# Patient Record
Sex: FEMALE | Race: WHITE | NOT HISPANIC OR LATINO | Employment: PART TIME | URBAN - METROPOLITAN AREA
[De-identification: names, ages, dates, MRNs, and addresses within clinical notes are randomized per-mention and may not be internally consistent; named-entity substitution may affect disease eponyms.]

---

## 2017-04-05 ENCOUNTER — OFFICE VISIT (OUTPATIENT)
Dept: INTERNAL MEDICINE | Facility: CLINIC | Age: 62
End: 2017-04-05
Payer: COMMERCIAL

## 2017-04-05 VITALS
HEART RATE: 68 BPM | HEIGHT: 65 IN | SYSTOLIC BLOOD PRESSURE: 120 MMHG | BODY MASS INDEX: 23.82 KG/M2 | DIASTOLIC BLOOD PRESSURE: 70 MMHG | TEMPERATURE: 98 F | WEIGHT: 143 LBS

## 2017-04-05 DIAGNOSIS — G47.00 INSOMNIA, UNSPECIFIED TYPE: ICD-10-CM

## 2017-04-05 DIAGNOSIS — F41.1 GAD (GENERALIZED ANXIETY DISORDER): ICD-10-CM

## 2017-04-05 PROCEDURE — 3078F DIAST BP <80 MM HG: CPT | Mod: S$GLB,,, | Performed by: PHYSICIAN ASSISTANT

## 2017-04-05 PROCEDURE — 99999 PR PBB SHADOW E&M-EST. PATIENT-LVL III: CPT | Mod: PBBFAC,,, | Performed by: PHYSICIAN ASSISTANT

## 2017-04-05 PROCEDURE — 99213 OFFICE O/P EST LOW 20 MIN: CPT | Mod: S$GLB,,, | Performed by: PHYSICIAN ASSISTANT

## 2017-04-05 PROCEDURE — 1160F RVW MEDS BY RX/DR IN RCRD: CPT | Mod: S$GLB,,, | Performed by: PHYSICIAN ASSISTANT

## 2017-04-05 PROCEDURE — 3074F SYST BP LT 130 MM HG: CPT | Mod: S$GLB,,, | Performed by: PHYSICIAN ASSISTANT

## 2017-04-05 RX ORDER — ALPRAZOLAM 0.25 MG/1
0.25 TABLET ORAL DAILY PRN
Qty: 30 TABLET | Refills: 0 | Status: SHIPPED | OUTPATIENT
Start: 2017-04-05 | End: 2017-09-20 | Stop reason: SDUPTHER

## 2017-04-05 NOTE — MR AVS SNAPSHOT
[place] [place]  [address] [place] [address]  Phone: [phone]  Fax: [phone]                  Shirlene Olvera   2017 10:00 AM   Office Visit    Description:  Female : 1955   Provider:  MARIA DOLORES Carias   [facility]  [place]           Reason for Visit     Headache     Ear Fullness     Nasal Congestion           Diagnoses this Visit        Comments    ROMÁN (generalized anxiety disorder)         Insomnia, unspecified type                To Do List           Future Appointments        Provider Department Dept Phone    2017 8:00 AM LABORATORY, [facility] [phone]    2017 2:40 PM Jhoana Segovia MD [facility] Medicine [phone]      Goals (5 Years of Data)     None       These Medications        Disp Refills Start End    alprazolam (XANAX) 0.25 MG tablet 30 tablet 0 2017    Take 1 tablet (0.25 mg total) by mouth daily as needed for Anxiety. - Oral    Pharmacy: [facility] in [facility], [place] - [address] [address] Ph #: [phone]       phenylephrine-guaifenesin (DECONEX IR)  mg Tab 14 each 0 2017 4/15/2017    Take 1 tablet by mouth every 12 (twelve) hours as needed. - Oral    Pharmacy: [facility] in [facility] - [address] [address] Ph #: [phone]         [facility]     [facility] Care Line -  Assistance  Unless otherwise directed by your provider, please contact [facility], our nurse care line that is available for  assistance.     Registered nurses in the [facility] provide: appointment scheduling, clinical advisement, health education, and other advisory services.  Call: [phone] (toll free)               Medications           Message regarding Medications     Verify the changes and/or additions to your medication regime listed below are the same as  discussed with your clinician today.  If any of these changes or additions are incorrect, please notify your healthcare provider.        START taking these NEW medications        Refills    phenylephrine-guaifenesin (DECONEX IR)  mg Tab 0    Sig: Take 1 tablet by mouth every 12 (twelve) hours as needed.    Class: Normal    Route: Oral      STOP taking these medications     loratadine (CLARITIN) 10 mg tablet Take 1 tablet (10 mg total) by mouth once daily.           Verify that the below list of medications is an accurate representation of the medications you are currently taking.  If none reported, the list may be blank. If incorrect, please contact your healthcare provider. Carry this list with you in case of emergency.           Current Medications     alprazolam (XANAX) 0.25 MG tablet Take 1 tablet (0.25 mg total) by mouth daily as needed for Anxiety.    aspirin 81 MG Chew Take 1 tablet (81 mg total) by mouth once daily.    atenolol (TENORMIN) 50 MG tablet TAKE 1 TABLET BY MOUTH ONCE DAILY    cyanocobalamin (VITAMIN B-12) 1000 MCG tablet Take 100 mcg by mouth once daily.    cyclobenzaprine (FLEXERIL) 10 MG tablet TAKE 1 TABLET BY MOUTH 3 TIMES DAILY IF NEEDED FOR MUSCLE SPASMS    fenofibrate 160 MG Tab Take 1 tablet (160 mg total) by mouth once daily.    fluticasone (FLONASE) 50 mcg/actuation nasal spray USE 2 SPRAYS IN EACH NOSTRIL ONCE DAILY    losartan-hydrochlorothiazide 50-12.5 mg (HYZAAR) 50-12.5 mg per tablet TAKE 1 TABLET BY MOUTH EVERY MORNING.    multivitamin with minerals tablet Take 1 tablet by mouth once daily.    tobramycin-dexamethasone (TOBRADEX ST) 0.3-0.05 % DrpS 1 drop 4 times a day for 7 days    phenylephrine-guaifenesin (DECONEX IR)  mg Tab Take 1 tablet by mouth every 12 (twelve) hours as needed.    tramadol (ULTRAM) 50 mg tablet Take 1 tablet (50 mg total) by mouth every 6 (six) hours as needed for Pain.           Clinical Reference Information           Your Vitals Were     BP  "Pulse Temp Height Weight BMI    120/70 68 97.5 °F (36.4 °C) (Tympanic) 5' 5" (1.651 m) 64.9 kg (143 lb) 23.8 kg/m2      Blood Pressure          Most Recent Value    BP  120/70      Allergies as of 4/5/2017     Statins-hmg-coa Reductase Inhibitors      Immunizations Administered on Date of Encounter - 4/5/2017     None      [facility] Sign-Up     Activating your [facility] account is as easy as 1-2-3!     1) Visit [email or web address], select Sign Up Now, enter this activation code and your date of birth, then select Next.  [email or web address]  Expires: 5/20/2017 10:39 AM      2) Create a username and password to use when you visit [facility] in the future and select a security question in case you lose your password and select Next.    3) Enter your e-mail address and click Sign Up!    Additional Information  If you have questions, please e-mail [email or web address] or call [phone] to talk to our [facility] staff. Remember, [facility] is NOT to be used for urgent needs. For medical emergencies, dial 911.         Instructions    Watch blood pressure (BP)   This medication may raise BP   Take one and see how it affects you.  Only take for a few days in a row        Smoking Cessation     If you would like to quit smoking:   You may be eligible for free services if you are a [place] resident and started smoking cigarettes before September 1, 1988.  Call the Smoking Cessation [employer] (SCT) toll free at (749) 743-1214 or (220) 936-9433.   Call 1-800-QUIT-NOW if you do not meet the above criteria.   Contact us via email: [email or web address]   View our website for more information: [email or web address]        Language Assistance Services     ATTENTION: Language assistance services are available, free of charge. Please call [phone].      ATENCIÓN: Si habla español, tiene a carlson disposición servicios gratuitos de asistencia lingüística. Llame al [phone].     CHÚ Ý: N?u b?n nói Ti?ng Vi?t, có các d?ch v? h? " tr? jose laughlin? mi?n phí dành cho b?n. G?i s? [phone].         [facility] complies with applicable Federal civil rights laws and does not discriminate on the basis of race, color, national origin, age, disability, or sex.

## 2017-04-05 NOTE — LETTER
April 5, 2017      [facility]  [address] [place] [address]  Phone: [phone]  Fax: [phone]       Patient: Shirlene Olvera   YOB: 1955  Date of Visit: 04/05/2017    To Whom It May Concern:    Shirlene Cohen was at [facility] on 04/05/2017. She May return to work on 4/6/17 with no restrictions. If you have any questions or concerns, or if I can be of further assistance, please do not hesitate to contact me.    Sincerely,    Ny Espitia LPN

## 2017-04-05 NOTE — PROGRESS NOTES
Subjective:       Patient ID: Shirlene Olvera is a 61 y.o. female.    Chief Complaint: Headache; Ear Fullness; and Nasal Congestion    Headache    This is a new problem. Associated symptoms include sinus pressure. Pertinent negatives include no abdominal pain, abnormal behavior, anorexia, back pain, blurred vision, coughing, dizziness, drainage, ear pain, eye pain, eye redness, facial sweating, fever, hearing loss, insomnia, loss of balance, muscle aches, nausea, neck pain, numbness, phonophobia, photophobia, rhinorrhea, scalp tenderness, seizures, sore throat, swollen glands, tingling, tinnitus, visual change, vomiting, weakness or weight loss.   Ear Fullness    Associated symptoms include headaches. Pertinent negatives include no abdominal pain, coughing, drainage, hearing loss, neck pain, rhinorrhea, sore throat or vomiting.   Sinus Problem   Associated symptoms include headaches and sinus pressure. Pertinent negatives include no coughing, ear pain, neck pain, sore throat or swollen glands.     Patient also needs refill of xanax. Only takes it sparingly, last refill >6 months ago   Past Medical History:   Diagnosis Date    Colon polyp     Coronary artery disease     pt states MI June 2015    Hypertension     Myocardial infarction     Tobacco use        Current Outpatient Prescriptions   Medication Sig Dispense Refill    alprazolam (XANAX) 0.25 MG tablet Take 1 tablet (0.25 mg total) by mouth daily as needed for Anxiety. 30 tablet 0    aspirin 81 MG Chew Take 1 tablet (81 mg total) by mouth once daily.  0    atenolol (TENORMIN) 50 MG tablet TAKE 1 TABLET BY MOUTH ONCE DAILY 30 tablet 11    cyanocobalamin (VITAMIN B-12) 1000 MCG tablet Take 100 mcg by mouth once daily.      cyclobenzaprine (FLEXERIL) 10 MG tablet TAKE 1 TABLET BY MOUTH 3 TIMES DAILY IF NEEDED FOR MUSCLE SPASMS 60 tablet 3    fenofibrate 160 MG Tab Take 1 tablet (160 mg total) by mouth once daily. 90 tablet 3    fluticasone  "(FLONASE) 50 mcg/actuation nasal spray USE 2 SPRAYS IN EACH NOSTRIL ONCE DAILY 16 g 11    losartan-hydrochlorothiazide 50-12.5 mg (HYZAAR) 50-12.5 mg per tablet TAKE 1 TABLET BY MOUTH EVERY MORNING. 30 tablet 11    multivitamin with minerals tablet Take 1 tablet by mouth once daily.      tobramycin-dexamethasone (TOBRADEX ST) 0.3-0.05 % DrpS 1 drop 4 times a day for 7 days 5 mL 0    phenylephrine-guaifenesin (DECONEX IR)  mg Tab Take 1 tablet by mouth every 12 (twelve) hours as needed. 14 each 0    tramadol (ULTRAM) 50 mg tablet Take 1 tablet (50 mg total) by mouth every 6 (six) hours as needed for Pain. 60 tablet 0     No current facility-administered medications for this visit.        Review of Systems   Constitutional: Negative for fever and weight loss.   HENT: Positive for sinus pressure. Negative for ear pain, hearing loss, rhinorrhea, sore throat and tinnitus.    Eyes: Negative for blurred vision, photophobia, pain and redness.   Respiratory: Negative for cough.    Gastrointestinal: Negative for abdominal pain, anorexia, nausea and vomiting.   Musculoskeletal: Negative for back pain and neck pain.   Neurological: Positive for headaches. Negative for dizziness, tingling, seizures, weakness, numbness and loss of balance.   Psychiatric/Behavioral: The patient does not have insomnia.        Objective:   /70  Pulse 68  Temp 97.5 °F (36.4 °C) (Tympanic)   Ht 5' 5" (1.651 m)  Wt 64.9 kg (143 lb)  BMI 23.8 kg/m2     Physical Exam   Constitutional: She is oriented to person, place, and time. She appears well-developed and well-nourished. No distress.   HENT:   Head: Normocephalic and atraumatic.   Right Ear: External ear normal.   Left Ear: External ear normal.   Eyes: EOM are normal. Pupils are equal, round, and reactive to light.   Cardiovascular: Normal rate, regular rhythm and normal heart sounds.    Pulmonary/Chest: Effort normal and breath sounds normal.   Neurological: She is alert and " oriented to person, place, and time.   Skin: Skin is warm.   Psychiatric: She has a normal mood and affect. Her behavior is normal. Thought content normal.         Lab Results   Component Value Date    WBC 4.98 07/10/2015    HGB 14.4 07/10/2015    HCT 43.6 07/10/2015     07/10/2015    CHOL 233 (H) 10/22/2016    TRIG 180 (H) 10/22/2016    HDL 52 10/22/2016    ALT 20 10/22/2016    AST 26 10/22/2016     10/22/2016    K 4.5 10/22/2016     10/22/2016    CREATININE 1.1 10/22/2016    BUN 19 10/22/2016    CO2 31 (H) 10/22/2016    TSH 0.895 01/26/2015    INR 0.9 05/13/2015       Assessment:       1. ROMÁN (generalized anxiety disorder)    2. Insomnia, unspecified type        Plan:   ROMÁN (generalized anxiety disorder)  -     alprazolam (XANAX) 0.25 MG tablet; Take 1 tablet (0.25 mg total) by mouth daily as needed for Anxiety.  Dispense: 30 tablet; Refill: 0    Insomnia, unspecified type  -     alprazolam (XANAX) 0.25 MG tablet; Take 1 tablet (0.25 mg total) by mouth daily as needed for Anxiety.  Dispense: 30 tablet; Refill: 0    Sinus pressure- discussed risks of this medication raising BP, take with caution and with plenty of water.   -     phenylephrine-guaifenesin (DECONEX IR)  mg Tab; Take 1 tablet by mouth every 12 (twelve) hours as needed.  Dispense: 14 each; Refill: 0

## 2017-04-05 NOTE — PATIENT INSTRUCTIONS
Watch blood pressure (BP)   This medication may raise BP   Take one and see how it affects you.  Only take for a few days in a row

## 2017-04-22 ENCOUNTER — LAB VISIT (OUTPATIENT)
Dept: LAB | Facility: HOSPITAL | Age: 62
End: 2017-04-22
Attending: FAMILY MEDICINE
Payer: COMMERCIAL

## 2017-04-22 DIAGNOSIS — E78.5 HYPERLIPIDEMIA, UNSPECIFIED HYPERLIPIDEMIA TYPE: ICD-10-CM

## 2017-04-22 DIAGNOSIS — Z72.0 TOBACCO ABUSE: ICD-10-CM

## 2017-04-22 DIAGNOSIS — I10 ESSENTIAL HYPERTENSION: ICD-10-CM

## 2017-04-22 LAB
ALBUMIN SERPL BCP-MCNC: 4.2 G/DL
ALP SERPL-CCNC: 59 U/L
ALT SERPL W/O P-5'-P-CCNC: 22 U/L
ANION GAP SERPL CALC-SCNC: 8 MMOL/L
AST SERPL-CCNC: 30 U/L
BASOPHILS # BLD AUTO: 0.06 K/UL
BASOPHILS NFR BLD: 1.1 %
BILIRUB SERPL-MCNC: 0.8 MG/DL
BUN SERPL-MCNC: 24 MG/DL
CALCIUM SERPL-MCNC: 10.3 MG/DL
CHLORIDE SERPL-SCNC: 104 MMOL/L
CHOLEST/HDLC SERPL: 4.3 {RATIO}
CO2 SERPL-SCNC: 28 MMOL/L
CREAT SERPL-MCNC: 1.5 MG/DL
DIFFERENTIAL METHOD: ABNORMAL
EOSINOPHIL # BLD AUTO: 0.1 K/UL
EOSINOPHIL NFR BLD: 2.3 %
ERYTHROCYTE [DISTWIDTH] IN BLOOD BY AUTOMATED COUNT: 12.8 %
EST. GFR  (AFRICAN AMERICAN): 42.7 ML/MIN/1.73 M^2
EST. GFR  (NON AFRICAN AMERICAN): 37.1 ML/MIN/1.73 M^2
GLUCOSE SERPL-MCNC: 98 MG/DL
HCT VFR BLD AUTO: 44.5 %
HDL/CHOLESTEROL RATIO: 23.5 %
HDLC SERPL-MCNC: 268 MG/DL
HDLC SERPL-MCNC: 63 MG/DL
HGB BLD-MCNC: 15.1 G/DL
LDLC SERPL CALC-MCNC: 179.8 MG/DL
LYMPHOCYTES # BLD AUTO: 2 K/UL
LYMPHOCYTES NFR BLD: 35.6 %
MCH RBC QN AUTO: 34.4 PG
MCHC RBC AUTO-ENTMCNC: 33.9 %
MCV RBC AUTO: 101 FL
MONOCYTES # BLD AUTO: 0.6 K/UL
MONOCYTES NFR BLD: 9.8 %
NEUTROPHILS # BLD AUTO: 2.9 K/UL
NEUTROPHILS NFR BLD: 51 %
NONHDLC SERPL-MCNC: 205 MG/DL
PLATELET # BLD AUTO: 283 K/UL
PMV BLD AUTO: 10.1 FL
POTASSIUM SERPL-SCNC: 4.7 MMOL/L
PROT SERPL-MCNC: 7.7 G/DL
RBC # BLD AUTO: 4.39 M/UL
SODIUM SERPL-SCNC: 140 MMOL/L
TRIGL SERPL-MCNC: 126 MG/DL
WBC # BLD AUTO: 5.7 K/UL

## 2017-04-22 PROCEDURE — 85025 COMPLETE CBC W/AUTO DIFF WBC: CPT

## 2017-04-22 PROCEDURE — 80053 COMPREHEN METABOLIC PANEL: CPT

## 2017-04-22 PROCEDURE — 80061 LIPID PANEL: CPT

## 2017-04-22 PROCEDURE — 36415 COLL VENOUS BLD VENIPUNCTURE: CPT | Mod: PO

## 2017-04-27 ENCOUNTER — OFFICE VISIT (OUTPATIENT)
Dept: INTERNAL MEDICINE | Facility: CLINIC | Age: 62
End: 2017-04-27
Payer: COMMERCIAL

## 2017-04-27 VITALS
WEIGHT: 147.06 LBS | SYSTOLIC BLOOD PRESSURE: 120 MMHG | DIASTOLIC BLOOD PRESSURE: 78 MMHG | TEMPERATURE: 98 F | BODY MASS INDEX: 24.5 KG/M2 | HEIGHT: 65 IN | HEART RATE: 60 BPM

## 2017-04-27 DIAGNOSIS — I10 ESSENTIAL HYPERTENSION: ICD-10-CM

## 2017-04-27 DIAGNOSIS — Z00.00 ROUTINE GENERAL MEDICAL EXAMINATION AT A HEALTH CARE FACILITY: Primary | ICD-10-CM

## 2017-04-27 DIAGNOSIS — N17.9 AKI (ACUTE KIDNEY INJURY): ICD-10-CM

## 2017-04-27 DIAGNOSIS — Z12.31 ENCOUNTER FOR SCREENING MAMMOGRAM FOR BREAST CANCER: ICD-10-CM

## 2017-04-27 DIAGNOSIS — E78.5 HYPERLIPIDEMIA, UNSPECIFIED HYPERLIPIDEMIA TYPE: ICD-10-CM

## 2017-04-27 PROCEDURE — 3074F SYST BP LT 130 MM HG: CPT | Mod: S$GLB,,, | Performed by: FAMILY MEDICINE

## 2017-04-27 PROCEDURE — 3078F DIAST BP <80 MM HG: CPT | Mod: S$GLB,,, | Performed by: FAMILY MEDICINE

## 2017-04-27 PROCEDURE — 99396 PREV VISIT EST AGE 40-64: CPT | Mod: S$GLB,,, | Performed by: FAMILY MEDICINE

## 2017-04-27 PROCEDURE — 99999 PR PBB SHADOW E&M-EST. PATIENT-LVL III: CPT | Mod: PBBFAC,,, | Performed by: FAMILY MEDICINE

## 2017-04-27 RX ORDER — FLUTICASONE PROPIONATE 50 MCG
2 SPRAY, SUSPENSION (ML) NASAL DAILY
Qty: 16 G | Refills: 11 | Status: SHIPPED | OUTPATIENT
Start: 2017-04-27 | End: 2017-10-19 | Stop reason: SDUPTHER

## 2017-04-27 RX ORDER — ROSUVASTATIN CALCIUM 5 MG/1
5 TABLET, COATED ORAL DAILY
Qty: 90 TABLET | Refills: 3 | Status: SHIPPED | OUTPATIENT
Start: 2017-04-27 | End: 2017-09-27

## 2017-04-27 NOTE — PROGRESS NOTES
Subjective:      Patient ID: Shirlene Olvera is a 61 y.o. female.    Chief Complaint: Annual Exam    HPI Comments: Patient's coming in today for follow-up of multiple issues and prevention exam.    She had a non-ST elevation MI.  We did try her on simvastatin and atorvastatin.  She couldn't afford Crestor before but now it is generic.  She's willing to try it again since the fenofibrate still is not getting her to goals.     She is working on quitting smoking.     She also has blood pressure issues currently well controlled.  No problems at this time.    She has recurrent back pain and cervical thoracic and lumbar regions with some degenerative disc.  Periodically she will use tramadol however she is remaining very active.  She has been using Motrin p.m. at night help her sleep.      She is requesting something for anxiety and mood irritation.  Xanax seemed to work very nicely helps her to get a good bit of rest.  She recently got a refill about 1 month ago.  Doing well.    Now noted to have acute kidney injury.  This is a new finding for her.  She reports she does not drink much water.  She's also been taking the Motrin p.m.  She's willing to stop.      Lab Results   Component Value Date    WBC 5.70 04/22/2017    HGB 15.1 04/22/2017    HCT 44.5 04/22/2017     04/22/2017    CHOL 268 (H) 04/22/2017    TRIG 126 04/22/2017    HDL 63 04/22/2017    ALT 22 04/22/2017    AST 30 04/22/2017     04/22/2017    K 4.7 04/22/2017     04/22/2017    CREATININE 1.5 (H) 04/22/2017    BUN 24 (H) 04/22/2017    CO2 28 04/22/2017    TSH 0.895 01/26/2015    INR 0.9 05/13/2015       Review of Systems   Constitutional: Negative for chills, fatigue and fever.   HENT: Negative for ear pain and trouble swallowing.    Eyes: Negative for pain and visual disturbance.   Respiratory: Negative for cough and shortness of breath.    Cardiovascular: Negative for chest pain and leg swelling.   Gastrointestinal: Negative for  abdominal pain, blood in stool, nausea and vomiting.   Endocrine: Negative for cold intolerance and heat intolerance.   Genitourinary: Negative for dysuria and frequency.   Musculoskeletal: Negative for joint swelling, myalgias and neck pain.   Skin: Negative for color change and rash.   Neurological: Negative for dizziness and headaches.   Psychiatric/Behavioral: Negative for behavioral problems, decreased concentration, dysphoric mood and sleep disturbance. The patient is not nervous/anxious.      Objective:     Physical Exam   Constitutional: She is oriented to person, place, and time. She appears well-developed and well-nourished.   HENT:   Head: Normocephalic and atraumatic.   Right Ear: External ear normal.   Left Ear: External ear normal.   Mouth/Throat: Oropharynx is clear and moist.   Eyes: EOM are normal.   Neck: Normal range of motion. Neck supple. No thyromegaly present.   Cardiovascular: Normal rate and regular rhythm.  Exam reveals no gallop and no friction rub.    No murmur heard.  Pulmonary/Chest: Effort normal. No respiratory distress. She has no wheezes. She has no rales.   Abdominal: Soft. Bowel sounds are normal. She exhibits no distension. There is no tenderness. There is no rebound.   Musculoskeletal: Normal range of motion. She exhibits no edema.   Lymphadenopathy:     She has no cervical adenopathy.   Neurological: She is alert and oriented to person, place, and time.   Skin: Skin is warm and dry. No rash noted.   Psychiatric: She has a normal mood and affect. Her behavior is normal. Judgment and thought content normal.   Vitals reviewed.    Assessment:     1. Routine general medical examination at a health care facility    2. Essential hypertension    3. Hyperlipidemia, unspecified hyperlipidemia type    4. SANTA (acute kidney injury)    5. Encounter for screening mammogram for breast cancer      Plan:   Shirlene was seen today for annual exam.    Diagnoses and all orders for this  visit:    Routine general medical examination at a health care facility- - labs ordered. Discussed Health Maintenance issues.       Essential hypertension- - stable, Continue with current medications and interventions. Labs reviewed.       Hyperlipidemia, unspecified hyperlipidemia type- stable, Continue with current medications and interventions. Labs reviewed.   -     Lipid panel; Future    SANTA (acute kidney injury)  Comments:  using motrin pm will stop. push more water. repeat again in 3mo.   Orders:  -     Comprehensive metabolic panel; Future    Encounter for screening mammogram for breast cancer-s [facility]acy.   -     Mammo Digital Screening Bilat with CAD; Future    Other orders  -     fluticasone (FLONASE) 50 mcg/actuation nasal spray; 2 sprays by Each Nare route once daily.  -     rosuvastatin (CRESTOR) 5 MG tablet; Take 1 tablet (5 mg total) by mouth once daily. Pt is willing to try medication again.            Return in about 1 year (around 4/27/2018) for physical.

## 2017-04-27 NOTE — MR AVS SNAPSHOT
[facility]  [address] [place] [address]  Phone: [phone]  Fax: [phone]                  Shirlene Olvera   2017 2:40 PM   Office Visit    Description:  Female : 1955   Provider:  Jhoana Segovia MD   [facility]           Reason for Visit     Annual Exam           Diagnoses this Visit        Comments    Routine general medical examination at a health care facility    -  Primary     Essential hypertension         Hyperlipidemia, unspecified hyperlipidemia type         SANTA (acute kidney injury)     using motrin pm will stop. push more water. repeat again in 3mo.     Encounter for screening mammogram for breast cancer                To Do List           Future Appointments        Provider Department Dept Phone    2017 8:45 AM [facility], [facility] [phone]    2017 10:45 AM [facility] MAMM-S[facility] [phone]      Goals (5 Years of Data)     None      Follow-Up and Disposition     Return in about 1 year (around 2018) for physical.       These Medications        Disp Refills Start End    fluticasone (FLONASE) 50 mcg/actuation nasal spray 16 g 11 2017     2 sprays by Each Nare route once daily. - Each Nare    Pharmacy: [facility] in [address], [place] Ph #: [phone]       Notes to Pharmacy: Generic For:*FLONASE 0.05% NASAL SPRAY   N O T I C E    PRESCRIPTION PREVIOUSLY AUTHORIZED BY DOCTOR:YOLY BARRERA, SEVERIANO [phone]    rosuvastatin (CRESTOR) 5 MG tablet 90 tablet 3 2017    Take 1 tablet (5 mg total) by mouth once daily. Pt is willing to try medication again. - Oral    Pharmacy: [facility] in [facility] [address] [facility] [address], [place] Ph #: [phone]         [facility]     [facility] On [facility] Nurse Care Line - / Assistance  Unless otherwise directed by your  provider, please contact [facility], our nurse care line that is available for 24/7 assistance.     Registered nurses in the [facility] provide: appointment scheduling, clinical advisement, health education, and other advisory services.  Call: [phone] (toll free)               Medications           Message regarding Medications     Verify the changes and/or additions to your medication regime listed below are the same as discussed with your clinician today.  If any of these changes or additions are incorrect, please notify your healthcare provider.        START taking these NEW medications        Refills    rosuvastatin (CRESTOR) 5 MG tablet 3    Sig: Take 1 tablet (5 mg total) by mouth once daily. Pt is willing to try medication again.    Class: Normal    Route: Oral      CHANGE how you are taking these medications     Start Taking Instead of    fluticasone (FLONASE) 50 mcg/actuation nasal spray fluticasone (FLONASE) 50 mcg/actuation nasal spray    Dosage:  2 sprays by Each Nare route once daily. Dosage:  USE 2 SPRAYS IN EACH NOSTRIL ONCE DAILY    Reason for Change:  Reorder       STOP taking these medications     fenofibrate 160 MG Tab Take 1 tablet (160 mg total) by mouth once daily.    tramadol (ULTRAM) 50 mg tablet Take 1 tablet (50 mg total) by mouth every 6 (six) hours as needed for Pain.           Verify that the below list of medications is an accurate representation of the medications you are currently taking.  If none reported, the list may be blank. If incorrect, please contact your healthcare provider. Carry this list with you in case of emergency.           Current Medications     alprazolam (XANAX) 0.25 MG tablet Take 1 tablet (0.25 mg total) by mouth daily as needed for Anxiety.    aspirin 81 MG Chew Take 1 tablet (81 mg total) by mouth once daily.    atenolol (TENORMIN) 50 MG tablet TAKE 1 TABLET BY MOUTH ONCE DAILY    cyanocobalamin (VITAMIN B-12) 1000 MCG tablet Take 100  "mcg by mouth once daily.    cyclobenzaprine (FLEXERIL) 10 MG tablet TAKE 1 TABLET BY MOUTH 3 TIMES DAILY IF NEEDED FOR MUSCLE SPASMS    fluticasone (FLONASE) 50 mcg/actuation nasal spray 2 sprays by Each Nare route once daily.    losartan-hydrochlorothiazide 50-12.5 mg (HYZAAR) 50-12.5 mg per tablet TAKE 1 TABLET BY MOUTH EVERY MORNING.    multivitamin with minerals tablet Take 1 tablet by mouth once daily.    rosuvastatin (CRESTOR) 5 MG tablet Take 1 tablet (5 mg total) by mouth once daily. Pt is willing to try medication again.    tobramycin-dexamethasone (TOBRADEX ST) 0.3-0.05 % DrpS 1 drop 4 times a day for 7 days           Clinical Reference Information           Your Vitals Were     BP Pulse Temp Height Weight BMI    120/78 60 97.8 °F (36.6 °C) (Tympanic) 5' 5" (1.651 m) 66.7 kg (147 lb 0.8 oz) 24.47 kg/m2      Blood Pressure          Most Recent Value    BP  120/78      Allergies as of 4/27/2017     Statins-hmg-coa Reductase Inhibitors      Immunizations Administered on Date of Encounter - 4/27/2017     None      Orders Placed During Today's Visit     Future Labs/Procedures Expected by Expires    Mammo Digital Screening Bilat with CAD  4/27/2017 6/28/2018    Comprehensive metabolic panel  7/19/2017 4/27/2018    Lipid panel  7/19/2017 4/27/2018      [facility] Sign-Up     Activating your [facility] account is as easy as 1-2-3!     1) Visit [email or web address], select Sign Up Now, enter this activation code and your date of birth, then select Next.  [email or web address]  Expires: 5/20/2017 10:39 AM      2) Create a username and password to use when you visit [facility] in the future and select a security question in case you lose your password and select Next.    3) Enter your e-mail address and click Sign Up!    Additional Information  If you have questions, please e-mail [email or web address] or call [phone] to talk to our [facility] staff. Remember, [facility] is NOT to be used for urgent needs. For medical " emergencies, dial 911.         Smoking Cessation     If you would like to quit smoking:   You may be eligible for free services if you are a [place] resident and started smoking cigarettes before September 1, 1988.  Call the Smoking Cessation Trust ([facility]) toll free at (224) 735-7932 or (402) 342-2600.   Call 1-800-QUIT-NOW if you do not meet the above criteria.   Contact us via email: [email or web address]   View our website for more information: [email or web address]        Language Assistance Services     ATTENTION: Language assistance services are available, free of charge. Please call [phone].      ATENCIÓN: Si habla shine, tiene a carlson disposición servicios gratuitos de asistencia lingüística. Llame al [phone].     CHÚ Ý: N?u b?n nói Ti?ng Vi?t, có các d?ch v? h? tr? ngôn ng? mi?n phí dành cho b?n. G?i s? [phone].         [facility] complies with applicable Federal civil rights laws and does not discriminate on the basis of race, color, national origin, age, disability, or sex.

## 2017-05-22 ENCOUNTER — OFFICE VISIT (OUTPATIENT)
Dept: URGENT CARE | Facility: CLINIC | Age: 62
End: 2017-05-22
Payer: COMMERCIAL

## 2017-05-22 VITALS
HEIGHT: 65 IN | HEART RATE: 60 BPM | BODY MASS INDEX: 24.94 KG/M2 | DIASTOLIC BLOOD PRESSURE: 82 MMHG | OXYGEN SATURATION: 98 % | TEMPERATURE: 98 F | SYSTOLIC BLOOD PRESSURE: 170 MMHG | WEIGHT: 149.69 LBS

## 2017-05-22 DIAGNOSIS — B37.31 YEAST VAGINITIS: Primary | ICD-10-CM

## 2017-05-22 DIAGNOSIS — N39.0 URINARY TRACT INFECTION WITH HEMATURIA, SITE UNSPECIFIED: ICD-10-CM

## 2017-05-22 DIAGNOSIS — R31.9 URINARY TRACT INFECTION WITH HEMATURIA, SITE UNSPECIFIED: ICD-10-CM

## 2017-05-22 LAB
BILIRUB SERPL-MCNC: NEGATIVE MG/DL
BLOOD URINE, POC: ABNORMAL
COLOR, POC UA: YELLOW
GLUCOSE UR QL STRIP: NORMAL
KETONES UR QL STRIP: NEGATIVE
LEUKOCYTE ESTERASE URINE, POC: ABNORMAL
NITRITE, POC UA: NEGATIVE
PH, POC UA: 5
PROTEIN, POC: ABNORMAL
SPECIFIC GRAVITY, POC UA: 1.01
UROBILINOGEN, POC UA: NORMAL

## 2017-05-22 PROCEDURE — 99999 PR PBB SHADOW E&M-EST. PATIENT-LVL IV: CPT | Mod: PBBFAC,,, | Performed by: NURSE PRACTITIONER

## 2017-05-22 PROCEDURE — 1160F RVW MEDS BY RX/DR IN RCRD: CPT | Mod: S$GLB,,, | Performed by: NURSE PRACTITIONER

## 2017-05-22 PROCEDURE — 3079F DIAST BP 80-89 MM HG: CPT | Mod: S$GLB,,, | Performed by: NURSE PRACTITIONER

## 2017-05-22 PROCEDURE — 3077F SYST BP >= 140 MM HG: CPT | Mod: S$GLB,,, | Performed by: NURSE PRACTITIONER

## 2017-05-22 PROCEDURE — 87086 URINE CULTURE/COLONY COUNT: CPT

## 2017-05-22 PROCEDURE — 99213 OFFICE O/P EST LOW 20 MIN: CPT | Mod: 25,S$GLB,, | Performed by: NURSE PRACTITIONER

## 2017-05-22 PROCEDURE — 81002 URINALYSIS NONAUTO W/O SCOPE: CPT | Mod: S$GLB,,, | Performed by: NURSE PRACTITIONER

## 2017-05-22 RX ORDER — PHENAZOPYRIDINE HYDROCHLORIDE 200 MG/1
200 TABLET, FILM COATED ORAL 3 TIMES DAILY PRN
Qty: 6 TABLET | Refills: 0 | Status: SHIPPED | OUTPATIENT
Start: 2017-05-22 | End: 2017-09-27

## 2017-05-22 RX ORDER — FLUCONAZOLE 150 MG/1
150 TABLET ORAL ONCE
Qty: 2 TABLET | Refills: 0 | Status: SHIPPED | OUTPATIENT
Start: 2017-05-22 | End: 2017-05-22

## 2017-05-22 RX ORDER — CIPROFLOXACIN 500 MG/1
500 TABLET ORAL 2 TIMES DAILY
Qty: 14 TABLET | Refills: 0 | Status: SHIPPED | OUTPATIENT
Start: 2017-05-22 | End: 2017-05-29

## 2017-05-22 NOTE — PATIENT INSTRUCTIONS
Rest  Fluids  Follow up with Primary Care Physician  If not improving       Urinary Tract Infections in Women    Urinary tract infections (UTIs) are most often caused by bacteria (germs). These bacteria enter the urinary tract. The bacteria may come from outside the body. Or they may travel from the skin outside the rectum or vagina into the urethra. Female anatomy makes it easier for bacteria from the bowel to enter a womans urinary tract, which is the most common source of UTI. This means women develop UTIs more often than men. Pain in or around the urinary tract is a common UTI symptom. But the only way to know for sure if you have a UTI for the health care provider to test your urine. The two tests that may be done are the urinalysis and urine culture.  Types of UTIs  · Cystitis: A bladder infection (cystitis) is the most common UTI in women. You may have urgent or frequent urination. You may also have pain, burning when you urinate, and bloody urine.  · Urethritis: This is an inflamed urethra, which is the tube that carries urine from the bladder to outside the body. You may have lower stomach or back pain. You may also have urgent or frequent urination.  · Pyelonephritis: This is a kidney infection. If not treated, it can be serious and damage your kidneys. In severe cases, you may be hospitalized. You may have a fever and lower back pain.  Medications to treat a UTI  Most UTIs are treated with antibiotics. These kill the bacteria. The length of time you need to take them depends on the type of infection. It may be as short as 3 days. If you have repeated UTIs, a low-dose antibiotic may be needed for several months. Take antibiotics exactly as directed. Dont stop taking them until all of the medication is gone. If you stop taking the antibiotic too soon, the infection may not go away, and you may develop a resistance to the antibiotic. This can make it much harder to treat.  Lifestyle changes to treat and  prevent UTIs  The lifestyle changes below will help get rid of your UTI. They may also help prevent future UTIs.  · Drink plenty of fluids. This includes water, juice, or other caffeine-free drinks. Fluids help flush bacteria out of your body.  · Empty your bladder. Always empty your bladder when you feel the urge to urinate. And always urinate before going to sleep. Urine that stays in your bladder can lead to infection. Try to urinate before and after sex as well.  · Practice good personal hygiene. Wipe yourself from front to back after using the toilet. This helps keep bacteria from getting into the urethra.  · Use condoms during sex. These help prevent UTIs caused by sexually transmitted bacteria. Also, avoid using spermicides during sex. These can increase the risk of UTIs. Choose other forms of birth control instead. For women who tend to get UTIs after sex, a low-dose of a preventive antibiotic may be used. Be sure to discuss this option with your health care provider.  · Follow up with your health care provider as directed. He or she may test to make sure the infection has cleared. If necessary, additional treatment may be started.  Date Last Reviewed: 9/8/2014  © 2975-0664 [employer]. [address] [place] [address], [place] [address]. All rights reserved. This information is not intended as a substitute for professional medical care. Always follow your healthcare professional's instructions.

## 2017-05-22 NOTE — PROGRESS NOTES
"Subjective:       Patient ID: Shirlene Olvera is a 61 y.o. female.    Chief Complaint: Dysuria (flank pain also )    Urinary Tract Infection    This is a new problem. Episode onset: 3 days. The problem occurs every urination. The problem has been gradually worsening. The quality of the pain is described as burning. The pain is mild. There has been no fever. There is no history of pyelonephritis. Associated symptoms include behavior changes (drinks lots of tea lately), a discharge (itching), flank pain, frequency, hesitancy and urgency. Pertinent negatives include no chills, hematuria, nausea, possible pregnancy, sweats, vomiting, weight loss, bubble bath use, constipation, rash or withholding. She has tried increased fluids for the symptoms. The treatment provided mild relief. There is no history of diabetes mellitus, kidney stones, a single kidney or urinary stasis.       BP (!) 170/82 (BP Location: Left arm, Patient Position: Sitting, BP Method: Manual)   Pulse 60   Temp 97.6 °F (36.4 °C) (Tympanic)   Ht 5' 5" (1.651 m)   Wt 67.9 kg (149 lb 11.1 oz)   SpO2 98%   BMI 24.91 kg/m²     Review of Systems   Constitutional: Negative for appetite change, chills, diaphoresis, fatigue, fever and weight loss.   HENT: Negative.    Eyes: Negative.  Negative for visual disturbance.   Respiratory: Negative for cough, chest tightness, shortness of breath and wheezing.    Cardiovascular: Negative for chest pain, palpitations and leg swelling.   Gastrointestinal: Positive for abdominal pain. Negative for abdominal distention, blood in stool, constipation, diarrhea, nausea and vomiting.   Endocrine: Negative.    Genitourinary: Positive for dysuria, flank pain, frequency, hesitancy, urgency and vaginal discharge (thick white, itching, feels like past yeast). Negative for decreased urine volume, difficulty urinating, dyspareunia, enuresis, genital sores, hematuria, menstrual problem, pelvic pain and vaginal pain. "   Musculoskeletal: Positive for back pain. Negative for joint swelling.   Skin: Negative for color change and rash.   Allergic/Immunologic: Negative for environmental allergies, food allergies and immunocompromised state.   Neurological: Negative.  Negative for dizziness, syncope, speech difficulty, light-headedness and headaches.   Hematological: Negative for adenopathy. Does not bruise/bleed easily.   Psychiatric/Behavioral: Negative for agitation, confusion and hallucinations. The patient is not nervous/anxious.        Objective:      Physical Exam   Constitutional: She is oriented to person, place, and time. She appears well-developed and well-nourished. No distress.   HENT:   Head: Normocephalic and atraumatic.   Right Ear: External ear normal.   Left Ear: External ear normal.   Nose: Nose normal.   Eyes: Conjunctivae are normal. Right eye exhibits no discharge. Left eye exhibits no discharge.   Neck: Normal range of motion.   Cardiovascular: Normal rate, regular rhythm and normal heart sounds.    No murmur heard.  Pulmonary/Chest: Effort normal and breath sounds normal. No respiratory distress. She has no wheezes. She has no rales. She exhibits no tenderness.   Abdominal: Soft. She exhibits no distension. There is no hepatosplenomegaly. There is tenderness in the periumbilical area and suprapubic area. There is no rigidity, no rebound, no guarding and no CVA tenderness. No hernia.   Musculoskeletal: Normal range of motion. She exhibits no edema or tenderness.   Neurological: She is alert and oriented to person, place, and time.   Skin: Skin is warm and dry. No rash noted. She is not diaphoretic. No erythema.   Psychiatric: She has a normal mood and affect. Her behavior is normal. Judgment and thought content normal.   Nursing note and vitals reviewed.      Assessment:       1. Yeast vaginitis    2. Urinary tract infection with hematuria, site unspecified        Plan:       Shirlene was seen today for  dysuria.    Diagnoses and all orders for this visit:    Yeast vaginitis  -     fluconazole (DIFLUCAN) 150 MG Tab; Take 1 tablet (150 mg total) by mouth once. Repeat in 3 days if symptoms still persist.    Urinary tract infection with hematuria, site unspecified  -     ciprofloxacin HCl (CIPRO) 500 MG tablet; Take 1 tablet (500 mg total) by mouth 2 (two) times daily.  -     POCT urine dipstick without microscope  -     Urine culture  -     phenazopyridine (PYRIDIUM) 200 MG tablet; Take 1 tablet (200 mg total) by mouth 3 (three) times daily as needed.    due to flank pain, offered kub to rule out kidney stone due to flank pain and blood seen in urine today. Patient wants to hold off, push fluids, and try antibiotic for infection first.   Rest  Fluids  Limit tea  If symptoms worsen or fail to improve with treatment, see your Primary Care Provider or go to the nearest Emergency Room.

## 2017-05-24 LAB
BACTERIA UR CULT: NORMAL
BACTERIA UR CULT: NORMAL

## 2017-05-25 ENCOUNTER — TELEPHONE (OUTPATIENT)
Dept: URGENT CARE | Facility: CLINIC | Age: 62
End: 2017-05-25

## 2017-05-25 NOTE — TELEPHONE ENCOUNTER
----- Message from Alanna Ohara sent at 5/25/2017 12:55 PM CDT -----  Contact: self [phone]  States that she is returning call. Please call back at [phone] leave detailed message if no answer//thank you acc

## 2017-05-25 NOTE — TELEPHONE ENCOUNTER
----- Message from Reese Cobb sent at 5/25/2017 11:45 AM CDT -----  Contact: pt  Pt is returning Nurse staff call.Pt call back [phone] to be advised thanks

## 2017-05-25 NOTE — TELEPHONE ENCOUNTER
----- Message from Tracey Seo sent at 5/25/2017 10:07 AM CDT -----  Contact: pt  Pt returning nurse call, please call pt @ [phone].

## 2017-07-17 ENCOUNTER — HOSPITAL ENCOUNTER (OUTPATIENT)
Dept: RADIOLOGY | Facility: HOSPITAL | Age: 62
Discharge: HOME | End: 2017-07-17
Attending: FAMILY MEDICINE
Payer: COMMERCIAL

## 2017-07-17 VITALS — HEIGHT: 65 IN | WEIGHT: 149 LBS | BODY MASS INDEX: 24.83 KG/M2

## 2017-07-17 DIAGNOSIS — Z12.31 ENCOUNTER FOR SCREENING MAMMOGRAM FOR BREAST CANCER: ICD-10-CM

## 2017-07-17 PROCEDURE — 77063 BREAST TOMOSYNTHESIS BI: CPT | Mod: 26,,, | Performed by: RADIOLOGY

## 2017-07-17 PROCEDURE — 77067 SCR MAMMO BI INCL CAD: CPT | Mod: TC

## 2017-07-17 PROCEDURE — 77067 SCR MAMMO BI INCL CAD: CPT | Mod: 26,,, | Performed by: RADIOLOGY

## 2017-07-18 ENCOUNTER — TELEPHONE (OUTPATIENT)
Dept: INTERNAL MEDICINE | Facility: CLINIC | Age: 62
End: 2017-07-18

## 2017-07-18 NOTE — TELEPHONE ENCOUNTER
----- Message from Shyla Huerta sent at 7/18/2017  2:32 PM CDT -----  Contact: patient  Returning your call. Please call patient @ [phone]. Thanks, roosevelt

## 2017-07-22 ENCOUNTER — LAB VISIT (OUTPATIENT)
Dept: LAB | Facility: HOSPITAL | Age: 62
End: 2017-07-22
Attending: FAMILY MEDICINE
Payer: COMMERCIAL

## 2017-07-22 DIAGNOSIS — N17.9 AKI (ACUTE KIDNEY INJURY): ICD-10-CM

## 2017-07-22 DIAGNOSIS — E78.5 HYPERLIPIDEMIA, UNSPECIFIED HYPERLIPIDEMIA TYPE: ICD-10-CM

## 2017-07-22 LAB
ALBUMIN SERPL BCP-MCNC: 3.6 G/DL
ALP SERPL-CCNC: 78 U/L
ALT SERPL W/O P-5'-P-CCNC: 10 U/L
ANION GAP SERPL CALC-SCNC: 9 MMOL/L
AST SERPL-CCNC: 17 U/L
BILIRUB SERPL-MCNC: 1.2 MG/DL
BUN SERPL-MCNC: 12 MG/DL
CALCIUM SERPL-MCNC: 9.6 MG/DL
CHLORIDE SERPL-SCNC: 107 MMOL/L
CHOLEST/HDLC SERPL: 6 {RATIO}
CO2 SERPL-SCNC: 26 MMOL/L
CREAT SERPL-MCNC: 0.9 MG/DL
EST. GFR  (AFRICAN AMERICAN): >60 ML/MIN/1.73 M^2
EST. GFR  (NON AFRICAN AMERICAN): >60 ML/MIN/1.73 M^2
GLUCOSE SERPL-MCNC: 91 MG/DL
HDL/CHOLESTEROL RATIO: 16.7 %
HDLC SERPL-MCNC: 252 MG/DL
HDLC SERPL-MCNC: 42 MG/DL
LDLC SERPL CALC-MCNC: 172 MG/DL
NONHDLC SERPL-MCNC: 210 MG/DL
POTASSIUM SERPL-SCNC: 4.4 MMOL/L
PROT SERPL-MCNC: 6.9 G/DL
SODIUM SERPL-SCNC: 142 MMOL/L
TRIGL SERPL-MCNC: 190 MG/DL

## 2017-07-22 PROCEDURE — 80053 COMPREHEN METABOLIC PANEL: CPT

## 2017-07-22 PROCEDURE — 80061 LIPID PANEL: CPT

## 2017-07-22 PROCEDURE — 36415 COLL VENOUS BLD VENIPUNCTURE: CPT | Mod: PO

## 2017-07-24 ENCOUNTER — TELEPHONE (OUTPATIENT)
Dept: INTERNAL MEDICINE | Facility: CLINIC | Age: 62
End: 2017-07-24

## 2017-07-24 NOTE — TELEPHONE ENCOUNTER
----- Message from Thang Eason sent at 7/24/2017  1:13 PM CDT -----  Contact: Pt  Pt states she is returning nurse call, please contact pt at [phone]

## 2017-07-24 NOTE — PROGRESS NOTES
Please find out if she is taking the crestor 5mg for her cholesterol. No major change in her cholesterol numbers.

## 2017-09-20 DIAGNOSIS — M50.30 DDD (DEGENERATIVE DISC DISEASE), CERVICAL: ICD-10-CM

## 2017-09-20 DIAGNOSIS — I10 UNSPECIFIED ESSENTIAL HYPERTENSION: ICD-10-CM

## 2017-09-20 DIAGNOSIS — G47.00 INSOMNIA, UNSPECIFIED TYPE: ICD-10-CM

## 2017-09-20 DIAGNOSIS — F41.1 GAD (GENERALIZED ANXIETY DISORDER): ICD-10-CM

## 2017-09-20 DIAGNOSIS — M51.36 DDD (DEGENERATIVE DISC DISEASE), LUMBAR: ICD-10-CM

## 2017-09-20 RX ORDER — METOPROLOL SUCCINATE 50 MG/1
50 TABLET, EXTENDED RELEASE ORAL DAILY
Qty: 30 TABLET | Refills: 11 | Status: SHIPPED | OUTPATIENT
Start: 2017-09-20 | End: 2017-10-12

## 2017-09-20 RX ORDER — ATENOLOL 50 MG/1
50 TABLET ORAL DAILY
Qty: 30 TABLET | Refills: 11 | Status: CANCELLED | OUTPATIENT
Start: 2017-09-20

## 2017-09-20 RX ORDER — ALPRAZOLAM 0.25 MG/1
0.25 TABLET ORAL DAILY PRN
Qty: 30 TABLET | Refills: 0 | Status: SHIPPED | OUTPATIENT
Start: 2017-09-20 | End: 2018-08-11

## 2017-09-20 RX ORDER — CYCLOBENZAPRINE HCL 10 MG
TABLET ORAL
Qty: 60 TABLET | Refills: 3 | Status: SHIPPED | OUTPATIENT
Start: 2017-09-20 | End: 2018-10-18 | Stop reason: SDUPTHER

## 2017-09-20 NOTE — TELEPHONE ENCOUNTER
----- Message from Alanna Edouardstephanieisak sent at 9/20/2017 10:33 AM CDT -----  Contact: self [phone]  1. What is the name of the medication you are requesting? atenolol  2. What is the dose? 50mg  3. How do you take the medication? Orally, topically, etc? orally  4. How often do you take this medication? daily  5. Do you need a 30 day or 90 day supply? 30 day  6. How many refills are you requesting? 1  7. What is your preferred pharmacy and location of the pharmacy? [facility] in [place] on [facility] [address].   8. Who can we contact with further questions? Pt [phone]    1. What is the name of the medication you are requesting? flexaril  2. What is the dose? 10mg  3. How do you take the medication? Orally, topically, etc? orally  4. How often do you take this medication? 3x daily as needed  5. Do you need a 30 day or 90 day supply? 30 day  6. How many refills are you requesting? 1  7. What is your preferred pharmacy and location of the pharmacy? [facility] in [place] on [facility] [address].   8. Who can we contact with further questions? Pt [phone]    1. What is the name of the medication you are requesting? xanax  2. What is the dose? 10mg  3. How do you take the medication? Orally, topically, etc? orally  4. How often do you take this medication? Daily as needed  5. Do you need a 30 day or 90 day supply? 30 day  6. How many refills are you requesting? 1  7. What is your preferred pharmacy and location of the pharmacy? [facility] in [place] on [facility] [address].   8. Who can we contact with further questions? Pt [phone]

## 2017-09-26 ENCOUNTER — OFFICE VISIT (OUTPATIENT)
Dept: URGENT CARE | Facility: CLINIC | Age: 62
End: 2017-09-26
Payer: COMMERCIAL

## 2017-09-26 ENCOUNTER — TELEPHONE (OUTPATIENT)
Dept: URGENT CARE | Facility: CLINIC | Age: 62
End: 2017-09-26

## 2017-09-26 DIAGNOSIS — H92.03 EAR PAIN, BILATERAL: ICD-10-CM

## 2017-09-26 DIAGNOSIS — G89.29 CHRONIC LOW BACK PAIN WITH SCIATICA, SCIATICA LATERALITY UNSPECIFIED, UNSPECIFIED BACK PAIN LATERALITY: Primary | ICD-10-CM

## 2017-09-26 DIAGNOSIS — H91.93 BILATERAL HEARING LOSS, UNSPECIFIED HEARING LOSS TYPE: ICD-10-CM

## 2017-09-26 DIAGNOSIS — M54.40 CHRONIC LOW BACK PAIN WITH SCIATICA, SCIATICA LATERALITY UNSPECIFIED, UNSPECIFIED BACK PAIN LATERALITY: Primary | ICD-10-CM

## 2017-09-26 DIAGNOSIS — H60.91 OTITIS EXTERNA OF RIGHT EAR, UNSPECIFIED CHRONICITY, UNSPECIFIED TYPE: ICD-10-CM

## 2017-09-26 PROCEDURE — 96372 THER/PROPH/DIAG INJ SC/IM: CPT | Mod: S$GLB,,, | Performed by: NURSE PRACTITIONER

## 2017-09-26 PROCEDURE — 3008F BODY MASS INDEX DOCD: CPT | Mod: S$GLB,,, | Performed by: NURSE PRACTITIONER

## 2017-09-26 PROCEDURE — 99999 PR PBB SHADOW E&M-EST. PATIENT-LVL III: CPT | Mod: PBBFAC,,, | Performed by: NURSE PRACTITIONER

## 2017-09-26 PROCEDURE — 99214 OFFICE O/P EST MOD 30 MIN: CPT | Mod: 25,S$GLB,, | Performed by: NURSE PRACTITIONER

## 2017-09-26 RX ORDER — NEOMYCIN SULFATE, POLYMYXIN B SULFATE AND HYDROCORTISONE 10; 3.5; 1 MG/ML; MG/ML; [USP'U]/ML
3 SUSPENSION/ DROPS AURICULAR (OTIC) 3 TIMES DAILY
Qty: 10 ML | OUTPATIENT
Start: 2017-09-26 | End: 2017-09-27

## 2017-09-26 RX ORDER — KETOROLAC TROMETHAMINE 30 MG/ML
60 INJECTION, SOLUTION INTRAMUSCULAR; INTRAVENOUS
Status: COMPLETED | OUTPATIENT
Start: 2017-09-26 | End: 2017-09-26

## 2017-09-26 RX ORDER — NEOMYCIN SULFATE, POLYMYXIN B SULFATE AND HYDROCORTISONE 10; 3.5; 1 MG/ML; MG/ML; [USP'U]/ML
3 SUSPENSION/ DROPS AURICULAR (OTIC) 3 TIMES DAILY
Qty: 10 ML | Status: SHIPPED | OUTPATIENT
Start: 2017-09-26 | End: 2017-09-26 | Stop reason: SDUPTHER

## 2017-09-26 RX ADMIN — KETOROLAC TROMETHAMINE 60 MG: 30 INJECTION, SOLUTION INTRAMUSCULAR; INTRAVENOUS at 10:09

## 2017-09-26 NOTE — PATIENT INSTRUCTIONS
Causes of Lumbar (Low Back) Pain  Low back pain can be caused by problems with any part of the lumbar spine. A disk can herniate (push out) and press on a nerve. Vertebrae can rub against each other or slip out of place. This can irritate facet joints and nerves. It can also lead to stenosis, a narrowing of the spinal canal or foramen.  Pressure from a disk  Constant wear and tear on a disk can cause it to weaken and push outward. Part of the disk may then press on nearby nerves. There are two common types of herniated disks:  Contained means the soft nucleus is protruding outward.   Extruded means the firm annulus has torn, letting the soft center squeeze through.     Pressure from bone  An unstable spine   With age, a disk may thin and wear out. Vertebrae above and below the disk may begin to touch. This can put pressure on nerves. It can also cause bone spurs (growths) to form where the bones rub together.    Stenosis results when bone spurs narrow the foramen or spinal canal. This also puts pressure on nerves. Slipping vertebrae can irritate nerves and joints. They can also worsen stenosis.    In some cases, vertebrae become unstable and slip forward. This is called spondylolisthesis.     Date Last Reviewed: 10/12/2015  © 4341-8709 [employer]. [address] [place] [address], [place] [address]. All rights reserved. This information is not intended as a substitute for professional medical care. Always follow your healthcare professional's instructions.

## 2017-09-26 NOTE — PROGRESS NOTES
Subjective:       Patient ID: Shirlene Olvera is a 62 y.o. female.    Chief Complaint: Back Pain    62 year old female presents to Urgent Care with reports of lower back pain that has been present for about 2 days. Patient also reports right ear pain. Denies any other problems or concerns at this time.  Patient reports that she was in pain management but has not seen her physician in a while.       Otalgia    There is pain in both ears. This is a new problem. The current episode started in the past 7 days. The problem occurs every few minutes. The problem has been unchanged. There has been no fever. The pain is at a severity of 2/10. The pain is mild. Pertinent negatives include no abdominal pain, diarrhea, headaches, rash, rhinorrhea, sore throat or vomiting. She has tried nothing for the symptoms. The treatment provided no relief.   Back Pain   This is a new problem. The current episode started yesterday. The problem occurs constantly. The problem is unchanged. The pain is present in the lumbar spine. The pain radiates to the right thigh. The pain is at a severity of 4/10. The pain is moderate. The pain is the same all the time. Pertinent negatives include no abdominal pain, chest pain, dysuria, fever, headaches, numbness or weakness.     Review of Systems   Constitutional: Negative for appetite change, chills and fever.   HENT: Positive for ear pain. Negative for rhinorrhea, sinus pressure, sore throat and trouble swallowing.    Eyes: Negative for visual disturbance.   Respiratory: Negative for shortness of breath.    Cardiovascular: Negative for chest pain.   Gastrointestinal: Negative for abdominal pain, diarrhea, nausea and vomiting.   Endocrine: Negative for cold intolerance, polyphagia and polyuria.   Genitourinary: Negative for decreased urine volume and dysuria.   Musculoskeletal: Positive for back pain.   Skin: Negative for rash.   Allergic/Immunologic: Negative for environmental allergies and food  allergies.   Neurological: Negative for dizziness, tremors, weakness, numbness and headaches.   Hematological: Does not bruise/bleed easily.   Psychiatric/Behavioral: Negative for confusion and hallucinations. The patient is not nervous/anxious and is not hyperactive.    All other systems reviewed and are negative.      Objective:     Physical Exam   Constitutional: She is oriented to person, place, and time. She appears well-developed and well-nourished.   HENT:   Head: Normocephalic and atraumatic.   Right Ear: External ear normal.   Left Ear: External ear normal.   Ears:    Nose: Nose normal.   Mouth/Throat: Oropharynx is clear and moist.   Eyes: Conjunctivae and EOM are normal. Pupils are equal, round, and reactive to light.   Neck: Normal range of motion. Neck supple.   Cardiovascular: Normal rate, regular rhythm, normal heart sounds and intact distal pulses.    No murmur heard.  Pulmonary/Chest: Effort normal and breath sounds normal. She has no wheezes.   Abdominal: Soft. Bowel sounds are normal. There is no tenderness.   Musculoskeletal: Normal range of motion.        Lumbar back: She exhibits tenderness and spasm.        Back:    Neurological: She is alert and oriented to person, place, and time. She has normal reflexes.   Skin: Skin is warm and dry. No rash noted.   Psychiatric: She has a normal mood and affect. Her behavior is normal. Judgment and thought content normal.   Nursing note and vitals reviewed.    Assessment:     1. Chronic low back pain with sciatica, sciatica laterality unspecified, unspecified back pain laterality    2. Otitis externa of right ear, unspecified chronicity, unspecified type    3. Ear pain, bilateral    4. Bilateral hearing loss, unspecified hearing loss type      Plan:   Chronic low back pain with sciatica, sciatica laterality unspecified, unspecified back pain laterality    Otitis externa of right ear, unspecified chronicity, unspecified type    Ear pain, bilateral  -      Ambulatory referral to ENT    Bilateral hearing loss, unspecified hearing loss type    Other orders  -     Discontinue: neomycin-polymyxin-hydrocortisone (CORTISPORIN) 3.5-10,000-1 mg/mL-unit/mL-% otic suspension; Place 3 drops into both ears 3 (three) times daily.  Dispense: 10 mL; Refill: ml  -     ketorolac injection 60 mg; Inject 2 mLs (60 mg total) into the muscle one time.  -     neomycin-polymyxin-hydrocortisone (CORTISPORIN) 3.5-10,000-1 mg/mL-unit/mL-% otic suspension; Place 3 drops into both ears 3 (three) times daily.  Dispense: 10 mL; Refill: ml

## 2017-09-26 NOTE — TELEPHONE ENCOUNTER
----- Message from Britni Mayfield sent at 9/26/2017 12:29 PM CDT -----  Pt needs to know if her antibiotics has been called in /please call [phone]/ma

## 2017-09-27 ENCOUNTER — OFFICE VISIT (OUTPATIENT)
Dept: OTOLARYNGOLOGY | Facility: CLINIC | Age: 62
End: 2017-09-27
Payer: COMMERCIAL

## 2017-09-27 VITALS
DIASTOLIC BLOOD PRESSURE: 88 MMHG | SYSTOLIC BLOOD PRESSURE: 170 MMHG | HEART RATE: 65 BPM | BODY MASS INDEX: 24.51 KG/M2 | TEMPERATURE: 98 F | WEIGHT: 147.25 LBS

## 2017-09-27 DIAGNOSIS — H93.13 TINNITUS, BILATERAL: ICD-10-CM

## 2017-09-27 DIAGNOSIS — H91.90 PERCEIVED HEARING CHANGES: ICD-10-CM

## 2017-09-27 DIAGNOSIS — H60.63 CHRONIC OTITIS EXTERNA OF BOTH EARS, UNSPECIFIED TYPE: Primary | ICD-10-CM

## 2017-09-27 PROCEDURE — 99999 PR PBB SHADOW E&M-EST. PATIENT-LVL III: CPT | Mod: PBBFAC,,, | Performed by: PHYSICIAN ASSISTANT

## 2017-09-27 PROCEDURE — 99243 OFF/OP CNSLTJ NEW/EST LOW 30: CPT | Mod: S$GLB,,, | Performed by: PHYSICIAN ASSISTANT

## 2017-09-27 NOTE — LETTER
September 27, 2017      Erick Christiansen, [facility]  [address] [address]           [facility]  [address] [address]  Phone: [phone]  Fax: [phone]          Patient: Shirlene Olvera   MR Number: [id number]   YOB: 1955   Date of Visit: 9/27/2017       Dear Erick Christiansen:    Thank you for referring Shirlene Olvera to me for evaluation. Attached you will find relevant portions of my assessment and plan of care.    If you have questions, please do not hesitate to call me. I look forward to following Shirlene Olvera along with you.    Sincerely,    Carlota Ulrich PA-C    Enclosure  CC:  No Recipients    If you would like to receive this communication electronically, please contact [email or web address] or [phone] to request more information on [employer] [facility] access.    For providers and/or their staff who would like to refer a patient to [facility], please contact us through our one-stop-shop provider referral line, [facility], at [phone].    If you feel you have received this communication in error or would no longer like to receive these types of communications, please e-mail [email or web address]

## 2017-09-27 NOTE — PROGRESS NOTES
Referring Provider:    Erick Fairbanks, [facility]  [address] [place] [address]  Subjective:   Patient: Shirlene Olvera [id number], :1955   Visit date:2017 12:01 PM    Chief Complaint:  Otalgia and Ear Fullness    HPI:  Shirlene is a 62 y.o. female who I was asked to see in consultation for evaluation of the following issue(s): otalgia AU.  Patient reports issues with dry, itchy ears for the past 4-6 months.  She using Qtips to scratch them and to apply topical Vaseline with no relief.  [facility] over past 4 months and has tried ear drops (? Not sure which) without sustained relief.  She was seen at  yesterday and Cortisporin was prescribed (not yet started it).  She says her ears feel stopped up and swollen, R>L.  Her hearing seems diminished in both ears.  She has tinnitus AU for years.  Denies ear drainage other than wax on Qtips.  She says her equilibrium has been off since her ears have been stopped up.  Denies vertigo or spinning.  Denies fever.  She has nasal congestion and clear runny nose and uses Flonase as needed (not daily).  She smokes, 0.5 packs per day for 40 years.  She saw ENT in past many years ago and Dermotic was prescribed; not used recently.  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      Review of Systems:  Negative unless checked off.  Gen:  []fever   [x]fatigue  HENT:  []nosebleeds  []dental problem   Eyes:  []photophobia  []visual disturbance  Resp:  []chest tightness []wheezing  Card:  []chest pain  []leg swelling  GI:  []abdominal pain []blood in stool  :  []dysuria  []hematuria  Musc:  []joint swelling  [x]gait problem - feels off balance since her ears are stopped up  Skin:  []color change  []pallor  Neuro:  []seizures  []numbness  Hem:  []bruise/bleed easily  Psych:  []hallucinations  []behavioral problems  Allergy/Imm: is allergic to  statins-hmg-coa reductase inhibitors.    Her meds, allergies, medical, surgical, social & family histories were reviewed & updated:  -     She has a current medication list which includes the following prescription(s): alprazolam, aspirin, cyanocobalamin, cyclobenzaprine, fluticasone, losartan-hydrochlorothiazide 50-12.5 mg, metoprolol succinate, multivitamin with minerals, and tobramycin-dexamethasone.  -     She  has a past medical history of Colon polyp; Coronary artery disease; Hypertension; Myocardial infarction; and Tobacco use.   -     She  does not have any pertinent problems on file.   -     She  has a past surgical history that includes Rib fracture surgery; high blood; Hysterectomy; Cholecystectomy (09/03/2015); and Colonoscopy (N/A, 10/11/2016).  -     She  reports that she has been smoking.  She has been smoking about 0.50 packs per day. She has never used smokeless tobacco. She reports that she drinks alcohol. She reports that she does not use drugs.  -     Her family history includes Heart attack (age of onset: 56) in her father.  -     She is allergic to statins-hmg-coa reductase inhibitors.    Objective:     Physical Exam:  Vitals:  BP (!) 170/88   Pulse 65   Temp 98.4 °F (36.9 °C) (Tympanic)   Wt 66.8 kg (147 lb 4.3 oz)   BMI 24.51 kg/m²   General appearance:  Well developed, well nourished    Eyes:  Extraocular motions intact, PERRL.  Eyeglasses    Communication:  no hoarseness, no dysphonia    Ears:  Otoscopy of external auditory canal shows significant edema and erythema with white debris and cerumen (removal described below) clinical speech reception thresholds grossly intact, no mass/lesion of auricle.  Nose:  No masses/lesions of external nose, nasal mucosa, septum, and turbinates were within normal limits.  Mouth:  No mass/lesion of lips, teeth, gums, hard/soft palate, tongue, tonsils, or oropharynx.    Cardiovascular:   Radial Pulses +2     Neck & Lymphatics:  No cervical  lymphadenopathy, no neck mass/crepitus/ asymmetry, trachea is midline, no thyroid enlargement/tenderness/mass.    Psych: Oriented x3,  Alert with normal mood and affect.     Respiration/Chest:  Symmetric expansion during respiration, normal respiratory effort.    Skin:  Warm and intact. No ulcerations of face, scalp, neck.        Procedure Note    CHIEF COMPLAINT:  Cerumen Impaction    Description:  The patient was seated in an exam chair.  An ear speculum was placed in the right EAC and was examined under the microscope.  Suction (5Fr) was used to remove white debris.  The tympanic membrane was partially visualized and was normal in appearance.  The procedure was repeated on the left side in a similar fashion and more cerumen removed on this side.  The TM was intact and normal on this side as well.  The patient tolerated the procedure well.      Assessment & Plan:   Shirlene was seen today for otalgia and ear fullness.    Diagnoses and all orders for this visit:    Chronic otitis externa of both ears, unspecified type    Tinnitus, bilateral    Perceived hearing changes      OTITIS EXTERNA-  Shirlene has evidence of bilateral otitis externa.  This was visualized after removal of cerumen.  We discussed the need for dry ear precautions.  For maintenance therapy, I recommend a mixture of distilled vinegar and alcohol.  For acute infection, antibiotic therapy is needed.  For Shirlene Olvera I recommend Cortisporin as prescribed yesterday.   RTC in 2 weeks for recheck.  She likely has component of eczematoid OE that is now acutely infected.  She may benefit from Dermotic once acute infection resolves.  Audiogram once acute infection resolves.    We also discussed that tinnitus is most often caused by a hearing loss, and that as the hair cells are damaged, either genetic or as a result of loud noise exposure, they then cause tinnitus.  Some patients find that restricting the salt or caffeine in their diet helps, and there  is also an OTC supplement, lipoflavinoids, that some people find to be effective though their benefit is not fully proven.  Tinnitus tends to be louder in times of stress and fatigue, and may decrease with time.  Sound machines may also be an effective masking technique if needed at night.      We discussed her medical conditions, treatments and plan.  Shirlene should return to clinic if any issues arise (symptoms worsen or persist), otherwise we will see her back in the clinic In 2 weeks.    Thank you for allowing me to participate in the care of Shirlene.  Report returned via [facility].

## 2017-10-03 ENCOUNTER — TELEPHONE (OUTPATIENT)
Dept: OTOLARYNGOLOGY | Facility: CLINIC | Age: 62
End: 2017-10-03

## 2017-10-03 RX ORDER — AMOXICILLIN AND CLAVULANATE POTASSIUM 875; 125 MG/1; MG/1
1 TABLET, FILM COATED ORAL 2 TIMES DAILY
Qty: 20 TABLET | Refills: 0 | Status: SHIPPED | OUTPATIENT
Start: 2017-10-03 | End: 2017-10-13

## 2017-10-03 NOTE — TELEPHONE ENCOUNTER
----- Message from Khushboo Ulrich sent at 10/3/2017  2:14 PM CDT -----  Contact: pt  The pt states she needs a antibiotic called in for her ears, pt request a call to [phone]///[id number]

## 2017-10-03 NOTE — TELEPHONE ENCOUNTER
Spoke with pt. States her ears continue to ring and she is unable walk and move around without feeling dizzy. She would like to know if any other measures can be taken to reduce symptoms before next visit. Advised will discuss with provider and return call with further instructions. Verbalized understanding. Call ended well.

## 2017-10-04 ENCOUNTER — TELEPHONE (OUTPATIENT)
Dept: INTERNAL MEDICINE | Facility: CLINIC | Age: 62
End: 2017-10-04

## 2017-10-04 NOTE — TELEPHONE ENCOUNTER
Med was changed from atenolol to metoprolol because of national backorder so atenolol isn't available currently. Have her come in to be seen to recheck pulse rate and Bp, can be ov with me or Juanita to adjust the meds. Have her check bp and her heart rate at home 1-2 times per day and bring in copies with her to visit.

## 2017-10-04 NOTE — TELEPHONE ENCOUNTER
----- Message from Austin Gray sent at 10/4/2017  1:37 PM CDT -----  Contact: Pt  Pt needs to speak with nurse regarding the BP medication. Pt states the medicine has her very fatigue. Please give pt a call at ..[phone] (home)

## 2017-10-04 NOTE — TELEPHONE ENCOUNTER
Called pt, she stated that her blood pressure medicine was recently changed. She has been taking it since she has been given it. She stated that she takes it at night and ever since then she is so tired and sleepy. She sleeps half the day. No energy at all. She wants to know if she can go back to previous medicine?

## 2017-10-05 NOTE — TELEPHONE ENCOUNTER
Called pt, notified of the reason the change was made. She verbalize understanding. She stated she is not taking it at all right now. Wants to know if she should try it again or try half or if is okay to go without? She is taking the losartan-hctz still. She is booked to see Juanita on Tuesday.

## 2017-10-05 NOTE — TELEPHONE ENCOUNTER
Can go without it for now if BP is staying under 140/85. If not staying under those levels then add back half tablet daily.

## 2017-10-12 ENCOUNTER — OFFICE VISIT (OUTPATIENT)
Dept: INTERNAL MEDICINE | Facility: CLINIC | Age: 62
End: 2017-10-12
Payer: COMMERCIAL

## 2017-10-12 VITALS
HEIGHT: 65 IN | BODY MASS INDEX: 24.16 KG/M2 | TEMPERATURE: 97 F | WEIGHT: 145 LBS | SYSTOLIC BLOOD PRESSURE: 106 MMHG | HEART RATE: 84 BPM | DIASTOLIC BLOOD PRESSURE: 70 MMHG

## 2017-10-12 DIAGNOSIS — I25.10 CORONARY ARTERY DISEASE INVOLVING NATIVE CORONARY ARTERY OF NATIVE HEART WITHOUT ANGINA PECTORIS: Primary | ICD-10-CM

## 2017-10-12 DIAGNOSIS — I21.4 NSTEMI (NON-ST ELEVATED MYOCARDIAL INFARCTION): ICD-10-CM

## 2017-10-12 DIAGNOSIS — I10 ESSENTIAL HYPERTENSION: ICD-10-CM

## 2017-10-12 PROCEDURE — 99213 OFFICE O/P EST LOW 20 MIN: CPT | Mod: S$GLB,,, | Performed by: PHYSICIAN ASSISTANT

## 2017-10-12 PROCEDURE — 99999 PR PBB SHADOW E&M-EST. PATIENT-LVL III: CPT | Mod: PBBFAC,,, | Performed by: PHYSICIAN ASSISTANT

## 2017-10-12 RX ORDER — NEBIVOLOL 5 MG/1
10 TABLET ORAL DAILY
Qty: 30 TABLET | Refills: 2 | Status: SHIPPED | OUTPATIENT
Start: 2017-10-12 | End: 2017-12-14 | Stop reason: SDUPTHER

## 2017-10-12 NOTE — PROGRESS NOTES
Subjective:       Patient ID: Shirlene Olvera is a 62 y.o. female.    Chief Complaint: Follow-up    HPI  Patient comes in today for follow up BP  She has been having some issue with metorpolol since change from atenolol   She is not liking metorpolol, having dizzy spells, feeling very tired.   Concern BP is going too low.   She states that she is checking it at home and it is fluctuating from too high (140s/90s to very low 105/60)   No cp, no shortness of breath   She does have hypertension, CAD, history of HI, tobacco use.   Past Medical History:   Diagnosis Date    Colon polyp     Coronary artery disease     pt states MI June 2015    Hypertension     Myocardial infarction     Tobacco use        Current Outpatient Prescriptions   Medication Sig Dispense Refill    alprazolam (XANAX) 0.25 MG tablet Take 1 tablet (0.25 mg total) by mouth daily as needed for Anxiety. 30 tablet 0    cyanocobalamin (VITAMIN B-12) 1000 MCG tablet Take 100 mcg by mouth once daily.      cyclobenzaprine (FLEXERIL) 10 MG tablet TAKE 1 TABLET BY MOUTH 3 TIMES DAILY IF NEEDED FOR MUSCLE SPASMS 60 tablet 3    fluticasone (FLONASE) 50 mcg/actuation nasal spray 2 sprays by Each Nare route once daily. 16 g 11    losartan-hydrochlorothiazide 50-12.5 mg (HYZAAR) 50-12.5 mg per tablet TAKE 1 TABLET BY MOUTH EVERY MORNING. 30 tablet 11    multivitamin with minerals tablet Take 1 tablet by mouth once daily.      tobramycin-dexamethasone (TOBRADEX ST) 0.3-0.05 % DrpS 1 drop 4 times a day for 7 days 5 mL 0    aspirin 81 MG Chew Take 1 tablet (81 mg total) by mouth once daily.  0    nebivolol (BYSTOLIC) 5 MG Tab Take 2 tablets (10 mg total) by mouth once daily. 30 tablet 2     No current facility-administered medications for this visit.        Review of Systems   Constitutional: Positive for fatigue.   HENT: Negative.    Eyes: Negative.    Respiratory: Negative.    Gastrointestinal: Negative.    Endocrine: Negative.    Genitourinary:  "Negative.    Musculoskeletal: Negative.    Allergic/Immunologic: Negative.    Neurological: Positive for dizziness. Negative for weakness.   Hematological: Negative.    Psychiatric/Behavioral: Negative.        Objective:   /70   Pulse 84   Temp 97.2 °F (36.2 °C) (Tympanic)   Ht 5' 5" (1.651 m)   Wt 65.8 kg (145 lb)   BMI 24.13 kg/m²      Physical Exam   Constitutional: She is oriented to person, place, and time. She appears well-developed and well-nourished. No distress.   HENT:   Head: Normocephalic and atraumatic.   Right Ear: External ear normal.   Left Ear: External ear normal.   Nose: Nose normal.   Mouth/Throat: Oropharynx is clear and moist.   Eyes: Conjunctivae and EOM are normal. Pupils are equal, round, and reactive to light.   Cardiovascular: Normal rate, regular rhythm, normal heart sounds and intact distal pulses.    Pulmonary/Chest: Effort normal and breath sounds normal.   Neurological: She is alert and oriented to person, place, and time.   Skin: Skin is warm.         Lab Results   Component Value Date    WBC 5.70 04/22/2017    HGB 15.1 04/22/2017    HCT 44.5 04/22/2017     04/22/2017    CHOL 252 (H) 07/22/2017    TRIG 190 (H) 07/22/2017    HDL 42 07/22/2017    ALT 10 07/22/2017    AST 17 07/22/2017     07/22/2017    K 4.4 07/22/2017     07/22/2017    CREATININE 0.9 07/22/2017    BUN 12 07/22/2017    CO2 26 07/22/2017    TSH 0.895 01/26/2015    INR 0.9 05/13/2015       Assessment:       1. Coronary artery disease involving native coronary artery of native heart without angina pectoris    2. NSTEMI (non-ST elevated myocardial infarction)    3. Essential hypertension        Plan:   Coronary artery disease involving native coronary artery of native heart without angina pectoris    NSTEMI (non-ST elevated myocardial infarction)    Essential hypertension- Patient not liking metorpolol, change to bystolic. Atenolol still not available Start with 1 pill daily, may increase if " BPs still running high   Follow up 2 weeks     Other orders  -     nebivolol (BYSTOLIC) 5 MG Tab; Take 2 tablets (10 mg total) by mouth once daily.  Dispense: 30 tablet; Refill: 2

## 2017-10-19 ENCOUNTER — OFFICE VISIT (OUTPATIENT)
Dept: OTOLARYNGOLOGY | Facility: CLINIC | Age: 62
End: 2017-10-19
Payer: COMMERCIAL

## 2017-10-19 VITALS
BODY MASS INDEX: 24.75 KG/M2 | HEIGHT: 65 IN | TEMPERATURE: 98 F | HEART RATE: 68 BPM | WEIGHT: 148.56 LBS | DIASTOLIC BLOOD PRESSURE: 75 MMHG | SYSTOLIC BLOOD PRESSURE: 133 MMHG | RESPIRATION RATE: 18 BRPM

## 2017-10-19 DIAGNOSIS — H93.13 TINNITUS OF BOTH EARS: ICD-10-CM

## 2017-10-19 DIAGNOSIS — H91.90 PERCEIVED HEARING CHANGES: ICD-10-CM

## 2017-10-19 DIAGNOSIS — H60.63 CHRONIC OTITIS EXTERNA OF BOTH EARS, UNSPECIFIED TYPE: Primary | ICD-10-CM

## 2017-10-19 PROCEDURE — 99999 PR PBB SHADOW E&M-EST. PATIENT-LVL IV: CPT | Mod: PBBFAC,,, | Performed by: PHYSICIAN ASSISTANT

## 2017-10-19 PROCEDURE — 99214 OFFICE O/P EST MOD 30 MIN: CPT | Mod: S$GLB,,, | Performed by: PHYSICIAN ASSISTANT

## 2017-10-19 RX ORDER — FLUOCINOLONE ACETONIDE 0.11 MG/ML
3 OIL AURICULAR (OTIC) 2 TIMES DAILY
Qty: 1 BOTTLE | Refills: 3 | Status: SHIPPED | OUTPATIENT
Start: 2017-10-19 | End: 2019-11-12

## 2017-10-19 RX ORDER — FLUTICASONE PROPIONATE 50 MCG
2 SPRAY, SUSPENSION (ML) NASAL DAILY
Qty: 16 G | Refills: 11 | Status: SHIPPED | OUTPATIENT
Start: 2017-10-19 | End: 2019-03-22 | Stop reason: SDUPTHER

## 2017-10-19 RX ORDER — LORATADINE 10 MG/1
10 TABLET ORAL DAILY PRN
COMMUNITY
End: 2023-01-13 | Stop reason: ALTCHOICE

## 2017-10-20 NOTE — PROGRESS NOTES
Subjective:   Patient: Shirlene Olvera [id number], :1955   Visit date:10/19/2017 7:54 AM    Chief Complaint:  Follow-up; Tinnitus; Otitis Media (bilateral/external); and Adenopathy    HPI:  Shirlene is a 62 y.o. female who is here for follow-up. She was last here on 17 and was started on Cortisporin for OE.  She later called because she had more issues with her equilibrium.  Augmentin was sent out and she's completed it.  Her blood pressure medication was recentl changed due to lows and she's feeling much better now and is no longer dizzy or lightheaded.  Since her last visit, she reports improved ear pain but she continues to have itching.  Denies ear drainage.  She says her ears are no longer feeling stopped up.  She says her hearing seems back to baseline.  She has tinnitus AU for years.  Denies vertigo or spinning.  Denies fever.  She has nasal congestion and clear runny nose and uses Flonase as needed (not daily).  She smokes, 0.5 packs per day for 40 years.  She says her glands in her neck were swollen last week but seem back to normal now.        Review of Systems:  -     Allergic/Immunologic: is allergic to statins-hmg-coa reductase inhibitors..  -     Constitutional: Current temp: 97.8 °F (36.6 °C) (Tympanic)    Her meds, allergies, medical, surgical, social & family histories were reviewed & updated:  -     She has a current medication list which includes the following prescription(s): alprazolam, aspirin, cyanocobalamin, cyclobenzaprine, fluticasone, loratadine, losartan-hydrochlorothiazide 50-12.5 mg, multivitamin with minerals, nebivolol, and fluocinolone acetonide oil.  -     She  has a past medical history of Colon polyp; Coronary artery disease; Hypertension; Myocardial infarction; and Tobacco use.   -     She  does not have any pertinent problems on file.   -     She  has a past surgical history that includes Rib fracture surgery; high blood; Hysterectomy; Cholecystectomy (2015);  "and Colonoscopy (N/A, 10/11/2016).  -     She  reports that she has been smoking.  She has been smoking about 0.50 packs per day. She has never used smokeless tobacco. She reports that she drinks alcohol. She reports that she does not use drugs.  -     Her family history includes Heart attack (age of onset: 56) in her father.  -     She is allergic to statins-hmg-coa reductase inhibitors.    Objective:     Physical Exam:  Vitals:  /75   Pulse 68   Temp 97.8 °F (36.6 °C) (Tympanic)   Resp 18   Ht 5' 5" (1.651 m)   Wt 67.4 kg (148 lb 9.4 oz)   BMI 24.73 kg/m²   Appearance:  Well-developed, well-nourished.  Communication:  Able to communicate, no hoarseness.  Head & Face:  Normocephalic, atraumatic, no sinus tenderness, normal facial strength.  Eyes:  Extraocular motions intact.  Ears:  Otoscopy of external auditory canals and tympanic membranes was normal, clinical speech reception thresholds grossly intact, no mass/lesion of auricle.  Dry, flaky skin in bilateral EAC; improved erythema and no edema.  Small amount of cerumen in left EAC (tried to suction today); otherwise no drainage  Nose:  No masses/lesions of external nose, nasal mucosa, septum, and turbinates were within normal limits.  Mouth:  No mass/lesion of lips, teeth, gums, hard/soft palate, tongue, tonsils, or oropharynx.  Neck & Lymphatics:  No cervical lymphadenopathy, no neck mass/crepitus/ asymmetry, trachea is midline, no thyroid enlargement/tenderness/mass.  Neuro/Psych: Alert with normal mood and affect.   Abdominal: Normal appearance.   Respiration/Chest:  Symmetric expansion during respiration, normal respiratory effort.  Skin:  Warm and intact  Cardiovascular:  No peripheral vascular edema or varicosities.    Assessment & Plan:   Shirlene was seen today for follow-up, tinnitus, otitis media and adenopathy.    Diagnoses and all orders for this visit:    Chronic otitis externa of both ears, unspecified type    Tinnitus of both " ears    Perceived hearing changes    Other orders  -     fluocinolone acetonide oil (DERMOTIC OIL) 0.01 % Drop; Place 3 drops in ear(s) 2 (two) times daily.  -     fluticasone (FLONASE) 50 mcg/actuation nasal spray; 2 sprays by Each Nare route once daily.      Her ears look much better today.  Recommend Dermotic drops (once or twice a week) for itching of the canals.  Recommend scheduling an audiogram now that her acute infection has resolved.  Will review those results once available.  We also discussed that tinnitus is most often caused by a hearing loss, and that as the hair cells are damaged, either genetic or as a result of loud noise exposure, they then cause tinnitus.  Some patients find that restricting the salt or caffeine in their diet helps, and there is also an OTC supplement, lipoflavinoids, that some people find to be effective though their benefit is not fully proven.  Tinnitus tends to be louder in times of stress and fatigue, and may decrease with time.  Sound machines may also be an effective masking technique if needed at night.  Flonase refilled as requested.  Recommend she RTC for repeat examination if she has recurrence of her cervical lymphadenopathy.  She voiced understanding.

## 2017-10-26 ENCOUNTER — OFFICE VISIT (OUTPATIENT)
Dept: CARDIOLOGY | Facility: CLINIC | Age: 62
End: 2017-10-26
Payer: COMMERCIAL

## 2017-10-26 VITALS
DIASTOLIC BLOOD PRESSURE: 88 MMHG | WEIGHT: 147.94 LBS | HEIGHT: 65 IN | HEART RATE: 60 BPM | BODY MASS INDEX: 24.65 KG/M2 | SYSTOLIC BLOOD PRESSURE: 142 MMHG

## 2017-10-26 DIAGNOSIS — I10 ESSENTIAL HYPERTENSION: Primary | ICD-10-CM

## 2017-10-26 DIAGNOSIS — I10 HYPERTENSION, UNSPECIFIED TYPE: ICD-10-CM

## 2017-10-26 DIAGNOSIS — E78.00 PURE HYPERCHOLESTEROLEMIA: ICD-10-CM

## 2017-10-26 DIAGNOSIS — I10 HYPERTENSION, UNSPECIFIED TYPE: Primary | ICD-10-CM

## 2017-10-26 DIAGNOSIS — Z72.0 TOBACCO ABUSE: ICD-10-CM

## 2017-10-26 DIAGNOSIS — I25.10 CORONARY ARTERY DISEASE INVOLVING NATIVE CORONARY ARTERY OF NATIVE HEART WITHOUT ANGINA PECTORIS: ICD-10-CM

## 2017-10-26 PROCEDURE — 93000 ELECTROCARDIOGRAM COMPLETE: CPT | Mod: S$GLB,,, | Performed by: NUCLEAR MEDICINE

## 2017-10-26 PROCEDURE — 99999 PR PBB SHADOW E&M-EST. PATIENT-LVL III: CPT | Mod: PBBFAC,,, | Performed by: INTERNAL MEDICINE

## 2017-10-26 PROCEDURE — 99214 OFFICE O/P EST MOD 30 MIN: CPT | Mod: S$GLB,,, | Performed by: INTERNAL MEDICINE

## 2017-10-26 RX ORDER — LEVOCARNITINE 500 MG
500 TABLET ORAL
Refills: 0
Start: 2017-10-26 | End: 2019-11-12

## 2017-10-26 NOTE — LETTER
October 29, 2017      MARIA DOLORES Carias  [address] [place] [address]           [facility]  [address] [place] [address]  Phone: [phone]  Fax: [phone]          Patient: Shirlene Olvera   MR Number: [id number]   YOB: 1955   Date of Visit: 10/26/2017       Dear Juanita Leiva:    Thank you for referring Shirlene Olvera to me for evaluation. Attached you will find relevant portions of my assessment and plan of care.    If you have questions, please do not hesitate to call me. I look forward to following Shirlene Olvera along with you.    Sincerely,    Pepe Byers MD    Enclosure  CC:  No Recipients    If you would like to receive this communication electronically, please contact [email or web address] or [phone] to request more information on [employer] Link access.    For providers and/or their staff who would like to refer a patient to [facility], please contact us through our one-stop-shop provider referral line, [facility], at [phone].    If you feel you have received this communication in error or would no longer like to receive these types of communications, please e-mail [email or web address]

## 2017-10-26 NOTE — PROGRESS NOTES
Subjective:   Patient ID:  Shirlene Olvera is a 62 y.o. female who presents for follow-up of Hypertension; Hyperlipidemia; and Medication Management (metoprolol making sick//just started bystolic)  Pt with ? nstemi 2015 vs (+) troponin. Pt with (-) NMT 2015. Pt with exertional CP when cutting grass or gardening. Pt with R leg pian at rest or exertion.    Hypertension   This is a chronic problem. The current episode started more than 1 year ago. The problem has been gradually improving since onset. The problem is controlled. Associated symptoms include chest pain and shortness of breath. Pertinent negatives include no palpitations. Past treatments include beta blockers, angiotensin blockers and diuretics. The current treatment provides moderate improvement. Compliance problems include exercise.    Chest Pain    This is a recurrent problem. The current episode started 1 to 4 weeks ago. The onset quality is gradual. The problem occurs intermittently. The problem has been waxing and waning. The pain is present in the substernal region. The pain is mild. The quality of the pain is described as dull. The pain does not radiate. Associated symptoms include shortness of breath. Pertinent negatives include no dizziness or palpitations. The pain is aggravated by exertion. She has tried rest for the symptoms. The treatment provided moderate relief.   Her past medical history is significant for hyperlipidemia.   Pertinent negatives for past medical history include no muscle weakness.   Hyperlipidemia   This is a chronic problem. The current episode started more than 1 year ago. The problem is controlled. Recent lipid tests were reviewed and are variable. Associated symptoms include chest pain and shortness of breath. She is currently on no antihyperlipidemic treatment. The current treatment provides moderate improvement of lipids. Compliance problems include medication side effects.        Review of Systems   Constitution:  Negative. Negative for weight gain.   HENT: Negative.    Eyes: Negative.    Cardiovascular: Positive for chest pain. Negative for leg swelling and palpitations.   Respiratory: Positive for shortness of breath.    Endocrine: Negative.    Hematologic/Lymphatic: Negative.    Skin: Negative.    Musculoskeletal: Negative for muscle weakness.   Gastrointestinal: Negative.    Genitourinary: Negative.    Neurological: Negative.  Negative for dizziness.   Psychiatric/Behavioral: Negative.    Allergic/Immunologic: Negative.      Family History   Problem Relation Age of Onset    Heart attack Father 56     MI    Asthma Neg Hx     Thyroid disease Neg Hx     Migraines Neg Hx     Cancer Neg Hx      Past Medical History:   Diagnosis Date    Colon polyp     Coronary artery disease     pt states MI June 2015    Hypertension     Myocardial infarction     Tobacco use      Current Outpatient Prescriptions on File Prior to Visit   Medication Sig Dispense Refill    cyanocobalamin (VITAMIN B-12) 1000 MCG tablet Take 100 mcg by mouth once daily.      cyclobenzaprine (FLEXERIL) 10 MG tablet TAKE 1 TABLET BY MOUTH 3 TIMES DAILY IF NEEDED FOR MUSCLE SPASMS 60 tablet 3    fluocinolone acetonide oil (DERMOTIC OIL) 0.01 % Drop Place 3 drops in ear(s) 2 (two) times daily. 1 Bottle 3    fluticasone (FLONASE) 50 mcg/actuation nasal spray 2 sprays by Each Nare route once daily. 16 g 11    loratadine (CLARITIN) 10 mg tablet Take 10 mg by mouth daily as needed for Allergies.      losartan-hydrochlorothiazide 50-12.5 mg (HYZAAR) 50-12.5 mg per tablet TAKE 1 TABLET BY MOUTH EVERY MORNING. 30 tablet 11    multivitamin with minerals tablet Take 1 tablet by mouth once daily.      nebivolol (BYSTOLIC) 5 MG Tab Take 2 tablets (10 mg total) by mouth once daily. 30 tablet 2    alprazolam (XANAX) 0.25 MG tablet Take 1 tablet (0.25 mg total) by mouth daily as needed for Anxiety. 30 tablet 0    aspirin 81 MG Chew Take 1 tablet (81 mg total) by  mouth once daily.  0     No current facility-administered medications on file prior to visit.      Review of patient's allergies indicates:   Allergen Reactions    Statins-hmg-coa reductase inhibitors Other (See Comments)     Myalgias to lipitor and simvastatin       Objective:     Physical Exam   Constitutional: She is oriented to person, place, and time. She appears well-developed and well-nourished.   HENT:   Head: Normocephalic and atraumatic.   Eyes: Conjunctivae and EOM are normal. Pupils are equal, round, and reactive to light.   Neck: Normal range of motion. Neck supple.   Cardiovascular: Normal rate, regular rhythm, normal heart sounds and intact distal pulses.    Pulmonary/Chest: Effort normal and breath sounds normal.   Abdominal: Soft. Bowel sounds are normal.   Musculoskeletal: Normal range of motion.   Neurological: She is alert and oriented to person, place, and time.   Skin: Skin is warm and dry.   Psychiatric: She has a normal mood and affect.   Nursing note and vitals reviewed.      Assessment:     1. Essential hypertension    2. Pure hypercholesterolemia    3. Coronary artery disease involving native coronary artery of native heart without angina pectoris    4. Tobacco abuse        Plan:     Essential hypertension    Pure hypercholesterolemia    Coronary artery disease involving native coronary artery of native heart without angina pectoris    Tobacco abuse    smoking cessation  Echo, nmt  Vascular studies  Continue asa- cad  Continue bystolic, losartan-hctz-htn

## 2017-11-09 ENCOUNTER — HOSPITAL ENCOUNTER (OUTPATIENT)
Dept: RADIOLOGY | Facility: HOSPITAL | Age: 62
Discharge: HOME | End: 2017-11-09
Attending: INTERNAL MEDICINE
Payer: COMMERCIAL

## 2017-11-09 ENCOUNTER — CLINICAL SUPPORT (OUTPATIENT)
Dept: CARDIOLOGY | Facility: CLINIC | Age: 62
End: 2017-11-09
Payer: COMMERCIAL

## 2017-11-09 DIAGNOSIS — I25.10 CORONARY ARTERY DISEASE INVOLVING NATIVE CORONARY ARTERY OF NATIVE HEART WITHOUT ANGINA PECTORIS: ICD-10-CM

## 2017-11-09 PROCEDURE — 78452 HT MUSCLE IMAGE SPECT MULT: CPT | Mod: TC,PO

## 2017-11-09 PROCEDURE — A9502 TC99M TETROFOSMIN: HCPCS | Mod: PO

## 2017-11-09 PROCEDURE — 93015 CV STRESS TEST SUPVJ I&R: CPT | Mod: S$GLB,,, | Performed by: INTERNAL MEDICINE

## 2017-11-09 PROCEDURE — 78452 HT MUSCLE IMAGE SPECT MULT: CPT | Mod: 26,,, | Performed by: INTERNAL MEDICINE

## 2017-11-10 ENCOUNTER — CLINICAL SUPPORT (OUTPATIENT)
Dept: CARDIOLOGY | Facility: CLINIC | Age: 62
End: 2017-11-10
Payer: COMMERCIAL

## 2017-11-10 DIAGNOSIS — Z72.0 TOBACCO ABUSE: ICD-10-CM

## 2017-11-10 LAB
DIASTOLIC DYSFUNCTION: NO
DIASTOLIC DYSFUNCTION: NO
ESTIMATED PA SYSTOLIC PRESSURE: 27.21
RETIRED EF AND QEF - SEE NOTES: 60 (ref 55–65)
VASCULAR ANKLE BRACHIAL INDEX (ABI) LEFT: 0.82 (ref 0.9–1.2)

## 2017-11-10 PROCEDURE — 93922 UPR/L XTREMITY ART 2 LEVELS: CPT | Mod: S$GLB,,, | Performed by: INTERNAL MEDICINE

## 2017-11-10 PROCEDURE — 93925 LOWER EXTREMITY STUDY: CPT | Mod: S$GLB,,, | Performed by: INTERNAL MEDICINE

## 2017-12-14 ENCOUNTER — OFFICE VISIT (OUTPATIENT)
Dept: CARDIOLOGY | Facility: CLINIC | Age: 62
End: 2017-12-14
Payer: COMMERCIAL

## 2017-12-14 VITALS
BODY MASS INDEX: 24.79 KG/M2 | WEIGHT: 148.81 LBS | SYSTOLIC BLOOD PRESSURE: 142 MMHG | DIASTOLIC BLOOD PRESSURE: 78 MMHG | HEIGHT: 65 IN | HEART RATE: 80 BPM

## 2017-12-14 DIAGNOSIS — E78.00 PURE HYPERCHOLESTEROLEMIA: ICD-10-CM

## 2017-12-14 DIAGNOSIS — Z72.0 TOBACCO ABUSE: ICD-10-CM

## 2017-12-14 DIAGNOSIS — I10 ESSENTIAL HYPERTENSION: Primary | ICD-10-CM

## 2017-12-14 PROCEDURE — 99214 OFFICE O/P EST MOD 30 MIN: CPT | Mod: S$GLB,,, | Performed by: INTERNAL MEDICINE

## 2017-12-14 PROCEDURE — 99999 PR PBB SHADOW E&M-EST. PATIENT-LVL III: CPT | Mod: PBBFAC,,, | Performed by: INTERNAL MEDICINE

## 2017-12-14 RX ORDER — LOSARTAN POTASSIUM AND HYDROCHLOROTHIAZIDE 12.5; 5 MG/1; MG/1
1 TABLET ORAL EVERY MORNING
Qty: 30 TABLET | Refills: 11 | Status: SHIPPED | OUTPATIENT
Start: 2017-12-14 | End: 2019-01-07 | Stop reason: SDUPTHER

## 2017-12-14 RX ORDER — NEBIVOLOL 5 MG/1
5 TABLET ORAL DAILY
Qty: 30 TABLET | Refills: 11 | Status: SHIPPED | OUTPATIENT
Start: 2017-12-14 | End: 2018-06-07

## 2017-12-14 NOTE — PROGRESS NOTES
Subjective:   Patient ID:  Shirlene Olvera is a 62 y.o. female who presents for follow-up of Hypertension; Hyperlipidemia; and Medication Management (would like to get her atenolol back)  Vasular studies shows mild PAD. LLE pain atypical.Pt has seen pain specialist for lumbar injections.  Echo- nml lv function, NMT - no reversibility, inferior scar.  Hypertension   This is a chronic problem. The current episode started more than 1 year ago. The problem has been gradually improving since onset. Pertinent negatives include no chest pain, palpitations or shortness of breath. Past treatments include beta blockers, diuretics and angiotensin blockers. The current treatment provides moderate improvement. Compliance problems include exercise.    Hyperlipidemia   This is a chronic problem. The current episode started more than 1 year ago. The problem is controlled. Pertinent negatives include no chest pain or shortness of breath. Current antihyperlipidemic treatment includes nicotinic acid. The current treatment provides moderate improvement of lipids. Compliance problems include adherence to exercise.        Review of Systems   Constitution: Negative. Negative for weight gain.   HENT: Negative.    Eyes: Negative.    Cardiovascular: Negative.  Negative for chest pain, leg swelling and palpitations.   Respiratory: Negative.  Negative for shortness of breath.    Endocrine: Negative.    Hematologic/Lymphatic: Negative.    Skin: Negative.    Musculoskeletal: Negative for muscle weakness.   Gastrointestinal: Negative.    Genitourinary: Negative.    Neurological: Negative.  Negative for dizziness.   Psychiatric/Behavioral: Negative.    Allergic/Immunologic: Negative.      Family History   Problem Relation Age of Onset    Heart attack Father 56     MI    Asthma Neg Hx     Thyroid disease Neg Hx     Migraines Neg Hx     Cancer Neg Hx      Past Medical History:   Diagnosis Date    Colon polyp     Coronary artery disease      pt states MI June 2015    Hypertension     Myocardial infarction     Tobacco use      Current Outpatient Prescriptions on File Prior to Visit   Medication Sig Dispense Refill    cyanocobalamin (VITAMIN B-12) 1000 MCG tablet Take 100 mcg by mouth once daily.      cyclobenzaprine (FLEXERIL) 10 MG tablet TAKE 1 TABLET BY MOUTH 3 TIMES DAILY IF NEEDED FOR MUSCLE SPASMS 60 tablet 3    fluocinolone acetonide oil (DERMOTIC OIL) 0.01 % Drop Place 3 drops in ear(s) 2 (two) times daily. 1 Bottle 3    fluticasone (FLONASE) 50 mcg/actuation nasal spray 2 sprays by Each Nare route once daily. 16 g 11    loratadine (CLARITIN) 10 mg tablet Take 10 mg by mouth daily as needed for Allergies.      losartan-hydrochlorothiazide 50-12.5 mg (HYZAAR) 50-12.5 mg per tablet TAKE 1 TABLET BY MOUTH EVERY MORNING. 30 tablet 11    multivitamin with minerals tablet Take 1 tablet by mouth once daily.      niacin, inositol niacinate, (NIACIN FLUSH FREE) 400 mg niacin (500 mg) Cap Take 500 mg by mouth after dinner.  0    alprazolam (XANAX) 0.25 MG tablet Take 1 tablet (0.25 mg total) by mouth daily as needed for Anxiety. 30 tablet 0    aspirin 81 MG Chew Take 1 tablet (81 mg total) by mouth once daily.  0    nebivolol (BYSTOLIC) 5 MG Tab Take 2 tablets (10 mg total) by mouth once daily. 30 tablet 2     No current facility-administered medications on file prior to visit.      Review of patient's allergies indicates:   Allergen Reactions    Statins-hmg-coa reductase inhibitors Other (See Comments)     Myalgias to lipitor and simvastatin       Objective:     Physical Exam   Constitutional: She is oriented to person, place, and time. She appears well-developed and well-nourished.   HENT:   Head: Normocephalic and atraumatic.   Eyes: Conjunctivae and EOM are normal. Pupils are equal, round, and reactive to light.   Neck: Normal range of motion. Neck supple.   Cardiovascular: Normal rate, regular rhythm, normal heart sounds and intact  distal pulses.    Pulmonary/Chest: Effort normal and breath sounds normal.   Abdominal: Soft. Bowel sounds are normal.   Musculoskeletal: Normal range of motion.   Neurological: She is alert and oriented to person, place, and time.   Skin: Skin is warm and dry.   Psychiatric: She has a normal mood and affect.   Nursing note and vitals reviewed.      Assessment:     1. Essential hypertension    2. Pure hypercholesterolemia    3. Tobacco abuse        Plan:     Essential hypertension    Pure hypercholesterolemia    Tobacco abuse      Continue asa- pad  Smoking cessation  Continue bystolic, losartan-hctz-htn  Continue niacin-hlp

## 2018-03-09 ENCOUNTER — TELEPHONE (OUTPATIENT)
Dept: CARDIOLOGY | Facility: CLINIC | Age: 63
End: 2018-03-09

## 2018-03-09 ENCOUNTER — OFFICE VISIT (OUTPATIENT)
Dept: INTERNAL MEDICINE | Facility: CLINIC | Age: 63
End: 2018-03-09
Payer: COMMERCIAL

## 2018-03-09 VITALS
SYSTOLIC BLOOD PRESSURE: 160 MMHG | OXYGEN SATURATION: 99 % | WEIGHT: 151.44 LBS | DIASTOLIC BLOOD PRESSURE: 80 MMHG | TEMPERATURE: 99 F | HEIGHT: 65 IN | HEART RATE: 70 BPM | BODY MASS INDEX: 25.23 KG/M2

## 2018-03-09 DIAGNOSIS — T36.95XA ANTIBIOTICS CAUSING ADVERSE EFFECT IN THERAPEUTIC USE, INITIAL ENCOUNTER: Primary | ICD-10-CM

## 2018-03-09 DIAGNOSIS — K21.9 GASTROESOPHAGEAL REFLUX DISEASE, ESOPHAGITIS PRESENCE NOT SPECIFIED: ICD-10-CM

## 2018-03-09 DIAGNOSIS — I10 ESSENTIAL HYPERTENSION: ICD-10-CM

## 2018-03-09 PROCEDURE — 99213 OFFICE O/P EST LOW 20 MIN: CPT | Mod: S$GLB,,, | Performed by: PHYSICIAN ASSISTANT

## 2018-03-09 PROCEDURE — 3079F DIAST BP 80-89 MM HG: CPT | Mod: CPTII,S$GLB,, | Performed by: PHYSICIAN ASSISTANT

## 2018-03-09 PROCEDURE — 99999 PR PBB SHADOW E&M-EST. PATIENT-LVL IV: CPT | Mod: PBBFAC,,, | Performed by: PHYSICIAN ASSISTANT

## 2018-03-09 PROCEDURE — 3077F SYST BP >= 140 MM HG: CPT | Mod: CPTII,S$GLB,, | Performed by: PHYSICIAN ASSISTANT

## 2018-03-09 RX ORDER — PANTOPRAZOLE SODIUM 20 MG/1
20 TABLET, DELAYED RELEASE ORAL DAILY
Qty: 30 TABLET | Refills: 0 | Status: SHIPPED | OUTPATIENT
Start: 2018-03-09 | End: 2018-03-15 | Stop reason: SDUPTHER

## 2018-03-09 NOTE — TELEPHONE ENCOUNTER
----- Message from Elba Olson sent at 3/9/2018 12:01 PM CST -----  Contact: pt  Calling with questions about blood work and medication  She is taking and please advise [phone] (home)

## 2018-03-10 ENCOUNTER — LAB VISIT (OUTPATIENT)
Dept: LAB | Facility: HOSPITAL | Age: 63
End: 2018-03-10
Attending: INTERNAL MEDICINE
Payer: COMMERCIAL

## 2018-03-10 DIAGNOSIS — E78.00 PURE HYPERCHOLESTEROLEMIA: ICD-10-CM

## 2018-03-10 LAB
ALBUMIN SERPL BCP-MCNC: 3.5 G/DL
ALP SERPL-CCNC: 72 U/L
ALT SERPL W/O P-5'-P-CCNC: 20 U/L
AST SERPL-CCNC: 24 U/L
BILIRUB DIRECT SERPL-MCNC: 0.2 MG/DL
BILIRUB SERPL-MCNC: 0.5 MG/DL
CHOLEST SERPL-MCNC: 238 MG/DL
CHOLEST/HDLC SERPL: 4.5 {RATIO}
HDLC SERPL-MCNC: 53 MG/DL
HDLC SERPL: 22.3 %
LDLC SERPL CALC-MCNC: 143.2 MG/DL
NONHDLC SERPL-MCNC: 185 MG/DL
PROT SERPL-MCNC: 6.8 G/DL
TRIGL SERPL-MCNC: 209 MG/DL

## 2018-03-10 PROCEDURE — 80061 LIPID PANEL: CPT

## 2018-03-10 PROCEDURE — 80076 HEPATIC FUNCTION PANEL: CPT

## 2018-03-10 PROCEDURE — 36415 COLL VENOUS BLD VENIPUNCTURE: CPT | Mod: PO

## 2018-03-12 ENCOUNTER — TELEPHONE (OUTPATIENT)
Dept: CARDIOLOGY | Facility: CLINIC | Age: 63
End: 2018-03-12

## 2018-03-12 DIAGNOSIS — I21.4 NON-ST ELEVATED MYOCARDIAL INFARCTION: Primary | ICD-10-CM

## 2018-03-12 DIAGNOSIS — E78.49 OTHER HYPERLIPIDEMIA: ICD-10-CM

## 2018-03-12 DIAGNOSIS — I25.10 CORONARY ARTERY DISEASE INVOLVING NATIVE CORONARY ARTERY OF NATIVE HEART WITHOUT ANGINA PECTORIS: ICD-10-CM

## 2018-03-12 NOTE — TELEPHONE ENCOUNTER
----- Message from Pepe Byers MD sent at 3/11/2018  7:04 AM CDT -----  Lipids reveiwed, increase niacin to 2 tablets per day, recheck lipids in 3 months

## 2018-03-13 ENCOUNTER — TELEPHONE (OUTPATIENT)
Dept: INTERNAL MEDICINE | Facility: CLINIC | Age: 63
End: 2018-03-13

## 2018-03-13 NOTE — PROGRESS NOTES
Subjective:       Patient ID: Shirlene Olvera is a 62 y.o. female.    Chief Complaint: Bloated (sour stomache since starting antibiotics for [facility] on 3/5/18 steroid and augmentin)  Patient comes in today for GI issues   States she has been having bloating/sour stomach for several months  Recently treated by UC with augmentin, this seems to have made it somewhat worse     No fever. Bloating after meals  +nausea   No vomiting   On/off constipation.  This is normal per patient     Abdominal Cramping   This is a recurrent problem. The current episode started more than 1 month ago. The onset quality is gradual. The problem occurs intermittently. The problem has been waxing and waning. Pertinent negatives include no arthralgias or fever.       Health Maintenance Due   Topic Date Due    Influenza Vaccine  08/01/2017       Past Medical History:   Diagnosis Date    Colon polyp     Coronary artery disease     pt states MI June 2015    Hypertension     Myocardial infarction     Tobacco use        Current Outpatient Prescriptions   Medication Sig Dispense Refill    cyanocobalamin (VITAMIN B-12) 1000 MCG tablet Take 100 mcg by mouth once daily.      cyclobenzaprine (FLEXERIL) 10 MG tablet TAKE 1 TABLET BY MOUTH 3 TIMES DAILY IF NEEDED FOR MUSCLE SPASMS 60 tablet 3    fluocinolone acetonide oil (DERMOTIC OIL) 0.01 % Drop Place 3 drops in ear(s) 2 (two) times daily. 1 Bottle 3    fluticasone (FLONASE) 50 mcg/actuation nasal spray 2 sprays by Each Nare route once daily. 16 g 11    loratadine (CLARITIN) 10 mg tablet Take 10 mg by mouth daily as needed for Allergies.      losartan-hydrochlorothiazide 50-12.5 mg (HYZAAR) 50-12.5 mg per tablet Take 1 tablet by mouth every morning. 30 tablet 11    multivitamin with minerals tablet Take 1 tablet by mouth once daily.      niacin, inositol niacinate, (NIACIN FLUSH FREE) 400 mg niacin (500 mg) Cap Take 500 mg by mouth after dinner. (Patient taking  "differently: Take 1,000 mg by mouth after dinner. )  0    alprazolam (XANAX) 0.25 MG tablet Take 1 tablet (0.25 mg total) by mouth daily as needed for Anxiety. 30 tablet 0    aspirin 81 MG Chew Take 1 tablet (81 mg total) by mouth once daily.  0    nebivolol (BYSTOLIC) 5 MG Tab Take 1 tablet (5 mg total) by mouth once daily. 30 tablet 11    pantoprazole (PROTONIX) 20 MG tablet Take 1 tablet (20 mg total) by mouth once daily. 30 tablet 0     No current facility-administered medications for this visit.        Review of Systems   Constitutional: Negative for activity change, appetite change, chills, fever and unexpected weight change.   HENT: Negative for trouble swallowing and voice change.    Eyes: Negative for photophobia and visual disturbance.   Respiratory: Negative for apnea and choking.    Cardiovascular: Negative for chest pain, palpitations and leg swelling.   Gastrointestinal: Negative for abdominal distention, abdominal pain and anal bleeding.   Endocrine: Negative for cold intolerance and heat intolerance.   Genitourinary: Negative for difficulty urinating, dyspareunia, menstrual problem and pelvic pain.   Musculoskeletal: Negative for arthralgias and back pain.   Skin: Negative for rash and wound.   Allergic/Immunologic: Negative for immunocompromised state.   Neurological: Negative for dizziness, syncope and weakness.   Hematological: Negative for adenopathy. Does not bruise/bleed easily.   Psychiatric/Behavioral: Negative for sleep disturbance and suicidal ideas.       Objective:   BP (!) 160/80   Pulse 70   Temp 99.3 °F (37.4 °C)   Ht 5' 5" (1.651 m)   Wt 68.7 kg (151 lb 7.3 oz)   SpO2 99%   BMI 25.20 kg/m²      Physical Exam   Constitutional: She is oriented to person, place, and time. She appears well-developed and well-nourished. No distress.   HENT:   Head: Normocephalic and atraumatic.   Right Ear: Hearing, tympanic membrane, external ear and ear canal normal.   Left Ear: Hearing, " tympanic membrane, external ear and ear canal normal.   Nose: No sinus tenderness. Right sinus exhibits no maxillary sinus tenderness and no frontal sinus tenderness. Left sinus exhibits no maxillary sinus tenderness and no frontal sinus tenderness.   Mouth/Throat: Uvula is midline, oropharynx is clear and moist and mucous membranes are normal. No oropharyngeal exudate, posterior oropharyngeal edema, posterior oropharyngeal erythema or tonsillar abscesses.   Eyes: Conjunctivae are normal. Pupils are equal, round, and reactive to light.   Neck: Normal range of motion. Neck supple.   Cardiovascular: Normal rate, regular rhythm, normal heart sounds and intact distal pulses.    Pulmonary/Chest: Effort normal and breath sounds normal. No respiratory distress.   Abdominal: Soft. Bowel sounds are normal. She exhibits no distension and no mass. There is no tenderness. There is no rebound and no guarding. No hernia.   Neurological: She is alert and oriented to person, place, and time.   Skin: Skin is warm. Capillary refill takes less than 2 seconds.   Psychiatric: She has a normal mood and affect. Her behavior is normal. Judgment and thought content normal.   Vitals reviewed.          Lab Results   Component Value Date    WBC 5.70 04/22/2017    HGB 15.1 04/22/2017    HCT 44.5 04/22/2017     04/22/2017    CHOL 238 (H) 03/10/2018    TRIG 209 (H) 03/10/2018    HDL 53 03/10/2018    ALT 20 03/10/2018    AST 24 03/10/2018     07/22/2017    K 4.4 07/22/2017     07/22/2017    CREATININE 0.9 07/22/2017    BUN 12 07/22/2017    CO2 26 07/22/2017    TSH 0.895 01/26/2015    INR 0.9 05/13/2015       Assessment:       1. Antibiotics causing adverse effect in therapeutic use, initial encounter    2. Essential hypertension        Plan:   Antibiotics causing adverse effect in therapeutic use, initial encounter  Augmentin likely not helping but patient states this has been gong on for quite sometime   Est  HTN    GERD/indigestion   Start Protonix  Follow up next week concerning BP and stomach issues   -     pantoprazole (PROTONIX) 20 MG tablet; Take 1 tablet (20 mg total) by mouth once daily.  Dispense: 30 tablet; Refill: 0    No Follow-up on file.

## 2018-03-13 NOTE — TELEPHONE ENCOUNTER
Chief Complaint: Bloated (sour stomache since starting antibiotics for [facility] 3/5/18 steroid and augmentin)    Called and got pt booked for Thursday. She is stating she is still not any better. Still having upset stomach.

## 2018-03-15 ENCOUNTER — HOSPITAL ENCOUNTER (OUTPATIENT)
Dept: RADIOLOGY | Facility: HOSPITAL | Age: 63
Discharge: HOME | End: 2018-03-15
Attending: FAMILY MEDICINE
Payer: COMMERCIAL

## 2018-03-15 ENCOUNTER — OFFICE VISIT (OUTPATIENT)
Dept: CARDIOLOGY | Facility: CLINIC | Age: 63
End: 2018-03-15
Payer: COMMERCIAL

## 2018-03-15 ENCOUNTER — OFFICE VISIT (OUTPATIENT)
Dept: INTERNAL MEDICINE | Facility: CLINIC | Age: 63
End: 2018-03-15
Payer: COMMERCIAL

## 2018-03-15 ENCOUNTER — TELEPHONE (OUTPATIENT)
Dept: INTERNAL MEDICINE | Facility: CLINIC | Age: 63
End: 2018-03-15

## 2018-03-15 VITALS
DIASTOLIC BLOOD PRESSURE: 72 MMHG | BODY MASS INDEX: 25.33 KG/M2 | SYSTOLIC BLOOD PRESSURE: 122 MMHG | HEIGHT: 65 IN | HEART RATE: 72 BPM | DIASTOLIC BLOOD PRESSURE: 72 MMHG | WEIGHT: 152 LBS | BODY MASS INDEX: 25.45 KG/M2 | SYSTOLIC BLOOD PRESSURE: 122 MMHG | WEIGHT: 152.75 LBS | HEART RATE: 72 BPM | HEIGHT: 65 IN

## 2018-03-15 DIAGNOSIS — R19.8 CHANGE IN BOWEL MOVEMENT: ICD-10-CM

## 2018-03-15 DIAGNOSIS — R11.2 NON-INTRACTABLE VOMITING WITH NAUSEA, UNSPECIFIED VOMITING TYPE: ICD-10-CM

## 2018-03-15 DIAGNOSIS — I25.10 CORONARY ARTERY DISEASE INVOLVING NATIVE CORONARY ARTERY OF NATIVE HEART WITHOUT ANGINA PECTORIS: ICD-10-CM

## 2018-03-15 DIAGNOSIS — R10.84 GENERALIZED ABDOMINAL PAIN: Primary | ICD-10-CM

## 2018-03-15 DIAGNOSIS — I10 ESSENTIAL HYPERTENSION: Primary | ICD-10-CM

## 2018-03-15 DIAGNOSIS — E78.00 PURE HYPERCHOLESTEROLEMIA: ICD-10-CM

## 2018-03-15 DIAGNOSIS — Z72.0 TOBACCO ABUSE: ICD-10-CM

## 2018-03-15 DIAGNOSIS — R10.84 GENERALIZED ABDOMINAL PAIN: ICD-10-CM

## 2018-03-15 PROCEDURE — 3074F SYST BP LT 130 MM HG: CPT | Mod: CPTII,S$GLB,, | Performed by: INTERNAL MEDICINE

## 2018-03-15 PROCEDURE — 74019 RADEX ABDOMEN 2 VIEWS: CPT | Mod: TC,FY,PO

## 2018-03-15 PROCEDURE — 3078F DIAST BP <80 MM HG: CPT | Mod: CPTII,S$GLB,, | Performed by: INTERNAL MEDICINE

## 2018-03-15 PROCEDURE — 99999 PR PBB SHADOW E&M-EST. PATIENT-LVL III: CPT | Mod: PBBFAC,,, | Performed by: FAMILY MEDICINE

## 2018-03-15 PROCEDURE — 99999 PR PBB SHADOW E&M-EST. PATIENT-LVL II: CPT | Mod: PBBFAC,,, | Performed by: INTERNAL MEDICINE

## 2018-03-15 PROCEDURE — 99214 OFFICE O/P EST MOD 30 MIN: CPT | Mod: S$GLB,,, | Performed by: INTERNAL MEDICINE

## 2018-03-15 PROCEDURE — 3078F DIAST BP <80 MM HG: CPT | Mod: CPTII,S$GLB,, | Performed by: FAMILY MEDICINE

## 2018-03-15 PROCEDURE — 74019 RADEX ABDOMEN 2 VIEWS: CPT | Mod: 26,,, | Performed by: RADIOLOGY

## 2018-03-15 PROCEDURE — 99214 OFFICE O/P EST MOD 30 MIN: CPT | Mod: S$GLB,,, | Performed by: FAMILY MEDICINE

## 2018-03-15 PROCEDURE — 3074F SYST BP LT 130 MM HG: CPT | Mod: CPTII,S$GLB,, | Performed by: FAMILY MEDICINE

## 2018-03-15 RX ORDER — PANTOPRAZOLE SODIUM 40 MG/1
40 TABLET, DELAYED RELEASE ORAL DAILY
Qty: 30 TABLET | Refills: 0 | Status: SHIPPED | OUTPATIENT
Start: 2018-03-15 | End: 2018-06-07

## 2018-03-15 RX ORDER — BUPROPION HYDROCHLORIDE 150 MG/1
150 TABLET ORAL DAILY
Qty: 30 TABLET | Refills: 11 | Status: SHIPPED | OUTPATIENT
Start: 2018-03-15 | End: 2018-06-07 | Stop reason: SDUPTHER

## 2018-03-15 RX ORDER — DICYCLOMINE HYDROCHLORIDE 20 MG/1
20 TABLET ORAL EVERY 6 HOURS
Qty: 120 TABLET | Refills: 0 | Status: SHIPPED | OUTPATIENT
Start: 2018-03-15 | End: 2018-04-12 | Stop reason: SDUPTHER

## 2018-03-15 RX ORDER — ONDANSETRON HYDROCHLORIDE 8 MG/1
8 TABLET, FILM COATED ORAL EVERY 8 HOURS PRN
Qty: 20 TABLET | Refills: 0 | Status: SHIPPED | OUTPATIENT
Start: 2018-03-15 | End: 2018-08-11

## 2018-03-15 NOTE — PROGRESS NOTES
Subjective:   Patient ID:  Shirlene Olvera is a 62 y.o. female who presents for follow-up of HTN,HLP, old MI.  Pt is trying to stop smoking.Patient denies CP, angina or anginal equivalent.  NMT and echo negative last year.Pt with nonspecific RLE pain especially at night.  HPI    ROS  Family History   Problem Relation Age of Onset    Heart attack Father 56     MI    Asthma Neg Hx     Thyroid disease Neg Hx     Migraines Neg Hx     Cancer Neg Hx      Past Medical History:   Diagnosis Date    Colon polyp     Coronary artery disease     pt states MI June 2015    Hypertension     Myocardial infarction     Tobacco use      Current Outpatient Prescriptions on File Prior to Visit   Medication Sig Dispense Refill    alprazolam (XANAX) 0.25 MG tablet Take 1 tablet (0.25 mg total) by mouth daily as needed for Anxiety. 30 tablet 0    aspirin 81 MG Chew Take 1 tablet (81 mg total) by mouth once daily.  0    cyanocobalamin (VITAMIN B-12) 1000 MCG tablet Take 100 mcg by mouth once daily.      cyclobenzaprine (FLEXERIL) 10 MG tablet TAKE 1 TABLET BY MOUTH 3 TIMES DAILY IF NEEDED FOR MUSCLE SPASMS 60 tablet 3    fluocinolone acetonide oil (DERMOTIC OIL) 0.01 % Drop Place 3 drops in ear(s) 2 (two) times daily. 1 Bottle 3    fluticasone (FLONASE) 50 mcg/actuation nasal spray 2 sprays by Each Nare route once daily. 16 g 11    loratadine (CLARITIN) 10 mg tablet Take 10 mg by mouth daily as needed for Allergies.      losartan-hydrochlorothiazide 50-12.5 mg (HYZAAR) 50-12.5 mg per tablet Take 1 tablet by mouth every morning. 30 tablet 11    multivitamin with minerals tablet Take 1 tablet by mouth once daily.      niacin, inositol niacinate, (NIACIN FLUSH FREE) 400 mg niacin (500 mg) Cap Take 500 mg by mouth after dinner. (Patient taking differently: Take 1,000 mg by mouth after dinner. )  0    pantoprazole (PROTONIX) 20 MG tablet Take 1 tablet (20 mg total) by mouth once daily. 30 tablet 0    nebivolol  (BYSTOLIC) 5 MG Tab Take 1 tablet (5 mg total) by mouth once daily. 30 tablet 11     No current facility-administered medications on file prior to visit.      Review of patient's allergies indicates:   Allergen Reactions    Statins-hmg-coa reductase inhibitors Other (See Comments)     Myalgias to lipitor and simvastatin       Objective:     Physical Exam    Assessment:     1. Essential hypertension    2. Pure hypercholesterolemia    3. Tobacco abuse    4. Coronary artery disease involving native coronary artery of native heart without angina pectoris        Plan:     Essential hypertension    Pure hypercholesterolemia    Tobacco abuse    Coronary artery disease involving native coronary artery of native heart without angina pectoris    exercise  Smoking cessation  Continue bystolic, losartan-hctz-htn  Continue niacin-hlp  Vascular studies

## 2018-03-15 NOTE — TELEPHONE ENCOUNTER
----- Message from Shyla Huerta sent at 3/15/2018  4:26 PM CDT -----  Contact: Freya @ [facility]  Calling concerning 2 RX's that patient states provider was sending electronically. Please call pharmacy today ASAP @ [phone]. Thanks, roosevelt

## 2018-03-15 NOTE — TELEPHONE ENCOUNTER
Called pharmacy and let know that Dr. Segovia sent in 4 medications today at 3:48 pm.  She said they did not get, gave verbal per med card.

## 2018-03-18 NOTE — PROGRESS NOTES
Subjective:      Patient ID: Shirlene Olvera is a 62 y.o. female.    Chief Complaint: Follow-up  and Abdominal Pain    Disclaimer:  This note is prepared using voice recognition software and as such is likely to have errors and has not been proof read. Please contact me for questions.     Shirlene Olvera is a 62 y.o. female who presents today for multiple issues.  She is mainly wanting to come in for follow-up of some abdominal pain.  She was seen last week by the physician assistant.  At that time she presented with sour stomach and more nausea.  The patient initially thought it was related to possibly using some antibiotics but that had been quite some time previous he urgent care.  She was started on Protonix and noted to have elevated blood pressure and so was advised to follow-up with her PCP in 1 week.  No lab work was obtained at that time.  Since that time she's had no improvement of her symptoms.  Blood pressure has controlled but she is still nauseated.  She reports that it does not matter what she eats she still continues to have nausea off and on.  She has a change in her bowels as well.  Sometimes it's rapid diarrhea other times she feels like she is constipated.  There's not been any increased stress recently.  She is a smoker.  She does still have her appendix.  She's not had any issues that she's aware of pancreatitis.  She has had her gallbladder removed however.  She's been taking the Protonix for 1 week but can't see that it's improved any.    She also would like to get back on Wellbutrin to help her to quit smoking.  She states she's ready at this time.  She believes it helped her previously.        Lab Results   Component Value Date    WBC 8.51 03/15/2018    HGB 14.0 03/15/2018    HCT 41.6 03/15/2018     03/15/2018    CHOL 238 (H) 03/10/2018    TRIG 209 (H) 03/10/2018    HDL 53 03/10/2018    ALT 15 03/15/2018    AST 18 03/15/2018     03/15/2018    K 4.6 03/15/2018    CL  "102 03/15/2018    CREATININE 1.1 03/15/2018    BUN 18 03/15/2018    CO2 30 (H) 03/15/2018    TSH 0.895 01/26/2015    INR 0.9 05/13/2015       Review of Systems   Constitutional: Positive for appetite change. Negative for activity change, fatigue and fever.   HENT: Negative for trouble swallowing and voice change.    Respiratory: Negative for cough and shortness of breath.    Cardiovascular: Negative for chest pain and palpitations.   Gastrointestinal: Positive for abdominal distention, abdominal pain, diarrhea, nausea and vomiting. Negative for blood in stool, constipation and rectal pain.   Genitourinary: Negative for difficulty urinating, flank pain, frequency, vaginal bleeding, vaginal discharge and vaginal pain.   Neurological: Negative for weakness.   Psychiatric/Behavioral: Negative for decreased concentration, dysphoric mood and sleep disturbance.     Objective:     Vitals:    03/15/18 1434   BP: 122/72   Pulse: 72   Weight: 68.9 kg (152 lb)   Height: 5' 5" (1.651 m)     Physical Exam   Constitutional: She appears well-developed and well-nourished.   HENT:   Head: Normocephalic and atraumatic.   Right Ear: Tympanic membrane normal.   Left Ear: Tympanic membrane normal.   Mouth/Throat: Oropharynx is clear and moist.   Eyes: Conjunctivae and EOM are normal.   Neck: Normal range of motion. Neck supple.   Cardiovascular: Normal rate and regular rhythm.    Pulmonary/Chest: Effort normal and breath sounds normal.   Abdominal: Soft. Normal appearance. Bowel sounds are increased. There is generalized tenderness. There is guarding. There is no rigidity, no rebound, no CVA tenderness, no tenderness at McBurney's point and negative Cárdenas's sign. No hernia.   Skin: Skin is warm and dry. No lesion noted. No erythema.   Psychiatric: She has a normal mood and affect. Her speech is normal and behavior is normal. Judgment and thought content normal. Cognition and memory are normal.   Nursing note and vitals " reviewed.    Assessment:     1. Generalized abdominal pain    2. Non-intractable vomiting with nausea, unspecified vomiting type    3. Change in bowel movement    4. Tobacco abuse      Plan:   Shirlene was seen today for follow-up and abdominal pain.    Diagnoses and all orders for this visit:    Generalized abdominal pain-still present not improved at this time will obtain additional lab work today as well as an x-ray of the abdomen.  Add x-ray has been reviewed which shows evidence of moderate stool throughout the colon.  Advised patient to perform magnesium citrate over the weekend.  If not improved with this and the use of Bentyl then would recommend we proceed forward with a CT scan of the abdomen and pelvis to evaluate for further etiologies.  We will also need to rule out pancreatitis.  Patient was in agreement with the plan.-Also check for H. pylori and start Protonix  -     Comprehensive metabolic panel; Future  -     CBC auto differential; Future  -     Lipase; Future  -     Amylase; Future  -     H. PYLORI ANTIBODY, IGG; Future  -     X-Ray Abdomen Flat And Erect; Future  -     CT Abdomen Pelvis W Wo Contrast; Future    Non-intractable vomiting with nausea, unspecified vomiting type-not improved at this time obtain lab work to evaluate for pancreatitis as well as x-rays.  We'll treat with intermittent Bentyl and Zofran as needed.  Follow-up based on findings, await findings on H. pylori testing can start Protonix in the interim  -     Comprehensive metabolic panel; Future  -     CBC auto differential; Future  -     Lipase; Future  -     Amylase; Future  -     H. PYLORI ANTIBODY, IGG; Future  -     X-Ray Abdomen Flat And Erect; Future    Change in bowel movement-new problem worse recently x-ray today noted to have significant amount of stool then will have patient use magnesium citrate and if not improving then would recommend CT scan of the abdomen to evaluate further due to her history of smoking.  -      Comprehensive metabolic panel; Future  -     CBC auto differential; Future  -     Lipase; Future  -     Amylase; Future  -     H. PYLORI ANTIBODY, IGG; Future  -     X-Ray Abdomen Flat And Erect; Future    Tobacco abuse-patient is wanting to get back on Wellbutrin to work on quitting.  We'll restart medication    Other orders  -     dicyclomine (BENTYL) 20 mg tablet; Take 1 tablet (20 mg total) by mouth every 6 (six) hours.  -     pantoprazole (PROTONIX) 40 MG tablet; Take 1 tablet (40 mg total) by mouth once daily.  -     ondansetron (ZOFRAN) 8 MG tablet; Take 1 tablet (8 mg total) by mouth every 8 (eight) hours as needed for Nausea.  -     buPROPion (WELLBUTRIN XL) 150 MG TB24 tablet; Take 1 tablet (150 mg total) by mouth once daily.            Follow-up in about 4 weeks (around 4/12/2018) for abdominal pain, ct scan.

## 2018-03-20 ENCOUNTER — TELEPHONE (OUTPATIENT)
Dept: INTERNAL MEDICINE | Facility: CLINIC | Age: 63
End: 2018-03-20

## 2018-03-20 NOTE — TELEPHONE ENCOUNTER
----- Message from Becca Miller sent at 3/16/2018  2:05 PM CDT -----  Contact: pt  She's calling in regards to  missed call pls call pt back at [phone] (home)

## 2018-03-20 NOTE — TELEPHONE ENCOUNTER
Not sure what about, but probably her labs from last week and xray. See results note from last week

## 2018-03-22 ENCOUNTER — TELEPHONE (OUTPATIENT)
Dept: INTERNAL MEDICINE | Facility: CLINIC | Age: 63
End: 2018-03-22

## 2018-03-22 NOTE — TELEPHONE ENCOUNTER
----- Message from Elba Olson sent at 3/22/2018 11:37 AM CDT -----  Contact: pt  Calling with results. [phone] (home)

## 2018-03-23 ENCOUNTER — TELEPHONE (OUTPATIENT)
Dept: INTERNAL MEDICINE | Facility: CLINIC | Age: 63
End: 2018-03-23

## 2018-03-23 DIAGNOSIS — R10.9 ABDOMINAL PAIN, UNSPECIFIED ABDOMINAL LOCATION: Primary | ICD-10-CM

## 2018-03-29 ENCOUNTER — TELEPHONE (OUTPATIENT)
Dept: RADIOLOGY | Facility: HOSPITAL | Age: 63
End: 2018-03-29

## 2018-04-02 ENCOUNTER — HOSPITAL ENCOUNTER (OUTPATIENT)
Dept: RADIOLOGY | Facility: HOSPITAL | Age: 63
Discharge: HOME | End: 2018-04-02
Attending: FAMILY MEDICINE
Payer: COMMERCIAL

## 2018-04-02 DIAGNOSIS — R10.84 GENERALIZED ABDOMINAL PAIN: ICD-10-CM

## 2018-04-02 PROCEDURE — 74178 CT ABD&PLV WO CNTR FLWD CNTR: CPT | Mod: TC,PO

## 2018-04-02 PROCEDURE — 74178 CT ABD&PLV WO CNTR FLWD CNTR: CPT | Mod: 26,,, | Performed by: RADIOLOGY

## 2018-04-02 PROCEDURE — 25500020 PHARM REV CODE 255: Mod: PO | Performed by: FAMILY MEDICINE

## 2018-04-02 RX ADMIN — IOHEXOL 30 ML: 350 INJECTION, SOLUTION INTRAVENOUS at 08:04

## 2018-04-02 RX ADMIN — IOHEXOL 75 ML: 350 INJECTION, SOLUTION INTRAVENOUS at 09:04

## 2018-04-02 NOTE — PROGRESS NOTES
Ct scan shows small hiatal hernia which can cause more reflux/indigestion issues. Continue with the acid reducers of pantoprazole 40mg indefinately,   Has kidney stone small 3mm on left side. Not obstructing but can cause back and radiating abdominal pain/ flank pain at times. Push lots of fluid of water  Benign kidney cysts noted. No colon or bowel issues. Please inform.

## 2018-04-03 ENCOUNTER — TELEPHONE (OUTPATIENT)
Dept: INTERNAL MEDICINE | Facility: CLINIC | Age: 63
End: 2018-04-03

## 2018-04-03 NOTE — TELEPHONE ENCOUNTER
----- Message from Shyla Huerta sent at 4/3/2018  1:51 PM CDT -----  Contact: patient  Calling to get a copy of her results from yesterday. Please call patient @ [phone]. Thanks, roosevelt

## 2018-04-04 ENCOUNTER — TELEPHONE (OUTPATIENT)
Dept: INTERNAL MEDICINE | Facility: CLINIC | Age: 63
End: 2018-04-04

## 2018-04-04 NOTE — TELEPHONE ENCOUNTER
Called pt and notified her of her appt that she already is scheduled for and she verbalized understanding.

## 2018-04-04 NOTE — TELEPHONE ENCOUNTER
----- Message from Tracey Seo sent at 4/4/2018  2:10 PM CDT -----  Contact: pt  Please call pt @ [phone] regarding an follow up appt before 5/23.

## 2018-04-10 ENCOUNTER — PATIENT OUTREACH (OUTPATIENT)
Dept: ADMINISTRATIVE | Facility: HOSPITAL | Age: 63
End: 2018-04-10

## 2018-04-12 ENCOUNTER — LAB VISIT (OUTPATIENT)
Dept: LAB | Facility: HOSPITAL | Age: 63
End: 2018-04-12
Attending: FAMILY MEDICINE
Payer: COMMERCIAL

## 2018-04-12 ENCOUNTER — OFFICE VISIT (OUTPATIENT)
Dept: INTERNAL MEDICINE | Facility: CLINIC | Age: 63
End: 2018-04-12
Payer: COMMERCIAL

## 2018-04-12 VITALS
WEIGHT: 149.94 LBS | TEMPERATURE: 97 F | HEART RATE: 78 BPM | SYSTOLIC BLOOD PRESSURE: 132 MMHG | BODY MASS INDEX: 24.98 KG/M2 | DIASTOLIC BLOOD PRESSURE: 80 MMHG | HEIGHT: 65 IN

## 2018-04-12 DIAGNOSIS — K44.9 HIATAL HERNIA: ICD-10-CM

## 2018-04-12 DIAGNOSIS — R10.9 ABDOMINAL PAIN, UNSPECIFIED ABDOMINAL LOCATION: ICD-10-CM

## 2018-04-12 DIAGNOSIS — R10.9 ABDOMINAL PAIN, UNSPECIFIED ABDOMINAL LOCATION: Primary | ICD-10-CM

## 2018-04-12 DIAGNOSIS — A04.8 H. PYLORI INFECTION: ICD-10-CM

## 2018-04-12 LAB — IGA SERPL-MCNC: 194 MG/DL

## 2018-04-12 PROCEDURE — 3079F DIAST BP 80-89 MM HG: CPT | Mod: CPTII,S$GLB,, | Performed by: FAMILY MEDICINE

## 2018-04-12 PROCEDURE — 99999 PR PBB SHADOW E&M-EST. PATIENT-LVL III: CPT | Mod: PBBFAC,,, | Performed by: FAMILY MEDICINE

## 2018-04-12 PROCEDURE — 3075F SYST BP GE 130 - 139MM HG: CPT | Mod: CPTII,S$GLB,, | Performed by: FAMILY MEDICINE

## 2018-04-12 PROCEDURE — 86677 HELICOBACTER PYLORI ANTIBODY: CPT

## 2018-04-12 PROCEDURE — 36415 COLL VENOUS BLD VENIPUNCTURE: CPT | Mod: PO

## 2018-04-12 PROCEDURE — 83516 IMMUNOASSAY NONANTIBODY: CPT

## 2018-04-12 PROCEDURE — 82784 ASSAY IGA/IGD/IGG/IGM EACH: CPT

## 2018-04-12 PROCEDURE — 99214 OFFICE O/P EST MOD 30 MIN: CPT | Mod: S$GLB,,, | Performed by: FAMILY MEDICINE

## 2018-04-12 RX ORDER — DICYCLOMINE HYDROCHLORIDE 20 MG/1
20 TABLET ORAL EVERY 6 HOURS
Qty: 120 TABLET | Refills: 0 | Status: SHIPPED | OUTPATIENT
Start: 2018-04-12 | End: 2018-05-12

## 2018-04-12 RX ORDER — LANSOPRAZOLE, AMOXICILLIN, CLARITHROMYCIN 30-500-500
KIT ORAL
Qty: 1 KIT | Refills: 0 | Status: SHIPPED | OUTPATIENT
Start: 2018-04-12 | End: 2018-04-26

## 2018-04-12 NOTE — PATIENT INSTRUCTIONS
What Is a Hiatal Hernia?    Hiatal hernia is when the area where the stomach and esophagus meet bulges up through the diaphragm into the chest cavity. In some cases, part of the stomach may bulge above the diaphragm. Stomach acid may move up into the esophagus and cause symptoms. The symptoms are often blamed on gastroesophageal reflux disease (GERD). You may only know about the hernia when it shows up on an X-ray taken for other reasons.   What you may feel  The hiatus is a normal hole in the diaphragm. The esophagus passes through this hole and leads to the stomach. In some cases, part of the stomach may bulge above the diaphragm. This bulge is called a hernia. Stomach acid may move up into the esophagus and cause symptoms.  When you eat, the muscle at the hiatus relaxes to allow food to pass into the stomach. It tightens again to keep food and digestive acids in the stomach.  Many people with hiatal hernias have mild symptoms. You may notice the following GERD symptoms:  · Heartburn or other chest discomfort  · A feeling of chest fullness after a meal  · Frequent burping  · Acid taste in the mouth  · Trouble swallowing  Treating symptoms  If you have been diagnosed with hiatal hernia, these suggestions may help improve symptoms:  · Lose excess weight. Extra weight puts pressure on the stomach and esophagus.  · Dont lie down after eating. Sit up for at least an hour after eating. Lying down after eating can increase symptoms.  · Avoid certain foods and drinks. These include fatty foods, chocolate, coffee, mint, and other foods that cause symptoms for you.  · Dont smoke or drink alcohol. These can worsen symptoms.  · Look at your medicines. Discuss your medicines with your healthcare provider. Many medicines can cause symptoms.  · Consider an antacid medicine. Ask your healthcare provider about over-the-counter and prescription medicines that may help.  · Ask about surgery, if needed. Surgery is a treatment  choice for some people. Your healthcare provider can determine if surgery is an option for you.    Date Last Reviewed: 10/1/2016  © 0548-7585 [employer], [facility]. [address] [place] [address], [place] [address]. All rights reserved. This information is not intended as a substitute for professional medical care. Always follow your healthcare professional's instructions.

## 2018-04-14 NOTE — PROGRESS NOTES
Subjective:      Patient ID: Shirlene Olvera is a 62 y.o. female.    Chief Complaint: Follow-up (4 week )    Disclaimer:  This note is prepared using voice recognition software and as such is likely to have errors and has not been proof read. Please contact me for questions.     Shirlene Olvera is a 62 y.o. female who presents today for four-week follow-up of abdominal pain.  Since I last saw her she still having 9 attend stomach pain.  We did start Protonix and continued it.  She really can't tell much improvement with this.  She did start the Bentyl and has noticed it's helped minimally.  She now reports a lot more mid sternal pain as well as back pain radiating in the center of her back between her shoulder blades.  She also gets extreme spasms after eating each meal.  She believes she has a hiatal hernia.  Her H. pylori test that was performed was equivocal.  For this reason due to her current symptoms and minimal improvement with Protonix and Bentyl I recommend we go ahead and place her on the treatment for H. pylori while waiting on the results to be repeated again.  We did review her CT scan findings which showed no colon abnormalities present.  It did repeat the elbow the hiatal hernia.  We discussed how this can contribute a lot of her symptoms as well as specially after eating.  Weight is down 3 pounds since last visit.    She's also still working on quitting smoking.  She's now using atomic fireball to come to help with her symptoms and cravings however we discussed that the spicy and peppermint like candies can also aggravate reflux.             Lab Results   Component Value Date    WBC 8.51 03/15/2018    HGB 14.0 03/15/2018    HCT 41.6 03/15/2018     03/15/2018    CHOL 238 (H) 03/10/2018    TRIG 209 (H) 03/10/2018    HDL 53 03/10/2018    ALT 15 03/15/2018    AST 18 03/15/2018     03/15/2018    K 4.6 03/15/2018     03/15/2018    CREATININE 1.1 03/15/2018    BUN 18 03/15/2018  "   CO2 30 (H) 03/15/2018    TSH 0.895 01/26/2015    INR 0.9 05/13/2015       Review of Systems   Constitutional: Positive for activity change, appetite change and fatigue. Negative for chills and fever.   HENT: Negative for trouble swallowing and voice change.    Respiratory: Negative for cough and shortness of breath.    Cardiovascular: Positive for chest pain.   Gastrointestinal: Positive for abdominal distention, abdominal pain and nausea. Negative for blood in stool, constipation, diarrhea and vomiting.   Musculoskeletal: Positive for back pain.     Objective:     Vitals:    04/12/18 1129   BP: 132/80   Pulse: 78   Temp: 97.2 °F (36.2 °C)   TempSrc: Tympanic   Weight: 68 kg (149 lb 14.6 oz)   Height: 5' 4.5" (1.638 m)     Physical Exam   Constitutional: She appears well-developed and well-nourished.   HENT:   Head: Normocephalic and atraumatic.   Right Ear: Tympanic membrane normal.   Left Ear: Tympanic membrane normal.   Mouth/Throat: Oropharynx is clear and moist.   Eyes: Conjunctivae and EOM are normal.   Neck: Normal range of motion. Neck supple.   Cardiovascular: Normal rate and regular rhythm.    Pulmonary/Chest: Effort normal and breath sounds normal.   Abdominal: Soft. Normal appearance and bowel sounds are normal. There is tenderness in the epigastric area. There is guarding. There is no rigidity, no rebound and no CVA tenderness.   Musculoskeletal:        Thoracic back: She exhibits pain. She exhibits normal range of motion, no tenderness, no bony tenderness and no spasm.        Back:    Psychiatric: She has a normal mood and affect. Her behavior is normal.   Nursing note and vitals reviewed.    Assessment:     1. Abdominal pain, unspecified abdominal location    2. Hiatal hernia    3. H. pylori infection      Plan:   Shirlene was seen today for follow-up.    Diagnoses and all orders for this visit:    Abdominal pain, unspecified abdominal location-reviewed CT scan.  We'll also check for repeat H. pylori " as well as possible celiac issues as patient has been trying to limit some of the week to see if this would help some as well.  We will go ahead and treat with Prevpac in the interim.  Continue with Bentyl to help with spasms.  -     Tissue transglutaminase, IgA; Future  -     IgA; Future    Hiatal hernia-noted on CT scan discussed dietary changes I stop modification.  We'll also go ahead and treat with Prevpac.  If H. pylori is negative or no improvement with Prevpac and we'll need to proceed forward with EGD     H. pylori infection-noted to be  equivocal on last exam repeating today.    Other orders  -     dicyclomine (BENTYL) 20 mg tablet; Take 1 tablet (20 mg total) by mouth every 6 (six) hours.  -     amoxicillin-clarithromycin-lansoprazole (PREVPAC) 500-500-30 mg combo pack; Follow package directions.            Follow-up in about 6 weeks (around 5/24/2018) for abdominal pain.

## 2018-04-16 LAB
H PYLORI IGG SERPL QL IA: NEGATIVE
TTG IGA SER IA-ACNC: 6 UNITS

## 2018-04-16 NOTE — PROGRESS NOTES
No evidence of celiac disease. h pylori this time was negative. Please call patient to find out if she started prevpack. If she did, has she seen any improvement since starting? If not, then can stop it. If she has, then complete the full course. If not improving, then let me know so we can refer for EGD.

## 2018-05-30 ENCOUNTER — CLINICAL SUPPORT (OUTPATIENT)
Dept: CARDIOLOGY | Facility: CLINIC | Age: 63
End: 2018-05-30
Attending: INTERNAL MEDICINE
Payer: COMMERCIAL

## 2018-05-30 DIAGNOSIS — Z72.0 TOBACCO ABUSE: ICD-10-CM

## 2018-05-30 LAB — VASCULAR ANKLE BRACHIAL INDEX (ABI) LEFT: 0.8 (ref 0.9–1.2)

## 2018-05-30 PROCEDURE — 93925 LOWER EXTREMITY STUDY: CPT | Mod: S$GLB,,, | Performed by: INTERNAL MEDICINE

## 2018-05-30 PROCEDURE — 93922 UPR/L XTREMITY ART 2 LEVELS: CPT | Mod: S$GLB,,, | Performed by: INTERNAL MEDICINE

## 2018-06-01 ENCOUNTER — TELEPHONE (OUTPATIENT)
Dept: CARDIOLOGY | Facility: CLINIC | Age: 63
End: 2018-06-01

## 2018-06-01 NOTE — TELEPHONE ENCOUNTER
----- Message from Pepe Byers MD sent at 5/30/2018  7:08 PM CDT -----  Vascular studies show mild PAD, recommend exercise

## 2018-06-07 ENCOUNTER — OFFICE VISIT (OUTPATIENT)
Dept: INTERNAL MEDICINE | Facility: CLINIC | Age: 63
End: 2018-06-07
Payer: COMMERCIAL

## 2018-06-07 VITALS
BODY MASS INDEX: 29.39 KG/M2 | SYSTOLIC BLOOD PRESSURE: 126 MMHG | WEIGHT: 149.69 LBS | DIASTOLIC BLOOD PRESSURE: 76 MMHG | HEART RATE: 74 BPM | TEMPERATURE: 97 F | HEIGHT: 60 IN

## 2018-06-07 DIAGNOSIS — F41.1 GAD (GENERALIZED ANXIETY DISORDER): ICD-10-CM

## 2018-06-07 DIAGNOSIS — I73.9 PAD (PERIPHERAL ARTERY DISEASE): Primary | ICD-10-CM

## 2018-06-07 DIAGNOSIS — Z72.0 TOBACCO ABUSE: ICD-10-CM

## 2018-06-07 DIAGNOSIS — R10.9 ABDOMINAL SPASMS: ICD-10-CM

## 2018-06-07 DIAGNOSIS — E78.00 PURE HYPERCHOLESTEROLEMIA: ICD-10-CM

## 2018-06-07 DIAGNOSIS — Z00.00 ROUTINE GENERAL MEDICAL EXAMINATION AT A HEALTH CARE FACILITY: ICD-10-CM

## 2018-06-07 DIAGNOSIS — I10 ESSENTIAL HYPERTENSION: ICD-10-CM

## 2018-06-07 DIAGNOSIS — G47.00 INSOMNIA, UNSPECIFIED TYPE: ICD-10-CM

## 2018-06-07 DIAGNOSIS — Z12.31 ENCOUNTER FOR SCREENING MAMMOGRAM FOR BREAST CANCER: ICD-10-CM

## 2018-06-07 PROCEDURE — 99214 OFFICE O/P EST MOD 30 MIN: CPT | Mod: 25,S$GLB,, | Performed by: FAMILY MEDICINE

## 2018-06-07 PROCEDURE — 3074F SYST BP LT 130 MM HG: CPT | Mod: CPTII,S$GLB,, | Performed by: FAMILY MEDICINE

## 2018-06-07 PROCEDURE — 3008F BODY MASS INDEX DOCD: CPT | Mod: CPTII,S$GLB,, | Performed by: FAMILY MEDICINE

## 2018-06-07 PROCEDURE — 3078F DIAST BP <80 MM HG: CPT | Mod: CPTII,S$GLB,, | Performed by: FAMILY MEDICINE

## 2018-06-07 PROCEDURE — 99396 PREV VISIT EST AGE 40-64: CPT | Mod: 25,S$GLB,, | Performed by: FAMILY MEDICINE

## 2018-06-07 PROCEDURE — 99999 PR PBB SHADOW E&M-EST. PATIENT-LVL III: CPT | Mod: PBBFAC,,, | Performed by: FAMILY MEDICINE

## 2018-06-07 RX ORDER — IBUPROFEN 200 MG
1 TABLET ORAL DAILY
Qty: 14 PATCH | Refills: 1 | Status: SHIPPED | OUTPATIENT
Start: 2018-06-07 | End: 2019-05-20 | Stop reason: SDUPTHER

## 2018-06-07 RX ORDER — BUPROPION HYDROCHLORIDE 150 MG/1
150 TABLET ORAL DAILY
Qty: 30 TABLET | Refills: 11 | Status: SHIPPED | OUTPATIENT
Start: 2018-06-07 | End: 2018-07-10 | Stop reason: SDUPTHER

## 2018-06-09 PROBLEM — F41.1 GAD (GENERALIZED ANXIETY DISORDER): Status: ACTIVE | Noted: 2018-06-09

## 2018-06-09 PROBLEM — G47.00 INSOMNIA: Status: ACTIVE | Noted: 2018-06-09

## 2018-06-09 PROBLEM — I73.9 PAD (PERIPHERAL ARTERY DISEASE): Status: ACTIVE | Noted: 2018-06-09

## 2018-06-09 NOTE — PROGRESS NOTES
Subjective:      Patient ID: Shirlene Olvera is a 62 y.o. female.    Chief Complaint: Follow-up (6 weeks ), abdominal pain status post treatment and new vascular studies    Disclaimer:  This note is prepared using voice recognition software and as such is likely to have errors and has not been proof read. Please contact me for questions.     Shirlene Olvera is a 62 y.o. female who presents today for 6 week follow-up of abdominal pain.  Since I last saw her she is actually doing much better now.  She completed a Prevpac for if equivocal H pylori.  She is not having much symptoms at all.    Also since that time she has met with Cardiology in ended up having arterial studies of a peripheral vascular system.  She has mild to moderate peripheral artery disease.  It was recommended that she quit smoking in begin routine exercise.  She is interested in quitting smoking today.  She has done so before in the past but previously she was using some atomic fire balls which also exacerbated some reflux issues.  We discussed the benefit of using Wellbutrin and possibly even patches.  She is willing to try to get this to help.  She is more stressed at this time.  She does smoke within 30 min of waking up.  She usually smokes around 10 per day.  Her son recently moved in with her is caused more stress as well.    With the peripheral arteries she is post be exercising more.  She is trying to work on this.  She is intolerant to statins.    Blood pressure is controlled at this time.    She is currently on B12 supplements at this time as well.    Her back pain seems to be stable at this time.            Lab Results   Component Value Date    WBC 8.51 03/15/2018    HGB 14.0 03/15/2018    HCT 41.6 03/15/2018     03/15/2018    CHOL 238 (H) 03/10/2018    TRIG 209 (H) 03/10/2018    HDL 53 03/10/2018    ALT 15 03/15/2018    AST 18 03/15/2018     03/15/2018    K 4.6 03/15/2018     03/15/2018    CREATININE 1.1  03/15/2018    BUN 18 03/15/2018    CO2 30 (H) 03/15/2018    TSH 0.895 01/26/2015    INR 0.9 05/13/2015       Review of Systems   Constitutional: Negative for activity change, appetite change, fatigue and fever.   Respiratory: Negative for cough and shortness of breath.    Cardiovascular: Negative for chest pain, palpitations and leg swelling.   Gastrointestinal: Negative for abdominal distention, abdominal pain, constipation, diarrhea and nausea.   Musculoskeletal: Positive for myalgias. Negative for arthralgias and gait problem.   Neurological: Negative for light-headedness and headaches.   Psychiatric/Behavioral: Positive for dysphoric mood. Negative for sleep disturbance. The patient is nervous/anxious.      Objective:     Vitals:    06/07/18 1428   BP: 126/76   Pulse: 74   Temp: 97 °F (36.1 °C)   TempSrc: Tympanic   Weight: 67.9 kg (149 lb 11.1 oz)   Height: 5' (1.524 m)     Physical Exam   Constitutional: She is oriented to person, place, and time. She appears well-developed and well-nourished.   HENT:   Head: Normocephalic and atraumatic.   Right Ear: External ear normal.   Left Ear: External ear normal.   Mouth/Throat: Oropharynx is clear and moist.   Eyes: EOM are normal.   Neck: Normal range of motion. Neck supple. No thyromegaly present.   Cardiovascular: Normal rate and regular rhythm.  Exam reveals no gallop and no friction rub.    No murmur heard.  Pulmonary/Chest: Effort normal. No respiratory distress. She has no wheezes. She has no rales.   Abdominal: Soft. Bowel sounds are normal. She exhibits no distension. There is no tenderness. There is no rebound.   Musculoskeletal: Normal range of motion. She exhibits no edema.   Lymphadenopathy:     She has no cervical adenopathy.   Neurological: She is alert and oriented to person, place, and time.   Skin: Skin is warm and dry. No rash noted.   Psychiatric: She has a normal mood and affect. Her behavior is normal. Judgment and thought content normal.    Vitals reviewed.    Assessment:     1. PAD (peripheral artery disease)    2. Abdominal spasms    3. Tobacco abuse    4. ROMÁN (generalized anxiety disorder)    5. Insomnia, unspecified type    6. Pure hypercholesterolemia    7. Essential hypertension    8. Encounter for screening mammogram for breast cancer    9. Routine general medical examination at a health care facility      Plan:   Shirlene was seen today for follow-up.    Diagnoses and all orders for this visit:    PAD (peripheral artery disease)- new. Reviewed studies   Comments:  bilateral noted, will work on quitting smoking.  Encouraged exercise as well.  Intolerant to statin  Orders:  -     Ambulatory referral to Smoking Cessation Program    Abdominal spasms-improved with these medications as well as treatment with Prevpac for H pylori    Tobacco abuse-greatly needs to quit due to peripheral vascular disease.  Will refer to the smoking cessation program.  Beginning Wellbutrin today.  Also discussed use of nicotine patches.  Also discussed benefit of cutting back even from 10-5.  -     Ambulatory referral to Smoking Cessation Program    ROMÁN (generalized anxiety disorder)-starting Wellbutrin to help with anxiety and stress also walk quitting smoking.  Has Xanax as needed as well    Insomnia, unspecified type-intermittently using Xanax    Pure hypercholesterolemia-intolerant to statins with peripheral vascular disease needing to work on diet    Essential hypertension-stable at this time      Other orders  -     buPROPion (WELLBUTRIN XL) 150 MG TB24 tablet; Take 1 tablet (150 mg total) by mouth once daily.  -     nicotine (NICODERM CQ) 14 mg/24 hr; Place 1 patch onto the skin once daily.            Follow-up in about 7 months (around 1/7/2019) for chronic issues Dr Segovia.

## 2018-06-09 NOTE — PROGRESS NOTES
Shirlene Olvera presented for a prevention/wellness visit today. The following components were reviewed and updated:    · Medical history  · Family History  · Social history  · Allergies and Current Medications  · Health Maintenance  Patient Care Team:  · Jhoana Segovia MD as PCP - General (Family Medicine)   · Dr. Byers Cardiology    **See Completed Assessments for Annual Wellness visit with in the encounter summary    ·  wellness assessment:  ·   Depression screening  · 1. In the past 2 weeks has the patient felt down, depressed or hopeless? No  · 2. Over the past 2 weeks as the patient felt little interest or pleasure in doing things?  No, however her son recently moved in with her and is making it more stressful  ·  Functional Ability/ Safety Screening  · 1. Was the  Patient's timed up and go test unsteady or longer than 30 seconds?  No   · 2. Has the patient needed help with transportation shopping preparing meals housework laundry medications are managing money?  No  ·     Vitals:    06/07/18 1428   BP: 126/76   Pulse: 74   Temp: 97 °F (36.1 °C)   TempSrc: Tympanic   Weight: 67.9 kg (149 lb 11.1 oz)   Height: 5' (1.524 m)     Body mass index is 29.23 kg/m².   ]      Annual Wellness Visit  Patient Active Problem List   Diagnosis    Thoracic or lumbosacral neuritis or radiculitis, unspecified    HTN (hypertension)    Hyperlipidemia    DDD (degenerative disc disease), cervical    DDD (degenerative disc disease), lumbar    Tobacco abuse    Allergic rhinitis, cause unspecified    NSTEMI (non-ST elevated myocardial infarction)    Chest pain    Coronary artery disease without angina pectoris    Biliary dyskinesia    History of colon polyps    PAD (peripheral artery disease)    ROÁMN (generalized anxiety disorder)    Insomnia         Provided Shirlene with a 5-10 year written screening schedule and personal prevention plan. Recommendations were developed using the USPSTF age appropriate recommendations.  Education, counseling, and referrals were provided as needed.  After Visit Summary printed and given to patient which includes a list of additional screenings\tests needed.    Health Maintenance   Topic Date Due    Mammogram  07/17/2018    Influenza Vaccine  08/01/2018    Lipid Panel  03/10/2019    Pneumococcal PPSV23 (Medium Risk) (2) 04/27/2021    Colonoscopy  10/11/2021    TETANUS VACCINE  10/27/2026    Hepatitis C Screening  Completed    Zoster Vaccine  Completed     Discussed health maintenance with the patient.  Will schedule her mammogram.  Discussed smoking cessation we referring her to clinic starting Wellbutrin and nicotine patches  Follow-up in about 7 months (around 1/7/2019) for chronic issues Dr Segovia.      Jhoana Segovia MD

## 2018-06-12 ENCOUNTER — LAB VISIT (OUTPATIENT)
Dept: LAB | Facility: HOSPITAL | Age: 63
End: 2018-06-12
Attending: INTERNAL MEDICINE
Payer: COMMERCIAL

## 2018-06-12 DIAGNOSIS — E78.49 OTHER HYPERLIPIDEMIA: ICD-10-CM

## 2018-06-12 DIAGNOSIS — I25.10 CORONARY ARTERY DISEASE INVOLVING NATIVE CORONARY ARTERY OF NATIVE HEART WITHOUT ANGINA PECTORIS: ICD-10-CM

## 2018-06-12 DIAGNOSIS — I21.4 NON-ST ELEVATED MYOCARDIAL INFARCTION: ICD-10-CM

## 2018-06-12 LAB
CHOLEST SERPL-MCNC: 246 MG/DL
CHOLEST/HDLC SERPL: 5.9 {RATIO}
HDLC SERPL-MCNC: 42 MG/DL
HDLC SERPL: 17.1 %
LDLC SERPL CALC-MCNC: 145 MG/DL
NONHDLC SERPL-MCNC: 204 MG/DL
TRIGL SERPL-MCNC: 295 MG/DL

## 2018-06-12 PROCEDURE — 36415 COLL VENOUS BLD VENIPUNCTURE: CPT | Mod: PO

## 2018-06-12 PROCEDURE — 80061 LIPID PANEL: CPT

## 2018-06-13 ENCOUNTER — TELEPHONE (OUTPATIENT)
Dept: CARDIOLOGY | Facility: CLINIC | Age: 63
End: 2018-06-13

## 2018-06-13 NOTE — TELEPHONE ENCOUNTER
----- Message from Pepe Byers MD sent at 6/12/2018 10:03 PM CDT -----  Lipids reveiwed increase niacin to 2000 mg q day

## 2018-07-11 RX ORDER — BUPROPION HYDROCHLORIDE 150 MG/1
TABLET ORAL
Qty: 30 TABLET | Refills: 11 | Status: SHIPPED | OUTPATIENT
Start: 2018-07-11 | End: 2019-01-07

## 2018-07-18 ENCOUNTER — HOSPITAL ENCOUNTER (OUTPATIENT)
Dept: RADIOLOGY | Facility: HOSPITAL | Age: 63
Discharge: HOME | End: 2018-07-18
Attending: FAMILY MEDICINE
Payer: COMMERCIAL

## 2018-07-18 VITALS — WEIGHT: 149 LBS | HEIGHT: 60 IN | BODY MASS INDEX: 29.25 KG/M2

## 2018-07-18 DIAGNOSIS — Z12.31 ENCOUNTER FOR SCREENING MAMMOGRAM FOR BREAST CANCER: ICD-10-CM

## 2018-07-18 PROCEDURE — 77063 BREAST TOMOSYNTHESIS BI: CPT | Mod: 26,,, | Performed by: RADIOLOGY

## 2018-07-18 PROCEDURE — 77067 SCR MAMMO BI INCL CAD: CPT | Mod: 26,,, | Performed by: RADIOLOGY

## 2018-07-18 PROCEDURE — 77067 SCR MAMMO BI INCL CAD: CPT | Mod: TC,PO

## 2018-07-20 ENCOUNTER — TELEPHONE (OUTPATIENT)
Dept: INTERNAL MEDICINE | Facility: CLINIC | Age: 63
End: 2018-07-20

## 2018-07-20 NOTE — TELEPHONE ENCOUNTER
----- Message from Maco Garcia sent at 7/20/2018  8:16 AM CDT -----  Contact: pt   Pt is returning the nurse call.                                               [phone] (bqbt)

## 2018-08-11 ENCOUNTER — TELEPHONE (OUTPATIENT)
Dept: URGENT CARE | Facility: CLINIC | Age: 63
End: 2018-08-11

## 2018-08-11 ENCOUNTER — OFFICE VISIT (OUTPATIENT)
Dept: URGENT CARE | Facility: CLINIC | Age: 63
End: 2018-08-11
Payer: COMMERCIAL

## 2018-08-11 ENCOUNTER — HOSPITAL ENCOUNTER (OUTPATIENT)
Dept: RADIOLOGY | Facility: HOSPITAL | Age: 63
Discharge: HOME | End: 2018-08-11
Attending: NURSE PRACTITIONER
Payer: COMMERCIAL

## 2018-08-11 VITALS
DIASTOLIC BLOOD PRESSURE: 80 MMHG | RESPIRATION RATE: 18 BRPM | BODY MASS INDEX: 25.83 KG/M2 | SYSTOLIC BLOOD PRESSURE: 134 MMHG | HEIGHT: 65 IN | OXYGEN SATURATION: 98 % | TEMPERATURE: 98 F | WEIGHT: 155 LBS | HEART RATE: 65 BPM

## 2018-08-11 DIAGNOSIS — M25.442 SWELLING OF FINGER JOINT OF LEFT HAND: ICD-10-CM

## 2018-08-11 DIAGNOSIS — S61.012A LACERATION OF LEFT THUMB, FOREIGN BODY PRESENCE UNSPECIFIED, NAIL DAMAGE STATUS UNSPECIFIED, INITIAL ENCOUNTER: ICD-10-CM

## 2018-08-11 DIAGNOSIS — M79.645 THUMB PAIN, LEFT: ICD-10-CM

## 2018-08-11 DIAGNOSIS — M79.645 THUMB PAIN, LEFT: Primary | ICD-10-CM

## 2018-08-11 DIAGNOSIS — R21 ERYTHEMATOUS RASH: ICD-10-CM

## 2018-08-11 PROCEDURE — 73110 X-RAY EXAM OF WRIST: CPT | Mod: TC,FY,PO,LT

## 2018-08-11 PROCEDURE — 3079F DIAST BP 80-89 MM HG: CPT | Mod: CPTII,S$GLB,, | Performed by: NURSE PRACTITIONER

## 2018-08-11 PROCEDURE — 73110 X-RAY EXAM OF WRIST: CPT | Mod: 26,LT,, | Performed by: RADIOLOGY

## 2018-08-11 PROCEDURE — 99999 PR PBB SHADOW E&M-EST. PATIENT-LVL V: CPT | Mod: PBBFAC,,, | Performed by: NURSE PRACTITIONER

## 2018-08-11 PROCEDURE — 73140 X-RAY EXAM OF FINGER(S): CPT | Mod: 26,LT,, | Performed by: RADIOLOGY

## 2018-08-11 PROCEDURE — 3008F BODY MASS INDEX DOCD: CPT | Mod: CPTII,S$GLB,, | Performed by: NURSE PRACTITIONER

## 2018-08-11 PROCEDURE — 3075F SYST BP GE 130 - 139MM HG: CPT | Mod: CPTII,S$GLB,, | Performed by: NURSE PRACTITIONER

## 2018-08-11 PROCEDURE — 73140 X-RAY EXAM OF FINGER(S): CPT | Mod: TC,FY,PO

## 2018-08-11 PROCEDURE — 99214 OFFICE O/P EST MOD 30 MIN: CPT | Mod: S$GLB,,, | Performed by: NURSE PRACTITIONER

## 2018-08-11 RX ORDER — NEBIVOLOL HYDROCHLORIDE 5 MG/1
10 TABLET ORAL DAILY
Refills: 11 | COMMUNITY
Start: 2018-07-10 | End: 2018-08-16 | Stop reason: ALTCHOICE

## 2018-08-11 RX ORDER — MUPIROCIN CALCIUM 20 MG/G
CREAM TOPICAL 2 TIMES DAILY
Qty: 15 G | Refills: 0 | Status: SHIPPED | OUTPATIENT
Start: 2018-08-11 | End: 2022-03-28

## 2018-08-11 NOTE — TELEPHONE ENCOUNTER
Notified patient of preliminary report. Instructed to continue treatment plan and follow up if there's any changes.

## 2018-08-11 NOTE — PROGRESS NOTES
Subjective:       Patient ID: Shirlene Olvera is a 62 y.o. female.    Chief Complaint: cut left thumb a week ago and now is breaking out with a maryann    Patient presents with a erythematous rash that she noticed this morning.  Reports cutting her thumb last week while cleaning chicken.  Kept thumb covered with band aid.  Has some pain and swelling to the thumb and wrist (left).  Has not tried any new topical creams/lotions or soaps.  Tried no medications.        Review of Systems   Constitutional: Negative for chills and fatigue.   Respiratory: Negative for cough and choking.    Musculoskeletal: Positive for arthralgias and joint swelling.   Skin: Positive for color change and wound.   Psychiatric/Behavioral: Negative for agitation and confusion.       Objective:      Physical Exam   Constitutional: She is oriented to person, place, and time. Vital signs are normal. She appears well-developed and well-nourished.   HENT:   Head: Normocephalic and atraumatic.   Neck: Normal range of motion.   Cardiovascular: Normal rate.    Pulmonary/Chest: Effort normal.   Musculoskeletal: She exhibits edema and tenderness.   Neurological: She is alert and oriented to person, place, and time.   Skin: Skin is warm. Laceration (left thumb) and rash noted. Rash is macular. There is erythema.   Macular erythematous area noted from left thumb to forearm.  No warm or swelling noted.  Laceration intact to left thumb.  No drainage.     Psychiatric: She has a normal mood and affect. Her behavior is normal.       Assessment:       1. Thumb pain, left    2. Swelling of finger joint of left hand    3. Laceration of left thumb, foreign body presence unspecified, nail damage status unspecified, initial encounter    4. Erythematous rash        Plan:         Thumb pain, left  -     X-Ray Finger 2 or More Views; Future; Expected date: 08/11/2018    Swelling of finger joint of left hand  -     X-Ray Finger 2 or More Views; Future; Expected date:  08/11/2018  -     X-Ray Wrist Complete Left; Future; Expected date: 08/11/2018    Laceration of left thumb, foreign body presence unspecified, nail damage status unspecified, initial encounter  -     X-Ray Finger 2 or More Views; Future; Expected date: 08/11/2018    Erythematous rash  -     mupirocin calcium 2% (BACTROBAN) 2 % cream; Apply topically 2 (two) times daily.  Dispense: 15 g; Refill: 0        X-ray's today.  Will start Bactroban ointment to area.  Instructed to monitor for changes.  Will start oral antibiotic if no improvement.  Will notify of x-ray reports.

## 2018-08-14 ENCOUNTER — HOSPITAL ENCOUNTER (OUTPATIENT)
Facility: HOSPITAL | Age: 63
Discharge: HOME | End: 2018-08-14
Attending: EMERGENCY MEDICINE | Admitting: HOSPITALIST
Payer: COMMERCIAL

## 2018-08-14 VITALS
BODY MASS INDEX: 23.91 KG/M2 | HEIGHT: 65 IN | RESPIRATION RATE: 17 BRPM | SYSTOLIC BLOOD PRESSURE: 138 MMHG | OXYGEN SATURATION: 99 % | DIASTOLIC BLOOD PRESSURE: 63 MMHG | WEIGHT: 143.5 LBS | HEART RATE: 61 BPM | TEMPERATURE: 96 F

## 2018-08-14 DIAGNOSIS — R07.9 CHEST PAIN: ICD-10-CM

## 2018-08-14 LAB
ANION GAP SERPL CALC-SCNC: 8 MMOL/L
BASOPHILS # BLD AUTO: 0.03 K/UL
BASOPHILS NFR BLD: 0.6 %
BUN SERPL-MCNC: 13 MG/DL
CALCIUM SERPL-MCNC: 9.4 MG/DL
CHLORIDE SERPL-SCNC: 105 MMOL/L
CHOLEST SERPL-MCNC: 251 MG/DL
CHOLEST/HDLC SERPL: 6.4 {RATIO}
CO2 SERPL-SCNC: 26 MMOL/L
CREAT SERPL-MCNC: 0.9 MG/DL
DIFFERENTIAL METHOD: ABNORMAL
EOSINOPHIL # BLD AUTO: 0.1 K/UL
EOSINOPHIL NFR BLD: 2.5 %
ERYTHROCYTE [DISTWIDTH] IN BLOOD BY AUTOMATED COUNT: 12.9 %
EST. GFR  (AFRICAN AMERICAN): >60 ML/MIN/1.73 M^2
EST. GFR  (NON AFRICAN AMERICAN): >60 ML/MIN/1.73 M^2
ESTIMATED AVG GLUCOSE: 105 MG/DL
GLUCOSE SERPL-MCNC: 92 MG/DL
HBA1C MFR BLD HPLC: 5.3 %
HCT VFR BLD AUTO: 39.3 %
HDLC SERPL-MCNC: 39 MG/DL
HDLC SERPL: 15.5 %
HGB BLD-MCNC: 13.7 G/DL
LDLC SERPL CALC-MCNC: 165.6 MG/DL
LYMPHOCYTES # BLD AUTO: 1.7 K/UL
LYMPHOCYTES NFR BLD: 31.3 %
MAGNESIUM SERPL-MCNC: 2.1 MG/DL
MCH RBC QN AUTO: 34.4 PG
MCHC RBC AUTO-ENTMCNC: 34.9 G/DL
MCV RBC AUTO: 99 FL
MONOCYTES # BLD AUTO: 0.5 K/UL
MONOCYTES NFR BLD: 8.5 %
NEUTROPHILS # BLD AUTO: 3 K/UL
NEUTROPHILS NFR BLD: 57.1 %
NONHDLC SERPL-MCNC: 212 MG/DL
PLATELET # BLD AUTO: 183 K/UL
PMV BLD AUTO: 9.4 FL
POTASSIUM SERPL-SCNC: 4.1 MMOL/L
RBC # BLD AUTO: 3.98 M/UL
SODIUM SERPL-SCNC: 139 MMOL/L
TRIGL SERPL-MCNC: 232 MG/DL
TROPONIN I SERPL DL<=0.01 NG/ML-MCNC: 0.01 NG/ML
TROPONIN I SERPL DL<=0.01 NG/ML-MCNC: 0.01 NG/ML
TROPONIN I SERPL DL<=0.01 NG/ML-MCNC: <0.006 NG/ML
TSH SERPL DL<=0.005 MIU/L-ACNC: 1.43 UIU/ML
WBC # BLD AUTO: 5.3 K/UL

## 2018-08-14 PROCEDURE — 93005 ELECTROCARDIOGRAM TRACING: CPT

## 2018-08-14 PROCEDURE — G0378 HOSPITAL OBSERVATION PER HR: HCPCS

## 2018-08-14 PROCEDURE — 25000003 PHARM REV CODE 250: Performed by: NURSE PRACTITIONER

## 2018-08-14 PROCEDURE — 99285 EMERGENCY DEPT VISIT HI MDM: CPT | Mod: 25

## 2018-08-14 PROCEDURE — 83735 ASSAY OF MAGNESIUM: CPT

## 2018-08-14 PROCEDURE — 25000003 PHARM REV CODE 250: Performed by: EMERGENCY MEDICINE

## 2018-08-14 PROCEDURE — 80048 BASIC METABOLIC PNL TOTAL CA: CPT

## 2018-08-14 PROCEDURE — 80061 LIPID PANEL: CPT

## 2018-08-14 PROCEDURE — 84484 ASSAY OF TROPONIN QUANT: CPT

## 2018-08-14 PROCEDURE — 83036 HEMOGLOBIN GLYCOSYLATED A1C: CPT

## 2018-08-14 PROCEDURE — 93010 ELECTROCARDIOGRAM REPORT: CPT | Mod: ,,, | Performed by: INTERNAL MEDICINE

## 2018-08-14 PROCEDURE — 85025 COMPLETE CBC W/AUTO DIFF WBC: CPT

## 2018-08-14 PROCEDURE — 84484 ASSAY OF TROPONIN QUANT: CPT | Mod: 91

## 2018-08-14 PROCEDURE — 36415 COLL VENOUS BLD VENIPUNCTURE: CPT

## 2018-08-14 PROCEDURE — 84443 ASSAY THYROID STIM HORMONE: CPT

## 2018-08-14 PROCEDURE — A4216 STERILE WATER/SALINE, 10 ML: HCPCS | Performed by: EMERGENCY MEDICINE

## 2018-08-14 RX ORDER — ONDANSETRON 8 MG/1
8 TABLET, ORALLY DISINTEGRATING ORAL EVERY 8 HOURS PRN
Status: CANCELLED | OUTPATIENT
Start: 2018-08-14

## 2018-08-14 RX ORDER — HYDROCHLOROTHIAZIDE 12.5 MG/1
12.5 TABLET ORAL DAILY
Status: DISCONTINUED | OUTPATIENT
Start: 2018-08-14 | End: 2018-08-14 | Stop reason: HOSPADM

## 2018-08-14 RX ORDER — IBUPROFEN 200 MG
1 TABLET ORAL DAILY
Status: DISCONTINUED | OUTPATIENT
Start: 2018-08-14 | End: 2018-08-14 | Stop reason: HOSPADM

## 2018-08-14 RX ORDER — NITROGLYCERIN 0.4 MG/1
0.4 TABLET SUBLINGUAL EVERY 5 MIN PRN
Status: DISCONTINUED | OUTPATIENT
Start: 2018-08-14 | End: 2018-08-14 | Stop reason: HOSPADM

## 2018-08-14 RX ORDER — PNV NO.95/FERROUS FUM/FOLIC AC 28MG-0.8MG
100 TABLET ORAL DAILY
Status: DISCONTINUED | OUTPATIENT
Start: 2018-08-14 | End: 2018-08-14 | Stop reason: HOSPADM

## 2018-08-14 RX ORDER — OXYCODONE HYDROCHLORIDE 5 MG/1
5 TABLET ORAL EVERY 4 HOURS PRN
Status: DISCONTINUED | OUTPATIENT
Start: 2018-08-14 | End: 2018-08-14 | Stop reason: HOSPADM

## 2018-08-14 RX ORDER — LANOLIN ALCOHOL/MO/W.PET/CERES
500 CREAM (GRAM) TOPICAL DAILY
Status: DISCONTINUED | OUTPATIENT
Start: 2018-08-14 | End: 2018-08-14 | Stop reason: HOSPADM

## 2018-08-14 RX ORDER — LOSARTAN POTASSIUM 50 MG/1
50 TABLET ORAL DAILY
Status: DISCONTINUED | OUTPATIENT
Start: 2018-08-14 | End: 2018-08-14 | Stop reason: HOSPADM

## 2018-08-14 RX ORDER — NAPROXEN SODIUM 220 MG/1
81 TABLET, FILM COATED ORAL DAILY
Status: DISCONTINUED | OUTPATIENT
Start: 2018-08-14 | End: 2018-08-14 | Stop reason: HOSPADM

## 2018-08-14 RX ORDER — ACETAMINOPHEN 325 MG/1
650 TABLET ORAL EVERY 6 HOURS PRN
Status: DISCONTINUED | OUTPATIENT
Start: 2018-08-14 | End: 2018-08-14 | Stop reason: HOSPADM

## 2018-08-14 RX ORDER — SODIUM CHLORIDE 0.9 % (FLUSH) 0.9 %
3 SYRINGE (ML) INJECTION EVERY 8 HOURS
Status: DISCONTINUED | OUTPATIENT
Start: 2018-08-14 | End: 2018-08-14 | Stop reason: HOSPADM

## 2018-08-14 RX ORDER — NEBIVOLOL 5 MG/1
5 TABLET ORAL DAILY
Status: DISCONTINUED | OUTPATIENT
Start: 2018-08-14 | End: 2018-08-14 | Stop reason: HOSPADM

## 2018-08-14 RX ORDER — BUPROPION HYDROCHLORIDE 150 MG/1
150 TABLET ORAL DAILY
Status: DISCONTINUED | OUTPATIENT
Start: 2018-08-14 | End: 2018-08-14 | Stop reason: HOSPADM

## 2018-08-14 RX ORDER — ONDANSETRON 2 MG/ML
4 INJECTION INTRAMUSCULAR; INTRAVENOUS EVERY 8 HOURS PRN
Status: DISCONTINUED | OUTPATIENT
Start: 2018-08-14 | End: 2018-08-14 | Stop reason: HOSPADM

## 2018-08-14 RX ORDER — ENOXAPARIN SODIUM 100 MG/ML
40 INJECTION SUBCUTANEOUS EVERY 24 HOURS
Status: DISCONTINUED | OUTPATIENT
Start: 2018-08-14 | End: 2018-08-14 | Stop reason: HOSPADM

## 2018-08-14 RX ADMIN — ASPIRIN 81 MG 81 MG: 81 TABLET ORAL at 09:08

## 2018-08-14 RX ADMIN — LOSARTAN POTASSIUM 50 MG: 50 TABLET, FILM COATED ORAL at 09:08

## 2018-08-14 RX ADMIN — Medication 3 ML: at 06:08

## 2018-08-14 RX ADMIN — BUPROPION HYDROCHLORIDE 150 MG: 150 TABLET, EXTENDED RELEASE ORAL at 09:08

## 2018-08-14 RX ADMIN — HYDROCHLOROTHIAZIDE 12.5 MG: 12.5 TABLET ORAL at 09:08

## 2018-08-14 NOTE — PROGRESS NOTES
Discharge instructions given. IV removed with cannula intact. Daughter present and verbliazed understanding of instructions. NO noted distress. Transported out via w/c

## 2018-08-14 NOTE — HOSPITAL COURSE
The patient was placed in Observation for chest pain. ACS was ruled out. The patient will follow up with [facility] as an outpatient.

## 2018-08-14 NOTE — H&P
"[facility] Hospital Medicine  History & Physical    Patient Name: Shirlene Olvera  MRN: [id number]  Admission Date: 8/14/2018  Attending Physician: Luca Linn MD   Primary Care Provider: Jhoana Segovia MD         Patient information was obtained from patient, past medical records and ER records.     Subjective:     Principal Problem:Chest pain    Chief Complaint:   Chief Complaint   Patient presents with    Chest Pain     transfer from [facility].        HPI: Ms. Olvera is a 63yo female with  a PMHx of nonobstructive CAD, HTN, HLD, PAD, and tobacco use, who  was transferred from [facility] with c/o left-sided chest pain since ~6:30PM last night.  Associated SOB, nausea, and dizziness.  Pain was constant, "tight" in nature, rated 5-6/10, radiated into left arm, and nonexertional.  No aggravating or alleviating factors.  Denies any palpitations, ESPARZA, orthopnea, PND, cough, edema, weight gain, ABD pain, V/D, constipation, dysuria, hematuria, flank/back pain, HA, AMS, syncope, focal deficits, fever, chills, or diaphoresis.  Work-up at [facility] ED was unremarkable with negative troponin, unrevealing EKG, normal CXR and UA, normal CBC and CMP.  She received ASA 325mg PO and SL NTG.  Subsequently, she was transferred to [facility] for Cardiology services per patient request.  Hospital Medicine was called and patient admitted to Observation for CP to r/o AMI.  Patient reports undergoing LHC in 2008 by Dr. Armendariz ([facility]), which showed "a small blockage".  She had negative MPI stress test and normal 2D echo in 11/2017 per Dr. Byers.  Currently, patient appears comfortable in NAD.  Denies any CP or SOB at present.       Past Medical History:   Diagnosis Date    Colon polyp     Coronary artery disease     pt states MI June 2015    Hypertension     Myocardial infarction     PAD (peripheral artery disease)     Tobacco use        Past Surgical History:   Procedure Laterality " Date    CHOLECYSTECTOMY  09/03/2015    high blood      HYSTERECTOMY      RIB FRACTURE SURGERY         Review of patient's allergies indicates:   Allergen Reactions    Statins-hmg-coa reductase inhibitors Other (See Comments)     Myalgias to lipitor and simvastatin       No current facility-administered medications on file prior to encounter.      Current Outpatient Medications on File Prior to Encounter   Medication Sig    aspirin 81 MG Chew Take 1 tablet (81 mg total) by mouth once daily.    buPROPion (WELLBUTRIN XL) 150 MG TB24 tablet TAKE 1 TABLET BY MOUTH ONCE DAILY    BYSTOLIC 5 mg Tab Take 5 mg by mouth once daily.    cyanocobalamin (VITAMIN B-12) 1000 MCG tablet Take 100 mcg by mouth once daily.    cyclobenzaprine (FLEXERIL) 10 MG tablet TAKE 1 TABLET BY MOUTH 3 TIMES DAILY IF NEEDED FOR MUSCLE SPASMS    fluocinolone acetonide oil (DERMOTIC OIL) 0.01 % Drop Place 3 drops in ear(s) 2 (two) times daily.    fluticasone (FLONASE) 50 mcg/actuation nasal spray 2 sprays by Each Nare route once daily.    loratadine (CLARITIN) 10 mg tablet Take 10 mg by mouth daily as needed for Allergies.    losartan-hydrochlorothiazide 50-12.5 mg (HYZAAR) 50-12.5 mg per tablet Take 1 tablet by mouth every morning.    multivitamin with minerals tablet Take 1 tablet by mouth once daily.    mupirocin calcium 2% (BACTROBAN) 2 % cream Apply topically 2 (two) times daily.    niacin, inositol niacinate, (NIACIN FLUSH FREE) 400 mg niacin (500 mg) Cap Take 500 mg by mouth after dinner. (Patient taking differently: Take 2,000 mg by mouth after dinner. 06/13/18 Dr Byers increased to 2000 mg a day.)    nicotine (NICODERM CQ) 14 mg/24 hr Place 1 patch onto the skin once daily.     Family History     Problem Relation (Age of Onset)    Heart attack Father (56)        Tobacco Use    Smoking status: Current Every Day Smoker     Packs/day: 0.25    Smokeless tobacco: Never Used   Substance and Sexual Activity    Alcohol use: Yes      Alcohol/week: 0.0 oz     Comment: once a month    Drug use: No    Sexual activity: No     Review of Systems   Constitutional: Negative for activity change, chills, diaphoresis, fatigue, fever and unexpected weight change.   HENT: Negative for congestion, sore throat and trouble swallowing.    Respiratory: Positive for shortness of breath. Negative for cough and wheezing.    Cardiovascular: Positive for chest pain. Negative for palpitations and leg swelling.   Gastrointestinal: Positive for nausea. Negative for abdominal distention, abdominal pain, blood in stool, constipation, diarrhea and vomiting.   Genitourinary: Negative for difficulty urinating, dysuria, frequency, hematuria and urgency.   Musculoskeletal: Negative for arthralgias, back pain, gait problem, joint swelling and myalgias.   Skin: Negative for pallor, rash and wound.   Neurological: Positive for dizziness. Negative for syncope, weakness, light-headedness, numbness and headaches.   Psychiatric/Behavioral: Negative for confusion. The patient is not nervous/anxious.    All other systems reviewed and are negative.    Objective:     Vital Signs (Most Recent):  Temp: 97.8 °F (36.6 °C) (08/14/18 0230)  Pulse: (!) 59 (08/14/18 0501)  Resp: 11 (08/14/18 0501)  BP: (!) 150/72 (08/14/18 0501)  SpO2: 97 % (08/14/18 0501) Vital Signs (24h Range):  Temp:  [63 °F (17.2 °C)-97.8 °F (36.6 °C)] 97.8 °F (36.6 °C)  Pulse:  [59-98] 59  Resp:  [11-20] 11  SpO2:  [96 %-100 %] 97 %  BP: (140-172)/(70-84) 150/72     Weight: 65.1 kg (143 lb 8 oz)  Body mass index is 23.88 kg/m².    Physical Exam   Constitutional: She is oriented to person, place, and time. She appears well-developed and well-nourished. No distress.   HENT:   Head: Normocephalic and atraumatic.   Eyes: Conjunctivae are normal.   PERRL; EOM intact.   Neck: Normal range of motion. Neck supple. No JVD present.   Cardiovascular: Normal rate, regular rhythm, S1 normal, S2 normal and intact distal pulses.  No  extrasystoles are present. Exam reveals no gallop and no friction rub.   No murmur heard.  Pulses:       Radial pulses are 2+ on the right side, and 2+ on the left side.        Dorsalis pedis pulses are 2+ on the right side, and 2+ on the left side.        Posterior tibial pulses are 2+ on the right side, and 2+ on the left side.   Pulmonary/Chest: Effort normal and breath sounds normal. No accessory muscle usage. No tachypnea. No respiratory distress. She has no wheezes. She has no rhonchi. She has no rales.   Abdominal: Soft. Bowel sounds are normal. She exhibits no distension. There is no tenderness. There is no rebound, no guarding and no CVA tenderness.   Musculoskeletal: Normal range of motion. She exhibits no edema, tenderness or deformity.   Neurological: She is alert and oriented to person, place, and time. No cranial nerve deficit or sensory deficit.   Skin: Skin is warm, dry and intact. Capillary refill takes less than 2 seconds. No rash noted. She is not diaphoretic. No cyanosis or erythema.   Psychiatric: She has a normal mood and affect. Her speech is normal and behavior is normal. Cognition and memory are normal.   Nursing note and vitals reviewed.          Significant Labs: Results from [facility] ED reviewed. All pertinent labs within the past 24 hours have been reviewed.    Significant Imaging: CXR and EKG from [facility] reviewed. I have reviewed all pertinent imaging results/findings within the past 24 hours.        Assessment/Plan:     * Chest pain    - Troponin negative x 2, EKG unrevealing, CP free at present.  Follow serial troponin.  - Continue ASA, BB, and niacin.  No statin due to allergy.  - Check FLP.  - Further recs pending clinical course.        Nonobstructive coronary artery disease    - Continue medical management as above.  - Normal 2D echo and negative MPI stress test in 11/2017, followed by Dr. Byers.        Tobacco abuse    - Counseled on cessation.  - Nicotine patch.         Hyperlipidemia    - Continue niacin.  - Allergy to statins.  - Check FLP.        Essential hypertension    - BP stable.  - Continue BB, ARB, and diuretic.          VTE Risk Mitigation (From admission, onward)        Ordered     enoxaparin injection 40 mg  Daily      08/14/18 0544     IP VTE LOW RISK PATIENT  Once      08/14/18 0544     Place sequential compression device  Until discontinued      08/14/18 0544             Genevieve Armendariz NP  Department of Hospital Medicine   [facility]

## 2018-08-14 NOTE — HPI
"Shirlene Olvera is a 62 year old female with nonobstructive CAD, HTN, HLD, PAD and tobacco use who was transferred from [facility] with complaints of left-sided chest pain since approximately 6:30 PM last night. Associated symptoms include SOB, nausea, and dizziness. The pain was constant, "tight" in nature, rated 5-6 out of 10, radiated into her left arm and nonexertional. She denies aggravating or alleviating factors. The patient denies any palpitations, ESPARZA, orthopnea, PND, cough, edema, weight gain, abdominal pain, vomiting, diarrhea, constipation, dysuria, hematuria, flank pain, back pain, headache, confusion, syncope, focal deficits, fever, chills and diaphoresis. The patient reports undergoing LHC in 2008 by Dr. Armendariz with [facility], which showed "a small blockage."  She had a negative MPI stress test and normal 2D echo in November of 2017, per Dr. Byers. Work-up at [facility] ED was unremarkable with negative troponin, unrevealing EKG, normal CXR and UA as well as a normal CBC and CMP.  She received  mg PO and SL NTG. Subsequently, she was transferred to [facility] per patient request. Hospital Medicine was called and the patient was placed in Observation for chest pain to rule out ACS.   "

## 2018-08-14 NOTE — ASSESSMENT & PLAN NOTE
- Continue medical management as above.  - Normal 2D echo and negative MPI stress test in 11/2017, followed by Dr. Byers.

## 2018-08-14 NOTE — DISCHARGE SUMMARY
"[facility] Hospital Medicine  Discharge Summary      Patient Name: Shirlene Olvera  MRN: [id number]  Admission Date: 8/14/2018  Hospital Length of Stay: 0 days  Discharge Date and Time: 8/14/2018  1:25 PM  Attending Physician: No att. providers found   Discharging Provider: MARIA DOLORES Fontanez  Primary Care Provider: Jhoana Segovia MD      HPI:   Shirlene Olvera is a 62 year old female with  nonobstructive CAD, HTN, HLD, PAD and tobacco use who  was transferred from [facility] with complaints of left-sided chest pain since approximately 6:30 PM last night. Associated symptoms include SOB, nausea, and dizziness. The pain was constant, "tight" in nature, rated 5-6 out of 10, radiated into her left arm and nonexertional. She denies aggravating or alleviating factors. The patient denies any palpitations, ESPARZA, orthopnea, PND, cough, edema, weight gain, abdominal pain, vomiting, diarrhea, constipation, dysuria, hematuria, flank pain, back pain, headache, confusion, syncope, focal deficits, fever, chills and diaphoresis. The patient reports undergoing LHC in 2008 by Dr. Armendariz with [facility], which showed "a small blockage."  She had a negative MPI stress test and normal 2D echo in November of 2017, per Dr. Byers. Work-up at [facility] ED was unremarkable with negative troponin, unrevealing EKG, normal CXR and UA as well as a normal CBC and CMP.  She received  mg PO and SL NTG. Subsequently, she was transferred to [facility] per patient request. Hospital Medicine was called and the patient was placed in Observation for chest pain to rule out ACS.     * No surgery found *      Hospital Course:   The patient was placed in Observation for chest pain. ACS was ruled out. The patient will follow up with [facility] as an outpatient.      Consults:     Final Active Diagnoses:    Diagnosis Date Noted POA    PRINCIPAL PROBLEM:  Chest pain [R07.9] 05/14/2015 Yes    Nonobstructive coronary " artery disease [I25.10] 08/31/2015 Yes     Chronic    Tobacco abuse [Z72.0] 07/18/2014 Yes     Chronic    Essential hypertension [I10] 10/10/2013 Yes     Chronic    Hyperlipidemia [E78.5] 10/10/2013 Yes     Chronic      Problems Resolved During this Admission:       Discharged Condition: stable    Disposition: Home or Self Care    Follow Up:  Follow-up Information     Jhoana Segovia MD In 3 days.    Specialty:  Family Medicine  Contact information:  [address]  [address]  [place] [address]  [phone]             Pepe Byers MD.    Specialties:  Cardiology, INTERVENTIONAL CARDIOLOGY  Why:  or Geraldo Rinaldi or Ania Leiva  Contact information:  [address]  [place] [address] [phone]                 Patient Instructions:      Ambulatory consult to Cardiology   Referral Priority: Routine Referral Type: Consultation   Referral Reason: Specialty Services Required   Requested Specialty: Cardiology   Number of Visits Requested: 1     Diet diabetic   Scheduling Instructions: 2 gm sodium     Activity as tolerated       Significant Diagnostic Studies: Labs:   CMP   Recent Labs   Lab  08/14/18   0645   NA  139   K  4.1   CL  105   CO2  26   GLU  92   BUN  13   CREATININE  0.9   CALCIUM  9.4   ANIONGAP  8   ESTGFRAFRICA  >60   EGFRNONAA  >60   , CBC   Recent Labs   Lab  08/14/18   0645   WBC  5.30   HGB  13.7   HCT  39.3   PLT  183    and All labs within the past 24 hours have been reviewed    Pending Diagnostic Studies:     Procedure Component Value Units Date/Time    EKG 12-LEAD [[id number]]     Order Status:  Sent Lab Status:  No result          Medications:  Reconciled Home Medications:      Medication List      CHANGE how you take these medications    niacin (inositol niacinate) 400 mg niacin (500 mg) Cap  Commonly known as:  NIACIN FLUSH FREE  Take 500 mg by mouth after dinner.  What changed:    · how much to take  · when to take this  · additional instructions        CONTINUE taking  these medications    aspirin 81 MG Chew  Take 1 tablet (81 mg total) by mouth once daily.     buPROPion 150 MG TB24 tablet  Commonly known as:  WELLBUTRIN XL  TAKE 1 TABLET BY MOUTH ONCE DAILY     BYSTOLIC 5 MG Tab  Generic drug:  nebivolol  Take 5 mg by mouth once daily.     cyclobenzaprine 10 MG tablet  Commonly known as:  FLEXERIL  TAKE 1 TABLET BY MOUTH 3 TIMES DAILY IF NEEDED FOR MUSCLE SPASMS     fluocinolone acetonide oil 0.01 % Drop  Commonly known as:  DERMOTIC OIL  Place 3 drops in ear(s) 2 (two) times daily.     fluticasone 50 mcg/actuation nasal spray  Commonly known as:  FLONASE  2 sprays by Each Nare route once daily.     loratadine 10 mg tablet  Commonly known as:  CLARITIN  Take 10 mg by mouth daily as needed for Allergies.     losartan-hydrochlorothiazide 50-12.5 mg 50-12.5 mg per tablet  Commonly known as:  HYZAAR  Take 1 tablet by mouth every morning.     multivitamin with minerals tablet  Take 1 tablet by mouth once daily.     mupirocin calcium 2% 2 % cream  Commonly known as:  BACTROBAN  Apply topically 2 (two) times daily.     nicotine 14 mg/24 hr  Commonly known as:  NICODERM CQ  Place 1 patch onto the skin once daily.     VITAMIN B-12 1000 MCG tablet  Generic drug:  cyanocobalamin  Take 100 mcg by mouth once daily.            Indwelling Lines/Drains at time of discharge:   Lines/Drains/Airways          None          Time spent on the discharge of patient: Greater than 30 minutes. Patient was seen and examined on the date of discharge and determined to be suitable for discharge.         MARIA DOLORES Fontanez  Department of Hospital Medicine  [facility]

## 2018-08-14 NOTE — ED PROVIDER NOTES
SCRIBE #1 NOTE: I, Roshni Garcia, am scribing for, and in the presence of, Stanislav Hall MD. I have scribed the entire note.      History      Chief Complaint   Patient presents with    Chest Pain     transfer from [facility].       Review of patient's allergies indicates:   Allergen Reactions    Statins-hmg-coa reductase inhibitors Other (See Comments)     Myalgias to lipitor and simvastatin        HPI   HPI    8/14/2018, 2:55 AM   History obtained from the patient      History of Present Illness: Shirlene Olvera is a 62 y.o. female patient with PMHx of HTN who presents to the Emergency Department for CP which onset gradually 1830. Patient reports sxs onset after checking her BP yesterday evening. Symptoms are constant and moderate in severity. The patient describes the sxs as tightness and radiating to L arm. No mitigating or exacerbating factors reported. Associated sxs include dizziness, SOB, and nausea. Patient was evaluated at [facility] for sxs and transferred for patient preference and cardiology consult. Workup at [facility] include negative chest x-ray and negative troponin. Patient received aspirin and nitroglycerin while at [facility]. Patient denies any fever, chills, diaphoresis, palpitations, extremity weakness/numbness, leg swelling, cough, abd pain, vomiting, and all other sxs at this time. Patient's last stress test was 3-4 months ago with Dr. Byers that was normal. Patient reports last heart cath was with Dr. Armendariz 5-6 years ago that showed some blockages. Patient admits to smoking about 10 cigarettes daily. No further complaints or concerns at this time.     Arrival mode:  AASI    PCP: Jhoana Segovia MD       Past Medical History:  Past Medical History:   Diagnosis Date    Colon polyp     Coronary artery disease     pt states MI June 2015    Hypertension     Myocardial infarction     PAD (peripheral artery disease)     Tobacco use        Past Surgical  History:  Past Surgical History:   Procedure Laterality Date    CHOLECYSTECTOMY  09/03/2015    high blood      HYSTERECTOMY      RIB FRACTURE SURGERY           Family History:  Family History   Problem Relation Age of Onset    Heart attack Father 56        MI    Asthma Neg Hx     Thyroid disease Neg Hx     Migraines Neg Hx     Cancer Neg Hx        Social History:  Social History     Tobacco Use    Smoking status: Current Every Day Smoker     Packs/day: 0.25    Smokeless tobacco: Never Used   Substance and Sexual Activity    Alcohol use: Yes     Alcohol/week: 0.0 oz     Comment: once a month    Drug use: No    Sexual activity: No       ROS   Review of Systems   Constitutional: Negative for chills, diaphoresis and fever.   HENT: Negative for sore throat.    Respiratory: Positive for shortness of breath. Negative for cough.    Cardiovascular: Positive for chest pain. Negative for palpitations and leg swelling.   Gastrointestinal: Positive for nausea. Negative for abdominal pain and vomiting.   Genitourinary: Negative for dysuria.   Musculoskeletal: Negative for back pain.   Skin: Negative for rash.   Neurological: Positive for dizziness. Negative for weakness and numbness.   Hematological: Does not bruise/bleed easily.   All other systems reviewed and are negative.      Physical Exam      Initial Vitals [08/14/18 0230]   BP Pulse Resp Temp SpO2   (!) 164/84 98 18 97.8 °F (36.6 °C) 97 %      MAP       --          Physical Exam  Nursing Notes and Vital Signs Reviewed.  Constitutional: Patient is in no acute distress. Well-developed and well-nourished. Non-toxic appearing.  Head: Atraumatic. Normocephalic.  Eyes: PERRL. EOM intact. Conjunctivae are not pale. No scleral icterus.  ENT: Mucous membranes are moist. Oropharynx is clear and symmetric.    Neck: Supple. Full ROM. No lymphadenopathy.  Cardiovascular: Regular rate. Regular rhythm. No murmurs, rubs, or gallops. Distal pulses are 2+ and  "symmetric.  Pulmonary/Chest: No respiratory distress. Clear to auscultation bilaterally. No wheezing or rales.  Abdominal: Soft and non-distended.  There is no tenderness.  No rebound, guarding, or rigidity. Good bowel sounds.  Genitourinary: No CVA tenderness  Musculoskeletal: Moves all extremities. No obvious deformities. No edema. No calf tenderness.  Skin: Warm and dry.  Neurological:  Alert, awake, and appropriate.  Normal speech.  No acute focal neurological deficits are appreciated.  Psychiatric: Normal affect. Good eye contact. Appropriate in content.    ED Course    Procedures  ED Vital Signs:  Vitals:    08/14/18 0230 08/14/18 0238 08/14/18 0301   BP: (!) 164/84 (!) 165/77 (!) 172/77   Pulse: 98 64 62   Resp: 18 15 13   Temp: 97.8 °F (36.6 °C)     TempSrc: Oral     SpO2: 97% 99% 98%   Weight: 65.1 kg (143 lb 8 oz)     Height: 5' 5" (1.651 m)         Abnormal Lab Results:  Labs Reviewed   TROPONIN I        All Lab Results:  Results for orders placed or performed during the hospital encounter of 08/14/18   Troponin I   Result Value Ref Range    Troponin I 0.012 0.000 - 0.026 ng/mL       Imaging Results:  Imaging Results    None        The EKG was ordered, reviewed, and independently interpreted by the ED provider.  Interpretation time: 0242  Rate: 69 BPM  Rhythm: Sinus rhythm with occasional ventricular-paced complexes  Interpretation: Possible left atrial enlargement. Prolonged QT. Abnormal ECG. No STEMI.         The Emergency Provider reviewed the vital signs and test results, which are outlined above.    ED Discussion     3:00 AM:  I have discussed test results, shared treatment plan, and the need for admission with patient and family at bedside. Pt and family express understanding at this time and agree with all information. All questions answered. Pt and family have no further questions or concerns at this time. Pt is ready for admit.    3:05 AM: Discussed case with Genevieve Armendariz NP (Hospital Medicine) " who agrees with current care and management of pt and accepts admission.   Admitting Service: Hospital medicine   Admitting Physician: Dr. Carmona  Admit to: Obs      ED Medication(s):  Medications - No data to display    New Prescriptions    No medications on file             Medical Decision Making    Medical Decision Making:   Clinical Tests:   Lab Tests: Ordered and Reviewed  Medical Tests: Ordered and Reviewed           Scribe Attestation:   Scribe #1: I performed the above scribed service and the documentation accurately describes the services I performed. I attest to the accuracy of the note.    Attending:   Physician Attestation Statement for Scribe #1: I, Stanislav Hall MD, personally performed the services described in this documentation, as scribed by Roshni Garcia, in my presence, and it is both accurate and complete.          Clinical Impression       ICD-10-CM ICD-9-CM   1. Chest pain R07.9 786.50       Disposition:   Disposition: Placed in Observation  Condition: Fair         Stanislav Hall MD  08/14/18 0516

## 2018-08-14 NOTE — ASSESSMENT & PLAN NOTE
- Troponin negative x 2, EKG unrevealing, CP free at present.  Follow serial troponin.  - Continue ASA, BB, and niacin.  No statin due to allergy.  - Check FLP.  - Further recs pending clinical course.

## 2018-08-14 NOTE — NURSING
Pt received to room 243 via W/C. No family present. Oriented to room. Call light placed within reach. No noted distress and no complaints of chest pain. On RA. Tele placed. Patient in NSR. Will monitor.

## 2018-08-14 NOTE — SUBJECTIVE & OBJECTIVE
Past Medical History:   Diagnosis Date    Colon polyp     Coronary artery disease     pt states MI June 2015    Hypertension     Myocardial infarction     PAD (peripheral artery disease)     Tobacco use        Past Surgical History:   Procedure Laterality Date    CHOLECYSTECTOMY  09/03/2015    high blood      HYSTERECTOMY      RIB FRACTURE SURGERY         Review of patient's allergies indicates:   Allergen Reactions    Statins-hmg-coa reductase inhibitors Other (See Comments)     Myalgias to lipitor and simvastatin       No current facility-administered medications on file prior to encounter.      Current Outpatient Medications on File Prior to Encounter   Medication Sig    aspirin 81 MG Chew Take 1 tablet (81 mg total) by mouth once daily.    buPROPion (WELLBUTRIN XL) 150 MG TB24 tablet TAKE 1 TABLET BY MOUTH ONCE DAILY    BYSTOLIC 5 mg Tab Take 5 mg by mouth once daily.    cyanocobalamin (VITAMIN B-12) 1000 MCG tablet Take 100 mcg by mouth once daily.    cyclobenzaprine (FLEXERIL) 10 MG tablet TAKE 1 TABLET BY MOUTH 3 TIMES DAILY IF NEEDED FOR MUSCLE SPASMS    fluocinolone acetonide oil (DERMOTIC OIL) 0.01 % Drop Place 3 drops in ear(s) 2 (two) times daily.    fluticasone (FLONASE) 50 mcg/actuation nasal spray 2 sprays by Each Nare route once daily.    loratadine (CLARITIN) 10 mg tablet Take 10 mg by mouth daily as needed for Allergies.    losartan-hydrochlorothiazide 50-12.5 mg (HYZAAR) 50-12.5 mg per tablet Take 1 tablet by mouth every morning.    multivitamin with minerals tablet Take 1 tablet by mouth once daily.    mupirocin calcium 2% (BACTROBAN) 2 % cream Apply topically 2 (two) times daily.    niacin, inositol niacinate, (NIACIN FLUSH FREE) 400 mg niacin (500 mg) Cap Take 500 mg by mouth after dinner. (Patient taking differently: Take 2,000 mg by mouth after dinner. 06/13/18 Dr Byers increased to 2000 mg a day.)    nicotine (NICODERM CQ) 14 mg/24 hr Place 1 patch onto the skin  once daily.     Family History     Problem Relation (Age of Onset)    Heart attack Father (56)        Tobacco Use    Smoking status: Current Every Day Smoker     Packs/day: 0.25    Smokeless tobacco: Never Used   Substance and Sexual Activity    Alcohol use: Yes     Alcohol/week: 0.0 oz     Comment: once a month    Drug use: No    Sexual activity: No     Review of Systems   Constitutional: Negative for activity change, chills, diaphoresis, fatigue, fever and unexpected weight change.   HENT: Negative for congestion, sore throat and trouble swallowing.    Respiratory: Positive for shortness of breath. Negative for cough and wheezing.    Cardiovascular: Positive for chest pain. Negative for palpitations and leg swelling.   Gastrointestinal: Positive for nausea. Negative for abdominal distention, abdominal pain, blood in stool, constipation, diarrhea and vomiting.   Genitourinary: Negative for difficulty urinating, dysuria, frequency, hematuria and urgency.   Musculoskeletal: Negative for arthralgias, back pain, gait problem, joint swelling and myalgias.   Skin: Negative for pallor, rash and wound.   Neurological: Positive for dizziness. Negative for syncope, weakness, light-headedness, numbness and headaches.   Psychiatric/Behavioral: Negative for confusion. The patient is not nervous/anxious.    All other systems reviewed and are negative.    Objective:     Vital Signs (Most Recent):  Temp: 97.8 °F (36.6 °C) (08/14/18 0230)  Pulse: (!) 59 (08/14/18 0501)  Resp: 11 (08/14/18 0501)  BP: (!) 150/72 (08/14/18 0501)  SpO2: 97 % (08/14/18 0501) Vital Signs (24h Range):  Temp:  [63 °F (17.2 °C)-97.8 °F (36.6 °C)] 97.8 °F (36.6 °C)  Pulse:  [59-98] 59  Resp:  [11-20] 11  SpO2:  [96 %-100 %] 97 %  BP: (140-172)/(70-84) 150/72     Weight: 65.1 kg (143 lb 8 oz)  Body mass index is 23.88 kg/m².    Physical Exam   Constitutional: She is oriented to person, place, and time. She appears well-developed and well-nourished. No  distress.   HENT:   Head: Normocephalic and atraumatic.   Eyes: Conjunctivae are normal.   PERRL; EOM intact.   Neck: Normal range of motion. Neck supple. No JVD present.   Cardiovascular: Normal rate, regular rhythm, S1 normal, S2 normal and intact distal pulses.  No extrasystoles are present. Exam reveals no gallop and no friction rub.   No murmur heard.  Pulses:       Radial pulses are 2+ on the right side, and 2+ on the left side.        Dorsalis pedis pulses are 2+ on the right side, and 2+ on the left side.        Posterior tibial pulses are 2+ on the right side, and 2+ on the left side.   Pulmonary/Chest: Effort normal and breath sounds normal. No accessory muscle usage. No tachypnea. No respiratory distress. She has no wheezes. She has no rhonchi. She has no rales.   Abdominal: Soft. Bowel sounds are normal. She exhibits no distension. There is no tenderness. There is no rebound, no guarding and no CVA tenderness.   Musculoskeletal: Normal range of motion. She exhibits no edema, tenderness or deformity.   Neurological: She is alert and oriented to person, place, and time. No cranial nerve deficit or sensory deficit.   Skin: Skin is warm, dry and intact. Capillary refill takes less than 2 seconds. No rash noted. She is not diaphoretic. No cyanosis or erythema.   Psychiatric: She has a normal mood and affect. Her speech is normal and behavior is normal. Cognition and memory are normal.   Nursing note and vitals reviewed.          Significant Labs: Results from [facility] ED reviewed. All pertinent labs within the past 24 hours have been reviewed.    Significant Imaging: CXR and EKG from [facility] reviewed. I have reviewed all pertinent imaging results/findings within the past 24 hours.

## 2018-08-16 ENCOUNTER — OFFICE VISIT (OUTPATIENT)
Dept: CARDIOLOGY | Facility: CLINIC | Age: 63
End: 2018-08-16
Payer: COMMERCIAL

## 2018-08-16 VITALS
HEART RATE: 66 BPM | HEIGHT: 65 IN | SYSTOLIC BLOOD PRESSURE: 128 MMHG | WEIGHT: 152.75 LBS | BODY MASS INDEX: 25.45 KG/M2 | DIASTOLIC BLOOD PRESSURE: 76 MMHG

## 2018-08-16 DIAGNOSIS — E78.00 PURE HYPERCHOLESTEROLEMIA: Chronic | ICD-10-CM

## 2018-08-16 DIAGNOSIS — Z72.0 TOBACCO ABUSE: Chronic | ICD-10-CM

## 2018-08-16 DIAGNOSIS — I10 ESSENTIAL HYPERTENSION: Primary | Chronic | ICD-10-CM

## 2018-08-16 DIAGNOSIS — I73.9 PAD (PERIPHERAL ARTERY DISEASE): ICD-10-CM

## 2018-08-16 DIAGNOSIS — I25.10 CORONARY ARTERY DISEASE INVOLVING NATIVE CORONARY ARTERY OF NATIVE HEART WITHOUT ANGINA PECTORIS: Chronic | ICD-10-CM

## 2018-08-16 PROCEDURE — 99999 PR PBB SHADOW E&M-EST. PATIENT-LVL III: CPT | Mod: PBBFAC,,, | Performed by: INTERNAL MEDICINE

## 2018-08-16 PROCEDURE — 3008F BODY MASS INDEX DOCD: CPT | Mod: CPTII,S$GLB,, | Performed by: INTERNAL MEDICINE

## 2018-08-16 PROCEDURE — 3078F DIAST BP <80 MM HG: CPT | Mod: CPTII,S$GLB,, | Performed by: INTERNAL MEDICINE

## 2018-08-16 PROCEDURE — 3074F SYST BP LT 130 MM HG: CPT | Mod: CPTII,S$GLB,, | Performed by: INTERNAL MEDICINE

## 2018-08-16 PROCEDURE — 99214 OFFICE O/P EST MOD 30 MIN: CPT | Mod: S$GLB,,, | Performed by: INTERNAL MEDICINE

## 2018-08-16 RX ORDER — NEBIVOLOL 10 MG/1
10 TABLET ORAL DAILY
Qty: 30 TABLET | Refills: 11 | Status: SHIPPED | OUTPATIENT
Start: 2018-08-16 | End: 2019-08-26 | Stop reason: SDUPTHER

## 2018-08-16 NOTE — PROGRESS NOTES
Subjective:   Patient ID:  Shirlene Olvera is a 62 y.o. female who presents for follow-up of er visit (started in [facility] and ended up at er at [facility] for B/P for 199/120)  Pt with noted CP with it. Cardiac w/u (-).BP high    Hypertension   This is a chronic problem. The current episode started more than 1 year ago. The problem has been gradually improving since onset. The problem is controlled. Pertinent negatives include no chest pain, palpitations or shortness of breath. Past treatments include beta blockers, angiotensin blockers and diuretics. The current treatment provides moderate improvement. There are no compliance problems.    Hyperlipidemia   This is a chronic problem. The current episode started more than 1 year ago. The problem is controlled. Pertinent negatives include no chest pain or shortness of breath. Current antihyperlipidemic treatment includes nicotinic acid. The current treatment provides moderate improvement of lipids. Compliance problems include adherence to exercise.        Review of Systems   Constitution: Negative. Negative for weight gain.   HENT: Negative.    Eyes: Negative.    Cardiovascular: Negative.  Negative for chest pain, leg swelling and palpitations.   Respiratory: Negative.  Negative for shortness of breath.    Endocrine: Negative.    Hematologic/Lymphatic: Negative.    Skin: Negative.    Musculoskeletal: Negative for muscle weakness.   Gastrointestinal: Negative.    Genitourinary: Negative.    Neurological: Negative.  Negative for dizziness.   Psychiatric/Behavioral: Negative.    Allergic/Immunologic: Negative.      Family History   Problem Relation Age of Onset    Heart attack Father 56        MI    Asthma Neg Hx     Thyroid disease Neg Hx     Migraines Neg Hx     Cancer Neg Hx      Past Medical History:   Diagnosis Date    Colon polyp     Coronary artery disease     pt states MI June 2015    Hypertension     Myocardial infarction     PAD (peripheral artery  disease)     Tobacco use      Current Outpatient Medications on File Prior to Visit   Medication Sig Dispense Refill    aspirin 81 MG Chew Take 1 tablet (81 mg total) by mouth once daily.  0    buPROPion (WELLBUTRIN XL) 150 MG TB24 tablet TAKE 1 TABLET BY MOUTH ONCE DAILY 30 tablet 11    BYSTOLIC 5 mg Tab Take 5 mg by mouth once daily.  11    cyanocobalamin (VITAMIN B-12) 1000 MCG tablet Take 100 mcg by mouth once daily.      cyclobenzaprine (FLEXERIL) 10 MG tablet TAKE 1 TABLET BY MOUTH 3 TIMES DAILY IF NEEDED FOR MUSCLE SPASMS 60 tablet 3    fluocinolone acetonide oil (DERMOTIC OIL) 0.01 % Drop Place 3 drops in ear(s) 2 (two) times daily. 1 Bottle 3    fluticasone (FLONASE) 50 mcg/actuation nasal spray 2 sprays by Each Nare route once daily. 16 g 11    loratadine (CLARITIN) 10 mg tablet Take 10 mg by mouth daily as needed for Allergies.      losartan-hydrochlorothiazide 50-12.5 mg (HYZAAR) 50-12.5 mg per tablet Take 1 tablet by mouth every morning. 30 tablet 11    multivitamin with minerals tablet Take 1 tablet by mouth once daily.      mupirocin calcium 2% (BACTROBAN) 2 % cream Apply topically 2 (two) times daily. 15 g 0    niacin, inositol niacinate, (NIACIN FLUSH FREE) 400 mg niacin (500 mg) Cap Take 500 mg by mouth after dinner. (Patient taking differently: Take 1,000 mg by mouth 2 (two) times daily. 06/13/18 Dr Byers increased to 2000 mg a day.)  0    nicotine (NICODERM CQ) 14 mg/24 hr Place 1 patch onto the skin once daily. 14 patch 1     No current facility-administered medications on file prior to visit.      Review of patient's allergies indicates:   Allergen Reactions    Statins-hmg-coa reductase inhibitors Other (See Comments)     Myalgias to lipitor and simvastatin       Objective:     Physical Exam   Constitutional: She is oriented to person, place, and time. She appears well-developed and well-nourished.   HENT:   Head: Normocephalic and atraumatic.   Eyes: Conjunctivae and EOM are  normal. Pupils are equal, round, and reactive to light.   Neck: Normal range of motion. Neck supple.   Cardiovascular: Normal rate, regular rhythm, normal heart sounds and intact distal pulses.   Pulmonary/Chest: Effort normal and breath sounds normal.   Abdominal: Soft. Bowel sounds are normal.   Musculoskeletal: Normal range of motion.   Neurological: She is alert and oriented to person, place, and time.   Skin: Skin is warm and dry.   Psychiatric: She has a normal mood and affect.   Nursing note and vitals reviewed.      Assessment:     1. Essential hypertension    2. Pure hypercholesterolemia    3. Tobacco abuse    4. Coronary artery disease involving native coronary artery of native heart without angina pectoris    5. PAD (peripheral artery disease)        Plan:     Essential hypertension    Pure hypercholesterolemia    Tobacco abuse    Coronary artery disease involving native coronary artery of native heart without angina pectoris    PAD (peripheral artery disease)    exercise  Smoking cessation  Continue bystolic, losartan-hctz-htn  Continue niacin-hlp  Increase bystolic

## 2018-09-26 ENCOUNTER — TELEPHONE (OUTPATIENT)
Dept: CARDIOLOGY | Facility: CLINIC | Age: 63
End: 2018-09-26

## 2018-09-26 NOTE — TELEPHONE ENCOUNTER
I called and left v/m that I am so sorry I did not get to talk to her, but I need to reschedule her appt due to Dr Byers has an meeting in [place] tomorrow.  Please call me if the appt made will not work for her. Call me at [phone]. ah

## 2018-09-27 ENCOUNTER — TELEPHONE (OUTPATIENT)
Dept: CARDIOLOGY | Facility: CLINIC | Age: 63
End: 2018-09-27

## 2018-09-27 NOTE — TELEPHONE ENCOUNTER
Called and asked her if I could move her appt since I have a mandatory skill fair I have to attend. She agreed. tasha

## 2018-10-05 ENCOUNTER — TELEPHONE (OUTPATIENT)
Dept: CARDIOLOGY | Facility: CLINIC | Age: 63
End: 2018-10-05

## 2018-10-05 NOTE — TELEPHONE ENCOUNTER
Called and left v/m to rtc to me, I hate to do this but [facility] , he will be in [facility] cath lab. Please call me at [phone]. Thanks Tsesa

## 2018-10-18 ENCOUNTER — OFFICE VISIT (OUTPATIENT)
Dept: CARDIOLOGY | Facility: CLINIC | Age: 63
End: 2018-10-18
Payer: COMMERCIAL

## 2018-10-18 VITALS
WEIGHT: 156.06 LBS | HEART RATE: 64 BPM | DIASTOLIC BLOOD PRESSURE: 68 MMHG | BODY MASS INDEX: 26 KG/M2 | SYSTOLIC BLOOD PRESSURE: 126 MMHG | HEIGHT: 65 IN

## 2018-10-18 DIAGNOSIS — G47.00 INSOMNIA, UNSPECIFIED TYPE: ICD-10-CM

## 2018-10-18 DIAGNOSIS — M50.30 DDD (DEGENERATIVE DISC DISEASE), CERVICAL: ICD-10-CM

## 2018-10-18 DIAGNOSIS — I25.10 CORONARY ARTERY DISEASE INVOLVING NATIVE CORONARY ARTERY OF NATIVE HEART WITHOUT ANGINA PECTORIS: Chronic | ICD-10-CM

## 2018-10-18 DIAGNOSIS — I10 ESSENTIAL HYPERTENSION: Chronic | ICD-10-CM

## 2018-10-18 DIAGNOSIS — Z72.0 TOBACCO ABUSE: Chronic | ICD-10-CM

## 2018-10-18 DIAGNOSIS — M51.36 DDD (DEGENERATIVE DISC DISEASE), LUMBAR: ICD-10-CM

## 2018-10-18 DIAGNOSIS — I73.9 PAD (PERIPHERAL ARTERY DISEASE): Primary | ICD-10-CM

## 2018-10-18 DIAGNOSIS — F41.1 GAD (GENERALIZED ANXIETY DISORDER): ICD-10-CM

## 2018-10-18 DIAGNOSIS — E78.00 PURE HYPERCHOLESTEROLEMIA: Chronic | ICD-10-CM

## 2018-10-18 PROCEDURE — 99999 PR PBB SHADOW E&M-EST. PATIENT-LVL IV: CPT | Mod: PBBFAC,,, | Performed by: INTERNAL MEDICINE

## 2018-10-18 PROCEDURE — 99214 OFFICE O/P EST MOD 30 MIN: CPT | Mod: S$GLB,,, | Performed by: INTERNAL MEDICINE

## 2018-10-18 PROCEDURE — 3074F SYST BP LT 130 MM HG: CPT | Mod: CPTII,S$GLB,, | Performed by: INTERNAL MEDICINE

## 2018-10-18 PROCEDURE — 3078F DIAST BP <80 MM HG: CPT | Mod: CPTII,S$GLB,, | Performed by: INTERNAL MEDICINE

## 2018-10-18 PROCEDURE — 3008F BODY MASS INDEX DOCD: CPT | Mod: CPTII,S$GLB,, | Performed by: INTERNAL MEDICINE

## 2018-10-18 RX ORDER — ALPRAZOLAM 0.25 MG/1
0.25 TABLET ORAL DAILY PRN
Qty: 30 TABLET | Refills: 0 | Status: SHIPPED | OUTPATIENT
Start: 2018-10-18 | End: 2019-01-07 | Stop reason: SDUPTHER

## 2018-10-18 RX ORDER — CYCLOBENZAPRINE HCL 10 MG
TABLET ORAL
Qty: 60 TABLET | Refills: 3 | Status: SHIPPED | OUTPATIENT
Start: 2018-10-18 | End: 2019-01-07 | Stop reason: SDUPTHER

## 2018-10-18 NOTE — PROGRESS NOTES
Subjective:   Patient ID:  Shirlene Olvera is a 63 y.o. female who presents for follow-up of Hypertension and Follow-up  Patient denies CP, angina or anginal equivalent.BP improved with bystolic the patient still smoking.    Hypertension   This is a chronic problem. The current episode started more than 1 year ago. The problem has been gradually improving since onset. The problem is controlled. Pertinent negatives include no chest pain, palpitations or shortness of breath. Past treatments include beta blockers, angiotensin blockers and diuretics. The current treatment provides moderate improvement. Compliance problems include exercise.    Hyperlipidemia   This is a chronic problem. The current episode started more than 1 year ago. The problem is controlled. Recent lipid tests were reviewed and are variable. Pertinent negatives include no chest pain or shortness of breath. Current antihyperlipidemic treatment includes statins. The current treatment provides moderate improvement of lipids. Compliance problems include medication side effects and adherence to exercise.        Review of Systems   Constitution: Negative. Negative for weight gain.   HENT: Negative.    Eyes: Negative.    Cardiovascular: Negative.  Negative for chest pain, leg swelling and palpitations.   Respiratory: Negative.  Negative for shortness of breath.    Endocrine: Negative.    Hematologic/Lymphatic: Negative.    Skin: Negative.    Musculoskeletal: Negative for muscle weakness.   Gastrointestinal: Negative.    Genitourinary: Negative.    Neurological: Negative.  Negative for dizziness.   Psychiatric/Behavioral: Negative.    Allergic/Immunologic: Negative.      Family History   Problem Relation Age of Onset    Heart attack Father 56        MI    Asthma Neg Hx     Thyroid disease Neg Hx     Migraines Neg Hx     Cancer Neg Hx      Past Medical History:   Diagnosis Date    Colon polyp     Coronary artery disease     pt states MI June 2015     Hypertension     Myocardial infarction     PAD (peripheral artery disease)     Tobacco use      Current Outpatient Medications on File Prior to Visit   Medication Sig Dispense Refill    aspirin 81 MG Chew Take 1 tablet (81 mg total) by mouth once daily.  0    buPROPion (WELLBUTRIN XL) 150 MG TB24 tablet TAKE 1 TABLET BY MOUTH ONCE DAILY 30 tablet 11    cyanocobalamin (VITAMIN B-12) 1000 MCG tablet Take 100 mcg by mouth once daily.      cyclobenzaprine (FLEXERIL) 10 MG tablet TAKE 1 TABLET BY MOUTH 3 TIMES DAILY IF NEEDED FOR MUSCLE SPASMS 60 tablet 3    fluocinolone acetonide oil (DERMOTIC OIL) 0.01 % Drop Place 3 drops in ear(s) 2 (two) times daily. 1 Bottle 3    fluticasone (FLONASE) 50 mcg/actuation nasal spray 2 sprays by Each Nare route once daily. 16 g 11    loratadine (CLARITIN) 10 mg tablet Take 10 mg by mouth daily as needed for Allergies.      losartan-hydrochlorothiazide 50-12.5 mg (HYZAAR) 50-12.5 mg per tablet Take 1 tablet by mouth every morning. 30 tablet 11    multivitamin with minerals tablet Take 1 tablet by mouth once daily.      mupirocin calcium 2% (BACTROBAN) 2 % cream Apply topically 2 (two) times daily. 15 g 0    nebivolol (BYSTOLIC) 10 MG Tab Take 1 tablet (10 mg total) by mouth once daily. 30 tablet 11    niacin, inositol niacinate, (NIACIN FLUSH FREE) 400 mg niacin (500 mg) Cap Take 500 mg by mouth after dinner. (Patient taking differently: Take 1,000 mg by mouth 2 (two) times daily. 06/13/18 Dr Byers increased to 2000 mg a day.)  0    nicotine (NICODERM CQ) 14 mg/24 hr Place 1 patch onto the skin once daily. 14 patch 1     No current facility-administered medications on file prior to visit.      Review of patient's allergies indicates:   Allergen Reactions    Statins-hmg-coa reductase inhibitors Other (See Comments)     Myalgias to lipitor and simvastatin       Objective:     Physical Exam   Constitutional: She is oriented to person, place, and time. She appears  well-developed and well-nourished.   HENT:   Head: Normocephalic and atraumatic.   Eyes: Conjunctivae and EOM are normal. Pupils are equal, round, and reactive to light.   Neck: Normal range of motion. Neck supple.   Cardiovascular: Normal rate, regular rhythm, normal heart sounds and intact distal pulses.   Pulmonary/Chest: Effort normal and breath sounds normal.   Abdominal: Soft. Bowel sounds are normal.   Musculoskeletal: Normal range of motion.   Neurological: She is alert and oriented to person, place, and time.   Skin: Skin is warm and dry.   Psychiatric: She has a normal mood and affect.   Nursing note and vitals reviewed.      Assessment:     1. PAD (peripheral artery disease)    2. Coronary artery disease involving native coronary artery of native heart without angina pectoris    3. Tobacco abuse    4. Essential hypertension    5. Pure hypercholesterolemia        Plan:     PAD (peripheral artery disease)    Coronary artery disease involving native coronary artery of native heart without angina pectoris    Tobacco abuse    Essential hypertension    Pure hypercholesterolemia    exercise  Smoking cessation  Continue bystolic, losartan-hctz-htn  Continue niacin-hlp

## 2018-12-31 ENCOUNTER — LAB VISIT (OUTPATIENT)
Dept: LAB | Facility: HOSPITAL | Age: 63
End: 2018-12-31
Attending: INTERNAL MEDICINE
Payer: COMMERCIAL

## 2018-12-31 DIAGNOSIS — E78.00 PURE HYPERCHOLESTEROLEMIA: Chronic | ICD-10-CM

## 2018-12-31 LAB
ALBUMIN SERPL BCP-MCNC: 3.5 G/DL
ALP SERPL-CCNC: 87 U/L
ALT SERPL W/O P-5'-P-CCNC: 16 U/L
AST SERPL-CCNC: 23 U/L
BILIRUB DIRECT SERPL-MCNC: 0.2 MG/DL
BILIRUB SERPL-MCNC: 0.7 MG/DL
CHOLEST SERPL-MCNC: 243 MG/DL
CHOLEST/HDLC SERPL: 6.1 {RATIO}
HDLC SERPL-MCNC: 40 MG/DL
HDLC SERPL: 16.5 %
LDLC SERPL CALC-MCNC: 149.4 MG/DL
NONHDLC SERPL-MCNC: 203 MG/DL
PROT SERPL-MCNC: 6.8 G/DL
TRIGL SERPL-MCNC: 268 MG/DL

## 2018-12-31 PROCEDURE — 80061 LIPID PANEL: CPT

## 2018-12-31 PROCEDURE — 36415 COLL VENOUS BLD VENIPUNCTURE: CPT | Mod: PO

## 2018-12-31 PROCEDURE — 80076 HEPATIC FUNCTION PANEL: CPT

## 2019-01-02 PROBLEM — Z78.9 STATIN INTOLERANCE: Status: ACTIVE | Noted: 2019-01-02

## 2019-01-04 ENCOUNTER — TELEPHONE (OUTPATIENT)
Dept: CARDIOLOGY | Facility: CLINIC | Age: 64
End: 2019-01-04

## 2019-01-04 NOTE — TELEPHONE ENCOUNTER
1/04 Rev'd labs and order to start repatha. She agrees and I explained the process and may take 1-2 weeks and to expect a call from [facility] in [facility]. She was ok with this. Cm    ----- Message from Pepe Byers MD sent at 1/3/2019 10:18 AM CST -----  Lipids reviewed, start repatha

## 2019-01-07 ENCOUNTER — TELEPHONE (OUTPATIENT)
Dept: PHARMACY | Facility: CLINIC | Age: 64
End: 2019-01-07

## 2019-01-07 ENCOUNTER — OFFICE VISIT (OUTPATIENT)
Dept: INTERNAL MEDICINE | Facility: CLINIC | Age: 64
End: 2019-01-07
Payer: COMMERCIAL

## 2019-01-07 VITALS
HEART RATE: 88 BPM | SYSTOLIC BLOOD PRESSURE: 110 MMHG | TEMPERATURE: 99 F | WEIGHT: 154.13 LBS | BODY MASS INDEX: 25.68 KG/M2 | HEIGHT: 65 IN | DIASTOLIC BLOOD PRESSURE: 70 MMHG

## 2019-01-07 DIAGNOSIS — Z00.00 ROUTINE GENERAL MEDICAL EXAMINATION AT A HEALTH CARE FACILITY: Primary | ICD-10-CM

## 2019-01-07 DIAGNOSIS — M51.36 DDD (DEGENERATIVE DISC DISEASE), LUMBAR: ICD-10-CM

## 2019-01-07 DIAGNOSIS — I73.9 PAD (PERIPHERAL ARTERY DISEASE): ICD-10-CM

## 2019-01-07 DIAGNOSIS — F41.1 GAD (GENERALIZED ANXIETY DISORDER): ICD-10-CM

## 2019-01-07 DIAGNOSIS — Z72.0 TOBACCO ABUSE: Chronic | ICD-10-CM

## 2019-01-07 DIAGNOSIS — I10 ESSENTIAL HYPERTENSION: ICD-10-CM

## 2019-01-07 DIAGNOSIS — G47.00 INSOMNIA, UNSPECIFIED TYPE: ICD-10-CM

## 2019-01-07 DIAGNOSIS — Z78.9 STATIN INTOLERANCE: ICD-10-CM

## 2019-01-07 DIAGNOSIS — E78.00 PURE HYPERCHOLESTEROLEMIA: Chronic | ICD-10-CM

## 2019-01-07 DIAGNOSIS — M50.30 DDD (DEGENERATIVE DISC DISEASE), CERVICAL: ICD-10-CM

## 2019-01-07 PROCEDURE — 90471 FLU VACCINE (QUAD) GREATER THAN OR EQUAL TO 3YO PRESERVATIVE FREE IM: ICD-10-PCS | Mod: S$GLB,,, | Performed by: FAMILY MEDICINE

## 2019-01-07 PROCEDURE — 90686 FLU VACCINE (QUAD) GREATER THAN OR EQUAL TO 3YO PRESERVATIVE FREE IM: ICD-10-PCS | Mod: S$GLB,,, | Performed by: FAMILY MEDICINE

## 2019-01-07 PROCEDURE — 90471 IMMUNIZATION ADMIN: CPT | Mod: S$GLB,,, | Performed by: FAMILY MEDICINE

## 2019-01-07 PROCEDURE — 3078F DIAST BP <80 MM HG: CPT | Mod: CPTII,S$GLB,, | Performed by: FAMILY MEDICINE

## 2019-01-07 PROCEDURE — 3074F PR MOST RECENT SYSTOLIC BLOOD PRESSURE < 130 MM HG: ICD-10-PCS | Mod: CPTII,S$GLB,, | Performed by: FAMILY MEDICINE

## 2019-01-07 PROCEDURE — 90686 IIV4 VACC NO PRSV 0.5 ML IM: CPT | Mod: S$GLB,,, | Performed by: FAMILY MEDICINE

## 2019-01-07 PROCEDURE — 99999 PR PBB SHADOW E&M-EST. PATIENT-LVL III: ICD-10-PCS | Mod: PBBFAC,,, | Performed by: FAMILY MEDICINE

## 2019-01-07 PROCEDURE — 3078F PR MOST RECENT DIASTOLIC BLOOD PRESSURE < 80 MM HG: ICD-10-PCS | Mod: CPTII,S$GLB,, | Performed by: FAMILY MEDICINE

## 2019-01-07 PROCEDURE — 99999 PR PBB SHADOW E&M-EST. PATIENT-LVL III: CPT | Mod: PBBFAC,,, | Performed by: FAMILY MEDICINE

## 2019-01-07 PROCEDURE — 3074F SYST BP LT 130 MM HG: CPT | Mod: CPTII,S$GLB,, | Performed by: FAMILY MEDICINE

## 2019-01-07 PROCEDURE — 99396 PREV VISIT EST AGE 40-64: CPT | Mod: 25,S$GLB,, | Performed by: FAMILY MEDICINE

## 2019-01-07 PROCEDURE — 99396 PR PREVENTIVE VISIT,EST,40-64: ICD-10-PCS | Mod: 25,S$GLB,, | Performed by: FAMILY MEDICINE

## 2019-01-07 RX ORDER — LOSARTAN POTASSIUM AND HYDROCHLOROTHIAZIDE 12.5; 5 MG/1; MG/1
1 TABLET ORAL EVERY MORNING
Qty: 30 TABLET | Refills: 11 | Status: SHIPPED | OUTPATIENT
Start: 2019-01-07 | End: 2019-05-20

## 2019-01-07 RX ORDER — VARENICLINE TARTRATE 1 MG/1
1 TABLET, FILM COATED ORAL 2 TIMES DAILY
Qty: 60 TABLET | Refills: 1 | Status: SHIPPED | OUTPATIENT
Start: 2019-02-07 | End: 2019-03-11

## 2019-01-07 RX ORDER — VARENICLINE TARTRATE 0.5 (11)-1
KIT ORAL
Qty: 1 PACKAGE | Refills: 0 | Status: SHIPPED | OUTPATIENT
Start: 2019-01-07 | End: 2019-03-11

## 2019-01-07 RX ORDER — CYCLOBENZAPRINE HCL 10 MG
TABLET ORAL
Qty: 90 TABLET | Refills: 3 | Status: SHIPPED | OUTPATIENT
Start: 2019-01-07 | End: 2019-10-01 | Stop reason: SDUPTHER

## 2019-01-07 RX ORDER — ALPRAZOLAM 0.25 MG/1
0.25 TABLET ORAL DAILY PRN
Qty: 30 TABLET | Refills: 1 | Status: SHIPPED | OUTPATIENT
Start: 2019-01-07 | End: 2019-07-10 | Stop reason: SDUPTHER

## 2019-01-07 NOTE — PROGRESS NOTES
Subjective:      Patient ID: Shirlene Olvera is a 63 y.o. female.    Chief Complaint: Follow-up and Annual Exam    Disclaimer:  This note is prepared using voice recognition software and as such is likely to have errors and has not been proof read. Please contact me for questions.     Shirlene Olvera is a 62 y.o. female who presents today for prevention exam and follow-up.  She was recently approved for rib path.  Has peripheral artery disease.  Still has significantly high cholesterol and has not been able to tolerate statin therapy without significant cramps and myalgias.  Has been trying niacin but not much benefit.  Also trying to quit smoking as well.    She did try the Wellbutrin to help her to quit but it has not.  She also has nicotine patches still no better.  She has a family member that is working with Chantix.  She would like to try this as well.  Did discuss slight increased risk for cardiovascular events such as heart attack or stroke but she is willing to monitor especially in the setting of her getting on the right past the this should help reduce her cholesterol levels also.    Still having issues with constipation has been taking more fiber but not drinking as much water.  Willing to work on this.    Blood pressure is very well controlled at this time needing refills.    Anxiety is controlled with rare use of Xanax.  Did take 1 prior to coming which shows that her blood pressure is much improved.    Still uses Flexeril at night to help her with sleep and myalgias and restless legs as well as her back pain.    Trying to exercise a bit more.  Trying to do some home Roseline.                Lab Results   Component Value Date    WBC 5.30 08/14/2018    HGB 13.7 08/14/2018    HCT 39.3 08/14/2018     08/14/2018    CHOL 243 (H) 12/31/2018    TRIG 268 (H) 12/31/2018    HDL 40 12/31/2018    ALT 16 12/31/2018    AST 23 12/31/2018     08/14/2018    K 4.1 08/14/2018     08/14/2018     CREATININE 0.9 08/14/2018    BUN 13 08/14/2018    CO2 26 08/14/2018    TSH 1.429 08/14/2018    INR 0.9 05/13/2015    HGBA1C 5.3 08/14/2018       X-Ray Finger 2 or More Views  Narrative: EXAMINATION:  XR WRIST COMPLETE 3 VIEWS LEFT; XR FINGER 2 OR MORE VIEWS    CLINICAL HISTORY:  Left thumb pain/swelling.  Recent laceration; Effusion, left hand; Pain in left finger(s)    TECHNIQUE:  Four views of the left wrist and three views of the left thumb.    COMPARISON:  None    FINDINGS:  The bones are osteopenic.  There is multi articular osteoarthritic change.  No acute fracture or dislocation.  Impression: As above.    Electronically signed by: LAMONT Centeno MD  Date:    08/13/2018  Time:    09:22  X-Ray Wrist Complete Left  Narrative: EXAMINATION:  XR WRIST COMPLETE 3 VIEWS LEFT; XR FINGER 2 OR MORE VIEWS    CLINICAL HISTORY:  Left thumb pain/swelling.  Recent laceration; Effusion, left hand; Pain in left finger(s)    TECHNIQUE:  Four views of the left wrist and three views of the left thumb.    COMPARISON:  None    FINDINGS:  The bones are osteopenic.  There is multi articular osteoarthritic change.  No acute fracture or dislocation.  Impression: As above.    Electronically signed by: LAMONT Centeno MD  Date:    08/13/2018  Time:    09:22        Review of Systems   Constitutional: Negative for chills, fatigue and fever.   HENT: Negative for ear pain and trouble swallowing.    Eyes: Negative for pain and visual disturbance.   Respiratory: Negative for cough and shortness of breath.    Cardiovascular: Negative for chest pain and leg swelling.   Gastrointestinal: Negative for abdominal pain, blood in stool, nausea and vomiting.   Endocrine: Negative for cold intolerance and heat intolerance.   Genitourinary: Negative for dysuria and frequency.   Musculoskeletal: Negative for joint swelling, myalgias and neck pain.   Skin: Negative for color change and rash.   Neurological: Negative for dizziness and headaches.  "  Psychiatric/Behavioral: Negative for behavioral problems and sleep disturbance.     Objective:     Vitals:    01/07/19 1450   BP: 110/70   Pulse: 88   Temp: 98.8 °F (37.1 °C)   Weight: 69.9 kg (154 lb 1.6 oz)   Height: 5' 5" (1.651 m)     Physical Exam   Constitutional: She is oriented to person, place, and time. She appears well-developed and well-nourished.   HENT:   Head: Normocephalic and atraumatic.   Right Ear: External ear normal.   Left Ear: External ear normal.   Mouth/Throat: Oropharynx is clear and moist.   Eyes: EOM are normal.   Neck: Normal range of motion. Neck supple. No thyromegaly present.   Cardiovascular: Normal rate and regular rhythm. Exam reveals no gallop and no friction rub.   No murmur heard.  Pulmonary/Chest: Effort normal. No respiratory distress. She has no wheezes. She has no rales.   Abdominal: Soft. Bowel sounds are normal. She exhibits no distension. There is no tenderness. There is no rebound.   Musculoskeletal: Normal range of motion. She exhibits no edema.   Lymphadenopathy:     She has no cervical adenopathy.   Neurological: She is alert and oriented to person, place, and time.   Skin: Skin is warm and dry. No rash noted.   Psychiatric: She has a normal mood and affect. Her behavior is normal. Judgment and thought content normal.   Vitals reviewed.    Assessment:     1. Routine general medical examination at a health care facility    2. Statin intolerance    3. PAD (peripheral artery disease)    4. ROMÁN (generalized anxiety disorder)    5. Insomnia, unspecified type    6. DDD (degenerative disc disease), cervical    7. DDD (degenerative disc disease), lumbar    8. Essential hypertension    9. Tobacco abuse    10. Pure hypercholesterolemia      Plan:   Shirlene was seen today for follow-up and annual exam.    Diagnoses and all orders for this visit:    Routine general medical examination at a health care facility - labs reviewed. Discussed Health Maintenance issues.       Statin " intolerance- per Cardiology starting with path a failed multiple statins failed niacin felt fenofibrate    PAD (peripheral artery disease)-starting Repatha.   ROMÁN (generalized anxiety disorder)- refilled. Stable.   -     ALPRAZolam (XANAX) 0.25 MG tablet; Take 1 tablet (0.25 mg total) by mouth daily as needed for Anxiety.    Insomnia, unspecified type- refilled. Stable.  -     ALPRAZolam (XANAX) 0.25 MG tablet; Take 1 tablet (0.25 mg total) by mouth daily as needed for Anxiety.    DDD (degenerative disc disease), cervical- refilled. Stable.  -     cyclobenzaprine (FLEXERIL) 10 MG tablet; TAKE 1 TABLET BY MOUTH QHS    DDD (degenerative disc disease), lumbar- refilled. Stable.  -     cyclobenzaprine (FLEXERIL) 10 MG tablet; TAKE 1 TABLET BY MOUTH QHS    Essential hypertension- refilled. Stable.  -     losartan-hydrochlorothiazide 50-12.5 mg (HYZAAR) 50-12.5 mg per tablet; Take 1 tablet by mouth every morning.    Tobacco abuse- failed Wellbutrin starting Chantix discussed increased risks such as cardiovascular events mood changes irritability.    Pure hypercholesterolemia-starting injectable cholesterol lowering medicine    Other orders  -     varenicline (CHANTIX STARTING MONTH BOX) 0.5 mg (11)- 1 mg (42) tablet; Take one 0.5mg tab by mouth once daily X3 days,then increase to one 0.5mg tab twice daily X4 days,then increase to one 1mg tab twice daily  -     varenicline (CHANTIX) 1 mg Tab; Take 1 tablet (1 mg total) by mouth 2 (two) times daily.            Follow-up in about 6 months (around 7/7/2019) for chronic issues Dr Segovia.    There are no Patient Instructions on file for this visit.

## 2019-01-15 NOTE — TELEPHONE ENCOUNTER
Documentation Only:  Faxed prior authorization for Repatha to insurance company for review on 1.15.19 AK 2:06pm

## 2019-01-16 NOTE — TELEPHONE ENCOUNTER
lvm to let patient know of transfer to [facility] and have her give them a call at [phone] to set up an account for her Repatha -- MD has been messaged to send new RX to [facility] as well. Copay card information needs to be given to patient -- info is listed in referral manager and in EPIC. AKF 12:21pm

## 2019-01-16 NOTE — TELEPHONE ENCOUNTER
"**The following message was sent to MD and staff via staff message on 1.16.19 @ 12:18pm **        Dr. Byers and Staff,    Josué prior authorization has been approved through 1.15.2020.   Insurance requires the patient to fill specialty medications through [facility]. Directions are below.     The patient has been provided with the necessary information to schedule a delivery.          To complete this in EPIC  1. The original order MUST be discontinued and re-typed as a new prescription with the updated pharmacy listed.     2. I have added [facility] to the patient's preferred pharmacies.      3. Uncheck the box that says [facility] AND Check box with [facility] (pharmacy)    *do not  click "reorder" -- as it will continue to route the rx to [facility] even if the pharmacy is changed.     Please opt the patient out of [facility] when the BPA is fired.     Thank You,  Carlota Landaverde, [facility]  Patient Care Advocate   [facility]   Direct ext: [phone]   Fax: [phone]   [email or web address]      "

## 2019-01-16 NOTE — TELEPHONE ENCOUNTER
Documentation Only:    Prior Authorization for Repatha has been approved for 1 year    Approval dates: 01.15.2019 until 01.15.2020     Case ID#: [id number]    Patient co-pay: $30    Patient Assistance IS required.    Patient has been enrolled in Repatha copay card program which will reduce patient copay to $5. Copay card info is listed below.    Repatha Copay Card  RxBIN: [id number]  RxPCN: [id number]  RxGRP: [id number]  ID: [id number]    Patient locked in to [facility] -- Will notify MD and PT of transfer.    AKF 12:15pm

## 2019-01-22 ENCOUNTER — TELEPHONE (OUTPATIENT)
Dept: CARDIOLOGY | Facility: CLINIC | Age: 64
End: 2019-01-22

## 2019-01-22 NOTE — TELEPHONE ENCOUNTER
01/22 I rtc to pt and she got a call from the pharmacy telling her to have the doctor call back. Told her I would call back this afternoon, then call her with status. Cm  ---- Message from Marysol Lo sent at 1/22/2019 12:57 PM CST -----  Contact: Patient  Patient states that she has been waiting on her script for Repatha, its suppose to go to [facility]'s pharmacy, please call her back at [phone]. Thank you

## 2019-01-23 ENCOUNTER — TELEPHONE (OUTPATIENT)
Dept: CARDIOLOGY | Facility: CLINIC | Age: 64
End: 2019-01-23

## 2019-01-23 DIAGNOSIS — E78.1 PURE HYPERGLYCERIDEMIA: Primary | Chronic | ICD-10-CM

## 2019-01-23 DIAGNOSIS — Z76.89 ENCOUNTER FOR NEW MEDICATION PRESCRIPTION: ICD-10-CM

## 2019-01-23 NOTE — TELEPHONE ENCOUNTER
I called and spoke with Abhilash. They do not fill the med till they get a call from the pt. They can send the rx to her local pharmacy , which is [facility], and she should get by tomorrow. She will get a envelope at [facility] to send back the 30 dollar copay. When she needs refill she will need to call [facility] at [phone].   Pt notified of all and agrees. Will call if anything else is needed. tasha

## 2019-01-24 ENCOUNTER — OFFICE VISIT (OUTPATIENT)
Dept: URGENT CARE | Facility: CLINIC | Age: 64
End: 2019-01-24
Payer: COMMERCIAL

## 2019-01-24 VITALS
DIASTOLIC BLOOD PRESSURE: 60 MMHG | TEMPERATURE: 97 F | BODY MASS INDEX: 25.5 KG/M2 | WEIGHT: 153.25 LBS | OXYGEN SATURATION: 99 % | SYSTOLIC BLOOD PRESSURE: 132 MMHG | HEART RATE: 70 BPM

## 2019-01-24 DIAGNOSIS — S29.012A UPPER BACK STRAIN, INITIAL ENCOUNTER: Primary | ICD-10-CM

## 2019-01-24 DIAGNOSIS — S46.812A TRAPEZIUS STRAIN, LEFT, INITIAL ENCOUNTER: ICD-10-CM

## 2019-01-24 PROCEDURE — 99999 PR PBB SHADOW E&M-EST. PATIENT-LVL IV: ICD-10-PCS | Mod: PBBFAC,,, | Performed by: NURSE PRACTITIONER

## 2019-01-24 PROCEDURE — 99214 OFFICE O/P EST MOD 30 MIN: CPT | Mod: S$GLB,,, | Performed by: NURSE PRACTITIONER

## 2019-01-24 PROCEDURE — 3078F DIAST BP <80 MM HG: CPT | Mod: CPTII,S$GLB,, | Performed by: NURSE PRACTITIONER

## 2019-01-24 PROCEDURE — 99214 PR OFFICE/OUTPT VISIT, EST, LEVL IV, 30-39 MIN: ICD-10-PCS | Mod: S$GLB,,, | Performed by: NURSE PRACTITIONER

## 2019-01-24 PROCEDURE — 3008F PR BODY MASS INDEX (BMI) DOCUMENTED: ICD-10-PCS | Mod: CPTII,S$GLB,, | Performed by: NURSE PRACTITIONER

## 2019-01-24 PROCEDURE — 3075F SYST BP GE 130 - 139MM HG: CPT | Mod: CPTII,S$GLB,, | Performed by: NURSE PRACTITIONER

## 2019-01-24 PROCEDURE — 3078F PR MOST RECENT DIASTOLIC BLOOD PRESSURE < 80 MM HG: ICD-10-PCS | Mod: CPTII,S$GLB,, | Performed by: NURSE PRACTITIONER

## 2019-01-24 PROCEDURE — 3008F BODY MASS INDEX DOCD: CPT | Mod: CPTII,S$GLB,, | Performed by: NURSE PRACTITIONER

## 2019-01-24 PROCEDURE — 99999 PR PBB SHADOW E&M-EST. PATIENT-LVL IV: CPT | Mod: PBBFAC,,, | Performed by: NURSE PRACTITIONER

## 2019-01-24 PROCEDURE — 3075F PR MOST RECENT SYSTOLIC BLOOD PRESS GE 130-139MM HG: ICD-10-PCS | Mod: CPTII,S$GLB,, | Performed by: NURSE PRACTITIONER

## 2019-01-24 RX ORDER — NAPROXEN 500 MG/1
500 TABLET ORAL 2 TIMES DAILY
Qty: 30 TABLET | Refills: 0 | Status: SHIPPED | OUTPATIENT
Start: 2019-01-24 | End: 2019-10-01

## 2019-01-24 NOTE — PATIENT INSTRUCTIONS
Back Sprain or Strain    Injury to the muscles (strain) or ligaments (sprain) around the spine can be troubling. Injury may occur after a sudden forceful twisting or bending force such as in a car accident, after a simple awkward movement, or after lifting something heavy with poor body positioning. In any case, muscle spasm is often present and adds to the pain.  Thankfully, most people feel better in 1 to 2 weeks, and most of the rest in 1 to 2 months. Most people can remain active. Unless you had a forceful or traumatic physical injury such as a car accident or fall, X-rays may not be ordered for the first evaluation of a back sprain or strain. If pain continues and does not respond to medical treatment, your healthcare provider may then order X-rays and other tests.  Home care  The following guidelines will help you care for your injury at home:  · When in bed, try to find a comfortable position. A firm mattress is best. Try lying flat on your back with pillows under your knees. You can also try lying on your side with your knees bent up toward your chest and a pillow between your knees.  · Don't sit for long periods. Try not to take long car rides or take other trips that have you sitting for a long time. This puts more stress on the lower back than standing or walking.  · During the first 24 to 72 hours after an injury or flare-up, apply an ice pack to the painful area for 20 minutes. Then remove it for 20 minutes. Do this for 60 to 90 minutes, or several times a day. This will reduce swelling and pain. Be sure to wrap the ice pack in a thin towel or plastic to protect your skin.  · You can start with ice, then switch to heat. Heat from a hot shower, hot bath, or heating pad reduces pain and works well for muscle spasms. Put heat on the painful area for 20 minutes, then remove for 20 minutes. Do this for 60 to 90 minutes, or several times a day. Do not use a heating pad while sleeping. It can burn the  skin.  · You can alternate the ice and heat. Talk with your healthcare provider to find out the best treatment or therapy for your back pain.  · Therapeutic massage will help relax the back muscles without stretching them.  · Be aware of safe lifting methods. Do not lift anything over 15 pounds until all of the pain is gone.  Medicines  Talk to your healthcare provider before using medicines, especially if you have other health problems or are taking other medicines.  · You may use acetaminophen or ibuprofen to control pain, unless another pain medicine was prescribed. If you have chronic conditions like diabetes, liver or kidney disease, stomach ulcers, or gastrointestinal bleeding, or are taking blood-thinner medicines, talk with your doctor before taking any medicines.  · Be careful if you are given prescription medicines, narcotics, or medicine for muscle spasm. They can cause drowsiness, and affect your coordination, reflexes, and judgment. Do not drive or operate heavy machinery when taking these types of medicines. Only take pain medicine as prescribed by your healthcare provider.  Follow-up care  Follow up with your healthcare provider, or as advised. You may need physical therapy or more tests if your symptoms get worse.  If you had X-rays your healthcare provider may be checking for any broken bones, breaks, or fractures. Bruises and sprains can sometimes hurt as much as a fracture. These injuries can take time to heal completely. If your symptoms dont improve or they get worse, talk with your healthcare provider. You may need a repeat X-ray or other tests.  Call 911  Call for emergency care if any of the following occur:  · Trouble breathing  · Confused  · Very drowsy or trouble awakening  · Fainting or loss of consciousness  · Rapid or very slow heart rate  · Loss of bowel or bladder control  When to seek medical advice  Call your healthcare provider right away if any of the following occur:  · Pain  gets worse or spreads to your arms or legs  · Weakness or numbness in one or both arms or legs  · Numbness in the groin or genital area  Date Last Reviewed: 6/1/2016  © 2211-0340 [employer]. [address] [place] [address], [place] [address]. All rights reserved. This information is not intended as a substitute for professional medical care. Always follow your healthcare professional's instructions.

## 2019-01-24 NOTE — LETTER
January 24, 2019      [facility]  [address] [place] [address]  Phone: [phone]  Fax: [phone]       Patient: Shirlene Olvera   YOB: 1955  Date of Visit: 01/24/2019    To Whom It May Concern:    Maggy Olvera  was at [facility] on 01/24/2019. She may return to work/school on 01/28/2019 with no restrictions. Please excuse for 01/23/2019-01/26/2019. If you have any questions or concerns, or if I can be of further assistance, please do not hesitate to contact me.    Sincerely,              Jerry Sampson, NP

## 2019-01-24 NOTE — PROGRESS NOTES
Subjective:       Patient ID: Shirlene Olvera is a 63 y.o. female.    Chief Complaint: Neck Pain    Pt is a 63 year old female to clinic today with complaints of left trap and shoulder pain that began 1-2 days ago. 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       Neck Pain    This is a new problem. The current episode started yesterday. The problem occurs intermittently. The problem has been waxing and waning. The pain is associated with lifting a heavy object (at work). The pain is present in the left side. The quality of the pain is described as aching. The pain is at a severity of 7/10. The pain is moderate. The symptoms are aggravated by twisting. Associated symptoms include numbness (in hands). Pertinent negatives include no chest pain, fever, headaches, leg pain, pain with swallowing, paresis, photophobia, syncope, tingling, trouble swallowing, visual change, weakness or weight loss. She has tried muscle relaxants for the symptoms. The treatment provided moderate relief.     Review of Systems   Constitutional: Negative for chills, diaphoresis, fatigue, fever and weight loss.   HENT: Negative for trouble swallowing.    Eyes: Negative for photophobia.   Respiratory: Negative for cough, chest tightness, shortness of breath and wheezing.    Cardiovascular: Negative for chest pain, palpitations and syncope.   Musculoskeletal: Positive for myalgias (left trap and shoulder) and neck pain. Negative for arthralgias, back pain and joint swelling.   Skin: Negative for rash.   Neurological: Positive for numbness (in hands). Negative for dizziness, tingling, weakness, light-headedness and headaches.       Objective:      Physical Exam   Constitutional: She is oriented to person, place, and time. She appears well-developed and well-nourished. No distress.   HENT:   Head: Normocephalic.   Right Ear: External ear normal.   Left Ear: External ear normal.   Nose: Nose normal.   Eyes:  Pupils are equal, round, and reactive to light.   Cardiovascular: Normal rate, regular rhythm and normal heart sounds. Exam reveals no gallop and no friction rub.   No murmur heard.  Pulses:       Radial pulses are 2+ on the right side, and 2+ on the left side.   Musculoskeletal: Normal range of motion. She exhibits tenderness. She exhibits no edema or deformity.        Right shoulder: She exhibits normal range of motion, no tenderness, no bony tenderness, no swelling, no effusion, no crepitus, no deformity, no laceration, no pain, no spasm, normal pulse and normal strength.        Left shoulder: She exhibits tenderness, pain and spasm. She exhibits normal range of motion, no bony tenderness, no swelling, no effusion, no crepitus, no deformity, no laceration, normal pulse and normal strength.        Cervical back: She exhibits tenderness, pain and spasm. She exhibits normal range of motion, no bony tenderness, no swelling, no edema, no deformity and no laceration.        Thoracic back: She exhibits normal range of motion, no tenderness, no bony tenderness, no swelling, no edema, no deformity, no laceration, no pain and no spasm.        Lumbar back: She exhibits normal range of motion, no tenderness, no bony tenderness, no swelling, no edema, no deformity, no laceration, no pain and no spasm.   Neurological: She is alert and oriented to person, place, and time.   Skin: Skin is warm and dry. No rash noted. She is not diaphoretic.   Psychiatric: She has a normal mood and affect. Her speech is normal and behavior is normal. Thought content normal.   Nursing note and vitals reviewed.      Assessment:       1. Upper back strain, initial encounter    2. Trapezius strain, left, initial encounter        Plan:   Upper back strain, initial encounter  -     naproxen (NAPROSYN) 500 MG tablet; Take 1 tablet (500 mg total) by mouth 2 (two) times daily.  Dispense: 30 tablet; Refill: 0    Trapezius strain, left, initial encounter  -      naproxen (NAPROSYN) 500 MG tablet; Take 1 tablet (500 mg total) by mouth 2 (two) times daily.  Dispense: 30 tablet; Refill: 0      Recommend continue muscle relaxant. Causes drowsiness. Do not take while driving.   Recommend ice to area.  Biofreeze/icy hot.    Follow prescribed treatment plan as directed.  Stay hydrated and rest.  Report to ER if symptoms worsen.  Follow up with PCP in 2-3 days or sooner if symptoms do not improve.

## 2019-03-11 ENCOUNTER — LAB VISIT (OUTPATIENT)
Dept: LAB | Facility: HOSPITAL | Age: 64
End: 2019-03-11
Attending: FAMILY MEDICINE
Payer: COMMERCIAL

## 2019-03-11 ENCOUNTER — OFFICE VISIT (OUTPATIENT)
Dept: INTERNAL MEDICINE | Facility: CLINIC | Age: 64
End: 2019-03-11
Payer: COMMERCIAL

## 2019-03-11 VITALS
HEART RATE: 80 BPM | DIASTOLIC BLOOD PRESSURE: 70 MMHG | HEIGHT: 65 IN | WEIGHT: 150.81 LBS | BODY MASS INDEX: 25.13 KG/M2 | TEMPERATURE: 97 F | SYSTOLIC BLOOD PRESSURE: 102 MMHG

## 2019-03-11 DIAGNOSIS — R10.30 LOWER ABDOMINAL PAIN: Primary | ICD-10-CM

## 2019-03-11 DIAGNOSIS — R10.9 FLANK PAIN: ICD-10-CM

## 2019-03-11 DIAGNOSIS — N20.0 KIDNEY STONE ON LEFT SIDE: ICD-10-CM

## 2019-03-11 DIAGNOSIS — R10.30 LOWER ABDOMINAL PAIN: ICD-10-CM

## 2019-03-11 LAB
BACTERIA #/AREA URNS AUTO: ABNORMAL /HPF
BASOPHILS # BLD AUTO: 0.07 K/UL
BASOPHILS NFR BLD: 0.8 %
BILIRUB UR QL STRIP: NEGATIVE
CLARITY UR REFRACT.AUTO: ABNORMAL
COLOR UR AUTO: YELLOW
DIFFERENTIAL METHOD: ABNORMAL
EOSINOPHIL # BLD AUTO: 0.1 K/UL
EOSINOPHIL NFR BLD: 0.8 %
ERYTHROCYTE [DISTWIDTH] IN BLOOD BY AUTOMATED COUNT: 12.1 %
GLUCOSE UR QL STRIP: NEGATIVE
HCT VFR BLD AUTO: 41.4 %
HGB BLD-MCNC: 14 G/DL
HGB UR QL STRIP: ABNORMAL
HYALINE CASTS UR QL AUTO: 13 /LPF
IMM GRANULOCYTES # BLD AUTO: 0.04 K/UL
IMM GRANULOCYTES NFR BLD AUTO: 0.5 %
KETONES UR QL STRIP: NEGATIVE
LEUKOCYTE ESTERASE UR QL STRIP: ABNORMAL
LYMPHOCYTES # BLD AUTO: 2 K/UL
LYMPHOCYTES NFR BLD: 22.5 %
MCH RBC QN AUTO: 34.1 PG
MCHC RBC AUTO-ENTMCNC: 33.8 G/DL
MCV RBC AUTO: 101 FL
MICROSCOPIC COMMENT: ABNORMAL
MONOCYTES # BLD AUTO: 0.7 K/UL
MONOCYTES NFR BLD: 7.4 %
NEUTROPHILS # BLD AUTO: 6 K/UL
NEUTROPHILS NFR BLD: 68 %
NITRITE UR QL STRIP: NEGATIVE
NRBC BLD-RTO: 0 /100 WBC
PH UR STRIP: 5 [PH] (ref 5–8)
PLATELET # BLD AUTO: 212 K/UL
PMV BLD AUTO: 10.8 FL
PROT UR QL STRIP: NEGATIVE
RBC # BLD AUTO: 4.11 M/UL
RBC #/AREA URNS AUTO: 27 /HPF (ref 0–4)
SP GR UR STRIP: 1.01 (ref 1–1.03)
SQUAMOUS #/AREA URNS AUTO: 1 /HPF
URN SPEC COLLECT METH UR: ABNORMAL
WBC # BLD AUTO: 8.76 K/UL
WBC #/AREA URNS AUTO: 33 /HPF (ref 0–5)

## 2019-03-11 PROCEDURE — 3078F DIAST BP <80 MM HG: CPT | Mod: CPTII,S$GLB,, | Performed by: FAMILY MEDICINE

## 2019-03-11 PROCEDURE — 87086 URINE CULTURE/COLONY COUNT: CPT

## 2019-03-11 PROCEDURE — 87088 URINE BACTERIA CULTURE: CPT

## 2019-03-11 PROCEDURE — 80053 COMPREHEN METABOLIC PANEL: CPT

## 2019-03-11 PROCEDURE — 99999 PR PBB SHADOW E&M-EST. PATIENT-LVL V: CPT | Mod: PBBFAC,,, | Performed by: FAMILY MEDICINE

## 2019-03-11 PROCEDURE — 87186 SC STD MICRODIL/AGAR DIL: CPT

## 2019-03-11 PROCEDURE — 99214 PR OFFICE/OUTPT VISIT, EST, LEVL IV, 30-39 MIN: ICD-10-PCS | Mod: 25,S$GLB,, | Performed by: FAMILY MEDICINE

## 2019-03-11 PROCEDURE — 3074F SYST BP LT 130 MM HG: CPT | Mod: CPTII,S$GLB,, | Performed by: FAMILY MEDICINE

## 2019-03-11 PROCEDURE — 3078F PR MOST RECENT DIASTOLIC BLOOD PRESSURE < 80 MM HG: ICD-10-PCS | Mod: CPTII,S$GLB,, | Performed by: FAMILY MEDICINE

## 2019-03-11 PROCEDURE — 3008F PR BODY MASS INDEX (BMI) DOCUMENTED: ICD-10-PCS | Mod: CPTII,S$GLB,, | Performed by: FAMILY MEDICINE

## 2019-03-11 PROCEDURE — 3074F PR MOST RECENT SYSTOLIC BLOOD PRESSURE < 130 MM HG: ICD-10-PCS | Mod: CPTII,S$GLB,, | Performed by: FAMILY MEDICINE

## 2019-03-11 PROCEDURE — 96372 THER/PROPH/DIAG INJ SC/IM: CPT | Mod: S$GLB,,, | Performed by: FAMILY MEDICINE

## 2019-03-11 PROCEDURE — 81001 URINALYSIS AUTO W/SCOPE: CPT

## 2019-03-11 PROCEDURE — 96372 PR INJECTION,THERAP/PROPH/DIAG2ST, IM OR SUBCUT: ICD-10-PCS | Mod: S$GLB,,, | Performed by: FAMILY MEDICINE

## 2019-03-11 PROCEDURE — 87077 CULTURE AEROBIC IDENTIFY: CPT

## 2019-03-11 PROCEDURE — 36415 COLL VENOUS BLD VENIPUNCTURE: CPT | Mod: PO

## 2019-03-11 PROCEDURE — 3008F BODY MASS INDEX DOCD: CPT | Mod: CPTII,S$GLB,, | Performed by: FAMILY MEDICINE

## 2019-03-11 PROCEDURE — 85025 COMPLETE CBC W/AUTO DIFF WBC: CPT

## 2019-03-11 PROCEDURE — 99999 PR PBB SHADOW E&M-EST. PATIENT-LVL V: ICD-10-PCS | Mod: PBBFAC,,, | Performed by: FAMILY MEDICINE

## 2019-03-11 PROCEDURE — 99214 OFFICE O/P EST MOD 30 MIN: CPT | Mod: 25,S$GLB,, | Performed by: FAMILY MEDICINE

## 2019-03-11 RX ORDER — KETOROLAC TROMETHAMINE 10 MG/1
10 TABLET, FILM COATED ORAL EVERY 6 HOURS
Qty: 12 TABLET | Refills: 0 | Status: SHIPPED | OUTPATIENT
Start: 2019-03-11 | End: 2019-03-14

## 2019-03-11 RX ORDER — CIPROFLOXACIN 500 MG/1
500 TABLET ORAL 2 TIMES DAILY
Qty: 14 TABLET | Refills: 0 | Status: SHIPPED | OUTPATIENT
Start: 2019-03-11 | End: 2019-03-18

## 2019-03-11 RX ORDER — TAMSULOSIN HYDROCHLORIDE 0.4 MG/1
0.4 CAPSULE ORAL DAILY
Qty: 7 CAPSULE | Refills: 0 | Status: SHIPPED | OUTPATIENT
Start: 2019-03-11 | End: 2019-11-12

## 2019-03-11 RX ORDER — KETOROLAC TROMETHAMINE 30 MG/ML
60 INJECTION, SOLUTION INTRAMUSCULAR; INTRAVENOUS
Status: COMPLETED | OUTPATIENT
Start: 2019-03-11 | End: 2019-03-11

## 2019-03-11 RX ADMIN — KETOROLAC TROMETHAMINE 60 MG: 30 INJECTION, SOLUTION INTRAMUSCULAR; INTRAVENOUS at 03:03

## 2019-03-11 NOTE — PROGRESS NOTES
Subjective:      Patient ID: Shirlene Olvera is a 63 y.o. female.    Chief Complaint: Abdominal Cramping (back pain and blood in urine since 3/9/19)    Disclaimer:  This note is prepared using voice recognition software and as such is likely to have errors and has not been proof read. Please contact me for questions.     Here for severe pelvic pain and burning with urination. 9/10. Started 48hrs ago. Trying to drink more water. Not helping. Pain in pelvic regions. Stomach does get upset sometimes, has had constipation and diarrhea in last 48 hrs. Feels flushed but not running fever. No tylenol, no nsaids. + vomiting.   Hx of kidney stone on left kidney from ct scan in 4/2018.     EXAMINATION:  CT ABDOMEN PELVIS W WO CONTRAST    CLINICAL HISTORY:  Abd pain, gastroenteritis or colitis suspected;Generalized abdominal pain    TECHNIQUE:  Low dose axial images, sagittal and coronal reformations were obtained from the lung bases to the pubic symphysis following the IV administration of 75 mL of Omnipaque 350.  Precontrast images of the abdomen were obtained.  30 mL of oral Omnipaque 350 was also administered.    COMPARISON:  None    FINDINGS:  Visualized Chest: Calcified granuloma noted in the left lower lobe.  Lung bases are otherwise clear bilaterally.    Abdomen:    Liver: Within normal limits.    Gallbladder and biliary: Gallbladder is not visualized and presumed surgically absent.    Spleen: Within normal limits.    Pancreas: Within normal limits.    Adrenals: Within normal limits.    Kidneys: There is a 3 mm nonobstructing stone at the interpolar region of the left kidney.  Multiple subcentimeter bilateral renal cysts are present without measurable enhancement.    Bowel: There is a small sliding-type hiatal hernia.  Remaining segments of bowel appear within normal limits.  No evidence of obstruction.  The appendix appears normal.    Peritoneum: No ascites or pneumoperitoneum.    Abdominal adenopathy:  None.    Vasculature: Within normal limits.    Pelvis:    Urinary bladder: Unremarkable.    Uterus and adnexa: Status post hysterectomy.  No adnexal masses visualized.    Pelvic adenopathy: None.    Bones: No acute findings.    Miscellaneous: None.    Impression      1. Nonobstructing 3 mm stone at the interpolar region of the left kidney  2. Small bilateral renal cysts  3. Small sliding-type hiatal hernia.      Electronically signed by: Jerry Eric MD  Date: 04/02/2018  Time: 10:58          Abdominal Pain   This is a new problem. The current episode started in the past 7 days. The onset quality is sudden. The problem occurs constantly. The problem has been rapidly worsening. The pain is located in the RLQ, LLQ, periumbilical region, suprapubic region, left flank and right flank. The pain is at a severity of 9/10. The pain is severe. The quality of the pain is colicky, cramping, aching and burning. The abdominal pain radiates to the suprapubic region and periumbilical region. Associated symptoms include constipation, diarrhea, dysuria, frequency, nausea and vomiting. Pertinent negatives include no anorexia, arthralgias, belching, fever, headaches, hematochezia, hematuria, melena, myalgias or weight loss. The pain is aggravated by urination. The pain is relieved by nothing. She has tried nothing for the symptoms. The treatment provided no relief.       Lab Results   Component Value Date    WBC 5.30 08/14/2018    HGB 13.7 08/14/2018    HCT 39.3 08/14/2018     08/14/2018    CHOL 243 (H) 12/31/2018    TRIG 268 (H) 12/31/2018    HDL 40 12/31/2018    ALT 16 12/31/2018    AST 23 12/31/2018     08/14/2018    K 4.1 08/14/2018     08/14/2018    CREATININE 0.9 08/14/2018    BUN 13 08/14/2018    CO2 26 08/14/2018    TSH 1.429 08/14/2018    INR 0.9 05/13/2015    HGBA1C 5.3 08/14/2018             Review of Systems   Constitutional: Positive for activity change and appetite change. Negative for chills,  "diaphoresis, fatigue, fever, unexpected weight change and weight loss.   Respiratory: Negative for cough, chest tightness and shortness of breath.    Cardiovascular: Negative for chest pain and palpitations.   Gastrointestinal: Positive for abdominal distention, abdominal pain, constipation, diarrhea, nausea and vomiting. Negative for anorexia, hematochezia and melena.   Genitourinary: Positive for decreased urine volume, difficulty urinating, dysuria, frequency, pelvic pain and urgency. Negative for hematuria.   Musculoskeletal: Negative for arthralgias and myalgias.   Neurological: Negative for headaches.   Psychiatric/Behavioral: Positive for sleep disturbance.     Objective:     Vitals:    03/11/19 1452   BP: 102/70   Pulse: 80   Temp: 97 °F (36.1 °C)   Weight: 68.4 kg (150 lb 12.7 oz)   Height: 5' 5" (1.651 m)     Physical Exam   Constitutional: She appears well-developed and well-nourished. She appears distressed.   HENT:   Head: Normocephalic and atraumatic.   Right Ear: Tympanic membrane normal.   Left Ear: Tympanic membrane normal.   Mouth/Throat: Oropharynx is clear and moist.   Eyes: Conjunctivae and EOM are normal.   Neck: Normal range of motion. Neck supple.   Cardiovascular: Normal rate and regular rhythm.   Pulmonary/Chest: Effort normal and breath sounds normal.   Abdominal: Normal appearance. Bowel sounds are decreased. There is tenderness in the right lower quadrant, suprapubic area and left lower quadrant. There is rebound, guarding and CVA tenderness. There is no rigidity.   Skin: Skin is warm and dry. Bruising noted.        Psychiatric: She has a normal mood and affect. Her behavior is normal.   Nursing note and vitals reviewed.    Assessment:     1. Lower abdominal pain    2. Kidney stone on left side      Plan:   Shirlene was seen today for abdominal cramping.    Diagnoses and all orders for this visit:    Lower abdominal pain-new problem not attend to the patient.  Has CT scan from April of 2018 " which showed a left-sided kidney stone which is known previously at 3 mm.  Unable to obtain urine specimen initially at the time of exam.  She will try again before she leaves.  Obtain blood work today.  Will going give Toradol 60 mg IM injection now.  Place her on Flomax and Toradol 10 mg every 6 hr she is to push fluids.  I will also go ahead and presumptively treated with Cipro as well since her initial symptoms started with urinary frequency and dysuria.  If symptoms are not improving in the next 6-8 hours or she develops fever she has to go on into the emergency room for CT scanning an additional workup with IV fluids.  If she is improving by the a.m. then we will base next steps on blood work.  Unfortunately due to the later appointment of the day today we are unable to get a CT scan of her abdomen from an outpatient standpoint.  Patient was amenable to this plan.  -     CBC auto differential; Future  -     Comprehensive metabolic panel; Future  -     Urinalysis  -     Urine culture; Future    Kidney stone on left side-noted previously on April 2018 CT scan.  Treat aggressively with fluids orally Toradol injection Flomax.  If not improving in the next 6-8 hours or develops fever go on into the local emergency room.  Patient is agreement with the plan.  -     CBC auto differential; Future  -     Comprehensive metabolic panel; Future  -     Urinalysis  -     Urine culture; Future    Other orders  -     ketorolac injection 60 mg  -     ciprofloxacin HCl (CIPRO) 500 MG tablet; Take 1 tablet (500 mg total) by mouth 2 (two) times daily. for 7 days  -     tamsulosin (FLOMAX) 0.4 mg Cap; Take 1 capsule (0.4 mg total) by mouth once daily.  -     ketorolac (TORADOL) 10 mg tablet; Take 1 tablet (10 mg total) by mouth every 6 (six) hours. for 3 days            No Follow-up on file.    Patient Instructions   If not improving in 6-8 hrs or develop fever temp > 100.4F, then go on in to ED for possible kidney stone vs CT  abdomen for bowel issues.   If better in am, then based on results of labs will determine if CT scan is needed or not.             Addendum:  Patient's blood counts did not show evidence of infection at this time.  Liver enzymes are normal.  Urinalysis does show positive blood.  Patient does have known history of kidney stone.  Reports symptoms not much improved but did not spike fever.  Recommend we go ahead and proceed for with doing CT scan to evaluate for kidney stone.  Placed orders.  Will schedule

## 2019-03-11 NOTE — PATIENT INSTRUCTIONS
If not improving in 6-8 hrs or develop fever temp > 100.4F, then go on in to ED for possible kidney stone vs CT abdomen for bowel issues.   If better in am, then based on results of labs will determine if CT scan is needed or not.

## 2019-03-12 ENCOUNTER — TELEPHONE (OUTPATIENT)
Dept: INTERNAL MEDICINE | Facility: CLINIC | Age: 64
End: 2019-03-12

## 2019-03-12 LAB
ALBUMIN SERPL BCP-MCNC: 4 G/DL
ALP SERPL-CCNC: 94 U/L
ALT SERPL W/O P-5'-P-CCNC: 19 U/L
ANION GAP SERPL CALC-SCNC: 4 MMOL/L
AST SERPL-CCNC: 22 U/L
BILIRUB SERPL-MCNC: 0.7 MG/DL
BUN SERPL-MCNC: 24 MG/DL
CALCIUM SERPL-MCNC: 9.7 MG/DL
CHLORIDE SERPL-SCNC: 98 MMOL/L
CO2 SERPL-SCNC: 33 MMOL/L
CREAT SERPL-MCNC: 1.3 MG/DL
EST. GFR  (AFRICAN AMERICAN): 50.1 ML/MIN/1.73 M^2
EST. GFR  (NON AFRICAN AMERICAN): 43.5 ML/MIN/1.73 M^2
GLUCOSE SERPL-MCNC: 92 MG/DL
POTASSIUM SERPL-SCNC: 4.7 MMOL/L
PROT SERPL-MCNC: 7.3 G/DL
SODIUM SERPL-SCNC: 135 MMOL/L

## 2019-03-12 NOTE — TELEPHONE ENCOUNTER
----- Message from Lou Stewart LPN sent at 3/12/2019  9:33 AM CDT -----  Called pt, she stated that she is still having the pain and soreness. Not any worse but not really any better at this time. She stills feels as if she is having issues using the restroom. Okay to proceed with CT scan.

## 2019-03-12 NOTE — TELEPHONE ENCOUNTER
----- Message from Nghia Segovia sent at 3/12/2019 11:20 AM CDT -----  Pt is requesting a call from nurse to discuss questions and concerns regarding CT scan.      Please call pt back at [phone]

## 2019-03-12 NOTE — TELEPHONE ENCOUNTER
I called patient and she states that she is better and wish to hold off on doing ct scan at this time.

## 2019-03-12 NOTE — PROGRESS NOTES
Please contact patient this am to check on her to see how she did overnight. Urine and labs looks like kidney stone as the cause for her abdominal pain at this time. If not much improved, then let me know so I can place order for CT abd/pelvis

## 2019-03-13 LAB — BACTERIA UR CULT: NORMAL

## 2019-03-15 ENCOUNTER — TELEPHONE (OUTPATIENT)
Dept: INTERNAL MEDICINE | Facility: CLINIC | Age: 64
End: 2019-03-15

## 2019-03-15 NOTE — PROGRESS NOTES
Urine did grow out uti but it is sensitive to the cipro. Please inform pt to complete the antibiotics and see how she is doing.

## 2019-03-15 NOTE — TELEPHONE ENCOUNTER
----- Message from Crystal Garcia sent at 3/15/2019  2:36 PM CDT -----  Contact: Pt  Type:  Patient Returning Call    Who Called:Pt  Who Left Message for Patient:unknown  Does the patient know what this is regarding?: No  Would the patient rather a call back or a response via [facility]? Call back  Best Call Back Number: [phone] (home)   Additional Information: n/a

## 2019-03-25 RX ORDER — FLUTICASONE PROPIONATE 50 MCG
SPRAY, SUSPENSION (ML) NASAL
Qty: 16 G | Refills: 11 | Status: SHIPPED | OUTPATIENT
Start: 2019-03-25 | End: 2020-04-17

## 2019-04-16 ENCOUNTER — OFFICE VISIT (OUTPATIENT)
Dept: CARDIOLOGY | Facility: CLINIC | Age: 64
End: 2019-04-16
Payer: COMMERCIAL

## 2019-04-16 VITALS
HEIGHT: 65 IN | SYSTOLIC BLOOD PRESSURE: 138 MMHG | DIASTOLIC BLOOD PRESSURE: 78 MMHG | HEART RATE: 68 BPM | BODY MASS INDEX: 25.05 KG/M2 | WEIGHT: 150.38 LBS

## 2019-04-16 DIAGNOSIS — Z78.9 STATIN INTOLERANCE: ICD-10-CM

## 2019-04-16 DIAGNOSIS — I73.9 PAD (PERIPHERAL ARTERY DISEASE): Primary | ICD-10-CM

## 2019-04-16 DIAGNOSIS — I10 ESSENTIAL HYPERTENSION: Chronic | ICD-10-CM

## 2019-04-16 DIAGNOSIS — I25.10 CORONARY ARTERY DISEASE INVOLVING NATIVE CORONARY ARTERY OF NATIVE HEART WITHOUT ANGINA PECTORIS: Chronic | ICD-10-CM

## 2019-04-16 DIAGNOSIS — Z72.0 TOBACCO ABUSE: Chronic | ICD-10-CM

## 2019-04-16 DIAGNOSIS — E78.00 PURE HYPERCHOLESTEROLEMIA: Chronic | ICD-10-CM

## 2019-04-16 PROCEDURE — 3075F PR MOST RECENT SYSTOLIC BLOOD PRESS GE 130-139MM HG: ICD-10-PCS | Mod: CPTII,S$GLB,, | Performed by: INTERNAL MEDICINE

## 2019-04-16 PROCEDURE — 99214 OFFICE O/P EST MOD 30 MIN: CPT | Mod: S$GLB,,, | Performed by: INTERNAL MEDICINE

## 2019-04-16 PROCEDURE — 3078F PR MOST RECENT DIASTOLIC BLOOD PRESSURE < 80 MM HG: ICD-10-PCS | Mod: CPTII,S$GLB,, | Performed by: INTERNAL MEDICINE

## 2019-04-16 PROCEDURE — 99214 PR OFFICE/OUTPT VISIT, EST, LEVL IV, 30-39 MIN: ICD-10-PCS | Mod: S$GLB,,, | Performed by: INTERNAL MEDICINE

## 2019-04-16 PROCEDURE — 99999 PR PBB SHADOW E&M-EST. PATIENT-LVL III: CPT | Mod: PBBFAC,,, | Performed by: INTERNAL MEDICINE

## 2019-04-16 PROCEDURE — 3078F DIAST BP <80 MM HG: CPT | Mod: CPTII,S$GLB,, | Performed by: INTERNAL MEDICINE

## 2019-04-16 PROCEDURE — 3075F SYST BP GE 130 - 139MM HG: CPT | Mod: CPTII,S$GLB,, | Performed by: INTERNAL MEDICINE

## 2019-04-16 PROCEDURE — 99999 PR PBB SHADOW E&M-EST. PATIENT-LVL III: ICD-10-PCS | Mod: PBBFAC,,, | Performed by: INTERNAL MEDICINE

## 2019-04-16 PROCEDURE — 3008F BODY MASS INDEX DOCD: CPT | Mod: CPTII,S$GLB,, | Performed by: INTERNAL MEDICINE

## 2019-04-16 PROCEDURE — 3008F PR BODY MASS INDEX (BMI) DOCUMENTED: ICD-10-PCS | Mod: CPTII,S$GLB,, | Performed by: INTERNAL MEDICINE

## 2019-04-27 ENCOUNTER — LAB VISIT (OUTPATIENT)
Dept: LAB | Facility: HOSPITAL | Age: 64
End: 2019-04-27
Attending: INTERNAL MEDICINE
Payer: COMMERCIAL

## 2019-04-27 DIAGNOSIS — E78.1 PURE HYPERGLYCERIDEMIA: Chronic | ICD-10-CM

## 2019-04-27 DIAGNOSIS — Z76.89 ENCOUNTER FOR NEW MEDICATION PRESCRIPTION: ICD-10-CM

## 2019-04-27 LAB
ALBUMIN SERPL BCP-MCNC: 3.8 G/DL (ref 3.5–5.2)
ALP SERPL-CCNC: 86 U/L (ref 55–135)
ALT SERPL W/O P-5'-P-CCNC: 17 U/L (ref 10–44)
AST SERPL-CCNC: 23 U/L (ref 10–40)
BILIRUB DIRECT SERPL-MCNC: 0.4 MG/DL (ref 0.1–0.3)
BILIRUB SERPL-MCNC: 1.1 MG/DL (ref 0.1–1)
CHOLEST SERPL-MCNC: 165 MG/DL (ref 120–199)
CHOLEST/HDLC SERPL: 3.3 {RATIO} (ref 2–5)
HDLC SERPL-MCNC: 50 MG/DL (ref 40–75)
HDLC SERPL: 30.3 % (ref 20–50)
LDLC SERPL CALC-MCNC: 77.8 MG/DL (ref 63–159)
NONHDLC SERPL-MCNC: 115 MG/DL
PROT SERPL-MCNC: 7.1 G/DL (ref 6–8.4)
TRIGL SERPL-MCNC: 186 MG/DL (ref 30–150)

## 2019-04-27 PROCEDURE — 36415 COLL VENOUS BLD VENIPUNCTURE: CPT

## 2019-04-27 PROCEDURE — 80076 HEPATIC FUNCTION PANEL: CPT

## 2019-04-27 PROCEDURE — 80061 LIPID PANEL: CPT

## 2019-04-29 ENCOUNTER — TELEPHONE (OUTPATIENT)
Dept: CARDIOLOGY | Facility: CLINIC | Age: 64
End: 2019-04-29

## 2019-04-29 NOTE — TELEPHONE ENCOUNTER
Pt notified. Cm    ----- Message from Pepe Byers MD sent at 4/29/2019  3:09 PM CDT -----  Lipids at goal

## 2019-05-07 ENCOUNTER — OFFICE VISIT (OUTPATIENT)
Dept: URGENT CARE | Facility: CLINIC | Age: 64
End: 2019-05-07
Payer: COMMERCIAL

## 2019-05-07 DIAGNOSIS — R09.81 SINUS CONGESTION: ICD-10-CM

## 2019-05-07 DIAGNOSIS — J06.9 UPPER RESPIRATORY TRACT INFECTION, UNSPECIFIED TYPE: Primary | ICD-10-CM

## 2019-05-07 PROCEDURE — 99214 PR OFFICE/OUTPT VISIT, EST, LEVL IV, 30-39 MIN: ICD-10-PCS | Mod: 25,S$GLB,, | Performed by: NURSE PRACTITIONER

## 2019-05-07 PROCEDURE — 96372 THER/PROPH/DIAG INJ SC/IM: CPT | Mod: S$GLB,,, | Performed by: NURSE PRACTITIONER

## 2019-05-07 PROCEDURE — 96372 PR INJECTION,THERAP/PROPH/DIAG2ST, IM OR SUBCUT: ICD-10-PCS | Mod: S$GLB,,, | Performed by: NURSE PRACTITIONER

## 2019-05-07 PROCEDURE — 99999 PR PBB SHADOW E&M-EST. PATIENT-LVL III: ICD-10-PCS | Mod: PBBFAC,,, | Performed by: NURSE PRACTITIONER

## 2019-05-07 PROCEDURE — 99999 PR PBB SHADOW E&M-EST. PATIENT-LVL III: CPT | Mod: PBBFAC,,, | Performed by: NURSE PRACTITIONER

## 2019-05-07 PROCEDURE — 99214 OFFICE O/P EST MOD 30 MIN: CPT | Mod: 25,S$GLB,, | Performed by: NURSE PRACTITIONER

## 2019-05-07 RX ORDER — BETAMETHASONE SODIUM PHOSPHATE AND BETAMETHASONE ACETATE 3; 3 MG/ML; MG/ML
9 INJECTION, SUSPENSION INTRA-ARTICULAR; INTRALESIONAL; INTRAMUSCULAR; SOFT TISSUE
Status: COMPLETED | OUTPATIENT
Start: 2019-05-07 | End: 2019-05-07

## 2019-05-07 RX ADMIN — BETAMETHASONE SODIUM PHOSPHATE AND BETAMETHASONE ACETATE 9 MG: 3; 3 INJECTION, SUSPENSION INTRA-ARTICULAR; INTRALESIONAL; INTRAMUSCULAR; SOFT TISSUE at 01:05

## 2019-05-07 NOTE — PROGRESS NOTES
Subjective:       Patient ID: Shirlene Olvera is a 63 y.o. female.    Chief Complaint: No chief complaint on file.    Pt is a 63 year old female to clinic today with complaints of sinus pressure, rhinorrhea, and congestion that began yesterday. Requesting steroid injection.     Sinus Problem   This is a recurrent problem. The current episode started yesterday. The problem is unchanged. There has been no fever. Her pain is at a severity of 8/10. The pain is severe. Associated symptoms include congestion and sinus pressure. Pertinent negatives include no chills, coughing, diaphoresis, ear pain, headaches, hoarse voice, neck pain, shortness of breath, sneezing, sore throat or swollen glands. Treatments tried: flonase. claritin. The treatment provided no relief.     Review of Systems   Constitutional: Negative for chills, diaphoresis, fatigue and fever.   HENT: Positive for congestion, postnasal drip, sinus pressure and sinus pain. Negative for ear discharge, ear pain, hoarse voice, rhinorrhea, sneezing, sore throat and trouble swallowing.    Respiratory: Negative for cough, chest tightness, shortness of breath and wheezing.    Cardiovascular: Negative for chest pain and palpitations.   Gastrointestinal: Negative for abdominal pain, diarrhea, nausea and vomiting.   Musculoskeletal: Negative for back pain, myalgias and neck pain.   Skin: Negative for rash.   Neurological: Negative for dizziness, light-headedness and headaches.       Objective:      Physical Exam   Constitutional: She is oriented to person, place, and time. She appears well-developed and well-nourished. No distress.   HENT:   Head: Normocephalic.   Right Ear: Tympanic membrane, external ear and ear canal normal.   Left Ear: Tympanic membrane, external ear and ear canal normal.   Nose: Mucosal edema present. No rhinorrhea. Right sinus exhibits maxillary sinus tenderness. Right sinus exhibits no frontal sinus tenderness. Left sinus exhibits maxillary  sinus tenderness. Left sinus exhibits no frontal sinus tenderness.   Mouth/Throat: Uvula is midline, oropharynx is clear and moist and mucous membranes are normal. No oropharyngeal exudate, posterior oropharyngeal edema or posterior oropharyngeal erythema.   Eyes: Pupils are equal, round, and reactive to light. Conjunctivae and EOM are normal.   Neck: Normal range of motion. Neck supple.   Cardiovascular: Normal rate, regular rhythm and normal heart sounds. Exam reveals no gallop and no friction rub.   No murmur heard.  Pulmonary/Chest: Effort normal and breath sounds normal. No accessory muscle usage or stridor. No apnea, no tachypnea and no bradypnea. No respiratory distress. She has no decreased breath sounds. She has no wheezes. She has no rhonchi. She has no rales.   Lymphadenopathy:        Head (right side): No submental, no submandibular and no tonsillar adenopathy present.        Head (left side): No submental, no submandibular and no tonsillar adenopathy present.     She has no cervical adenopathy.   Neurological: She is alert and oriented to person, place, and time.   Skin: Skin is warm and dry. No rash noted. She is not diaphoretic.   Psychiatric: She has a normal mood and affect. Her speech is normal and behavior is normal. Thought content normal.   Nursing note and vitals reviewed.      Assessment:       1. Upper respiratory tract infection, unspecified type    2. Sinus congestion        Plan:   Upper respiratory tract infection, unspecified type  -     betamethasone acetate-betamethasone sodium phosphate injection 9 mg    Sinus congestion  -     betamethasone acetate-betamethasone sodium phosphate injection 9 mg      Patient informed of potential side effects of steroid injection including elevating blood pressure, increased blood glucose levels, increased risk of opportunistic infections, peptic ulcer disease and GI bleeding, insomnia, tremors, suppression of ones own steroid production, avascular  necrosis, osteoporosis, increased intraocular pressure, etc. Patient verbalizes risk/benefit profile and agrees to steroid injection.     · Your symptoms are likely due to a viral infection. These infections can last up to 14 days, but you should notice some improvement of your symptoms within the first 7-10 days. Viral infections will not improve with antibiotics. If your symptoms persist >10 days without improvement or if you have any new or worsening symptoms, this is an indication that you may have developed a bacterial infection and should return to your primary care provider or to Urgent Care.   · Getting plenty of rest is very important to fighting infections.  · Increase fluids.   · May apply warm compresses as needed for facial pain and congestion.   · Saline nasal spray to loosen nasal congestion.  · Flonase or Nasacort to reduce inflammation in the sinus cavities.  · You may take an over the counter antihistamine for allergy symptoms such as sneezing, itchy/watery eyes, scratchy throat, or congestion.  · Take Tylenol or Ibuprofen as needed for sore throat, body aches, or fever.  · Don't drive, drink alcohol, or take any other medication or substance that causes sedation while taking cough syrup.   · Follow up with your primary care provider if symptoms persist >10 days or sooner for any new or worsening symptoms.   · Go to the ER for any fever that does not improve with Tylenol/Ibuprofen, neck stiffness, rash, severe headache, vision changes, shortness of breath, chest pain, facial swelling, severe facial pain, or any other new and concerning symptoms.

## 2019-05-20 ENCOUNTER — TELEPHONE (OUTPATIENT)
Dept: INTERNAL MEDICINE | Facility: CLINIC | Age: 64
End: 2019-05-20

## 2019-05-20 RX ORDER — IBUPROFEN 200 MG
TABLET ORAL
Qty: 14 PATCH | Refills: 1 | Status: SHIPPED | OUTPATIENT
Start: 2019-05-20 | End: 2019-11-12

## 2019-05-20 RX ORDER — VALSARTAN AND HYDROCHLOROTHIAZIDE 160; 12.5 MG/1; MG/1
1 TABLET, FILM COATED ORAL DAILY
Qty: 90 TABLET | Refills: 3 | Status: SHIPPED | OUTPATIENT
Start: 2019-05-20 | End: 2020-06-04

## 2019-05-20 NOTE — TELEPHONE ENCOUNTER
Losartan-HCTZ, all meds with the losartan in it has been wiped off market due to recall. They would like to know if you would like to switch it to something else in the mean time?

## 2019-05-20 NOTE — TELEPHONE ENCOUNTER
----- Message from Tracey Seo sent at 5/20/2019  2:42 PM CDT -----  Contact: pt  Please call Keisha @  [phone] Walker regarding pt blood pressure medication Losartan, states it with fluid meds, states it's unavailable, need to know what to change to to replace.

## 2019-07-08 NOTE — TELEPHONE ENCOUNTER
----- Message from Savana Lara sent at 5/25/2017  2:32 PM CDT -----  Patient returned your call regarding her test results.  Call her at [phone].                                   [email or web address]   Wound Care: Vaseline

## 2019-07-09 ENCOUNTER — TELEPHONE (OUTPATIENT)
Dept: INTERNAL MEDICINE | Facility: CLINIC | Age: 64
End: 2019-07-09

## 2019-07-09 DIAGNOSIS — G47.00 INSOMNIA, UNSPECIFIED TYPE: ICD-10-CM

## 2019-07-09 DIAGNOSIS — F41.1 GAD (GENERALIZED ANXIETY DISORDER): ICD-10-CM

## 2019-07-09 NOTE — TELEPHONE ENCOUNTER
----- Message from Shyla Huerta sent at 7/9/2019  4:43 PM CDT -----  Contact: patient  Type:  RX Refill Request    Who Called: PATIENT  Refill or New Rx:REFILL  RX Name and Strength:XANAX 10 MG  How is the patient currently taking it? (ex. 1XDay):AS NEEDED  Is this a 30 day or 90 day RX:30  Preferred Pharmacy with phone number:  [facility] in [facility] [address] [address], [facility] [address]  [address], [facility] [address]  [place] [address]  Phone: [phone] Fax: [phone]    Local or Mail Order:LOCAL  Ordering Provider:LOVE  Would the patient rather a call back or a response via [facility]? CALL  Best Call Back Number:[phone]  Additional Information: PLEASE CALL PATIENT IF APPROVED. THANKS, KRYSTAL

## 2019-07-10 RX ORDER — ALPRAZOLAM 0.25 MG/1
0.25 TABLET ORAL DAILY PRN
Qty: 30 TABLET | Refills: 1 | Status: SHIPPED | OUTPATIENT
Start: 2019-07-10 | End: 2019-10-29 | Stop reason: SDUPTHER

## 2019-08-26 ENCOUNTER — TELEPHONE (OUTPATIENT)
Dept: INTERNAL MEDICINE | Facility: CLINIC | Age: 64
End: 2019-08-26

## 2019-08-26 ENCOUNTER — TELEPHONE (OUTPATIENT)
Dept: CARDIOLOGY | Facility: CLINIC | Age: 64
End: 2019-08-26

## 2019-08-26 DIAGNOSIS — Z12.39 ENCOUNTER FOR SPECIAL SCREENING EXAMINATION FOR NEOPLASM OF BREAST: Primary | ICD-10-CM

## 2019-08-26 RX ORDER — NEBIVOLOL 10 MG/1
10 TABLET ORAL DAILY
Qty: 30 TABLET | Refills: 11 | Status: SHIPPED | OUTPATIENT
Start: 2019-08-26 | End: 2020-09-19

## 2019-08-26 NOTE — TELEPHONE ENCOUNTER
I rtc to her , notified he I just got the rx req this afternoon and sent to Dr Byers to sign. If at all possible, please call for med refill before running out. I called and [facility] will fill now and she is going to pick it up. Cm  ----- Message from Tomasa Hinojosa sent at 8/26/2019  4:40 PM CDT -----  Contact: Pt   Type:  RX Refill Request    Who Called: pt   Refill or New Rx:refill   RX Name and Strength:bystolic 10 mg   How is the patient currently taking it? (ex. 1XDay):once daily   Is this a 30 day or 90 day RX:30 days   [facility] with phone number[facility]   Local or Mail Order:local   Ordering Provider:miguel ángel   Would the patient rather a call back or a response via [facility]? Phone   Best Call Back Number: [phone]  Additional Information: Pt has been out for 2 days        [facility] in [facility] [address], [facility] [address]  [address], [address]  [facility] [address]  Phone: [phone] Fax: [phone]

## 2019-08-26 NOTE — TELEPHONE ENCOUNTER
----- Message from Tomasa Hinojosa sent at 8/26/2019  4:38 PM CDT -----  Contact: Pt   States she's calling to have orders placed in system for her mammo so that she can schedule and can be reached at [phone]//thanks/dbw

## 2019-09-13 ENCOUNTER — HOSPITAL ENCOUNTER (OUTPATIENT)
Dept: RADIOLOGY | Facility: HOSPITAL | Age: 64
Discharge: HOME | End: 2019-09-13
Attending: FAMILY MEDICINE
Payer: COMMERCIAL

## 2019-09-13 DIAGNOSIS — Z12.39 ENCOUNTER FOR SPECIAL SCREENING EXAMINATION FOR NEOPLASM OF BREAST: ICD-10-CM

## 2019-09-13 PROCEDURE — 77063 MAMMO DIGITAL SCREENING BILAT WITH TOMOSYNTHESIS_CAD: ICD-10-PCS | Mod: 26,,, | Performed by: RADIOLOGY

## 2019-09-13 PROCEDURE — 77067 SCR MAMMO BI INCL CAD: CPT | Mod: TC

## 2019-09-13 PROCEDURE — 77067 MAMMO DIGITAL SCREENING BILAT WITH TOMOSYNTHESIS_CAD: ICD-10-PCS | Mod: 26,,, | Performed by: RADIOLOGY

## 2019-09-13 PROCEDURE — 77063 BREAST TOMOSYNTHESIS BI: CPT | Mod: 26,,, | Performed by: RADIOLOGY

## 2019-09-13 PROCEDURE — 77067 SCR MAMMO BI INCL CAD: CPT | Mod: 26,,, | Performed by: RADIOLOGY

## 2019-10-01 ENCOUNTER — OFFICE VISIT (OUTPATIENT)
Dept: INTERNAL MEDICINE | Facility: CLINIC | Age: 64
End: 2019-10-01
Payer: COMMERCIAL

## 2019-10-01 VITALS
SYSTOLIC BLOOD PRESSURE: 120 MMHG | RESPIRATION RATE: 16 BRPM | TEMPERATURE: 97 F | WEIGHT: 153.25 LBS | HEIGHT: 65 IN | HEART RATE: 74 BPM | DIASTOLIC BLOOD PRESSURE: 78 MMHG | BODY MASS INDEX: 25.53 KG/M2

## 2019-10-01 DIAGNOSIS — M54.2 NECK PAIN ON RIGHT SIDE: Primary | ICD-10-CM

## 2019-10-01 DIAGNOSIS — N18.30 CHRONIC KIDNEY DISEASE, STAGE 3: ICD-10-CM

## 2019-10-01 DIAGNOSIS — M51.36 DDD (DEGENERATIVE DISC DISEASE), LUMBAR: ICD-10-CM

## 2019-10-01 PROCEDURE — 99999 PR PBB SHADOW E&M-EST. PATIENT-LVL V: ICD-10-PCS | Mod: PBBFAC,,, | Performed by: NURSE PRACTITIONER

## 2019-10-01 PROCEDURE — 3078F DIAST BP <80 MM HG: CPT | Mod: CPTII,S$GLB,, | Performed by: NURSE PRACTITIONER

## 2019-10-01 PROCEDURE — 3074F SYST BP LT 130 MM HG: CPT | Mod: CPTII,S$GLB,, | Performed by: NURSE PRACTITIONER

## 2019-10-01 PROCEDURE — 3074F PR MOST RECENT SYSTOLIC BLOOD PRESSURE < 130 MM HG: ICD-10-PCS | Mod: CPTII,S$GLB,, | Performed by: NURSE PRACTITIONER

## 2019-10-01 PROCEDURE — 1101F PR PT FALLS ASSESS DOC 0-1 FALLS W/OUT INJ PAST YR: ICD-10-PCS | Mod: CPTII,S$GLB,, | Performed by: NURSE PRACTITIONER

## 2019-10-01 PROCEDURE — 1101F PT FALLS ASSESS-DOCD LE1/YR: CPT | Mod: CPTII,S$GLB,, | Performed by: NURSE PRACTITIONER

## 2019-10-01 PROCEDURE — 3008F BODY MASS INDEX DOCD: CPT | Mod: CPTII,S$GLB,, | Performed by: NURSE PRACTITIONER

## 2019-10-01 PROCEDURE — 99214 PR OFFICE/OUTPT VISIT, EST, LEVL IV, 30-39 MIN: ICD-10-PCS | Mod: 25,S$GLB,, | Performed by: NURSE PRACTITIONER

## 2019-10-01 PROCEDURE — 99214 OFFICE O/P EST MOD 30 MIN: CPT | Mod: 25,S$GLB,, | Performed by: NURSE PRACTITIONER

## 2019-10-01 PROCEDURE — 3008F PR BODY MASS INDEX (BMI) DOCUMENTED: ICD-10-PCS | Mod: CPTII,S$GLB,, | Performed by: NURSE PRACTITIONER

## 2019-10-01 PROCEDURE — 3078F PR MOST RECENT DIASTOLIC BLOOD PRESSURE < 80 MM HG: ICD-10-PCS | Mod: CPTII,S$GLB,, | Performed by: NURSE PRACTITIONER

## 2019-10-01 PROCEDURE — 96372 PR INJECTION,THERAP/PROPH/DIAG2ST, IM OR SUBCUT: ICD-10-PCS | Mod: S$GLB,,, | Performed by: NURSE PRACTITIONER

## 2019-10-01 PROCEDURE — 99999 PR PBB SHADOW E&M-EST. PATIENT-LVL V: CPT | Mod: PBBFAC,,, | Performed by: NURSE PRACTITIONER

## 2019-10-01 PROCEDURE — 96372 THER/PROPH/DIAG INJ SC/IM: CPT | Mod: S$GLB,,, | Performed by: NURSE PRACTITIONER

## 2019-10-01 RX ORDER — CYCLOBENZAPRINE HCL 10 MG
TABLET ORAL
Qty: 90 TABLET | Refills: 3 | Status: SHIPPED | OUTPATIENT
Start: 2019-10-01 | End: 2020-10-19 | Stop reason: SDUPTHER

## 2019-10-01 RX ORDER — KETOROLAC TROMETHAMINE 30 MG/ML
30 INJECTION, SOLUTION INTRAMUSCULAR; INTRAVENOUS
Status: COMPLETED | OUTPATIENT
Start: 2019-10-01 | End: 2019-10-01

## 2019-10-01 RX ORDER — DICLOFENAC SODIUM 10 MG/G
2 GEL TOPICAL 4 TIMES DAILY PRN
Qty: 100 G | Refills: 0 | Status: SHIPPED | OUTPATIENT
Start: 2019-10-01 | End: 2022-03-28 | Stop reason: ALTCHOICE

## 2019-10-01 RX ADMIN — KETOROLAC TROMETHAMINE 30 MG: 30 INJECTION, SOLUTION INTRAMUSCULAR; INTRAVENOUS at 05:10

## 2019-10-01 NOTE — PATIENT INSTRUCTIONS
Rest  Heating pad  Massage  voltaren gel for pain  Extra strength tylenol as needed  Flexeril at night

## 2019-10-01 NOTE — PROGRESS NOTES
Subjective:       Patient ID: Shirlene Olvera is a 64 y.o. female.    Chief Complaint: Headache    Patient comes in with right sided neck pain and jaw pain that started after doing yard work in flower beds. No injury or trauma. She is right hand dominant. No radiculopathy. No rash. Pain worse with palpation and range of motion.      Review of Systems   Constitutional: Positive for activity change. Negative for appetite change, chills, diaphoresis, fatigue, fever and unexpected weight change.   HENT: Negative.         + for tmj pain on right   Respiratory: Negative for cough and shortness of breath.    Cardiovascular: Negative for chest pain, palpitations and leg swelling.   Gastrointestinal: Negative.    Genitourinary: Negative.    Musculoskeletal: Positive for myalgias and neck pain.   Skin: Negative for color change, pallor, rash and wound.   Allergic/Immunologic: Negative for immunocompromised state.   Neurological: Negative.  Negative for dizziness and facial asymmetry.   Hematological: Negative for adenopathy. Does not bruise/bleed easily.   Psychiatric/Behavioral: Negative for agitation, behavioral problems and confusion.       Objective:      Physical Exam   Constitutional: She appears well-developed and well-nourished. She is cooperative. No distress.   HENT:   Head: Normocephalic and atraumatic.   Right Ear: Hearing, tympanic membrane, external ear and ear canal normal.   Left Ear: Hearing, tympanic membrane, external ear and ear canal normal.   Nose: Nose normal.   Mouth/Throat: Uvula is midline, oropharynx is clear and moist and mucous membranes are normal.   ttp to right tmj   Neck: Neck supple. Muscular tenderness present. Decreased range of motion present.       ttp to right side of neck per drawing   Cardiovascular: Normal rate, regular rhythm and normal heart sounds.   No murmur heard.  Pulmonary/Chest: Effort normal and breath sounds normal. No respiratory distress.   Lymphadenopathy:         Head (right side): No tonsillar adenopathy present.        Head (left side): No tonsillar adenopathy present.     She has no cervical adenopathy.   Neurological: She is alert.   Skin: Skin is warm and dry. No rash noted. She is not diaphoretic.   Psychiatric: She has a normal mood and affect. Her behavior is normal. Judgment and thought content normal.   Nursing note and vitals reviewed.      Assessment:       1. Neck pain on right side    2. Chronic kidney disease, stage 3    3. DDD (degenerative disc disease), lumbar        Plan:       Shirlene was seen today for headache.    Diagnoses and all orders for this visit:    Neck pain on right side  -     ketorolac injection 30 mg  -     diclofenac sodium (VOLTAREN) 1 % Gel; Apply 2 g topically 4 (four) times daily as needed. For muscular pain    Chronic kidney disease, stage 3  -     diclofenac sodium (VOLTAREN) 1 % Gel; Apply 2 g topically 4 (four) times daily as needed. For muscular pain    DDD (degenerative disc disease), lumbar- requests refill  -     cyclobenzaprine (FLEXERIL) 10 MG tablet; TAKE 1 TABLET BY MOUTH QHS    Patient Instructions   Rest  Heating pad  Massage  voltaren gel for pain  Extra strength tylenol as needed  Flexeril at night     Follow up with Dr. Segovia if not improving

## 2019-10-01 NOTE — PROGRESS NOTES
Administered Toradol 30mg IM to pt left ventrogluteal. Pt tolerated well. No distress noted. Advised pt to wait 20 minutes in lobby and to inform  if any adverse reaction occurs. Pt stated understanding.

## 2019-10-01 NOTE — LETTER
October 1, 2019      [facility]  [address] [place] [address]  Phone: [phone]  Fax: [phone]       Patient: Shirlene Olvera   YOB: 1955  Date of Visit: 10/01/2019    To Whom It May Concern:    Maggy Olvera  was at [facility] on 10/01/2019. She may return to work/school on 10/02/2019 with no restrictions. If you have any questions or concerns, or if I can be of further assistance, please do not hesitate to contact me.    Sincerely,    Ngoc Nelson LPN

## 2019-10-17 ENCOUNTER — HOSPITAL ENCOUNTER (OUTPATIENT)
Dept: RADIOLOGY | Facility: HOSPITAL | Age: 64
Discharge: HOME | End: 2019-10-17
Attending: PHYSICIAN ASSISTANT
Payer: COMMERCIAL

## 2019-10-17 ENCOUNTER — OFFICE VISIT (OUTPATIENT)
Dept: INTERNAL MEDICINE | Facility: CLINIC | Age: 64
End: 2019-10-17
Payer: COMMERCIAL

## 2019-10-17 VITALS
BODY MASS INDEX: 25.13 KG/M2 | TEMPERATURE: 98 F | DIASTOLIC BLOOD PRESSURE: 96 MMHG | SYSTOLIC BLOOD PRESSURE: 150 MMHG | WEIGHT: 150.81 LBS | HEART RATE: 68 BPM | HEIGHT: 65 IN

## 2019-10-17 DIAGNOSIS — R10.9 ABDOMINAL CRAMPING: ICD-10-CM

## 2019-10-17 DIAGNOSIS — R10.9 ABDOMINAL CRAMPING: Primary | ICD-10-CM

## 2019-10-17 PROCEDURE — 99999 PR PBB SHADOW E&M-EST. PATIENT-LVL IV: CPT | Mod: PBBFAC,,, | Performed by: PHYSICIAN ASSISTANT

## 2019-10-17 PROCEDURE — 99214 OFFICE O/P EST MOD 30 MIN: CPT | Mod: S$GLB,,, | Performed by: PHYSICIAN ASSISTANT

## 2019-10-17 PROCEDURE — 74019 XR ABDOMEN FLAT AND ERECT: ICD-10-PCS | Mod: 26,,, | Performed by: RADIOLOGY

## 2019-10-17 PROCEDURE — 74019 RADEX ABDOMEN 2 VIEWS: CPT | Mod: 26,,, | Performed by: RADIOLOGY

## 2019-10-17 PROCEDURE — 3077F SYST BP >= 140 MM HG: CPT | Mod: CPTII,S$GLB,, | Performed by: PHYSICIAN ASSISTANT

## 2019-10-17 PROCEDURE — 99214 PR OFFICE/OUTPT VISIT, EST, LEVL IV, 30-39 MIN: ICD-10-PCS | Mod: S$GLB,,, | Performed by: PHYSICIAN ASSISTANT

## 2019-10-17 PROCEDURE — 74019 RADEX ABDOMEN 2 VIEWS: CPT | Mod: TC,FY,PO

## 2019-10-17 PROCEDURE — 3008F BODY MASS INDEX DOCD: CPT | Mod: CPTII,S$GLB,, | Performed by: PHYSICIAN ASSISTANT

## 2019-10-17 PROCEDURE — 3008F PR BODY MASS INDEX (BMI) DOCUMENTED: ICD-10-PCS | Mod: CPTII,S$GLB,, | Performed by: PHYSICIAN ASSISTANT

## 2019-10-17 PROCEDURE — 3077F PR MOST RECENT SYSTOLIC BLOOD PRESSURE >= 140 MM HG: ICD-10-PCS | Mod: CPTII,S$GLB,, | Performed by: PHYSICIAN ASSISTANT

## 2019-10-17 PROCEDURE — 3080F DIAST BP >= 90 MM HG: CPT | Mod: CPTII,S$GLB,, | Performed by: PHYSICIAN ASSISTANT

## 2019-10-17 PROCEDURE — 1101F PT FALLS ASSESS-DOCD LE1/YR: CPT | Mod: CPTII,S$GLB,, | Performed by: PHYSICIAN ASSISTANT

## 2019-10-17 PROCEDURE — 99999 PR PBB SHADOW E&M-EST. PATIENT-LVL IV: ICD-10-PCS | Mod: PBBFAC,,, | Performed by: PHYSICIAN ASSISTANT

## 2019-10-17 PROCEDURE — 3080F PR MOST RECENT DIASTOLIC BLOOD PRESSURE >= 90 MM HG: ICD-10-PCS | Mod: CPTII,S$GLB,, | Performed by: PHYSICIAN ASSISTANT

## 2019-10-17 PROCEDURE — 1101F PR PT FALLS ASSESS DOC 0-1 FALLS W/OUT INJ PAST YR: ICD-10-PCS | Mod: CPTII,S$GLB,, | Performed by: PHYSICIAN ASSISTANT

## 2019-10-17 RX ORDER — PANTOPRAZOLE SODIUM 20 MG/1
20 TABLET, DELAYED RELEASE ORAL DAILY
Qty: 30 TABLET | Refills: 11 | Status: SHIPPED | OUTPATIENT
Start: 2019-10-17 | End: 2020-11-23

## 2019-10-17 RX ORDER — DICYCLOMINE HYDROCHLORIDE 10 MG/1
10 CAPSULE ORAL 2 TIMES DAILY
Qty: 14 CAPSULE | Refills: 0 | Status: SHIPPED | OUTPATIENT
Start: 2019-10-17 | End: 2019-10-24

## 2019-10-17 NOTE — PROGRESS NOTES
Subjective:       Patient ID: Shirlene Olvera is a 64 y.o. female.    Chief Complaint: GI Problem    HPI  3 days of nausea with eating, RUQ pain after eating and loose stools. However Patient then states signs and symptoms have been on/off for months     Gallbladder surgeyr 2-3 years ago     Pain radiating through abd to the back at same level   No vomiting but very  Nauseous with the pain     States pain has been going on x months   Early satiety is a symptom as well       Health Maintenance Due   Topic Date Due    DEXA SCAN  08/29/1994    Pneumococcal Vaccine (65+ High/Highest Risk) (1 of 2 - PCV13) 08/29/2019       Past Medical History:   Diagnosis Date    Colon polyp     Coronary artery disease     pt states MI June 2015    Hypertension     Myocardial infarction     PAD (peripheral artery disease)     Tobacco use        Current Outpatient Medications   Medication Sig Dispense Refill    ALPRAZolam (XANAX) 0.25 MG tablet Take 1 tablet (0.25 mg total) by mouth daily as needed for Anxiety. 30 tablet 1    aspirin 81 MG Chew Take 1 tablet (81 mg total) by mouth once daily.  0    cyanocobalamin (VITAMIN B-12) 1000 MCG tablet Take 100 mcg by mouth once daily.      cyclobenzaprine (FLEXERIL) 10 MG tablet TAKE 1 TABLET BY MOUTH QHS 90 tablet 3    diclofenac sodium (VOLTAREN) 1 % Gel Apply 2 g topically 4 (four) times daily as needed. For muscular pain 100 g 0    evolocumab (REPATHA SYRINGE) 140 mg/mL Syrg Inject 140 mg into the skin every 14 (fourteen) days. 6 Syringe 3    fluticasone (FLONASE) 50 mcg/actuation nasal spray INSTILL 2 SPRAYS IN EACH NOSTRIL ONCE DAILY. 16 g 11    loratadine (CLARITIN) 10 mg tablet Take 10 mg by mouth daily as needed for Allergies.      multivitamin with minerals tablet Take 1 tablet by mouth once daily.      nebivolol (BYSTOLIC) 10 MG Tab Take 1 tablet (10 mg total) by mouth once daily. 30 tablet 11    valsartan-hydrochlorothiazide (DIOVAN-HCT) 160-12.5 mg per tablet  "Take 1 tablet by mouth once daily. 90 tablet 3    dicyclomine (BENTYL) 10 MG capsule Take 1 capsule (10 mg total) by mouth 2 (two) times daily. for 7 days 14 capsule 0    fluocinolone acetonide oil (DERMOTIC OIL) 0.01 % Drop Place 3 drops in ear(s) 2 (two) times daily. (Patient not taking: Reported on 10/17/2019) 1 Bottle 3    mupirocin calcium 2% (BACTROBAN) 2 % cream Apply topically 2 (two) times daily. (Patient not taking: Reported on 10/17/2019) 15 g 0    niacin, inositol niacinate, (NIACIN FLUSH FREE) 400 mg niacin (500 mg) Cap Take 500 mg by mouth after dinner. (Patient taking differently: Take 1,000 mg by mouth 2 (two) times daily. 06/13/18 Dr Byers increased to 2000 mg a day.)  0    nicotine (NICODERM CQ) 14 mg/24 hr APPLY 1 PATCH TO THE SKIN ONCE DAILY (Patient not taking: Reported on 10/1/2019) 14 patch 1    pantoprazole (PROTONIX) 20 MG tablet Take 1 tablet (20 mg total) by mouth once daily. 30 tablet 11    tamsulosin (FLOMAX) 0.4 mg Cap Take 1 capsule (0.4 mg total) by mouth once daily. (Patient not taking: Reported on 10/17/2019) 7 capsule 0     No current facility-administered medications for this visit.        Review of Systems   Constitutional: Negative for fatigue, fever and unexpected weight change.   HENT: Negative for sore throat and trouble swallowing.    Respiratory: Negative for cough and shortness of breath.    Cardiovascular: Negative for chest pain.   Gastrointestinal: Negative for abdominal pain.   Hematological: Negative for adenopathy. Does not bruise/bleed easily.   All other systems reviewed and are negative.      Objective:   BP (!) 150/96   Pulse 68   Temp 97.8 °F (36.6 °C) (Oral)   Ht 5' 5" (1.651 m)   Wt 68.4 kg (150 lb 12.7 oz)   BMI 25.09 kg/m²      Physical Exam   Constitutional: She is oriented to person, place, and time. She appears well-developed and well-nourished. No distress.   HENT:   Head: Normocephalic and atraumatic.   Eyes: Pupils are equal, round, and " reactive to light. EOM are normal.   Neck: Normal range of motion. Neck supple.   Cardiovascular: Normal rate and regular rhythm.   Pulmonary/Chest: Effort normal.   Abdominal: Soft. There is tenderness.   Musculoskeletal: She exhibits no edema.        Arms:  Neurological: She is alert and oriented to person, place, and time.   Skin: Capillary refill takes less than 2 seconds.   Psychiatric: She has a normal mood and affect. Her behavior is normal.       TTp at areas per chart  No guarding   No organomegaly   Lab Results   Component Value Date    WBC 8.76 03/11/2019    HGB 14.0 03/11/2019    HCT 41.4 03/11/2019     03/11/2019    CHOL 165 04/27/2019    TRIG 186 (H) 04/27/2019    HDL 50 04/27/2019    ALT 17 04/27/2019    AST 23 04/27/2019     (L) 03/11/2019    K 4.7 03/11/2019    CL 98 03/11/2019    CREATININE 1.3 03/11/2019    BUN 24 (H) 03/11/2019    CO2 33 (H) 03/11/2019    TSH 1.429 08/14/2018    INR 0.9 05/13/2015    HGBA1C 5.3 08/14/2018       Assessment:       1. Abdominal cramping        Plan:   Abdominal cramping  -     X-Ray Abdomen Flat And Erect; Future; Expected date: 10/17/2019    Other orders  -     dicyclomine (BENTYL) 10 MG capsule; Take 1 capsule (10 mg total) by mouth 2 (two) times daily. for 7 days  Dispense: 14 capsule; Refill: 0  -     pantoprazole (PROTONIX) 20 MG tablet; Take 1 tablet (20 mg total) by mouth once daily.  Dispense: 30 tablet; Refill: 11    signs and symptoms have been ongoing for months   So will treat for overactive bowel/ acid issues and also get xray and then CT    Will follow up with results     No follow-ups on file.

## 2019-10-21 DIAGNOSIS — I10 ESSENTIAL HYPERTENSION: Primary | Chronic | ICD-10-CM

## 2019-10-22 ENCOUNTER — TELEPHONE (OUTPATIENT)
Dept: RADIOLOGY | Facility: HOSPITAL | Age: 64
End: 2019-10-22

## 2019-10-23 ENCOUNTER — HOSPITAL ENCOUNTER (OUTPATIENT)
Dept: RADIOLOGY | Facility: HOSPITAL | Age: 64
Discharge: HOME | End: 2019-10-23
Attending: FAMILY MEDICINE
Payer: COMMERCIAL

## 2019-10-23 DIAGNOSIS — R10.30 LOWER ABDOMINAL PAIN: ICD-10-CM

## 2019-10-23 DIAGNOSIS — R10.9 FLANK PAIN: ICD-10-CM

## 2019-10-23 DIAGNOSIS — N20.0 KIDNEY STONE ON LEFT SIDE: ICD-10-CM

## 2019-10-23 PROCEDURE — 74176 CT RENAL STONE STUDY ABD PELVIS WO: ICD-10-PCS | Mod: 26,,, | Performed by: RADIOLOGY

## 2019-10-23 PROCEDURE — 74176 CT ABD & PELVIS W/O CONTRAST: CPT | Mod: TC

## 2019-10-23 PROCEDURE — 74176 CT ABD & PELVIS W/O CONTRAST: CPT | Mod: 26,,, | Performed by: RADIOLOGY

## 2019-10-24 ENCOUNTER — CLINICAL SUPPORT (OUTPATIENT)
Dept: CARDIOLOGY | Facility: CLINIC | Age: 64
End: 2019-10-24
Payer: COMMERCIAL

## 2019-10-24 ENCOUNTER — OFFICE VISIT (OUTPATIENT)
Dept: CARDIOLOGY | Facility: CLINIC | Age: 64
End: 2019-10-24
Payer: COMMERCIAL

## 2019-10-24 VITALS
SYSTOLIC BLOOD PRESSURE: 122 MMHG | DIASTOLIC BLOOD PRESSURE: 78 MMHG | HEART RATE: 65 BPM | WEIGHT: 155.63 LBS | OXYGEN SATURATION: 97 % | BODY MASS INDEX: 25.93 KG/M2 | HEIGHT: 65 IN

## 2019-10-24 DIAGNOSIS — Z72.0 TOBACCO ABUSE: Chronic | ICD-10-CM

## 2019-10-24 DIAGNOSIS — I10 ESSENTIAL HYPERTENSION: Chronic | ICD-10-CM

## 2019-10-24 DIAGNOSIS — E78.00 PURE HYPERCHOLESTEROLEMIA: Chronic | ICD-10-CM

## 2019-10-24 DIAGNOSIS — Z78.9 STATIN INTOLERANCE: ICD-10-CM

## 2019-10-24 DIAGNOSIS — I10 ESSENTIAL HYPERTENSION: Primary | Chronic | ICD-10-CM

## 2019-10-24 DIAGNOSIS — I25.10 CORONARY ARTERY DISEASE INVOLVING NATIVE CORONARY ARTERY OF NATIVE HEART WITHOUT ANGINA PECTORIS: Chronic | ICD-10-CM

## 2019-10-24 PROCEDURE — 3008F BODY MASS INDEX DOCD: CPT | Mod: CPTII,S$GLB,, | Performed by: INTERNAL MEDICINE

## 2019-10-24 PROCEDURE — 99214 OFFICE O/P EST MOD 30 MIN: CPT | Mod: 25,S$GLB,, | Performed by: INTERNAL MEDICINE

## 2019-10-24 PROCEDURE — 99214 PR OFFICE/OUTPT VISIT, EST, LEVL IV, 30-39 MIN: ICD-10-PCS | Mod: 25,S$GLB,, | Performed by: INTERNAL MEDICINE

## 2019-10-24 PROCEDURE — 3074F PR MOST RECENT SYSTOLIC BLOOD PRESSURE < 130 MM HG: ICD-10-PCS | Mod: CPTII,S$GLB,, | Performed by: INTERNAL MEDICINE

## 2019-10-24 PROCEDURE — 3074F SYST BP LT 130 MM HG: CPT | Mod: CPTII,S$GLB,, | Performed by: INTERNAL MEDICINE

## 2019-10-24 PROCEDURE — 99999 PR PBB SHADOW E&M-EST. PATIENT-LVL III: CPT | Mod: PBBFAC,,, | Performed by: INTERNAL MEDICINE

## 2019-10-24 PROCEDURE — 1101F PR PT FALLS ASSESS DOC 0-1 FALLS W/OUT INJ PAST YR: ICD-10-PCS | Mod: CPTII,S$GLB,, | Performed by: INTERNAL MEDICINE

## 2019-10-24 PROCEDURE — 93000 EKG 12-LEAD: ICD-10-PCS | Mod: S$GLB,,, | Performed by: INTERNAL MEDICINE

## 2019-10-24 PROCEDURE — 3078F DIAST BP <80 MM HG: CPT | Mod: CPTII,S$GLB,, | Performed by: INTERNAL MEDICINE

## 2019-10-24 PROCEDURE — 99999 PR PBB SHADOW E&M-EST. PATIENT-LVL III: ICD-10-PCS | Mod: PBBFAC,,, | Performed by: INTERNAL MEDICINE

## 2019-10-24 PROCEDURE — 3078F PR MOST RECENT DIASTOLIC BLOOD PRESSURE < 80 MM HG: ICD-10-PCS | Mod: CPTII,S$GLB,, | Performed by: INTERNAL MEDICINE

## 2019-10-24 PROCEDURE — 93000 ELECTROCARDIOGRAM COMPLETE: CPT | Mod: S$GLB,,, | Performed by: INTERNAL MEDICINE

## 2019-10-24 PROCEDURE — 3008F PR BODY MASS INDEX (BMI) DOCUMENTED: ICD-10-PCS | Mod: CPTII,S$GLB,, | Performed by: INTERNAL MEDICINE

## 2019-10-24 PROCEDURE — 1101F PT FALLS ASSESS-DOCD LE1/YR: CPT | Mod: CPTII,S$GLB,, | Performed by: INTERNAL MEDICINE

## 2019-10-24 NOTE — PROGRESS NOTES
Subjective:   Patient ID:  Shirlene Olvera is a 64 y.o. female who presents for follow-up of Follow-up (6 month)    Patient denies CP, angina or anginal equivalent. Pt has decreased her smoking. BP averages 130-140 systolic     Hypertension   This is a chronic problem. The current episode started more than 1 year ago. The problem has been gradually improving since onset. The problem is controlled. Pertinent negatives include no chest pain, palpitations or shortness of breath. Past treatments include angiotensin blockers, diuretics and beta blockers. The current treatment provides moderate improvement. There are no compliance problems.    Hyperlipidemia   This is a chronic problem. The current episode started more than 1 year ago. Recent lipid tests were reviewed and are variable. Pertinent negatives include no chest pain or shortness of breath. Treatments tried: repatha. The current treatment provides moderate improvement of lipids. Compliance problems include medication side effects.         Review of Systems   Constitution: Negative. Negative for weight gain.   HENT: Negative.    Eyes: Negative.    Cardiovascular: Negative.  Negative for chest pain, claudication, cyanosis, dyspnea on exertion, irregular heartbeat, leg swelling, near-syncope, orthopnea, palpitations, paroxysmal nocturnal dyspnea and syncope.   Respiratory: Negative.  Negative for shortness of breath.    Endocrine: Negative.    Hematologic/Lymphatic: Negative.    Skin: Negative.    Musculoskeletal: Negative for muscle weakness.   Gastrointestinal: Negative.    Genitourinary: Negative.    Neurological: Negative.  Negative for dizziness.   Psychiatric/Behavioral: Negative.    Allergic/Immunologic: Negative.      Family History   Problem Relation Age of Onset    Heart attack Father 56        MI    Asthma Neg Hx     Thyroid disease Neg Hx     Migraines Neg Hx     Cancer Neg Hx      Past Medical History:   Diagnosis Date    Colon polyp      Coronary artery disease     pt states MI June 2015    Hypertension     Myocardial infarction     PAD (peripheral artery disease)     Tobacco use      Social History     Socioeconomic History    Marital status:      Spouse name: Not on file    Number of children: 3    Years of education: Not on file    Highest education level: Not on file   Occupational History    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     Employer: [email or web address]   Social Needs    Financial resource strain: Not on file    Food insecurity:     Worry: Not on file     Inability: Not on file    Transportation needs:     Medical: Not on file     Non-medical: Not on file   Tobacco Use    Smoking status: Current Every Day Smoker     Packs/day: 0.25    Smokeless tobacco: Never Used   Substance and Sexual Activity    Alcohol use: Yes     Alcohol/week: 0.0 standard drinks     Comment: once a month    Drug use: No    Sexual activity: Never   Lifestyle    Physical activity:     Days per week: Not on file     Minutes per session: Not on file    Stress: Not on file   Relationships    Social connections:     Talks on phone: Not on file     Gets together: Not on file     Attends Baptism service: Not on file     Active member of club or organization: Not on file     Attends meetings of clubs or organizations: Not on file     Relationship status: Not on file   Other Topics Concern    Not on file   Social History Narrative    Not on file     Current Outpatient Medications on File Prior to Visit   Medication Sig Dispense Refill    ALPRAZolam (XANAX) 0.25 MG tablet Take 1 tablet (0.25 mg total) by mouth daily as needed for Anxiety. 30 tablet 1    aspirin 81 MG Chew Take 1 tablet (81 mg total) by mouth once daily.  0    cyanocobalamin (VITAMIN B-12) 1000 MCG tablet Take 100 mcg by mouth once daily.      cyclobenzaprine (FLEXERIL) 10 MG tablet TAKE 1 TABLET BY MOUTH QHS 90 tablet 3    diclofenac sodium (VOLTAREN) 1 %  Gel Apply 2 g topically 4 (four) times daily as needed. For muscular pain 100 g 0    dicyclomine (BENTYL) 10 MG capsule Take 1 capsule (10 mg total) by mouth 2 (two) times daily. for 7 days 14 capsule 0    evolocumab (REPATHA SYRINGE) 140 mg/mL Syrg Inject 140 mg into the skin every 14 (fourteen) days. 6 Syringe 3    fluocinolone acetonide oil (DERMOTIC OIL) 0.01 % Drop Place 3 drops in ear(s) 2 (two) times daily. (Patient not taking: Reported on 10/17/2019) 1 Bottle 3    fluticasone (FLONASE) 50 mcg/actuation nasal spray INSTILL 2 SPRAYS IN EACH NOSTRIL ONCE DAILY. 16 g 11    loratadine (CLARITIN) 10 mg tablet Take 10 mg by mouth daily as needed for Allergies.      multivitamin with minerals tablet Take 1 tablet by mouth once daily.      mupirocin calcium 2% (BACTROBAN) 2 % cream Apply topically 2 (two) times daily. (Patient not taking: Reported on 10/17/2019) 15 g 0    nebivolol (BYSTOLIC) 10 MG Tab Take 1 tablet (10 mg total) by mouth once daily. 30 tablet 11    niacin, inositol niacinate, (NIACIN FLUSH FREE) 400 mg niacin (500 mg) Cap Take 500 mg by mouth after dinner. (Patient taking differently: Take 1,000 mg by mouth 2 (two) times daily. 06/13/18 Dr Byers increased to 2000 mg a day.)  0    nicotine (NICODERM CQ) 14 mg/24 hr APPLY 1 PATCH TO THE SKIN ONCE DAILY (Patient not taking: Reported on 10/1/2019) 14 patch 1    pantoprazole (PROTONIX) 20 MG tablet Take 1 tablet (20 mg total) by mouth once daily. 30 tablet 11    tamsulosin (FLOMAX) 0.4 mg Cap Take 1 capsule (0.4 mg total) by mouth once daily. (Patient not taking: Reported on 10/17/2019) 7 capsule 0    valsartan-hydrochlorothiazide (DIOVAN-HCT) 160-12.5 mg per tablet Take 1 tablet by mouth once daily. 90 tablet 3     No current facility-administered medications on file prior to visit.      Review of patient's allergies indicates:   Allergen Reactions    Cholesterol analogues     Statins-hmg-coa reductase inhibitors Other (See Comments)      Myalgias to lipitor and simvastatin       Objective:     Physical Exam   Constitutional: She is oriented to person, place, and time. She appears well-developed and well-nourished.   HENT:   Head: Normocephalic and atraumatic.   Eyes: Pupils are equal, round, and reactive to light. Conjunctivae and EOM are normal.   Neck: Normal range of motion. Neck supple. No JVD present.   Cardiovascular: Normal rate, regular rhythm, normal heart sounds and intact distal pulses. Exam reveals no gallop and no friction rub.   No murmur heard.  Pulmonary/Chest: Effort normal and breath sounds normal. No respiratory distress. She has no wheezes. She has no rales.   Abdominal: Soft. Bowel sounds are normal.   Musculoskeletal: Normal range of motion.   Lymphadenopathy:     She has no cervical adenopathy.   Neurological: She is alert and oriented to person, place, and time.   Skin: Skin is warm and dry.   Psychiatric: She has a normal mood and affect.   Nursing note and vitals reviewed.      Assessment:     1. Essential hypertension    2. Coronary artery disease involving native coronary artery of native heart without angina pectoris    3. Pure hypercholesterolemia    4. Statin intolerance    5. Tobacco abuse        Plan:     Essential hypertension    Coronary artery disease involving native coronary artery of native heart without angina pectoris    Pure hypercholesterolemia    Statin intolerance    Tobacco abuse         exercise  Smoking cessation  Continue bystolic, losartan-hctz-htn  Continue , repatha-hlp  Asa every other day- bruising

## 2019-10-29 DIAGNOSIS — F41.1 GAD (GENERALIZED ANXIETY DISORDER): ICD-10-CM

## 2019-10-29 DIAGNOSIS — G47.00 INSOMNIA, UNSPECIFIED TYPE: ICD-10-CM

## 2019-10-29 NOTE — TELEPHONE ENCOUNTER
----- Message from Dominique Olguin sent at 10/29/2019  3:34 PM CDT -----  Contact: pt  ..Type:  Test Results    Who Called:  pt  Name of Test (Lab/Mammo/Etc):  CT scan   Date of Test:    Ordering Provider:    Where the test was performed:    Would the patient rather a call back or a response via [facility]?  Call back   Best Call Back Number:  [phone] (home)   Additional Information:

## 2019-10-30 ENCOUNTER — TELEPHONE (OUTPATIENT)
Dept: INTERNAL MEDICINE | Facility: CLINIC | Age: 64
End: 2019-10-30

## 2019-10-30 DIAGNOSIS — N18.31 CKD STAGE G3A/A1, GFR 45-59 AND ALBUMIN CREATININE RATIO <30 MG/G: Primary | ICD-10-CM

## 2019-10-30 RX ORDER — ALPRAZOLAM 0.25 MG/1
0.25 TABLET ORAL DAILY PRN
Qty: 30 TABLET | Refills: 1 | Status: SHIPPED | OUTPATIENT
Start: 2019-10-30 | End: 2020-03-07

## 2019-10-30 NOTE — TELEPHONE ENCOUNTER
Patient  Called in refrence to ct scan Dr Segovia read immpression patient asked also about her last avs having stage 3 kidney disease.

## 2019-10-30 NOTE — TELEPHONE ENCOUNTER
Yes based on her last labs from April 2019 her kidney function was decreased. This can be re-evaluated and she can see wayne if she desires to repeat kidney function. Make sure she hydrates with water ( 70 oz a day based on her body weight) each day. I'll put in orders if she wants to followup with wayne. May have just been she was dehydrated. Other causes are due to hypertension.

## 2019-10-30 NOTE — TELEPHONE ENCOUNTER
Impression       1. No acute pathology noted.  Stable appearance of a tiny 3 mm calcification overlying the interpolar region of the left kidney which is favored to be vascular rather than a nonobstructing stone.  2. Tiny 5 mm hyperdense lesion noted within the lower pole of the left kidney favored to represent a small hemorrhagic cyst.  3. Remaining findings as discussed above.

## 2019-10-30 NOTE — TELEPHONE ENCOUNTER
----- Message from Elissa Samson sent at 10/30/2019  9:19 AM CDT -----  Contact: Pt  Type:  Test Results    Who Called: Shirlene  Name of Test (Lab/Mammo/Etc): Ct Scan  Date of Test: 10/23/2019  Ordering Provider: NANCY Segovia  Where the test was performed: [facility]  Would the patient rather a call back or a response via [facility]? Callback  Best Call Back Number: [phone]  Additional Information:  No

## 2019-11-01 NOTE — TELEPHONE ENCOUNTER
"Per LESIA Covington , Nurse navigator. Please see message below.      "I spoke to a patient of Jhoana Segovia today, Shirlene Olvera. She voiced concern over not understanding the recent results of labs and a CT that was performed. She states that the office did call to give her the results, but she is still confused as to what is going on. The mbr states that she is concerned that there may be lesions on her kidneys and that she may have kidney failure. She is unclear of treatment plan at this time. She requested that the information be passed along to Dr. Segovia. She would like a call with someone that can further discuss the findings, "      "

## 2019-11-04 ENCOUNTER — TELEPHONE (OUTPATIENT)
Dept: INTERNAL MEDICINE | Facility: CLINIC | Age: 64
End: 2019-11-04

## 2019-11-04 NOTE — TELEPHONE ENCOUNTER
Have patient come in to discuss the ct scan done in 10/2019. Shows two things in kidneys. 1 is benign appearing cyst and other is also thought to be a non-obstructing kidney vascular ( blood vessel containing) area but does not appear to be concerning either. No evidence of kidney failure only suspected dehydration while doing labs. If she is comfortable with getting these results over the phone then can come in to discuss in OV with a provider.

## 2019-11-05 ENCOUNTER — TELEPHONE (OUTPATIENT)
Dept: INTERNAL MEDICINE | Facility: CLINIC | Age: 64
End: 2019-11-05

## 2019-11-05 NOTE — TELEPHONE ENCOUNTER
----- Message from Margo Pérez sent at 11/5/2019 10:41 AM CST -----  Type:  Patient Returning Call    Who Called: Carlton Garber  Who Left Message for Patient:  Dr Segovia's office  Does the patient know what this is regarding?:  results  Would the patient rather a call back or a response via [facility]?   Call back  Best Call Back Number:   [phone]  Additional Information:  States she is returning your call//please call again//thanks/kirsten

## 2019-11-12 ENCOUNTER — OFFICE VISIT (OUTPATIENT)
Dept: INTERNAL MEDICINE | Facility: CLINIC | Age: 64
End: 2019-11-12
Payer: COMMERCIAL

## 2019-11-12 VITALS
HEIGHT: 65 IN | WEIGHT: 161.19 LBS | TEMPERATURE: 98 F | SYSTOLIC BLOOD PRESSURE: 124 MMHG | HEART RATE: 74 BPM | BODY MASS INDEX: 26.85 KG/M2 | DIASTOLIC BLOOD PRESSURE: 82 MMHG

## 2019-11-12 DIAGNOSIS — M51.36 DDD (DEGENERATIVE DISC DISEASE), LUMBAR: ICD-10-CM

## 2019-11-12 DIAGNOSIS — Z13.820 ENCOUNTER FOR SCREENING FOR OSTEOPOROSIS: ICD-10-CM

## 2019-11-12 DIAGNOSIS — Z72.0 TOBACCO ABUSE: Chronic | ICD-10-CM

## 2019-11-12 DIAGNOSIS — E78.00 PURE HYPERCHOLESTEROLEMIA: Chronic | ICD-10-CM

## 2019-11-12 DIAGNOSIS — Z78.9 STATIN INTOLERANCE: ICD-10-CM

## 2019-11-12 DIAGNOSIS — I10 ESSENTIAL HYPERTENSION: Chronic | ICD-10-CM

## 2019-11-12 DIAGNOSIS — K59.04 CHRONIC IDIOPATHIC CONSTIPATION: Primary | ICD-10-CM

## 2019-11-12 DIAGNOSIS — I73.9 PAD (PERIPHERAL ARTERY DISEASE): ICD-10-CM

## 2019-11-12 PROCEDURE — 90670 PNEUMOCOCCAL CONJUGATE VACCINE 13-VALENT LESS THAN 5YO & GREATER THAN: ICD-10-PCS | Mod: S$GLB,,, | Performed by: FAMILY MEDICINE

## 2019-11-12 PROCEDURE — 90471 FLU VACCINE - HIGH DOSE (65+) PRESERVATIVE FREE IM: ICD-10-PCS | Mod: S$GLB,,, | Performed by: FAMILY MEDICINE

## 2019-11-12 PROCEDURE — 1101F PR PT FALLS ASSESS DOC 0-1 FALLS W/OUT INJ PAST YR: ICD-10-PCS | Mod: CPTII,S$GLB,, | Performed by: FAMILY MEDICINE

## 2019-11-12 PROCEDURE — 90472 PNEUMOCOCCAL CONJUGATE VACCINE 13-VALENT LESS THAN 5YO & GREATER THAN: ICD-10-PCS | Mod: S$GLB,,, | Performed by: FAMILY MEDICINE

## 2019-11-12 PROCEDURE — 90471 IMMUNIZATION ADMIN: CPT | Mod: S$GLB,,, | Performed by: FAMILY MEDICINE

## 2019-11-12 PROCEDURE — 99999 PR PBB SHADOW E&M-EST. PATIENT-LVL III: CPT | Mod: PBBFAC,,, | Performed by: FAMILY MEDICINE

## 2019-11-12 PROCEDURE — 90662 IIV NO PRSV INCREASED AG IM: CPT | Mod: S$GLB,,, | Performed by: FAMILY MEDICINE

## 2019-11-12 PROCEDURE — 3008F BODY MASS INDEX DOCD: CPT | Mod: CPTII,S$GLB,, | Performed by: FAMILY MEDICINE

## 2019-11-12 PROCEDURE — 90472 IMMUNIZATION ADMIN EACH ADD: CPT | Mod: S$GLB,,, | Performed by: FAMILY MEDICINE

## 2019-11-12 PROCEDURE — 3079F DIAST BP 80-89 MM HG: CPT | Mod: CPTII,S$GLB,, | Performed by: FAMILY MEDICINE

## 2019-11-12 PROCEDURE — 99999 PR PBB SHADOW E&M-EST. PATIENT-LVL III: ICD-10-PCS | Mod: PBBFAC,,, | Performed by: FAMILY MEDICINE

## 2019-11-12 PROCEDURE — 1101F PT FALLS ASSESS-DOCD LE1/YR: CPT | Mod: CPTII,S$GLB,, | Performed by: FAMILY MEDICINE

## 2019-11-12 PROCEDURE — 3074F SYST BP LT 130 MM HG: CPT | Mod: CPTII,S$GLB,, | Performed by: FAMILY MEDICINE

## 2019-11-12 PROCEDURE — 90662 FLU VACCINE - HIGH DOSE (65+) PRESERVATIVE FREE IM: ICD-10-PCS | Mod: S$GLB,,, | Performed by: FAMILY MEDICINE

## 2019-11-12 PROCEDURE — 3079F PR MOST RECENT DIASTOLIC BLOOD PRESSURE 80-89 MM HG: ICD-10-PCS | Mod: CPTII,S$GLB,, | Performed by: FAMILY MEDICINE

## 2019-11-12 PROCEDURE — 3074F PR MOST RECENT SYSTOLIC BLOOD PRESSURE < 130 MM HG: ICD-10-PCS | Mod: CPTII,S$GLB,, | Performed by: FAMILY MEDICINE

## 2019-11-12 PROCEDURE — 90670 PCV13 VACCINE IM: CPT | Mod: S$GLB,,, | Performed by: FAMILY MEDICINE

## 2019-11-12 PROCEDURE — 99215 OFFICE O/P EST HI 40 MIN: CPT | Mod: 25,S$GLB,, | Performed by: FAMILY MEDICINE

## 2019-11-12 PROCEDURE — 99215 PR OFFICE/OUTPT VISIT, EST, LEVL V, 40-54 MIN: ICD-10-PCS | Mod: 25,S$GLB,, | Performed by: FAMILY MEDICINE

## 2019-11-12 PROCEDURE — 3008F PR BODY MASS INDEX (BMI) DOCUMENTED: ICD-10-PCS | Mod: CPTII,S$GLB,, | Performed by: FAMILY MEDICINE

## 2019-11-12 RX ORDER — SYRING-NEEDL,DISP,INSUL,0.3 ML 29 G X1/2"
296 SYRINGE, EMPTY DISPOSABLE MISCELLANEOUS ONCE
Refills: 0
Start: 2019-11-12 | End: 2019-11-12

## 2019-11-12 RX ORDER — POLYETHYLENE GLYCOL 3350 17 G/17G
17 POWDER, FOR SOLUTION ORAL DAILY
Qty: 850 G | Refills: 11 | Status: SHIPPED | OUTPATIENT
Start: 2019-11-12 | End: 2021-08-02

## 2019-11-12 NOTE — PROGRESS NOTES
Subjective:      Patient ID: Shirlene Olvera is a 64 y.o. female.    Chief Complaint: Follow-up (6 months )    Disclaimer:  This note is prepared using voice recognition software and as such is likely to have errors and has not been proof read. Please contact me for questions.     Patient is here for follow-up of CT scan that was done in October which was originally ordered in March for abdominal pain. We had called her to give her results but she want to come in to discuss them.  She still reports that she is battling a lot with constipation and diarrhea.  She seems to have 1 bowel movement per week.  Gets a burning in her stomach gets very liquidy diarrhea last for about an hour and then goes away after a few hours.  Gets a lot of cramping with it.  Otherwise she is constipated for the rest of the week.  She is not taking anything at this time.  She does not have any blood noted in the stool other than her hemorrhoids.  No changes to any of her medicines either.  Were reviewing her CT scan face-to-face today which show significant amount of constipation but otherwise there is 2 areas on the left kidney 1 is a 3 mm possibly vascular structure noted that was unchanged from prior the year prior in the CT scan and a 5 mm suspected hemorrhage more addict cyst.  No kidney stones noted.  Uterus ovaries are surgically absent gallbladder surgically absent.  Other pelvic organs are within normal ranges for her prior CT imaging.  She is not currently using MiraLax she is willing to get started on this she know she needs to increase her water consumption.  She does report when she does magnesium citrate it does seem to help.    She is still smoking.  She has tried to reduce it but still not able to.    Is needing to get a Prevnar today as well as a flu shot today.    Is needing to follow back up about the repatha the for her cholesterol.  Has seen Cardiology already this year.        Abdominal Pain   This is a new  problem. The current episode started in the past 7 days. The onset quality is sudden. The problem occurs constantly. The problem has been rapidly worsening. The pain is located in the RLQ, LLQ, periumbilical region, suprapubic region, left flank and right flank. The pain is at a severity of 9/10. The pain is severe. The quality of the pain is colicky, cramping, aching and burning. The abdominal pain radiates to the suprapubic region and periumbilical region. Associated symptoms include constipation, diarrhea, nausea and vomiting. Pertinent negatives include no anorexia, arthralgias, belching, dysuria, fever, frequency, headaches, hematochezia, hematuria, melena, myalgias or weight loss. The pain is aggravated by urination. The pain is relieved by nothing. She has tried nothing for the symptoms. The treatment provided no relief.       Lab Results   Component Value Date    WBC 8.76 03/11/2019    HGB 14.0 03/11/2019    HCT 41.4 03/11/2019     03/11/2019    CHOL 165 04/27/2019    TRIG 186 (H) 04/27/2019    HDL 50 04/27/2019    ALT 17 04/27/2019    AST 23 04/27/2019     (L) 03/11/2019    K 4.7 03/11/2019    CL 98 03/11/2019    CREATININE 1.3 03/11/2019    BUN 24 (H) 03/11/2019    CO2 33 (H) 03/11/2019    TSH 1.429 08/14/2018    INR 0.9 05/13/2015    HGBA1C 5.3 08/14/2018       CT Renal Stone Study ABD Pelvis WO  Narrative: EXAMINATION:  CT RENAL STONE STUDY ABD PELVIS WO    CLINICAL HISTORY:  Flank pain, recurrent stone disease suspected;sudden flank and abd pain, prior kidney stone on 2018; Unspecified abdominal pain    TECHNIQUE:  Low dose axial images, sagittal and coronal reformations were obtained from the lung bases to the pubic symphysis.  Contrast was not administered.    COMPARISON:  Plain films from 10/17/2019 and CT from 04/02/2018    FINDINGS:  ABDOMEN    Lung bases: There is a calcified granuloma noted within the left lower lobe.    Liver/gallbladder/biliary: The liver demonstrates a negative  noncontrast appearance. The gallbladder is not visualized and may be surgically absent.  No biliary ductal dilation.    Pancreas: The pancreas demonstrates a negative noncontrast appearance.    Spleen: The spleen is not enlarged.    Adrenals: Unremarkable    Kidneys: The kidneys are symmetric in size and demonstrate no evidence for nephrolithiasis or obstructive uropathy.Punctate calcification seen in the interpolar region of the left kidney which is favored to be vascular in nature and unchanged in positioning in appearance when compared to the prior exam.  Tiny hyperdense lesion associated with the lower pole of the left kidney measuring approximately 5 mm likely representing a small hemorrhagic cyst.    Bowel/Mesentery: There is no evidence of bowel obstruction. No mesenteric stranding or adenopathy.    Retroperitoneum: No adenopathy.  The aorta demonstrates a normal caliber.    PELVIS    Genitourinary/Reproductive organs: Pelvic phleboliths are noted.  The patient appears to be status post hysterectomy.    Adenopathy: None    Free Fluid: No free fluid    Osseus Structures/Soft tissues: There is Mild dextroscoliosis of the lumbar spine.  Impression: 1. No acute pathology noted.  Stable appearance of a tiny 3 mm calcification overlying the interpolar region of the left kidney which is favored to be vascular rather than a nonobstructing stone.  2. Tiny 5 mm hyperdense lesion noted within the lower pole of the left kidney favored to represent a small hemorrhagic cyst.  3. Remaining findings as discussed above.    Electronically signed by: John Peck DO  Date:    10/23/2019  Time:    15:03        Review of Systems   Constitutional: Negative for activity change, appetite change, fatigue, fever and weight loss.   Gastrointestinal: Positive for abdominal distention, abdominal pain, constipation, diarrhea, nausea and vomiting. Negative for anorexia, blood in stool, hematochezia and melena.   Genitourinary: Negative  "for dysuria, frequency and hematuria.   Musculoskeletal: Negative for arthralgias and myalgias.   Neurological: Negative for headaches.   Psychiatric/Behavioral: Negative for sleep disturbance. The patient is not nervous/anxious.      Objective:     Vitals:    11/12/19 1431   BP: 124/82   Pulse: 74   Temp: 97.5 °F (36.4 °C)   TempSrc: Oral   Weight: 73.1 kg (161 lb 2.5 oz)   Height: 5' 5" (1.651 m)     Physical Exam   Constitutional: She is oriented to person, place, and time. She appears well-developed and well-nourished.   HENT:   Head: Normocephalic and atraumatic.   Right Ear: External ear normal.   Left Ear: External ear normal.   Mouth/Throat: Oropharynx is clear and moist.   Eyes: EOM are normal.   Neck: Normal range of motion. Neck supple. No thyromegaly present.   Cardiovascular: Normal rate and regular rhythm. Exam reveals no gallop and no friction rub.   No murmur heard.  Pulmonary/Chest: Effort normal. No respiratory distress. She has no wheezes. She has no rales.   Abdominal: Soft. Bowel sounds are normal. She exhibits no distension. There is tenderness. There is guarding. There is no rebound.   Musculoskeletal: Normal range of motion. She exhibits no edema.   Lymphadenopathy:     She has no cervical adenopathy.   Neurological: She is alert and oriented to person, place, and time.   Skin: Skin is warm and dry. No rash noted.   Psychiatric: She has a normal mood and affect. Her behavior is normal. Judgment and thought content normal.   Vitals reviewed.    Assessment:     1. Chronic idiopathic constipation    2. Encounter for screening for osteoporosis    3. Essential hypertension    4. Pure hypercholesterolemia    5. Tobacco abuse    6. DDD (degenerative disc disease), lumbar    7. Statin intolerance    8. PAD (peripheral artery disease)      Plan:   Shirlene was seen today for follow-up.    Diagnoses and all orders for this visit:    Chronic idiopathic constipation-reviewed extensively through her CT " abdomen and pelvis done in October.  This time we discussed ways to manage her constipation.  Initially she will do a magnesium citrate solution to clean out her bowels then she will begin MiraLax 17 g daily she can increase this to twice a day and even 3 times a day encourage her to drink more water throughout the day as well.  If this is not effective then can try something like Linzess or Amitiza.    Encounter for screening for osteoporosis needing to schedule bone density scheduled for the patient  -     DXA Bone Density Spine And Hip; Future    Essential hypertension stable this time continue medications    Pure hypercholesterolemia needing to get back with Cardiology on getting approval again due to statin intolerance currently on repatha    Tobacco abuse-still not quit.  Declines smoking cessation group    DDD (degenerative disc disease), lumbar-reviewed findings noted in the CT scan    Statin intolerance refer back to cardiology for medication approval    PAD (peripheral artery disease) refer back to cardiology for medication approval    Other orders  -     polyethylene glycol (GLYCOLAX) 17 gram/dose powder; Take 17 g by mouth once daily.  -     magnesium citrate solution; Take 296 mLs by mouth once. for 1 dose  -     (In Office Administered) Pneumococcal Conjugate Vaccine (13 Valent) (IM)  -     Influenza - High Dose (65+) (PF) (IM)    Time spent: 40 minutes in face to face discussion concerning diagnosis, prognosis, review of lab and test results, benefits of treatment as well as management of disease, counseling of patient and coordination of care between various health care providers . Greater than half the time spent was used for coordination of care and counseling of patient.           Follow up in about 6 months (around 5/12/2020) for chronic issues Dr Segovia.    There are no Patient Instructions on file for this visit.

## 2019-11-21 ENCOUNTER — TELEPHONE (OUTPATIENT)
Dept: CARDIOLOGY | Facility: CLINIC | Age: 64
End: 2019-11-21

## 2019-11-21 NOTE — TELEPHONE ENCOUNTER
Rtc and left v/m for pt to rtc to me in ref to the repatha. Cm  ----- Message from Melissa Sanchez sent at 11/21/2019  2:55 PM CST -----  Contact: pt   Pt stated she's calling about a authorization for (evolocumab (REPATHA SYRINGE) 140 mg/mL Syrg) the auth can be faxed to [id number] , she can be reached at [phone] Thanks

## 2019-11-26 ENCOUNTER — APPOINTMENT (OUTPATIENT)
Dept: RADIOLOGY | Facility: HOSPITAL | Age: 64
End: 2019-11-26
Attending: FAMILY MEDICINE
Payer: COMMERCIAL

## 2019-11-26 DIAGNOSIS — Z13.820 ENCOUNTER FOR SCREENING FOR OSTEOPOROSIS: ICD-10-CM

## 2019-11-26 PROCEDURE — 77080 DEXA BONE DENSITY SPINE HIP: ICD-10-PCS | Mod: 26,,, | Performed by: RADIOLOGY

## 2019-11-26 PROCEDURE — 77080 DXA BONE DENSITY AXIAL: CPT | Mod: TC

## 2019-11-26 PROCEDURE — 77080 DXA BONE DENSITY AXIAL: CPT | Mod: 26,,, | Performed by: RADIOLOGY

## 2019-11-29 ENCOUNTER — TELEPHONE (OUTPATIENT)
Dept: INTERNAL MEDICINE | Facility: CLINIC | Age: 64
End: 2019-11-29

## 2019-11-29 NOTE — TELEPHONE ENCOUNTER
Bone density shows osteopenia, with a moderate to high fracture risk. I would like for you to obtain 1200mg of calcium through foods in your diet (such as milk, veggies, & cheese). Calcium supplements can be used but it is recommended to try first to improve your diet, as calcium supplements can lead to kidney stones. You also need to obtain about 5804-8692 IU of vitamin D3 a day. This is ok to obtain through an over the counter supplement. We will need to repeat your bone density study again in 3 years. Please let me know if you have further questions.  Jhoana Segovia MD

## 2019-12-02 ENCOUNTER — TELEPHONE (OUTPATIENT)
Dept: INTERNAL MEDICINE | Facility: CLINIC | Age: 64
End: 2019-12-02

## 2019-12-06 NOTE — TELEPHONE ENCOUNTER
----- Message from Tomasa Hinojosa sent at 12/6/2019 11:35 AM CST -----  Contact: pt   repatha  Type:  RX Refill Request    Who Called:  Pt   Refill or New Rx:refill   RX Name and Strength:repatha 140 mg   How is the patient currently taking it? (ex. 1XDay):twice monthly   Is this a 30 day or 90 day RX:a yr's supply  Preferred Pharmacy with phone number: [facility]  Local or Mail Order: mail order  Ordering Provider: miguel ángel  Would the patient rather a call back or a response via [facility]? phone  Best Call Back Number: [phone]  Additional Information:     [facility]   [phone]

## 2020-01-21 ENCOUNTER — OFFICE VISIT (OUTPATIENT)
Dept: OTOLARYNGOLOGY | Facility: CLINIC | Age: 65
End: 2020-01-21
Payer: COMMERCIAL

## 2020-01-21 VITALS
BODY MASS INDEX: 26.23 KG/M2 | WEIGHT: 157.63 LBS | TEMPERATURE: 97 F | SYSTOLIC BLOOD PRESSURE: 173 MMHG | DIASTOLIC BLOOD PRESSURE: 82 MMHG | HEART RATE: 67 BPM

## 2020-01-21 DIAGNOSIS — H60.313 CHRONIC DIFFUSE OTITIS EXTERNA OF BOTH EARS: Primary | ICD-10-CM

## 2020-01-21 PROCEDURE — 3008F BODY MASS INDEX DOCD: CPT | Mod: CPTII,S$GLB,, | Performed by: PHYSICIAN ASSISTANT

## 2020-01-21 PROCEDURE — 1101F PT FALLS ASSESS-DOCD LE1/YR: CPT | Mod: CPTII,S$GLB,, | Performed by: PHYSICIAN ASSISTANT

## 2020-01-21 PROCEDURE — 3008F PR BODY MASS INDEX (BMI) DOCUMENTED: ICD-10-PCS | Mod: CPTII,S$GLB,, | Performed by: PHYSICIAN ASSISTANT

## 2020-01-21 PROCEDURE — 3077F PR MOST RECENT SYSTOLIC BLOOD PRESSURE >= 140 MM HG: ICD-10-PCS | Mod: CPTII,S$GLB,, | Performed by: PHYSICIAN ASSISTANT

## 2020-01-21 PROCEDURE — 3079F PR MOST RECENT DIASTOLIC BLOOD PRESSURE 80-89 MM HG: ICD-10-PCS | Mod: CPTII,S$GLB,, | Performed by: PHYSICIAN ASSISTANT

## 2020-01-21 PROCEDURE — 99213 OFFICE O/P EST LOW 20 MIN: CPT | Mod: S$GLB,,, | Performed by: PHYSICIAN ASSISTANT

## 2020-01-21 PROCEDURE — 1101F PR PT FALLS ASSESS DOC 0-1 FALLS W/OUT INJ PAST YR: ICD-10-PCS | Mod: CPTII,S$GLB,, | Performed by: PHYSICIAN ASSISTANT

## 2020-01-21 PROCEDURE — 99999 PR PBB SHADOW E&M-EST. PATIENT-LVL III: ICD-10-PCS | Mod: PBBFAC,,, | Performed by: PHYSICIAN ASSISTANT

## 2020-01-21 PROCEDURE — 3077F SYST BP >= 140 MM HG: CPT | Mod: CPTII,S$GLB,, | Performed by: PHYSICIAN ASSISTANT

## 2020-01-21 PROCEDURE — 99213 PR OFFICE/OUTPT VISIT, EST, LEVL III, 20-29 MIN: ICD-10-PCS | Mod: S$GLB,,, | Performed by: PHYSICIAN ASSISTANT

## 2020-01-21 PROCEDURE — 99999 PR PBB SHADOW E&M-EST. PATIENT-LVL III: CPT | Mod: PBBFAC,,, | Performed by: PHYSICIAN ASSISTANT

## 2020-01-21 PROCEDURE — 3079F DIAST BP 80-89 MM HG: CPT | Mod: CPTII,S$GLB,, | Performed by: PHYSICIAN ASSISTANT

## 2020-01-21 RX ORDER — NEOMYCIN SULFATE, POLYMYXIN B SULFATE AND HYDROCORTISONE 10; 3.5; 1 MG/ML; MG/ML; [USP'U]/ML
3 SUSPENSION/ DROPS AURICULAR (OTIC) 4 TIMES DAILY
Qty: 8 ML | Refills: 0 | Status: SHIPPED | OUTPATIENT
Start: 2020-01-21 | End: 2020-01-31

## 2020-01-21 RX ORDER — NEOMYCIN SULFATE, POLYMYXIN B SULFATE AND HYDROCORTISONE 10; 3.5; 1 MG/ML; MG/ML; [USP'U]/ML
SUSPENSION/ DROPS AURICULAR (OTIC)
COMMUNITY
Start: 2020-01-18 | End: 2022-03-28

## 2020-01-21 NOTE — PROGRESS NOTES
Subjective:       Patient ID: Shirlene Olvera is a 64 y.o. female.    Chief Complaint: Ear Drainage (bilateral)    Shirlene is a 64 y.o. female here to see me tonight for otalgia AU.  Patient reports issues with dry, itchy ears for the past 4-6 months.  She using Qtips to scratch them and to apply topical Vaseline with no relief. She has seen [facility] in past for same issue and improved with cortisporin.    She has tinnitus AU for years.  Denies ear drainage other than wax on Qtips.  She says her equilibrium has been off since her ears have been stopped up.  Denies vertigo or spinning.  Denies fever.  She has nasal congestion and clear runny nose and uses Flonase as needed (not daily).  She smokes, 0.5 packs per day for 40 years.  She saw ENT in past many years ago and Dermotic was prescribed; not used recently.  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    Review of Systems   Constitutional: Negative for chills, fatigue, fever and unexpected weight change.   HENT: Positive for ear pain and hearing loss. Negative for congestion, dental problem, ear discharge, facial swelling, nosebleeds, postnasal drip, rhinorrhea, sinus pressure, sneezing, sore throat, tinnitus, trouble swallowing and voice change.    Eyes: Negative for redness, itching and visual disturbance.   Respiratory: Negative for cough, choking, shortness of breath and wheezing.    Cardiovascular: Negative for chest pain and palpitations.   Gastrointestinal: Negative for abdominal pain.        No reflux.   Musculoskeletal: Negative for gait problem.   Skin: Negative for rash.   Neurological: Negative for dizziness, light-headedness and headaches.       Objective:      Physical Exam   HENT:   Otoscopy of external auditory canal shows significant edema and erythema with white debris and cerumen        Assessment:       1. Chronic diffuse otitis externa of both ears        Plan:       OTITIS  EXTERNA-  Shirlene has evidence of bilateral otitis externa.  This was visualized after removal of cerumen.  We discussed the need for dry ear precautions.  For maintenance therapy, I recommend a mixture of distilled vinegar and alcohol.  For acute infection, antibiotic therapy is needed.  For Shirlene Olvera I recommend Cortisporin as prescribed yesterday.   RTC in 2 weeks for recheck.  She likely has component of eczematoid OE that is now acutely infected.  She may benefit from Dermotic once acute infection resolves.

## 2020-01-21 NOTE — LETTER
January 21, 2020      [facility]  [address]  [place] [address]  Phone: [phone]  Fax: [phone]       Patient: Shirlene Olvera   YOB: 1955  Date of Visit: 01/21/2020    To Whom It May Concern:    Maggy Olvera  was at [facility] on 01/21/2020. She may return to work/school on 01/22/2020 with no restrictions. If you have any questions or concerns, or if I can be of further assistance, please do not hesitate to contact me.    Sincerely,          Olivia Mcdaniel MA

## 2020-01-24 ENCOUNTER — TELEPHONE (OUTPATIENT)
Dept: CARDIOLOGY | Facility: CLINIC | Age: 65
End: 2020-01-24

## 2020-01-24 NOTE — TELEPHONE ENCOUNTER
Rtc and she needs her repatha for this year and it needs PA. Told her I would send to Dr Byers to sign for it to got to [facility] to process PA. Asked her next year to call in nov so we may send in dec so it may be processed for her to receive in Jan-. She agreed. Cm  ---- Message from Stephane Canada sent at 1/24/2020  2:45 PM CST -----  Contact: Pt  Please call Shirlene regarding a PA she needs for a medication [phone] (home).

## 2020-02-04 ENCOUNTER — OFFICE VISIT (OUTPATIENT)
Dept: OTOLARYNGOLOGY | Facility: CLINIC | Age: 65
End: 2020-02-04
Payer: COMMERCIAL

## 2020-02-04 VITALS
DIASTOLIC BLOOD PRESSURE: 74 MMHG | HEIGHT: 65 IN | TEMPERATURE: 99 F | SYSTOLIC BLOOD PRESSURE: 114 MMHG | WEIGHT: 153.25 LBS | HEART RATE: 79 BPM | RESPIRATION RATE: 16 BRPM | BODY MASS INDEX: 25.53 KG/M2

## 2020-02-04 DIAGNOSIS — L29.9 EAR ITCHING: ICD-10-CM

## 2020-02-04 DIAGNOSIS — F17.200 SMOKING ADDICTION: ICD-10-CM

## 2020-02-04 DIAGNOSIS — J01.10 ACUTE FRONTAL SINUSITIS, RECURRENCE NOT SPECIFIED: Primary | ICD-10-CM

## 2020-02-04 PROCEDURE — 3008F PR BODY MASS INDEX (BMI) DOCUMENTED: ICD-10-PCS | Mod: CPTII,S$GLB,, | Performed by: PHYSICIAN ASSISTANT

## 2020-02-04 PROCEDURE — 1101F PR PT FALLS ASSESS DOC 0-1 FALLS W/OUT INJ PAST YR: ICD-10-PCS | Mod: CPTII,S$GLB,, | Performed by: PHYSICIAN ASSISTANT

## 2020-02-04 PROCEDURE — 3074F PR MOST RECENT SYSTOLIC BLOOD PRESSURE < 130 MM HG: ICD-10-PCS | Mod: CPTII,S$GLB,, | Performed by: PHYSICIAN ASSISTANT

## 2020-02-04 PROCEDURE — 99999 PR PBB SHADOW E&M-EST. PATIENT-LVL IV: CPT | Mod: PBBFAC,,, | Performed by: PHYSICIAN ASSISTANT

## 2020-02-04 PROCEDURE — 3074F SYST BP LT 130 MM HG: CPT | Mod: CPTII,S$GLB,, | Performed by: PHYSICIAN ASSISTANT

## 2020-02-04 PROCEDURE — 3078F PR MOST RECENT DIASTOLIC BLOOD PRESSURE < 80 MM HG: ICD-10-PCS | Mod: CPTII,S$GLB,, | Performed by: PHYSICIAN ASSISTANT

## 2020-02-04 PROCEDURE — 3008F BODY MASS INDEX DOCD: CPT | Mod: CPTII,S$GLB,, | Performed by: PHYSICIAN ASSISTANT

## 2020-02-04 PROCEDURE — 3078F DIAST BP <80 MM HG: CPT | Mod: CPTII,S$GLB,, | Performed by: PHYSICIAN ASSISTANT

## 2020-02-04 PROCEDURE — 99213 OFFICE O/P EST LOW 20 MIN: CPT | Mod: S$GLB,,, | Performed by: PHYSICIAN ASSISTANT

## 2020-02-04 PROCEDURE — 1101F PT FALLS ASSESS-DOCD LE1/YR: CPT | Mod: CPTII,S$GLB,, | Performed by: PHYSICIAN ASSISTANT

## 2020-02-04 PROCEDURE — 99999 PR PBB SHADOW E&M-EST. PATIENT-LVL IV: ICD-10-PCS | Mod: PBBFAC,,, | Performed by: PHYSICIAN ASSISTANT

## 2020-02-04 PROCEDURE — 99213 PR OFFICE/OUTPT VISIT, EST, LEVL III, 20-29 MIN: ICD-10-PCS | Mod: S$GLB,,, | Performed by: PHYSICIAN ASSISTANT

## 2020-02-04 RX ORDER — AMOXICILLIN 875 MG/1
875 TABLET, FILM COATED ORAL 2 TIMES DAILY
Qty: 20 TABLET | Refills: 0 | Status: SHIPPED | OUTPATIENT
Start: 2020-02-04 | End: 2020-02-14

## 2020-02-04 RX ORDER — FLUOCINOLONE ACETONIDE 0.11 MG/ML
3 OIL AURICULAR (OTIC) 2 TIMES DAILY
Qty: 1 BOTTLE | Refills: 3 | Status: SHIPPED | OUTPATIENT
Start: 2020-02-04 | End: 2021-08-02

## 2020-02-04 NOTE — LETTER
February 4, 2020      [facility]  [address]  [place] [address]  Phone: [phone]  Fax: [phone]       Patient: Shirlene Olvera   YOB: 1955  Date of Visit: 02/04/2020    To Whom It May Concern:    Maggy Olvera  was at [facility] on 02/04/2020. Please excuse Mrs. Olvera from 02/03/2020 through today.  She may return to work/school on 02/05/2020 with no restrictions. If you have any questions or concerns, or if I can be of further assistance, please do not hesitate to contact me.    Sincerely,    Ania Graf MA

## 2020-02-04 NOTE — PROGRESS NOTES
Subjective:       Patient ID: Shirlene Olvera is a 64 y.o. female.    Chief Complaint: Sinusitis and Cerumen Impaction (bilateral ear pain; burning sensation)    Shirlene is a 64 y.o. female here to see me for follow up otalgia AU.  Patient reports continued  issues with dry, itchy ears for the past 4-6 months.  I started her on cortisporin 2 weeks ago which she has completed. She using Qtips to scratch them and to apply topical Vaseline with no relief.  She is also complaining of     MAJOR SYMPTOMS:  Yes  Purulent anterior nasal discharge  Yes  Purulent or discolored posterior nasal discharge  Yes  Nasal congestion or obstruction  Yes  Facial Congestion or fullness  Yes  Hyposmia or anosmia  No  Fever    MINOR SYMPTOMS:  Yes  Headache  Yes  Ear pain, pressure, or fullness  No  Halitosis  No  Dental pain  Yes  Fatigue    The diagnosis of acute sinusitis is based on the presence of at least 2 major or 1 major and at least 2 minor symptoms.  Shirlene does meet this criteria.  Clinical Practice Guideline for Acute Bacterial Rhinosinusitis in Children and Adults. Clin Infect Dis 2012; 54:e72    Onset:  1 week ago  Exacerbating factors: No  Relieving factors: No  Timing:  worsening    Review of Systems   Constitutional: Negative for chills, fatigue, fever and unexpected weight change.   HENT: Positive for congestion, ear pain, sinus pressure and sinus pain. Negative for dental problem, ear discharge, facial swelling, hearing loss, nosebleeds, postnasal drip, rhinorrhea, sneezing, sore throat, tinnitus, trouble swallowing and voice change.    Eyes: Negative for redness, itching and visual disturbance.   Respiratory: Positive for cough. Negative for choking, shortness of breath and wheezing.    Cardiovascular: Negative for chest pain and palpitations.   Gastrointestinal: Negative for abdominal pain.        No reflux.   Musculoskeletal: Negative for gait problem.   Skin: Negative for rash.   Neurological: Negative for  dizziness, light-headedness and headaches.       Objective:      Physical Exam   Constitutional: She is oriented to person, place, and time. She appears well-developed and well-nourished. No distress.   HENT:   Head: Normocephalic and atraumatic.   Right Ear: Tympanic membrane, external ear and ear canal normal.   Left Ear: Tympanic membrane, external ear and ear canal normal.   Nose: Mucosal edema present. No rhinorrhea, nasal deformity or septal deviation. No epistaxis. Right sinus exhibits maxillary sinus tenderness and frontal sinus tenderness. Left sinus exhibits maxillary sinus tenderness and frontal sinus tenderness.   Mouth/Throat: Uvula is midline, oropharynx is clear and moist and mucous membranes are normal. Mucous membranes are not pale and not dry. No dental caries. No oropharyngeal exudate or posterior oropharyngeal erythema.   Ear exam much improved   Eyes: Pupils are equal, round, and reactive to light. Conjunctivae, EOM and lids are normal. Right eye exhibits no chemosis. Left eye exhibits no chemosis. Right conjunctiva is not injected. Left conjunctiva is not injected. No scleral icterus. Right eye exhibits normal extraocular motion and no nystagmus. Left eye exhibits normal extraocular motion and no nystagmus.   Neck: Trachea normal and phonation normal. No tracheal tenderness present. No tracheal deviation present. No thyroid mass and no thyromegaly present.   Cardiovascular: Intact distal pulses.   Pulmonary/Chest: Effort normal. No stridor. No respiratory distress.   Abdominal: She exhibits no distension.   Lymphadenopathy:        Head (right side): No submental, no submandibular, no preauricular, no posterior auricular and no occipital adenopathy present.        Head (left side): No submental, no submandibular, no preauricular, no posterior auricular and no occipital adenopathy present.     She has no cervical adenopathy.   Neurological: She is alert and oriented to person, place, and time. No  cranial nerve deficit.   Skin: Skin is warm and dry. No rash noted. No erythema.   Psychiatric: She has a normal mood and affect. Her behavior is normal.       Assessment:       1. Acute frontal sinusitis, recurrence not specified    2. Ear itching    3. Smoking addiction        Plan:       I have started her on oral antibiotics. I have also started her on dermotic oil.  We discussed importance of smoking cessation as well. RTC in 2 weeks for recheck.

## 2020-02-14 DIAGNOSIS — E78.5 HYPERLIPIDEMIA, UNSPECIFIED HYPERLIPIDEMIA TYPE: Primary | ICD-10-CM

## 2020-02-14 DIAGNOSIS — I25.10 NONOCCLUSIVE CORONARY ATHEROSCLEROSIS OF NATIVE CORONARY ARTERY: ICD-10-CM

## 2020-02-14 DIAGNOSIS — Z78.9 STATIN INTOLERANCE: ICD-10-CM

## 2020-02-14 DIAGNOSIS — I73.9 PAD (PERIPHERAL ARTERY DISEASE): ICD-10-CM

## 2020-03-03 ENCOUNTER — OFFICE VISIT (OUTPATIENT)
Dept: OTOLARYNGOLOGY | Facility: CLINIC | Age: 65
End: 2020-03-03
Payer: COMMERCIAL

## 2020-03-03 VITALS
DIASTOLIC BLOOD PRESSURE: 76 MMHG | WEIGHT: 159.38 LBS | HEART RATE: 70 BPM | TEMPERATURE: 96 F | SYSTOLIC BLOOD PRESSURE: 152 MMHG | BODY MASS INDEX: 26.52 KG/M2

## 2020-03-03 DIAGNOSIS — J01.10 ACUTE FRONTAL SINUSITIS, RECURRENCE NOT SPECIFIED: Primary | ICD-10-CM

## 2020-03-03 DIAGNOSIS — H60.313 CHRONIC DIFFUSE OTITIS EXTERNA OF BOTH EARS: ICD-10-CM

## 2020-03-03 PROCEDURE — 3078F PR MOST RECENT DIASTOLIC BLOOD PRESSURE < 80 MM HG: ICD-10-PCS | Mod: CPTII,S$GLB,, | Performed by: PHYSICIAN ASSISTANT

## 2020-03-03 PROCEDURE — 1101F PT FALLS ASSESS-DOCD LE1/YR: CPT | Mod: CPTII,S$GLB,, | Performed by: PHYSICIAN ASSISTANT

## 2020-03-03 PROCEDURE — 99213 PR OFFICE/OUTPT VISIT, EST, LEVL III, 20-29 MIN: ICD-10-PCS | Mod: S$GLB,,, | Performed by: PHYSICIAN ASSISTANT

## 2020-03-03 PROCEDURE — 3008F BODY MASS INDEX DOCD: CPT | Mod: CPTII,S$GLB,, | Performed by: PHYSICIAN ASSISTANT

## 2020-03-03 PROCEDURE — 99213 OFFICE O/P EST LOW 20 MIN: CPT | Mod: S$GLB,,, | Performed by: PHYSICIAN ASSISTANT

## 2020-03-03 PROCEDURE — 3077F PR MOST RECENT SYSTOLIC BLOOD PRESSURE >= 140 MM HG: ICD-10-PCS | Mod: CPTII,S$GLB,, | Performed by: PHYSICIAN ASSISTANT

## 2020-03-03 PROCEDURE — 3008F PR BODY MASS INDEX (BMI) DOCUMENTED: ICD-10-PCS | Mod: CPTII,S$GLB,, | Performed by: PHYSICIAN ASSISTANT

## 2020-03-03 PROCEDURE — 3078F DIAST BP <80 MM HG: CPT | Mod: CPTII,S$GLB,, | Performed by: PHYSICIAN ASSISTANT

## 2020-03-03 PROCEDURE — 3077F SYST BP >= 140 MM HG: CPT | Mod: CPTII,S$GLB,, | Performed by: PHYSICIAN ASSISTANT

## 2020-03-03 PROCEDURE — 99999 PR PBB SHADOW E&M-EST. PATIENT-LVL III: ICD-10-PCS | Mod: PBBFAC,,, | Performed by: PHYSICIAN ASSISTANT

## 2020-03-03 PROCEDURE — 1101F PR PT FALLS ASSESS DOC 0-1 FALLS W/OUT INJ PAST YR: ICD-10-PCS | Mod: CPTII,S$GLB,, | Performed by: PHYSICIAN ASSISTANT

## 2020-03-03 PROCEDURE — 99999 PR PBB SHADOW E&M-EST. PATIENT-LVL III: CPT | Mod: PBBFAC,,, | Performed by: PHYSICIAN ASSISTANT

## 2020-03-03 RX ORDER — CHOLECALCIFEROL (VITAMIN D3) 25 MCG
1000 TABLET ORAL DAILY
COMMUNITY

## 2020-03-03 NOTE — PROGRESS NOTES
Subjective:       Patient ID: Shirlene Olvera is a 64 y.o. female.    Chief Complaint: Follow-up (ear infection)    Shirlene is a 64 y.o. female here to see me for follow up otalgia AU and acute sinusitis. She states that she is feeling much better.    Review of Systems   Constitutional: Negative for chills, fatigue, fever and unexpected weight change.   HENT: Negative for congestion, dental problem, ear discharge, ear pain, facial swelling, hearing loss, nosebleeds, postnasal drip, rhinorrhea, sinus pressure, sneezing, sore throat, tinnitus, trouble swallowing and voice change.    Eyes: Negative for redness, itching and visual disturbance.   Respiratory: Negative for cough, choking, shortness of breath and wheezing.    Cardiovascular: Negative for chest pain and palpitations.   Gastrointestinal: Negative for abdominal pain.        No reflux.   Musculoskeletal: Negative for gait problem.   Skin: Negative for rash.   Neurological: Negative for dizziness, light-headedness and headaches.       Objective:      Physical Exam   Constitutional: She is oriented to person, place, and time. She appears well-developed and well-nourished. No distress.   HENT:   Head: Normocephalic and atraumatic.   Right Ear: Tympanic membrane, external ear and ear canal normal.   Left Ear: Tympanic membrane, external ear and ear canal normal.   Nose: Nose normal. No mucosal edema, rhinorrhea, nasal deformity or septal deviation. No epistaxis. Right sinus exhibits no maxillary sinus tenderness and no frontal sinus tenderness. Left sinus exhibits no maxillary sinus tenderness and no frontal sinus tenderness.   Mouth/Throat: Uvula is midline, oropharynx is clear and moist and mucous membranes are normal. Mucous membranes are not pale and not dry. No dental caries. No oropharyngeal exudate or posterior oropharyngeal erythema.   Eyes: Pupils are equal, round, and reactive to light. Conjunctivae, EOM and lids are normal. Right eye exhibits no  chemosis. Left eye exhibits no chemosis. Right conjunctiva is not injected. Left conjunctiva is not injected. No scleral icterus. Right eye exhibits normal extraocular motion and no nystagmus. Left eye exhibits normal extraocular motion and no nystagmus.   Neck: Trachea normal and phonation normal. No tracheal tenderness present. No tracheal deviation present. No thyroid mass and no thyromegaly present.   Cardiovascular: Intact distal pulses.   Pulmonary/Chest: Effort normal. No stridor. No respiratory distress.   Abdominal: She exhibits no distension.   Lymphadenopathy:        Head (right side): No submental, no submandibular, no preauricular, no posterior auricular and no occipital adenopathy present.        Head (left side): No submental, no submandibular, no preauricular, no posterior auricular and no occipital adenopathy present.     She has no cervical adenopathy.   Neurological: She is alert and oriented to person, place, and time. No cranial nerve deficit.   Skin: Skin is warm and dry. No rash noted. No erythema.   Psychiatric: She has a normal mood and affect. Her behavior is normal.       Assessment:       1. Acute frontal sinusitis, recurrence not specified    2. Chronic diffuse otitis externa of both ears        Plan:       Sinusitis: resolved.    OE: resolved. RTC as needed.

## 2020-03-07 DIAGNOSIS — G47.00 INSOMNIA, UNSPECIFIED TYPE: ICD-10-CM

## 2020-03-07 DIAGNOSIS — F41.1 GAD (GENERALIZED ANXIETY DISORDER): ICD-10-CM

## 2020-03-07 RX ORDER — ALPRAZOLAM 0.25 MG/1
0.25 TABLET ORAL DAILY PRN
Qty: 30 TABLET | Refills: 0 | Status: SHIPPED | OUTPATIENT
Start: 2020-03-07 | End: 2020-06-04

## 2020-03-17 ENCOUNTER — TELEPHONE (OUTPATIENT)
Dept: CARDIOLOGY | Facility: CLINIC | Age: 65
End: 2020-03-17

## 2020-03-17 DIAGNOSIS — I25.10 NONOCCLUSIVE CORONARY ATHEROSCLEROSIS OF NATIVE CORONARY ARTERY: ICD-10-CM

## 2020-03-17 DIAGNOSIS — I73.9 PAD (PERIPHERAL ARTERY DISEASE): ICD-10-CM

## 2020-03-17 DIAGNOSIS — Z78.9 STATIN INTOLERANCE: ICD-10-CM

## 2020-03-17 DIAGNOSIS — E78.5 HYPERLIPIDEMIA, UNSPECIFIED HYPERLIPIDEMIA TYPE: ICD-10-CM

## 2020-03-17 NOTE — TELEPHONE ENCOUNTER
I called and [facility] said they never rec'd the prior auth you did on her Repatha. They need It faxed to [phone]. Rx refill is waiting for it. Please let me know when you fax. Thank you Tessa

## 2020-03-17 NOTE — TELEPHONE ENCOUNTER
Rt and told her I would call and see what happened. Got new number.  I called [facility] at [phone]. They told me they never rec'd refill for this year. Jagjit I called [facility] and they tell me they rec'd rx for refill. Send and they will work it. Rx sent for signature. Cm  ----- Message from Nkechi Valencia sent at 3/17/2020  2:42 PM CDT -----  Contact: self  Type:  RX Refill Request    Who Called: Shirlene  Refill or New Rx:refill  RX Name and Strength:evolocumab (REPATHA SYRINGE) 140 mg/mL Syrg  How is the patient currently taking it? (ex. 1XDay):  Is this a 30 day or 90 day RX:  Preferred Pharmacy with phone number:  [facility] ([facility] [place] [address]  Phone: [phone] Fax: [phone]      Local or Mail Order:mail  Ordering Provider:Shirlene  Would the patient rather a call back or a response via [facility]? call  Best Call Back Number:[phone]    Additional Information: Pha [phone] Authorization is needed

## 2020-03-17 NOTE — TELEPHONE ENCOUNTER
Called and left v/m to rtc and let me know if he ever got her Repatha. Told her I sent to [facility] and it was auth and then sent to [facility]. I need her to call me with update. tasha

## 2020-03-18 ENCOUNTER — TELEPHONE (OUTPATIENT)
Dept: PHARMACY | Facility: CLINIC | Age: 65
End: 2020-03-18

## 2020-03-20 NOTE — TELEPHONE ENCOUNTER
DOCUMENTATION ONLY:  Prior authorization for Jousé approved from 3/18/20 to 3/18/21 for 13 fills    Case Id: [id number]    Co-pay: $40.00    Patient Assistance is required

## 2020-03-31 ENCOUNTER — TELEPHONE (OUTPATIENT)
Dept: PHARMACY | Facility: CLINIC | Age: 65
End: 2020-03-31

## 2020-03-31 NOTE — TELEPHONE ENCOUNTER
Pt has been taking Repatha but has been off for about 2 months. Pt was previously using syringes, so provided injection training for pens. Dosage, storage and administration reviewed. Pt tolerated well, no side effects from Repatha. Med list reviewed- no ddis. Allergies reviewed- only statins. [facility] refill process reviewed. Pt did not have any further questions or concerns. Shipment scheduled 4/1 for pt to receive 4/2.

## 2020-04-17 RX ORDER — FLUTICASONE PROPIONATE 50 MCG
SPRAY, SUSPENSION (ML) NASAL
Qty: 16 G | Refills: 10 | Status: SHIPPED | OUTPATIENT
Start: 2020-04-17 | End: 2021-06-03

## 2020-04-24 ENCOUNTER — TELEPHONE (OUTPATIENT)
Dept: PHARMACY | Facility: CLINIC | Age: 65
End: 2020-04-24

## 2020-05-22 ENCOUNTER — TELEPHONE (OUTPATIENT)
Dept: PHARMACY | Facility: CLINIC | Age: 65
End: 2020-05-22

## 2020-05-22 NOTE — TELEPHONE ENCOUNTER
Refill call regarding Repatha from [facility]. Shipping out Repatha on  for  arrival with patients consent. Copay of $5.00 @ 004. Address and  confirmed.

## 2020-06-18 ENCOUNTER — TELEPHONE (OUTPATIENT)
Dept: PHARMACY | Facility: CLINIC | Age: 65
End: 2020-06-18

## 2020-06-18 NOTE — TELEPHONE ENCOUNTER
Refill call regarding repatha at [facility]. Copay $5.00 ccof. Patient is in need of a refill, will ship  to arrive  with patient consent. Patient has not started any new medications including OTC drugs. Patient has not had any medication/ dose or instruction changes. No new allergies or side effects reported with this shipment. Medication is being taken as prescribed by physician and properly stored. Two patient identifiers:  and Address verified. Patient has no questions or concerns for RPh. No sharps container needed. Next injection due .

## 2020-07-17 ENCOUNTER — TELEPHONE (OUTPATIENT)
Dept: PHARMACY | Facility: CLINIC | Age: 65
End: 2020-07-17

## 2020-09-14 ENCOUNTER — TELEPHONE (OUTPATIENT)
Dept: PRIMARY CARE CLINIC | Facility: CLINIC | Age: 65
End: 2020-09-14

## 2020-09-14 DIAGNOSIS — G47.00 INSOMNIA, UNSPECIFIED TYPE: ICD-10-CM

## 2020-09-14 DIAGNOSIS — F41.1 GAD (GENERALIZED ANXIETY DISORDER): ICD-10-CM

## 2020-09-14 RX ORDER — ALPRAZOLAM 0.25 MG/1
0.25 TABLET ORAL DAILY PRN
Qty: 30 TABLET | Refills: 0 | Status: SHIPPED | OUTPATIENT
Start: 2020-09-14 | End: 2020-10-19 | Stop reason: SDUPTHER

## 2020-09-14 NOTE — TELEPHONE ENCOUNTER
Lov: 11/12/19 requesting ref[facility]ax to [facility] in [facility]. Please advise.     ----- Message from Makeda Olson sent at 9/14/2020  9:03 AM CDT -----  Regarding: Refill  Contact: [phone]  Would like to consult with the nurse, patient needs a refill on her Rx medication, please call back thanks sj   .Type:  RX Refill Request    Who Called:  Ms aponte  Refill or New Rx:refill  RX Name and Strength: Xanax  How is the patient currently taking it? (ex. 1XDay):  Is this a 30 day or 90 day RX:30  Preferred Pharmacy with phone number:.  [facility] in [place] - [facility] [address], [facility] [address]  [address]  [facility] [address]  Phone: [phone] Fax: [phone]        Local or Mail Order Local  Ordering Provider:Dr Segovia  Would the patient rather a call back or a response via [facility]? callback  Best Call Back Number:[phone]  Additional Information:

## 2020-09-14 NOTE — TELEPHONE ENCOUNTER
Pt scheduled for first available on 10/19 @ 10:20, pt requesting refill of xanax prior to appt d/t stress at work. Pt states she will be at her appt for annual on 10/19.

## 2020-09-28 ENCOUNTER — OFFICE VISIT (OUTPATIENT)
Dept: URGENT CARE | Facility: CLINIC | Age: 65
End: 2020-09-28
Payer: COMMERCIAL

## 2020-09-28 VITALS
HEIGHT: 65 IN | WEIGHT: 159 LBS | BODY MASS INDEX: 26.49 KG/M2 | DIASTOLIC BLOOD PRESSURE: 68 MMHG | SYSTOLIC BLOOD PRESSURE: 141 MMHG | TEMPERATURE: 99 F | HEART RATE: 84 BPM | OXYGEN SATURATION: 96 % | RESPIRATION RATE: 16 BRPM

## 2020-09-28 DIAGNOSIS — G44.201 ACUTE INTRACTABLE TENSION-TYPE HEADACHE: Primary | ICD-10-CM

## 2020-09-28 PROCEDURE — 99214 OFFICE O/P EST MOD 30 MIN: CPT | Mod: 25,S$GLB,, | Performed by: INTERNAL MEDICINE

## 2020-09-28 PROCEDURE — 96372 THER/PROPH/DIAG INJ SC/IM: CPT | Mod: S$GLB,,, | Performed by: INTERNAL MEDICINE

## 2020-09-28 PROCEDURE — 96372 PR INJECTION,THERAP/PROPH/DIAG2ST, IM OR SUBCUT: ICD-10-PCS | Mod: S$GLB,,, | Performed by: INTERNAL MEDICINE

## 2020-09-28 PROCEDURE — 99214 PR OFFICE/OUTPT VISIT, EST, LEVL IV, 30-39 MIN: ICD-10-PCS | Mod: 25,S$GLB,, | Performed by: INTERNAL MEDICINE

## 2020-09-28 RX ORDER — KETOROLAC TROMETHAMINE 30 MG/ML
30 INJECTION, SOLUTION INTRAMUSCULAR; INTRAVENOUS
Status: COMPLETED | OUTPATIENT
Start: 2020-09-28 | End: 2020-09-28

## 2020-09-28 RX ORDER — IBUPROFEN 800 MG/1
800 TABLET ORAL 3 TIMES DAILY PRN
Qty: 30 TABLET | Refills: 0 | Status: SHIPPED | OUTPATIENT
Start: 2020-09-28 | End: 2021-08-02

## 2020-09-28 RX ADMIN — KETOROLAC TROMETHAMINE 30 MG: 30 INJECTION, SOLUTION INTRAMUSCULAR; INTRAVENOUS at 01:09

## 2020-09-28 NOTE — PATIENT INSTRUCTIONS
Tension Headache    A muscle tension headache is a very common cause of head pain. Its also called a stress headache. When some people are under stress, they tense the muscles of their shoulder, neck, and scalp without knowing it. If this tension lasts long enough, a headache can occur. A tension headache can be quite painful. It can last for hours or even days.  Home care  Follow these tips when caring for yourself at home:  · Dont drive yourself home if you were given pain medicine for your headache. Instead, have someone else drive you home. Try to sleep when you get home. You should feel much better when you wake up.  · Put heat on the back of your neck to help ease neck spasm.  · Drink only clear liquids or eat a light diet until your symptoms get better. This will help you avoid nausea or vomiting.  How to prevent headaches  · Figure out what is causing stress in your life. Learn new ways to handle your stress. Ideas include regular exercise, biofeedback, self-hypnosis, yoga, and meditation. Talk with your healthcare provider to find out more information about managing stress. Many books and digital media are also available on this subject.  · Take time out at the first sign of a tension headache, if possible. Take yourself out of the stressful situation. Find a quiet, comfortable place to sit or lie down and let yourself relax. Heat and deep massage of the tight areas in the neck and shoulders may help ease muscle spasm. You may also get relief from a medicine like ibuprofen or a prescribed muscle relaxant.  Follow-up care  Follow up with your healthcare provider, or as advised. Talk with your provider if you have frequent headaches. He or she can figure out a treatment plan. Ask if you can have medicine to take at home the next time you get a bad headache. This may keep you from having to visit the emergency department in the future. You may need to see a headache specialist (neurologist) if you continue  to have headaches.  When to seek medical advice  Call your healthcare provider right away if any of these occur:  · Your head pain gets worse during sexual intercourse or strenuous activity  · Your head pain doesnt get better within 24 hours  · You arent able to keep liquids down (repeated vomiting)  · Fever of 100.4ºF (38ºC) or higher, or as directed by your healthcare provider  · Stiff neck  · Extreme drowsiness, confusion, or fainting  · Dizziness or dizziness with spinning sensation (vertigo)  · Weakness in an arm or leg or one side of your face  · You have difficulty speaking  · Your vision changes  Date Last Reviewed: 8/1/2016  © 5995-7231 [employer]. [address] [place] [address], [place] [address]. All rights reserved. This information is not intended as a substitute for professional medical care. Always follow your healthcare professional's instructions.

## 2020-09-28 NOTE — LETTER
September 28, 2020      [facility] [place]  [address], [address]  [place] [address]  Phone: [phone]       Patient: Shirlene Olvera   YOB: 1955  Date of Visit: 09/28/2020    To Whom It May Concern:    Maggy Olvera  was at [facility] on 09/28/2020. She may return to work/school on 9/3/2020 with no restrictions. If you have any questions or concerns, or if I can be of further assistance, please do not hesitate to contact me.    Sincerely,    Beto Bear MD

## 2020-09-28 NOTE — PROGRESS NOTES
"Subjective:       Patient ID: Shirlene Olvera is a 65 y.o. female.    Vitals:  height is 5' 5" (1.651 m) and weight is 72.1 kg (159 lb). Her tympanic temperature is 98.6 °F (37 °C). Her blood pressure is 141/68 (abnormal) and her pulse is 84. Her respiration is 16 and oxygen saturation is 96%.     Chief Complaint: Headache    Headache   This is a new problem. The current episode started yesterday. The problem occurs constantly. The problem has been unchanged. The pain is located in the bilateral, occipital, temporal and retro-orbital region. The pain radiates to the left neck and right neck. The pain quality is similar to prior headaches. The quality of the pain is described as throbbing, shooting and dull. The pain is at a severity of 8/10. The pain is severe. Associated symptoms include neck pain, photophobia and tinnitus. Pertinent negatives include no abdominal pain, abnormal behavior, anorexia, back pain, blurred vision, coughing, dizziness, drainage, ear pain, eye pain (pressure behind eyes), eye redness, eye watering, facial sweating, fever, hearing loss, insomnia, loss of balance, muscle aches, nausea, numbness, phonophobia, rhinorrhea, scalp tenderness, seizures, sinus pressure, sore throat, swollen glands, tingling, visual change, vomiting, weakness or weight loss. The symptoms are aggravated by activity and bright light. Treatments tried: Muscle relaxer. The treatment provided no relief. Her past medical history is significant for hypertension and migraine headaches. There is no history of cancer, cluster headaches, immunosuppression, migraines in the family, obesity, pseudotumor cerebri, recent head traumas, sinus disease or TMJ.       Constitution: Negative for chills, sweating and fever.   HENT: Positive for tinnitus. Negative for ear pain, hearing loss, facial swelling, congestion, sinus pain, sinus pressure and sore throat.    Neck: Positive for neck pain. Negative for neck stiffness.   Eyes: " Positive for photophobia. Negative for eye pain (pressure behind eyes), eye redness, vision loss, double vision and blurred vision.   Respiratory: Negative for cough.    Gastrointestinal: Negative for abdominal pain, nausea and vomiting.   Genitourinary: Negative for missed menses.   Musculoskeletal: Negative for trauma, back pain and muscle ache.   Skin: Negative for rash, wound, lesion and erythema.   Neurological: Positive for headaches and history of migraines. Negative for dizziness, history of vertigo, light-headedness, facial drooping, speech difficulty, coordination disturbances, loss of balance, disorientation, loss of consciousness, numbness and seizures.   Psychiatric/Behavioral: Negative for disorientation, confusion, nervous/anxious, sleep disturbance and depression. The patient is not nervous/anxious and does not have insomnia.        Objective:      Physical Exam   Constitutional: She is oriented to person, place, and time. She appears well-developed. No distress.   HENT:   Head: Normocephalic and atraumatic.   Ears:   Right Ear: External ear normal.   Left Ear: External ear normal.   Nose: Nose normal.   Mouth/Throat: Oropharynx is clear and moist. No oropharyngeal exudate.   Eyes: Pupils are equal, round, and reactive to light. Conjunctivae and EOM are normal. Right eye exhibits no discharge. Left eye exhibits no discharge. No scleral icterus.   Neck: Normal range of motion. Neck supple.   Cardiovascular: Normal rate, regular rhythm and normal heart sounds. Exam reveals no gallop and no friction rub.   No murmur heard.  Pulmonary/Chest: Effort normal. No respiratory distress. She has no wheezes. She has no rales.   Abdominal: Soft. Bowel sounds are normal. She exhibits no distension.   Lymphadenopathy:     She has no cervical adenopathy.   Neurological: She is alert and oriented to person, place, and time.   Skin: Skin is warm, dry, not diaphoretic and no rash. Capillary refill takes less than 2  seconds. erythemaPsychiatric: Her behavior is normal.   Nursing note and vitals reviewed.  Tense cervical spine musculature      Assessment:       1. Acute intractable tension-type headache        Plan:         Acute intractable tension-type headache  -     ketorolac injection 30 mg  -     ibuprofen (ADVIL,MOTRIN) 800 MG tablet; Take 1 tablet (800 mg total) by mouth 3 (three) times daily as needed for Pain. Do not take more than 3 pills in one day.  Dispense: 30 tablet; Refill: 0    Symptoms seem consistent with tension type headache. Tense muscles in shoulder and back of head extending to forehead. Photophobia, but no other neurologic complaints or evidence of such on exam. Also has some symptoms of typical migraine as well. Avoiding triptans due to hx of coronary artery disease. Toradol given in clinic. 800 mg ibuprofen and home care instructions provided. Will follow up with PCP if failing to improve

## 2020-09-30 ENCOUNTER — TELEPHONE (OUTPATIENT)
Dept: URGENT CARE | Facility: CLINIC | Age: 65
End: 2020-09-30

## 2020-10-17 ENCOUNTER — TELEPHONE (OUTPATIENT)
Dept: PHARMACY | Facility: CLINIC | Age: 65
End: 2020-10-17

## 2020-10-19 ENCOUNTER — HOSPITAL ENCOUNTER (OUTPATIENT)
Dept: RADIOLOGY | Facility: HOSPITAL | Age: 65
Discharge: HOME | End: 2020-10-19
Attending: FAMILY MEDICINE
Payer: COMMERCIAL

## 2020-10-19 ENCOUNTER — OFFICE VISIT (OUTPATIENT)
Dept: PRIMARY CARE CLINIC | Facility: CLINIC | Age: 65
End: 2020-10-19
Payer: COMMERCIAL

## 2020-10-19 ENCOUNTER — LAB VISIT (OUTPATIENT)
Dept: LAB | Facility: HOSPITAL | Age: 65
End: 2020-10-19
Attending: FAMILY MEDICINE
Payer: COMMERCIAL

## 2020-10-19 ENCOUNTER — LAB VISIT (OUTPATIENT)
Dept: LAB | Facility: HOSPITAL | Age: 65
End: 2020-10-19
Payer: COMMERCIAL

## 2020-10-19 ENCOUNTER — TELEPHONE (OUTPATIENT)
Dept: PRIMARY CARE CLINIC | Facility: CLINIC | Age: 65
End: 2020-10-19

## 2020-10-19 VITALS
HEART RATE: 60 BPM | DIASTOLIC BLOOD PRESSURE: 70 MMHG | TEMPERATURE: 98 F | WEIGHT: 149.81 LBS | OXYGEN SATURATION: 98 % | BODY MASS INDEX: 24.93 KG/M2 | SYSTOLIC BLOOD PRESSURE: 110 MMHG

## 2020-10-19 DIAGNOSIS — M50.30 DDD (DEGENERATIVE DISC DISEASE), CERVICAL: Primary | ICD-10-CM

## 2020-10-19 DIAGNOSIS — E78.00 PURE HYPERCHOLESTEROLEMIA: Chronic | ICD-10-CM

## 2020-10-19 DIAGNOSIS — I73.9 PAD (PERIPHERAL ARTERY DISEASE): ICD-10-CM

## 2020-10-19 DIAGNOSIS — Z72.0 TOBACCO ABUSE: Chronic | ICD-10-CM

## 2020-10-19 DIAGNOSIS — F41.1 GAD (GENERALIZED ANXIETY DISORDER): ICD-10-CM

## 2020-10-19 DIAGNOSIS — Z78.9 STATIN INTOLERANCE: ICD-10-CM

## 2020-10-19 DIAGNOSIS — E78.5 HYPERLIPIDEMIA, UNSPECIFIED HYPERLIPIDEMIA TYPE: ICD-10-CM

## 2020-10-19 DIAGNOSIS — I10 ESSENTIAL HYPERTENSION: Chronic | ICD-10-CM

## 2020-10-19 DIAGNOSIS — M50.30 DDD (DEGENERATIVE DISC DISEASE), CERVICAL: ICD-10-CM

## 2020-10-19 DIAGNOSIS — G47.00 INSOMNIA, UNSPECIFIED TYPE: ICD-10-CM

## 2020-10-19 DIAGNOSIS — I25.10 CORONARY ARTERY DISEASE INVOLVING NATIVE CORONARY ARTERY OF NATIVE HEART WITHOUT ANGINA PECTORIS: Chronic | ICD-10-CM

## 2020-10-19 DIAGNOSIS — I21.4 NSTEMI (NON-ST ELEVATED MYOCARDIAL INFARCTION): ICD-10-CM

## 2020-10-19 DIAGNOSIS — Z12.31 ENCOUNTER FOR SCREENING MAMMOGRAM FOR BREAST CANCER: ICD-10-CM

## 2020-10-19 DIAGNOSIS — Z00.00 ROUTINE GENERAL MEDICAL EXAMINATION AT A HEALTH CARE FACILITY: ICD-10-CM

## 2020-10-19 DIAGNOSIS — M51.36 DDD (DEGENERATIVE DISC DISEASE), LUMBAR: ICD-10-CM

## 2020-10-19 DIAGNOSIS — I25.10 NONOCCLUSIVE CORONARY ATHEROSCLEROSIS OF NATIVE CORONARY ARTERY: ICD-10-CM

## 2020-10-19 LAB
ALBUMIN SERPL BCP-MCNC: 4 G/DL (ref 3.5–5.2)
ALP SERPL-CCNC: 79 U/L (ref 55–135)
ALT SERPL W/O P-5'-P-CCNC: 15 U/L (ref 10–44)
ANION GAP SERPL CALC-SCNC: 7 MMOL/L (ref 8–16)
AST SERPL-CCNC: 19 U/L (ref 10–40)
BASOPHILS # BLD AUTO: 0.04 K/UL (ref 0–0.2)
BASOPHILS NFR BLD: 0.6 % (ref 0–1.9)
BILIRUB SERPL-MCNC: 0.9 MG/DL (ref 0.1–1)
BUN SERPL-MCNC: 20 MG/DL (ref 8–23)
CALCIUM SERPL-MCNC: 9.7 MG/DL (ref 8.7–10.5)
CHLORIDE SERPL-SCNC: 103 MMOL/L (ref 95–110)
CHOLEST SERPL-MCNC: 157 MG/DL (ref 120–199)
CHOLEST/HDLC SERPL: 3.3 {RATIO} (ref 2–5)
CO2 SERPL-SCNC: 29 MMOL/L (ref 23–29)
CREAT SERPL-MCNC: 1.2 MG/DL (ref 0.5–1.4)
DIFFERENTIAL METHOD: ABNORMAL
EOSINOPHIL # BLD AUTO: 0.1 K/UL (ref 0–0.5)
EOSINOPHIL NFR BLD: 1.4 % (ref 0–8)
ERYTHROCYTE [DISTWIDTH] IN BLOOD BY AUTOMATED COUNT: 11.9 % (ref 11.5–14.5)
EST. GFR  (AFRICAN AMERICAN): 54.4 ML/MIN/1.73 M^2
EST. GFR  (NON AFRICAN AMERICAN): 47.2 ML/MIN/1.73 M^2
GLUCOSE SERPL-MCNC: 88 MG/DL (ref 70–110)
HCT VFR BLD AUTO: 44.3 % (ref 37–48.5)
HDLC SERPL-MCNC: 48 MG/DL (ref 40–75)
HDLC SERPL: 30.6 % (ref 20–50)
HGB BLD-MCNC: 14.5 G/DL (ref 12–16)
IMM GRANULOCYTES # BLD AUTO: 0.01 K/UL (ref 0–0.04)
IMM GRANULOCYTES NFR BLD AUTO: 0.2 % (ref 0–0.5)
LDLC SERPL CALC-MCNC: 67 MG/DL (ref 63–159)
LYMPHOCYTES # BLD AUTO: 2.1 K/UL (ref 1–4.8)
LYMPHOCYTES NFR BLD: 32 % (ref 18–48)
MCH RBC QN AUTO: 34.6 PG (ref 27–31)
MCHC RBC AUTO-ENTMCNC: 32.7 G/DL (ref 32–36)
MCV RBC AUTO: 106 FL (ref 82–98)
MONOCYTES # BLD AUTO: 0.4 K/UL (ref 0.3–1)
MONOCYTES NFR BLD: 5.6 % (ref 4–15)
NEUTROPHILS # BLD AUTO: 4 K/UL (ref 1.8–7.7)
NEUTROPHILS NFR BLD: 60.2 % (ref 38–73)
NONHDLC SERPL-MCNC: 109 MG/DL
NRBC BLD-RTO: 0 /100 WBC
PLATELET # BLD AUTO: 214 K/UL (ref 150–350)
PMV BLD AUTO: 10.8 FL (ref 9.2–12.9)
POTASSIUM SERPL-SCNC: 4.6 MMOL/L (ref 3.5–5.1)
PROT SERPL-MCNC: 7.1 G/DL (ref 6–8.4)
RBC # BLD AUTO: 4.19 M/UL (ref 4–5.4)
SODIUM SERPL-SCNC: 139 MMOL/L (ref 136–145)
T4 FREE SERPL-MCNC: 1.13 NG/DL (ref 0.71–1.51)
TRIGL SERPL-MCNC: 210 MG/DL (ref 30–150)
TSH SERPL DL<=0.005 MIU/L-ACNC: 0.47 UIU/ML (ref 0.4–4)
WBC # BLD AUTO: 6.57 K/UL (ref 3.9–12.7)

## 2020-10-19 PROCEDURE — 3008F BODY MASS INDEX DOCD: CPT | Mod: CPTII,S$GLB,, | Performed by: FAMILY MEDICINE

## 2020-10-19 PROCEDURE — 85025 COMPLETE CBC W/AUTO DIFF WBC: CPT

## 2020-10-19 PROCEDURE — 3078F DIAST BP <80 MM HG: CPT | Mod: CPTII,S$GLB,, | Performed by: FAMILY MEDICINE

## 2020-10-19 PROCEDURE — 80061 LIPID PANEL: CPT

## 2020-10-19 PROCEDURE — 83036 HEMOGLOBIN GLYCOSYLATED A1C: CPT

## 2020-10-19 PROCEDURE — 99999 PR PBB SHADOW E&M-EST. PATIENT-LVL IV: ICD-10-PCS | Mod: PBBFAC,,, | Performed by: FAMILY MEDICINE

## 2020-10-19 PROCEDURE — 77067 MAMMO DIGITAL SCREENING BILAT WITH TOMO: ICD-10-PCS | Mod: 26,,, | Performed by: RADIOLOGY

## 2020-10-19 PROCEDURE — 99999 PR PBB SHADOW E&M-EST. PATIENT-LVL IV: CPT | Mod: PBBFAC,,, | Performed by: FAMILY MEDICINE

## 2020-10-19 PROCEDURE — 1159F PR MEDICATION LIST DOCUMENTED IN MEDICAL RECORD: ICD-10-PCS | Mod: S$GLB,,, | Performed by: FAMILY MEDICINE

## 2020-10-19 PROCEDURE — 99397 PR PREVENTIVE VISIT,EST,65 & OVER: ICD-10-PCS | Mod: 25,S$GLB,, | Performed by: FAMILY MEDICINE

## 2020-10-19 PROCEDURE — 1126F PR PAIN SEVERITY QUANTIFIED, NO PAIN PRESENT: ICD-10-PCS | Mod: S$GLB,,, | Performed by: FAMILY MEDICINE

## 2020-10-19 PROCEDURE — 36415 COLL VENOUS BLD VENIPUNCTURE: CPT

## 2020-10-19 PROCEDURE — 82043 UR ALBUMIN QUANTITATIVE: CPT

## 2020-10-19 PROCEDURE — 80053 COMPREHEN METABOLIC PANEL: CPT

## 2020-10-19 PROCEDURE — 3008F PR BODY MASS INDEX (BMI) DOCUMENTED: ICD-10-PCS | Mod: CPTII,S$GLB,, | Performed by: FAMILY MEDICINE

## 2020-10-19 PROCEDURE — 77063 MAMMO DIGITAL SCREENING BILAT WITH TOMO: ICD-10-PCS | Mod: 26,,, | Performed by: RADIOLOGY

## 2020-10-19 PROCEDURE — 90694 VACC AIIV4 NO PRSRV 0.5ML IM: CPT | Mod: S$GLB,,, | Performed by: FAMILY MEDICINE

## 2020-10-19 PROCEDURE — 99214 OFFICE O/P EST MOD 30 MIN: CPT | Mod: 25,S$GLB,, | Performed by: FAMILY MEDICINE

## 2020-10-19 PROCEDURE — 99214 PR OFFICE/OUTPT VISIT, EST, LEVL IV, 30-39 MIN: ICD-10-PCS | Mod: 25,S$GLB,, | Performed by: FAMILY MEDICINE

## 2020-10-19 PROCEDURE — 3078F PR MOST RECENT DIASTOLIC BLOOD PRESSURE < 80 MM HG: ICD-10-PCS | Mod: CPTII,S$GLB,, | Performed by: FAMILY MEDICINE

## 2020-10-19 PROCEDURE — 1159F MED LIST DOCD IN RCRD: CPT | Mod: S$GLB,,, | Performed by: FAMILY MEDICINE

## 2020-10-19 PROCEDURE — 1101F PT FALLS ASSESS-DOCD LE1/YR: CPT | Mod: CPTII,S$GLB,, | Performed by: FAMILY MEDICINE

## 2020-10-19 PROCEDURE — 77067 SCR MAMMO BI INCL CAD: CPT | Mod: 26,,, | Performed by: RADIOLOGY

## 2020-10-19 PROCEDURE — 99397 PER PM REEVAL EST PAT 65+ YR: CPT | Mod: 25,S$GLB,, | Performed by: FAMILY MEDICINE

## 2020-10-19 PROCEDURE — 84443 ASSAY THYROID STIM HORMONE: CPT

## 2020-10-19 PROCEDURE — 84439 ASSAY OF FREE THYROXINE: CPT

## 2020-10-19 PROCEDURE — 77067 SCR MAMMO BI INCL CAD: CPT | Mod: TC

## 2020-10-19 PROCEDURE — 77063 BREAST TOMOSYNTHESIS BI: CPT | Mod: 26,,, | Performed by: RADIOLOGY

## 2020-10-19 PROCEDURE — 3074F SYST BP LT 130 MM HG: CPT | Mod: CPTII,S$GLB,, | Performed by: FAMILY MEDICINE

## 2020-10-19 PROCEDURE — 1126F AMNT PAIN NOTED NONE PRSNT: CPT | Mod: S$GLB,,, | Performed by: FAMILY MEDICINE

## 2020-10-19 PROCEDURE — 1101F PR PT FALLS ASSESS DOC 0-1 FALLS W/OUT INJ PAST YR: ICD-10-PCS | Mod: CPTII,S$GLB,, | Performed by: FAMILY MEDICINE

## 2020-10-19 PROCEDURE — 90471 IMMUNIZATION ADMIN: CPT | Mod: S$GLB,,, | Performed by: FAMILY MEDICINE

## 2020-10-19 PROCEDURE — 3074F PR MOST RECENT SYSTOLIC BLOOD PRESSURE < 130 MM HG: ICD-10-PCS | Mod: CPTII,S$GLB,, | Performed by: FAMILY MEDICINE

## 2020-10-19 PROCEDURE — 90471 FLU VACCINE - QUADRIVALENT - ADJUVANTED: ICD-10-PCS | Mod: S$GLB,,, | Performed by: FAMILY MEDICINE

## 2020-10-19 PROCEDURE — 90694 FLU VACCINE - QUADRIVALENT - ADJUVANTED: ICD-10-PCS | Mod: S$GLB,,, | Performed by: FAMILY MEDICINE

## 2020-10-19 RX ORDER — GABAPENTIN 100 MG/1
100 CAPSULE ORAL 3 TIMES DAILY
Qty: 90 CAPSULE | Refills: 11 | Status: SHIPPED | OUTPATIENT
Start: 2020-10-19 | End: 2021-06-07

## 2020-10-19 RX ORDER — VALSARTAN AND HYDROCHLOROTHIAZIDE 160; 12.5 MG/1; MG/1
1 TABLET, FILM COATED ORAL DAILY
Qty: 90 TABLET | Refills: 3 | Status: SHIPPED | OUTPATIENT
Start: 2020-10-19 | End: 2021-08-16

## 2020-10-19 RX ORDER — BUPROPION HYDROCHLORIDE 150 MG/1
150 TABLET ORAL DAILY
Qty: 90 TABLET | Refills: 3 | Status: SHIPPED | OUTPATIENT
Start: 2020-10-19 | End: 2021-06-07

## 2020-10-19 RX ORDER — TIZANIDINE 4 MG/1
4 TABLET ORAL NIGHTLY PRN
Qty: 90 TABLET | Refills: 3 | Status: SHIPPED | OUTPATIENT
Start: 2020-10-19 | End: 2020-10-29

## 2020-10-19 RX ORDER — CYCLOBENZAPRINE HCL 10 MG
TABLET ORAL
Qty: 90 TABLET | Refills: 3 | Status: SHIPPED | OUTPATIENT
Start: 2020-10-19 | End: 2020-10-19

## 2020-10-19 RX ORDER — ALPRAZOLAM 0.25 MG/1
0.25 TABLET ORAL DAILY PRN
Qty: 30 TABLET | Refills: 2 | Status: SHIPPED | OUTPATIENT
Start: 2020-10-19 | End: 2021-08-02

## 2020-10-19 NOTE — PROGRESS NOTES
Subjective:      Patient ID: Shirlene Olvera is a 65 y.o. female.    Chief Complaint: Follow-up and Annual Exam    Disclaimer:  This note is prepared using voice recognition software and as such is likely to have errors and has not been proof read. Please contact me for questions.     Shirlene Olvera is a 65 y.o. female who presents today for prevention exam and follow-up.  She was recently approved for Repatha has been seeing Cardiology.  Doing well but needing to do lab work.  Is still smoking to try Chantix but was not successful with it.  Has done Wellbutrin the past willing to try this again.  Also for blood pressure currently on Bystolic but reports that is too expensive for her.  Needs to see about getting an alternative previously had been on atenolol.  Is still on a daily aspirin.  Plans on holding it though for 1 week prior to getting her teeth pulled.    Is still having neck and back discomfort.  Has known DDD and cervical and lumbar regions also with some daily headaches.  Has tried some ibuprofen 800 as well as some muscle relaxants.  Has not tried gabapentin.  Willing to give this a try.    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  Willing to try also the Wellbutrin as well.    Is needing set up her mammogram.  Is willing to do flu shot today.          Lab Results   Component Value Date    WBC 8.76 03/11/2019    HGB 14.0 03/11/2019    HCT 41.4 03/11/2019     03/11/2019    CHOL 165 04/27/2019    TRIG 186 (H) 04/27/2019    HDL 50 04/27/2019    ALT 17 04/27/2019    AST 23 04/27/2019     (L) 03/11/2019    K 4.7 03/11/2019    CL 98 03/11/2019    CREATININE 1.3 03/11/2019    BUN 24 (H) 03/11/2019    CO2 33 (H) 03/11/2019    TSH 1.429 08/14/2018    INR 0.9 05/13/2015    HGBA1C 5.3 08/14/2018       DXA Bone Density Spine And Hip  Narrative: EXAMINATION:  DEXA BONE DENSITY SPINE HIP    CLINICAL HISTORY:  Encounter for  screening for osteoporosis    TECHNIQUE:  DXA scanning was performed over the left hip and lumbar spine.  Review of the images confirms satisfactory positioning and technique.    COMPARISON:  None    FINDINGS:  The L1 to L4 vertebral bone mineral density is equal to 1.099 g/cm squared with a T score of -0.8.    The left femoral neck bone mineral density is equal to 0.885 g/cm squared with a T score of -1.1.    There is a 8% risk of a major osteoporotic fracture and a 1% risk of hip fracture in the next 10 years (FRAX).  Impression: Osteopenia    Consider FDA approved medical therapies in postmenopausal women and men aged 50 years and older, based on the following:    *A hip or vertebral (clinical or morphometric) fracture  *T score less than or equal to -2.5 at the femoral neck or spine after appropriate evaluation to exclude secondary causes.  *Low bone mass -- also known as osteopenia (T score between -1.0 and -2.5 at the femoral neck or spine) and a 10 year probability of hip fracture greater than or equal to 3% or a 10 year probability of major osteoporosis-related fracture greater than or equal to 20% based on the [place]-adapted WHO algorithm.  *Clinicians judgment and/or patient preference may indicate treatment for people with 10 year fracture probabilities is above or below these levels.    Electronically signed by: Arnoldo Little MD  Date:    11/26/2019  Time:    09:54        Review of Systems   Constitutional: Negative for chills, fatigue and fever.   HENT: Positive for dental problem. Negative for ear pain and trouble swallowing.    Eyes: Negative for pain and visual disturbance.   Respiratory: Negative for cough and shortness of breath.    Cardiovascular: Negative for chest pain and leg swelling.   Gastrointestinal: Negative for abdominal pain, blood in stool, nausea and vomiting.   Endocrine: Negative for cold intolerance and heat intolerance.   Genitourinary: Negative for dysuria and frequency.    Musculoskeletal: Positive for arthralgias, back pain and myalgias. Negative for joint swelling and neck pain.   Skin: Negative for color change and rash.   Neurological: Positive for headaches. Negative for dizziness.   Psychiatric/Behavioral: Negative for behavioral problems, decreased concentration, dysphoric mood and sleep disturbance. The patient is nervous/anxious.      Objective:     Vitals:    10/19/20 1104   BP: 110/70   Pulse: 60   Temp: 97.9 °F (36.6 °C)   SpO2: 98%   Weight: 67.9 kg (149 lb 12.8 oz)     Physical Exam  Vitals signs reviewed.   Constitutional:       Appearance: Normal appearance. She is well-developed and normal weight.   HENT:      Head: Normocephalic and atraumatic.      Right Ear: Tympanic membrane and external ear normal.      Left Ear: Tympanic membrane and external ear normal.      Nose: Nose normal.      Mouth/Throat:      Mouth: Mucous membranes are moist.      Pharynx: Oropharynx is clear.   Eyes:      Conjunctiva/sclera: Conjunctivae normal.      Pupils: Pupils are equal, round, and reactive to light.   Neck:      Musculoskeletal: Normal range of motion and neck supple.      Thyroid: No thyromegaly.   Cardiovascular:      Rate and Rhythm: Normal rate and regular rhythm.      Heart sounds: No murmur. No friction rub. No gallop.    Pulmonary:      Effort: Pulmonary effort is normal. No respiratory distress.      Breath sounds: No wheezing or rales.   Abdominal:      General: Bowel sounds are normal. There is no distension.      Palpations: Abdomen is soft.      Tenderness: There is no abdominal tenderness. There is no rebound.   Musculoskeletal:      Cervical back: She exhibits decreased range of motion, tenderness, bony tenderness, pain and spasm.      Lumbar back: She exhibits decreased range of motion, tenderness, bony tenderness, pain and spasm.   Lymphadenopathy:      Cervical: No cervical adenopathy.   Skin:     General: Skin is warm and dry.      Findings: No rash.    Neurological:      Mental Status: She is alert and oriented to person, place, and time.   Psychiatric:         Attention and Perception: Attention and perception normal.         Mood and Affect: Mood and affect normal.         Speech: Speech normal.         Behavior: Behavior normal.         Thought Content: Thought content normal.         Cognition and Memory: Cognition and memory normal.         Judgment: Judgment normal.       Assessment:     1. DDD (degenerative disc disease), cervical    2. Pure hypercholesterolemia    3. Essential hypertension    4. Tobacco abuse    5. Coronary artery disease involving native coronary artery of native heart without angina pectoris    6. NSTEMI (non-ST elevated myocardial infarction)    7. PAD (peripheral artery disease)    8. Statin intolerance    9. ROMÁN (generalized anxiety disorder)    10. DDD (degenerative disc disease), lumbar    11. Insomnia, unspecified type    12. Routine general medical examination at a health care facility    13. Encounter for screening mammogram for breast cancer      Plan:   Shirlene was seen today for follow-up and annual exam.    Diagnoses and all orders for this visit:    DDD (degenerative disc disease), cervical  Comments:  Trial of gabapentin 100 mg t.i.d. can still use Flexeril p.r.n. at nighttime  Orders:  -     TSH; Future  -     T4, Free; Future  -     Lipid Panel; Future  -     Hemoglobin A1C; Future  -     Comprehensive Metabolic Panel; Future  -     CBC auto differential; Future  -     Microalbumin/Creatinine Ratio, Urine; Future    Pure hypercholesterolemia  Comments:  On Repatha doing lab work today forward results to cardiologist  Orders:  -     TSH; Future  -     T4, Free; Future  -     Lipid Panel; Future  -     Hemoglobin A1C; Future  -     Comprehensive Metabolic Panel; Future  -     CBC auto differential; Future  -     Microalbumin/Creatinine Ratio, Urine; Future    Essential hypertension  Comments:  Needing alternative agent  besides Bystolic due to cost sent message to cardiologist continue with Diovan HCT  Orders:  -     TSH; Future  -     T4, Free; Future  -     Lipid Panel; Future  -     Hemoglobin A1C; Future  -     Comprehensive Metabolic Panel; Future  -     CBC auto differential; Future  -     Microalbumin/Creatinine Ratio, Urine; Future    Tobacco abuse  Comments:  Desires to quit failed Chantix in the past has tried Wellbutrin willing to reach try again  Orders:  -     TSH; Future  -     T4, Free; Future  -     Lipid Panel; Future  -     Hemoglobin A1C; Future  -     Comprehensive Metabolic Panel; Future  -     CBC auto differential; Future  -     Microalbumin/Creatinine Ratio, Urine; Future    Coronary artery disease involving native coronary artery of native heart without angina pectoris  -     TSH; Future  -     T4, Free; Future  -     Lipid Panel; Future  -     Hemoglobin A1C; Future  -     Comprehensive Metabolic Panel; Future  -     CBC auto differential; Future  -     Microalbumin/Creatinine Ratio, Urine; Future    NSTEMI (non-ST elevated myocardial infarction)  Comments:  Seeing Cardiology  Orders:  -     TSH; Future  -     T4, Free; Future  -     Lipid Panel; Future  -     Hemoglobin A1C; Future  -     Comprehensive Metabolic Panel; Future  -     CBC auto differential; Future  -     Microalbumin/Creatinine Ratio, Urine; Future    PAD (peripheral artery disease)  -     TSH; Future  -     T4, Free; Future  -     Lipid Panel; Future  -     Hemoglobin A1C; Future  -     Comprehensive Metabolic Panel; Future  -     CBC auto differential; Future  -     Microalbumin/Creatinine Ratio, Urine; Future    Statin intolerance  -     TSH; Future  -     T4, Free; Future  -     Lipid Panel; Future  -     Hemoglobin A1C; Future  -     Comprehensive Metabolic Panel; Future  -     CBC auto differential; Future  -     Microalbumin/Creatinine Ratio, Urine; Future    ROMÁN (generalized anxiety disorder)  Comments:  Refilled Xanax needs  follow-up visit in 3 months.  Starting Wellbutrin to also help not only from anxiety but also from smoking cessation  Orders:  -     TSH; Future  -     T4, Free; Future  -     Lipid Panel; Future  -     Hemoglobin A1C; Future  -     Comprehensive Metabolic Panel; Future  -     CBC auto differential; Future  -     Microalbumin/Creatinine Ratio, Urine; Future  -     ALPRAZolam (XANAX) 0.25 MG tablet; Take 1 tablet (0.25 mg total) by mouth daily as needed for Anxiety.    DDD (degenerative disc disease), lumbar  Comments:  Trial of gabapentin.  Has muscle relaxants also  Orders:  -     Discontinue: cyclobenzaprine (FLEXERIL) 10 MG tablet; TAKE 1 TABLET BY MOUTH QHS    Insomnia, unspecified type  Comments:  Refilled Xanax for 3 months needs follow-up visit afterward  Orders:  -     ALPRAZolam (XANAX) 0.25 MG tablet; Take 1 tablet (0.25 mg total) by mouth daily as needed for Anxiety.    Routine general medical examination at a health care facility  Comments:  For health maintenance issues flu shot set up lab work today mammogram discussed quitting smoking  Orders:  -     TSH; Future  -     T4, Free; Future  -     Lipid Panel; Future  -     Hemoglobin A1C; Future  -     Comprehensive Metabolic Panel; Future  -     CBC auto differential; Future  -     Microalbumin/Creatinine Ratio, Urine; Future    Encounter for screening mammogram for breast cancer  -     Mammo Digital Screening Bilat w/ Brant; Future    Other orders  -     buPROPion (WELLBUTRIN XL) 150 MG TB24 tablet; Take 1 tablet (150 mg total) by mouth once daily.  -     gabapentin (NEURONTIN) 100 MG capsule; Take 1 capsule (100 mg total) by mouth 3 (three) times daily.  -     valsartan-hydrochlorothiazide (DIOVAN-HCT) 160-12.5 mg per tablet; Take 1 tablet by mouth once daily.  -     tiZANidine (ZANAFLEX) 4 MG tablet; Take 1 tablet (4 mg total) by mouth nightly as needed (muscle relaxant to replaced flexeril).            Follow up in about 3 months (around 1/19/2021)  for 3 mo MARIA DOLORES Gallardo/ anxiety meds .    There are no Patient Instructions on file for this visit.

## 2020-10-19 NOTE — TELEPHONE ENCOUNTER
----- Message from Nkechi Valencia sent at 10/19/2020 10:18 AM CDT -----  Pt is running 5-10 min late/KD

## 2020-10-20 LAB
ALBUMIN/CREAT UR: 4.2 UG/MG (ref 0–30)
CREAT UR-MCNC: 72 MG/DL (ref 15–325)
ESTIMATED AVG GLUCOSE: 105 MG/DL (ref 68–131)
HBA1C MFR BLD HPLC: 5.3 % (ref 4–5.6)
MICROALBUMIN UR DL<=1MG/L-MCNC: 3 UG/ML

## 2020-10-21 ENCOUNTER — SPECIALTY PHARMACY (OUTPATIENT)
Dept: PHARMACY | Facility: CLINIC | Age: 65
End: 2020-10-21

## 2020-10-21 ENCOUNTER — TELEPHONE (OUTPATIENT)
Dept: CARDIOLOGY | Facility: CLINIC | Age: 65
End: 2020-10-21

## 2020-10-21 RX ORDER — METOPROLOL TARTRATE 50 MG/1
50 TABLET ORAL 2 TIMES DAILY
COMMUNITY
End: 2020-10-21 | Stop reason: SDUPTHER

## 2020-10-21 RX ORDER — METOPROLOL TARTRATE 50 MG/1
50 TABLET ORAL 2 TIMES DAILY
Qty: 60 TABLET | Refills: 5 | Status: SHIPPED | OUTPATIENT
Start: 2020-10-21 | End: 2021-04-12

## 2020-10-21 NOTE — TELEPHONE ENCOUNTER
Per Dr Byers: metoprolol 50 bid  VM for pt to rtc about med change and need pharmacy of choice. Cm    ----- Message from Pepe Byers MD sent at 10/20/2020  9:17 PM CDT -----  Regarding: RE: medication change and refill on repatha  ok  ----- Message -----  From: Tessa Crowe LPN  Sent: 10/19/2020   4:17 PM CDT  To: Pepe Byers MD  Subject: RE: medication change and refill on repatha      What you want on the metoprolol. Sent rx for repatha to you to sign and it needs to go to [facility].   ----- Message -----  From: Pepe Byers MD  Sent: 10/19/2020   3:07 PM CDT  To: Tessa Crowe LPN, Jhoana Segovia MD  Subject: RE: medication change and refill on repatha      Thanks, erica Ely to metoprolol and refill repatha    David  ----- Message -----  From: Jhoana Segovia MD  Sent: 10/19/2020  11:18 AM CDT  To: Pepe Byers MD  Subject: medication change and refill on repatha          David,   I saw our mutual patient this am, getting labs. She states she needs refills on the repatha as well as wanted to see if cheaper option for BP BB instead of bystolic 10mg. Her copay on bystolic is > $35 a month, which is too much for her. Was asking about atenolol or metoprolol? Thanks.   Jhoana Segovia MD

## 2020-10-29 ENCOUNTER — OFFICE VISIT (OUTPATIENT)
Dept: URGENT CARE | Facility: CLINIC | Age: 65
End: 2020-10-29
Payer: COMMERCIAL

## 2020-10-29 VITALS
DIASTOLIC BLOOD PRESSURE: 68 MMHG | OXYGEN SATURATION: 97 % | BODY MASS INDEX: 24.83 KG/M2 | HEIGHT: 65 IN | HEART RATE: 70 BPM | RESPIRATION RATE: 16 BRPM | SYSTOLIC BLOOD PRESSURE: 127 MMHG | WEIGHT: 149 LBS | TEMPERATURE: 98 F

## 2020-10-29 DIAGNOSIS — J02.9 SORE THROAT: ICD-10-CM

## 2020-10-29 DIAGNOSIS — R05.9 COUGH: Primary | ICD-10-CM

## 2020-10-29 DIAGNOSIS — J06.9 UPPER RESPIRATORY TRACT INFECTION, UNSPECIFIED TYPE: ICD-10-CM

## 2020-10-29 LAB
CTP QC/QA: YES
FLUAV AG NPH QL: NEGATIVE
FLUBV AG NPH QL: NEGATIVE
MOLECULAR STREP A: NEGATIVE
SARS-COV-2 RDRP RESP QL NAA+PROBE: NEGATIVE

## 2020-10-29 PROCEDURE — 99213 OFFICE O/P EST LOW 20 MIN: CPT | Mod: 25,S$GLB,, | Performed by: NURSE PRACTITIONER

## 2020-10-29 PROCEDURE — 87651 POCT STREP A MOLECULAR: ICD-10-PCS | Mod: QW,S$GLB,, | Performed by: NURSE PRACTITIONER

## 2020-10-29 PROCEDURE — 87804 INFLUENZA ASSAY W/OPTIC: CPT | Mod: QW,S$GLB,, | Performed by: NURSE PRACTITIONER

## 2020-10-29 PROCEDURE — 87804 POCT INFLUENZA A/B: ICD-10-PCS | Mod: QW,S$GLB,, | Performed by: NURSE PRACTITIONER

## 2020-10-29 PROCEDURE — 99213 PR OFFICE/OUTPT VISIT, EST, LEVL III, 20-29 MIN: ICD-10-PCS | Mod: 25,S$GLB,, | Performed by: NURSE PRACTITIONER

## 2020-10-29 PROCEDURE — 87651 STREP A DNA AMP PROBE: CPT | Mod: QW,S$GLB,, | Performed by: NURSE PRACTITIONER

## 2020-10-29 PROCEDURE — U0002 COVID-19 LAB TEST NON-CDC: HCPCS | Mod: QW,S$GLB,, | Performed by: NURSE PRACTITIONER

## 2020-10-29 PROCEDURE — U0002: ICD-10-PCS | Mod: QW,S$GLB,, | Performed by: NURSE PRACTITIONER

## 2020-10-29 NOTE — LETTER
October 29, 2020      [facility]  [address], [address]  [place] [address]  Phone: [phone]       Patient: Shirlene Olvera   YOB: 1955  Date of Visit: 10/29/2020    To Whom It May Concern:    Maggy Olvera  was at [facility] on 10/29/2020. She may return to work/school on 11/2/2020 with no restrictions. If you have any questions or concerns, or if I can be of further assistance, please do not hesitate to contact me.    Sincerely,    Aida Rose, NP

## 2020-10-29 NOTE — PROGRESS NOTES
"Subjective:       Patient ID: Shirlene Olvera is a 65 y.o. female.    Vitals:  height is 5' 5" (1.651 m) and weight is 67.6 kg (149 lb).     Chief Complaint: Sinus Problem    Sinus Problem  This is a new problem. The current episode started yesterday. The problem is unchanged. There has been no fever. Her pain is at a severity of 8/10. The pain is moderate. Associated symptoms include congestion, coughing, ear pain, headaches, a hoarse voice, sinus pressure, sneezing, a sore throat and swollen glands. Pertinent negatives include no chills, diaphoresis, neck pain or shortness of breath. Treatments tried: Nyquil. The treatment provided mild relief.       Constitution: Positive for fatigue. Negative for chills, sweating and fever.   HENT: Positive for ear pain, congestion, sinus pain, sinus pressure, sore throat, trouble swallowing and voice change.    Neck: Negative for neck pain and painful lymph nodes.   Eyes: Negative for eye redness.   Respiratory: Positive for cough, sputum production and wheezing. Negative for chest tightness, bloody sputum, COPD, shortness of breath, stridor and asthma.    Gastrointestinal: Negative for nausea, vomiting and diarrhea.   Musculoskeletal: Negative for muscle ache.   Skin: Negative for rash.   Allergic/Immunologic: Positive for sneezing. Negative for seasonal allergies and asthma.   Neurological: Positive for headaches.   Hematologic/Lymphatic: Negative for swollen lymph nodes.       Objective:      Physical Exam   Constitutional: She is oriented to person, place, and time.  Non-toxic appearance. She does not appear ill. No distress.   HENT:   Head: Normocephalic and atraumatic.   Eyes: Pupils are equal, round, and reactive to light. Conjunctivae are normal. extraocular movement intact  Cardiovascular: Normal rate, regular rhythm, S1 normal, S2 normal, normal heart sounds and normal pulses. Exam reveals no decreased pulses.   Pulmonary/Chest: Effort normal and breath sounds " normal. No accessory muscle usage. No respiratory distress. She has no decreased breath sounds. She has no wheezes. She has no rhonchi. She has no rales.   Abdominal: Normal appearance.   Lymphadenopathy:     She has no cervical adenopathy.   Neurological: She is alert and oriented to person, place, and time.   Skin: Skin is warm, dry and not diaphoretic.   Nursing note and vitals reviewed.        Results for orders placed or performed in visit on 10/29/20   POCT COVID-19 Rapid Screening   Result Value Ref Range    POC Rapid COVID Negative Negative     Acceptable Yes    POCT Strep A, Molecular   Result Value Ref Range    Molecular Strep A, POC Negative Negative     Acceptable Yes    POCT Influenza A/B   Result Value Ref Range    Rapid Influenza A Ag Negative Negative    Rapid Influenza B Ag Negative Negative     Acceptable Yes       Assessment:       1. Cough    2. Sore throat    3. Upper respiratory tract infection, unspecified type        Plan:         Cough  -     POCT COVID-19 Rapid Screening  -     POCT Influenza A/B    Sore throat  -     POCT COVID-19 Rapid Screening  -     POCT Strep A, Molecular  -     POCT Influenza A/B    Upper respiratory tract infection, unspecified type      Patient Instructions   Your test was NEGATIVE for COVID-19 (coronavirus), Strep Throat and INFLUENZA A/B.      You may leave home and/or return to work when the following conditions are met:   24 hours fever free without fever-reducing medications AND   Improved symptoms      If your symptoms worsen or if you have any other concerns, please contact [facility] at [phone].     Sincerely,    Aida Rose NP                                                             URI   If your condition worsens or fails to improve we recommend that you receive another evaluation at the ER immediately or contact your PCP to discuss your concerns or return here. You must understand that  "you've received an urgent care treatment only and that you may be released before all your medical problems are known or treated. You the patient will arrange for follouw care as instructed.   If we discussed that I think your illness is viral, it will not respond to antibiotics and will last 5-7 days. If we discussed "wait and see" antibiotics and if over the next few days the symptoms worsen start the antibiotics I have given you.   -  If you are female and on BCP and do take the antibiotics, use additional methods to prevent pregnancy while on the antibiotics and for one cycle after.   -  Flonase (fluticasone) is a nasal spray which is available over the counter and may help with your symptoms.   -  Zyrtec D, Claritin D or Allegra D can also help with symptoms of congestion and drainage.   -  If you have hypertension avoid using the "D" which is the decongestant.  Instead you can use Coricidin HBP for cold and cough symptoms.    -  If you just have drainage you can take plain Zyrtec, Claritin or Allegra   -  If you just have a congested feeling you can take pseudoephedrine (unless you have high blood pressure) which you have to sign for behind the counter. Do not buy the phenylephrine which is on the shelf as it is not effective   -  Rest and fluids are also important.   -  Tylenol or ibuprofen can also be used as directed for pain unless you have an allergy to them or medical condition such as stomach ulcers, kidney or liver disease or blood thinners etc for which you should not be taking these type of medications.   -  If you are flying in the next few days Afrin nose drops for the airplane flight upon take off and landing may help. Other than at those times refrain from using afrin.   - If you were prescribed a narcotic do not drive or operate heavy machinery while taking these medications.                  "

## 2020-10-29 NOTE — PATIENT INSTRUCTIONS
"Your test was NEGATIVE for COVID-19 (coronavirus), Strep Throat and INFLUENZA A/B.      You may leave home and/or return to work when the following conditions are met:   24 hours fever free without fever-reducing medications AND   Improved symptoms      If your symptoms worsen or if you have any other concerns, please contact [facility] at [phone].     Sincerely,    Aida Rose, NP                                                             URI   If your condition worsens or fails to improve we recommend that you receive another evaluation at the ER immediately or contact your PCP to discuss your concerns or return here. You must understand that you've received an urgent care treatment only and that you may be released before all your medical problems are known or treated. You the patient will [facility] as instructed.   If we discussed that I think your illness is viral, it will not respond to antibiotics and will last 5-7 days. If we discussed "wait and see" antibiotics and if over the next few days the symptoms worsen start the antibiotics I have given you.   -  If you are female and on BCP and do take the antibiotics, use additional methods to prevent pregnancy while on the antibiotics and for one cycle after.   -  Flonase (fluticasone) is a nasal spray which is available over the counter and may help with your symptoms.   -  Zyrtec D, Claritin D or Allegra D can also help with symptoms of congestion and drainage.   -  If you have hypertension avoid using the "D" which is the decongestant.  Instead you can use Coricidin HBP for cold and cough symptoms.    -  If you just have drainage you can take plain Zyrtec, Claritin or Allegra   -  If you just have a congested feeling you can take pseudoephedrine (unless you have high blood pressure) which you have to sign for behind the counter. Do not buy the phenylephrine which is on the shelf as it is not effective   -  Rest and fluids are " also important.   -  Tylenol or ibuprofen can also be used as directed for pain unless you have an allergy to them or medical condition such as stomach ulcers, kidney or liver disease or blood thinners etc for which you should not be taking these type of medications.   -  If you are flying in the next few days Afrin nose drops for the airplane flight upon take off and landing may help. Other than at those times refrain from using afrin.   - If you were prescribed a narcotic do not drive or operate heavy machinery while taking these medications.         Cough, Chronic, Uncertain Cause (Adult)    Everyone has had a cough as part of the common cold, flu, or bronchitis. This kind of cough occurs along with an achy feeling, low-grade fever, nasal and sinus congestion, and a scratchy or sore throat. This usually gets better in 2 to 3 weeks. A cough that lasts longer than 3 weeks may be due to other causes.  If your cough does not improve over the next 2 weeks, further testing may be needed. Follow up with your healthcare provider as advised. Cough suppressants may be recommended. Based on your exam today, the exact cause of your cough is not certain. Below are some common causes for persistent cough.  Smokers cough  Smokers cough doesnt go away. If you continue to smoke, it only gets worse. The cough is from irritation in the air passages. Talk to your healthcare provider about quitting. Medicines or nicotine-replacement products, like gum or the patch, may make quitting easier.  Postnasal drip  A cough that is worse at night may be due to postnasal drip. Excess mucus in the nose drains from the back of your nose to your throat. This triggers the cough reflex. Postnasal drip may be due to a sinus infection or allergy. Common allergens include dust, tobacco smoke (both inhaled and secondhand smoke), environmental pollutants, pollen, mold, pets, cleaning agents, room deodorizers, and chemical fumes. Over-the-counter  antihistamines or decongestants may be helpful for allergies. A sinus infection may requires antibiotic treatment. See your healthcare provider if symptoms continue.  Medicines  Certain prescribed medicines can cause a chronic cough in some people:  · ACE inhibitors for high blood pressure. These include benazepril, captopril, enalapril, fosinopril, lisinopril, quinapril, ramipril, and others.  · Beta-blockers for high blood pressure and other conditions. These include propranolol, atenolol, metoprolol, nadolol, and others.  Let your healthcare provider know if you are taking any of these.  Asthma  Cough may be the only sign of mild asthma. You may have tests to find out if asthma is causing your cough. You may also take asthma medicine on a trial basis.  Acid reflux (heartburn, GERD)  The esophagus is the tube that carries food from the mouth to the stomach. A valve at its lower end prevents stomach acids from flowing upward. If this valve does not work properly, acid from the stomach enters the esophagus. This may cause a burning pain in the upper abdomen or lower chest, belching, or cough. Symptoms are often worse when lying flat. Avoid eating or drinking before bedtime. Try using extra pillows to raise your upper body, or place 4-inch blocks under the head of your bed. You may try an over-the-counter antacid or an acid-blocking medicine such as famotidine, cimetidine, ranitidine, esomeprazole, lansoprazole, or omeprazole. Stronger medicines for this condition can be prescribed by your healthcare provider.  Follow-up care  Follow up with your healthcare provider, or as advised, if your cough does not improve. Further testing may be needed.  Note: If an X-ray was taken, a specialist will review it. You will be notified of any new findings that may affect your care.  When to seek medical advice  Call your healthcare provider right away if any of these occur:  · Mild wheezing or difficulty breathing  · Fever of  100.4ºF (38ºC) or higher, or as directed by your healthcare provider  · Unexpected weight loss  · Coughing up large amounts of colored sputum  · Night sweats (sheets and pajamas get soaking wet)  Call 911, or get immediate medical care  Contact emergency services right away if any of these occur:  · Coughing up blood  · Moderate to severe trouble breathing or wheezing  Date Last Reviewed: 9/13/2015  © 6365-1339 [employer]. [address] [place] [address], [place] [address]. All rights reserved. This information is not intended as a substitute for professional medical care. Always follow your healthcare professional's instructions.

## 2020-10-29 NOTE — LETTER
[address] [facility], [address] ? [place], [address] ? Phone [phone] ? Fax             Return to Work/School    Patient: Shirlene Olvera  YOB: 1955   Date: 10/29/2020      To Whom It May Concern:     Shirlene Olvera was in contact with/seen in my office on 10/29/2020. COVID-19 is present in our communities across the state. Not all patients are eligible or appropriate to be tested. In this situation, your employee meets the following criteria:     Shirlene Olvera has met the criteria for COVID-19 testing and has a NEGATIVE result. The employee can return to work once they are asymptomatic for 24 hours without the use of fever reducing medications (Tylenol, Motrin, etc).     If you have any questions or concerns, or if I can be of further assistance, please do not hesitate to contact me.     Sincerely,    Aida Rose NP

## 2020-10-31 ENCOUNTER — TELEPHONE (OUTPATIENT)
Dept: URGENT CARE | Facility: CLINIC | Age: 65
End: 2020-10-31

## 2020-10-31 NOTE — TELEPHONE ENCOUNTER
Spoke to patient. She has a question about an antibiotic being called in. Sent message to provider.

## 2020-11-03 ENCOUNTER — TELEPHONE (OUTPATIENT)
Dept: URGENT CARE | Facility: CLINIC | Age: 65
End: 2020-11-03

## 2020-11-03 NOTE — TELEPHONE ENCOUNTER
Patient returned my call. States she is still coughing and feels a lot of mucus trying to break up. Patient instructed to follow up for further evaluation per provider instructions. She said she will wait a few more days and return to clinic if symptoms persist.

## 2020-11-03 NOTE — TELEPHONE ENCOUNTER
Attempted to contact patient regarding provider's suggestion for patient to return to clinic for further eval if symptoms still persist. No answer. Left message for patient to return my call.

## 2020-11-05 ENCOUNTER — HOSPITAL ENCOUNTER (OUTPATIENT)
Dept: RADIOLOGY | Facility: CLINIC | Age: 65
Discharge: HOME | End: 2020-11-05
Attending: PHYSICIAN ASSISTANT
Payer: COMMERCIAL

## 2020-11-05 ENCOUNTER — OFFICE VISIT (OUTPATIENT)
Dept: URGENT CARE | Facility: CLINIC | Age: 65
End: 2020-11-05
Payer: COMMERCIAL

## 2020-11-05 ENCOUNTER — TELEPHONE (OUTPATIENT)
Dept: URGENT CARE | Facility: CLINIC | Age: 65
End: 2020-11-05

## 2020-11-05 ENCOUNTER — PATIENT OUTREACH (OUTPATIENT)
Dept: ADMINISTRATIVE | Facility: HOSPITAL | Age: 65
End: 2020-11-05

## 2020-11-05 VITALS
TEMPERATURE: 98 F | WEIGHT: 149 LBS | RESPIRATION RATE: 20 BRPM | DIASTOLIC BLOOD PRESSURE: 90 MMHG | OXYGEN SATURATION: 98 % | BODY MASS INDEX: 24.79 KG/M2 | SYSTOLIC BLOOD PRESSURE: 156 MMHG | HEART RATE: 87 BPM

## 2020-11-05 DIAGNOSIS — J20.9 ACUTE BRONCHITIS, UNSPECIFIED ORGANISM: ICD-10-CM

## 2020-11-05 DIAGNOSIS — J20.9 ACUTE BRONCHITIS, UNSPECIFIED ORGANISM: Primary | ICD-10-CM

## 2020-11-05 LAB
CTP QC/QA: YES
SARS-COV-2 RDRP RESP QL NAA+PROBE: NEGATIVE

## 2020-11-05 PROCEDURE — 93005 EKG 12-LEAD: ICD-10-PCS | Mod: S$GLB,,, | Performed by: PHYSICIAN ASSISTANT

## 2020-11-05 PROCEDURE — U0002: ICD-10-PCS | Mod: QW,S$GLB,, | Performed by: PHYSICIAN ASSISTANT

## 2020-11-05 PROCEDURE — 71046 X-RAY EXAM CHEST 2 VIEWS: CPT | Mod: S$GLB,,, | Performed by: RADIOLOGY

## 2020-11-05 PROCEDURE — 71046 XR CHEST PA AND LATERAL: ICD-10-PCS | Mod: S$GLB,,, | Performed by: RADIOLOGY

## 2020-11-05 PROCEDURE — 93005 ELECTROCARDIOGRAM TRACING: CPT | Mod: S$GLB,,, | Performed by: PHYSICIAN ASSISTANT

## 2020-11-05 PROCEDURE — 93010 EKG 12-LEAD: ICD-10-PCS | Mod: S$GLB,,, | Performed by: INTERNAL MEDICINE

## 2020-11-05 PROCEDURE — U0002 COVID-19 LAB TEST NON-CDC: HCPCS | Mod: QW,S$GLB,, | Performed by: PHYSICIAN ASSISTANT

## 2020-11-05 PROCEDURE — 99214 OFFICE O/P EST MOD 30 MIN: CPT | Mod: S$GLB,,, | Performed by: PHYSICIAN ASSISTANT

## 2020-11-05 PROCEDURE — 93010 ELECTROCARDIOGRAM REPORT: CPT | Mod: S$GLB,,, | Performed by: INTERNAL MEDICINE

## 2020-11-05 PROCEDURE — 99214 PR OFFICE/OUTPT VISIT, EST, LEVL IV, 30-39 MIN: ICD-10-PCS | Mod: S$GLB,,, | Performed by: PHYSICIAN ASSISTANT

## 2020-11-05 RX ORDER — HYDROCODONE POLISTIREX AND CHLORPHENIRAMINE POLISTIREX 10; 8 MG/5ML; MG/5ML
5 SUSPENSION, EXTENDED RELEASE ORAL EVERY 12 HOURS PRN
Qty: 120 ML | Refills: 0 | Status: SHIPPED | OUTPATIENT
Start: 2020-11-05 | End: 2020-11-05

## 2020-11-05 RX ORDER — ALBUTEROL SULFATE 90 UG/1
2 AEROSOL, METERED RESPIRATORY (INHALATION) EVERY 6 HOURS PRN
Qty: 6.7 G | Refills: 1 | Status: SHIPPED | OUTPATIENT
Start: 2020-11-05 | End: 2022-03-28

## 2020-11-05 RX ORDER — PREDNISONE 20 MG/1
20 TABLET ORAL 2 TIMES DAILY
Qty: 10 TABLET | Refills: 0 | Status: SHIPPED | OUTPATIENT
Start: 2020-11-05 | End: 2020-11-10

## 2020-11-05 RX ORDER — HYDROCODONE POLISTIREX AND CHLORPHENIRAMINE POLISTIREX 10; 8 MG/5ML; MG/5ML
5 SUSPENSION, EXTENDED RELEASE ORAL EVERY 12 HOURS PRN
Qty: 70 ML | Refills: 0 | Status: SHIPPED | OUTPATIENT
Start: 2020-11-05 | End: 2020-11-12

## 2020-11-05 NOTE — TELEPHONE ENCOUNTER
[facility] will need prescription for Tussionex changed to brand name only with a note that it is medically necessary for greater than 7 days.

## 2020-11-05 NOTE — PATIENT INSTRUCTIONS
CDC Testing and Quarantine Guidelines for Exposure:  A close exposure is defined as anyone who had a masked or an unmasked exposure to a known COVID -19 positive person, at less than 6 ft for more than 15 minutes. If your exposure meets this definition, then you are required to quarantine for 14 days per the CDC. They now recommend that a test can be performed if you are asymptomatic (someone who does not have any symptoms), and a test should be done if you develop symptoms after an exposure as described above.    If you meet the definition of a close exposure, it does not matter whether or not you are asymptomatic or symptomatic - A NEGATIVE TEST DOES NOT GET YOU OUT OF 14 DAYS OF QUARANTINE!    Please note that if you are asymptomatic and wait more than 4 days to test after an exposure, you risk lengthening your quarantine. This is because if you test positive as an asymptomatic, your isolation is 10 days from the date of the positive test, not the date of exposure. So for example, if you test positive as an asymptomatic on day 7 from exposure, you have now extended your 14 day quarantine to a 17 day isolation.    If your exposure does not meet the above definition, you may return to your normal activities including social distancing, wearing masks, and frequent handwashing.      Bronchitis with Wheezing (Viral or Bacterial: Adult)  Albuterol for wheeze and shortness of breath, steroids, and cough medication. Go to ER for any worsening shortness of breath or shortness of breath unrelieved by inhaler, severe chest pain or passing out.     Bronchitis is an infection of the air passages. It often occurs during a cold and is usually caused by a virus. Symptoms include cough with mucus (phlegm) and low-grade fever. This illness is contagious during the first few days and is spread through the air by coughing and sneezing, or by direct contact (touching the sick person and then touching your own eyes, nose, or  mouth).  If there is a lot of inflammation, air flow is restricted. The air passages may also go into spasm, especially if you have asthma. This causes wheezing and difficulty breathing even in people who do not have asthma.  Bronchitis usually lasts 7 to 14 days. The wheezing should improve with treatment during the first week. An inhaler is often prescribed to relax the air passages and stop wheezing. Antibiotics will be prescribed if your doctor thinks there is also a secondary bacterial infection.  Home care  · If symptoms are severe, rest at home for the first 2 to 3 days. When you go back to your usual activities, don't let yourself get too tired.  · Do not smoke. Also avoid being exposed to secondhand smoke.  · You may use over-the-counter medicine to control fever or pain, unless another medicine was prescribed. Note: If you have chronic liver or kidney disease or have ever had a stomach ulcer or gastrointestinal bleeding, talk with your healthcare provider before using these medicines. Also talk to your provider if you are taking medicine to prevent blood clots.) Aspirin should never be given to anyone younger than 18 years of age who is ill with a viral infection or fever. It may cause severe liver or brain damage.  · Your appetite may be poor, so a light diet is fine. Avoid dehydration by drinking 6 to 8 glasses of fluids per day (such as water, soft drinks, sports drinks, juices, tea, or soup). Extra fluids will help loosen secretions in the nose and lungs.  · Over-the-counter cough, cold, and sore-throat medicines will not shorten the length of the illness, but they may be helpful to reduce symptoms. (Note: Do not use decongestants if you have high blood pressure.)  · If you were given an inhaler, use it exactly as directed. If you need to use it more often than prescribed, your condition may be worsening. If this happens, contact your healthcare provider.  · If prescribed, finish all antibiotic  medicine, even if you are feeling better after only a few days.  Follow-up care  Follow up with your healthcare provider, or as advised. If you had an X-ray or ECG (electrocardiogram), a specialist will review it. You will be notified of any new findings that may affect your care.  Note: If you are age 65 or older, or if you have a chronic lung disease or condition that affects your immune system, or you smoke, talk to your healthcare provider about having a pneumococcal vaccinations and a yearly influenza vaccination (flu shot).  When to seek medical advice  Call your healthcare provider right away if any of these occur:  · Fever of 100.4°F (38°C) or higher  · Coughing up increasing amounts of colored sputum  · Weakness, drowsiness, headache, facial pain, ear pain, or a stiff neck  Call 911, or get immediate medical care  Contact emergency services right away if any of these occur.  · Coughing up blood  · Worsening weakness, drowsiness, headache, or stiff neck  · Increased wheezing not helped with medication, shortness of breath, or pain with breathing  Date Last Reviewed: 9/13/2015  © 4138-9445 [employer]. [address] [place] [address]. All rights reserved. This information is not intended as a substitute for professional medical care. Always follow your healthcare professional's instructions.        Bronchitis, Viral (Adult)    You have a viral bronchitis. Bronchitis is inflammation and swelling of the lining of the lungs. This is often caused by an infection. Symptoms include a dry, hacking cough that is worse at night. The cough may bring up yellow-green mucus. You may also feel short of breath or wheeze. Other symptoms may include tiredness, chest discomfort, and chills.  Bronchitis that is caused by a virus is not treated with antibiotics. Instead, medicines may be given to help relieve symptoms. Symptoms can last up to 2 weeks, although the cough may last much longer.  This illness  is contagious during the first few days and is spread through the air by coughing and sneezing, or by direct contact (touching the sick person and then touching your own eyes, nose, or mouth).  Most viral illnesses resolve within 10 to 14 days with rest and simple home remedies, although they may sometimes last for several weeks.  Home care  · If symptoms are severe, rest at home for the first 2 to 3 days. When you go back to your usual activities, don't let yourself get too tired.  · Do not smoke. Also avoid being exposed to secondhand smoke.  · You may use over-the-counter medicine to control fever or pain, unless another pain medicine was prescribed. (Note: If you have chronic liver or kidney disease or have ever had a stomach ulcer or gastrointestinal bleeding, talk with your healthcare provider before using these medicines. Also talk to your provider if you are taking medicine to prevent blood clots.) Aspirin should never be given to anyone younger than 18 years of age who is ill with a viral infection or fever. It may cause severe liver or brain damage.  · Your appetite may be poor, so a light diet is fine. Avoid dehydration by drinking 6 to 8 glasses of fluids per day (such as water, soft drinks, sports drinks, juices, tea, or soup). Extra fluids will help loosen secretions in the nose and lungs.  · Over-the-counter cough, cold, and sore-throat medicines will not shorten the length of the illness, but they may help to reduce symptoms. (Note: Do not use decongestants if you have high blood pressure.)  Follow-up care  Follow up with your healthcare provider, or as advised. If you had an X-ray or ECG (electrocardiogram), a specialist will review it. You will be notified of any new findings that may affect your care.  Note: If you are age 65 or older, or if you have a chronic lung disease or condition that affects your immune system, or you smoke, talk to your healthcare provider about having pneumococcal  vaccinations and a yearly influenza vaccination (flu shot).  When to seek medical advice  Call your healthcare provider right away if any of these occur:  · Fever of 100.4°F (38°C) or higher  · Coughing up increased amounts of colored sputum  · Weakness, drowsiness, headache, facial pain, ear pain, or a stiff neck  Call 911, or get immediate medical care  Contact emergency services right away if any of these occur:  · Coughing up blood  · Worsening weakness, drowsiness, headache, or stiff neck  · Trouble breathing, wheezing, or pain with breathing  Date Last Reviewed: 9/13/2015  © 0267-8296 [employer]. [address] [place] [address], [place] [id number]. All rights reserved. This information is not intended as a substitute for professional medical care. Always follow your healthcare professional's instructions.

## 2020-11-05 NOTE — LETTER
November 5, 2020      [facility]  [address], [address]  [place] [address]  Phone: [phone]       Patient: Shirlene Olvera   YOB: 1955  Date of Visit: 11/05/2020    To Whom It May Concern:    Maggy Olvera  was at [facility] on 11/05/2020. Known covid exposure on 11/2/20. Her test was negative today. However, CDC still recommends self quarantine for 14 days post exposure. She may return to work/school on 11/17/20 with no restrictions. If you have any questions or concerns, or if I can be of further assistance, please do not hesitate to contact me.    Sincerely,    Izabella Bailon PA-C

## 2020-11-05 NOTE — PROGRESS NOTES
Subjective:       Patient ID: Shirlene Olvera is a 65 y.o. female.    Vitals:  weight is 67.6 kg (149 lb). Her temperature is 97.8 °F (36.6 °C). Her blood pressure is 156/90 (abnormal) and her pulse is 87. Her respiration is 20 and oxygen saturation is 98%.     Chief Complaint: Cough    Persistent cough for 8 days. Hacking cough with green mucus. Mild dyspnea and chest tightness that occurs with cough. No fever. Here on 10/29 was negative for COVID but had significant exposure to her Grand daughter, who tested positive for COVID on this Tuesday. Pt is an every day smoker. No dx of COPD     Cough  This is a recurrent problem. The current episode started 1 to 4 weeks ago (10/27/20). The problem has been gradually worsening. The cough is non-productive. Associated symptoms include headaches, myalgias, nasal congestion, postnasal drip, a sore throat, shortness of breath and wheezing. Pertinent negatives include no rash. The symptoms are aggravated by cold air. Risk factors for lung disease include smoking/tobacco exposure. Treatments tried: cold and flu medicine. The treatment provided mild relief.       Constitution: Positive for fatigue and generalized weakness.   HENT: Positive for congestion, postnasal drip and sore throat.    Neck: negative. Negative for neck pain and neck stiffness.   Cardiovascular: Negative.  Negative for chest trauma, leg swelling, palpitations, sob on exertion and passing out.   Eyes: Negative.  Negative for eye itching and eye pain.   Respiratory: Positive for chest tightness, cough, sputum production, shortness of breath and wheezing. Negative for asthma.    Gastrointestinal: Negative.  Negative for abdominal pain, vomiting, constipation and diarrhea.   Endocrine: negative.   Genitourinary: Negative.  Negative for dysuria.   Musculoskeletal: Positive for muscle ache.   Skin: Negative.  Negative for rash and wound.   Allergic/Immunologic: Negative.  Negative for asthma.   Neurological:  Positive for headaches.   Hematologic/Lymphatic: Negative.    Psychiatric/Behavioral: Negative.        Objective:      Physical Exam   Constitutional: She is oriented to person, place, and time. She appears well-developed. She is cooperative.  Non-toxic appearance. She does not appear ill. No distress.   HENT:   Head: Normocephalic and atraumatic.   Ears:   Right Ear: Hearing, tympanic membrane, external ear and ear canal normal.   Left Ear: Hearing, tympanic membrane, external ear and ear canal normal.   Nose: Congestion present. No mucosal edema, rhinorrhea or nasal deformity. No epistaxis. Right sinus exhibits no maxillary sinus tenderness and no frontal sinus tenderness. Left sinus exhibits no maxillary sinus tenderness and no frontal sinus tenderness.   Mouth/Throat: Uvula is midline, oropharynx is clear and moist and mucous membranes are normal. No trismus in the jaw. Normal dentition. No uvula swelling. No oropharyngeal exudate, posterior oropharyngeal edema or posterior oropharyngeal erythema.   Eyes: Conjunctivae and lids are normal. No scleral icterus.   Neck: Trachea normal, full passive range of motion without pain and phonation normal. Neck supple. No neck rigidity. No edema and no erythema present.   Cardiovascular: Normal rate, regular rhythm, normal heart sounds and normal pulses.   Pulmonary/Chest: Effort normal and breath sounds normal. No accessory muscle usage. No tachypnea. No respiratory distress. She has no decreased breath sounds. She has no rhonchi.   Coarse BS bilaterally. Mild exp wheeze that improves with inhalation. No tachypnea or distress     Comments: Coarse BS bilaterally. Mild exp wheeze that improves with inhalation. No tachypnea or distress     Abdominal: Normal appearance.   Musculoskeletal: Normal range of motion.         General: No deformity.   Neurological: She is alert and oriented to person, place, and time. She exhibits normal muscle tone. Coordination normal.   Skin: Skin  is warm, dry, intact, not diaphoretic and not pale. Psychiatric: Her speech is normal and behavior is normal. Judgment and thought content normal.   Nursing note and vitals reviewed.        Results for orders placed or performed in visit on 11/05/20   POCT COVID-19 Rapid Screening   Result Value Ref Range    POC Rapid COVID Negative Negative     Acceptable Yes      X-ray Chest Pa And Lateral    Result Date: 11/5/2020  EXAMINATION: XR CHEST PA AND LATERAL CLINICAL HISTORY: Acute bronchitis, unspecified TECHNIQUE: PA and lateral views of the chest were performed. COMPARISON: 05/13/2015 FINDINGS: The lungs are clear, with normal appearance of pulmonary vasculature and no pleural effusion or pneumothorax. The cardiac silhouette is normal in size. The hilar and mediastinal contours are unremarkable. Bones show no acute abnormalities.  There are mild degenerative changes.  There is stable surgical clips over the left supraclavicular region.     No acute radiographic findings in the chest. Electronically signed by: Jose G Alejandro MD Date:    11/05/2020 Time:    12:52    ECG reviewed and interpreted by me: NSR HR 57 bpm. Sinus bradycardia. No ST changes except old T wave inversion AVL V2 unchanged from 2018. No STEMI. Impression: sinus bradycardia     Assessment:       1. Acute bronchitis, unspecified organism        Plan:       ECG no acute changes. CXR clear. Will tx for bronchitis. Pt does not wish to stop smoking at this time. Given education. Discussed risks and benefits of steroids, pt will closely monitor while taking them. RX prednisone, albuterol and tussionex. Given ED precautions for any worsening chest pain or shortness of breath.    Acute bronchitis, unspecified organism  -     POCT COVID-19 Rapid Screening  -     EKG 12-lead; Future  -     X-Ray Chest PA And Lateral; Future; Expected date: 11/05/2020    Other orders  -     Discontinue: hydrocodone-chlorpheniramine (TUSSIONEX) 10-8 mg/5 mL  suspension; Take 5 mLs by mouth every 12 (twelve) hours as needed for Cough or Congestion.  Dispense: 120 mL; Refill: 0  -     predniSONE (DELTASONE) 20 MG tablet; Take 1 tablet (20 mg total) by mouth 2 (two) times daily. for 5 days  Dispense: 10 tablet; Refill: 0  -     albuterol (VENTOLIN HFA) 90 mcg/actuation inhaler; Inhale 2 puffs into the lungs every 6 (six) hours as needed for Wheezing. Rescue  Dispense: 6.7 g; Refill: 1  -     hydrocodone-chlorpheniramine (TUSSIONEX) 10-8 mg/5 mL suspension; Take 5 mLs by mouth every 12 (twelve) hours as needed for Cough or Congestion. Brand name only TUSSIONEX  Dispense: 70 mL; Refill: 0        Izabella Bailon PA-C  [facility]

## 2020-11-06 ENCOUNTER — SPECIALTY PHARMACY (OUTPATIENT)
Dept: PHARMACY | Facility: CLINIC | Age: 65
End: 2020-11-06

## 2020-11-14 ENCOUNTER — SPECIALTY PHARMACY (OUTPATIENT)
Dept: PHARMACY | Facility: CLINIC | Age: 65
End: 2020-11-14

## 2020-11-14 NOTE — TELEPHONE ENCOUNTER
Specialty Pharmacy - Refill Coordination    Specialty Medication Orders Linked to Encounter      Most Recent Value   Medication #1  evolocumab (REPATHA SURECLICK) 140 mg/mL PnIj (Order#[id number], Rx#[id number])          Refill Questions - Documented Responses      Most Recent Value   Relationship to patient of person spoken to?  Self   HIPAA/medical authority confirmed?  Yes   Any changes in contact preferences or allowed representatives?  No   Has the patient had any insurance changes?  No   Has the patient had any changes to specialty medication, dose, or instructions?  No   Has the patient started taking any new medications, herbals, or supplements?  No   Has the patient been diagnosed with any new medical conditions?  No   Does the patient have any new allergies to medications or foods?  No   Does the patient have any concerns about side effects?  No   Can the patient store medication/sharps container properly (at the correct temperature, away from children/pets, etc.)?  Yes   Can the patient call emergency services (911) in the event of an emergency?  Yes   Does the patient have any concerns or questions about taking or administering this medication as prescribed?  No   How many doses did the patient miss in the past 4 weeks or since the last fill?  0   How many doses does the patient have on hand?  0   How many days does the patient report on hand quantity will last?  0   Does the number of doses/days supply remaining match pharmacy expected amounts?  Yes   Does the patient feel that this medication is effective?  Yes   During the past 4 weeks, has patient missed any activities due to condition or medication?  No   During the past 4 weeks, did patient have any of the following urgent care visits?  None   How will the patient receive the medication?  Mail   When does the patient need to receive the medication?  11/20/20   Shipping Address  Home   Expected Copay ($)  5   Is the patient able to afford the  medication copay?  Yes   Payment Method  CC on file   Days supply of Refill  28   Would patient like to speak to a pharmacist?  No   Do you want to trigger an intervention?  No   Do you want to trigger an additional referral task?  No   Refill activity completed?  Yes   Refill activity plan  Refill scheduled   Shipment/Pickup Date:  11/19/20          Current Outpatient Medications   Medication Sig    albuterol (VENTOLIN HFA) 90 mcg/actuation inhaler Inhale 2 puffs into the lungs every 6 (six) hours as needed for Wheezing. Rescue    ALPRAZolam (XANAX) 0.25 MG tablet Take 1 tablet (0.25 mg total) by mouth daily as needed for Anxiety. (Patient not taking: Reported on 10/29/2020)    aspirin 81 MG Chew Take 1 tablet (81 mg total) by mouth once daily.    buPROPion (WELLBUTRIN XL) 150 MG TB24 tablet Take 1 tablet (150 mg total) by mouth once daily.    cyanocobalamin (VITAMIN B-12) 1000 MCG tablet Take 100 mcg by mouth once daily.    diclofenac sodium (VOLTAREN) 1 % Gel Apply 2 g topically 4 (four) times daily as needed. For muscular pain    evolocumab (REPATHA SURECLICK) 140 mg/mL PnIj Inject 1 mL (140 mg total) into the skin every 14 (fourteen) days.    evolocumab (REPATHA SURECLICK) 140 mg/mL PnIj Inject 1 mL (140 mg total) into the skin every 14 (fourteen) days. Test claim only    fluocinolone acetonide oil (DERMOTIC OIL) 0.01 % Drop Place 3 drops in ear(s) 2 (two) times daily.    fluticasone propionate (FLONASE) 50 mcg/actuation nasal spray INSTILL 2 SPRAYS IN EACH NOSTRIL ONCE DAILY.    gabapentin (NEURONTIN) 100 MG capsule Take 1 capsule (100 mg total) by mouth 3 (three) times daily.    ibuprofen (ADVIL,MOTRIN) 800 MG tablet Take 1 tablet (800 mg total) by mouth 3 (three) times daily as needed for Pain. Do not take more than 3 pills in one day.    loratadine (CLARITIN) 10 mg tablet Take 10 mg by mouth daily as needed for Allergies.    metoprolol tartrate (LOPRESSOR) 50 MG tablet Take 1 tablet (50 mg  total) by mouth 2 (two) times daily.    multivitamin with minerals tablet Take 1 tablet by mouth once daily.    mupirocin calcium 2% (BACTROBAN) 2 % cream Apply topically 2 (two) times daily.    neomycin-polymyxin-hydrocortisone (CORTISPORIN) 3.5-10,000-1 mg/mL-unit/mL-% otic suspension INSTILL 2 TO 4 DROPS INTO AFFECTED EAR 4 TIMES DAILY FOR 5 DAYS    pantoprazole (PROTONIX) 20 MG tablet Take 1 tablet (20 mg total) by mouth once daily.    polyethylene glycol (GLYCOLAX) 17 gram/dose powder Take 17 g by mouth once daily.    valsartan-hydrochlorothiazide (DIOVAN-HCT) 160-12.5 mg per tablet Take 1 tablet by mouth once daily.    vitamin D (VITAMIN D3) 1000 units Tab Take 1,000 Units by mouth once daily.   Last reviewed on 11/5/2020 12:56 PM by Izabella Bailon PA-C    Review of patient's allergies indicates:   Allergen Reactions    Cholesterol analogues     Statins-hmg-coa reductase inhibitors Other (See Comments)     Myalgias to lipitor and simvastatin    Last reviewed on  11/14/2020 3:21 PM by Janny Finch      Tasks added this encounter   12/7/2020 - Refill Call (Auto Added)   Tasks due within next 3 months   No tasks due.     Janny Finch  [facility]  [address] [place] [id number]  Phone: [phone]  Fax: [phone]

## 2020-12-08 ENCOUNTER — TELEPHONE (OUTPATIENT)
Dept: PRIMARY CARE CLINIC | Facility: CLINIC | Age: 65
End: 2020-12-08

## 2020-12-08 ENCOUNTER — OFFICE VISIT (OUTPATIENT)
Dept: PRIMARY CARE CLINIC | Facility: CLINIC | Age: 65
End: 2020-12-08
Payer: COMMERCIAL

## 2020-12-08 ENCOUNTER — OFFICE VISIT (OUTPATIENT)
Dept: OTOLARYNGOLOGY | Facility: CLINIC | Age: 65
End: 2020-12-08
Payer: COMMERCIAL

## 2020-12-08 VITALS
DIASTOLIC BLOOD PRESSURE: 90 MMHG | TEMPERATURE: 98 F | HEART RATE: 60 BPM | SYSTOLIC BLOOD PRESSURE: 160 MMHG | OXYGEN SATURATION: 98 % | WEIGHT: 152.69 LBS | BODY MASS INDEX: 25.41 KG/M2

## 2020-12-08 VITALS
BODY MASS INDEX: 25.5 KG/M2 | DIASTOLIC BLOOD PRESSURE: 75 MMHG | SYSTOLIC BLOOD PRESSURE: 155 MMHG | HEART RATE: 63 BPM | WEIGHT: 153.25 LBS | TEMPERATURE: 99 F

## 2020-12-08 DIAGNOSIS — I10 ESSENTIAL HYPERTENSION: ICD-10-CM

## 2020-12-08 DIAGNOSIS — R42 DIZZINESS: Primary | ICD-10-CM

## 2020-12-08 DIAGNOSIS — I10 ESSENTIAL HYPERTENSION: Primary | ICD-10-CM

## 2020-12-08 DIAGNOSIS — R42 DIZZINESS: ICD-10-CM

## 2020-12-08 DIAGNOSIS — R51.9 NONINTRACTABLE HEADACHE, UNSPECIFIED CHRONICITY PATTERN, UNSPECIFIED HEADACHE TYPE: ICD-10-CM

## 2020-12-08 DIAGNOSIS — R42 DIZZINESS AND GIDDINESS: ICD-10-CM

## 2020-12-08 PROCEDURE — 1101F PR PT FALLS ASSESS DOC 0-1 FALLS W/OUT INJ PAST YR: ICD-10-PCS | Mod: CPTII,S$GLB,, | Performed by: FAMILY MEDICINE

## 2020-12-08 PROCEDURE — 99999 PR PBB SHADOW E&M-EST. PATIENT-LVL IV: ICD-10-PCS | Mod: PBBFAC,,, | Performed by: ORTHOPAEDIC SURGERY

## 2020-12-08 PROCEDURE — 3008F BODY MASS INDEX DOCD: CPT | Mod: CPTII,S$GLB,, | Performed by: ORTHOPAEDIC SURGERY

## 2020-12-08 PROCEDURE — 1125F PR PAIN SEVERITY QUANTIFIED, PAIN PRESENT: ICD-10-PCS | Mod: S$GLB,,, | Performed by: ORTHOPAEDIC SURGERY

## 2020-12-08 PROCEDURE — 3008F PR BODY MASS INDEX (BMI) DOCUMENTED: ICD-10-PCS | Mod: CPTII,S$GLB,, | Performed by: ORTHOPAEDIC SURGERY

## 2020-12-08 PROCEDURE — 3288F PR FALLS RISK ASSESSMENT DOCUMENTED: ICD-10-PCS | Mod: CPTII,S$GLB,, | Performed by: FAMILY MEDICINE

## 2020-12-08 PROCEDURE — 99244 OFF/OP CNSLTJ NEW/EST MOD 40: CPT | Mod: S$GLB,,, | Performed by: ORTHOPAEDIC SURGERY

## 2020-12-08 PROCEDURE — 3288F FALL RISK ASSESSMENT DOCD: CPT | Mod: CPTII,S$GLB,, | Performed by: FAMILY MEDICINE

## 2020-12-08 PROCEDURE — 99999 PR PBB SHADOW E&M-EST. PATIENT-LVL IV: CPT | Mod: PBBFAC,,, | Performed by: ORTHOPAEDIC SURGERY

## 2020-12-08 PROCEDURE — 3008F PR BODY MASS INDEX (BMI) DOCUMENTED: ICD-10-PCS | Mod: CPTII,S$GLB,, | Performed by: FAMILY MEDICINE

## 2020-12-08 PROCEDURE — 99214 OFFICE O/P EST MOD 30 MIN: CPT | Mod: S$GLB,,, | Performed by: FAMILY MEDICINE

## 2020-12-08 PROCEDURE — 3077F SYST BP >= 140 MM HG: CPT | Mod: CPTII,S$GLB,, | Performed by: FAMILY MEDICINE

## 2020-12-08 PROCEDURE — 3078F DIAST BP <80 MM HG: CPT | Mod: CPTII,S$GLB,, | Performed by: FAMILY MEDICINE

## 2020-12-08 PROCEDURE — 1125F AMNT PAIN NOTED PAIN PRSNT: CPT | Mod: S$GLB,,, | Performed by: ORTHOPAEDIC SURGERY

## 2020-12-08 PROCEDURE — 99999 PR PBB SHADOW E&M-EST. PATIENT-LVL V: CPT | Mod: PBBFAC,,, | Performed by: FAMILY MEDICINE

## 2020-12-08 PROCEDURE — 99214 PR OFFICE/OUTPT VISIT, EST, LEVL IV, 30-39 MIN: ICD-10-PCS | Mod: S$GLB,,, | Performed by: FAMILY MEDICINE

## 2020-12-08 PROCEDURE — 3077F PR MOST RECENT SYSTOLIC BLOOD PRESSURE >= 140 MM HG: ICD-10-PCS | Mod: CPTII,S$GLB,, | Performed by: FAMILY MEDICINE

## 2020-12-08 PROCEDURE — 1159F MED LIST DOCD IN RCRD: CPT | Mod: S$GLB,,, | Performed by: FAMILY MEDICINE

## 2020-12-08 PROCEDURE — 99999 PR PBB SHADOW E&M-EST. PATIENT-LVL V: ICD-10-PCS | Mod: PBBFAC,,, | Performed by: FAMILY MEDICINE

## 2020-12-08 PROCEDURE — 1101F PT FALLS ASSESS-DOCD LE1/YR: CPT | Mod: CPTII,S$GLB,, | Performed by: FAMILY MEDICINE

## 2020-12-08 PROCEDURE — 1126F AMNT PAIN NOTED NONE PRSNT: CPT | Mod: S$GLB,,, | Performed by: FAMILY MEDICINE

## 2020-12-08 PROCEDURE — 3008F BODY MASS INDEX DOCD: CPT | Mod: CPTII,S$GLB,, | Performed by: FAMILY MEDICINE

## 2020-12-08 PROCEDURE — 1159F PR MEDICATION LIST DOCUMENTED IN MEDICAL RECORD: ICD-10-PCS | Mod: S$GLB,,, | Performed by: FAMILY MEDICINE

## 2020-12-08 PROCEDURE — 99244 PR OFFICE CONSULTATION,LEVEL IV: ICD-10-PCS | Mod: S$GLB,,, | Performed by: ORTHOPAEDIC SURGERY

## 2020-12-08 PROCEDURE — 1126F PR PAIN SEVERITY QUANTIFIED, NO PAIN PRESENT: ICD-10-PCS | Mod: S$GLB,,, | Performed by: FAMILY MEDICINE

## 2020-12-08 PROCEDURE — 3078F PR MOST RECENT DIASTOLIC BLOOD PRESSURE < 80 MM HG: ICD-10-PCS | Mod: CPTII,S$GLB,, | Performed by: FAMILY MEDICINE

## 2020-12-08 RX ORDER — VALSARTAN AND HYDROCHLOROTHIAZIDE 160; 25 MG/1; MG/1
1 TABLET ORAL DAILY
Qty: 90 TABLET | Refills: 3 | Status: SHIPPED | OUTPATIENT
Start: 2020-12-08 | End: 2021-12-09

## 2020-12-08 RX ORDER — ONDANSETRON 4 MG/1
4 TABLET, ORALLY DISINTEGRATING ORAL EVERY 6 HOURS PRN
Qty: 30 TABLET | Refills: 1 | Status: SHIPPED | OUTPATIENT
Start: 2020-12-08 | End: 2021-08-02
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
## 2020-12-08 NOTE — PROGRESS NOTES
Subjective:      Patient ID: Shirlene Olvera is a 65 y.o. female.    Chief Complaint: Headache, Shortness of Breath (sometimes ), and Dizziness    Disclaimer:  This note is prepared using voice recognition software and as such is likely to have errors and has not been proof read. Please contact me for questions.     Shirlene Olvera is a 65 y.o. female who presents today for headache and dizziness.  Recently went to urgent care.  Reports that the dizziness is been there for about 2 days.  Does report some changes with position.  Also does feel nauseated at times with it as well.  Was starting to take a new antihistamine.    Also with a headaches that been more recurrent.  She missed work today.  Uncertain what the cause may be.  Blood pressure is also elevated today as well.  Is on metoprolol tartrate as well as Diovan HCTZ low-dose.     She was recently approved for [facility] has been seeing Cardiology.  Is still on a daily aspirin.  Plans on holding it though for 1 week prior to getting her teeth pulled.    Is still having neck and back discomfort.  Has known DDD and cervical and lumbar regions also with some daily headaches.  Has tried some ibuprofen 800 as well as some muscle relaxants.  Did try the gabapentin but only using it intermittently.  Not much benefit.        Headache   This is a new problem. The problem occurs intermittently. The problem has been gradually worsening. The pain is located in the temporal and bilateral region. The quality of the pain is described as squeezing. The pain is at a severity of 9/10. The pain is severe. Associated symptoms include dizziness, nausea, photophobia, sinus pressure, a visual change and weakness. Pertinent negatives include no abdominal pain, phonophobia or scalp tenderness. The symptoms are aggravated by activity, weather changes and work. Treatments tried: gabapentin. The treatment provided moderate relief. Her past medical history is significant for  hypertension and sinus disease.   Dizziness:   Chronicity:  New  Onset:  In the past 7 days  Progression since onset:  Waxing and waning  Frequency:  Every few minutes  Severity:  Severe  Dizziness characteristics:  Off-balance, trouble focusing eyes and lightheaded/impending faint  Frequency of Spells:  Weekly   Associated symptoms: headaches, nausea, weakness, visual disturbances and light-headedness.  Aggravated by:  Nothing and position changes  Treatments tried:  Body position changes  Improvements on treatment:  No relief      Lab Results   Component Value Date    WBC 6.57 10/19/2020    HGB 14.5 10/19/2020    HCT 44.3 10/19/2020     10/19/2020    CHOL 157 10/19/2020    TRIG 210 (H) 10/19/2020    HDL 48 10/19/2020    ALT 15 10/19/2020    AST 19 10/19/2020     10/19/2020    K 4.6 10/19/2020     10/19/2020    CREATININE 1.2 10/19/2020    BUN 20 10/19/2020    CO2 29 10/19/2020    TSH 0.468 10/19/2020    INR 0.9 05/13/2015    HGBA1C 5.3 10/19/2020       X-Ray Chest PA And Lateral  Narrative: EXAMINATION:  XR CHEST PA AND LATERAL    CLINICAL HISTORY:  Acute bronchitis, unspecified    TECHNIQUE:  PA and lateral views of the chest were performed.    COMPARISON:  05/13/2015    FINDINGS:  The lungs are clear, with normal appearance of pulmonary vasculature and no pleural effusion or pneumothorax.    The cardiac silhouette is normal in size. The hilar and mediastinal contours are unremarkable.    Bones show no acute abnormalities.  There are mild degenerative changes.  There is stable surgical clips over the left supraclavicular region.  Impression: No acute radiographic findings in the chest.    Electronically signed by: Jose G Alejandro MD  Date:    11/05/2020  Time:    12:52        Review of Systems   Constitutional: Positive for activity change. Negative for appetite change, fatigue and unexpected weight change.   HENT: Positive for sinus pressure.    Eyes: Positive for photophobia.    Gastrointestinal: Positive for nausea. Negative for abdominal pain.   Neurological: Positive for dizziness, weakness, light-headedness and headaches.   Psychiatric/Behavioral: Negative for decreased concentration, dysphoric mood and sleep disturbance. The patient is not nervous/anxious.      Objective:     Vitals:    12/08/20 1007   BP: (!) 160/90   Pulse: 60   Temp: 98.1 °F (36.7 °C)   SpO2: 98%   Weight: 69.3 kg (152 lb 10.7 oz)     Physical Exam  Vitals signs and nursing note reviewed.   Constitutional:       General: She is awake.      Appearance: Normal appearance. She is well-developed, well-groomed and normal weight.   HENT:      Head: Normocephalic and atraumatic.      Right Ear: Tympanic membrane and external ear normal.      Left Ear: Tympanic membrane and external ear normal.      Nose: Nose normal.   Eyes:      Conjunctiva/sclera: Conjunctivae normal.   Neck:      Musculoskeletal: Normal range of motion and neck supple.      Thyroid: No thyromegaly or thyroid tenderness.      Vascular: No carotid bruit.   Cardiovascular:      Rate and Rhythm: Normal rate and regular rhythm.      Heart sounds: Normal heart sounds.   Pulmonary:      Effort: Pulmonary effort is normal. No accessory muscle usage.      Breath sounds: Normal breath sounds.   Neurological:      General: No focal deficit present.      Mental Status: She is alert. Mental status is at baseline.      Comments: Present dizziness with some beats of nystagmus with left for gaze and return back to [facility].   Psychiatric:         Attention and Perception: Attention normal.         Mood and Affect: Mood normal.         Speech: Speech normal.         Behavior: Behavior normal. Behavior is cooperative.         Thought Content: Thought content normal.         Judgment: Judgment normal.       Assessment:     1. Dizziness    2. Essential hypertension    3. Nonintractable headache, unspecified chronicity pattern, unspecified headache type    4. Dizziness and  giddiness      Plan:   Shirlene was seen today for headache, shortness of breath and dizziness.    Diagnoses and all orders for this visit:    Dizziness  Comments:  Refer to ENT and audiology today suspect related to vertigo as well as elevated blood pressure adjust medicines  Orders:  -     Ambulatory referral/consult to ENT; Future  -     Ambulatory referral/consult to Audiology; Future    Essential hypertension  Comments:  Not controlled increase Diovan from 160 HCTZ 12.5 to HCTZ component 25 mg  Orders:  -     CT Head Without Contrast; Future    Nonintractable headache, unspecified chronicity pattern, unspecified headache type  Comments:  New suspect related to dizziness vertigo as well as higher blood pressure adjust medicines  Orders:  -     CT Head Without Contrast; Future    Dizziness and giddiness  -     CT Head Without Contrast; Future    Other orders  -     valsartan-hydrochlorothiazide (DIOVAN-HCT) 160-25 mg per tablet; Take 1 tablet by mouth once daily.  -     ondansetron (ZOFRAN-ODT) 4 MG TbDL; Take 1 tablet (4 mg total) by mouth every 6 (six) hours as needed (nausea). (Patient not taking: Reported on 12/8/2020)            Follow up if symptoms worsen or fail to improve.    There are no Patient Instructions on file for this visit.

## 2020-12-08 NOTE — LETTER
December 8, 2020      [facility]  [address]  [place] [address]  Phone: [phone]  Fax: [phone]       Patient: Shirlene Olvera   YOB: 1955  Date of Visit: 12/08/2020    To Whom It May Concern:    Maggy Olvera  was at [facility] on 12/08/2020. She may return to work/school on 12/10/2020 with no restrictions. If you have any questions or concerns, or if I can be of further assistance, please do not hesitate to contact me.    Sincerely,    ELVIA Graf MA

## 2020-12-08 NOTE — PROGRESS NOTES
Subjective:      Patient ID: Shirlene Olvera is a 65 y.o. female.    Chief Complaint: Dizziness    Patient is a very pleasant 65 y.o. female here to see me today in consultation at the request of Dr. Segovia for evaluation of dizziness.   She reports that the symptoms have been present for the last 2 days.  On average, the patent reports symptoms that occur multiple times each days.  She describes the dizziness as a sensation of unsteadiness and a sensation of movement of surroundings and says that it lasts minutes.  She feels lightheaded and weak as if she is going to pass out.  She has noted that rapid head movement acts as a trigger.  She denies aural pressure and otalgia.  She has noted tinnitus, hearing loss- both at her baseline.  She has recently had an adjustment in her BP medication today, increased one medication and switched another (changed due to cost, not because of side effects).          Review of Systems   Constitutional: Negative for chills, fatigue, fever and unexpected weight change.   HENT: Positive for hearing loss and tinnitus. Negative for congestion, dental problem, ear discharge, ear pain, facial swelling, nosebleeds, postnasal drip, rhinorrhea, sinus pressure, sneezing, sore throat, trouble swallowing and voice change.    Eyes: Negative for redness, itching and visual disturbance.   Respiratory: Negative for cough, choking, shortness of breath and wheezing.    Cardiovascular: Negative for chest pain and palpitations.   Gastrointestinal: Negative for abdominal pain.        No reflux.   Musculoskeletal: Negative for gait problem.   Skin: Negative for rash.   Neurological: Positive for light-headedness (off balance). Negative for dizziness and headaches.       Objective:       Physical Exam  Constitutional:       General: She is not in acute distress.     Appearance: She is well-developed.   HENT:      Head: Normocephalic and atraumatic.      Right Ear: Tympanic membrane, ear canal and  external ear normal.      Left Ear: Tympanic membrane, ear canal and external ear normal.      Nose: Nose normal. No nasal deformity, septal deviation, mucosal edema or rhinorrhea.      Right Sinus: No maxillary sinus tenderness or frontal sinus tenderness.      Left Sinus: No maxillary sinus tenderness or frontal sinus tenderness.      Mouth/Throat:      Mouth: Mucous membranes are not pale and not dry.      Dentition: No dental caries.      Pharynx: Uvula midline. No oropharyngeal exudate or posterior oropharyngeal erythema.   Eyes:      General: Lids are normal. No scleral icterus.     Extraocular Movements:      Right eye: Normal extraocular motion and no nystagmus.      Left eye: Normal extraocular motion and no nystagmus.      Conjunctiva/sclera: Conjunctivae normal.      Right eye: Right conjunctiva is not injected. No chemosis.     Left eye: Left conjunctiva is not injected. No chemosis.     Pupils: Pupils are equal, round, and reactive to light.   Neck:      Thyroid: No thyroid mass or thyromegaly.      Trachea: Trachea and phonation normal. No tracheal tenderness or tracheal deviation.   Pulmonary:      Effort: Pulmonary effort is normal. No respiratory distress.      Breath sounds: No stridor.   Abdominal:      General: There is no distension.   Lymphadenopathy:      Head:      Right side of head: No submental, submandibular, preauricular, posterior auricular or occipital adenopathy.      Left side of head: No submental, submandibular, preauricular, posterior auricular or occipital adenopathy.      Cervical: No cervical adenopathy.   Skin:     General: Skin is warm and dry.      Findings: No erythema or rash.   Neurological:      Mental Status: She is alert and oriented to person, place, and time.      Cranial Nerves: No cranial nerve deficit.   Psychiatric:         Behavior: Behavior normal.         Assessment:       1. Essential hypertension    2. Dizziness        Plan:     Essential  hypertension    Dizziness  -     Ambulatory referral/consult to ENT      I had a long discussion with the patient regarding their symptoms.  Dizziness, vertigo and disequilibrium are common symptoms reported by adults during visits to their doctors. They are all symptoms that can result from a peripheral vestibular disorder (a dysfunction of the balance organs of the inner ear) or central vestibular disorder (a dysfunction of one or more parts of the central nervous system that help process balance and spatial information).  There are also non-vestibular causes of dizziness, and dizziness can be linked to a wide array of problems such as blood-flow irregularities from cardiovascular problems and blood pressure fluctuations.  I would recommend a VNG with audiogram for further diagnostic testing, and will contact the patient with further recommendations when that is complete.

## 2020-12-10 ENCOUNTER — SPECIALTY PHARMACY (OUTPATIENT)
Dept: PHARMACY | Facility: CLINIC | Age: 65
End: 2020-12-10

## 2020-12-10 NOTE — TELEPHONE ENCOUNTER
Specialty Pharmacy - Refill Coordination    Specialty Medication Orders Linked to Encounter      Most Recent Value   Medication #1  evolocumab (REPATHA SURECLICK) 140 mg/mL PnIj (Order#[id number], Rx#[id number])          Refill Questions - Documented Responses      Most Recent Value   Relationship to patient of person spoken to?  Self   HIPAA/medical authority confirmed?  Yes   Any changes in contact preferences or allowed representatives?  No   Has the patient had any insurance changes?  No   Has the patient had any changes to specialty medication, dose, or instructions?  No   Has the patient started taking any new medications, herbals, or supplements?  No   Does the patient have any new allergies to medications or foods?  No   Does the patient have any concerns about side effects?  No   Can the patient store medication/sharps container properly (at the correct temperature, away from children/pets, etc.)?  Yes   Can the patient call emergency services (911) in the event of an emergency?  Yes   Does the patient have any concerns or questions about taking or administering this medication as prescribed?  No   How many doses did the patient miss in the past 4 weeks or since the last fill?  0   How many doses does the patient have on hand?  0   Does the number of doses/days supply remaining match pharmacy expected amounts?  Yes   Does the patient feel that this medication is effective?  Yes   During the past 4 weeks, has patient missed any activities due to condition or medication?  No   During the past 4 weeks, did patient have any of the following urgent care visits?  None   How will the patient receive the medication?  Mail   When does the patient need to receive the medication?  12/20/20   Shipping Address  Home   Address in [facility] confirmed and updated if neccessary?  Yes   Expected Copay ($)  5   Is the patient able to afford the medication copay?  Yes   Payment Method  CC on file   Days supply of  Refill  28   Would patient like to speak to a pharmacist?  No   Do you want to trigger an intervention?  No   Do you want to trigger an additional referral task?  No   Refill activity completed?  Yes   Refill activity plan  Refill scheduled   Shipment/Pickup Date:  12/16/20          Current Outpatient Medications   Medication Sig    albuterol (VENTOLIN HFA) 90 mcg/actuation inhaler Inhale 2 puffs into the lungs every 6 (six) hours as needed for Wheezing. Rescue    ALPRAZolam (XANAX) 0.25 MG tablet Take 1 tablet (0.25 mg total) by mouth daily as needed for Anxiety.    aspirin 81 MG Chew Take 1 tablet (81 mg total) by mouth once daily.    buPROPion (WELLBUTRIN XL) 150 MG TB24 tablet Take 1 tablet (150 mg total) by mouth once daily.    cyanocobalamin (VITAMIN B-12) 1000 MCG tablet Take 100 mcg by mouth once daily.    diclofenac sodium (VOLTAREN) 1 % Gel Apply 2 g topically 4 (four) times daily as needed. For muscular pain    evolocumab (REPATHA SURECLICK) 140 mg/mL PnIj Inject 1 mL (140 mg total) into the skin every 14 (fourteen) days.    evolocumab (REPATHA SURECLICK) 140 mg/mL PnIj Inject 1 mL (140 mg total) into the skin every 14 (fourteen) days. Test claim only    fexofenadine HCl (ALLEGRA ORAL) Take 1 tablet by mouth once daily.    fluocinolone acetonide oil (DERMOTIC OIL) 0.01 % Drop Place 3 drops in ear(s) 2 (two) times daily. (Patient taking differently: Place 3 drops in ear(s) 2 (two) times daily as needed. )    fluticasone propionate (FLONASE) 50 mcg/actuation nasal spray INSTILL 2 SPRAYS IN EACH NOSTRIL ONCE DAILY.    gabapentin (NEURONTIN) 100 MG capsule Take 1 capsule (100 mg total) by mouth 3 (three) times daily.    ibuprofen (ADVIL,MOTRIN) 800 MG tablet Take 1 tablet (800 mg total) by mouth 3 (three) times daily as needed for Pain. Do not take more than 3 pills in one day.    loratadine (CLARITIN) 10 mg tablet Take 10 mg by mouth daily as needed for Allergies.    metoprolol tartrate  (LOPRESSOR) 50 MG tablet Take 1 tablet (50 mg total) by mouth 2 (two) times daily.    multivitamin with minerals tablet Take 1 tablet by mouth once daily.    mupirocin calcium 2% (BACTROBAN) 2 % cream Apply topically 2 (two) times daily.    neomycin-polymyxin-hydrocortisone (CORTISPORIN) 3.5-10,000-1 mg/mL-unit/mL-% otic suspension INSTILL 2 TO 4 DROPS INTO AFFECTED EAR 4 TIMES DAILY FOR 5 DAYS    ondansetron (ZOFRAN-ODT) 4 MG TbDL Take 1 tablet (4 mg total) by mouth every 6 (six) hours as needed (nausea). (Patient not taking: Reported on 12/8/2020)    pantoprazole (PROTONIX) 20 MG tablet TAKE 1 TABLET (20 MG TOTAL) BY MOUTH ONCE DAILY.    polyethylene glycol (GLYCOLAX) 17 gram/dose powder Take 17 g by mouth once daily.    valsartan-hydrochlorothiazide (DIOVAN-HCT) 160-12.5 mg per tablet Take 1 tablet by mouth once daily.    valsartan-hydrochlorothiazide (DIOVAN-HCT) 160-25 mg per tablet Take 1 tablet by mouth once daily.    vitamin D (VITAMIN D3) 1000 units Tab Take 1,000 Units by mouth once daily.   Last reviewed on 12/8/2020  4:02 PM by Jhoana Segovia MD    Review of patient's allergies indicates:   Allergen Reactions    Cholesterol analogues     Statins-hmg-coa reductase inhibitors Other (See Comments)     Myalgias to lipitor and simvastatin    Last reviewed on  12/8/2020 4:02 PM by Jhoana Segovia      Tasks added this encounter   1/7/2021 - Refill Call (Auto Added)   Tasks due within next 3 months   No tasks due.     Slime Carvajal, PharmD  [facility]  [address] [place] [address]  Phone: [phone]  Fax: [phone]

## 2020-12-11 ENCOUNTER — HOSPITAL ENCOUNTER (OUTPATIENT)
Dept: RADIOLOGY | Facility: HOSPITAL | Age: 65
Discharge: HOME | End: 2020-12-11
Attending: FAMILY MEDICINE
Payer: COMMERCIAL

## 2020-12-11 DIAGNOSIS — R42 DIZZINESS AND GIDDINESS: ICD-10-CM

## 2020-12-11 DIAGNOSIS — I10 ESSENTIAL HYPERTENSION: ICD-10-CM

## 2020-12-11 DIAGNOSIS — R51.9 NONINTRACTABLE HEADACHE, UNSPECIFIED CHRONICITY PATTERN, UNSPECIFIED HEADACHE TYPE: ICD-10-CM

## 2020-12-11 PROCEDURE — 70450 CT HEAD/BRAIN W/O DYE: CPT | Mod: TC

## 2020-12-11 PROCEDURE — 70450 CT HEAD/BRAIN W/O DYE: CPT | Mod: 26,,, | Performed by: RADIOLOGY

## 2020-12-11 PROCEDURE — 70450 CT HEAD WITHOUT CONTRAST: ICD-10-PCS | Mod: 26,,, | Performed by: RADIOLOGY

## 2020-12-16 ENCOUNTER — PATIENT OUTREACH (OUTPATIENT)
Dept: ADMINISTRATIVE | Facility: HOSPITAL | Age: 65
End: 2020-12-16

## 2020-12-16 NOTE — PROGRESS NOTES
Working HTN Report        Called pt for home BP Reading, No answer, L/M, Portal Message Sent        Mohini WAGNER LPN Care Coordinator  Care Coordination Department  [facility]  [phone]

## 2020-12-22 ENCOUNTER — TELEPHONE (OUTPATIENT)
Dept: AUDIOLOGY | Facility: CLINIC | Age: 65
End: 2020-12-22

## 2020-12-22 NOTE — TELEPHONE ENCOUNTER
Patient requested to be R/S at [facility]. Accepted first available for 1/11/21 at 8:30 AM. Re-instructed verbally /rtoma/  ----- Message from Kelvin Osei sent at 12/22/2020 11:55 AM CST -----  Type:  Sooner Apoointment Request    Caller is requesting a sooner appointment.  Caller declined first available appointment listed below.  Caller will not accept being placed on the waitlist and is requesting a message be sent to doctor.  Name of Caller:Pt  When is the first available appointment?  Symptoms:  Would the patient rather a call back or a response via [facility]? Call   Best Call Back Number:[phone] (home)   Additional Information:  Pt would like to reschedule appointment. States that she would like to go to [facility] location.   Please call back asap.

## 2021-01-11 ENCOUNTER — CLINICAL SUPPORT (OUTPATIENT)
Dept: AUDIOLOGY | Facility: CLINIC | Age: 66
End: 2021-01-11
Payer: COMMERCIAL

## 2021-01-11 ENCOUNTER — TELEPHONE (OUTPATIENT)
Dept: OTOLARYNGOLOGY | Facility: CLINIC | Age: 66
End: 2021-01-11

## 2021-01-11 DIAGNOSIS — R42 DIZZINESS: ICD-10-CM

## 2021-01-11 DIAGNOSIS — H90.3 SENSORY HEARING LOSS, BILATERAL: Primary | ICD-10-CM

## 2021-01-11 DIAGNOSIS — H93.13 TINNITUS, BILATERAL: ICD-10-CM

## 2021-01-11 PROCEDURE — 92537 PR CALORIC VSTBLR TEST W/REC BITHERMAL: ICD-10-PCS | Mod: S$GLB,,, | Performed by: AUDIOLOGIST-HEARING AID FITTER

## 2021-01-11 PROCEDURE — 92567 PR TYMPA2METRY: ICD-10-PCS | Mod: S$GLB,,, | Performed by: AUDIOLOGIST-HEARING AID FITTER

## 2021-01-11 PROCEDURE — 92540 PR VESTIBULAR EVAL NYSTAG FOVL&PERPH STIM OSCIL TRACKING: ICD-10-PCS | Mod: S$GLB,,, | Performed by: AUDIOLOGIST-HEARING AID FITTER

## 2021-01-11 PROCEDURE — 92557 PR COMPREHENSIVE HEARING TEST: ICD-10-PCS | Mod: S$GLB,,, | Performed by: AUDIOLOGIST-HEARING AID FITTER

## 2021-01-11 PROCEDURE — 92537 CALORIC VSTBLR TEST W/REC: CPT | Mod: S$GLB,,, | Performed by: AUDIOLOGIST-HEARING AID FITTER

## 2021-01-11 PROCEDURE — 92540 BASIC VESTIBULAR EVALUATION: CPT | Mod: S$GLB,,, | Performed by: AUDIOLOGIST-HEARING AID FITTER

## 2021-01-11 PROCEDURE — 92567 TYMPANOMETRY: CPT | Mod: S$GLB,,, | Performed by: AUDIOLOGIST-HEARING AID FITTER

## 2021-01-11 PROCEDURE — 92557 COMPREHENSIVE HEARING TEST: CPT | Mod: S$GLB,,, | Performed by: AUDIOLOGIST-HEARING AID FITTER

## 2021-01-13 ENCOUNTER — PATIENT MESSAGE (OUTPATIENT)
Dept: PHARMACY | Facility: CLINIC | Age: 66
End: 2021-01-13

## 2021-01-15 ENCOUNTER — SPECIALTY PHARMACY (OUTPATIENT)
Dept: PHARMACY | Facility: CLINIC | Age: 66
End: 2021-01-15

## 2021-02-12 ENCOUNTER — SPECIALTY PHARMACY (OUTPATIENT)
Dept: PHARMACY | Facility: CLINIC | Age: 66
End: 2021-02-12

## 2021-02-18 ENCOUNTER — TELEPHONE (OUTPATIENT)
Dept: PHARMACY | Facility: CLINIC | Age: 66
End: 2021-02-18

## 2021-03-09 ENCOUNTER — PATIENT OUTREACH (OUTPATIENT)
Dept: ADMINISTRATIVE | Facility: HOSPITAL | Age: 66
End: 2021-03-09

## 2021-04-03 ENCOUNTER — SPECIALTY PHARMACY (OUTPATIENT)
Dept: PHARMACY | Facility: CLINIC | Age: 66
End: 2021-04-03

## 2021-04-09 ENCOUNTER — SPECIALTY PHARMACY (OUTPATIENT)
Dept: PHARMACY | Facility: CLINIC | Age: 66
End: 2021-04-09

## 2021-05-04 ENCOUNTER — SPECIALTY PHARMACY (OUTPATIENT)
Dept: PHARMACY | Facility: CLINIC | Age: 66
End: 2021-05-04

## 2021-05-17 ENCOUNTER — PATIENT MESSAGE (OUTPATIENT)
Dept: PHARMACY | Facility: CLINIC | Age: 66
End: 2021-05-17

## 2021-05-17 ENCOUNTER — SPECIALTY PHARMACY (OUTPATIENT)
Dept: PHARMACY | Facility: CLINIC | Age: 66
End: 2021-05-17

## 2021-05-28 ENCOUNTER — SPECIALTY PHARMACY (OUTPATIENT)
Dept: PHARMACY | Facility: CLINIC | Age: 66
End: 2021-05-28

## 2021-06-21 ENCOUNTER — TELEPHONE (OUTPATIENT)
Dept: PRIMARY CARE CLINIC | Facility: CLINIC | Age: 66
End: 2021-06-21

## 2021-06-25 ENCOUNTER — PATIENT MESSAGE (OUTPATIENT)
Dept: ENDOSCOPY | Facility: HOSPITAL | Age: 66
End: 2021-06-25

## 2021-06-25 ENCOUNTER — OFFICE VISIT (OUTPATIENT)
Dept: INTERNAL MEDICINE | Facility: CLINIC | Age: 66
End: 2021-06-25
Payer: MEDICARE

## 2021-06-25 VITALS
WEIGHT: 155.44 LBS | DIASTOLIC BLOOD PRESSURE: 88 MMHG | HEIGHT: 65 IN | SYSTOLIC BLOOD PRESSURE: 130 MMHG | TEMPERATURE: 97 F | BODY MASS INDEX: 25.9 KG/M2 | OXYGEN SATURATION: 98 % | HEART RATE: 81 BPM

## 2021-06-25 DIAGNOSIS — R11.10 REGURGITATION OF FOOD: Primary | ICD-10-CM

## 2021-06-25 DIAGNOSIS — Z72.0 TOBACCO ABUSE: ICD-10-CM

## 2021-06-25 DIAGNOSIS — R10.13 DYSPEPSIA: ICD-10-CM

## 2021-06-25 PROCEDURE — 99999 PR PBB SHADOW E&M-EST. PATIENT-LVL V: CPT | Mod: PBBFAC,,, | Performed by: PHYSICIAN ASSISTANT

## 2021-06-25 PROCEDURE — 1101F PR PT FALLS ASSESS DOC 0-1 FALLS W/OUT INJ PAST YR: ICD-10-PCS | Mod: CPTII,S$GLB,, | Performed by: PHYSICIAN ASSISTANT

## 2021-06-25 PROCEDURE — 1125F PR PAIN SEVERITY QUANTIFIED, PAIN PRESENT: ICD-10-PCS | Mod: S$GLB,,, | Performed by: PHYSICIAN ASSISTANT

## 2021-06-25 PROCEDURE — 1125F AMNT PAIN NOTED PAIN PRSNT: CPT | Mod: S$GLB,,, | Performed by: PHYSICIAN ASSISTANT

## 2021-06-25 PROCEDURE — 3008F BODY MASS INDEX DOCD: CPT | Mod: CPTII,S$GLB,, | Performed by: PHYSICIAN ASSISTANT

## 2021-06-25 PROCEDURE — 3288F FALL RISK ASSESSMENT DOCD: CPT | Mod: CPTII,S$GLB,, | Performed by: PHYSICIAN ASSISTANT

## 2021-06-25 PROCEDURE — 1159F PR MEDICATION LIST DOCUMENTED IN MEDICAL RECORD: ICD-10-PCS | Mod: S$GLB,,, | Performed by: PHYSICIAN ASSISTANT

## 2021-06-25 PROCEDURE — 3008F PR BODY MASS INDEX (BMI) DOCUMENTED: ICD-10-PCS | Mod: CPTII,S$GLB,, | Performed by: PHYSICIAN ASSISTANT

## 2021-06-25 PROCEDURE — 99999 PR PBB SHADOW E&M-EST. PATIENT-LVL V: ICD-10-PCS | Mod: PBBFAC,,, | Performed by: PHYSICIAN ASSISTANT

## 2021-06-25 PROCEDURE — 3288F PR FALLS RISK ASSESSMENT DOCUMENTED: ICD-10-PCS | Mod: CPTII,S$GLB,, | Performed by: PHYSICIAN ASSISTANT

## 2021-06-25 PROCEDURE — 1159F MED LIST DOCD IN RCRD: CPT | Mod: S$GLB,,, | Performed by: PHYSICIAN ASSISTANT

## 2021-06-25 PROCEDURE — 99214 OFFICE O/P EST MOD 30 MIN: CPT | Mod: S$GLB,,, | Performed by: PHYSICIAN ASSISTANT

## 2021-06-25 PROCEDURE — 99214 PR OFFICE/OUTPT VISIT, EST, LEVL IV, 30-39 MIN: ICD-10-PCS | Mod: S$GLB,,, | Performed by: PHYSICIAN ASSISTANT

## 2021-06-25 PROCEDURE — 1101F PT FALLS ASSESS-DOCD LE1/YR: CPT | Mod: CPTII,S$GLB,, | Performed by: PHYSICIAN ASSISTANT

## 2021-06-26 DIAGNOSIS — Z01.818 PRE-OP TESTING: Primary | ICD-10-CM

## 2021-07-09 ENCOUNTER — PATIENT MESSAGE (OUTPATIENT)
Dept: PHARMACY | Facility: CLINIC | Age: 66
End: 2021-07-09

## 2021-07-27 ENCOUNTER — TELEPHONE (OUTPATIENT)
Dept: ENDOSCOPY | Facility: HOSPITAL | Age: 66
End: 2021-07-27

## 2021-07-30 ENCOUNTER — SPECIALTY PHARMACY (OUTPATIENT)
Dept: PHARMACY | Facility: CLINIC | Age: 66
End: 2021-07-30

## 2021-08-02 ENCOUNTER — SPECIALTY PHARMACY (OUTPATIENT)
Dept: PHARMACY | Facility: CLINIC | Age: 66
End: 2021-08-02

## 2021-08-02 ENCOUNTER — OFFICE VISIT (OUTPATIENT)
Dept: INTERNAL MEDICINE | Facility: CLINIC | Age: 66
End: 2021-08-02
Payer: MEDICARE

## 2021-08-02 DIAGNOSIS — N18.31 STAGE 3A CHRONIC KIDNEY DISEASE: ICD-10-CM

## 2021-08-02 DIAGNOSIS — N30.90 CYSTITIS: ICD-10-CM

## 2021-08-02 DIAGNOSIS — I73.9 PAD (PERIPHERAL ARTERY DISEASE): ICD-10-CM

## 2021-08-02 DIAGNOSIS — F41.1 GAD (GENERALIZED ANXIETY DISORDER): ICD-10-CM

## 2021-08-02 DIAGNOSIS — N30.90 CYSTITIS: Primary | ICD-10-CM

## 2021-08-02 DIAGNOSIS — E78.00 PURE HYPERCHOLESTEROLEMIA: Chronic | ICD-10-CM

## 2021-08-02 PROBLEM — N18.30 CKD (CHRONIC KIDNEY DISEASE) STAGE 3, GFR 30-59 ML/MIN: Status: ACTIVE | Noted: 2021-08-02

## 2021-08-02 PROCEDURE — 1159F MED LIST DOCD IN RCRD: CPT | Mod: CPTII,95,, | Performed by: FAMILY MEDICINE

## 2021-08-02 PROCEDURE — 1160F PR REVIEW ALL MEDS BY PRESCRIBER/CLIN PHARMACIST DOCUMENTED: ICD-10-PCS | Mod: CPTII,95,, | Performed by: FAMILY MEDICINE

## 2021-08-02 PROCEDURE — 1160F RVW MEDS BY RX/DR IN RCRD: CPT | Mod: CPTII,95,, | Performed by: FAMILY MEDICINE

## 2021-08-02 PROCEDURE — 99499 RISK ADDL DX/OHS AUDIT: ICD-10-PCS | Mod: 95,,, | Performed by: FAMILY MEDICINE

## 2021-08-02 PROCEDURE — 1159F PR MEDICATION LIST DOCUMENTED IN MEDICAL RECORD: ICD-10-PCS | Mod: CPTII,95,, | Performed by: FAMILY MEDICINE

## 2021-08-02 PROCEDURE — 99499 UNLISTED E&M SERVICE: CPT | Mod: 95,,, | Performed by: FAMILY MEDICINE

## 2021-08-02 PROCEDURE — 99214 PR OFFICE/OUTPT VISIT, EST, LEVL IV, 30-39 MIN: ICD-10-PCS | Mod: 95,,, | Performed by: FAMILY MEDICINE

## 2021-08-02 PROCEDURE — 99214 OFFICE O/P EST MOD 30 MIN: CPT | Mod: 95,,, | Performed by: FAMILY MEDICINE

## 2021-08-02 PROCEDURE — 81001 URINALYSIS AUTO W/SCOPE: CPT | Performed by: FAMILY MEDICINE

## 2021-08-02 RX ORDER — SULFAMETHOXAZOLE AND TRIMETHOPRIM 800; 160 MG/1; MG/1
1 TABLET ORAL 2 TIMES DAILY
Qty: 14 TABLET | Refills: 0 | Status: SHIPPED | OUTPATIENT
Start: 2021-08-02 | End: 2021-08-16

## 2021-08-02 RX ORDER — SULFAMETHOXAZOLE AND TRIMETHOPRIM 800; 160 MG/1; MG/1
1 TABLET ORAL 2 TIMES DAILY
Qty: 14 TABLET | Refills: 0 | Status: SHIPPED | OUTPATIENT
Start: 2021-08-02 | End: 2021-08-02 | Stop reason: SDUPTHER

## 2021-08-02 RX ORDER — BUPROPION HYDROCHLORIDE 300 MG/1
300 TABLET ORAL DAILY
Qty: 90 TABLET | Refills: 0 | Status: SHIPPED | OUTPATIENT
Start: 2021-08-02 | End: 2022-02-17

## 2021-08-03 ENCOUNTER — PATIENT MESSAGE (OUTPATIENT)
Dept: INTERNAL MEDICINE | Facility: CLINIC | Age: 66
End: 2021-08-03

## 2021-08-03 LAB
BACTERIA #/AREA URNS AUTO: ABNORMAL /HPF
BILIRUB UR QL STRIP: NEGATIVE
CLARITY UR REFRACT.AUTO: ABNORMAL
COLOR UR AUTO: YELLOW
GLUCOSE UR QL STRIP: NEGATIVE
HGB UR QL STRIP: NEGATIVE
HYALINE CASTS UR QL AUTO: 10 /LPF
KETONES UR QL STRIP: NEGATIVE
LEUKOCYTE ESTERASE UR QL STRIP: ABNORMAL
MICROSCOPIC COMMENT: ABNORMAL
NITRITE UR QL STRIP: NEGATIVE
PH UR STRIP: 6 [PH] (ref 5–8)
PROT UR QL STRIP: NEGATIVE
RBC #/AREA URNS AUTO: 2 /HPF (ref 0–4)
SP GR UR STRIP: 1.01 (ref 1–1.03)
SQUAMOUS #/AREA URNS AUTO: 18 /HPF
URN SPEC COLLECT METH UR: ABNORMAL
WBC #/AREA URNS AUTO: 6 /HPF (ref 0–5)

## 2021-08-09 ENCOUNTER — PATIENT MESSAGE (OUTPATIENT)
Dept: ADMINISTRATIVE | Facility: HOSPITAL | Age: 66
End: 2021-08-09

## 2021-08-16 ENCOUNTER — OFFICE VISIT (OUTPATIENT)
Dept: INTERNAL MEDICINE | Facility: CLINIC | Age: 66
End: 2021-08-16
Payer: MEDICARE

## 2021-08-16 ENCOUNTER — HOSPITAL ENCOUNTER (OUTPATIENT)
Dept: RADIOLOGY | Facility: HOSPITAL | Age: 66
Discharge: HOME | End: 2021-08-16
Attending: FAMILY MEDICINE
Payer: MEDICARE

## 2021-08-16 VITALS
DIASTOLIC BLOOD PRESSURE: 80 MMHG | WEIGHT: 159.81 LBS | TEMPERATURE: 99 F | HEART RATE: 85 BPM | SYSTOLIC BLOOD PRESSURE: 136 MMHG | BODY MASS INDEX: 26.6 KG/M2

## 2021-08-16 DIAGNOSIS — D22.9 MULTIPLE NEVI: ICD-10-CM

## 2021-08-16 DIAGNOSIS — I10 ESSENTIAL HYPERTENSION: Chronic | ICD-10-CM

## 2021-08-16 DIAGNOSIS — E78.00 PURE HYPERCHOLESTEROLEMIA: Chronic | ICD-10-CM

## 2021-08-16 DIAGNOSIS — Z86.010 HISTORY OF COLON POLYPS: ICD-10-CM

## 2021-08-16 DIAGNOSIS — M50.30 DEGENERATIVE DISC DISEASE, CERVICAL: ICD-10-CM

## 2021-08-16 DIAGNOSIS — Z00.00 ROUTINE GENERAL MEDICAL EXAMINATION AT A HEALTH CARE FACILITY: Primary | ICD-10-CM

## 2021-08-16 PROCEDURE — 1126F PR PAIN SEVERITY QUANTIFIED, NO PAIN PRESENT: ICD-10-PCS | Mod: CPTII,S$GLB,, | Performed by: FAMILY MEDICINE

## 2021-08-16 PROCEDURE — 1159F MED LIST DOCD IN RCRD: CPT | Mod: CPTII,S$GLB,, | Performed by: FAMILY MEDICINE

## 2021-08-16 PROCEDURE — 3008F PR BODY MASS INDEX (BMI) DOCUMENTED: ICD-10-PCS | Mod: CPTII,S$GLB,, | Performed by: FAMILY MEDICINE

## 2021-08-16 PROCEDURE — 1160F RVW MEDS BY RX/DR IN RCRD: CPT | Mod: CPTII,S$GLB,, | Performed by: FAMILY MEDICINE

## 2021-08-16 PROCEDURE — 99999 PR PBB SHADOW E&M-EST. PATIENT-LVL V: CPT | Mod: PBBFAC,,, | Performed by: FAMILY MEDICINE

## 2021-08-16 PROCEDURE — 3079F DIAST BP 80-89 MM HG: CPT | Mod: CPTII,S$GLB,, | Performed by: FAMILY MEDICINE

## 2021-08-16 PROCEDURE — 72040 X-RAY EXAM NECK SPINE 2-3 VW: CPT | Mod: 26,,, | Performed by: RADIOLOGY

## 2021-08-16 PROCEDURE — 1160F PR REVIEW ALL MEDS BY PRESCRIBER/CLIN PHARMACIST DOCUMENTED: ICD-10-PCS | Mod: CPTII,S$GLB,, | Performed by: FAMILY MEDICINE

## 2021-08-16 PROCEDURE — 3008F BODY MASS INDEX DOCD: CPT | Mod: CPTII,S$GLB,, | Performed by: FAMILY MEDICINE

## 2021-08-16 PROCEDURE — 1126F AMNT PAIN NOTED NONE PRSNT: CPT | Mod: CPTII,S$GLB,, | Performed by: FAMILY MEDICINE

## 2021-08-16 PROCEDURE — 99397 PER PM REEVAL EST PAT 65+ YR: CPT | Mod: S$GLB,,, | Performed by: FAMILY MEDICINE

## 2021-08-16 PROCEDURE — 99397 PR PREVENTIVE VISIT,EST,65 & OVER: ICD-10-PCS | Mod: S$GLB,,, | Performed by: FAMILY MEDICINE

## 2021-08-16 PROCEDURE — 3075F SYST BP GE 130 - 139MM HG: CPT | Mod: CPTII,S$GLB,, | Performed by: FAMILY MEDICINE

## 2021-08-16 PROCEDURE — 3075F PR MOST RECENT SYSTOLIC BLOOD PRESS GE 130-139MM HG: ICD-10-PCS | Mod: CPTII,S$GLB,, | Performed by: FAMILY MEDICINE

## 2021-08-16 PROCEDURE — 3288F PR FALLS RISK ASSESSMENT DOCUMENTED: ICD-10-PCS | Mod: CPTII,S$GLB,, | Performed by: FAMILY MEDICINE

## 2021-08-16 PROCEDURE — 1101F PT FALLS ASSESS-DOCD LE1/YR: CPT | Mod: CPTII,S$GLB,, | Performed by: FAMILY MEDICINE

## 2021-08-16 PROCEDURE — 3288F FALL RISK ASSESSMENT DOCD: CPT | Mod: CPTII,S$GLB,, | Performed by: FAMILY MEDICINE

## 2021-08-16 PROCEDURE — 72040 X-RAY EXAM NECK SPINE 2-3 VW: CPT | Mod: TC,FY,PO

## 2021-08-16 PROCEDURE — 3079F PR MOST RECENT DIASTOLIC BLOOD PRESSURE 80-89 MM HG: ICD-10-PCS | Mod: CPTII,S$GLB,, | Performed by: FAMILY MEDICINE

## 2021-08-16 PROCEDURE — 1159F PR MEDICATION LIST DOCUMENTED IN MEDICAL RECORD: ICD-10-PCS | Mod: CPTII,S$GLB,, | Performed by: FAMILY MEDICINE

## 2021-08-16 PROCEDURE — 99999 PR PBB SHADOW E&M-EST. PATIENT-LVL V: ICD-10-PCS | Mod: PBBFAC,,, | Performed by: FAMILY MEDICINE

## 2021-08-16 PROCEDURE — 72040 XR CERVICAL SPINE 2 OR 3 VIEWS: ICD-10-PCS | Mod: 26,,, | Performed by: RADIOLOGY

## 2021-08-16 PROCEDURE — 1101F PR PT FALLS ASSESS DOC 0-1 FALLS W/OUT INJ PAST YR: ICD-10-PCS | Mod: CPTII,S$GLB,, | Performed by: FAMILY MEDICINE

## 2021-08-17 ENCOUNTER — TELEPHONE (OUTPATIENT)
Dept: INTERNAL MEDICINE | Facility: CLINIC | Age: 66
End: 2021-08-17

## 2021-08-23 ENCOUNTER — SPECIALTY PHARMACY (OUTPATIENT)
Dept: PHARMACY | Facility: CLINIC | Age: 66
End: 2021-08-23
Payer: MEDICARE

## 2021-09-13 ENCOUNTER — PATIENT MESSAGE (OUTPATIENT)
Dept: INFECTIOUS DISEASES | Facility: HOSPITAL | Age: 66
End: 2021-09-13

## 2021-09-13 ENCOUNTER — OFFICE VISIT (OUTPATIENT)
Dept: URGENT CARE | Facility: CLINIC | Age: 66
End: 2021-09-13
Payer: MEDICARE

## 2021-09-13 VITALS
HEIGHT: 65 IN | OXYGEN SATURATION: 98 % | DIASTOLIC BLOOD PRESSURE: 58 MMHG | BODY MASS INDEX: 26.66 KG/M2 | HEART RATE: 67 BPM | SYSTOLIC BLOOD PRESSURE: 112 MMHG | WEIGHT: 160 LBS | RESPIRATION RATE: 18 BRPM | TEMPERATURE: 100 F

## 2021-09-13 DIAGNOSIS — R52 BODY ACHES: ICD-10-CM

## 2021-09-13 DIAGNOSIS — U07.1 COVID-19 VIRUS DETECTED: Primary | ICD-10-CM

## 2021-09-13 LAB
CTP QC/QA: YES
SARS-COV-2 RDRP RESP QL NAA+PROBE: POSITIVE

## 2021-09-13 PROCEDURE — 99214 OFFICE O/P EST MOD 30 MIN: CPT | Mod: S$GLB,,, | Performed by: NURSE PRACTITIONER

## 2021-09-13 PROCEDURE — 3078F PR MOST RECENT DIASTOLIC BLOOD PRESSURE < 80 MM HG: ICD-10-PCS | Mod: CPTII,S$GLB,, | Performed by: NURSE PRACTITIONER

## 2021-09-13 PROCEDURE — 3074F SYST BP LT 130 MM HG: CPT | Mod: CPTII,S$GLB,, | Performed by: NURSE PRACTITIONER

## 2021-09-13 PROCEDURE — 3074F PR MOST RECENT SYSTOLIC BLOOD PRESSURE < 130 MM HG: ICD-10-PCS | Mod: CPTII,S$GLB,, | Performed by: NURSE PRACTITIONER

## 2021-09-13 PROCEDURE — 99214 PR OFFICE/OUTPT VISIT, EST, LEVL IV, 30-39 MIN: ICD-10-PCS | Mod: S$GLB,,, | Performed by: NURSE PRACTITIONER

## 2021-09-13 PROCEDURE — 1125F PR PAIN SEVERITY QUANTIFIED, PAIN PRESENT: ICD-10-PCS | Mod: CPTII,S$GLB,, | Performed by: NURSE PRACTITIONER

## 2021-09-13 PROCEDURE — 1159F MED LIST DOCD IN RCRD: CPT | Mod: CPTII,S$GLB,, | Performed by: NURSE PRACTITIONER

## 2021-09-13 PROCEDURE — U0002 COVID-19 LAB TEST NON-CDC: HCPCS | Mod: QW,CR,S$GLB, | Performed by: NURSE PRACTITIONER

## 2021-09-13 PROCEDURE — U0002: ICD-10-PCS | Mod: QW,CR,S$GLB, | Performed by: NURSE PRACTITIONER

## 2021-09-13 PROCEDURE — 3078F DIAST BP <80 MM HG: CPT | Mod: CPTII,S$GLB,, | Performed by: NURSE PRACTITIONER

## 2021-09-13 PROCEDURE — 3008F BODY MASS INDEX DOCD: CPT | Mod: CPTII,S$GLB,, | Performed by: NURSE PRACTITIONER

## 2021-09-13 PROCEDURE — 1125F AMNT PAIN NOTED PAIN PRSNT: CPT | Mod: CPTII,S$GLB,, | Performed by: NURSE PRACTITIONER

## 2021-09-13 PROCEDURE — 1159F PR MEDICATION LIST DOCUMENTED IN MEDICAL RECORD: ICD-10-PCS | Mod: CPTII,S$GLB,, | Performed by: NURSE PRACTITIONER

## 2021-09-13 PROCEDURE — 3008F PR BODY MASS INDEX (BMI) DOCUMENTED: ICD-10-PCS | Mod: CPTII,S$GLB,, | Performed by: NURSE PRACTITIONER

## 2021-09-14 ENCOUNTER — INFUSION (OUTPATIENT)
Dept: INFECTIOUS DISEASES | Facility: HOSPITAL | Age: 66
End: 2021-09-14
Attending: NURSE PRACTITIONER
Payer: MEDICARE

## 2021-09-14 VITALS
SYSTOLIC BLOOD PRESSURE: 129 MMHG | DIASTOLIC BLOOD PRESSURE: 63 MMHG | RESPIRATION RATE: 16 BRPM | HEART RATE: 72 BPM | TEMPERATURE: 98 F | OXYGEN SATURATION: 100 %

## 2021-09-14 DIAGNOSIS — U07.1 COVID-19: Primary | ICD-10-CM

## 2021-09-14 PROCEDURE — 25000003 PHARM REV CODE 250: Mod: HCWC | Performed by: INTERNAL MEDICINE

## 2021-09-14 PROCEDURE — M0243 CASIRIVI AND IMDEVI INFUSION: HCPCS | Performed by: INTERNAL MEDICINE

## 2021-09-14 PROCEDURE — 63600175 PHARM REV CODE 636 W HCPCS: Mod: HCWC | Performed by: INTERNAL MEDICINE

## 2021-09-14 RX ORDER — ACETAMINOPHEN 325 MG/1
650 TABLET ORAL ONCE AS NEEDED
Status: DISCONTINUED | OUTPATIENT
Start: 2021-09-14 | End: 2022-02-17

## 2021-09-14 RX ORDER — DIPHENHYDRAMINE HYDROCHLORIDE 50 MG/ML
25 INJECTION INTRAMUSCULAR; INTRAVENOUS ONCE AS NEEDED
Status: DISCONTINUED | OUTPATIENT
Start: 2021-09-14 | End: 2022-02-17

## 2021-09-14 RX ORDER — SODIUM CHLORIDE 0.9 % (FLUSH) 0.9 %
10 SYRINGE (ML) INJECTION
Status: DISCONTINUED | OUTPATIENT
Start: 2021-09-14 | End: 2022-02-17

## 2021-09-14 RX ORDER — ONDANSETRON 4 MG/1
4 TABLET, ORALLY DISINTEGRATING ORAL ONCE AS NEEDED
Status: DISCONTINUED | OUTPATIENT
Start: 2021-09-14 | End: 2022-02-17

## 2021-09-14 RX ORDER — ALBUTEROL SULFATE 90 UG/1
2 AEROSOL, METERED RESPIRATORY (INHALATION)
Status: DISCONTINUED | OUTPATIENT
Start: 2021-09-14 | End: 2022-02-17

## 2021-09-14 RX ORDER — EPINEPHRINE 0.3 MG/.3ML
0.3 INJECTION SUBCUTANEOUS
Status: DISCONTINUED | OUTPATIENT
Start: 2021-09-14 | End: 2022-02-17

## 2021-09-14 RX ADMIN — CASIRIVIMAB AND IMDEVIMAB 600 MG: 600; 600 INJECTION, SOLUTION, CONCENTRATE INTRAVENOUS at 08:09

## 2021-09-27 ENCOUNTER — OFFICE VISIT (OUTPATIENT)
Dept: DERMATOLOGY | Facility: CLINIC | Age: 66
End: 2021-09-27
Payer: MEDICARE

## 2021-09-27 ENCOUNTER — TELEPHONE (OUTPATIENT)
Dept: PAIN MEDICINE | Facility: CLINIC | Age: 66
End: 2021-09-27

## 2021-09-27 DIAGNOSIS — L70.0 ACNE VULGARIS: ICD-10-CM

## 2021-09-27 DIAGNOSIS — D48.5 NEOPLASM OF UNCERTAIN BEHAVIOR OF SKIN: ICD-10-CM

## 2021-09-27 DIAGNOSIS — D22.9 MULTIPLE NEVI: Primary | ICD-10-CM

## 2021-09-27 DIAGNOSIS — L72.0 MILIA: ICD-10-CM

## 2021-09-27 PROCEDURE — 99203 OFFICE O/P NEW LOW 30 MIN: CPT | Mod: 25,HCWC,S$GLB, | Performed by: DERMATOLOGY

## 2021-09-27 PROCEDURE — 88342 IMHCHEM/IMCYTCHM 1ST ANTB: CPT | Mod: 26,HCWC,, | Performed by: PATHOLOGY

## 2021-09-27 PROCEDURE — 11102 PR TANGENTIAL BIOPSY, SKIN, SINGLE LESION: ICD-10-PCS | Mod: HCWC,S$GLB,, | Performed by: DERMATOLOGY

## 2021-09-27 PROCEDURE — 99999 PR PBB SHADOW E&M-EST. PATIENT-LVL IV: ICD-10-PCS | Mod: PBBFAC,HCWC,, | Performed by: DERMATOLOGY

## 2021-09-27 PROCEDURE — 1160F RVW MEDS BY RX/DR IN RCRD: CPT | Mod: HCWC,CPTII,S$GLB, | Performed by: DERMATOLOGY

## 2021-09-27 PROCEDURE — 3288F PR FALLS RISK ASSESSMENT DOCUMENTED: ICD-10-PCS | Mod: HCWC,CPTII,S$GLB, | Performed by: DERMATOLOGY

## 2021-09-27 PROCEDURE — 88305 TISSUE EXAM BY PATHOLOGIST: CPT | Mod: 26,HCWC,, | Performed by: PATHOLOGY

## 2021-09-27 PROCEDURE — 1101F PR PT FALLS ASSESS DOC 0-1 FALLS W/OUT INJ PAST YR: ICD-10-PCS | Mod: HCWC,CPTII,S$GLB, | Performed by: DERMATOLOGY

## 2021-09-27 PROCEDURE — 1159F PR MEDICATION LIST DOCUMENTED IN MEDICAL RECORD: ICD-10-PCS | Mod: HCWC,CPTII,S$GLB, | Performed by: DERMATOLOGY

## 2021-09-27 PROCEDURE — 99203 PR OFFICE/OUTPT VISIT, NEW, LEVL III, 30-44 MIN: ICD-10-PCS | Mod: 25,HCWC,S$GLB, | Performed by: DERMATOLOGY

## 2021-09-27 PROCEDURE — 1126F AMNT PAIN NOTED NONE PRSNT: CPT | Mod: HCWC,CPTII,S$GLB, | Performed by: DERMATOLOGY

## 2021-09-27 PROCEDURE — 1160F PR REVIEW ALL MEDS BY PRESCRIBER/CLIN PHARMACIST DOCUMENTED: ICD-10-PCS | Mod: HCWC,CPTII,S$GLB, | Performed by: DERMATOLOGY

## 2021-09-27 PROCEDURE — 88305 TISSUE EXAM BY PATHOLOGIST: CPT | Mod: HCWC | Performed by: PATHOLOGY

## 2021-09-27 PROCEDURE — 88342 CHG IMMUNOCYTOCHEMISTRY: ICD-10-PCS | Mod: 26,HCWC,, | Performed by: PATHOLOGY

## 2021-09-27 PROCEDURE — 1126F PR PAIN SEVERITY QUANTIFIED, NO PAIN PRESENT: ICD-10-PCS | Mod: HCWC,CPTII,S$GLB, | Performed by: DERMATOLOGY

## 2021-09-27 PROCEDURE — 1159F MED LIST DOCD IN RCRD: CPT | Mod: HCWC,CPTII,S$GLB, | Performed by: DERMATOLOGY

## 2021-09-27 PROCEDURE — 88305 TISSUE EXAM BY PATHOLOGIST: ICD-10-PCS | Mod: 26,HCWC,, | Performed by: PATHOLOGY

## 2021-09-27 PROCEDURE — 3288F FALL RISK ASSESSMENT DOCD: CPT | Mod: HCWC,CPTII,S$GLB, | Performed by: DERMATOLOGY

## 2021-09-27 PROCEDURE — 1101F PT FALLS ASSESS-DOCD LE1/YR: CPT | Mod: HCWC,CPTII,S$GLB, | Performed by: DERMATOLOGY

## 2021-09-27 PROCEDURE — 11102 TANGNTL BX SKIN SINGLE LES: CPT | Mod: HCWC,S$GLB,, | Performed by: DERMATOLOGY

## 2021-09-27 PROCEDURE — 99999 PR PBB SHADOW E&M-EST. PATIENT-LVL IV: CPT | Mod: PBBFAC,HCWC,, | Performed by: DERMATOLOGY

## 2021-09-27 RX ORDER — TRETINOIN 0.25 MG/G
CREAM TOPICAL
Qty: 20 G | Refills: 6 | Status: SHIPPED | OUTPATIENT
Start: 2021-09-27 | End: 2021-09-27 | Stop reason: SDUPTHER

## 2021-09-27 RX ORDER — TRETINOIN 0.25 MG/G
CREAM TOPICAL
Qty: 20 G | Refills: 6 | Status: SHIPPED | OUTPATIENT
Start: 2021-09-27 | End: 2022-03-28 | Stop reason: ALTCHOICE

## 2021-09-28 ENCOUNTER — TELEPHONE (OUTPATIENT)
Dept: PAIN MEDICINE | Facility: CLINIC | Age: 66
End: 2021-09-28

## 2021-09-30 ENCOUNTER — PATIENT MESSAGE (OUTPATIENT)
Dept: PHARMACY | Facility: CLINIC | Age: 66
End: 2021-09-30

## 2021-09-30 LAB
FINAL PATHOLOGIC DIAGNOSIS: NORMAL
GROSS: NORMAL
Lab: NORMAL
MICROSCOPIC EXAM: NORMAL

## 2021-10-05 ENCOUNTER — PATIENT OUTREACH (OUTPATIENT)
Dept: ADMINISTRATIVE | Facility: OTHER | Age: 66
End: 2021-10-05

## 2021-10-05 DIAGNOSIS — Z12.31 ENCOUNTER FOR SCREENING MAMMOGRAM FOR BREAST CANCER: Primary | ICD-10-CM

## 2021-10-06 ENCOUNTER — OFFICE VISIT (OUTPATIENT)
Dept: PAIN MEDICINE | Facility: CLINIC | Age: 66
End: 2021-10-06
Payer: MEDICARE

## 2021-10-06 ENCOUNTER — TELEPHONE (OUTPATIENT)
Dept: PAIN MEDICINE | Facility: CLINIC | Age: 66
End: 2021-10-06

## 2021-10-06 VITALS
HEART RATE: 76 BPM | DIASTOLIC BLOOD PRESSURE: 82 MMHG | WEIGHT: 156.88 LBS | BODY MASS INDEX: 26.14 KG/M2 | SYSTOLIC BLOOD PRESSURE: 157 MMHG | HEIGHT: 65 IN

## 2021-10-06 DIAGNOSIS — M47.812 CERVICAL SPONDYLOSIS: Primary | ICD-10-CM

## 2021-10-06 DIAGNOSIS — M54.12 CERVICAL RADICULOPATHY: ICD-10-CM

## 2021-10-06 DIAGNOSIS — M47.812 FACET ARTHROPATHY, CERVICAL: ICD-10-CM

## 2021-10-06 DIAGNOSIS — M50.30 DEGENERATIVE DISC DISEASE, CERVICAL: ICD-10-CM

## 2021-10-06 PROCEDURE — 1101F PT FALLS ASSESS-DOCD LE1/YR: CPT | Mod: HCWC,CPTII,S$GLB, | Performed by: ANESTHESIOLOGY

## 2021-10-06 PROCEDURE — 3077F PR MOST RECENT SYSTOLIC BLOOD PRESSURE >= 140 MM HG: ICD-10-PCS | Mod: HCWC,CPTII,S$GLB, | Performed by: ANESTHESIOLOGY

## 2021-10-06 PROCEDURE — 3008F PR BODY MASS INDEX (BMI) DOCUMENTED: ICD-10-PCS | Mod: HCWC,CPTII,S$GLB, | Performed by: ANESTHESIOLOGY

## 2021-10-06 PROCEDURE — 3288F FALL RISK ASSESSMENT DOCD: CPT | Mod: HCWC,CPTII,S$GLB, | Performed by: ANESTHESIOLOGY

## 2021-10-06 PROCEDURE — 99999 PR PBB SHADOW E&M-EST. PATIENT-LVL V: ICD-10-PCS | Mod: PBBFAC,HCWC,, | Performed by: ANESTHESIOLOGY

## 2021-10-06 PROCEDURE — 99999 PR PBB SHADOW E&M-EST. PATIENT-LVL V: CPT | Mod: PBBFAC,HCWC,, | Performed by: ANESTHESIOLOGY

## 2021-10-06 PROCEDURE — 3077F SYST BP >= 140 MM HG: CPT | Mod: HCWC,CPTII,S$GLB, | Performed by: ANESTHESIOLOGY

## 2021-10-06 PROCEDURE — 1159F PR MEDICATION LIST DOCUMENTED IN MEDICAL RECORD: ICD-10-PCS | Mod: HCWC,CPTII,S$GLB, | Performed by: ANESTHESIOLOGY

## 2021-10-06 PROCEDURE — 1125F AMNT PAIN NOTED PAIN PRSNT: CPT | Mod: HCWC,CPTII,S$GLB, | Performed by: ANESTHESIOLOGY

## 2021-10-06 PROCEDURE — 1101F PR PT FALLS ASSESS DOC 0-1 FALLS W/OUT INJ PAST YR: ICD-10-PCS | Mod: HCWC,CPTII,S$GLB, | Performed by: ANESTHESIOLOGY

## 2021-10-06 PROCEDURE — 3008F BODY MASS INDEX DOCD: CPT | Mod: HCWC,CPTII,S$GLB, | Performed by: ANESTHESIOLOGY

## 2021-10-06 PROCEDURE — 1125F PR PAIN SEVERITY QUANTIFIED, PAIN PRESENT: ICD-10-PCS | Mod: HCWC,CPTII,S$GLB, | Performed by: ANESTHESIOLOGY

## 2021-10-06 PROCEDURE — 1159F MED LIST DOCD IN RCRD: CPT | Mod: HCWC,CPTII,S$GLB, | Performed by: ANESTHESIOLOGY

## 2021-10-06 PROCEDURE — 3288F PR FALLS RISK ASSESSMENT DOCUMENTED: ICD-10-PCS | Mod: HCWC,CPTII,S$GLB, | Performed by: ANESTHESIOLOGY

## 2021-10-06 PROCEDURE — 99204 OFFICE O/P NEW MOD 45 MIN: CPT | Mod: HCWC,S$GLB,, | Performed by: ANESTHESIOLOGY

## 2021-10-06 PROCEDURE — 1160F PR REVIEW ALL MEDS BY PRESCRIBER/CLIN PHARMACIST DOCUMENTED: ICD-10-PCS | Mod: HCWC,CPTII,S$GLB, | Performed by: ANESTHESIOLOGY

## 2021-10-06 PROCEDURE — 99204 PR OFFICE/OUTPT VISIT, NEW, LEVL IV, 45-59 MIN: ICD-10-PCS | Mod: HCWC,S$GLB,, | Performed by: ANESTHESIOLOGY

## 2021-10-06 PROCEDURE — 1160F RVW MEDS BY RX/DR IN RCRD: CPT | Mod: HCWC,CPTII,S$GLB, | Performed by: ANESTHESIOLOGY

## 2021-10-06 PROCEDURE — 3079F PR MOST RECENT DIASTOLIC BLOOD PRESSURE 80-89 MM HG: ICD-10-PCS | Mod: HCWC,CPTII,S$GLB, | Performed by: ANESTHESIOLOGY

## 2021-10-06 PROCEDURE — 3079F DIAST BP 80-89 MM HG: CPT | Mod: HCWC,CPTII,S$GLB, | Performed by: ANESTHESIOLOGY

## 2021-10-06 RX ORDER — GABAPENTIN 300 MG/1
CAPSULE ORAL
Qty: 30 CAPSULE | Refills: 1 | Status: SHIPPED | OUTPATIENT
Start: 2021-10-06 | End: 2021-11-11

## 2021-10-06 RX ORDER — TIZANIDINE 4 MG/1
1 TABLET ORAL NIGHTLY
COMMUNITY
Start: 2021-08-27 | End: 2021-11-08

## 2021-10-15 ENCOUNTER — HOSPITAL ENCOUNTER (OUTPATIENT)
Facility: HOSPITAL | Age: 66
Discharge: HOME | End: 2021-10-15
Attending: ANESTHESIOLOGY | Admitting: ANESTHESIOLOGY
Payer: MEDICARE

## 2021-10-15 VITALS
RESPIRATION RATE: 12 BRPM | DIASTOLIC BLOOD PRESSURE: 70 MMHG | TEMPERATURE: 98 F | HEART RATE: 70 BPM | OXYGEN SATURATION: 100 % | BODY MASS INDEX: 25.85 KG/M2 | WEIGHT: 155.13 LBS | HEIGHT: 65 IN | SYSTOLIC BLOOD PRESSURE: 153 MMHG

## 2021-10-15 DIAGNOSIS — M47.812 CERVICAL SPONDYLOSIS: ICD-10-CM

## 2021-10-15 PROCEDURE — 64490 INJ PARAVERT F JNT C/T 1 LEV: CPT | Mod: 50,HCWC | Performed by: ANESTHESIOLOGY

## 2021-10-15 PROCEDURE — 64490 PR INJ DX/THER AGNT PARAVERT FACET JOINT,IMG GUIDE,CERV/THORAC, 1ST LEVEL: ICD-10-PCS | Mod: 50,HCWC,, | Performed by: ANESTHESIOLOGY

## 2021-10-15 PROCEDURE — 25000003 PHARM REV CODE 250: Mod: HCWC | Performed by: ANESTHESIOLOGY

## 2021-10-15 PROCEDURE — 99152 MOD SED SAME PHYS/QHP 5/>YRS: CPT | Mod: HCWC | Performed by: ANESTHESIOLOGY

## 2021-10-15 PROCEDURE — 64491 INJ PARAVERT F JNT C/T 2 LEV: CPT | Mod: 50,HCWC | Performed by: ANESTHESIOLOGY

## 2021-10-15 PROCEDURE — 64491 INJ PARAVERT F JNT C/T 2 LEV: CPT | Mod: 50,HCWC,, | Performed by: ANESTHESIOLOGY

## 2021-10-15 PROCEDURE — 63600175 PHARM REV CODE 636 W HCPCS: Mod: HCWC | Performed by: ANESTHESIOLOGY

## 2021-10-15 PROCEDURE — 64491 PR INJ DX/THER AGNT PARAVERT FACET JOINT,IMG GUIDE,CERV/THORAC, 2ND LEVEL: ICD-10-PCS | Mod: 50,HCWC,, | Performed by: ANESTHESIOLOGY

## 2021-10-15 PROCEDURE — 64490 INJ PARAVERT F JNT C/T 1 LEV: CPT | Mod: 50,HCWC,, | Performed by: ANESTHESIOLOGY

## 2021-10-15 PROCEDURE — 25500020 PHARM REV CODE 255: Mod: HCWC | Performed by: ANESTHESIOLOGY

## 2021-10-15 RX ORDER — BUPIVACAINE HYDROCHLORIDE 5 MG/ML
INJECTION, SOLUTION EPIDURAL; INTRACAUDAL
Status: DISCONTINUED | OUTPATIENT
Start: 2021-10-15 | End: 2021-10-15 | Stop reason: HOSPADM

## 2021-10-15 RX ORDER — MIDAZOLAM HYDROCHLORIDE 1 MG/ML
INJECTION, SOLUTION INTRAMUSCULAR; INTRAVENOUS
Status: DISCONTINUED | OUTPATIENT
Start: 2021-10-15 | End: 2021-10-15 | Stop reason: HOSPADM

## 2021-10-15 RX ORDER — DEXAMETHASONE SODIUM PHOSPHATE 10 MG/ML
INJECTION INTRAMUSCULAR; INTRAVENOUS
Status: DISCONTINUED | OUTPATIENT
Start: 2021-10-15 | End: 2021-10-15 | Stop reason: HOSPADM

## 2021-10-15 RX ORDER — SODIUM BICARBONATE 1 MEQ/ML
SYRINGE (ML) INTRAVENOUS
Status: DISCONTINUED | OUTPATIENT
Start: 2021-10-15 | End: 2021-10-15 | Stop reason: HOSPADM

## 2021-10-15 RX ORDER — FENTANYL CITRATE 50 UG/ML
INJECTION, SOLUTION INTRAMUSCULAR; INTRAVENOUS
Status: DISCONTINUED | OUTPATIENT
Start: 2021-10-15 | End: 2021-10-15 | Stop reason: HOSPADM

## 2021-10-25 ENCOUNTER — OFFICE VISIT (OUTPATIENT)
Dept: DERMATOLOGY | Facility: CLINIC | Age: 66
End: 2021-10-25
Payer: MEDICARE

## 2021-10-25 DIAGNOSIS — L82.1 SEBORRHEIC KERATOSES: ICD-10-CM

## 2021-10-25 DIAGNOSIS — B07.9 VERRUCA VULGARIS: Primary | ICD-10-CM

## 2021-10-25 DIAGNOSIS — D22.9 MULTIPLE NEVI: ICD-10-CM

## 2021-10-25 PROCEDURE — 1101F PT FALLS ASSESS-DOCD LE1/YR: CPT | Mod: HCWC,CPTII,S$GLB, | Performed by: DERMATOLOGY

## 2021-10-25 PROCEDURE — 1159F MED LIST DOCD IN RCRD: CPT | Mod: HCWC,CPTII,S$GLB, | Performed by: DERMATOLOGY

## 2021-10-25 PROCEDURE — 1159F PR MEDICATION LIST DOCUMENTED IN MEDICAL RECORD: ICD-10-PCS | Mod: HCWC,CPTII,S$GLB, | Performed by: DERMATOLOGY

## 2021-10-25 PROCEDURE — 1126F PR PAIN SEVERITY QUANTIFIED, NO PAIN PRESENT: ICD-10-PCS | Mod: HCWC,CPTII,S$GLB, | Performed by: DERMATOLOGY

## 2021-10-25 PROCEDURE — 1160F PR REVIEW ALL MEDS BY PRESCRIBER/CLIN PHARMACIST DOCUMENTED: ICD-10-PCS | Mod: HCWC,CPTII,S$GLB, | Performed by: DERMATOLOGY

## 2021-10-25 PROCEDURE — 1101F PR PT FALLS ASSESS DOC 0-1 FALLS W/OUT INJ PAST YR: ICD-10-PCS | Mod: HCWC,CPTII,S$GLB, | Performed by: DERMATOLOGY

## 2021-10-25 PROCEDURE — 3288F PR FALLS RISK ASSESSMENT DOCUMENTED: ICD-10-PCS | Mod: HCWC,CPTII,S$GLB, | Performed by: DERMATOLOGY

## 2021-10-25 PROCEDURE — 3288F FALL RISK ASSESSMENT DOCD: CPT | Mod: HCWC,CPTII,S$GLB, | Performed by: DERMATOLOGY

## 2021-10-25 PROCEDURE — 99999 PR PBB SHADOW E&M-EST. PATIENT-LVL IV: CPT | Mod: PBBFAC,HCWC,, | Performed by: DERMATOLOGY

## 2021-10-25 PROCEDURE — 99999 PR PBB SHADOW E&M-EST. PATIENT-LVL IV: ICD-10-PCS | Mod: PBBFAC,HCWC,, | Performed by: DERMATOLOGY

## 2021-10-25 PROCEDURE — 1126F AMNT PAIN NOTED NONE PRSNT: CPT | Mod: HCWC,CPTII,S$GLB, | Performed by: DERMATOLOGY

## 2021-10-25 PROCEDURE — 99212 PR OFFICE/OUTPT VISIT, EST, LEVL II, 10-19 MIN: ICD-10-PCS | Mod: HCWC,S$GLB,, | Performed by: DERMATOLOGY

## 2021-10-25 PROCEDURE — 1160F RVW MEDS BY RX/DR IN RCRD: CPT | Mod: HCWC,CPTII,S$GLB, | Performed by: DERMATOLOGY

## 2021-10-25 PROCEDURE — 99212 OFFICE O/P EST SF 10 MIN: CPT | Mod: HCWC,S$GLB,, | Performed by: DERMATOLOGY

## 2021-10-27 ENCOUNTER — PATIENT MESSAGE (OUTPATIENT)
Dept: ENDOSCOPY | Facility: HOSPITAL | Age: 66
End: 2021-10-27
Payer: MEDICARE

## 2021-10-27 RX ORDER — SODIUM, POTASSIUM,MAG SULFATES 17.5-3.13G
1 SOLUTION, RECONSTITUTED, ORAL ORAL DAILY
Qty: 1 KIT | Refills: 0 | Status: SHIPPED | OUTPATIENT
Start: 2021-10-27 | End: 2021-10-29

## 2021-10-28 ENCOUNTER — HOSPITAL ENCOUNTER (OUTPATIENT)
Dept: RADIOLOGY | Facility: HOSPITAL | Age: 66
Discharge: HOME | End: 2021-10-28
Attending: FAMILY MEDICINE
Payer: MEDICARE

## 2021-10-28 DIAGNOSIS — Z12.31 ENCOUNTER FOR SCREENING MAMMOGRAM FOR BREAST CANCER: ICD-10-CM

## 2021-10-28 PROCEDURE — 77067 MAMMO DIGITAL SCREENING BILAT WITH TOMO: ICD-10-PCS | Mod: 26,HCWC,, | Performed by: RADIOLOGY

## 2021-10-28 PROCEDURE — 77067 SCR MAMMO BI INCL CAD: CPT | Mod: TC,HCWC

## 2021-10-28 PROCEDURE — 77063 MAMMO DIGITAL SCREENING BILAT WITH TOMO: ICD-10-PCS | Mod: 26,HCWC,, | Performed by: RADIOLOGY

## 2021-10-28 PROCEDURE — 77067 SCR MAMMO BI INCL CAD: CPT | Mod: 26,HCWC,, | Performed by: RADIOLOGY

## 2021-10-28 PROCEDURE — 77063 BREAST TOMOSYNTHESIS BI: CPT | Mod: 26,HCWC,, | Performed by: RADIOLOGY

## 2021-11-05 DIAGNOSIS — I73.9 PAD (PERIPHERAL ARTERY DISEASE): ICD-10-CM

## 2021-11-05 DIAGNOSIS — E78.5 HYPERLIPIDEMIA, UNSPECIFIED HYPERLIPIDEMIA TYPE: ICD-10-CM

## 2021-11-05 DIAGNOSIS — I25.10 NONOCCLUSIVE CORONARY ATHEROSCLEROSIS OF NATIVE CORONARY ARTERY: ICD-10-CM

## 2021-11-05 DIAGNOSIS — Z78.9 STATIN INTOLERANCE: ICD-10-CM

## 2021-11-07 RX ORDER — EVOLOCUMAB 140 MG/ML
140 INJECTION, SOLUTION SUBCUTANEOUS
Qty: 6 ML | Refills: 3 | Status: ON HOLD | OUTPATIENT
Start: 2021-11-07 | End: 2021-12-09 | Stop reason: HOSPADM

## 2021-11-11 ENCOUNTER — OFFICE VISIT (OUTPATIENT)
Dept: PAIN MEDICINE | Facility: CLINIC | Age: 66
End: 2021-11-11
Payer: MEDICARE

## 2021-11-11 ENCOUNTER — HOSPITAL ENCOUNTER (OUTPATIENT)
Dept: RADIOLOGY | Facility: HOSPITAL | Age: 66
Discharge: HOME | End: 2021-11-11
Attending: ANESTHESIOLOGY
Payer: MEDICARE

## 2021-11-11 VITALS
WEIGHT: 157.63 LBS | BODY MASS INDEX: 30.95 KG/M2 | HEART RATE: 58 BPM | SYSTOLIC BLOOD PRESSURE: 146 MMHG | HEIGHT: 60 IN | DIASTOLIC BLOOD PRESSURE: 78 MMHG

## 2021-11-11 DIAGNOSIS — M54.12 CERVICAL RADICULOPATHY: ICD-10-CM

## 2021-11-11 DIAGNOSIS — M47.812 CERVICAL SPONDYLOSIS: Primary | ICD-10-CM

## 2021-11-11 DIAGNOSIS — M47.812 FACET ARTHROPATHY, CERVICAL: ICD-10-CM

## 2021-11-11 DIAGNOSIS — M46.1 SACROILIITIS: ICD-10-CM

## 2021-11-11 DIAGNOSIS — M25.551 RIGHT HIP PAIN: ICD-10-CM

## 2021-11-11 PROCEDURE — 3288F PR FALLS RISK ASSESSMENT DOCUMENTED: ICD-10-PCS | Mod: HCWC,CPTII,S$GLB, | Performed by: ANESTHESIOLOGY

## 2021-11-11 PROCEDURE — 73502 XR HIP WITH PELVIS WHEN PERFORMED, 2 OR 3  VIEWS RIGHT: ICD-10-PCS | Mod: 26,HCWC,RT, | Performed by: RADIOLOGY

## 2021-11-11 PROCEDURE — 1159F MED LIST DOCD IN RCRD: CPT | Mod: HCWC,CPTII,S$GLB, | Performed by: ANESTHESIOLOGY

## 2021-11-11 PROCEDURE — 73502 X-RAY EXAM HIP UNI 2-3 VIEWS: CPT | Mod: TC,HCWC,FY,PO,RT

## 2021-11-11 PROCEDURE — 3288F FALL RISK ASSESSMENT DOCD: CPT | Mod: HCWC,CPTII,S$GLB, | Performed by: ANESTHESIOLOGY

## 2021-11-11 PROCEDURE — 99499 UNLISTED E&M SERVICE: CPT | Mod: S$GLB,,, | Performed by: ANESTHESIOLOGY

## 2021-11-11 PROCEDURE — 3078F PR MOST RECENT DIASTOLIC BLOOD PRESSURE < 80 MM HG: ICD-10-PCS | Mod: HCWC,CPTII,S$GLB, | Performed by: ANESTHESIOLOGY

## 2021-11-11 PROCEDURE — 73502 X-RAY EXAM HIP UNI 2-3 VIEWS: CPT | Mod: 26,HCWC,RT, | Performed by: RADIOLOGY

## 2021-11-11 PROCEDURE — 1101F PR PT FALLS ASSESS DOC 0-1 FALLS W/OUT INJ PAST YR: ICD-10-PCS | Mod: HCWC,CPTII,S$GLB, | Performed by: ANESTHESIOLOGY

## 2021-11-11 PROCEDURE — 3008F PR BODY MASS INDEX (BMI) DOCUMENTED: ICD-10-PCS | Mod: HCWC,CPTII,S$GLB, | Performed by: ANESTHESIOLOGY

## 2021-11-11 PROCEDURE — 3078F DIAST BP <80 MM HG: CPT | Mod: HCWC,CPTII,S$GLB, | Performed by: ANESTHESIOLOGY

## 2021-11-11 PROCEDURE — 99214 OFFICE O/P EST MOD 30 MIN: CPT | Mod: HCWC,S$GLB,, | Performed by: ANESTHESIOLOGY

## 2021-11-11 PROCEDURE — 1101F PT FALLS ASSESS-DOCD LE1/YR: CPT | Mod: HCWC,CPTII,S$GLB, | Performed by: ANESTHESIOLOGY

## 2021-11-11 PROCEDURE — 3077F SYST BP >= 140 MM HG: CPT | Mod: HCWC,CPTII,S$GLB, | Performed by: ANESTHESIOLOGY

## 2021-11-11 PROCEDURE — 1125F AMNT PAIN NOTED PAIN PRSNT: CPT | Mod: HCWC,CPTII,S$GLB, | Performed by: ANESTHESIOLOGY

## 2021-11-11 PROCEDURE — 3008F BODY MASS INDEX DOCD: CPT | Mod: HCWC,CPTII,S$GLB, | Performed by: ANESTHESIOLOGY

## 2021-11-11 PROCEDURE — 99214 PR OFFICE/OUTPT VISIT, EST, LEVL IV, 30-39 MIN: ICD-10-PCS | Mod: HCWC,S$GLB,, | Performed by: ANESTHESIOLOGY

## 2021-11-11 PROCEDURE — 99999 PR PBB SHADOW E&M-EST. PATIENT-LVL V: CPT | Mod: PBBFAC,HCWC,, | Performed by: ANESTHESIOLOGY

## 2021-11-11 PROCEDURE — 99499 RISK ADDL DX/OHS AUDIT: ICD-10-PCS | Mod: S$GLB,,, | Performed by: ANESTHESIOLOGY

## 2021-11-11 PROCEDURE — 1159F PR MEDICATION LIST DOCUMENTED IN MEDICAL RECORD: ICD-10-PCS | Mod: HCWC,CPTII,S$GLB, | Performed by: ANESTHESIOLOGY

## 2021-11-11 PROCEDURE — 3077F PR MOST RECENT SYSTOLIC BLOOD PRESSURE >= 140 MM HG: ICD-10-PCS | Mod: HCWC,CPTII,S$GLB, | Performed by: ANESTHESIOLOGY

## 2021-11-11 PROCEDURE — 99999 PR PBB SHADOW E&M-EST. PATIENT-LVL V: ICD-10-PCS | Mod: PBBFAC,HCWC,, | Performed by: ANESTHESIOLOGY

## 2021-11-11 PROCEDURE — 1125F PR PAIN SEVERITY QUANTIFIED, PAIN PRESENT: ICD-10-PCS | Mod: HCWC,CPTII,S$GLB, | Performed by: ANESTHESIOLOGY

## 2021-11-11 RX ORDER — GABAPENTIN 300 MG/1
CAPSULE ORAL
Qty: 30 CAPSULE | Refills: 1 | Status: SHIPPED | OUTPATIENT
Start: 2021-11-11 | End: 2022-03-10

## 2021-11-12 ENCOUNTER — PATIENT MESSAGE (OUTPATIENT)
Dept: PAIN MEDICINE | Facility: HOSPITAL | Age: 66
End: 2021-11-12
Payer: MEDICARE

## 2021-11-15 PROBLEM — Z00.00 ROUTINE GENERAL MEDICAL EXAMINATION AT A HEALTH CARE FACILITY: Status: RESOLVED | Noted: 2021-08-16 | Resolved: 2021-11-15

## 2021-12-06 ENCOUNTER — TELEPHONE (OUTPATIENT)
Dept: ENDOSCOPY | Facility: HOSPITAL | Age: 66
End: 2021-12-06
Payer: MEDICARE

## 2021-12-06 ENCOUNTER — LAB VISIT (OUTPATIENT)
Dept: PRIMARY CARE CLINIC | Facility: CLINIC | Age: 66
End: 2021-12-06
Payer: MEDICARE

## 2021-12-06 DIAGNOSIS — Z01.818 PRE-OP TESTING: ICD-10-CM

## 2021-12-06 PROCEDURE — U0005 INFEC AGEN DETEC AMPLI PROBE: HCPCS | Performed by: INTERNAL MEDICINE

## 2021-12-06 PROCEDURE — U0003 INFECTIOUS AGENT DETECTION BY NUCLEIC ACID (DNA OR RNA); SEVERE ACUTE RESPIRATORY SYNDROME CORONAVIRUS 2 (SARS-COV-2) (CORONAVIRUS DISEASE [COVID-19]), AMPLIFIED PROBE TECHNIQUE, MAKING USE OF HIGH THROUGHPUT TECHNOLOGIES AS DESCRIBED BY CMS-2020-01-R: HCPCS | Mod: HCWC | Performed by: INTERNAL MEDICINE

## 2021-12-07 LAB — SARS-COV-2 RNA RESP QL NAA+PROBE: NOT DETECTED

## 2021-12-09 ENCOUNTER — HOSPITAL ENCOUNTER (OUTPATIENT)
Facility: HOSPITAL | Age: 66
Discharge: HOME | End: 2021-12-09
Attending: INTERNAL MEDICINE | Admitting: INTERNAL MEDICINE
Payer: MEDICARE

## 2021-12-09 ENCOUNTER — ANESTHESIA EVENT (OUTPATIENT)
Dept: ENDOSCOPY | Facility: HOSPITAL | Age: 66
End: 2021-12-09

## 2021-12-09 ENCOUNTER — ANESTHESIA (OUTPATIENT)
Dept: ENDOSCOPY | Facility: HOSPITAL | Age: 66
End: 2021-12-09

## 2021-12-09 DIAGNOSIS — R13.14 PHARYNGOESOPHAGEAL DYSPHAGIA: ICD-10-CM

## 2021-12-09 DIAGNOSIS — K22.2 SCHATZKI'S RING OF DISTAL ESOPHAGUS: ICD-10-CM

## 2021-12-09 DIAGNOSIS — Z12.11 ENCOUNTER FOR SCREENING COLONOSCOPY: Primary | ICD-10-CM

## 2021-12-09 PROCEDURE — 63600175 PHARM REV CODE 636 W HCPCS: Mod: HCWC | Performed by: NURSE ANESTHETIST, CERTIFIED REGISTERED

## 2021-12-09 PROCEDURE — 88305 TISSUE EXAM BY PATHOLOGIST: CPT | Mod: HCWC | Performed by: PATHOLOGY

## 2021-12-09 PROCEDURE — 43248 EGD GUIDE WIRE INSERTION: CPT | Mod: HCWC | Performed by: INTERNAL MEDICINE

## 2021-12-09 PROCEDURE — 43248 PR EGD, FLEX, W/DILATION OVER GUIDEWIRE: ICD-10-PCS | Mod: HCWC,,, | Performed by: INTERNAL MEDICINE

## 2021-12-09 PROCEDURE — 25000003 PHARM REV CODE 250: Mod: HCWC | Performed by: NURSE ANESTHETIST, CERTIFIED REGISTERED

## 2021-12-09 PROCEDURE — 43239 EGD BIOPSY SINGLE/MULTIPLE: CPT | Mod: 59,HCWC,, | Performed by: INTERNAL MEDICINE

## 2021-12-09 PROCEDURE — 88305 TISSUE EXAM BY PATHOLOGIST: CPT | Mod: 26,HCWC,, | Performed by: PATHOLOGY

## 2021-12-09 PROCEDURE — 88305 TISSUE EXAM BY PATHOLOGIST: ICD-10-PCS | Mod: 26,HCWC,, | Performed by: PATHOLOGY

## 2021-12-09 PROCEDURE — 43239 EGD BIOPSY SINGLE/MULTIPLE: CPT | Mod: HCWC | Performed by: INTERNAL MEDICINE

## 2021-12-09 PROCEDURE — 45380 PR COLONOSCOPY,BIOPSY: ICD-10-PCS | Mod: PT,HCWC,, | Performed by: INTERNAL MEDICINE

## 2021-12-09 PROCEDURE — 43239 PR EGD, FLEX, W/BIOPSY, SGL/MULTI: ICD-10-PCS | Mod: 59,HCWC,, | Performed by: INTERNAL MEDICINE

## 2021-12-09 PROCEDURE — 45380 COLONOSCOPY AND BIOPSY: CPT | Mod: HCWC | Performed by: INTERNAL MEDICINE

## 2021-12-09 PROCEDURE — 45380 COLONOSCOPY AND BIOPSY: CPT | Mod: PT,HCWC,, | Performed by: INTERNAL MEDICINE

## 2021-12-09 PROCEDURE — 27201012 HC FORCEPS, HOT/COLD, DISP: Mod: HCWC | Performed by: INTERNAL MEDICINE

## 2021-12-09 PROCEDURE — 37000009 HC ANESTHESIA EA ADD 15 MINS: Mod: HCWC | Performed by: INTERNAL MEDICINE

## 2021-12-09 PROCEDURE — C1769 GUIDE WIRE: HCPCS | Mod: HCWC | Performed by: INTERNAL MEDICINE

## 2021-12-09 PROCEDURE — 43248 EGD GUIDE WIRE INSERTION: CPT | Mod: HCWC,,, | Performed by: INTERNAL MEDICINE

## 2021-12-09 PROCEDURE — 37000008 HC ANESTHESIA 1ST 15 MINUTES: Mod: HCWC | Performed by: INTERNAL MEDICINE

## 2021-12-09 RX ORDER — PROPOFOL 10 MG/ML
VIAL (ML) INTRAVENOUS
Status: DISCONTINUED | OUTPATIENT
Start: 2021-12-09 | End: 2021-12-09

## 2021-12-09 RX ORDER — LIDOCAINE HYDROCHLORIDE 10 MG/ML
INJECTION, SOLUTION EPIDURAL; INFILTRATION; INTRACAUDAL; PERINEURAL
Status: DISCONTINUED | OUTPATIENT
Start: 2021-12-09 | End: 2021-12-09

## 2021-12-09 RX ORDER — SODIUM CHLORIDE, SODIUM LACTATE, POTASSIUM CHLORIDE, CALCIUM CHLORIDE 600; 310; 30; 20 MG/100ML; MG/100ML; MG/100ML; MG/100ML
INJECTION, SOLUTION INTRAVENOUS CONTINUOUS
Status: DISCONTINUED | OUTPATIENT
Start: 2021-12-09 | End: 2021-12-09 | Stop reason: HOSPADM

## 2021-12-09 RX ADMIN — PROPOFOL 90 MG: 10 INJECTION, EMULSION INTRAVENOUS at 08:12

## 2021-12-09 RX ADMIN — PROPOFOL 40 MG: 10 INJECTION, EMULSION INTRAVENOUS at 09:12

## 2021-12-09 RX ADMIN — PROPOFOL 40 MG: 10 INJECTION, EMULSION INTRAVENOUS at 08:12

## 2021-12-09 RX ADMIN — SODIUM CHLORIDE, SODIUM LACTATE, POTASSIUM CHLORIDE, AND CALCIUM CHLORIDE: .6; .31; .03; .02 INJECTION, SOLUTION INTRAVENOUS at 07:12

## 2021-12-09 RX ADMIN — LIDOCAINE HYDROCHLORIDE 50 MG: 10 INJECTION, SOLUTION EPIDURAL; INFILTRATION; INTRACAUDAL; PERINEURAL at 08:12

## 2021-12-09 RX ADMIN — PROPOFOL 30 MG: 10 INJECTION, EMULSION INTRAVENOUS at 09:12

## 2021-12-09 RX ADMIN — PROPOFOL 20 MG: 10 INJECTION, EMULSION INTRAVENOUS at 09:12

## 2021-12-10 VITALS
TEMPERATURE: 97 F | BODY MASS INDEX: 26.12 KG/M2 | HEIGHT: 64 IN | OXYGEN SATURATION: 100 % | DIASTOLIC BLOOD PRESSURE: 87 MMHG | RESPIRATION RATE: 15 BRPM | HEART RATE: 67 BPM | WEIGHT: 153 LBS | SYSTOLIC BLOOD PRESSURE: 156 MMHG

## 2021-12-15 ENCOUNTER — TELEPHONE (OUTPATIENT)
Dept: PAIN MEDICINE | Facility: CLINIC | Age: 66
End: 2021-12-15
Payer: MEDICARE

## 2021-12-16 LAB
FINAL PATHOLOGIC DIAGNOSIS: NORMAL
GROSS: NORMAL
Lab: NORMAL

## 2021-12-17 ENCOUNTER — HOSPITAL ENCOUNTER (OUTPATIENT)
Facility: HOSPITAL | Age: 66
Discharge: HOME | End: 2021-12-17
Attending: ANESTHESIOLOGY | Admitting: ANESTHESIOLOGY
Payer: MEDICARE

## 2021-12-17 ENCOUNTER — TELEPHONE (OUTPATIENT)
Dept: PAIN MEDICINE | Facility: CLINIC | Age: 66
End: 2021-12-17
Payer: MEDICARE

## 2021-12-17 VITALS
TEMPERATURE: 98 F | WEIGHT: 158.31 LBS | HEIGHT: 64 IN | DIASTOLIC BLOOD PRESSURE: 81 MMHG | OXYGEN SATURATION: 95 % | HEART RATE: 64 BPM | RESPIRATION RATE: 16 BRPM | BODY MASS INDEX: 27.03 KG/M2 | SYSTOLIC BLOOD PRESSURE: 183 MMHG

## 2021-12-17 DIAGNOSIS — M46.1 SACROILIITIS: ICD-10-CM

## 2021-12-17 PROCEDURE — 25000003 PHARM REV CODE 250: Mod: HCWC | Performed by: ANESTHESIOLOGY

## 2021-12-17 PROCEDURE — 63600175 PHARM REV CODE 636 W HCPCS: Mod: HCWC | Performed by: ANESTHESIOLOGY

## 2021-12-17 PROCEDURE — 27096 PR INJECTION,SACROILIAC JOINT: ICD-10-PCS | Mod: HCWC,RT,, | Performed by: ANESTHESIOLOGY

## 2021-12-17 PROCEDURE — 25500020 PHARM REV CODE 255: Mod: HCWC | Performed by: ANESTHESIOLOGY

## 2021-12-17 PROCEDURE — 27096 INJECT SACROILIAC JOINT: CPT | Mod: HCWC | Performed by: ANESTHESIOLOGY

## 2021-12-17 PROCEDURE — 27096 INJECT SACROILIAC JOINT: CPT | Mod: HCWC,RT,, | Performed by: ANESTHESIOLOGY

## 2021-12-17 RX ORDER — TRIAMCINOLONE ACETONIDE 40 MG/ML
INJECTION, SUSPENSION INTRA-ARTICULAR; INTRAMUSCULAR
Status: DISCONTINUED | OUTPATIENT
Start: 2021-12-17 | End: 2021-12-17 | Stop reason: HOSPADM

## 2021-12-17 RX ORDER — BUPIVACAINE HYDROCHLORIDE 2.5 MG/ML
INJECTION, SOLUTION EPIDURAL; INFILTRATION; INTRACAUDAL
Status: DISCONTINUED | OUTPATIENT
Start: 2021-12-17 | End: 2021-12-17 | Stop reason: HOSPADM

## 2021-12-17 RX ORDER — INDOMETHACIN 25 MG/1
CAPSULE ORAL
Status: DISCONTINUED | OUTPATIENT
Start: 2021-12-17 | End: 2021-12-17 | Stop reason: HOSPADM

## 2021-12-28 ENCOUNTER — TELEPHONE (OUTPATIENT)
Dept: PAIN MEDICINE | Facility: CLINIC | Age: 66
End: 2021-12-28
Payer: MEDICARE

## 2022-01-05 ENCOUNTER — OFFICE VISIT (OUTPATIENT)
Dept: INTERNAL MEDICINE | Facility: CLINIC | Age: 67
End: 2022-01-05
Payer: MEDICARE

## 2022-01-05 ENCOUNTER — LAB VISIT (OUTPATIENT)
Dept: PRIMARY CARE CLINIC | Facility: OTHER | Age: 67
End: 2022-01-05
Attending: INTERNAL MEDICINE
Payer: MEDICARE

## 2022-01-05 DIAGNOSIS — R06.2 WHEEZING: ICD-10-CM

## 2022-01-05 DIAGNOSIS — I25.10 CORONARY ARTERY DISEASE INVOLVING NATIVE CORONARY ARTERY OF NATIVE HEART WITHOUT ANGINA PECTORIS: ICD-10-CM

## 2022-01-05 DIAGNOSIS — Z20.822 ENCOUNTER FOR LABORATORY TESTING FOR COVID-19 VIRUS: ICD-10-CM

## 2022-01-05 DIAGNOSIS — Z72.0 TOBACCO ABUSE: Primary | ICD-10-CM

## 2022-01-05 DIAGNOSIS — N18.31 STAGE 3A CHRONIC KIDNEY DISEASE: ICD-10-CM

## 2022-01-05 DIAGNOSIS — J06.9 UPPER RESPIRATORY TRACT INFECTION, UNSPECIFIED TYPE: ICD-10-CM

## 2022-01-05 PROCEDURE — 99213 OFFICE O/P EST LOW 20 MIN: CPT | Mod: HCWC,95,, | Performed by: PHYSICIAN ASSISTANT

## 2022-01-05 PROCEDURE — U0003 INFECTIOUS AGENT DETECTION BY NUCLEIC ACID (DNA OR RNA); SEVERE ACUTE RESPIRATORY SYNDROME CORONAVIRUS 2 (SARS-COV-2) (CORONAVIRUS DISEASE [COVID-19]), AMPLIFIED PROBE TECHNIQUE, MAKING USE OF HIGH THROUGHPUT TECHNOLOGIES AS DESCRIBED BY CMS-2020-01-R: HCPCS | Mod: HCWC | Performed by: INTERNAL MEDICINE

## 2022-01-05 PROCEDURE — 99213 PR OFFICE/OUTPT VISIT, EST, LEVL III, 20-29 MIN: ICD-10-PCS | Mod: HCWC,95,, | Performed by: PHYSICIAN ASSISTANT

## 2022-01-05 RX ORDER — IPRATROPIUM BROMIDE AND ALBUTEROL SULFATE 2.5; .5 MG/3ML; MG/3ML
3 SOLUTION RESPIRATORY (INHALATION) EVERY 6 HOURS PRN
Qty: 30 ML | Refills: 0 | Status: SHIPPED | OUTPATIENT
Start: 2022-01-05 | End: 2022-03-28 | Stop reason: ALTCHOICE

## 2022-01-05 NOTE — PROGRESS NOTES
Subjective:       Patient ID: Shirlene Olvera is a 66 y.o. female.    Chief Complaint: No chief complaint on file.    Cough  This is a new problem. The current episode started in the past 7 days. The cough is productive of sputum. Associated symptoms include headaches, nasal congestion, postnasal drip, rhinorrhea, a sore throat and wheezing. Pertinent negatives include no chest pain, chills, fever, hemoptysis, myalgias, rash, shortness of breath or weight loss. The symptoms are aggravated by pollens. Risk factors for lung disease include smoking/tobacco exposure. She has tried OTC cough suppressant and rest for the symptoms. The treatment provided mild relief. Her past medical history is significant for bronchiectasis and bronchitis.     Some mild shortness of breath   +wheezing, mild sinus pressure HA     unkown exposure to covid   Health Maintenance Due   Topic Date Due    COVID-19 Vaccine (1) Never done    Aspirin/Antiplatelet Therapy  Never done    Shingles Vaccine (2 of 3) 12/22/2016    Influenza Vaccine (1) 09/01/2021       Past Medical History:   Diagnosis Date    Chest pain 5/14/2015    Colon polyp     Coronary artery disease     pt states MI June 2015    Hypertension     Myocardial infarction     PAD (peripheral artery disease)     Tobacco use        Current Outpatient Medications   Medication Sig Dispense Refill    albuterol (VENTOLIN HFA) 90 mcg/actuation inhaler Inhale 2 puffs into the lungs every 6 (six) hours as needed for Wheezing. Rescue 6.7 g 1    albuterol-ipratropium (DUO-NEB) 2.5 mg-0.5 mg/3 mL nebulizer solution Take 3 mLs by nebulization every 6 (six) hours as needed for Wheezing or Shortness of Breath. Rescue 30 mL 0    aspirin 81 MG Chew Take 1 tablet (81 mg total) by mouth once daily.  0    buPROPion (WELLBUTRIN XL) 300 MG 24 hr tablet Take 1 tablet (300 mg total) by mouth once daily. 90 tablet 0    cyanocobalamin (VITAMIN B-12) 1000 MCG tablet Take 100 mcg by mouth once  daily.      diclofenac sodium (VOLTAREN) 1 % Gel Apply 2 g topically 4 (four) times daily as needed. For muscular pain 100 g 0    fexofenadine HCl (ALLEGRA ORAL) Take 1 tablet by mouth once daily.      fluticasone propionate (FLONASE) 50 mcg/actuation nasal spray 2 sprays (100 mcg total) by Each Nostril route once daily. 16 g 12    gabapentin (NEURONTIN) 300 MG capsule Take 1 capsule (300 mg total) by mouth every evening for 7 days, THEN 2 capsules (600 mg total) every evening for 7 days, THEN 3 capsules (900 mg total) every evening for 16 days. 30 capsule 1    loratadine (CLARITIN) 10 mg tablet Take 10 mg by mouth daily as needed for Allergies.      metoprolol tartrate (LOPRESSOR) 50 MG tablet Take 1 tablet (50 mg total) by mouth Daily. 30 tablet 11    multivitamin with minerals tablet Take 1 tablet by mouth once daily.      mupirocin calcium 2% (BACTROBAN) 2 % cream Apply topically 2 (two) times daily. 15 g 0    neomycin-polymyxin-hydrocortisone (CORTISPORIN) 3.5-10,000-1 mg/mL-unit/mL-% otic suspension INSTILL 2 TO 4 DROPS INTO AFFECTED EAR 4 TIMES DAILY FOR 5 DAYS      pantoprazole (PROTONIX) 20 MG tablet Take 1 tablet (20 mg total) by mouth once daily. 30 tablet 11    tiZANidine (ZANAFLEX) 4 MG tablet TAKE 1 TABLET (4 MG TOTAL) BY MOUTH NIGHTLY AS NEEDED (MUSCLE RELAXANT) 90 tablet 1    tretinoin (RETIN-A) 0.025 % cream Apply pea-sized amount to entire face at bedtime.  If dryness, use every third night and increase as tolerated to every night. 20 g 6    valsartan-hydrochlorothiazide (DIOVAN-HCT) 160-25 mg per tablet TAKE 1 TABLET BY MOUTH ONCE DAILY. 90 tablet 0    vitamin D (VITAMIN D3) 1000 units Tab Take 1,000 Units by mouth once daily.       Current Facility-Administered Medications   Medication Dose Route Frequency Provider Last Rate Last Admin    acetaminophen tablet 650 mg  650 mg Oral Once PRN Luca Linn MD        albuterol inhaler 2 puff  2 puff Inhalation Q20 Min PRN Luca TA  MD Kaveh        diphenhydrAMINE injection 25 mg  25 mg Intravenous Once PRN Luca Linn MD        EPINEPHrine (EPIPEN) 0.3 mg/0.3 mL pen injection 0.3 mg  0.3 mg Intramuscular PRN Luca Linn MD        methylPREDNISolone sodium succinate injection 40 mg  40 mg Intravenous Once PRN Luca Linn MD        ondansetron disintegrating tablet 4 mg  4 mg Oral Once PRN Luca Linn MD        sodium chloride 0.9% 500 mL flush bag   Intravenous PRN Luca Linn MD        sodium chloride 0.9% flush 10 mL  10 mL Intravenous PRN Luca Linn MD           Review of Systems   Constitutional: Negative for activity change, appetite change, chills, fever and weight loss.   HENT: Positive for postnasal drip, rhinorrhea and sore throat.    Respiratory: Positive for cough and wheezing. Negative for hemoptysis, chest tightness, shortness of breath and stridor.    Cardiovascular: Negative for chest pain and leg swelling.   Musculoskeletal: Negative for myalgias.   Skin: Negative for rash.   Neurological: Positive for headaches.       Objective:   LMP  (LMP Unknown)      Physical Exam  Constitutional:       Appearance: Normal appearance.   HENT:      Head: Atraumatic.   Pulmonary:      Effort: Pulmonary effort is normal.   Neurological:      General: No focal deficit present.      Mental Status: She is alert and oriented to person, place, and time.   Psychiatric:         Mood and Affect: Mood normal.         Behavior: Behavior normal.           Lab Results   Component Value Date    WBC 5.65 08/16/2021    HGB 14.0 08/16/2021    HCT 41.2 08/16/2021     08/16/2021    CHOL 190 08/16/2021    TRIG 532 (H) 08/16/2021    HDL 44 08/16/2021    ALT 24 08/16/2021    AST 39 08/16/2021     08/16/2021    K 4.4 08/16/2021     08/16/2021    CREATININE 1.7 (H) 08/16/2021    BUN 25 (H) 08/16/2021    CO2 25 08/16/2021    TSH 1.108 08/16/2021    INR 0.9 05/13/2015    HGBA1C 5.3 10/19/2020        Assessment:       1. Tobacco abuse    2. Coronary artery disease involving native coronary artery of native heart without angina pectoris    3. Stage 3a chronic kidney disease    4. Upper respiratory tract infection, unspecified type    5. Wheezing        Plan:   Tobacco abuse    Coronary artery disease involving native coronary artery of native heart without angina pectoris    Stage 3a chronic kidney disease    Upper respiratory tract infection, unspecified type    Wheezing    Other orders  -     albuterol-ipratropium (DUO-NEB) 2.5 mg-0.5 mg/3 mL nebulizer solution; Take 3 mLs by nebulization every 6 (six) hours as needed for Wheezing or Shortness of Breath. Rescue  Dispense: 30 mL; Refill: 0      Patient will get tested for covid and will let us know if she is worsening in anyway   She is high risk   I sent in neb treatment for symptom and patient is currently stable   ER for severe symptoms

## 2022-01-08 LAB — SARS-COV-2 RNA RESP QL NAA+PROBE: DETECTED

## 2022-01-11 ENCOUNTER — PATIENT MESSAGE (OUTPATIENT)
Dept: ENDOSCOPY | Facility: HOSPITAL | Age: 67
End: 2022-01-11
Payer: MEDICARE

## 2022-01-19 ENCOUNTER — TELEPHONE (OUTPATIENT)
Dept: PAIN MEDICINE | Facility: CLINIC | Age: 67
End: 2022-01-19
Payer: MEDICARE

## 2022-01-19 NOTE — TELEPHONE ENCOUNTER
Attempt to call patient to confirm appointment. Patent did not answer, Left message on patients voice mail to call back at earliest convenience to confirm or reschedule p.t apt.     Juan Soares  Medical Assistant

## 2022-02-17 ENCOUNTER — OFFICE VISIT (OUTPATIENT)
Dept: INTERNAL MEDICINE | Facility: CLINIC | Age: 67
End: 2022-02-17
Payer: MEDICARE

## 2022-02-17 VITALS
HEART RATE: 75 BPM | BODY MASS INDEX: 27.06 KG/M2 | WEIGHT: 157.63 LBS | TEMPERATURE: 98 F | DIASTOLIC BLOOD PRESSURE: 82 MMHG | SYSTOLIC BLOOD PRESSURE: 136 MMHG

## 2022-02-17 DIAGNOSIS — J32.9 SINUSITIS, UNSPECIFIED CHRONICITY, UNSPECIFIED LOCATION: ICD-10-CM

## 2022-02-17 DIAGNOSIS — I73.9 PAD (PERIPHERAL ARTERY DISEASE): ICD-10-CM

## 2022-02-17 DIAGNOSIS — F41.1 GAD (GENERALIZED ANXIETY DISORDER): ICD-10-CM

## 2022-02-17 DIAGNOSIS — I10 ESSENTIAL HYPERTENSION: Primary | Chronic | ICD-10-CM

## 2022-02-17 DIAGNOSIS — E78.00 PURE HYPERCHOLESTEROLEMIA: Chronic | ICD-10-CM

## 2022-02-17 PROBLEM — M46.1 SACROILIITIS: Status: RESOLVED | Noted: 2021-12-17 | Resolved: 2022-02-17

## 2022-02-17 PROCEDURE — 99214 OFFICE O/P EST MOD 30 MIN: CPT | Mod: HCWC,25,S$GLB, | Performed by: FAMILY MEDICINE

## 2022-02-17 PROCEDURE — 3288F PR FALLS RISK ASSESSMENT DOCUMENTED: ICD-10-PCS | Mod: HCWC,CPTII,S$GLB, | Performed by: FAMILY MEDICINE

## 2022-02-17 PROCEDURE — 1160F PR REVIEW ALL MEDS BY PRESCRIBER/CLIN PHARMACIST DOCUMENTED: ICD-10-PCS | Mod: HCWC,CPTII,S$GLB, | Performed by: FAMILY MEDICINE

## 2022-02-17 PROCEDURE — 3008F PR BODY MASS INDEX (BMI) DOCUMENTED: ICD-10-PCS | Mod: HCWC,CPTII,S$GLB, | Performed by: FAMILY MEDICINE

## 2022-02-17 PROCEDURE — 3075F SYST BP GE 130 - 139MM HG: CPT | Mod: HCWC,CPTII,S$GLB, | Performed by: FAMILY MEDICINE

## 2022-02-17 PROCEDURE — 96372 PR INJECTION,THERAP/PROPH/DIAG2ST, IM OR SUBCUT: ICD-10-PCS | Mod: HCWC,S$GLB,, | Performed by: FAMILY MEDICINE

## 2022-02-17 PROCEDURE — 1159F MED LIST DOCD IN RCRD: CPT | Mod: HCWC,CPTII,S$GLB, | Performed by: FAMILY MEDICINE

## 2022-02-17 PROCEDURE — 3008F BODY MASS INDEX DOCD: CPT | Mod: HCWC,CPTII,S$GLB, | Performed by: FAMILY MEDICINE

## 2022-02-17 PROCEDURE — 3288F FALL RISK ASSESSMENT DOCD: CPT | Mod: HCWC,CPTII,S$GLB, | Performed by: FAMILY MEDICINE

## 2022-02-17 PROCEDURE — 1101F PR PT FALLS ASSESS DOC 0-1 FALLS W/OUT INJ PAST YR: ICD-10-PCS | Mod: HCWC,CPTII,S$GLB, | Performed by: FAMILY MEDICINE

## 2022-02-17 PROCEDURE — 3079F DIAST BP 80-89 MM HG: CPT | Mod: HCWC,CPTII,S$GLB, | Performed by: FAMILY MEDICINE

## 2022-02-17 PROCEDURE — 1159F PR MEDICATION LIST DOCUMENTED IN MEDICAL RECORD: ICD-10-PCS | Mod: HCWC,CPTII,S$GLB, | Performed by: FAMILY MEDICINE

## 2022-02-17 PROCEDURE — 99214 PR OFFICE/OUTPT VISIT, EST, LEVL IV, 30-39 MIN: ICD-10-PCS | Mod: HCWC,25,S$GLB, | Performed by: FAMILY MEDICINE

## 2022-02-17 PROCEDURE — 96372 THER/PROPH/DIAG INJ SC/IM: CPT | Mod: HCWC,S$GLB,, | Performed by: FAMILY MEDICINE

## 2022-02-17 PROCEDURE — 1160F RVW MEDS BY RX/DR IN RCRD: CPT | Mod: HCWC,CPTII,S$GLB, | Performed by: FAMILY MEDICINE

## 2022-02-17 PROCEDURE — 99999 PR PBB SHADOW E&M-EST. PATIENT-LVL II: CPT | Mod: PBBFAC,HCWC,, | Performed by: FAMILY MEDICINE

## 2022-02-17 PROCEDURE — 99999 PR PBB SHADOW E&M-EST. PATIENT-LVL II: ICD-10-PCS | Mod: PBBFAC,HCWC,, | Performed by: FAMILY MEDICINE

## 2022-02-17 PROCEDURE — 1101F PT FALLS ASSESS-DOCD LE1/YR: CPT | Mod: HCWC,CPTII,S$GLB, | Performed by: FAMILY MEDICINE

## 2022-02-17 PROCEDURE — 3075F PR MOST RECENT SYSTOLIC BLOOD PRESS GE 130-139MM HG: ICD-10-PCS | Mod: HCWC,CPTII,S$GLB, | Performed by: FAMILY MEDICINE

## 2022-02-17 PROCEDURE — 3079F PR MOST RECENT DIASTOLIC BLOOD PRESSURE 80-89 MM HG: ICD-10-PCS | Mod: HCWC,CPTII,S$GLB, | Performed by: FAMILY MEDICINE

## 2022-02-17 RX ORDER — BUPROPION HYDROCHLORIDE 150 MG/1
TABLET ORAL
Qty: 166 TABLET | Refills: 0 | Status: SHIPPED | OUTPATIENT
Start: 2022-02-17 | End: 2022-08-18 | Stop reason: SDUPTHER

## 2022-02-17 RX ORDER — VALSARTAN AND HYDROCHLOROTHIAZIDE 160; 25 MG/1; MG/1
1 TABLET ORAL DAILY
Qty: 90 TABLET | Refills: 1 | Status: SHIPPED | OUTPATIENT
Start: 2022-02-17 | End: 2022-03-30

## 2022-02-17 RX ORDER — BETAMETHASONE SODIUM PHOSPHATE AND BETAMETHASONE ACETATE 3; 3 MG/ML; MG/ML
6 INJECTION, SUSPENSION INTRA-ARTICULAR; INTRALESIONAL; INTRAMUSCULAR; SOFT TISSUE ONCE
Status: COMPLETED | OUTPATIENT
Start: 2022-02-17 | End: 2022-02-17

## 2022-02-17 RX ORDER — TIZANIDINE 4 MG/1
TABLET ORAL
Qty: 90 TABLET | Refills: 1 | Status: SHIPPED | OUTPATIENT
Start: 2022-02-17 | End: 2022-04-20

## 2022-02-17 RX ORDER — NAPROXEN SODIUM 220 MG/1
81 TABLET, FILM COATED ORAL DAILY
Qty: 360 TABLET | Refills: 1 | COMMUNITY
Start: 2022-02-17 | End: 2023-07-13 | Stop reason: SDUPTHER

## 2022-02-17 RX ORDER — PANTOPRAZOLE SODIUM 20 MG/1
20 TABLET, DELAYED RELEASE ORAL DAILY
Qty: 90 TABLET | Refills: 1 | Status: SHIPPED | OUTPATIENT
Start: 2022-02-17 | End: 2022-06-07

## 2022-02-17 RX ORDER — AMLODIPINE BESYLATE 10 MG/1
10 TABLET ORAL DAILY
Qty: 90 TABLET | Refills: 1 | Status: ON HOLD | OUTPATIENT
Start: 2022-02-17 | End: 2022-03-30 | Stop reason: SDUPTHER

## 2022-02-17 RX ADMIN — BETAMETHASONE SODIUM PHOSPHATE AND BETAMETHASONE ACETATE 6 MG: 3; 3 INJECTION, SUSPENSION INTRA-ARTICULAR; INTRALESIONAL; INTRAMUSCULAR; SOFT TISSUE at 09:02

## 2022-02-17 NOTE — PROGRESS NOTES
Administered injection. Pt tolerated well. Advised pt to wait 15 mins in lobby for any adverse/allergic reaction. Pt verbalized understanding.

## 2022-02-17 NOTE — PROGRESS NOTES
Subjective:       Patient ID: Shirlene Olvera is a 66 y.o. female.    Chief Complaint: No chief complaint on file.    Hypertension  This is a chronic problem. The current episode started more than 1 year ago. The problem has been resolved since onset. The problem is controlled. Pertinent negatives include no anxiety, blurred vision, chest pain or shortness of breath. There are no associated agents to hypertension.     Review of Systems   Eyes: Negative for blurred vision.   Respiratory: Negative for shortness of breath.    Cardiovascular: Negative for chest pain.   Gastrointestinal: Negative for abdominal pain.       Objective:      Physical Exam  Vitals and nursing note reviewed.   Constitutional:       General: She is not in acute distress.     Appearance: Normal appearance. She is well-developed. She is not diaphoretic.   HENT:      Head: Normocephalic and atraumatic.      Nose:      Right Sinus: Maxillary sinus tenderness present.      Left Sinus: Maxillary sinus tenderness present.   Pulmonary:      Effort: Pulmonary effort is normal. No respiratory distress.      Breath sounds: Normal breath sounds. No wheezing.   Skin:     General: Skin is warm and dry.      Findings: No erythema or rash.   Neurological:      Mental Status: She is alert.   Psychiatric:         Mood and Affect: Mood normal.         Behavior: Behavior normal.         Thought Content: Thought content normal.         Judgment: Judgment normal.         Assessment:       1. Essential hypertension    2. PAD (peripheral artery disease)    3. Pure hypercholesterolemia    4. ROMÁN (generalized anxiety disorder)    5. Sinusitis, unspecified chronicity, unspecified location        Plan:     Problem List Items Addressed This Visit        Psychiatric    ROMÁN (generalized anxiety disorder)    Relevant Medications    buPROPion (WELLBUTRIN XL) 150 MG TB24 tablet       ENT    Sinusitis    Relevant Medications    betamethasone acetate-betamethasone sodium  phosphate injection 6 mg (Start on 2/17/2022 10:15 AM)    penicillin G benzathine (BICILLIN LA) injection 1.2 Million Units (Start on 2/17/2022  9:15 AM)       Cardiac/Vascular    Essential hypertension - Primary (Chronic)    Overview     Chronic, Stable, cont toprol, diovan-hctz         Relevant Medications    amLODIPine (NORVASC) 10 MG tablet    valsartan-hydrochlorothiazide (DIOVAN-HCT) 160-25 mg per tablet    Hyperlipidemia (Chronic)    Overview     Cont asa, repatha         PAD (peripheral artery disease)    Overview     Followed by jen Hernandez asa

## 2022-03-09 ENCOUNTER — TELEPHONE (OUTPATIENT)
Dept: PAIN MEDICINE | Facility: CLINIC | Age: 67
End: 2022-03-09
Payer: MEDICARE

## 2022-03-09 NOTE — PROGRESS NOTES
Established Patient Chronic Pain Note     Referring Physician: No ref. provider found    PCP: Clayton Frankel MD    Chief Complaint:   Chief Complaint   Patient presents with    Hip Pain     right    Neck Pain     Radiates into shoulders and biceps L>R        SUBJECTIVE:  Interval history 03/10/2022  Patient presents status post right-sided sacroiliac joint injection 12/17/2021 and for review of bilateral hip XR.  Patient reports 100% relief and right sacroiliac territory following her injection.  Today she reports insidious return of bilateral neck pain, which today is rated as a 9/10.  Patient reports pain in bilateral cervical paraspinous territory.  She denies radiation into the upper extremities, compromise in hand  strength or dexterity.  Patient is requesting repeat cervical C4, C5-C6 medial branch block as she obtain greater than 4 months of relief with this procedure.  Patient has continued gabapentin 100 mg 3 times daily and was not able to fill be updated prescription.  She does believe this medication helps with her symptoms and she denies any side effects.      Interval history 11/11/2021  Patient presents status post bilateral medial branch block at C4, C5, C6 10/15/2021.  Patient reports 100% pain relief following her bilateral medial branch block which is still sustained.  Patient reports significant improvement in range of motion and reduced pain with cervical flexion, extension and lateral flexion.  Today patient primarily ports right-sided sacroiliac joint pain which radiates into the right buttock and right hip territory.  Pain today is rated as a 7/10 and is intermittent.  Pain is described as sharp in nature.  Pain is exacerbated when moving from sitting to standing and patient has positive Jason finger sign.  Patient has continued gabapentin 300 mg at night.  Patient reports when she increase this dose to 600, she just did not feel right.  She will maintain her dose of 300 mg. She  "denies any new onset lower extremity weakness, bowel or bladder incontinence or saddle anesthesia.      HPI:  10/06/2021  Shirlene Olvera is a 66 y.o. female with past medical history significant for ROMÁN, HTN, HLD, CAD s/p MI, PAD, CKD III, nicotine dependence who presents to the clinic for the evaluation of longstanding neck pain.  Today patient reports neck pain which began several years prior without inciting accident, injury or trauma.  Patient describes pain as intermittent and is a 7/10 today.  At its worst pain is a 10/10.  Pain is described as stiff and a "pins and needles" sensation.  Pain begins at her occiput and radiates down bilateral cervical paraspinous muscles into bilateral trapezius distributions in C4-6 distribution.  Pain is exacerbated with cervical flexion, extension such as when she is gardening.  Pain is improved with Icy Hot.  Patient denies radicular symptoms into bilateral upper extremities, paresthesias or weakness in bilateral hands, compromise in hand  strength or dexterity.  Patient reports receiving prior cervical medial branch block several years prior with significant improvement in her symptoms.  Patient is currently taking gabapentin 100 mg 3 times daily without noticeable improvement in her symptoms.    Patient denies night fever/night sweats, urinary incontinence, bowel incontinence, significant weight loss, significant motor weakness and loss of sensations.    Pain Disability Index Review:     Last 3 PDI Scores 11/11/2021 10/6/2021   Pain Disability Index (PDI) 39 38       Non-Pharmacologic Treatments:  Physical Therapy/Home Exercise: no  Ice/Heat:yes  TENS: no  Acupuncture: no  Massage: no  Chiropractic: no    Other: no      Pain Medications:  - Adjuvant Medications: Neurontin (Gabapentin), Topical Ointment (Voltaren Gel, Steroid cream, Anti-Inflammatory Cream, Compound cream) and Tylenol (Acetaminophen)  - Anti-Coagulants: Aspirin    Pain Procedures:   -12/17/2021:  " Right-sided sacroiliac joint injection  -10/15/2021:  Bilateral medial branch block at C4, C5, C6; Dr. Izquierdo      Cervical MBB: several years prior    Past Medical History:   Diagnosis Date    Chest pain 5/14/2015    Colon polyp     Coronary artery disease     pt states MI June 2015    Hypertension     Myocardial infarction     PAD (peripheral artery disease)     Tobacco use      Past Surgical History:   Procedure Laterality Date    CHOLECYSTECTOMY  09/03/2015    COLONOSCOPY N/A 10/11/2016    Procedure: COLONOSCOPY;  Surgeon: Haseeb Spears MD;  Location: [facility];  Service: Endoscopy;  Laterality: N/A;    COLONOSCOPY N/A 12/9/2021    Procedure: COLONOSCOPY;  Surgeon: Pat Rios MD;  Location: [facility];  Service: Endoscopy;  Laterality: N/A;    ESOPHAGOGASTRODUODENOSCOPY N/A 12/9/2021    Procedure: EGD (ESOPHAGOGASTRODUODENOSCOPY);  Surgeon: Pat Rios MD;  Location: [facility];  Service: Endoscopy;  Laterality: N/A;    high blood      HYSTERECTOMY      INJECTION OF ANESTHETIC AGENT AROUND MEDIAL BRANCH NERVES INNERVATING CERVICAL FACET JOINT Bilateral 10/15/2021    Procedure: Bilateral C5-7 MBB with RN IV sedation PATIENT WOULD LIKE AFTERNOON ARRIVAL, IF POSSIBLE;  Surgeon: Nano Izquierdo MD;  Location: [facility];  Service: Pain Management;  Laterality: Bilateral;    INJECTION OF ANESTHETIC AGENT INTO SACROILIAC JOINT Right 12/17/2021    Procedure: Right SIJ Injection;  Surgeon: Nano Izquierdo MD;  Location: [facility];  Service: Pain Management;  Laterality: Right;    OOPHORECTOMY      RIB FRACTURE SURGERY       Review of patient's allergies indicates:   Allergen Reactions    Cholesterol analogues     Statins-hmg-coa reductase inhibitors Other (See Comments)     Myalgias to lipitor and simvastatin       Current Outpatient Medications   Medication Sig    albuterol-ipratropium (DUO-NEB) 2.5 mg-0.5 mg/3 mL nebulizer solution Take 3 mLs by nebulization every 6 (six) hours  as needed for Wheezing or Shortness of Breath. Rescue    amLODIPine (NORVASC) 10 MG tablet Take 1 tablet (10 mg total) by mouth once daily.    aspirin 81 MG Chew Take 1 tablet (81 mg total) by mouth once daily.    buPROPion (WELLBUTRIN XL) 150 MG TB24 tablet Take 1 tablet (150 mg total) by mouth once daily for 14 days, THEN 2 tablets (300 mg total) once daily.    cyanocobalamin (VITAMIN B-12) 1000 MCG tablet Take 100 mcg by mouth once daily.    diclofenac sodium (VOLTAREN) 1 % Gel Apply 2 g topically 4 (four) times daily as needed. For muscular pain    fexofenadine HCl (ALLEGRA ORAL) Take 1 tablet by mouth once daily.    fluticasone propionate (FLONASE) 50 mcg/actuation nasal spray 2 sprays (100 mcg total) by Each Nostril route once daily.    loratadine (CLARITIN) 10 mg tablet Take 10 mg by mouth daily as needed for Allergies.    metoprolol tartrate (LOPRESSOR) 50 MG tablet Take 1 tablet (50 mg total) by mouth Daily.    multivitamin with minerals tablet Take 1 tablet by mouth once daily.    mupirocin calcium 2% (BACTROBAN) 2 % cream Apply topically 2 (two) times daily.    pantoprazole (PROTONIX) 20 MG tablet Take 1 tablet (20 mg total) by mouth once daily.    tiZANidine (ZANAFLEX) 4 MG tablet TAKE 1 TABLET (4 MG TOTAL) BY MOUTH NIGHTLY AS NEEDED (MUSCLE RELAXANT)    tretinoin (RETIN-A) 0.025 % cream Apply pea-sized amount to entire face at bedtime.  If dryness, use every third night and increase as tolerated to every night. (Patient taking differently: Apply pea-sized amount to entire face at bedtime.  If dryness, use every third night and increase as tolerated to every night.)    valsartan-hydrochlorothiazide (DIOVAN-HCT) 160-25 mg per tablet Take 1 tablet by mouth once daily.    vitamin D (VITAMIN D3) 1000 units Tab Take 1,000 Units by mouth once daily.    albuterol (VENTOLIN HFA) 90 mcg/actuation inhaler Inhale 2 puffs into the lungs every 6 (six) hours as needed for Wheezing. Rescue     "gabapentin (NEURONTIN) 300 MG capsule Take 1 capsule (300 mg total) by mouth every evening for 7 days, THEN 2 capsules (600 mg total) every evening for 7 days, THEN 3 capsules (900 mg total) every evening for 16 days.    neomycin-polymyxin-hydrocortisone (CORTISPORIN) 3.5-10,000-1 mg/mL-unit/mL-% otic suspension INSTILL 2 TO 4 DROPS INTO AFFECTED EAR 4 TIMES DAILY FOR 5 DAYS     No current facility-administered medications for this visit.       Review of Systems     GENERAL:  No weight loss, malaise or fevers.  HEENT:   No recent changes in vision or hearing  NECK:  Negative for lumps, no difficulty with swallowing.  RESPIRATORY:  Negative for cough, wheezing or shortness of breath, patient denies any recent URI.  CARDIOVASCULAR:  Negative for chest pain, leg swelling or palpitations.  GI:  Negative for abdominal discomfort, blood in stools or black stools or change in bowel habits.  MUSCULOSKELETAL:  See HPI.  SKIN:  Negative for lesions, rash, and itching.  PSYCH:  No mood disorder or recent psychosocial stressors.   HEMATOLOGY/LYMPHOLOGY:  Negative for prolonged bleeding, bruising easily or swollen nodes.    NEURO:   No history of headaches, syncope, paralysis, seizures or tremors.  All other reviewed and negative other than HPI.    OBJECTIVE:    /71   Pulse (!) 58   Ht 5' 4" (1.626 m)   Wt 72.1 kg (158 lb 15.2 oz)   LMP  (LMP Unknown)   BMI 27.28 kg/m²       Physical Exam    GENERAL: Well appearing, in no acute distress, alert and oriented x3.  PSYCH:  Mood and affect appropriate.  SKIN: Skin color, texture, turgor normal, no rashes or lesions.  HEAD/FACE:  Normocephalic, atraumatic. Cranial nerves grossly intact.    NECK:  pain to palpation over the cervical paraspinous muscles. Spurling Negative.  pain with neck flexion, extension, or lateral flexion.  Full range of motion  Normal testing biceps, triceps and brachioradialis bilaterally.    Negative Antonio's bilaterally.    5/5 strength testing " deltoid, biceps, triceps, wrist extensor, wrist flexor and ulnar intrinsics bilaterally.    Normal  strength bilaterally    CV: RRR with palpation of the radial artery.  PULM: No evidence of respiratory difficulty, symmetric chest rise.  GI:  Soft and non-tender.    NEURO: Bilateral upper and lower extremity coordination and muscle stretch reflexes are physiologic and symmetric. No loss of sensation is noted.  GAIT: normal.    Imaging:   Hip  XR 11/11/21  FINDINGS:  No acute fracture.  Hip joint spaces maintained with left greater than right acetabular degenerative findings.  Lower lumbar spine degenerative changes noted.  Soft tissues unremarkable.       09/05/12    MRI Lumbar Spine Without Contrast    Narrative  DATE OF EXAM: Oct  5 2012    Findings: Vertebral alignment is normal.  The conus ends at the level of  L1 and appears normal.  Intervertebral disks are well-hydrated and disk  spaces are maintained.  No compression fractures or acute osseous  abnormalities are seen.  There is no area of abnormal signal intensity  identified within the bone marrow.  Axial images are acquired through the  disk spaces from L1-2 through L5-S1.  There is no focal disk herniation,  canal or foraminal stenosis identified above the L4-5 level.  At L4 R. there is central disk protrusion minimally indenting the thecal  sac.  There is also some diffuse bulging of the disk and facet and  ligamentum flavum hypertrophy.  There is mild canal and bilateral  foraminal narrowing at this level.  At L5-S1 there is central bulging of the disk without canal or foraminal  stenosis.    Impression  Central disk protrusion at L4-5 with some canal and foraminal  narrowing due to combination of disk abnormality and degenerative change.  Please correlate above findings with the patient's clinical symptoms.    09/05/12    MRI Cervical Spine Without Contrast    Narrative  DATE OF EXAM: Oct  5 2012    Findings: The craniovertebral junction and  vertebral alignment are  normal.  No abnormality of bone marrow signal intensity is seen.  Intervertebral disks are dessicated but disk spaces are maintained.  No  abnormality seen within the cervical spinal cord.  Axial images are  acquired through the disk spaces from C2-3 through C7-T1.  C2-3 disk space is normal.  The C3-4 disk space demonstrates spondylosis as well as facet and  uncinate hypertrophy.  There is mild canal and bilateral foraminal  narrowing at this level.  The C4-5 disk space also demonstrates right paracentral spondylosis and  disk bulge with facet and uncinate hypertrophy on the right.  There is  mild canal and moderate right sided foraminal stenosis.  The C5-6 disk space does not demonstrate any significant disk herniation,  canal or foraminal stenosis.  The C6-7 disk space demonstrates minimal bulging of the disk but no canal  or foraminal narrowing.  The C7 T1 disk space is unremarkable.    Impression  Degenerative change and degenerative disk disease most  pronounced at the C3-4 and C4-5 level as discussed above.  Please  correlate with patient's clinical symptoms.       09/05/12    X-Ray Cervical Spine AP And Lateral    Narrative  DATE OF EXAM: Sep 13 2012    Findings: Vertebral alignment is normal.  There is moderate degenerative  change throughout the cervical spine with loss of disk height and  anterior osteophyte formation.  No acute osseous abnormalities are seen.  Postoperative findings from resection of the left first rib.      ASSESSMENT: 66 y.o. year old female with neck pain, R lower back pain, consistent with     1. Cervical spondylosis  IR Facet Inj Cerv Thoracic 2nd Vert Bi    IR Facet Inj Cervical Thoracic 1st Vert Bi    Case Request-RAD/Other Procedure Area: Bilateral C4-6 MBB with RN IV sedation   2. Cervical radiculopathy     3. Facet arthropathy, cervical     4. Degenerative disc disease, cervical           PLAN:   - Interventions:  Schedule for bilateral C4, C5, C6  medial branch block as she obtained greater than 4 months of relief with. Explained the risks and benefits of the procedure in detail with the patient today in clinic along with alternative treatment options, and the patient elected to pursue the intervention at this time.      - Anticoagulation use: yes aspirin  For secondary prophylaxis, patient can continue aspirin for cervical medial branch block per ASA Guidelines.    - we have discussed with exacerbation of sacroiliac pain, scheduling repeat right-sided sacroiliac joint injection in the future.       report:  Reviewed and consistent with medication use as prescribed.    - Medications:  --  We have discussed increasing gabapentin.  Patient will increase their medication according to the following algorithm.  We have reviewed potential side effects of this medication including daytime somnolence, weight gain and peripheral edema    Gabapentin Titration:  Week 1: 300 mg QHS  Week 2: 600 mg QHS  Week 3: 900 mg QHS      - Therapy:   We discussed continuing physical therapy to help manage the patient/s painful condition. The patient was counseled that muscle strengthening will improve the long term prognosis in regards to pain and may also help increase range of motion and mobility.     - Imaging: Reviewed available imaging with patient and answered any questions they had regarding study.      - Follow up visit: return to clinic in 4 weeks      The above plan and management options were discussed at length with patient. Patient is in agreement with the above and verbalized understanding.    - I discussed the goals of interventional chronic pain management with the patient on today's visit. We discussed a multimodal and systematic approach to pain.  This includes diagnostic and therapeutic injections, adjuvant pharmacologic treatment, physical therapy, and at times psychiatry.  I emphasized the importance of regular exercise, core strengthening and stretching,  diet and weight loss as a cornerstone of long-term pain management.    - This condition does not require this patient to take time off of work, and the primary goal of our Pain Management services is to improve the patient's functional capacity.  - Patient Questions: Answered all of the patient's questions regarding diagnoses, therapy, treatment and next steps        Nano Izquierdo MD  Interventional Pain Management  [facility]    Disclaimer:  This note was prepared using voice recognition system and is likely to have sound alike errors that may have been overlooked even after proof reading.  Please call me with any questions

## 2022-03-09 NOTE — H&P (VIEW-ONLY)
Established Patient Chronic Pain Note     Referring Physician: No ref. provider found    PCP: Clayton Frankel MD    Chief Complaint:   Chief Complaint   Patient presents with    Hip Pain     right    Neck Pain     Radiates into shoulders and biceps L>R        SUBJECTIVE:  Interval history 03/10/2022  Patient presents status post right-sided sacroiliac joint injection 12/17/2021 and for review of bilateral hip XR.  Patient reports 100% relief and right sacroiliac territory following her injection.  Today she reports insidious return of bilateral neck pain, which today is rated as a 9/10.  Patient reports pain in bilateral cervical paraspinous territory.  She denies radiation into the upper extremities, compromise in hand  strength or dexterity.  Patient is requesting repeat cervical C4, C5-C6 medial branch block as she obtain greater than 4 months of relief with this procedure.  Patient has continued gabapentin 100 mg 3 times daily and was not able to fill be updated prescription.  She does believe this medication helps with her symptoms and she denies any side effects.      Interval history 11/11/2021  Patient presents status post bilateral medial branch block at C4, C5, C6 10/15/2021.  Patient reports 100% pain relief following her bilateral medial branch block which is still sustained.  Patient reports significant improvement in range of motion and reduced pain with cervical flexion, extension and lateral flexion.  Today patient primarily ports right-sided sacroiliac joint pain which radiates into the right buttock and right hip territory.  Pain today is rated as a 7/10 and is intermittent.  Pain is described as sharp in nature.  Pain is exacerbated when moving from sitting to standing and patient has positive Jason finger sign.  Patient has continued gabapentin 300 mg at night.  Patient reports when she increase this dose to 600, she just did not feel right.  She will maintain her dose of 300 mg. She  "denies any new onset lower extremity weakness, bowel or bladder incontinence or saddle anesthesia.      HPI:  10/06/2021  Shirlene Olvera is a 66 y.o. female with past medical history significant for ROMÁN, HTN, HLD, CAD s/p MI, PAD, CKD III, nicotine dependence who presents to the clinic for the evaluation of longstanding neck pain.  Today patient reports neck pain which began several years prior without inciting accident, injury or trauma.  Patient describes pain as intermittent and is a 7/10 today.  At its worst pain is a 10/10.  Pain is described as stiff and a "pins and needles" sensation.  Pain begins at her occiput and radiates down bilateral cervical paraspinous muscles into bilateral trapezius distributions in C4-6 distribution.  Pain is exacerbated with cervical flexion, extension such as when she is gardening.  Pain is improved with Icy Hot.  Patient denies radicular symptoms into bilateral upper extremities, paresthesias or weakness in bilateral hands, compromise in hand  strength or dexterity.  Patient reports receiving prior cervical medial branch block several years prior with significant improvement in her symptoms.  Patient is currently taking gabapentin 100 mg 3 times daily without noticeable improvement in her symptoms.    Patient denies night fever/night sweats, urinary incontinence, bowel incontinence, significant weight loss, significant motor weakness and loss of sensations.    Pain Disability Index Review:     Last 3 PDI Scores 11/11/2021 10/6/2021   Pain Disability Index (PDI) 39 38       Non-Pharmacologic Treatments:  Physical Therapy/Home Exercise: no  Ice/Heat:yes  TENS: no  Acupuncture: no  Massage: no  Chiropractic: no    Other: no      Pain Medications:  - Adjuvant Medications: Neurontin (Gabapentin), Topical Ointment (Voltaren Gel, Steroid cream, Anti-Inflammatory Cream, Compound cream) and Tylenol (Acetaminophen)  - Anti-Coagulants: Aspirin    Pain Procedures:   -12/17/2021:  " Right-sided sacroiliac joint injection  -10/15/2021:  Bilateral medial branch block at C4, C5, C6; Dr. Izquierdo      Cervical MBB: several years prior    Past Medical History:   Diagnosis Date    Chest pain 5/14/2015    Colon polyp     Coronary artery disease     pt states MI June 2015    Hypertension     Myocardial infarction     PAD (peripheral artery disease)     Tobacco use      Past Surgical History:   Procedure Laterality Date    CHOLECYSTECTOMY  09/03/2015    COLONOSCOPY N/A 10/11/2016    Procedure: COLONOSCOPY;  Surgeon: Haseeb Spears MD;  Location: [facility];  Service: Endoscopy;  Laterality: N/A;    COLONOSCOPY N/A 12/9/2021    Procedure: COLONOSCOPY;  Surgeon: Pat Rios MD;  Location: [facility];  Service: Endoscopy;  Laterality: N/A;    ESOPHAGOGASTRODUODENOSCOPY N/A 12/9/2021    Procedure: EGD (ESOPHAGOGASTRODUODENOSCOPY);  Surgeon: Pat Rios MD;  Location: [facility];  Service: Endoscopy;  Laterality: N/A;    high blood      HYSTERECTOMY      INJECTION OF ANESTHETIC AGENT AROUND MEDIAL BRANCH NERVES INNERVATING CERVICAL FACET JOINT Bilateral 10/15/2021    Procedure: Bilateral C5-7 MBB with RN IV sedation PATIENT WOULD LIKE AFTERNOON ARRIVAL, IF POSSIBLE;  Surgeon: Nano Izquierdo MD;  Location: [facility];  Service: Pain Management;  Laterality: Bilateral;    INJECTION OF ANESTHETIC AGENT INTO SACROILIAC JOINT Right 12/17/2021    Procedure: Right SIJ Injection;  Surgeon: Nano Izquierdo MD;  Location: [facility];  Service: Pain Management;  Laterality: Right;    OOPHORECTOMY      RIB FRACTURE SURGERY       Review of patient's allergies indicates:   Allergen Reactions    Cholesterol analogues     Statins-hmg-coa reductase inhibitors Other (See Comments)     Myalgias to lipitor and simvastatin       Current Outpatient Medications   Medication Sig    albuterol-ipratropium (DUO-NEB) 2.5 mg-0.5 mg/3 mL nebulizer solution Take 3 mLs by nebulization every 6 (six) hours  as needed for Wheezing or Shortness of Breath. Rescue    amLODIPine (NORVASC) 10 MG tablet Take 1 tablet (10 mg total) by mouth once daily.    aspirin 81 MG Chew Take 1 tablet (81 mg total) by mouth once daily.    buPROPion (WELLBUTRIN XL) 150 MG TB24 tablet Take 1 tablet (150 mg total) by mouth once daily for 14 days, THEN 2 tablets (300 mg total) once daily.    cyanocobalamin (VITAMIN B-12) 1000 MCG tablet Take 100 mcg by mouth once daily.    diclofenac sodium (VOLTAREN) 1 % Gel Apply 2 g topically 4 (four) times daily as needed. For muscular pain    fexofenadine HCl (ALLEGRA ORAL) Take 1 tablet by mouth once daily.    fluticasone propionate (FLONASE) 50 mcg/actuation nasal spray 2 sprays (100 mcg total) by Each Nostril route once daily.    loratadine (CLARITIN) 10 mg tablet Take 10 mg by mouth daily as needed for Allergies.    metoprolol tartrate (LOPRESSOR) 50 MG tablet Take 1 tablet (50 mg total) by mouth Daily.    multivitamin with minerals tablet Take 1 tablet by mouth once daily.    mupirocin calcium 2% (BACTROBAN) 2 % cream Apply topically 2 (two) times daily.    pantoprazole (PROTONIX) 20 MG tablet Take 1 tablet (20 mg total) by mouth once daily.    tiZANidine (ZANAFLEX) 4 MG tablet TAKE 1 TABLET (4 MG TOTAL) BY MOUTH NIGHTLY AS NEEDED (MUSCLE RELAXANT)    tretinoin (RETIN-A) 0.025 % cream Apply pea-sized amount to entire face at bedtime.  If dryness, use every third night and increase as tolerated to every night. (Patient taking differently: Apply pea-sized amount to entire face at bedtime.  If dryness, use every third night and increase as tolerated to every night.)    valsartan-hydrochlorothiazide (DIOVAN-HCT) 160-25 mg per tablet Take 1 tablet by mouth once daily.    vitamin D (VITAMIN D3) 1000 units Tab Take 1,000 Units by mouth once daily.    albuterol (VENTOLIN HFA) 90 mcg/actuation inhaler Inhale 2 puffs into the lungs every 6 (six) hours as needed for Wheezing. Rescue     "gabapentin (NEURONTIN) 300 MG capsule Take 1 capsule (300 mg total) by mouth every evening for 7 days, THEN 2 capsules (600 mg total) every evening for 7 days, THEN 3 capsules (900 mg total) every evening for 16 days.    neomycin-polymyxin-hydrocortisone (CORTISPORIN) 3.5-10,000-1 mg/mL-unit/mL-% otic suspension INSTILL 2 TO 4 DROPS INTO AFFECTED EAR 4 TIMES DAILY FOR 5 DAYS     No current facility-administered medications for this visit.       Review of Systems     GENERAL:  No weight loss, malaise or fevers.  HEENT:   No recent changes in vision or hearing  NECK:  Negative for lumps, no difficulty with swallowing.  RESPIRATORY:  Negative for cough, wheezing or shortness of breath, patient denies any recent URI.  CARDIOVASCULAR:  Negative for chest pain, leg swelling or palpitations.  GI:  Negative for abdominal discomfort, blood in stools or black stools or change in bowel habits.  MUSCULOSKELETAL:  See HPI.  SKIN:  Negative for lesions, rash, and itching.  PSYCH:  No mood disorder or recent psychosocial stressors.   HEMATOLOGY/LYMPHOLOGY:  Negative for prolonged bleeding, bruising easily or swollen nodes.    NEURO:   No history of headaches, syncope, paralysis, seizures or tremors.  All other reviewed and negative other than HPI.    OBJECTIVE:    /71   Pulse (!) 58   Ht 5' 4" (1.626 m)   Wt 72.1 kg (158 lb 15.2 oz)   LMP  (LMP Unknown)   BMI 27.28 kg/m²       Physical Exam    GENERAL: Well appearing, in no acute distress, alert and oriented x3.  PSYCH:  Mood and affect appropriate.  SKIN: Skin color, texture, turgor normal, no rashes or lesions.  HEAD/FACE:  Normocephalic, atraumatic. Cranial nerves grossly intact.    NECK:  pain to palpation over the cervical paraspinous muscles. Spurling Negative.  pain with neck flexion, extension, or lateral flexion.  Full range of motion  Normal testing biceps, triceps and brachioradialis bilaterally.    Negative Antonio's bilaterally.    5/5 strength testing " deltoid, biceps, triceps, wrist extensor, wrist flexor and ulnar intrinsics bilaterally.    Normal  strength bilaterally    CV: RRR with palpation of the radial artery.  PULM: No evidence of respiratory difficulty, symmetric chest rise.  GI:  Soft and non-tender.    NEURO: Bilateral upper and lower extremity coordination and muscle stretch reflexes are physiologic and symmetric. No loss of sensation is noted.  GAIT: normal.    Imaging:   Hip  XR 11/11/21  FINDINGS:  No acute fracture.  Hip joint spaces maintained with left greater than right acetabular degenerative findings.  Lower lumbar spine degenerative changes noted.  Soft tissues unremarkable.       09/05/12    MRI Lumbar Spine Without Contrast    Narrative  DATE OF EXAM: Oct  5 2012    Findings: Vertebral alignment is normal.  The conus ends at the level of  L1 and appears normal.  Intervertebral disks are well-hydrated and disk  spaces are maintained.  No compression fractures or acute osseous  abnormalities are seen.  There is no area of abnormal signal intensity  identified within the bone marrow.  Axial images are acquired through the  disk spaces from L1-2 through L5-S1.  There is no focal disk herniation,  canal or foraminal stenosis identified above the L4-5 level.  At L4 R. there is central disk protrusion minimally indenting the thecal  sac.  There is also some diffuse bulging of the disk and facet and  ligamentum flavum hypertrophy.  There is mild canal and bilateral  foraminal narrowing at this level.  At L5-S1 there is central bulging of the disk without canal or foraminal  stenosis.    Impression  Central disk protrusion at L4-5 with some canal and foraminal  narrowing due to combination of disk abnormality and degenerative change.  Please correlate above findings with the patient's clinical symptoms.    09/05/12    MRI Cervical Spine Without Contrast    Narrative  DATE OF EXAM: Oct  5 2012    Findings: The craniovertebral junction and  vertebral alignment are  normal.  No abnormality of bone marrow signal intensity is seen.  Intervertebral disks are dessicated but disk spaces are maintained.  No  abnormality seen within the cervical spinal cord.  Axial images are  acquired through the disk spaces from C2-3 through C7-T1.  C2-3 disk space is normal.  The C3-4 disk space demonstrates spondylosis as well as facet and  uncinate hypertrophy.  There is mild canal and bilateral foraminal  narrowing at this level.  The C4-5 disk space also demonstrates right paracentral spondylosis and  disk bulge with facet and uncinate hypertrophy on the right.  There is  mild canal and moderate right sided foraminal stenosis.  The C5-6 disk space does not demonstrate any significant disk herniation,  canal or foraminal stenosis.  The C6-7 disk space demonstrates minimal bulging of the disk but no canal  or foraminal narrowing.  The C7 T1 disk space is unremarkable.    Impression  Degenerative change and degenerative disk disease most  pronounced at the C3-4 and C4-5 level as discussed above.  Please  correlate with patient's clinical symptoms.       09/05/12    X-Ray Cervical Spine AP And Lateral    Narrative  DATE OF EXAM: Sep 13 2012    Findings: Vertebral alignment is normal.  There is moderate degenerative  change throughout the cervical spine with loss of disk height and  anterior osteophyte formation.  No acute osseous abnormalities are seen.  Postoperative findings from resection of the left first rib.      ASSESSMENT: 66 y.o. year old female with neck pain, R lower back pain, consistent with     1. Cervical spondylosis  IR Facet Inj Cerv Thoracic 2nd Vert Bi    IR Facet Inj Cervical Thoracic 1st Vert Bi    Case Request-RAD/Other Procedure Area: Bilateral C4-6 MBB with RN IV sedation   2. Cervical radiculopathy     3. Facet arthropathy, cervical     4. Degenerative disc disease, cervical           PLAN:   - Interventions:  Schedule for bilateral C4, C5, C6  medial branch block as she obtained greater than 4 months of relief with. Explained the risks and benefits of the procedure in detail with the patient today in clinic along with alternative treatment options, and the patient elected to pursue the intervention at this time.      - Anticoagulation use: yes aspirin  For secondary prophylaxis, patient can continue aspirin for cervical medial branch block per ASA Guidelines.    - we have discussed with exacerbation of sacroiliac pain, scheduling repeat right-sided sacroiliac joint injection in the future.       report:  Reviewed and consistent with medication use as prescribed.    - Medications:  --  We have discussed increasing gabapentin.  Patient will increase their medication according to the following algorithm.  We have reviewed potential side effects of this medication including daytime somnolence, weight gain and peripheral edema    Gabapentin Titration:  Week 1: 300 mg QHS  Week 2: 600 mg QHS  Week 3: 900 mg QHS      - Therapy:   We discussed continuing physical therapy to help manage the patient/s painful condition. The patient was counseled that muscle strengthening will improve the long term prognosis in regards to pain and may also help increase range of motion and mobility.     - Imaging: Reviewed available imaging with patient and answered any questions they had regarding study.      - Follow up visit: return to clinic in 4 weeks      The above plan and management options were discussed at length with patient. Patient is in agreement with the above and verbalized understanding.    - I discussed the goals of interventional chronic pain management with the patient on today's visit. We discussed a multimodal and systematic approach to pain.  This includes diagnostic and therapeutic injections, adjuvant pharmacologic treatment, physical therapy, and at times psychiatry.  I emphasized the importance of regular exercise, core strengthening and stretching,  diet and weight loss as a cornerstone of long-term pain management.    - This condition does not require this patient to take time off of work, and the primary goal of our Pain Management services is to improve the patient's functional capacity.  - Patient Questions: Answered all of the patient's questions regarding diagnoses, therapy, treatment and next steps        Nano Izquierdo MD  Interventional Pain Management  [facility]    Disclaimer:  This note was prepared using voice recognition system and is likely to have sound alike errors that may have been overlooked even after proof reading.  Please call me with any questions

## 2022-03-10 ENCOUNTER — OFFICE VISIT (OUTPATIENT)
Dept: PAIN MEDICINE | Facility: CLINIC | Age: 67
End: 2022-03-10
Payer: MEDICARE

## 2022-03-10 ENCOUNTER — TELEPHONE (OUTPATIENT)
Dept: PAIN MEDICINE | Facility: CLINIC | Age: 67
End: 2022-03-10

## 2022-03-10 VITALS
WEIGHT: 158.94 LBS | SYSTOLIC BLOOD PRESSURE: 133 MMHG | DIASTOLIC BLOOD PRESSURE: 71 MMHG | HEART RATE: 58 BPM | BODY MASS INDEX: 27.13 KG/M2 | HEIGHT: 64 IN

## 2022-03-10 DIAGNOSIS — M47.812 FACET ARTHROPATHY, CERVICAL: ICD-10-CM

## 2022-03-10 DIAGNOSIS — M47.812 CERVICAL SPONDYLOSIS: Primary | ICD-10-CM

## 2022-03-10 DIAGNOSIS — M54.12 CERVICAL RADICULOPATHY: ICD-10-CM

## 2022-03-10 DIAGNOSIS — M50.30 DEGENERATIVE DISC DISEASE, CERVICAL: ICD-10-CM

## 2022-03-10 PROCEDURE — 3078F PR MOST RECENT DIASTOLIC BLOOD PRESSURE < 80 MM HG: ICD-10-PCS | Mod: CPTII,S$GLB,, | Performed by: ANESTHESIOLOGY

## 2022-03-10 PROCEDURE — 3008F PR BODY MASS INDEX (BMI) DOCUMENTED: ICD-10-PCS | Mod: CPTII,S$GLB,, | Performed by: ANESTHESIOLOGY

## 2022-03-10 PROCEDURE — 3288F FALL RISK ASSESSMENT DOCD: CPT | Mod: CPTII,S$GLB,, | Performed by: ANESTHESIOLOGY

## 2022-03-10 PROCEDURE — 99999 PR PBB SHADOW E&M-EST. PATIENT-LVL V: CPT | Mod: PBBFAC,,, | Performed by: ANESTHESIOLOGY

## 2022-03-10 PROCEDURE — 3075F PR MOST RECENT SYSTOLIC BLOOD PRESS GE 130-139MM HG: ICD-10-PCS | Mod: CPTII,S$GLB,, | Performed by: ANESTHESIOLOGY

## 2022-03-10 PROCEDURE — 3078F DIAST BP <80 MM HG: CPT | Mod: CPTII,S$GLB,, | Performed by: ANESTHESIOLOGY

## 2022-03-10 PROCEDURE — 1101F PR PT FALLS ASSESS DOC 0-1 FALLS W/OUT INJ PAST YR: ICD-10-PCS | Mod: CPTII,S$GLB,, | Performed by: ANESTHESIOLOGY

## 2022-03-10 PROCEDURE — 1159F PR MEDICATION LIST DOCUMENTED IN MEDICAL RECORD: ICD-10-PCS | Mod: CPTII,S$GLB,, | Performed by: ANESTHESIOLOGY

## 2022-03-10 PROCEDURE — 99214 OFFICE O/P EST MOD 30 MIN: CPT | Mod: S$GLB,,, | Performed by: ANESTHESIOLOGY

## 2022-03-10 PROCEDURE — 99214 PR OFFICE/OUTPT VISIT, EST, LEVL IV, 30-39 MIN: ICD-10-PCS | Mod: S$GLB,,, | Performed by: ANESTHESIOLOGY

## 2022-03-10 PROCEDURE — 1125F AMNT PAIN NOTED PAIN PRSNT: CPT | Mod: CPTII,S$GLB,, | Performed by: ANESTHESIOLOGY

## 2022-03-10 PROCEDURE — 1101F PT FALLS ASSESS-DOCD LE1/YR: CPT | Mod: CPTII,S$GLB,, | Performed by: ANESTHESIOLOGY

## 2022-03-10 PROCEDURE — 1125F PR PAIN SEVERITY QUANTIFIED, PAIN PRESENT: ICD-10-PCS | Mod: CPTII,S$GLB,, | Performed by: ANESTHESIOLOGY

## 2022-03-10 PROCEDURE — 3288F PR FALLS RISK ASSESSMENT DOCUMENTED: ICD-10-PCS | Mod: CPTII,S$GLB,, | Performed by: ANESTHESIOLOGY

## 2022-03-10 PROCEDURE — 1159F MED LIST DOCD IN RCRD: CPT | Mod: CPTII,S$GLB,, | Performed by: ANESTHESIOLOGY

## 2022-03-10 PROCEDURE — 3008F BODY MASS INDEX DOCD: CPT | Mod: CPTII,S$GLB,, | Performed by: ANESTHESIOLOGY

## 2022-03-10 PROCEDURE — 99999 PR PBB SHADOW E&M-EST. PATIENT-LVL V: ICD-10-PCS | Mod: PBBFAC,,, | Performed by: ANESTHESIOLOGY

## 2022-03-10 PROCEDURE — 3075F SYST BP GE 130 - 139MM HG: CPT | Mod: CPTII,S$GLB,, | Performed by: ANESTHESIOLOGY

## 2022-03-10 RX ORDER — GABAPENTIN 300 MG/1
CAPSULE ORAL
Qty: 69 CAPSULE | Refills: 0 | Status: SHIPPED | OUTPATIENT
Start: 2022-03-10 | End: 2022-04-28 | Stop reason: SDUPTHER

## 2022-03-10 NOTE — PATIENT INSTRUCTIONS
General Neck and Back Pain    Both neck and back pain are usually caused by injury to the muscles or ligaments of the spine. Sometimes the disks that separate each bone of the spine may cause pain by pressing on a nearby nerve. Back and neck pain may appear after a sudden twisting or bending force (such as in a car accident), or sometimes after a simple awkward movement. In either case, muscle spasm is often present and adds to the pain.  Acute neck and back pain usually gets better in 1 to 2 weeks. Pain related to disk disease, arthritis in the spinal joints or spinal stenosis (narrowing of the spinal canal) can become chronic and last for months or years.  Back and neck pain are common problems. Most people feel better in 1 or 2 weeks, and most of the rest in 1 to 2 months. Most people can remain active.  People experience and describe pain differently.  Pain can be sharp, stabbing, shooting, aching, cramping, or burning  Movement, standing, bending, lifting, sitting, or walking may worsen the pain  Pain can be localized to one spot or area, or it can be more generalized  Pain can spread or radiate upwards, downwards, to the front, or go down your arms  Muscle spasm may occur.  Most of the time mechanical problems with the muscles or spine cause the pain. it is usually caused by an injury, whether known or not, to the muscles or ligaments. While illnesses can cause back pain, it is usually not caused by a serious illness. Pain is usually related to physical activity, whether sports, exercise, work, or normal activity. Sometimes it can occur without an identifiable cause. This can happen simply by stretching or moving wrong, without noting pain at the time. Other causes include:  Overexertion, lifting, pushing, pulling incorrectly or too aggressively.  Sudden twisting, bending or stretching from an accident (car or fall), or accidental movement.  Poor posture  Poor conditioning, lack of regular exercise  Spinal  disc disease or arthritis  Stress  Pregnancy, or illness like appendicitis, bladder or kidney infection, pelvic infections   Home care  For neck pain: Use a comfortable pillow that supports the head and keeps the spine in a neutral position. The position of the head should not be tilted forward or backward.  When in bed, try to find a position of comfort. A firm mattress is best. Try lying flat on your back with pillows under your knees. You can also try lying on your side with your knees bent up towards your chest and a pillow between your knees.  At first, do not try to stretch out the sore spots. If there is a strain, it is not like the good soreness you get after exercising without an injury. In this case, stretching may make it worse.  Avoid prolonged sitting, long car rides or travel. This puts more stress on the lower back than standing or walking.  During the first 24 to 72 hours after an injury, apply an ice pack to the painful area for 20 minutes and then remove it for 20 minutes over a period of 60 to 90 minutes or several times a day.   You can alternate ice and heat therapies. Talk with your healthcare provider about the best treatment for your back or neck pain. As a safety precaution, do not use a heating pad at bedtime. Sleeping with a heating pad can lead to skin burns or tissue damage.  Therapeutic massage can help relax the back and neck muscles without stretching them.  Be aware of safe lifting methods and do not lift anything over 15 pounds until all the pain is gone.  Medications  Talk to your healthcare provider before using medicine, especially if you have other medical problems or are taking other medicines.  You may use over-the-counter medicine to control pain, unless another pain medicine was prescribed. If you have chronic conditions like diabetes, liver or kidney disease, stomach ulcers,  gastrointestinal bleeding, or are taking blood thinner medicines.  Be careful if you are given pain  medicines, narcotics, or medicine for muscle spasm. They can cause drowsiness, and can affect your coordination, reflexes, and judgment. Do not drive or operate heavy machinery.  Follow-up care  Follow up with your healthcare provider, or as advised. Physical therapy or further tests may be needed.  If X-rays were taken, you will be notified of any new findings that may affect your care.  Call 911  Seek emergency medical care if any of the following occur:  Trouble breathing  Confusion  Very drowsy or trouble awakening  Fainting or loss of consciousness  Rapid or very slow heart rate  Loss of bowel or bladder control  When to seek medical advice  Call your healthcare provider right away if any of these occur:  Pain becomes worse or spreads into your arms or legs  Weakness, numbness or pain in one or both arms or legs  Numbness in the groin area  Difficulty walking  Fever of 100.4ºF (38ºC) or higher, or as directed by your healthcare provider  Date Last Reviewed: 7/1/2016  © 4642-1530 [employer]. [address] [place] [address]. All rights reserved. This information is not intended as a substitute for professional medical care. Always follow your healthcare professional's instructions.       Understanding Cervical Radiculopathy    Cervical radiculopathy is irritation or inflammation of a nerve root in the neck. It causes neck pain and other symptoms that may spread into the chest or down the arm. To understand this condition, it helps to understand the parts of the spine:  Vertebrae. These are bones that stack to form the spine. The cervical spine contains the 7 vertebrae in the neck.  Disks. These are soft pads of tissue between the vertebrae. They act as shock absorbers for the spine.  The spinal canal. This is a tunnel formed within the stacked vertebrae. The spinal cord runs through this canal.  Nerves. These branch off the spinal cord. As they leave the spinal canal, nerves pass through  openings between the vertebrae. The nerve root is the part of the nerve that is closest to the spinal cord.   With cervical radiculopathy, nerve roots in the neck become irritated. This leads to pain and symptoms that can travel to the nerves that go from the spinal cord down the arms and into the torso.  What causes cervical radiculopathy?  Aging, injury, poor posture, and other issues can lead to problems in the neck. These problems may then irritate nerve roots. These include:  Damage to a disk in the cervical spine. The damaged disk may then press on nearby nerve roots.  Degeneration from wear and tear, and aging. This can lead to narrowing (stenosis) of the openings between the vertebrae. The narrowed openings press on nerve roots as they leave the spinal canal.  An unstable spine. This is when a vertebra slips forward. It can then press on a nerve root.  There are other, less common causes of pressure on nerves in the neck. These include infection, cysts, and tumors.  Symptoms of cervical radiculopathy  These include:  Neck pain  Pain, numbness, tingling, or weakness that travels down the arm  Loss of neck movement  Muscle spasms  Treatment for cervical radiculopathy  In most cases, your healthcare provider will first try treatments that help relieve symptoms. These may include:  Prescription or over-the-counter pain medicines. These help relieve pain and swelling.  Cold packs. These help reduce pain.  Resting. This involves avoiding positions and activities that increase pain.  Neck brace (cervical collar). This can help relieve inflammation and pain.  Physical therapy, including exercises and stretches. This can help decrease pain and increase movement and function.  Shots of medicinesaround the nerve roots. This is done to help relieve symptoms for a time.  In some cases, your healthcare provider may advise surgery to fix the underlying problem. This depends on the cause, the symptoms, and how long the pain  has lasted.  Possible complications  Over time, an irritated and inflamed nerve may become damaged. This may lead to long-lasting (permanent) numbness or weakness. If symptoms change suddenly or get worse, be sure to let your healthcare provider know.     When to call your healthcare provider  Call your healthcare provider right away if you have any of these:  New pain or pain that gets worse  New or increasing weakness, numbness, or tingling in your arm or hand  Bowel or bladder changes   Date Last Reviewed: 3/10/2016  © 3870-2772 [employer]. [address] [place] [address]. All rights reserved. This information is not intended as a substitute for professional medical care. Always follow your healthcare professional's instructions.       Exercises: Neck Isometrics  To start, sit in a chair with your feet flat on the floor. Your weight should be slightly forward so that youre balanced evenly on your buttocks. Relax your shoulders and keep your head level. Using a chair with arms may help you keep your balance.       Press your palm against your forehead. Resist with your neck muscles. Hold for 10 seconds. Relax. Repeat 5 times.  Do the exercise again, pressing on the side of your head. Repeat 5 times. Switch sides.  Do the exercise again, pressing on the back of your head. Repeat 5 times.  For your safety, check with your healthcare provider before starting an exercise program.   Date Last Reviewed: 8/16/2015 © 2000-2017 [employer]. [address] [place] [address]. All rights reserved. This information is not intended as a substitute for professional medical care. Always follow your healthcare professional's instructions.

## 2022-03-10 NOTE — TELEPHONE ENCOUNTER
The patient is ON ASA BUT DOES NOT NEED TO HOLD PER DR. DUFF.  No clearance is needed.  I went over the pre-procedure instructions with the patient and I sent the patients a copy through her [employer].  The patient verbalized understanding.  All questions answered.

## 2022-03-14 NOTE — PRE-PROCEDURE INSTRUCTIONS
Spoke with patient regarding procedure scheduled on 3.21     Arrival time 0600     Has patient been sick with fever or on antibiotics within the last 7 days? No     Does the patient have any open wounds, sores or rashes? No     Does the patient have any recent fractures? no     Has patient received a vaccination within the last 7 days? No     Received the COVID vaccination? yes     Has the patient stopped all medications as directed? na     Does patient have a pacemaker and or defibrillator? no     Does the patient have a ride to and from procedure and someone reliable to remain with patient? son     Is the patient diabetic? no     Does the patient have sleep apnea? Or use O2 at home? No and no      Is the patient receiving sedation? yes     Is the patient instructed to remain NPO beginning at midnight the night before their procedure? yes     Procedure location confirmed with patient? Yes     Covid- Denies signs/symptoms. Instructed to notify PAT/MD if any changes.

## 2022-03-21 ENCOUNTER — HOSPITAL ENCOUNTER (OUTPATIENT)
Facility: HOSPITAL | Age: 67
Discharge: HOME | End: 2022-03-21
Attending: ANESTHESIOLOGY | Admitting: ANESTHESIOLOGY
Payer: MEDICARE

## 2022-03-21 VITALS
OXYGEN SATURATION: 99 % | WEIGHT: 157.94 LBS | DIASTOLIC BLOOD PRESSURE: 66 MMHG | HEART RATE: 59 BPM | RESPIRATION RATE: 16 BRPM | TEMPERATURE: 98 F | SYSTOLIC BLOOD PRESSURE: 147 MMHG | HEIGHT: 64 IN | BODY MASS INDEX: 26.96 KG/M2

## 2022-03-21 DIAGNOSIS — M47.812 CERVICAL SPONDYLOSIS: ICD-10-CM

## 2022-03-21 PROCEDURE — 64490 INJ PARAVERT F JNT C/T 1 LEV: CPT | Mod: 50,,, | Performed by: ANESTHESIOLOGY

## 2022-03-21 PROCEDURE — 99152 MOD SED SAME PHYS/QHP 5/>YRS: CPT | Performed by: ANESTHESIOLOGY

## 2022-03-21 PROCEDURE — 63600175 PHARM REV CODE 636 W HCPCS: Performed by: ANESTHESIOLOGY

## 2022-03-21 PROCEDURE — 64491 PR INJ DX/THER AGNT PARAVERT FACET JOINT,IMG GUIDE,CERV/THORAC, 2ND LEVEL: ICD-10-PCS | Mod: 50,,, | Performed by: ANESTHESIOLOGY

## 2022-03-21 PROCEDURE — 64491 INJ PARAVERT F JNT C/T 2 LEV: CPT | Mod: 50,,, | Performed by: ANESTHESIOLOGY

## 2022-03-21 PROCEDURE — 25000003 PHARM REV CODE 250: Performed by: ANESTHESIOLOGY

## 2022-03-21 PROCEDURE — 64491 INJ PARAVERT F JNT C/T 2 LEV: CPT | Mod: 50 | Performed by: ANESTHESIOLOGY

## 2022-03-21 PROCEDURE — 64490 PR INJ DX/THER AGNT PARAVERT FACET JOINT,IMG GUIDE,CERV/THORAC, 1ST LEVEL: ICD-10-PCS | Mod: 50,,, | Performed by: ANESTHESIOLOGY

## 2022-03-21 PROCEDURE — 64490 INJ PARAVERT F JNT C/T 1 LEV: CPT | Mod: 50 | Performed by: ANESTHESIOLOGY

## 2022-03-21 RX ORDER — MIDAZOLAM HYDROCHLORIDE 1 MG/ML
INJECTION, SOLUTION INTRAMUSCULAR; INTRAVENOUS
Status: DISCONTINUED | OUTPATIENT
Start: 2022-03-21 | End: 2022-03-21 | Stop reason: HOSPADM

## 2022-03-21 RX ORDER — BUPIVACAINE HYDROCHLORIDE 5 MG/ML
INJECTION, SOLUTION EPIDURAL; INTRACAUDAL
Status: DISCONTINUED | OUTPATIENT
Start: 2022-03-21 | End: 2022-03-21 | Stop reason: HOSPADM

## 2022-03-21 RX ORDER — DEXAMETHASONE SODIUM PHOSPHATE 10 MG/ML
INJECTION INTRAMUSCULAR; INTRAVENOUS
Status: DISCONTINUED | OUTPATIENT
Start: 2022-03-21 | End: 2022-03-21 | Stop reason: HOSPADM

## 2022-03-21 RX ORDER — FENTANYL CITRATE 50 UG/ML
INJECTION, SOLUTION INTRAMUSCULAR; INTRAVENOUS
Status: DISCONTINUED | OUTPATIENT
Start: 2022-03-21 | End: 2022-03-21 | Stop reason: HOSPADM

## 2022-03-21 RX ORDER — INDOMETHACIN 25 MG/1
CAPSULE ORAL
Status: DISCONTINUED | OUTPATIENT
Start: 2022-03-21 | End: 2022-03-21 | Stop reason: HOSPADM

## 2022-03-21 NOTE — DISCHARGE SUMMARY
[facility] 1st Fl  Discharge Note  Short Stay    Procedure(s) (LRB):  Bilateral C4-6 MBB with RN IV sedation (Bilateral)    OUTCOME: Patient tolerated treatment/procedure well without complication and is now ready for discharge.    DISPOSITION: Home or Self Care    FINAL DIAGNOSIS:  <principal problem not specified>    FOLLOWUP: In clinic    DISCHARGE INSTRUCTIONS:  No discharge procedures on file.     TIME SPENT ON DISCHARGE: 15 minutes

## 2022-03-21 NOTE — DISCHARGE INSTRUCTIONS

## 2022-03-21 NOTE — OP NOTE
CERVICAL Medial Branch Block Under Fluoroscopy  Time-out taken to identify patient and procedure side prior to starting the procedure.        Date of Procedure:3/21/22                                                          PROCEDURE:  bilateral medial branch block at the transverse processes of C4, C5, C6    Pre Procedure Diagnosis:  Cervical spondylosis [M47.812]  Post Procedure Diagnosis: Same    PHYSICIAN: Nano Izquierdo MD    ASSISTANTS: None    Anesthesia:   Conscious sedation provided by M.D    The patient was monitored with continuous pulse oximetry, EKG, and intermittent blood pressure monitors.  The patient was hemodynamically stable throughout the entire process was responsive to voice, and breathing spontaneously.  Supplemental O2 was provided at 2L/min via nasal cannula.  Patient was comfortable for the duration of the procedure. (See nurse documentation and case log for sedation time)    There was a total of 2mg IV Midazolam and 50mcg Fentanyl titrated for the procedure      MEDICATIONS INJECTED:  0.25% Bupivicaine total 1.5mL  LOCAL ANESTHETIC USED:   Xylocaine 1% 1mL / site    SEDATION MEDICATIONS: None    ESTIMATED BLOOD LOSS:  None.    COMPLICATIONS:  None.    TECHNIQUE:   Laying in a bilateral lateral decubitus  position, the patient was prepped and draped in the usual sterile fashion using ChloraPrep and fenestrated drape.  The level was determined under fluoroscopic guidance.  Local anesthetic was given by going down to the hub of the 27-gauge 1.25in needle and raising a wheel.  A 22-gauge 3.5inch needle was introduced to the anatomic local of the medial branch at each of the stated above levels using fluoroscopy. Then after negative aspiration, a total of 1.5 cc of .25% bupivacaine and 10 mg dexamethasone was injected in divided doses at the three levels. The patient tolerated the procedure well.     The patient was monitored after the procedure.  Patient was given post procedure and discharge  instructions to follow at home.  We will see the patient back in two weeks or the patient may call to inform of status. The patient was discharged in a stable condition.    Nano Izquierdo

## 2022-03-28 ENCOUNTER — HOSPITAL ENCOUNTER (INPATIENT)
Facility: HOSPITAL | Age: 67
LOS: 2 days | Discharge: HOME | DRG: 247 | End: 2022-03-30
Attending: EMERGENCY MEDICINE | Admitting: INTERNAL MEDICINE
Payer: MEDICARE

## 2022-03-28 DIAGNOSIS — I21.4 NSTEMI (NON-ST ELEVATED MYOCARDIAL INFARCTION): Primary | ICD-10-CM

## 2022-03-28 DIAGNOSIS — E78.00 PURE HYPERCHOLESTEROLEMIA: Chronic | ICD-10-CM

## 2022-03-28 DIAGNOSIS — Z72.0 TOBACCO ABUSE: Chronic | ICD-10-CM

## 2022-03-28 DIAGNOSIS — I25.2 HISTORY OF NON-ST ELEVATION MYOCARDIAL INFARCTION (NSTEMI): ICD-10-CM

## 2022-03-28 DIAGNOSIS — I10 ESSENTIAL HYPERTENSION: Chronic | ICD-10-CM

## 2022-03-28 DIAGNOSIS — R00.1 BRADYCARDIA: ICD-10-CM

## 2022-03-28 DIAGNOSIS — R07.9 CHEST PAIN: ICD-10-CM

## 2022-03-28 DIAGNOSIS — R79.89 ELEVATED TROPONIN: ICD-10-CM

## 2022-03-28 DIAGNOSIS — I10 CHRONIC HYPERTENSION: ICD-10-CM

## 2022-03-28 LAB
AORTIC ROOT ANNULUS: 2.68 CM
APTT BLDCRRT: 26.2 SEC (ref 21–32)
ASCENDING AORTA: 2.52 CM
AV INDEX (PROSTH): 1.16
AV MEAN GRADIENT: 4 MMHG
AV PEAK GRADIENT: 7 MMHG
AV VALVE AREA: 3.49 CM2
AV VELOCITY RATIO: 0.93
BNP SERPL-MCNC: 316 PG/ML (ref 0–99)
BSA FOR ECHO PROCEDURE: 1.81 M2
CV ECHO LV RWT: 0.67 CM
DOP CALC AO PEAK VEL: 1.34 M/S
DOP CALC AO VTI: 29.5 CM
DOP CALC LVOT AREA: 3 CM2
DOP CALC LVOT DIAMETER: 1.96 CM
DOP CALC LVOT PEAK VEL: 1.24 M/S
DOP CALC LVOT STROKE VOLUME: 102.83 CM3
DOP CALC RVOT PEAK VEL: 0.81 M/S
DOP CALC RVOT VTI: 18.8 CM
DOP CALCLVOT PEAK VEL VTI: 34.1 CM
E WAVE DECELERATION TIME: 266.81 MSEC
E/A RATIO: 1.19
E/E' RATIO: 11.67 M/S
ECHO EF ESTIMATED: 66 %
ECHO LV POSTERIOR WALL: 1.27 CM (ref 0.6–1.1)
EJECTION FRACTION: 55 %
FRACTIONAL SHORTENING: 36 % (ref 28–44)
INR PPP: 0.9 (ref 0.8–1.2)
INTERVENTRICULAR SEPTUM: 1.34 CM (ref 0.6–1.1)
IVC DIAMETER: 1.7 CM
IVRT: 65.74 MSEC
LA MAJOR: 5.17 CM
LA MINOR: 4.48 CM
LA WIDTH: 3.5 CM
LEFT ATRIUM SIZE: 2.87 CM
LEFT ATRIUM VOLUME INDEX: 23 ML/M2
LEFT ATRIUM VOLUME: 40.99 CM3
LEFT INTERNAL DIMENSION IN SYSTOLE: 2.42 CM (ref 2.1–4)
LEFT VENTRICLE DIASTOLIC VOLUME INDEX: 34.31 ML/M2
LEFT VENTRICLE DIASTOLIC VOLUME: 61.07 ML
LEFT VENTRICLE MASS INDEX: 97 G/M2
LEFT VENTRICLE SYSTOLIC VOLUME INDEX: 11.6 ML/M2
LEFT VENTRICLE SYSTOLIC VOLUME: 20.67 ML
LEFT VENTRICULAR INTERNAL DIMENSION IN DIASTOLE: 3.78 CM (ref 3.5–6)
LEFT VENTRICULAR MASS: 172.75 G
LV LATERAL E/E' RATIO: 10.5 M/S
LV SEPTAL E/E' RATIO: 13.13 M/S
LVOT MG: 3.52 MMHG
LVOT MV: 0.9 CM/S
MV PEAK A VEL: 0.88 M/S
MV PEAK E VEL: 1.05 M/S
MV STENOSIS PRESSURE HALF TIME: 77.38 MS
MV VALVE AREA P 1/2 METHOD: 2.84 CM2
PISA TR MAX VEL: 2.39 M/S
PROTHROMBIN TIME: 10 SEC (ref 9–12.5)
PV MEAN GRADIENT: 1.81 MMHG
RA MAJOR: 3.73 CM
RA PRESSURE: 3 MMHG
TDI LATERAL: 0.1 M/S
TDI SEPTAL: 0.08 M/S
TDI: 0.09 M/S
TR MAX PG: 23 MMHG
TR MEAN GRADIENT: 20 MMHG
TRICUSPID ANNULAR PLANE SYSTOLIC EXCURSION: 2.1 CM
TROPONIN I SERPL DL<=0.01 NG/ML-MCNC: 0.49 NG/ML (ref 0–0.03)
TROPONIN I SERPL DL<=0.01 NG/ML-MCNC: 0.51 NG/ML (ref 0–0.03)
TV REST PULMONARY ARTERY PRESSURE: 26 MMHG

## 2022-03-28 PROCEDURE — 21400001 HC TELEMETRY ROOM

## 2022-03-28 PROCEDURE — 11000001 HC ACUTE MED/SURG PRIVATE ROOM

## 2022-03-28 PROCEDURE — 99223 PR INITIAL HOSPITAL CARE,LEVL III: ICD-10-PCS | Mod: 25,,, | Performed by: INTERNAL MEDICINE

## 2022-03-28 PROCEDURE — 93005 ELECTROCARDIOGRAM TRACING: CPT

## 2022-03-28 PROCEDURE — 93010 EKG 12-LEAD: ICD-10-PCS | Mod: ,,, | Performed by: INTERNAL MEDICINE

## 2022-03-28 PROCEDURE — 85730 THROMBOPLASTIN TIME PARTIAL: CPT | Mod: 91 | Performed by: INTERNAL MEDICINE

## 2022-03-28 PROCEDURE — 93010 ELECTROCARDIOGRAM REPORT: CPT | Mod: ,,, | Performed by: INTERNAL MEDICINE

## 2022-03-28 PROCEDURE — 85730 THROMBOPLASTIN TIME PARTIAL: CPT | Performed by: EMERGENCY MEDICINE

## 2022-03-28 PROCEDURE — 99223 1ST HOSP IP/OBS HIGH 75: CPT | Mod: 25,,, | Performed by: INTERNAL MEDICINE

## 2022-03-28 PROCEDURE — 99291 CRITICAL CARE FIRST HOUR: CPT | Mod: 25

## 2022-03-28 PROCEDURE — 83880 ASSAY OF NATRIURETIC PEPTIDE: CPT | Performed by: NURSE PRACTITIONER

## 2022-03-28 PROCEDURE — 96374 THER/PROPH/DIAG INJ IV PUSH: CPT

## 2022-03-28 PROCEDURE — 36415 COLL VENOUS BLD VENIPUNCTURE: CPT | Performed by: INTERNAL MEDICINE

## 2022-03-28 PROCEDURE — 84484 ASSAY OF TROPONIN QUANT: CPT | Performed by: EMERGENCY MEDICINE

## 2022-03-28 PROCEDURE — 85610 PROTHROMBIN TIME: CPT | Performed by: EMERGENCY MEDICINE

## 2022-03-28 PROCEDURE — 63600175 PHARM REV CODE 636 W HCPCS: Performed by: EMERGENCY MEDICINE

## 2022-03-28 PROCEDURE — 25000003 PHARM REV CODE 250: Performed by: INTERNAL MEDICINE

## 2022-03-28 PROCEDURE — 84484 ASSAY OF TROPONIN QUANT: CPT | Mod: 91 | Performed by: NURSE PRACTITIONER

## 2022-03-28 RX ORDER — NALOXONE HYDROCHLORIDE 1 MG/ML
0.02 INJECTION INTRAMUSCULAR; INTRAVENOUS; SUBCUTANEOUS
Status: DISCONTINUED | OUTPATIENT
Start: 2022-03-28 | End: 2022-03-30 | Stop reason: HOSPADM

## 2022-03-28 RX ORDER — MUPIROCIN 20 MG/G
OINTMENT TOPICAL 2 TIMES DAILY
Status: DISCONTINUED | OUTPATIENT
Start: 2022-03-28 | End: 2022-03-30 | Stop reason: HOSPADM

## 2022-03-28 RX ORDER — AMLODIPINE BESYLATE 5 MG/1
5 TABLET ORAL DAILY
Status: DISCONTINUED | OUTPATIENT
Start: 2022-03-28 | End: 2022-03-30 | Stop reason: HOSPADM

## 2022-03-28 RX ORDER — SODIUM CHLORIDE 9 MG/ML
INJECTION, SOLUTION INTRAVENOUS CONTINUOUS
Status: DISCONTINUED | OUTPATIENT
Start: 2022-03-28 | End: 2022-03-30

## 2022-03-28 RX ORDER — SODIUM CHLORIDE 0.9 % (FLUSH) 0.9 %
10 SYRINGE (ML) INJECTION EVERY 8 HOURS PRN
Status: DISCONTINUED | OUTPATIENT
Start: 2022-03-28 | End: 2022-03-30 | Stop reason: HOSPADM

## 2022-03-28 RX ORDER — MAG HYDROX/ALUMINUM HYD/SIMETH 200-200-20
30 SUSPENSION, ORAL (FINAL DOSE FORM) ORAL 4 TIMES DAILY PRN
Status: DISCONTINUED | OUTPATIENT
Start: 2022-03-28 | End: 2022-03-30 | Stop reason: HOSPADM

## 2022-03-28 RX ORDER — HYDROCHLOROTHIAZIDE 25 MG/1
25 TABLET ORAL DAILY
Status: DISCONTINUED | OUTPATIENT
Start: 2022-03-29 | End: 2022-03-28

## 2022-03-28 RX ORDER — HEPARIN SODIUM,PORCINE/D5W 25000/250
0-40 INTRAVENOUS SOLUTION INTRAVENOUS CONTINUOUS
Status: DISCONTINUED | OUTPATIENT
Start: 2022-03-28 | End: 2022-03-29

## 2022-03-28 RX ORDER — POLYETHYLENE GLYCOL 3350 17 G/17G
17 POWDER, FOR SOLUTION ORAL DAILY PRN
Status: DISCONTINUED | OUTPATIENT
Start: 2022-03-28 | End: 2022-03-30 | Stop reason: HOSPADM

## 2022-03-28 RX ORDER — ACETAMINOPHEN 325 MG/1
650 TABLET ORAL EVERY 4 HOURS PRN
Status: DISCONTINUED | OUTPATIENT
Start: 2022-03-28 | End: 2022-03-30 | Stop reason: HOSPADM

## 2022-03-28 RX ORDER — VALSARTAN 160 MG/1
160 TABLET ORAL DAILY
Status: DISCONTINUED | OUTPATIENT
Start: 2022-03-29 | End: 2022-03-30 | Stop reason: HOSPADM

## 2022-03-28 RX ORDER — SIMETHICONE 80 MG
1 TABLET,CHEWABLE ORAL 4 TIMES DAILY PRN
Status: DISCONTINUED | OUTPATIENT
Start: 2022-03-28 | End: 2022-03-30 | Stop reason: HOSPADM

## 2022-03-28 RX ORDER — IPRATROPIUM BROMIDE AND ALBUTEROL SULFATE 2.5; .5 MG/3ML; MG/3ML
3 SOLUTION RESPIRATORY (INHALATION) EVERY 6 HOURS PRN
Status: DISCONTINUED | OUTPATIENT
Start: 2022-03-28 | End: 2022-03-30 | Stop reason: HOSPADM

## 2022-03-28 RX ORDER — TALC
6 POWDER (GRAM) TOPICAL NIGHTLY PRN
Status: DISCONTINUED | OUTPATIENT
Start: 2022-03-28 | End: 2022-03-30 | Stop reason: HOSPADM

## 2022-03-28 RX ORDER — ONDANSETRON 2 MG/ML
4 INJECTION INTRAMUSCULAR; INTRAVENOUS EVERY 8 HOURS PRN
Status: DISCONTINUED | OUTPATIENT
Start: 2022-03-28 | End: 2022-03-30 | Stop reason: HOSPADM

## 2022-03-28 RX ORDER — VALSARTAN AND HYDROCHLOROTHIAZIDE 160; 25 MG/1; MG/1
1 TABLET ORAL DAILY
Status: DISCONTINUED | OUTPATIENT
Start: 2022-03-29 | End: 2022-03-28 | Stop reason: CLARIF

## 2022-03-28 RX ORDER — NAPROXEN SODIUM 220 MG/1
81 TABLET, FILM COATED ORAL DAILY
Status: DISCONTINUED | OUTPATIENT
Start: 2022-03-29 | End: 2022-03-30 | Stop reason: HOSPADM

## 2022-03-28 RX ADMIN — HEPARIN SODIUM 12 UNITS/KG/HR: 1000 INJECTION, SOLUTION INTRAVENOUS; SUBCUTANEOUS at 05:03

## 2022-03-28 RX ADMIN — SODIUM CHLORIDE: 0.9 INJECTION, SOLUTION INTRAVENOUS at 06:03

## 2022-03-28 NOTE — Clinical Note
Diagnosis: Chest pain [857197]   Future Attending Provider: NAVNEET GREGORY [[id number]]   Admitting Provider:: NAVNEET GREGORY [[id number]]

## 2022-03-28 NOTE — CONSULTS
[facility] - Emergency Dept.  Cardiology  Consult Note    Patient Name: Shirlene Olvera  MRN: [id number]  Admission Date: 3/28/2022  Hospital Length of Stay: 0 days  Code Status: Full Code   Attending Provider: Durga Veras MD   Consulting Provider: Ubaldo Huffman MD  Primary Care Physician: Clayton Frankel MD  Principal Problem:Chest pain    Patient information was obtained from patient and ER records.     Inpatient consult to Cardiology  Consult performed by: Ubaldo Huffman MD  Consult ordered by: Arianna Hunt MD        Subjective:     HPI:   67 yo female, cardiology consult for NSTEMI  PMH HTN HLD PAD, CAD smoker. Statin intolerance  C/o dizziness and high BP today. 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 [facility]  Troponin 0.5  Ekg NSR TWI in I AVL and chronic  2017 echo showed EF 55% and LVH; MPI no ischemia  2018 mild PAD  Pt states that she had remote LHC at [facility] and nonobstructive CAD  Now paion free            Past Medical History:   Diagnosis Date    Chest pain 5/14/2015    Colon polyp     Coronary artery disease     pt states MI June 2015    Hypertension     Myocardial infarction     PAD (peripheral artery disease)     Tobacco use        Past Surgical History:   Procedure Laterality Date    CHOLECYSTECTOMY  09/03/2015    COLONOSCOPY N/A 10/11/2016    Procedure: COLONOSCOPY;  Surgeon: Haseeb Spears MD;  Location: [facility];  Service: Endoscopy;  Laterality: N/A;    COLONOSCOPY N/A 12/9/2021    Procedure: COLONOSCOPY;  Surgeon: Pat Rios MD;  Location: [facility];  Service: Endoscopy;  Laterality: N/A;    ESOPHAGOGASTRODUODENOSCOPY N/A 12/9/2021    Procedure: EGD (ESOPHAGOGASTRODUODENOSCOPY);  Surgeon: Pat Rios MD;  Location: [facility];  Service: Endoscopy;  Laterality: N/A;    high blood      HYSTERECTOMY      INJECTION OF ANESTHETIC  AGENT AROUND MEDIAL BRANCH NERVES INNERVATING CERVICAL FACET JOINT Bilateral 10/15/2021    Procedure: Bilateral C5-7 MBB with RN IV sedation PATIENT WOULD LIKE AFTERNOON ARRIVAL, IF POSSIBLE;  Surgeon: Nano Izquierdo MD;  Location: [facility];  Service: Pain Management;  Laterality: Bilateral;    INJECTION OF ANESTHETIC AGENT AROUND MEDIAL BRANCH NERVES INNERVATING CERVICAL FACET JOINT Bilateral 3/21/2022    Procedure: Bilateral C4-6 MBB with RN IV sedation;  Surgeon: Nano Izquierdo MD;  Location: [facility];  Service: Pain Management;  Laterality: Bilateral;    INJECTION OF ANESTHETIC AGENT INTO SACROILIAC JOINT Right 12/17/2021    Procedure: Right SIJ Injection;  Surgeon: Nano Izquierdo MD;  Location: [facility];  Service: Pain Management;  Laterality: Right;    OOPHORECTOMY      RIB FRACTURE SURGERY         Review of patient's allergies indicates:   Allergen Reactions    Statins-hmg-coa reductase inhibitors Other (See Comments)     Myalgias to lipitor and simvastatin       No current facility-administered medications on file prior to encounter.     Current Outpatient Medications on File Prior to Encounter   Medication Sig    amLODIPine (NORVASC) 10 MG tablet Take 1 tablet (10 mg total) by mouth once daily.    aspirin 81 MG Chew Take 1 tablet (81 mg total) by mouth once daily.    buPROPion (WELLBUTRIN XL) 150 MG TB24 tablet Take 1 tablet (150 mg total) by mouth once daily for 14 days, THEN 2 tablets (300 mg total) once daily.    fexofenadine HCl (ALLEGRA ORAL) Take 1 tablet by mouth once daily.    fluticasone propionate (FLONASE) 50 mcg/actuation nasal spray 2 sprays (100 mcg total) by Each Nostril route once daily.    gabapentin (NEURONTIN) 300 MG capsule Take 1 capsule (300 mg total) by mouth every evening for 7 days, THEN 2 capsules (600 mg total) every evening for 7 days, THEN 3 capsules (900 mg total) every evening for 16 days.    metoprolol tartrate (LOPRESSOR) 50 MG tablet Take 1 tablet  (50 mg total) by mouth Daily.    pantoprazole (PROTONIX) 20 MG tablet Take 1 tablet (20 mg total) by mouth once daily.    tiZANidine (ZANAFLEX) 4 MG tablet TAKE 1 TABLET (4 MG TOTAL) BY MOUTH NIGHTLY AS NEEDED (MUSCLE RELAXANT)    valsartan-hydrochlorothiazide (DIOVAN-HCT) 160-25 mg per tablet Take 1 tablet by mouth once daily.    vitamin D (VITAMIN D3) 1000 units Tab Take 1,000 Units by mouth once daily.    albuterol (VENTOLIN HFA) 90 mcg/actuation inhaler Inhale 2 puffs into the lungs every 6 (six) hours as needed for Wheezing. Rescue    albuterol-ipratropium (DUO-NEB) 2.5 mg-0.5 mg/3 mL nebulizer solution Take 3 mLs by nebulization every 6 (six) hours as needed for Wheezing or Shortness of Breath. Rescue    cyanocobalamin (VITAMIN B-12) 1000 MCG tablet Take 100 mcg by mouth once daily.    diclofenac sodium (VOLTAREN) 1 % Gel Apply 2 g topically 4 (four) times daily as needed. For muscular pain    loratadine (CLARITIN) 10 mg tablet Take 10 mg by mouth daily as needed for Allergies.    multivitamin with minerals tablet Take 1 tablet by mouth once daily.    mupirocin calcium 2% (BACTROBAN) 2 % cream Apply topically 2 (two) times daily.    neomycin-polymyxin-hydrocortisone (CORTISPORIN) 3.5-10,000-1 mg/mL-unit/mL-% otic suspension INSTILL 2 TO 4 DROPS INTO AFFECTED EAR 4 TIMES DAILY FOR 5 DAYS    tretinoin (RETIN-A) 0.025 % cream Apply pea-sized amount to entire face at bedtime.  If dryness, use every third night and increase as tolerated to every night. (Patient taking differently: Apply pea-sized amount to entire face at bedtime.  If dryness, use every third night and increase as tolerated to every night.)     Family History       Problem Relation (Age of Onset)    Heart attack Father (56)          Tobacco Use    Smoking status: Current Every Day Smoker     Packs/day: 0.25    Smokeless tobacco: Never Used   Substance and Sexual Activity    Alcohol use: Not Currently     Alcohol/week: 0.0 standard  drinks    Drug use: No    Sexual activity: Never     Review of Systems   Constitutional: Negative for decreased appetite, diaphoresis, fever, malaise/fatigue and night sweats.   HENT:  Negative for nosebleeds.    Eyes:  Negative for blurred vision and double vision.   Cardiovascular:  Negative for chest pain, claudication, dyspnea on exertion, irregular heartbeat, leg swelling, near-syncope, orthopnea, palpitations, paroxysmal nocturnal dyspnea and syncope.   Respiratory:  Negative for cough, shortness of breath, sleep disturbances due to breathing, snoring, sputum production and wheezing.    Endocrine: Negative for cold intolerance and polyuria.   Hematologic/Lymphatic: Does not bruise/bleed easily.   Skin:  Negative for rash.   Musculoskeletal:  Positive for arthritis, back pain and joint pain. Negative for falls, joint swelling and neck pain.   Gastrointestinal:  Negative for abdominal pain, heartburn, nausea and vomiting.   Genitourinary:  Negative for dysuria, frequency and hematuria.   Neurological:  Positive for dizziness. Negative for difficulty with concentration, focal weakness, headaches, light-headedness, numbness, seizures and weakness.   Psychiatric/Behavioral:  Negative for depression, memory loss and substance abuse. The patient does not have insomnia.    Allergic/Immunologic: Negative for HIV exposure and hives.   Objective:     Vital Signs (Most Recent):  Temp: 98.2 °F (36.8 °C) (03/28/22 1400)  Pulse: (!) 48 (03/28/22 1552)  Resp: 20 (03/28/22 1400)  BP: (!) 146/58 (03/28/22 1552)  SpO2: 96 % (03/28/22 1400)   Vital Signs (24h Range):  Temp:  [98.2 °F (36.8 °C)] 98.2 °F (36.8 °C)  Pulse:  [48-50] 48  Resp:  [12-20] 20  SpO2:  [96 %-100 %] 96 %  BP: (145-180)/(58-81) 146/58     Weight: 72.7 kg (160 lb 3.2 oz)  Body mass index is 27.5 kg/m².    SpO2: 96 %  O2 Device (Oxygen Therapy): room air    No intake or output data in the 24 hours ending 03/28/22 1631    Lines/Drains/Airways       Peripheral  Intravenous Line  Duration                  Peripheral IV - Single Lumen 03/28/22 1509 20 G Right Antecubital <1 day                    Physical Exam  HENT:      Head: Normocephalic.   Eyes:      Pupils: Pupils are equal, round, and reactive to light.   Neck:      Thyroid: No thyromegaly.      Vascular: Normal carotid pulses. No carotid bruit or JVD.   Cardiovascular:      Rate and Rhythm: Normal rate and regular rhythm. No extrasystoles are present.     Chest Wall: PMI is not displaced.      Pulses: Normal pulses.      Heart sounds: Normal heart sounds. No murmur heard.    No gallop. No S3 sounds.   Pulmonary:      Effort: No respiratory distress.      Breath sounds: Normal breath sounds. No stridor.   Abdominal:      General: Bowel sounds are normal.      Palpations: Abdomen is soft.      Tenderness: There is no abdominal tenderness. There is no rebound.   Musculoskeletal:         General: Normal range of motion.   Skin:     Findings: No rash.   Neurological:      Mental Status: She is alert and oriented to person, place, and time.   Psychiatric:         Behavior: Behavior normal.       Significant Labs: Troponin   Recent Labs   Lab 03/28/22  1502   TROPONINI 0.509*    and   Recent Lab Results         03/28/22  1502        Troponin I 0.509  Comment: The reference interval for Troponin I represents the 99th percentile   cutoff   for our facility and is consistent with 3rd generation assay   performance.                 Significant Imaging: EKG:      Assessment and Plan:     History of non-ST elevation myocardial infarction (NSTEMI)  NSTEMI  CAD   Smoker  HTN    -start heparin gtt  -IVF 50 cc /hf for o/n  -NPO after mn. LHC in AM   -add ASA 81mg daily  -no BB due to bradycardia   -ok to continue ARB.   -Add amlodipine and d/c HCTZ  -trending troponin      Tobacco abuse  smoking cessation    Hyperlipidemia  Statin intolerance    Essential hypertension  Continue ARB and AMlodipine  DASH        VTE Risk Mitigation  (From admission, onward)         Ordered     heparin 25,000 units in dextrose 5% (100 units/ml) IV bolus from bag - ADDITIONAL PRN BOLUS - 60 units/kg (max bolus 4000 units)  As needed (PRN)        Question:  Heparin Infusion Adjustment (DO NOT MODIFY ANSWER)  Answer:  [email or web address]\epic\Images\Pharmacy\HeparinInfusions\heparin LOW INTENSITY nomogram for [facility] [id number].pdf    03/28/22 1557     heparin 25,000 units in dextrose 5% (100 units/ml) IV bolus from bag - ADDITIONAL PRN BOLUS - 30 units/kg (max bolus 4000 units)  As needed (PRN)        Question:  Heparin Infusion Adjustment (DO NOT MODIFY ANSWER)  Answer:  [email or web address]\epic\Images\Pharmacy\HeparinInfusions\heparin LOW INTENSITY nomogram for [facility] [id number].pdf    03/28/22 1557     Reason for No Pharmacological VTE Prophylaxis  Once        Question:  Reasons:  Answer:  IV Heparin w/in 24 hrs. Pre or Post-Op    03/28/22 1649     IP VTE HIGH RISK PATIENT  Once         03/28/22 1649     Place sequential compression device  Until discontinued         03/28/22 1649     heparin 25,000 units in dextrose 5% (100 units/ml) IV bolus from bag INITIAL BOLUS (max bolus 4000 units)  Once        Question:  Heparin Infusion Adjustment (DO NOT MODIFY ANSWER)  Answer:  \\[email or web address]\epic\Images\Pharmacy\HeparinInfusions\heparin LOW INTENSITY nomogram for [facility] [id number].pdf    03/28/22 1557     heparin 25,000 units in dextrose 5% 250 mL (100 units/mL) infusion LOW INTENSITY nomogram - [facility]  Continuous        Question Answer Comment   Heparin Infusion Adjustment (DO NOT MODIFY ANSWER) [email or web address]\epic\Images\Pharmacy\HeparinInfusions\heparin LOW INTENSITY nomogram for [facility] [id number].pdf    Begin at (in units/kg/hr) 12        03/28/22 1557                Thank you for your consult. I will follow-up with patient. Please contact us if you have any additional questions.    Ubaldo Huffman MD  Cardiology   [facility] - Emergency Dept.

## 2022-03-28 NOTE — H&P
Count includes the [facility] - Emergency Dept.  [facility] Medicine  History & Physical    Patient Name: Shirlene Olvera  MRN: [id number]  Patient Class: OP- Observation  Admission Date: 3/28/2022  Attending Physician: Durga Veras MD   Primary Care Provider: Clayton Frankel MD         Patient information was obtained from patient and ER records.     Subjective:     Principal Problem:Chest pain    Chief Complaint:   Chief Complaint   Patient presents with    Chest Pain     Transfer from [place]. NSTEMI.         HPI: Ms Olvera is a 66 year old female with PMHx of CAD, HTN, tobacco use and old MI who presented to [facility] Emergn[facility] Room from [facility] for evaluation of chest pain. Associated symptoms include shortness of breath, nausea, generalize weakness and fatigue. EKG: Sinus Bradycardia rate 48. Troponin 0.21 at [facility], however increase to 0.509 upon arrival to [facility] ED. All labs reviewed for [facility] stable. At [facility], patient was given 4, 81 mg ASA and a NTG patch.  Patient currently denies chest pain or shortness of breath. She is a full code, daughter Yakelin and son are surrogate decision makers. Patient is being placed in Observation under the care of Hospital Medicine.       Past Medical History:   Diagnosis Date    Chest pain 5/14/2015    Colon polyp     Coronary artery disease     pt states MI June 2015    Hypertension     Myocardial infarction     PAD (peripheral artery disease)     Tobacco use        Past Surgical History:   Procedure Laterality Date    CHOLECYSTECTOMY  09/03/2015    COLONOSCOPY N/A 10/11/2016    Procedure: COLONOSCOPY;  Surgeon: Haseeb Spears MD;  Location: [facility];  Service: Endoscopy;  Laterality: N/A;    COLONOSCOPY N/A 12/9/2021    Procedure: COLONOSCOPY;  Surgeon: Pat Rios MD;  Location: [facility];  Service: Endoscopy;  Laterality: N/A;    ESOPHAGOGASTRODUODENOSCOPY N/A 12/9/2021     Procedure: EGD (ESOPHAGOGASTRODUODENOSCOPY);  Surgeon: Pat Rios MD;  Location: [facility];  Service: Endoscopy;  Laterality: N/A;    high blood      HYSTERECTOMY      INJECTION OF ANESTHETIC AGENT AROUND MEDIAL BRANCH NERVES INNERVATING CERVICAL FACET JOINT Bilateral 10/15/2021    Procedure: Bilateral C5-7 MBB with RN IV sedation PATIENT WOULD LIKE AFTERNOON ARRIVAL, IF POSSIBLE;  Surgeon: Nano Izquierdo MD;  Location: [facility];  Service: Pain Management;  Laterality: Bilateral;    INJECTION OF ANESTHETIC AGENT AROUND MEDIAL BRANCH NERVES INNERVATING CERVICAL FACET JOINT Bilateral 3/21/2022    Procedure: Bilateral C4-6 MBB with RN IV sedation;  Surgeon: Nano Izquierdo MD;  Location: [facility];  Service: Pain Management;  Laterality: Bilateral;    INJECTION OF ANESTHETIC AGENT INTO SACROILIAC JOINT Right 12/17/2021    Procedure: Right SIJ Injection;  Surgeon: Nano Izquierdo MD;  Location: [facility];  Service: Pain Management;  Laterality: Right;    OOPHORECTOMY      RIB FRACTURE SURGERY         Review of patient's allergies indicates:   Allergen Reactions    Statins-hmg-coa reductase inhibitors Other (See Comments)     Myalgias to lipitor and simvastatin       No current facility-administered medications on file prior to encounter.     Current Outpatient Medications on File Prior to Encounter   Medication Sig    amLODIPine (NORVASC) 10 MG tablet Take 1 tablet (10 mg total) by mouth once daily.    aspirin 81 MG Chew Take 1 tablet (81 mg total) by mouth once daily.    buPROPion (WELLBUTRIN XL) 150 MG TB24 tablet Take 1 tablet (150 mg total) by mouth once daily for 14 days, THEN 2 tablets (300 mg total) once daily.    fexofenadine HCl (ALLEGRA ORAL) Take 1 tablet by mouth once daily.    fluticasone propionate (FLONASE) 50 mcg/actuation nasal spray 2 sprays (100 mcg total) by Each Nostril route once daily.    gabapentin (NEURONTIN) 300 MG capsule Take 1 capsule (300 mg total) by mouth  every evening for 7 days, THEN 2 capsules (600 mg total) every evening for 7 days, THEN 3 capsules (900 mg total) every evening for 16 days.    metoprolol tartrate (LOPRESSOR) 50 MG tablet Take 1 tablet (50 mg total) by mouth Daily.    pantoprazole (PROTONIX) 20 MG tablet Take 1 tablet (20 mg total) by mouth once daily.    tiZANidine (ZANAFLEX) 4 MG tablet TAKE 1 TABLET (4 MG TOTAL) BY MOUTH NIGHTLY AS NEEDED (MUSCLE RELAXANT)    valsartan-hydrochlorothiazide (DIOVAN-HCT) 160-25 mg per tablet Take 1 tablet by mouth once daily.    vitamin D (VITAMIN D3) 1000 units Tab Take 1,000 Units by mouth once daily.    albuterol (VENTOLIN HFA) 90 mcg/actuation inhaler Inhale 2 puffs into the lungs every 6 (six) hours as needed for Wheezing. Rescue    albuterol-ipratropium (DUO-NEB) 2.5 mg-0.5 mg/3 mL nebulizer solution Take 3 mLs by nebulization every 6 (six) hours as needed for Wheezing or Shortness of Breath. Rescue    cyanocobalamin (VITAMIN B-12) 1000 MCG tablet Take 100 mcg by mouth once daily.    diclofenac sodium (VOLTAREN) 1 % Gel Apply 2 g topically 4 (four) times daily as needed. For muscular pain    loratadine (CLARITIN) 10 mg tablet Take 10 mg by mouth daily as needed for Allergies.    multivitamin with minerals tablet Take 1 tablet by mouth once daily.    mupirocin calcium 2% (BACTROBAN) 2 % cream Apply topically 2 (two) times daily.    neomycin-polymyxin-hydrocortisone (CORTISPORIN) 3.5-10,000-1 mg/mL-unit/mL-% otic suspension INSTILL 2 TO 4 DROPS INTO AFFECTED EAR 4 TIMES DAILY FOR 5 DAYS    tretinoin (RETIN-A) 0.025 % cream Apply pea-sized amount to entire face at bedtime.  If dryness, use every third night and increase as tolerated to every night. (Patient taking differently: Apply pea-sized amount to entire face at bedtime.  If dryness, use every third night and increase as tolerated to every night.)     Family History       Problem Relation (Age of Onset)    Heart attack Father (56)           Tobacco Use    Smoking status: Current Every Day Smoker     Packs/day: 0.25    Smokeless tobacco: Never Used   Substance and Sexual Activity    Alcohol use: Not Currently     Alcohol/week: 0.0 standard drinks    Drug use: No    Sexual activity: Never     Review of Systems   Constitutional:  Positive for fatigue. Negative for chills and fever.   HENT:  Negative for congestion, rhinorrhea and sinus pressure.    Respiratory:  Positive for shortness of breath. Negative for apnea, cough, choking, chest tightness, wheezing and stridor.    Cardiovascular:  Positive for chest pain. Negative for palpitations and leg swelling.   Gastrointestinal:  Negative for abdominal distention, abdominal pain, diarrhea, nausea and vomiting.   Endocrine: Negative for cold intolerance and heat intolerance.   Genitourinary:  Negative for difficulty urinating and hematuria.   Musculoskeletal:  Negative for arthralgias and joint swelling.   Skin:  Negative for color change, pallor and rash.   Neurological:  Positive for weakness. Negative for dizziness, seizures, numbness and headaches.   Psychiatric/Behavioral:  Negative for agitation. The patient is not nervous/anxious.    Objective:     Vital Signs (Most Recent):  Temp: 98.2 °F (36.8 °C) (03/28/22 1400)  Pulse: (!) 48 (03/28/22 1400)  Resp: 20 (03/28/22 1400)  BP: (!) 146/58 (03/28/22 1400)  SpO2: 96 % (03/28/22 1400)   Vital Signs (24h Range):  Temp:  [98.2 °F (36.8 °C)] 98.2 °F (36.8 °C)  Pulse:  [48-50] 48  Resp:  [12-20] 20  SpO2:  [96 %-100 %] 96 %  BP: (145-180)/(58-81) 146/58     Weight: 72.7 kg (160 lb 3.2 oz)  Body mass index is 27.5 kg/m².    Physical Exam  Vitals and nursing note reviewed.   Constitutional:       General: She is not in acute distress.     Appearance: She is not ill-appearing, toxic-appearing or diaphoretic.   Eyes:      General:         Right eye: No discharge.         Left eye: No discharge.   Cardiovascular:      Rate and Rhythm: Bradycardia present.    Pulmonary:      Effort: No respiratory distress.      Breath sounds: No stridor. No wheezing, rhonchi or rales.   Chest:      Chest wall: No tenderness.   Abdominal:      General: There is no distension.      Palpations: There is no mass.      Tenderness: There is no abdominal tenderness. There is no right CVA tenderness, left CVA tenderness, guarding or rebound.      Hernia: No hernia is present.   Musculoskeletal:         General: No swelling, tenderness, deformity or signs of injury.      Right lower leg: No edema.      Left lower leg: No edema.   Skin:     Coloration: Skin is not jaundiced or pale.      Findings: No bruising, erythema or rash.   Neurological:      Cranial Nerves: No cranial nerve deficit.      Sensory: No sensory deficit.      Motor: No weakness.      Coordination: Coordination normal.      Gait: Gait normal.      Deep Tendon Reflexes: Reflexes normal.           Significant Labs: All pertinent labs within the past 24 hours have been reviewed.    Troponin:   Recent Labs   Lab 03/28/22  1502   TROPONINI 0.509*                       Assessment/Plan:     * Chest pain  Place in Observation   Consult Cardiology   Continue ASA   Telemetry monitor  Check 2 D Echo   Hold B blocker due to bradycardia   Hold statin due to intolerance   Heparin drip          History of non-ST elevation myocardial infarction (NSTEMI)  As above       Elevated troponin  Consult Cardiology  Troponin 0.509  Heparin drip        CKD (chronic kidney disease) stage 3, GFR 30-59 ml/min  Renal function around baseline       Tobacco abuse  Continue smoking cessation       Hyperlipidemia  Pt reports statin allergy   Check Lipid panel in AM         Essential hypertension  Continue Divan-HCT   Hold B blocker due to bradycardia   Restart amlodipine as tolerated       Bradycardia         Hold B blocker       Telemetry monitor       VTE Risk Mitigation (From admission, onward)         Ordered     heparin 25,000 units in dextrose 5% (100  units/ml) IV bolus from bag - ADDITIONAL PRN BOLUS - 60 units/kg (max bolus 4000 units)  As needed (PRN)        Question:  Heparin Infusion Adjustment (DO NOT MODIFY ANSWER)  Answer:  [email or web address]\epic\Images\Pharmacy\HeparinInfusions\heparin LOW INTENSITY nomogram for [facility] [id number].pdf    03/28/22 1557     heparin 25,000 units in dextrose 5% (100 units/ml) IV bolus from bag - ADDITIONAL PRN BOLUS - 30 units/kg (max bolus 4000 units)  As needed (PRN)        Question:  Heparin Infusion Adjustment (DO NOT MODIFY ANSWER)  Answer:  [email or web address]\epic\Images\Pharmacy\HeparinInfusions\heparin LOW INTENSITY nomogram for [facility] [id number].pdf    03/28/22 1557     heparin 25,000 units in dextrose 5% (100 units/ml) IV bolus from bag INITIAL BOLUS (max bolus 4000 units)  Once        Question:  Heparin Infusion Adjustment (DO NOT MODIFY ANSWER)  Answer:  [email or web address]\epic\Images\Pharmacy\HeparinInfusions\heparin LOW INTENSITY nomogram for [facility] [id number].pdf    03/28/22 1557     heparin 25,000 units in dextrose 5% 250 mL (100 units/mL) infusion LOW INTENSITY nomogram - [facility]  Continuous        Question Answer Comment   Heparin Infusion Adjustment (DO NOT MODIFY ANSWER) [email or web address]\epic\Images\Pharmacy\HeparinInfusions\heparin LOW INTENSITY nomogram for [facility] [id number].pdf    Begin at (in units/kg/hr) 12        03/28/22 1557                   Chris Esparza NP  Department of Hospital Medicine   [facility] - Emergency Dept.

## 2022-03-28 NOTE — SUBJECTIVE & OBJECTIVE
Past Medical History:   Diagnosis Date    Chest pain 5/14/2015    Colon polyp     Coronary artery disease     pt states MI June 2015    Hypertension     Myocardial infarction     PAD (peripheral artery disease)     Tobacco use        Past Surgical History:   Procedure Laterality Date    CHOLECYSTECTOMY  09/03/2015    COLONOSCOPY N/A 10/11/2016    Procedure: COLONOSCOPY;  Surgeon: Haseeb Spears MD;  Location: [facility] ENDO;  Service: Endoscopy;  Laterality: N/A;    COLONOSCOPY N/A 12/9/2021    Procedure: COLONOSCOPY;  Surgeon: Pat Rios MD;  Location: [facility];  Service: Endoscopy;  Laterality: N/A;    ESOPHAGOGASTRODUODENOSCOPY N/A 12/9/2021    Procedure: EGD (ESOPHAGOGASTRODUODENOSCOPY);  Surgeon: Pat Rios MD;  Location: [facility];  Service: Endoscopy;  Laterality: N/A;    high blood      HYSTERECTOMY      INJECTION OF ANESTHETIC AGENT AROUND MEDIAL BRANCH NERVES INNERVATING CERVICAL FACET JOINT Bilateral 10/15/2021    Procedure: Bilateral C5-7 MBB with RN IV sedation PATIENT WOULD LIKE AFTERNOON ARRIVAL, IF POSSIBLE;  Surgeon: Nano Izquierdo MD;  Location: [facility] PAIN MGT;  Service: Pain Management;  Laterality: Bilateral;    INJECTION OF ANESTHETIC AGENT AROUND MEDIAL BRANCH NERVES INNERVATING CERVICAL FACET JOINT Bilateral 3/21/2022    Procedure: Bilateral C4-6 MBB with RN IV sedation;  Surgeon: Nano Izquierdo MD;  Location: [facility] PAIN MGT;  Service: Pain Management;  Laterality: Bilateral;    INJECTION OF ANESTHETIC AGENT INTO SACROILIAC JOINT Right 12/17/2021    Procedure: Right SIJ Injection;  Surgeon: Nano Izquierdo MD;  Location: [facility] PAIN MGT;  Service: Pain Management;  Laterality: Right;    OOPHORECTOMY      RIB FRACTURE SURGERY         Review of patient's allergies indicates:   Allergen Reactions    Statins-hmg-coa reductase inhibitors Other (See Comments)     Myalgias to lipitor and simvastatin       No current facility-administered medications on file prior to encounter.     Current  Outpatient Medications on File Prior to Encounter   Medication Sig    amLODIPine (NORVASC) 10 MG tablet Take 1 tablet (10 mg total) by mouth once daily.    aspirin 81 MG Chew Take 1 tablet (81 mg total) by mouth once daily.    buPROPion (WELLBUTRIN XL) 150 MG TB24 tablet Take 1 tablet (150 mg total) by mouth once daily for 14 days, THEN 2 tablets (300 mg total) once daily.    fexofenadine HCl (ALLEGRA ORAL) Take 1 tablet by mouth once daily.    fluticasone propionate (FLONASE) 50 mcg/actuation nasal spray 2 sprays (100 mcg total) by Each Nostril route once daily.    gabapentin (NEURONTIN) 300 MG capsule Take 1 capsule (300 mg total) by mouth every evening for 7 days, THEN 2 capsules (600 mg total) every evening for 7 days, THEN 3 capsules (900 mg total) every evening for 16 days.    metoprolol tartrate (LOPRESSOR) 50 MG tablet Take 1 tablet (50 mg total) by mouth Daily.    pantoprazole (PROTONIX) 20 MG tablet Take 1 tablet (20 mg total) by mouth once daily.    tiZANidine (ZANAFLEX) 4 MG tablet TAKE 1 TABLET (4 MG TOTAL) BY MOUTH NIGHTLY AS NEEDED (MUSCLE RELAXANT)    valsartan-hydrochlorothiazide (DIOVAN-HCT) 160-25 mg per tablet Take 1 tablet by mouth once daily.    vitamin D (VITAMIN D3) 1000 units Tab Take 1,000 Units by mouth once daily.    albuterol (VENTOLIN HFA) 90 mcg/actuation inhaler Inhale 2 puffs into the lungs every 6 (six) hours as needed for Wheezing. Rescue    albuterol-ipratropium (DUO-NEB) 2.5 mg-0.5 mg/3 mL nebulizer solution Take 3 mLs by nebulization every 6 (six) hours as needed for Wheezing or Shortness of Breath. Rescue    cyanocobalamin (VITAMIN B-12) 1000 MCG tablet Take 100 mcg by mouth once daily.    diclofenac sodium (VOLTAREN) 1 % Gel Apply 2 g topically 4 (four) times daily as needed. For muscular pain    loratadine (CLARITIN) 10 mg tablet Take 10 mg by mouth daily as needed for Allergies.    multivitamin with minerals tablet Take 1 tablet by mouth once daily.    mupirocin calcium 2%  (BACTROBAN) 2 % cream Apply topically 2 (two) times daily.    neomycin-polymyxin-hydrocortisone (CORTISPORIN) 3.5-10,000-1 mg/mL-unit/mL-% otic suspension INSTILL 2 TO 4 DROPS INTO AFFECTED EAR 4 TIMES DAILY FOR 5 DAYS    tretinoin (RETIN-A) 0.025 % cream Apply pea-sized amount to entire face at bedtime.  If dryness, use every third night and increase as tolerated to every night. (Patient taking differently: Apply pea-sized amount to entire face at bedtime.  If dryness, use every third night and increase as tolerated to every night.)     Family History       Problem Relation (Age of Onset)    Heart attack Father (56)          Tobacco Use    Smoking status: Current Every Day Smoker     Packs/day: 0.25    Smokeless tobacco: Never Used   Substance and Sexual Activity    Alcohol use: Not Currently     Alcohol/week: 0.0 standard drinks    Drug use: No    Sexual activity: Never     Review of Systems   Constitutional:  Positive for fatigue. Negative for chills and fever.   HENT:  Negative for congestion, rhinorrhea and sinus pressure.    Respiratory:  Positive for shortness of breath. Negative for apnea, cough, choking, chest tightness, wheezing and stridor.    Cardiovascular:  Positive for chest pain. Negative for palpitations and leg swelling.   Gastrointestinal:  Negative for abdominal distention, abdominal pain, diarrhea, nausea and vomiting.   Endocrine: Negative for cold intolerance and heat intolerance.   Genitourinary:  Negative for difficulty urinating and hematuria.   Musculoskeletal:  Negative for arthralgias and joint swelling.   Skin:  Negative for color change, pallor and rash.   Neurological:  Positive for weakness. Negative for dizziness, seizures, numbness and headaches.   Psychiatric/Behavioral:  Negative for agitation. The patient is not nervous/anxious.    Objective:     Vital Signs (Most Recent):  Temp: 98.2 °F (36.8 °C) (03/28/22 1400)  Pulse: (!) 48 (03/28/22 1400)  Resp: 20 (03/28/22 1400)  BP: (!)  146/58 (03/28/22 1400)  SpO2: 96 % (03/28/22 1400)   Vital Signs (24h Range):  Temp:  [98.2 °F (36.8 °C)] 98.2 °F (36.8 °C)  Pulse:  [48-50] 48  Resp:  [12-20] 20  SpO2:  [96 %-100 %] 96 %  BP: (145-180)/(58-81) 146/58     Weight: 72.7 kg (160 lb 3.2 oz)  Body mass index is 27.5 kg/m².    Physical Exam  Vitals and nursing note reviewed.   Constitutional:       General: She is not in acute distress.     Appearance: She is not ill-appearing, toxic-appearing or diaphoretic.   Eyes:      General:         Right eye: No discharge.         Left eye: No discharge.   Cardiovascular:      Rate and Rhythm: Bradycardia present.   Pulmonary:      Effort: No respiratory distress.      Breath sounds: No stridor. No wheezing, rhonchi or rales.   Chest:      Chest wall: No tenderness.   Abdominal:      General: There is no distension.      Palpations: There is no mass.      Tenderness: There is no abdominal tenderness. There is no right CVA tenderness, left CVA tenderness, guarding or rebound.      Hernia: No hernia is present.   Musculoskeletal:         General: No swelling, tenderness, deformity or signs of injury.      Right lower leg: No edema.      Left lower leg: No edema.   Skin:     Coloration: Skin is not jaundiced or pale.      Findings: No bruising, erythema or rash.   Neurological:      Cranial Nerves: No cranial nerve deficit.      Sensory: No sensory deficit.      Motor: No weakness.      Coordination: Coordination normal.      Gait: Gait normal.      Deep Tendon Reflexes: Reflexes normal.           Significant Labs: All pertinent labs within the past 24 hours have been reviewed.    Troponin:   Recent Labs   Lab 03/28/22  1502   TROPONINI 0.509*

## 2022-03-28 NOTE — HPI
Ms Olvera is a 66 year old female with PMHx of CAD, HTN, tobacco use and old MI who presented to [facility] from [facility] for evaluation of chest pain. Associated symptoms include shortness of breath, nausea, generalize weakness and fatigue. EKG: Sinus Bradycardia rate 48. Troponin 0.21 at [facility], however increase to 0.509 upon arrival to [facility] ED. All labs reviewed for [facility] stable. At [facility], patient was given 4, 81 mg ASA and a NTG patch.  Patient currently denies chest pain or shortness of breath. She is a full code, daughter Yakelin and son are surrogate decision makers. Patient is being placed in Observation under the care of Hospital Medicine.

## 2022-03-28 NOTE — H&P (VIEW-ONLY)
[facility] - Emergency Dept.  Cardiology  Consult Note    Patient Name: Shirlene Olvera  MRN: [id number]  Admission Date: 3/28/2022  Hospital Length of Stay: 0 days  Code Status: Full Code   Attending Provider: Durga Veras MD   Consulting Provider: Ubaldo Huffman MD  Primary Care Physician: Clayton Frankel MD  Principal Problem:Chest pain    Patient information was obtained from patient and ER records.     Inpatient consult to Cardiology  Consult performed by: Ubaldo Huffman MD  Consult ordered by: Arianna Hunt MD        Subjective:     HPI:   67 yo female, cardiology consult for NSTEMI  PMH HTN HLD PAD, CAD smoker. Statin intolerance  C/o dizziness and high BP today. 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 [facility]  Troponin 0.5  Ekg NSR TWI in I AVL and chronic  2017 echo showed EF 55% and LVH; MPI no ischemia  2018 mild PAD  Pt states that she had remote LHC at [facility] and nonobstructive CAD  Now paion free            Past Medical History:   Diagnosis Date    Chest pain 5/14/2015    Colon polyp     Coronary artery disease     pt states MI June 2015    Hypertension     Myocardial infarction     PAD (peripheral artery disease)     Tobacco use        Past Surgical History:   Procedure Laterality Date    CHOLECYSTECTOMY  09/03/2015    COLONOSCOPY N/A 10/11/2016    Procedure: COLONOSCOPY;  Surgeon: Haseeb Spears MD;  Location: [facility];  Service: Endoscopy;  Laterality: N/A;    COLONOSCOPY N/A 12/9/2021    Procedure: COLONOSCOPY;  Surgeon: Pat Rios MD;  Location: [facility];  Service: Endoscopy;  Laterality: N/A;    ESOPHAGOGASTRODUODENOSCOPY N/A 12/9/2021    Procedure: EGD (ESOPHAGOGASTRODUODENOSCOPY);  Surgeon: Pat Rios MD;  Location: [facility];  Service: Endoscopy;  Laterality: N/A;    high blood      HYSTERECTOMY      INJECTION OF ANESTHETIC  AGENT AROUND MEDIAL BRANCH NERVES INNERVATING CERVICAL FACET JOINT Bilateral 10/15/2021    Procedure: Bilateral C5-7 MBB with RN IV sedation PATIENT WOULD LIKE AFTERNOON ARRIVAL, IF POSSIBLE;  Surgeon: Nano Izquierdo MD;  Location: [facility] PAIN MGT;  Service: Pain Management;  Laterality: Bilateral;    INJECTION OF ANESTHETIC AGENT AROUND MEDIAL BRANCH NERVES INNERVATING CERVICAL FACET JOINT Bilateral 3/21/2022    Procedure: Bilateral C4-6 MBB with RN IV sedation;  Surgeon: Nano Izquierdo MD;  Location: [facility] PAIN MGT;  Service: Pain Management;  Laterality: Bilateral;    INJECTION OF ANESTHETIC AGENT INTO SACROILIAC JOINT Right 12/17/2021    Procedure: Right SIJ Injection;  Surgeon: Nano Izquierdo MD;  Location: [facility] PAIN MGT;  Service: Pain Management;  Laterality: Right;    OOPHORECTOMY      RIB FRACTURE SURGERY         Review of patient's allergies indicates:   Allergen Reactions    Statins-hmg-coa reductase inhibitors Other (See Comments)     Myalgias to lipitor and simvastatin       No current facility-administered medications on file prior to encounter.     Current Outpatient Medications on File Prior to Encounter   Medication Sig    amLODIPine (NORVASC) 10 MG tablet Take 1 tablet (10 mg total) by mouth once daily.    aspirin 81 MG Chew Take 1 tablet (81 mg total) by mouth once daily.    buPROPion (WELLBUTRIN XL) 150 MG TB24 tablet Take 1 tablet (150 mg total) by mouth once daily for 14 days, THEN 2 tablets (300 mg total) once daily.    fexofenadine HCl (ALLEGRA ORAL) Take 1 tablet by mouth once daily.    fluticasone propionate (FLONASE) 50 mcg/actuation nasal spray 2 sprays (100 mcg total) by Each Nostril route once daily.    gabapentin (NEURONTIN) 300 MG capsule Take 1 capsule (300 mg total) by mouth every evening for 7 days, THEN 2 capsules (600 mg total) every evening for 7 days, THEN 3 capsules (900 mg total) every evening for 16 days.    metoprolol tartrate (LOPRESSOR) 50 MG tablet Take 1 tablet  (50 mg total) by mouth Daily.    pantoprazole (PROTONIX) 20 MG tablet Take 1 tablet (20 mg total) by mouth once daily.    tiZANidine (ZANAFLEX) 4 MG tablet TAKE 1 TABLET (4 MG TOTAL) BY MOUTH NIGHTLY AS NEEDED (MUSCLE RELAXANT)    valsartan-hydrochlorothiazide (DIOVAN-HCT) 160-25 mg per tablet Take 1 tablet by mouth once daily.    vitamin D (VITAMIN D3) 1000 units Tab Take 1,000 Units by mouth once daily.    albuterol (VENTOLIN HFA) 90 mcg/actuation inhaler Inhale 2 puffs into the lungs every 6 (six) hours as needed for Wheezing. Rescue    albuterol-ipratropium (DUO-NEB) 2.5 mg-0.5 mg/3 mL nebulizer solution Take 3 mLs by nebulization every 6 (six) hours as needed for Wheezing or Shortness of Breath. Rescue    cyanocobalamin (VITAMIN B-12) 1000 MCG tablet Take 100 mcg by mouth once daily.    diclofenac sodium (VOLTAREN) 1 % Gel Apply 2 g topically 4 (four) times daily as needed. For muscular pain    loratadine (CLARITIN) 10 mg tablet Take 10 mg by mouth daily as needed for Allergies.    multivitamin with minerals tablet Take 1 tablet by mouth once daily.    mupirocin calcium 2% (BACTROBAN) 2 % cream Apply topically 2 (two) times daily.    neomycin-polymyxin-hydrocortisone (CORTISPORIN) 3.5-10,000-1 mg/mL-unit/mL-% otic suspension INSTILL 2 TO 4 DROPS INTO AFFECTED EAR 4 TIMES DAILY FOR 5 DAYS    tretinoin (RETIN-A) 0.025 % cream Apply pea-sized amount to entire face at bedtime.  If dryness, use every third night and increase as tolerated to every night. (Patient taking differently: Apply pea-sized amount to entire face at bedtime.  If dryness, use every third night and increase as tolerated to every night.)     Family History       Problem Relation (Age of Onset)    Heart attack Father (56)          Tobacco Use    Smoking status: Current Every Day Smoker     Packs/day: 0.25    Smokeless tobacco: Never Used   Substance and Sexual Activity    Alcohol use: Not Currently     Alcohol/week: 0.0 standard  drinks    Drug use: No    Sexual activity: Never     Review of Systems   Constitutional: Negative for decreased appetite, diaphoresis, fever, malaise/fatigue and night sweats.   HENT:  Negative for nosebleeds.    Eyes:  Negative for blurred vision and double vision.   Cardiovascular:  Negative for chest pain, claudication, dyspnea on exertion, irregular heartbeat, leg swelling, near-syncope, orthopnea, palpitations, paroxysmal nocturnal dyspnea and syncope.   Respiratory:  Negative for cough, shortness of breath, sleep disturbances due to breathing, snoring, sputum production and wheezing.    Endocrine: Negative for cold intolerance and polyuria.   Hematologic/Lymphatic: Does not bruise/bleed easily.   Skin:  Negative for rash.   Musculoskeletal:  Positive for arthritis, back pain and joint pain. Negative for falls, joint swelling and neck pain.   Gastrointestinal:  Negative for abdominal pain, heartburn, nausea and vomiting.   Genitourinary:  Negative for dysuria, frequency and hematuria.   Neurological:  Positive for dizziness. Negative for difficulty with concentration, focal weakness, headaches, light-headedness, numbness, seizures and weakness.   Psychiatric/Behavioral:  Negative for depression, memory loss and substance abuse. The patient does not have insomnia.    Allergic/Immunologic: Negative for HIV exposure and hives.   Objective:     Vital Signs (Most Recent):  Temp: 98.2 °F (36.8 °C) (03/28/22 1400)  Pulse: (!) 48 (03/28/22 1552)  Resp: 20 (03/28/22 1400)  BP: (!) 146/58 (03/28/22 1552)  SpO2: 96 % (03/28/22 1400)   Vital Signs (24h Range):  Temp:  [98.2 °F (36.8 °C)] 98.2 °F (36.8 °C)  Pulse:  [48-50] 48  Resp:  [12-20] 20  SpO2:  [96 %-100 %] 96 %  BP: (145-180)/(58-81) 146/58     Weight: 72.7 kg (160 lb 3.2 oz)  Body mass index is 27.5 kg/m².    SpO2: 96 %  O2 Device (Oxygen Therapy): room air    No intake or output data in the 24 hours ending 03/28/22 1631    Lines/Drains/Airways       Peripheral  Intravenous Line  Duration                  Peripheral IV - Single Lumen 03/28/22 1509 20 G Right Antecubital <1 day                    Physical Exam  HENT:      Head: Normocephalic.   Eyes:      Pupils: Pupils are equal, round, and reactive to light.   Neck:      Thyroid: No thyromegaly.      Vascular: Normal carotid pulses. No carotid bruit or JVD.   Cardiovascular:      Rate and Rhythm: Normal rate and regular rhythm. No extrasystoles are present.     Chest Wall: PMI is not displaced.      Pulses: Normal pulses.      Heart sounds: Normal heart sounds. No murmur heard.    No gallop. No S3 sounds.   Pulmonary:      Effort: No respiratory distress.      Breath sounds: Normal breath sounds. No stridor.   Abdominal:      General: Bowel sounds are normal.      Palpations: Abdomen is soft.      Tenderness: There is no abdominal tenderness. There is no rebound.   Musculoskeletal:         General: Normal range of motion.   Skin:     Findings: No rash.   Neurological:      Mental Status: She is alert and oriented to person, place, and time.   Psychiatric:         Behavior: Behavior normal.       Significant Labs: Troponin   Recent Labs   Lab 03/28/22  1502   TROPONINI 0.509*    and   Recent Lab Results         03/28/22  1502        Troponin I 0.509  Comment: The reference interval for Troponin I represents the 99th percentile   cutoff   for our facility and is consistent with 3rd generation assay   performance.                 Significant Imaging: EKG:      Assessment and Plan:     History of non-ST elevation myocardial infarction (NSTEMI)  NSTEMI  CAD   Smoker  HTN    -start heparin gtt  -IVF 50 cc /hf for o/n  -NPO after mn. LHC in AM   -add ASA 81mg daily  -no BB due to bradycardia   -ok to continue ARB.   -Add amlodipine and d/c HCTZ  -trending troponin      Tobacco abuse  smoking cessation    Hyperlipidemia  Statin intolerance    Essential hypertension  Continue ARB and AMlodipine  DASH        VTE Risk Mitigation  (From admission, onward)         Ordered     heparin 25,000 units in dextrose 5% (100 units/ml) IV bolus from bag - ADDITIONAL PRN BOLUS - 60 units/kg (max bolus 4000 units)  As needed (PRN)        Question:  Heparin Infusion Adjustment (DO NOT MODIFY ANSWER)  Answer:  [email or web address]\epic\Images\Pharmacy\HeparinInfusions\heparin LOW INTENSITY nomogram for [facility] [id number]    03/28/22 1557     heparin 25,000 units in dextrose 5% (100 units/ml) IV bolus from bag - ADDITIONAL PRN BOLUS - 30 units/kg (max bolus 4000 units)  As needed (PRN)        Question:  Heparin Infusion Adjustment (DO NOT MODIFY ANSWER)  Answer:  [email or web address]\epic\Images\Pharmacy\HeparinInfusions\heparin LOW INTENSITY nomogram for [facility] [id number]    03/28/22 1557     Reason for No Pharmacological VTE Prophylaxis  Once        Question:  Reasons:  Answer:  IV Heparin w/in 24 hrs. Pre or Post-Op    03/28/22 1649     IP VTE HIGH RISK PATIENT  Once         03/28/22 1649     Place sequential compression device  Until discontinued         03/28/22 1649     heparin 25,000 units in dextrose 5% (100 units/ml) IV bolus from bag INITIAL BOLUS (max bolus 4000 units)  Once        Question:  Heparin Infusion Adjustment (DO NOT MODIFY ANSWER)  Answer:  [email or web address]\epic\Images\Pharmacy\HeparinInfusions\heparin LOW INTENSITY nomogram for [facility] [id number]    03/28/22 1557     heparin 25,000 units in dextrose 5% 250 mL (100 units/mL) infusion LOW INTENSITY nomogram - [facility]  Continuous        Question Answer Comment   Heparin Infusion Adjustment (DO NOT MODIFY ANSWER) [email or web address]\epic\Images\Pharmacy\HeparinInfusions\heparin LOW INTENSITY nomogram for [facility] [id number]    Begin at (in units/kg/hr) 12        03/28/22 1557                Thank you for your consult. I will follow-up with patient. Please contact us if you have any additional questions.    Ubaldo Huffman MD  Cardiology   [facility] - Emergency Dept.

## 2022-03-28 NOTE — ED PROVIDER NOTES
SCRIBE #1 NOTE: I, Meg Castillo, am scribing for, and in the presence of, Arianna Hunt MD. I have scribed the entire note.      History      Chief Complaint   Patient presents with    Chest Pain     Transfer from [place]. NSTEMI.        Review of patient's allergies indicates:   Allergen Reactions    Statins-hmg-coa reductase inhibitors Other (See Comments)     Myalgias to lipitor and simvastatin        HPI   HPI    3/28/2022, 2:14 PM   History obtained from the patient      History of Present Illness: Shirlene Olvera is a 66 y.o. female patient with a PMhx of CAD, MI, and PAD who presents to the Emergency Department after being transferred from [facility] for a NSTEMI. The pt reports that she went to the [facility] because she was experiencing fatigue, nausea, generalized weakness, SOB, and CP which onset this morning. She reports that her CP felt like heartburn. At [facility], the pt reports that she had lab work done, including a COVID-19 test, and was given 4, 81 mg ASA and a NTG patch. At this time, the pt denies having any sxs. Her symptoms are episodic and moderate in severity. No mitigating or exacerbating factors reported. Patient denies any fever, chills, V/D, numbness, headaches, dizziness, diaphoresis, and all other sxs at this time. No further complaints or concerns at this time.         Arrival mode: EMS    PCP: Clayton Frankel MD       Past Medical History:  Past Medical History:   Diagnosis Date    Chest pain 5/14/2015    Colon polyp     Coronary artery disease     pt states MI June 2015    Hypertension     Myocardial infarction     PAD (peripheral artery disease)     Tobacco use        Past Surgical History:  Past Surgical History:   Procedure Laterality Date    CHOLECYSTECTOMY  09/03/2015    COLONOSCOPY N/A 10/11/2016    Procedure: COLONOSCOPY;  Surgeon: Haseeb Spears MD;  Location: [facility];  Service:  Endoscopy;  Laterality: N/A;    COLONOSCOPY N/A 12/9/2021    Procedure: COLONOSCOPY;  Surgeon: Pat Rios MD;  Location: [facility];  Service: Endoscopy;  Laterality: N/A;    ESOPHAGOGASTRODUODENOSCOPY N/A 12/9/2021    Procedure: EGD (ESOPHAGOGASTRODUODENOSCOPY);  Surgeon: Pat Rios MD;  Location: [facility] ENDO;  Service: Endoscopy;  Laterality: N/A;    high blood      HYSTERECTOMY      INJECTION OF ANESTHETIC AGENT AROUND MEDIAL BRANCH NERVES INNERVATING CERVICAL FACET JOINT Bilateral 10/15/2021    Procedure: Bilateral C5-7 MBB with RN IV sedation PATIENT WOULD LIKE AFTERNOON ARRIVAL, IF POSSIBLE;  Surgeon: Nano Izquierdo MD;  Location: [facility] PAIN MGT;  Service: Pain Management;  Laterality: Bilateral;    INJECTION OF ANESTHETIC AGENT AROUND MEDIAL BRANCH NERVES INNERVATING CERVICAL FACET JOINT Bilateral 3/21/2022    Procedure: Bilateral C4-6 MBB with RN IV sedation;  Surgeon: Nano Izquierdo MD;  Location: [facility] PAIN MGT;  Service: Pain Management;  Laterality: Bilateral;    INJECTION OF ANESTHETIC AGENT INTO SACROILIAC JOINT Right 12/17/2021    Procedure: Right SIJ Injection;  Surgeon: Nano Izquierdo MD;  Location: [facility] PAIN MGT;  Service: Pain Management;  Laterality: Right;    OOPHORECTOMY      RIB FRACTURE SURGERY           Family History:  Family History   Problem Relation Age of Onset    Heart attack Father 56        MI    Asthma Neg Hx     Thyroid disease Neg Hx     Migraines Neg Hx     Cancer Neg Hx        Social History:  Social History     Tobacco Use    Smoking status: Current Every Day Smoker     Packs/day: 0.25    Smokeless tobacco: Never Used   Substance and Sexual Activity    Alcohol use: Not Currently     Alcohol/week: 0.0 standard drinks    Drug use: No    Sexual activity: Never       ROS   Review of Systems   Constitutional: Positive for fatigue (subsided). Negative for chills, diaphoresis and fever.   HENT: Negative for sore throat.    Respiratory: Positive for  shortness of breath (subsided).    Cardiovascular: Positive for chest pain (subsided).   Gastrointestinal: Positive for nausea (subsided). Negative for diarrhea and vomiting.   Genitourinary: Negative for dysuria.   Musculoskeletal: Negative for back pain.   Skin: Negative for rash.   Neurological: Positive for weakness (generalized (subsided)). Negative for dizziness, numbness and headaches.   Hematological: Does not bruise/bleed easily.   All other systems reviewed and are negative.    Physical Exam      Initial Vitals [03/28/22 1400]   BP Pulse Resp Temp SpO2   (!) 146/58 (!) 48 20 98.2 °F (36.8 °C) 96 %      MAP       --          Physical Exam  Nursing Notes and Vital Signs Reviewed.  Constitutional: Patient is in no acute distress. Well-developed and well-nourished.  Head: Atraumatic. Normocephalic.  Eyes: PERRL. EOM intact. Conjunctivae are not pale. No scleral icterus.  ENT: Mucous membranes are moist. Oropharynx is clear and symmetric.    Neck: Supple. Full ROM. No lymphadenopathy.  Cardiovascular: Regular rate. Regular rhythm. No murmurs, rubs, or gallops. Distal pulses are 2+ and symmetric.  Pulmonary/Chest: No respiratory distress. Clear to auscultation bilaterally. No wheezing or rales.  Abdominal: Soft and non-distended.  There is no tenderness.  No rebound, guarding, or rigidity.   Musculoskeletal: Moves all extremities. No obvious deformities. No edema.  Skin: Warm and dry.  Neurological:  Alert, awake, and appropriate.  Normal speech.  No acute focal neurological deficits are appreciated.  Psychiatric: Normal affect. Good eye contact. Appropriate in content.    ED Course    Critical Care    Date/Time: 3/28/2022 3:52 PM  Performed by: Arianna Hunt MD  Authorized by: Durga Veras MD   Direct patient critical care time: 20 minutes  Additional history critical care time: 5 minutes  Ordering / reviewing critical care time: 5 minutes  Documentation critical care time: 5 minutes  Consulting other  physicians critical care time: 5 minutes  Total critical care time (exclusive of procedural time) : 40 minutes  Critical care was necessary to treat or prevent imminent or life-threatening deterioration of the following conditions: cardiac failure.  Critical care was time spent personally by me on the following activities: blood draw for specimens, discussions with primary provider, evaluation of patient's response to treatment, ordering and performing treatments and interventions, pulse oximetry, re-evaluation of patient's condition, ordering and review of laboratory studies, examination of patient, development of treatment plan with patient or surrogate, discussions with consultants, interpretation of cardiac output measurements, obtaining history from patient or surrogate, ordering and review of radiographic studies and review of old charts.        ED Vital Signs:  Vitals:    03/28/22 1400   BP: (!) 146/58   Pulse: (!) 48   Resp: 20   Temp: 98.2 °F (36.8 °C)   TempSrc: Oral   SpO2: 96%       Abnormal Lab Results:  Labs Reviewed   TROPONIN I - Abnormal; Notable for the following components:       Result Value    Troponin I 0.509 (*)     All other components within normal limits   PROTIME-INR   APTT            Imaging Results:  Imaging Results    None        The EKG was ordered, reviewed, and independently interpreted by the ED provider.  Interpretation time: 14:11  Rate: 48 BPM  Rhythm: sinus bradycardia  Interpretation: ST and T wave abnormality, consider lateral ischemia. No STEMI.           The Emergency Provider reviewed the vital signs and test results, which are outlined above.    ED Discussion     2:14 PM: The pt's paperwork from [facility], and her CBC and chemistry look normal, but the pt's troponin was elevated.    2:29 PM: Discussed pt's case with Dr. Huffman (Cardiology) who recommends ACS protocol and admission for o/n.    2:40 PM: Discussed case with ALLA Spence ([facility]  Medicine). Dr. Veras agrees with current care and management of pt and accepts admission.   Admitting Service: Hospital Medicine  Admitting Physician: Dr. Veras  Admit to: Obs Tele    2:41 PM: Re-evaluated pt. I have discussed test results, shared treatment plan, and the need for admission with patient and family at bedside. Pt and family express understanding at this time and agree with all information. All questions answered. Pt and family have no further questions or concerns at this time. Pt is ready for admit.           ED Medication(s):  Medications - No data to display    New Prescriptions    No medications on file             Medical Decision Making    Medical Decision Making:   Clinical Tests:   Lab Tests: Ordered  Medical Tests: Ordered and Reviewed           Scribe Attestation:   Scribe #1: I performed the above scribed service and the documentation accurately describes the services I performed. I attest to the accuracy of the note.    Attending:   Physician Attestation Statement for Scribe #1: I, Arianna Hunt MD, personally performed the services described in this documentation, as scribed by Meg Castillo, in my presence, and it is both accurate and complete.          Clinical Impression       ICD-10-CM ICD-9-CM   1. Chest pain  R07.9 786.50   2. Elevated troponin  R77.8 790.6   3. Chronic hypertension  I10 401.9       Disposition:   Disposition: Placed in Observation  Condition: Fair         Arianna Hunt MD  03/28/22 155

## 2022-03-28 NOTE — Clinical Note
The catheter was inserted over the wire into the mid   right coronary artery. Coronary ultrasound performed

## 2022-03-28 NOTE — SUBJECTIVE & OBJECTIVE
Past Medical History:   Diagnosis Date    Chest pain 5/14/2015    Colon polyp     Coronary artery disease     pt states MI June 2015    Hypertension     Myocardial infarction     PAD (peripheral artery disease)     Tobacco use        Past Surgical History:   Procedure Laterality Date    CHOLECYSTECTOMY  09/03/2015    COLONOSCOPY N/A 10/11/2016    Procedure: COLONOSCOPY;  Surgeon: Haseeb Spears MD;  Location: [facility] ENDO;  Service: Endoscopy;  Laterality: N/A;    COLONOSCOPY N/A 12/9/2021    Procedure: COLONOSCOPY;  Surgeon: Pat Rios MD;  Location: [facility];  Service: Endoscopy;  Laterality: N/A;    ESOPHAGOGASTRODUODENOSCOPY N/A 12/9/2021    Procedure: EGD (ESOPHAGOGASTRODUODENOSCOPY);  Surgeon: Pat Rios MD;  Location: [facility];  Service: Endoscopy;  Laterality: N/A;    high blood      HYSTERECTOMY      INJECTION OF ANESTHETIC AGENT AROUND MEDIAL BRANCH NERVES INNERVATING CERVICAL FACET JOINT Bilateral 10/15/2021    Procedure: Bilateral C5-7 MBB with RN IV sedation PATIENT WOULD LIKE AFTERNOON ARRIVAL, IF POSSIBLE;  Surgeon: Nano Izquierdo MD;  Location: [facility] PAIN MGT;  Service: Pain Management;  Laterality: Bilateral;    INJECTION OF ANESTHETIC AGENT AROUND MEDIAL BRANCH NERVES INNERVATING CERVICAL FACET JOINT Bilateral 3/21/2022    Procedure: Bilateral C4-6 MBB with RN IV sedation;  Surgeon: Nano Izquierdo MD;  Location: [facility] PAIN MGT;  Service: Pain Management;  Laterality: Bilateral;    INJECTION OF ANESTHETIC AGENT INTO SACROILIAC JOINT Right 12/17/2021    Procedure: Right SIJ Injection;  Surgeon: Nano Izquierdo MD;  Location: [facility] PAIN MGT;  Service: Pain Management;  Laterality: Right;    OOPHORECTOMY      RIB FRACTURE SURGERY         Review of patient's allergies indicates:   Allergen Reactions    Statins-hmg-coa reductase inhibitors Other (See Comments)     Myalgias to lipitor and simvastatin       No current facility-administered medications on file prior to encounter.     Current  Outpatient Medications on File Prior to Encounter   Medication Sig    amLODIPine (NORVASC) 10 MG tablet Take 1 tablet (10 mg total) by mouth once daily.    aspirin 81 MG Chew Take 1 tablet (81 mg total) by mouth once daily.    buPROPion (WELLBUTRIN XL) 150 MG TB24 tablet Take 1 tablet (150 mg total) by mouth once daily for 14 days, THEN 2 tablets (300 mg total) once daily.    fexofenadine HCl (ALLEGRA ORAL) Take 1 tablet by mouth once daily.    fluticasone propionate (FLONASE) 50 mcg/actuation nasal spray 2 sprays (100 mcg total) by Each Nostril route once daily.    gabapentin (NEURONTIN) 300 MG capsule Take 1 capsule (300 mg total) by mouth every evening for 7 days, THEN 2 capsules (600 mg total) every evening for 7 days, THEN 3 capsules (900 mg total) every evening for 16 days.    metoprolol tartrate (LOPRESSOR) 50 MG tablet Take 1 tablet (50 mg total) by mouth Daily.    pantoprazole (PROTONIX) 20 MG tablet Take 1 tablet (20 mg total) by mouth once daily.    tiZANidine (ZANAFLEX) 4 MG tablet TAKE 1 TABLET (4 MG TOTAL) BY MOUTH NIGHTLY AS NEEDED (MUSCLE RELAXANT)    valsartan-hydrochlorothiazide (DIOVAN-HCT) 160-25 mg per tablet Take 1 tablet by mouth once daily.    vitamin D (VITAMIN D3) 1000 units Tab Take 1,000 Units by mouth once daily.    albuterol (VENTOLIN HFA) 90 mcg/actuation inhaler Inhale 2 puffs into the lungs every 6 (six) hours as needed for Wheezing. Rescue    albuterol-ipratropium (DUO-NEB) 2.5 mg-0.5 mg/3 mL nebulizer solution Take 3 mLs by nebulization every 6 (six) hours as needed for Wheezing or Shortness of Breath. Rescue    cyanocobalamin (VITAMIN B-12) 1000 MCG tablet Take 100 mcg by mouth once daily.    diclofenac sodium (VOLTAREN) 1 % Gel Apply 2 g topically 4 (four) times daily as needed. For muscular pain    loratadine (CLARITIN) 10 mg tablet Take 10 mg by mouth daily as needed for Allergies.    multivitamin with minerals tablet Take 1 tablet by mouth once daily.    mupirocin calcium 2%  (BACTROBAN) 2 % cream Apply topically 2 (two) times daily.    neomycin-polymyxin-hydrocortisone (CORTISPORIN) 3.5-10,000-1 mg/mL-unit/mL-% otic suspension INSTILL 2 TO 4 DROPS INTO AFFECTED EAR 4 TIMES DAILY FOR 5 DAYS    tretinoin (RETIN-A) 0.025 % cream Apply pea-sized amount to entire face at bedtime.  If dryness, use every third night and increase as tolerated to every night. (Patient taking differently: Apply pea-sized amount to entire face at bedtime.  If dryness, use every third night and increase as tolerated to every night.)     Family History       Problem Relation (Age of Onset)    Heart attack Father (56)          Tobacco Use    Smoking status: Current Every Day Smoker     Packs/day: 0.25    Smokeless tobacco: Never Used   Substance and Sexual Activity    Alcohol use: Not Currently     Alcohol/week: 0.0 standard drinks    Drug use: No    Sexual activity: Never     Review of Systems   Constitutional: Negative for decreased appetite, diaphoresis, fever, malaise/fatigue and night sweats.   HENT:  Negative for nosebleeds.    Eyes:  Negative for blurred vision and double vision.   Cardiovascular:  Negative for chest pain, claudication, dyspnea on exertion, irregular heartbeat, leg swelling, near-syncope, orthopnea, palpitations, paroxysmal nocturnal dyspnea and syncope.   Respiratory:  Negative for cough, shortness of breath, sleep disturbances due to breathing, snoring, sputum production and wheezing.    Endocrine: Negative for cold intolerance and polyuria.   Hematologic/Lymphatic: Does not bruise/bleed easily.   Skin:  Negative for rash.   Musculoskeletal:  Positive for arthritis, back pain and joint pain. Negative for falls, joint swelling and neck pain.   Gastrointestinal:  Negative for abdominal pain, heartburn, nausea and vomiting.   Genitourinary:  Negative for dysuria, frequency and hematuria.   Neurological:  Positive for dizziness. Negative for difficulty with concentration, focal weakness,  headaches, light-headedness, numbness, seizures and weakness.   Psychiatric/Behavioral:  Negative for depression, memory loss and substance abuse. The patient does not have insomnia.    Allergic/Immunologic: Negative for HIV exposure and hives.   Objective:     Vital Signs (Most Recent):  Temp: 98.2 °F (36.8 °C) (03/28/22 1400)  Pulse: (!) 48 (03/28/22 1552)  Resp: 20 (03/28/22 1400)  BP: (!) 146/58 (03/28/22 1552)  SpO2: 96 % (03/28/22 1400)   Vital Signs (24h Range):  Temp:  [98.2 °F (36.8 °C)] 98.2 °F (36.8 °C)  Pulse:  [48-50] 48  Resp:  [12-20] 20  SpO2:  [96 %-100 %] 96 %  BP: (145-180)/(58-81) 146/58     Weight: 72.7 kg (160 lb 3.2 oz)  Body mass index is 27.5 kg/m².    SpO2: 96 %  O2 Device (Oxygen Therapy): room air    No intake or output data in the 24 hours ending 03/28/22 1631    Lines/Drains/Airways       Peripheral Intravenous Line  Duration                  Peripheral IV - Single Lumen 03/28/22 1509 20 G Right Antecubital <1 day                    Physical Exam  HENT:      Head: Normocephalic.   Eyes:      Pupils: Pupils are equal, round, and reactive to light.   Neck:      Thyroid: No thyromegaly.      Vascular: Normal carotid pulses. No carotid bruit or JVD.   Cardiovascular:      Rate and Rhythm: Normal rate and regular rhythm. No extrasystoles are present.     Chest Wall: PMI is not displaced.      Pulses: Normal pulses.      Heart sounds: Normal heart sounds. No murmur heard.    No gallop. No S3 sounds.   Pulmonary:      Effort: No respiratory distress.      Breath sounds: Normal breath sounds. No stridor.   Abdominal:      General: Bowel sounds are normal.      Palpations: Abdomen is soft.      Tenderness: There is no abdominal tenderness. There is no rebound.   Musculoskeletal:         General: Normal range of motion.   Skin:     Findings: No rash.   Neurological:      Mental Status: She is alert and oriented to person, place, and time.   Psychiatric:         Behavior: Behavior normal.        Significant Labs: Troponin   Recent Labs   Lab 03/28/22  1502   TROPONINI 0.509*    and   Recent Lab Results         03/28/22  1502        Troponin I 0.509  Comment: The reference interval for Troponin I represents the 99th percentile   cutoff   for our facility and is consistent with 3rd generation assay   performance.                 Significant Imaging: EKG:

## 2022-03-28 NOTE — ASSESSMENT & PLAN NOTE
Place in Observation   Consult Cardiology   Continue ASA   Telemetry monitor  Check 2 D Echo   Hold B blocker due to bradycardia   Hold statin due to intolerance   Heparin drip

## 2022-03-28 NOTE — HPI
67 yo female, cardiology consult for NSTEMI  PMH HTN HLD PAD, CAD smoker. Statin intolerance  C/o dizziness and high BP today. 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 Transferred to ER at [facility]  Troponin 0.5  Ekg NSR TWI in I AVL and chronic  2017 echo showed EF 55% and LVH; MPI no ischemia  2018 mild PAD  Pt states that she had remote LHC at [facility] and nonobstructive CAD  Now paion free

## 2022-03-28 NOTE — PHARMACY MED REC
"Admission Medication History     The home medication history was taken by Jorge A Dominguez.    You may go to "Admission" then "Reconcile Home Medications" tabs to review and/or act upon these items.      The home medication list has been updated by the Pharmacy department.    Please read ALL comments highlighted in yellow.    Please address this information as you see fit.     Feel free to contact us if you have any questions or require assistance.      The medications listed below were removed from the home medication list. Please reorder if appropriate:  Patient reports no longer taking the following medication(s):   ALBUTEROL HFA 90 MCG/ACUTATION INHALER   ALBUTEROL-IPRATROPIUM 2.5 MG-0.5 MG/3 mL NEBULIZER SOLUTION   DICLOFENAC SODIUM 1 % TOPICAL GEL   MUPIROCIN CALCIUM 2 % TOPICAL CREAM   NEOMYCIN-POLYMIXIN-HYDROCORTISONE 3.5-10,000-1 MG/mL-UNIT/mL-% OTIC SUSPENSION   TRETINOIN 0.025 % TOPICAL CREAM    Medications listed below were obtained from: Patient/family, Analytic software- [employer] and Medical records  (Not in a hospital admission)      Potential issues to be addressed PRIOR TO DISCHARGE: NONE      Jorge A Dominguez, CPhT  Spectralqgt [phone]      Current Outpatient Medications on File Prior to Encounter   Medication Sig Dispense Refill Last Dose    amLODIPine (NORVASC) 10 MG tablet Take 1 tablet (10 mg total) by mouth once daily. 90 tablet 1 3/27/2022 at Unknown time    aspirin 81 MG Chew Take 1 tablet (81 mg total) by mouth once daily. 360 tablet 1 3/28/2022 at Unknown time    buPROPion (WELLBUTRIN XL) 150 MG TB24 tablet Take 1 tablet (150 mg total) by mouth once daily for 14 days, THEN 2 tablets (300 mg total) once daily. 166 tablet 0 3/27/2022 at Unknown time    cyanocobalamin (VITAMIN B-12) 1000 MCG tablet Take 100 mcg by mouth once daily.   3/27/2022 at Unknown time    fexofenadine HCl (ALLEGRA ORAL) Take 1 tablet by mouth once daily.   Past Month at Unknown time    fluticasone " propionate (FLONASE) 50 mcg/actuation nasal spray 2 sprays (100 mcg total) by Each Nostril route once daily. 16 g 12 3/28/2022 at Unknown time    gabapentin (NEURONTIN) 300 MG capsule Take 1 capsule (300 mg total) by mouth every evening for 7 days, THEN 2 capsules (600 mg total) every evening for 7 days, THEN 3 capsules (900 mg total) every evening for 16 days. 69 capsule 0 3/27/2022 at Unknown time    loratadine (CLARITIN) 10 mg tablet Take 10 mg by mouth daily as needed for Allergies.   3/27/2022 at Unknown time    metoprolol tartrate (LOPRESSOR) 50 MG tablet Take 1 tablet (50 mg total) by mouth Daily. 30 tablet 11 3/28/2022 at Unknown time    multivitamin with minerals tablet Take 1 tablet by mouth once daily.   3/27/2022 at Unknown time    pantoprazole (PROTONIX) 20 MG tablet Take 1 tablet (20 mg total) by mouth once daily. 90 tablet 1 3/27/2022 at Unknown time    tiZANidine (ZANAFLEX) 4 MG tablet TAKE 1 TABLET (4 MG TOTAL) BY MOUTH NIGHTLY AS NEEDED (MUSCLE RELAXANT) 90 tablet 1 3/27/2022 at Unknown time    valsartan-hydrochlorothiazide (DIOVAN-HCT) 160-25 mg per tablet Take 1 tablet by mouth once daily. 90 tablet 1 3/28/2022 at Unknown time    vitamin D (VITAMIN D3) 1000 units Tab Take 1,000 Units by mouth once daily.   3/27/2022 at Unknown time    [DISCONTINUED] albuterol (VENTOLIN HFA) 90 mcg/actuation inhaler Inhale 2 puffs into the lungs every 6 (six) hours as needed for Wheezing. Rescue 6.7 g 1     [DISCONTINUED] albuterol-ipratropium (DUO-NEB) 2.5 mg-0.5 mg/3 mL nebulizer solution Take 3 mLs by nebulization every 6 (six) hours as needed for Wheezing or Shortness of Breath. Rescue 30 mL 0 Unknown at Unknown time    [DISCONTINUED] diclofenac sodium (VOLTAREN) 1 % Gel Apply 2 g topically 4 (four) times daily as needed. For muscular pain 100 g 0     [DISCONTINUED] mupirocin calcium 2% (BACTROBAN) 2 % cream Apply topically 2 (two) times daily. 15 g 0     [DISCONTINUED]  neomycin-polymyxin-hydrocortisone (CORTISPORIN) 3.5-10,000-1 mg/mL-unit/mL-% otic suspension INSTILL 2 TO 4 DROPS INTO AFFECTED EAR 4 TIMES DAILY FOR 5 DAYS       [DISCONTINUED] tretinoin (RETIN-A) 0.025 % cream Apply pea-sized amount to entire face at bedtime.  If dryness, use every third night and increase as tolerated to every night. (Patient taking differently: Apply pea-sized amount to entire face at bedtime.  If dryness, use every third night and increase as tolerated to every night.) 20 g 6                              .

## 2022-03-28 NOTE — ASSESSMENT & PLAN NOTE
NSTEMI  CAD   Smoker  HTN    -start heparin gtt  -IVF 50 cc /hf for o/n  -NPO after mn. LHC in AM   -add ASA 81mg daily  -no BB due to bradycardia   -ok to continue ARB.   -Add amlodipine and d/c HCTZ  -trending troponin

## 2022-03-29 LAB
ALBUMIN SERPL BCP-MCNC: 3.3 G/DL (ref 3.5–5.2)
ALP SERPL-CCNC: 69 U/L (ref 55–135)
ALT SERPL W/O P-5'-P-CCNC: 45 U/L (ref 10–44)
ANION GAP SERPL CALC-SCNC: 12 MMOL/L (ref 8–16)
ANISOCYTOSIS BLD QL SMEAR: SLIGHT
APTT BLDCRRT: 139 SEC (ref 21–32)
APTT BLDCRRT: 35 SEC (ref 21–32)
APTT BLDCRRT: 37.8 SEC (ref 21–32)
APTT BLDCRRT: 40.5 SEC (ref 21–32)
APTT BLDCRRT: 54.1 SEC (ref 21–32)
AST SERPL-CCNC: 25 U/L (ref 10–40)
BASOPHILS # BLD AUTO: 0.04 K/UL (ref 0–0.2)
BASOPHILS NFR BLD: 0.6 % (ref 0–1.9)
BILIRUB SERPL-MCNC: 0.7 MG/DL (ref 0.1–1)
BUN SERPL-MCNC: 23 MG/DL (ref 8–23)
CALCIUM SERPL-MCNC: 9 MG/DL (ref 8.7–10.5)
CATH EF QUANTITATIVE: 55 %
CHLORIDE SERPL-SCNC: 106 MMOL/L (ref 95–110)
CHOLEST SERPL-MCNC: 239 MG/DL (ref 120–199)
CHOLEST/HDLC SERPL: 6 {RATIO} (ref 2–5)
CO2 SERPL-SCNC: 22 MMOL/L (ref 23–29)
CREAT SERPL-MCNC: 1.2 MG/DL (ref 0.5–1.4)
DIFFERENTIAL METHOD: ABNORMAL
EOSINOPHIL # BLD AUTO: 0.2 K/UL (ref 0–0.5)
EOSINOPHIL NFR BLD: 2.4 % (ref 0–8)
ERYTHROCYTE [DISTWIDTH] IN BLOOD BY AUTOMATED COUNT: 12.2 % (ref 11.5–14.5)
EST. GFR  (AFRICAN AMERICAN): 54 ML/MIN/1.73 M^2
EST. GFR  (NON AFRICAN AMERICAN): 47 ML/MIN/1.73 M^2
GLUCOSE SERPL-MCNC: 95 MG/DL (ref 70–110)
HCT VFR BLD AUTO: 34 % (ref 37–48.5)
HDLC SERPL-MCNC: 40 MG/DL (ref 40–75)
HDLC SERPL: 16.7 % (ref 20–50)
HGB BLD-MCNC: 11.8 G/DL (ref 12–16)
IMM GRANULOCYTES # BLD AUTO: 0.03 K/UL (ref 0–0.04)
IMM GRANULOCYTES NFR BLD AUTO: 0.5 % (ref 0–0.5)
LDLC SERPL CALC-MCNC: 159.2 MG/DL (ref 63–159)
LYMPHOCYTES # BLD AUTO: 2.9 K/UL (ref 1–4.8)
LYMPHOCYTES NFR BLD: 45.1 % (ref 18–48)
MCH RBC QN AUTO: 35.1 PG (ref 27–31)
MCHC RBC AUTO-ENTMCNC: 34.7 G/DL (ref 32–36)
MCV RBC AUTO: 101 FL (ref 82–98)
MONOCYTES # BLD AUTO: 0.5 K/UL (ref 0.3–1)
MONOCYTES NFR BLD: 7.5 % (ref 4–15)
NEUTROPHILS # BLD AUTO: 2.8 K/UL (ref 1.8–7.7)
NEUTROPHILS NFR BLD: 43.9 % (ref 38–73)
NONHDLC SERPL-MCNC: 199 MG/DL
NRBC BLD-RTO: 0 /100 WBC
PLATELET # BLD AUTO: 177 K/UL (ref 150–450)
PLATELET BLD QL SMEAR: ABNORMAL
PMV BLD AUTO: 10.3 FL (ref 9.2–12.9)
POC ACTIVATED CLOTTING TIME K: 237 SEC (ref 74–137)
POC ACTIVATED CLOTTING TIME K: 285 SEC (ref 74–137)
POCT GLUCOSE: 134 MG/DL (ref 70–110)
POTASSIUM SERPL-SCNC: 3.7 MMOL/L (ref 3.5–5.1)
PROT SERPL-MCNC: 6.2 G/DL (ref 6–8.4)
RBC # BLD AUTO: 3.36 M/UL (ref 4–5.4)
SAMPLE: ABNORMAL
SAMPLE: ABNORMAL
SODIUM SERPL-SCNC: 140 MMOL/L (ref 136–145)
TRIGL SERPL-MCNC: 199 MG/DL (ref 30–150)
TROPONIN I SERPL DL<=0.01 NG/ML-MCNC: 0.41 NG/ML (ref 0–0.03)
WBC # BLD AUTO: 6.38 K/UL (ref 3.9–12.7)

## 2022-03-29 PROCEDURE — 25000003 PHARM REV CODE 250: Performed by: EMERGENCY MEDICINE

## 2022-03-29 PROCEDURE — C9600 PERC DRUG-EL COR STENT SING: HCPCS | Mod: RC | Performed by: INTERNAL MEDICINE

## 2022-03-29 PROCEDURE — 85730 THROMBOPLASTIN TIME PARTIAL: CPT | Mod: 91 | Performed by: INTERNAL MEDICINE

## 2022-03-29 PROCEDURE — 36415 COLL VENOUS BLD VENIPUNCTURE: CPT | Performed by: INTERNAL MEDICINE

## 2022-03-29 PROCEDURE — 99152 MOD SED SAME PHYS/QHP 5/>YRS: CPT | Performed by: INTERNAL MEDICINE

## 2022-03-29 PROCEDURE — 21400001 HC TELEMETRY ROOM

## 2022-03-29 PROCEDURE — 93458 PR CATH PLACE/CORON ANGIO, IMG SUPER/INTERP,W LEFT HEART VENTRICULOGRAPHY: ICD-10-PCS | Mod: 26,59,51, | Performed by: INTERNAL MEDICINE

## 2022-03-29 PROCEDURE — 92978 PR IVUS, CORONARY, 1ST VESSEL: ICD-10-PCS | Mod: 26,RC,, | Performed by: INTERNAL MEDICINE

## 2022-03-29 PROCEDURE — 93010 ELECTROCARDIOGRAM REPORT: CPT | Mod: ,,, | Performed by: INTERNAL MEDICINE

## 2022-03-29 PROCEDURE — 85347 COAGULATION TIME ACTIVATED: CPT | Performed by: INTERNAL MEDICINE

## 2022-03-29 PROCEDURE — 92928 PR STENT: ICD-10-PCS | Mod: RC,,, | Performed by: INTERNAL MEDICINE

## 2022-03-29 PROCEDURE — C1874 STENT, COATED/COV W/DEL SYS: HCPCS | Performed by: INTERNAL MEDICINE

## 2022-03-29 PROCEDURE — 27201423 OPTIME MED/SURG SUP & DEVICES STERILE SUPPLY: Performed by: INTERNAL MEDICINE

## 2022-03-29 PROCEDURE — 80061 LIPID PANEL: CPT | Performed by: NURSE PRACTITIONER

## 2022-03-29 PROCEDURE — 85025 COMPLETE CBC W/AUTO DIFF WBC: CPT | Performed by: EMERGENCY MEDICINE

## 2022-03-29 PROCEDURE — 97110 THERAPEUTIC EXERCISES: CPT

## 2022-03-29 PROCEDURE — C1725 CATH, TRANSLUMIN NON-LASER: HCPCS | Performed by: INTERNAL MEDICINE

## 2022-03-29 PROCEDURE — C1894 INTRO/SHEATH, NON-LASER: HCPCS | Performed by: INTERNAL MEDICINE

## 2022-03-29 PROCEDURE — 85730 THROMBOPLASTIN TIME PARTIAL: CPT | Performed by: EMERGENCY MEDICINE

## 2022-03-29 PROCEDURE — 92978 ENDOLUMINL IVUS OCT C 1ST: CPT | Mod: RC | Performed by: INTERNAL MEDICINE

## 2022-03-29 PROCEDURE — 25000003 PHARM REV CODE 250: Performed by: NURSE PRACTITIONER

## 2022-03-29 PROCEDURE — 63600175 PHARM REV CODE 636 W HCPCS: Performed by: EMERGENCY MEDICINE

## 2022-03-29 PROCEDURE — C1769 GUIDE WIRE: HCPCS | Performed by: INTERNAL MEDICINE

## 2022-03-29 PROCEDURE — 63600175 PHARM REV CODE 636 W HCPCS: Performed by: INTERNAL MEDICINE

## 2022-03-29 PROCEDURE — C1887 CATHETER, GUIDING: HCPCS | Performed by: INTERNAL MEDICINE

## 2022-03-29 PROCEDURE — 97161 PT EVAL LOW COMPLEX 20 MIN: CPT

## 2022-03-29 PROCEDURE — 94761 N-INVAS EAR/PLS OXIMETRY MLT: CPT

## 2022-03-29 PROCEDURE — 84484 ASSAY OF TROPONIN QUANT: CPT | Performed by: NURSE PRACTITIONER

## 2022-03-29 PROCEDURE — 92928 PRQ TCAT PLMT NTRAC ST 1 LES: CPT | Mod: RC,,, | Performed by: INTERNAL MEDICINE

## 2022-03-29 PROCEDURE — 97530 THERAPEUTIC ACTIVITIES: CPT

## 2022-03-29 PROCEDURE — 25000003 PHARM REV CODE 250: Performed by: INTERNAL MEDICINE

## 2022-03-29 PROCEDURE — C1760 CLOSURE DEV, VASC: HCPCS | Performed by: INTERNAL MEDICINE

## 2022-03-29 PROCEDURE — 93458 L HRT ARTERY/VENTRICLE ANGIO: CPT | Mod: 59 | Performed by: INTERNAL MEDICINE

## 2022-03-29 PROCEDURE — 80053 COMPREHEN METABOLIC PANEL: CPT | Performed by: NURSE PRACTITIONER

## 2022-03-29 PROCEDURE — 92978 ENDOLUMINL IVUS OCT C 1ST: CPT | Mod: 26,RC,, | Performed by: INTERNAL MEDICINE

## 2022-03-29 PROCEDURE — 97165 OT EVAL LOW COMPLEX 30 MIN: CPT

## 2022-03-29 PROCEDURE — 99153 MOD SED SAME PHYS/QHP EA: CPT | Performed by: INTERNAL MEDICINE

## 2022-03-29 PROCEDURE — 93010 EKG 12-LEAD: ICD-10-PCS | Mod: ,,, | Performed by: INTERNAL MEDICINE

## 2022-03-29 PROCEDURE — 93458 L HRT ARTERY/VENTRICLE ANGIO: CPT | Mod: 26,59,51, | Performed by: INTERNAL MEDICINE

## 2022-03-29 PROCEDURE — 25500020 PHARM REV CODE 255: Performed by: INTERNAL MEDICINE

## 2022-03-29 PROCEDURE — 93005 ELECTROCARDIOGRAM TRACING: CPT

## 2022-03-29 PROCEDURE — 27800903 OPTIME MED/SURG SUP & DEVICES OTHER IMPLANTS: Performed by: INTERNAL MEDICINE

## 2022-03-29 DEVICE — ANGIO-SEAL VIP VASCULAR CLOSURE DEVICE
Type: IMPLANTABLE DEVICE | Site: CORONARY | Status: FUNCTIONAL
Brand: ANGIO-SEAL

## 2022-03-29 DEVICE — STENT RONYX35038UX RESOLUTE ONYX 3.50X38
Type: IMPLANTABLE DEVICE | Site: CORONARY | Status: FUNCTIONAL
Brand: RESOLUTE ONYX™

## 2022-03-29 RX ORDER — ONDANSETRON 8 MG/1
8 TABLET, ORALLY DISINTEGRATING ORAL EVERY 8 HOURS PRN
Status: DISCONTINUED | OUTPATIENT
Start: 2022-03-29 | End: 2022-03-29 | Stop reason: SDUPTHER

## 2022-03-29 RX ORDER — ACETAMINOPHEN 325 MG/1
650 TABLET ORAL EVERY 4 HOURS PRN
Status: DISCONTINUED | OUTPATIENT
Start: 2022-03-29 | End: 2022-03-29 | Stop reason: SDUPTHER

## 2022-03-29 RX ORDER — MIDAZOLAM HYDROCHLORIDE 1 MG/ML
INJECTION INTRAMUSCULAR; INTRAVENOUS
Status: DISCONTINUED | OUTPATIENT
Start: 2022-03-29 | End: 2022-03-29 | Stop reason: HOSPADM

## 2022-03-29 RX ORDER — FENTANYL CITRATE 50 UG/ML
INJECTION, SOLUTION INTRAMUSCULAR; INTRAVENOUS
Status: DISCONTINUED | OUTPATIENT
Start: 2022-03-29 | End: 2022-03-29 | Stop reason: HOSPADM

## 2022-03-29 RX ORDER — NITROGLYCERIN 5 MG/ML
INJECTION, SOLUTION INTRAVENOUS
Status: DISCONTINUED | OUTPATIENT
Start: 2022-03-29 | End: 2022-03-29 | Stop reason: HOSPADM

## 2022-03-29 RX ORDER — ASPIRIN 81 MG/1
81 TABLET ORAL DAILY
Status: DISCONTINUED | OUTPATIENT
Start: 2022-03-30 | End: 2022-03-29 | Stop reason: SDUPTHER

## 2022-03-29 RX ORDER — HEPARIN SODIUM 1000 [USP'U]/ML
INJECTION, SOLUTION INTRAVENOUS; SUBCUTANEOUS
Status: DISCONTINUED | OUTPATIENT
Start: 2022-03-29 | End: 2022-03-29 | Stop reason: HOSPADM

## 2022-03-29 RX ORDER — LIDOCAINE HYDROCHLORIDE 20 MG/ML
INJECTION, SOLUTION EPIDURAL; INFILTRATION; INTRACAUDAL; PERINEURAL
Status: DISCONTINUED | OUTPATIENT
Start: 2022-03-29 | End: 2022-03-29 | Stop reason: HOSPADM

## 2022-03-29 RX ORDER — SODIUM CHLORIDE 9 MG/ML
INJECTION, SOLUTION INTRAVENOUS
Status: DISCONTINUED | OUTPATIENT
Start: 2022-03-29 | End: 2022-03-30

## 2022-03-29 RX ADMIN — ASPIRIN 81 MG CHEWABLE TABLET 81 MG: 81 TABLET CHEWABLE at 08:03

## 2022-03-29 RX ADMIN — SODIUM CHLORIDE: 0.9 INJECTION, SOLUTION INTRAVENOUS at 12:03

## 2022-03-29 RX ADMIN — VALSARTAN 160 MG: 160 TABLET, FILM COATED ORAL at 08:03

## 2022-03-29 RX ADMIN — MUPIROCIN: 20 OINTMENT TOPICAL at 09:03

## 2022-03-29 RX ADMIN — HEPARIN SODIUM 14 UNITS/KG/HR: 1000 INJECTION, SOLUTION INTRAVENOUS; SUBCUTANEOUS at 02:03

## 2022-03-29 RX ADMIN — HEPARIN SODIUM 14 UNITS/KG/HR: 1000 INJECTION, SOLUTION INTRAVENOUS; SUBCUTANEOUS at 06:03

## 2022-03-29 NOTE — OP NOTE
INPATIENT Operative Note         SUMMARY     Surgery Date: 3/29/2022     Surgeon(s) and Role:     * Lion Awad MD - Primary    ASSISTANT:none    Pre-op Diagnosis:  NSTEMI (non-ST elevated myocardial infarction) [I21.4]      Post-op Diagnosis:  NSTEMI (non-ST elevated myocardial infarction) [I21.4]    Procedure(s) (LRB):  CATHETERIZATION, HEART, LEFT (Left)  PTCA, Single Vessel  IVUS, Coronary  Stent, Drug Eluting, Single Vessel, Coronary  Placement of Closure Device    COMPLICATION:none    Anesthesia: RN IV Sedation    Findings/Key Components:   prox lcx unable to cross   lad non obs disease.    rca subtotal with timi1 flow treated with stenting.   ivus guided stent expansion performed.     Estimated Blood Loss: < 50 ML.         SPECIMEN: NONE    Devices/Prostetics: resolute 3.8 x38.    PLAN:  Routine post stent care.

## 2022-03-29 NOTE — HOSPITAL COURSE
Patient is a 66 year old female who presented to [facility] Emergency Room from [facility] for further evaluation of chest pain. Troponin 0.509 upon arrival, she was started on heparin drip. Cardiology consulted, patient underwent Left Heart Cath by Dr Awad. Resulted showed  of the LXC, she received PCI to the proximal and mid RCA. As of 3/30/2022, patient feeling well, vital signs and labs stable. Case reviewed with Cardiology, ok to discharge for there standpoint, continue Marvin as outpatient. Patient was seen, examined and deemed suitable for discharge.

## 2022-03-29 NOTE — SUBJECTIVE & OBJECTIVE
Interval History: Patient feeling well, no complaint of chest pain. Plan Left Heart Cath today.     Review of Systems   Constitutional:  Negative for chills, fatigue and fever.   HENT:  Negative for congestion, rhinorrhea and sinus pressure.    Respiratory:  Negative for apnea, cough, choking, chest tightness, shortness of breath, wheezing and stridor.    Cardiovascular:  Negative for chest pain, palpitations and leg swelling.   Gastrointestinal:  Negative for abdominal distention, abdominal pain, diarrhea, nausea and vomiting.   Endocrine: Negative for cold intolerance and heat intolerance.   Genitourinary:  Negative for difficulty urinating and hematuria.   Musculoskeletal:  Negative for arthralgias and joint swelling.   Skin:  Negative for color change, pallor and rash.   Neurological:  Negative for dizziness, seizures, weakness, numbness and headaches.   Psychiatric/Behavioral:  Negative for agitation. The patient is not nervous/anxious.    Objective:     Vital Signs (Most Recent):  Temp: 98.7 °F (37.1 °C) (03/29/22 0733)  Pulse: (!) 57 (03/29/22 0905)  Resp: 16 (03/29/22 0733)  BP: 126/60 (03/29/22 0838)  SpO2: 95 % (03/29/22 0733)   Vital Signs (24h Range):  Temp:  [97.8 °F (36.6 °C)-98.7 °F (37.1 °C)] 98.7 °F (37.1 °C)  Pulse:  [48-57] 57  Resp:  [12-20] 16  SpO2:  [93 %-100 %] 95 %  BP: (111-180)/(55-81) 126/60     Weight: 72.5 kg (159 lb 13.3 oz)  Body mass index is 27.44 kg/m².  No intake or output data in the 24 hours ending 03/29/22 1102   Physical Exam  Vitals and nursing note reviewed.   Constitutional:       General: She is not in acute distress.     Appearance: She is not ill-appearing, toxic-appearing or diaphoretic.   Eyes:      General:         Right eye: No discharge.         Left eye: No discharge.   Cardiovascular:      Rate and Rhythm: Bradycardia present.   Pulmonary:      Effort: No respiratory distress.      Breath sounds: No stridor. No wheezing, rhonchi or rales.   Chest:      Chest wall:  No tenderness.   Abdominal:      General: There is no distension.      Palpations: There is no mass.      Tenderness: There is no abdominal tenderness. There is no right CVA tenderness, left CVA tenderness, guarding or rebound.      Hernia: No hernia is present.   Musculoskeletal:         General: No swelling, tenderness, deformity or signs of injury.      Right lower leg: No edema.      Left lower leg: No edema.   Skin:     Coloration: Skin is not jaundiced or pale.      Findings: No bruising, erythema or rash.   Neurological:      Cranial Nerves: No cranial nerve deficit.      Sensory: No sensory deficit.      Motor: No weakness.      Coordination: Coordination normal.      Gait: Gait normal.      Deep Tendon Reflexes: Reflexes normal.       Significant Labs: All pertinent labs within the past 24 hours have been reviewed.  CBC:   Recent Labs   Lab 03/29/22 0311   WBC 6.38   HGB 11.8*   HCT 34.0*        CMP:   Recent Labs   Lab 03/29/22 0311      K 3.7      CO2 22*   GLU 95   BUN 23   CREATININE 1.2   CALCIUM 9.0   PROT 6.2   ALBUMIN 3.3*   BILITOT 0.7   ALKPHOS 69   AST 25   ALT 45*   ANIONGAP 12   EGFRNONAA 47*     Cardiac Markers:   Recent Labs   Lab 03/28/22 2050   *     Troponin:   Recent Labs   Lab 03/28/22  1502 03/28/22 2050 03/29/22 0311   TROPONINI 0.509* 0.490* 0.411*       Significant Imaging:     Imaging Results              X-Ray Chest AP Portable (Final result)  Result time 03/28/22 17:31:06      Final result by Mariella Carreno MD (03/28/22 17:31:06)                   Impression:      No acute abnormality.      Electronically signed by: Wai Wolff  Date:    03/28/2022  Time:    17:31               Narrative:    EXAMINATION:  XR CHEST AP PORTABLE    CLINICAL HISTORY:  Chest pain;    TECHNIQUE:  Single frontal view of the chest was performed.    COMPARISON:  None    FINDINGS:  Supraclavicular surgical clips noted.The lungs are clear, with normal appearance of pulmonary  vasculature and no pleural effusion or pneumothorax.    The cardiac silhouette is normal in size. The hilar and mediastinal contours are unremarkable.    Bones are intact.

## 2022-03-29 NOTE — PLAN OF CARE
Pt remains free from falls/injuries this shift. Safety precautions maintained. No c/o pain. No s/s of acute distress noted. Heparin infusing per nomogram. IVF infusing per orders. Tele monitoring per orders. VSS. Will continue to monitor. Chart check completed.

## 2022-03-29 NOTE — PLAN OF CARE
[facility] - Med Surg  Initial Discharge Assessment       Primary Care Provider: Clayton Frankel MD    Admission Diagnosis: Elevated troponin [R77.8]  Chest pain [R07.9]  Chronic hypertension [I10]    Admission Date: 3/28/2022  Expected Discharge Date:     Discharge Barriers Identified: None    Payor: HU[employer] MANAGED MEDICARE / Plan: HUMANA TOTAL CARE ADVANTAGE / Product Type: Medicare Advantage /     Extended Emergency Contact Information  Primary Emergency Contact: Yakelin Ohara   [place]  Mobile Phone: [phone]  Relation: Daughter  Secondary Emergency Contact: Tobi Blanchard  Mobile Phone: [phone]  Relation: Grandchild    Discharge Plan A: Home with family  Discharge Plan B: Home with family      [facility] [place] - [address]  [address]  [place] [address]  Phone: [phone] Fax: [phone]    [facility] - [address] [address]  Phone: [phone] Fax: [phone]    [facility] - [place], [place] - [address] [facility]  [address] [facility] [address]  Phone: [phone] Fax: [phone]    [id number] [place], [place] - [address]  [address] [address] [facility] [address] [facility] [address]  Phone: [phone] Fax: [phone]      Initial Assessment (most recent)     Adult Discharge Assessment - 03/29/22 1008        Discharge Assessment    Assessment Type Discharge Planning Assessment     Confirmed/corrected address, phone number and insurance Yes     Confirmed Demographics Correct on Facesheet     Source of Information patient     Reason For Admission Chest Pain     Lives With sibling(s)     Facility Arrived From: Home     Do you expect to return to your current living situation? Yes     Do you have help at home or someone to help you manage your care at home? Yes     Who are your caregiver(s) and their phone  number(s)? Yakelin Ohara daughter [phone]     Prior to hospitilization cognitive status: Alert/Oriented     Current cognitive status: Alert/Oriented     Walking or Climbing Stairs Difficulty none     Dressing/Bathing Difficulty none     Equipment Currently Used at Home none     Readmission within 30 days? No     Patient currently being followed by outpatient case management? No     Do you currently have service(s) that help you manage your care at home? No     Do you take prescription medications? Yes     Do you have prescription coverage? Yes     Coverage [employer]     Do you have any problems affording any of your prescribed medications? No     Is the patient taking medications as prescribed? yes     Who is going to help you get home at discharge? Charissa, sister     How do you get to doctors appointments? car, drives self     Are you on dialysis? No     Do you take coumadin? No     Discharge Plan A Home with family     Discharge Plan B Home with family     DME Needed Upon Discharge  none     Discharge Plan discussed with: Patient     Discharge Barriers Identified None        Relationship/Environment    Name(s) of Who Lives With Patient Charissa (sister)               CM met with patient at the bedside to assess for discharge needs.  Patient and her sister, Charissa, live together.  Patient is independent and does not use any medical equipment and drives.  Patient has no anticipated discharge needs at this time.  CM provided a transitional care folder, information on advanced directives, information on pharmacy bedside delivery, and discharge planning begins on admission with contact information for any needs/questions.

## 2022-03-29 NOTE — PROGRESS NOTES
[facility] Medicine  Progress Note    Patient Name: Shirlene Olvera  MRN: [id number]  Patient Class: IP- Inpatient   Admission Date: 3/28/2022  Length of Stay: 1 days  Attending Physician: Durga Veras MD  Primary Care Provider: Clayton Frankel MD        Subjective:     Principal Problem:Chest pain        HPI:  Ms Olvera is a 66 year old female with PMHx of CAD, HTN, tobacco use and old MI who presented to [facility] from [facility] for evaluation of chest pain. Associated symptoms include shortness of breath, nausea, generalize weakness and fatigue. EKG: Sinus Bradycardia rate 48. Troponin 0.21 at [facility], however increase to 0.509 upon arrival to [facility] ED. All labs reviewed for [facility] stable. At [facility], patient was given 4, 81 mg ASA and a NTG patch.  Patient currently denies chest pain or shortness of breath. She is a full code, daughter Yakelin and son are surrogate decision makers. Patient is being placed in Observation under the care of Hospital Medicine.       Overview/Hospital Course:  Patient is a 66 year old female who presented to [facility] Emergency Room from [facility] for further evaluation of chest pain. Troponin 0.509 upon arrival, she was started on heparin drip. Cardiology consulted, plan for Left Heart Cath today by Dr Awad.       Interval History: Patient feeling well, no complaint of chest pain. Plan Left Heart Cath today.     Review of Systems   Constitutional:  Negative for chills, fatigue and fever.   HENT:  Negative for congestion, rhinorrhea and sinus pressure.    Respiratory:  Negative for apnea, cough, choking, chest tightness, shortness of breath, wheezing and stridor.    Cardiovascular:  Negative for chest pain, palpitations and leg swelling.   Gastrointestinal:  Negative for abdominal distention, abdominal pain, diarrhea, nausea and vomiting.   Endocrine:  Negative for cold intolerance and heat intolerance.   Genitourinary:  Negative for difficulty urinating and hematuria.   Musculoskeletal:  Negative for arthralgias and joint swelling.   Skin:  Negative for color change, pallor and rash.   Neurological:  Negative for dizziness, seizures, weakness, numbness and headaches.   Psychiatric/Behavioral:  Negative for agitation. The patient is not nervous/anxious.    Objective:     Vital Signs (Most Recent):  Temp: 98.7 °F (37.1 °C) (03/29/22 0733)  Pulse: (!) 57 (03/29/22 0905)  Resp: 16 (03/29/22 0733)  BP: 126/60 (03/29/22 0838)  SpO2: 95 % (03/29/22 0733)   Vital Signs (24h Range):  Temp:  [97.8 °F (36.6 °C)-98.7 °F (37.1 °C)] 98.7 °F (37.1 °C)  Pulse:  [48-57] 57  Resp:  [12-20] 16  SpO2:  [93 %-100 %] 95 %  BP: (111-180)/(55-81) 126/60     Weight: 72.5 kg (159 lb 13.3 oz)  Body mass index is 27.44 kg/m².  No intake or output data in the 24 hours ending 03/29/22 1102   Physical Exam  Vitals and nursing note reviewed.   Constitutional:       General: She is not in acute distress.     Appearance: She is not ill-appearing, toxic-appearing or diaphoretic.   Eyes:      General:         Right eye: No discharge.         Left eye: No discharge.   Cardiovascular:      Rate and Rhythm: Bradycardia present.   Pulmonary:      Effort: No respiratory distress.      Breath sounds: No stridor. No wheezing, rhonchi or rales.   Chest:      Chest wall: No tenderness.   Abdominal:      General: There is no distension.      Palpations: There is no mass.      Tenderness: There is no abdominal tenderness. There is no right CVA tenderness, left CVA tenderness, guarding or rebound.      Hernia: No hernia is present.   Musculoskeletal:         General: No swelling, tenderness, deformity or signs of injury.      Right lower leg: No edema.      Left lower leg: No edema.   Skin:     Coloration: Skin is not jaundiced or pale.      Findings: No bruising, erythema or rash.   Neurological:      Cranial  Nerves: No cranial nerve deficit.      Sensory: No sensory deficit.      Motor: No weakness.      Coordination: Coordination normal.      Gait: Gait normal.      Deep Tendon Reflexes: Reflexes normal.       Significant Labs: All pertinent labs within the past 24 hours have been reviewed.  CBC:   Recent Labs   Lab 03/29/22 0311   WBC 6.38   HGB 11.8*   HCT 34.0*        CMP:   Recent Labs   Lab 03/29/22 0311      K 3.7      CO2 22*   GLU 95   BUN 23   CREATININE 1.2   CALCIUM 9.0   PROT 6.2   ALBUMIN 3.3*   BILITOT 0.7   ALKPHOS 69   AST 25   ALT 45*   ANIONGAP 12   EGFRNONAA 47*     Cardiac Markers:   Recent Labs   Lab 03/28/22 2050   *     Troponin:   Recent Labs   Lab 03/28/22  1502 03/28/22 2050 03/29/22 0311   TROPONINI 0.509* 0.490* 0.411*       Significant Imaging:     Imaging Results              X-Ray Chest AP Portable (Final result)  Result time 03/28/22 17:31:06      Final result by Mariella Carreno MD (03/28/22 17:31:06)                   Impression:      No acute abnormality.      Electronically signed by: Wai Wolff  Date:    03/28/2022  Time:    17:31               Narrative:    EXAMINATION:  XR CHEST AP PORTABLE    CLINICAL HISTORY:  Chest pain;    TECHNIQUE:  Single frontal view of the chest was performed.    COMPARISON:  None    FINDINGS:  Supraclavicular surgical clips noted.The lungs are clear, with normal appearance of pulmonary vasculature and no pleural effusion or pneumothorax.    The cardiac silhouette is normal in size. The hilar and mediastinal contours are unremarkable.    Bones are intact.                                         Assessment/Plan:      * Chest pain  Place in Observation   Consult Cardiology   Continue ASA   Telemetry monitor  Check 2 D Echo   Hold B blocker due to bradycardia   Hold statin due to intolerance   Heparin drip       3/29/2022  Admit to Hospital Medicine   Continue ASA   Continue Heparin drip   Cardiology following    Plan Left  Heart Cath today            History of non-ST elevation myocardial infarction (NSTEMI)  As above       Bradycardia  Hold B blocker   Telemetry monitor       Elevated troponin  Consult Cardiology  Troponin 0.509  Heparin drip      3/29  Troponin 0.509>0.490>0.411  Cardiology following   Heparin drip   Plan [facility] today       CKD (chronic kidney disease) stage 3, GFR 30-59 ml/min  Renal function around baseline       Tobacco abuse  Continue smoking cessation       Hyperlipidemia  Pt reports statin allergy   Check Lipid panel in AM         Essential hypertension  Continue Divan-HCT   Hold B blocker due to bradycardia   Restart amlodipine as tolerated         VTE Risk Mitigation (From admission, onward)         Ordered     heparin 25,000 units in dextrose 5% (100 units/ml) IV bolus from bag - ADDITIONAL PRN BOLUS - 60 units/kg (max bolus 4000 units)  As needed (PRN)        Question:  Heparin Infusion Adjustment (DO NOT MODIFY ANSWER)  Answer:  \\[email or web address]\epic\Images\Pharmacy\HeparinInfusions\heparin LOW INTENSITY nomogram for [facility] [id number].pdf    03/28/22 1557     heparin 25,000 units in dextrose 5% (100 units/ml) IV bolus from bag - ADDITIONAL PRN BOLUS - 30 units/kg (max bolus 4000 units)  As needed (PRN)        Question:  Heparin Infusion Adjustment (DO NOT MODIFY ANSWER)  Answer:  \"[email or web address]\epic\Images\Pharmacy\HeparinInfusions\heparin LOW INTENSITY nomogram for [facility] [id number].pdf    03/28/22 1557     Reason for No Pharmacological VTE Prophylaxis  Once        Question:  Reasons:  Answer:  IV Heparin w/in 24 hrs. Pre or Post-Op    03/28/22 1649     IP VTE HIGH RISK PATIENT  Once         03/28/22 1649     Place sequential compression device  Until discontinued         03/28/22 1649     heparin 25,000 units in dextrose 5% 250 mL (100 units/mL) infusion LOW INTENSITY nomogram - [facility]  Continuous        Question Answer Comment   Heparin Infusion Adjustment (DO NOT MODIFY ANSWER)  \\[email or web address]\epic\Images\Pharmacy\HeparinInfusions\heparin LOW INTENSITY nomogram for [facility] [id number].pdf    Begin at (in units/kg/hr) 12        03/28/22 1557                Discharge Planning   BECCA:      Code Status: Full Code   Is the patient medically ready for discharge?:     Reason for patient still in hospital (select all that apply): Patient trending condition and Treatment  Discharge Plan A: Home with family                  Chris Esparza NP  Department of Hospital Medicine   [facility] - Med Surg

## 2022-03-29 NOTE — ASSESSMENT & PLAN NOTE
Consult Cardiology  Troponin 0.509  Heparin drip      3/29  Troponin 0.509>0.490>0.411  Cardiology following   Heparin drip   Plan [facility] today

## 2022-03-29 NOTE — ASSESSMENT & PLAN NOTE
Place in Observation   Consult Cardiology   Continue ASA   Telemetry monitor  Check 2 D Echo   Hold B blocker due to bradycardia   Hold statin due to intolerance   Heparin drip       3/29/2022  Admit to Hospital Medicine   Continue ASA   Continue Heparin drip   Cardiology following    Plan Left Heart Cath today

## 2022-03-29 NOTE — PLAN OF CARE
Patient remains free of injury. Transported from ER via stretcher, patient's family at bedside. Safety measures in place. Room air. Heparin infusing at 12 units/kg, next APTT due at 2300. Monitoring continued per nursing rounds.

## 2022-03-29 NOTE — INTERVAL H&P NOTE
The patient has been examined and the H&P has been reviewed:    I concur with the findings and no changes have occurred since H&P was written.    Procedure risks, benefits and alternative options discussed and understood by patient/family.          Active Hospital Problems    Diagnosis  POA    *Chest pain [R07.9]  Yes    Elevated troponin [R77.8]  Yes    Bradycardia [R00.1]  Yes    CKD (chronic kidney disease) stage 3, GFR 30-59 ml/min [N18.30]  Yes     GFR of 47.2 on 10/2020        History of non-ST elevation myocardial infarction (NSTEMI) [I25.2]  Not Applicable    Tobacco abuse [Z72.0]  Yes     Chronic    Hyperlipidemia [E78.5]  Yes     Chronic     Cont asa, repatha        Essential hypertension [I10]  Yes     Chronic     Chronic, Stable, cont toprol, diovan-hctz          Resolved Hospital Problems   No resolved problems to display.

## 2022-03-30 ENCOUNTER — TELEPHONE (OUTPATIENT)
Dept: CARDIOLOGY | Facility: CLINIC | Age: 67
End: 2022-03-30
Payer: MEDICARE

## 2022-03-30 ENCOUNTER — TELEPHONE (OUTPATIENT)
Dept: CARDIOLOGY | Facility: HOSPITAL | Age: 67
End: 2022-03-30
Payer: MEDICARE

## 2022-03-30 VITALS
SYSTOLIC BLOOD PRESSURE: 130 MMHG | WEIGHT: 166.25 LBS | DIASTOLIC BLOOD PRESSURE: 62 MMHG | HEART RATE: 73 BPM | OXYGEN SATURATION: 97 % | TEMPERATURE: 98 F | BODY MASS INDEX: 28.38 KG/M2 | RESPIRATION RATE: 18 BRPM | HEIGHT: 64 IN

## 2022-03-30 PROBLEM — R00.1 BRADYCARDIA: Status: RESOLVED | Noted: 2022-03-28 | Resolved: 2022-03-30

## 2022-03-30 PROBLEM — R79.89 ELEVATED TROPONIN: Status: RESOLVED | Noted: 2022-03-28 | Resolved: 2022-03-30

## 2022-03-30 LAB
ANION GAP SERPL CALC-SCNC: 14 MMOL/L (ref 8–16)
BASOPHILS # BLD AUTO: 0.02 K/UL (ref 0–0.2)
BASOPHILS NFR BLD: 0.3 % (ref 0–1.9)
BUN SERPL-MCNC: 16 MG/DL (ref 8–23)
CALCIUM SERPL-MCNC: 8.9 MG/DL (ref 8.7–10.5)
CHLORIDE SERPL-SCNC: 104 MMOL/L (ref 95–110)
CO2 SERPL-SCNC: 19 MMOL/L (ref 23–29)
CREAT SERPL-MCNC: 1.1 MG/DL (ref 0.5–1.4)
DIFFERENTIAL METHOD: ABNORMAL
EOSINOPHIL # BLD AUTO: 0.1 K/UL (ref 0–0.5)
EOSINOPHIL NFR BLD: 0.7 % (ref 0–8)
ERYTHROCYTE [DISTWIDTH] IN BLOOD BY AUTOMATED COUNT: 11.9 % (ref 11.5–14.5)
EST. GFR  (AFRICAN AMERICAN): >60 ML/MIN/1.73 M^2
EST. GFR  (NON AFRICAN AMERICAN): 52 ML/MIN/1.73 M^2
GLUCOSE SERPL-MCNC: 90 MG/DL (ref 70–110)
HCT VFR BLD AUTO: 35.3 % (ref 37–48.5)
HGB BLD-MCNC: 12.4 G/DL (ref 12–16)
IMM GRANULOCYTES # BLD AUTO: 0.04 K/UL (ref 0–0.04)
IMM GRANULOCYTES NFR BLD AUTO: 0.5 % (ref 0–0.5)
LYMPHOCYTES # BLD AUTO: 1.2 K/UL (ref 1–4.8)
LYMPHOCYTES NFR BLD: 16.2 % (ref 18–48)
MAGNESIUM SERPL-MCNC: 1.6 MG/DL (ref 1.6–2.6)
MCH RBC QN AUTO: 34.9 PG (ref 27–31)
MCHC RBC AUTO-ENTMCNC: 35.1 G/DL (ref 32–36)
MCV RBC AUTO: 99 FL (ref 82–98)
MONOCYTES # BLD AUTO: 0.7 K/UL (ref 0.3–1)
MONOCYTES NFR BLD: 9.7 % (ref 4–15)
NEUTROPHILS # BLD AUTO: 5.4 K/UL (ref 1.8–7.7)
NEUTROPHILS NFR BLD: 72.6 % (ref 38–73)
NRBC BLD-RTO: 0 /100 WBC
PHOSPHATE SERPL-MCNC: 3.6 MG/DL (ref 2.7–4.5)
PLATELET # BLD AUTO: 182 K/UL (ref 150–450)
PMV BLD AUTO: 9.8 FL (ref 9.2–12.9)
POCT GLUCOSE: 88 MG/DL (ref 70–110)
POTASSIUM SERPL-SCNC: 3.7 MMOL/L (ref 3.5–5.1)
RBC # BLD AUTO: 3.55 M/UL (ref 4–5.4)
SODIUM SERPL-SCNC: 137 MMOL/L (ref 136–145)
WBC # BLD AUTO: 7.49 K/UL (ref 3.9–12.7)

## 2022-03-30 PROCEDURE — 25000003 PHARM REV CODE 250: Performed by: INTERNAL MEDICINE

## 2022-03-30 PROCEDURE — 84100 ASSAY OF PHOSPHORUS: CPT | Performed by: INTERNAL MEDICINE

## 2022-03-30 PROCEDURE — 83735 ASSAY OF MAGNESIUM: CPT | Performed by: INTERNAL MEDICINE

## 2022-03-30 PROCEDURE — 99232 SBSQ HOSP IP/OBS MODERATE 35: CPT | Mod: ,,, | Performed by: PHYSICIAN ASSISTANT

## 2022-03-30 PROCEDURE — 85025 COMPLETE CBC W/AUTO DIFF WBC: CPT | Performed by: INTERNAL MEDICINE

## 2022-03-30 PROCEDURE — 99232 PR SUBSEQUENT HOSPITAL CARE,LEVL II: ICD-10-PCS | Mod: ,,, | Performed by: PHYSICIAN ASSISTANT

## 2022-03-30 PROCEDURE — 36415 COLL VENOUS BLD VENIPUNCTURE: CPT | Performed by: INTERNAL MEDICINE

## 2022-03-30 PROCEDURE — 80048 BASIC METABOLIC PNL TOTAL CA: CPT | Performed by: INTERNAL MEDICINE

## 2022-03-30 PROCEDURE — 25000003 PHARM REV CODE 250: Performed by: NURSE PRACTITIONER

## 2022-03-30 RX ORDER — AMLODIPINE BESYLATE 10 MG/1
5 TABLET ORAL DAILY
Qty: 90 TABLET | Refills: 1 | Status: SHIPPED | OUTPATIENT
Start: 2022-03-30 | End: 2022-06-02

## 2022-03-30 RX ORDER — SODIUM CHLORIDE 9 MG/ML
INJECTION, SOLUTION INTRAVENOUS CONTINUOUS
Status: DISCONTINUED | OUTPATIENT
Start: 2022-03-30 | End: 2022-03-30 | Stop reason: HOSPADM

## 2022-03-30 RX ORDER — VALSARTAN 160 MG/1
160 TABLET ORAL DAILY
Qty: 30 TABLET | Refills: 0 | Status: SHIPPED | OUTPATIENT
Start: 2022-03-31 | End: 2022-04-26 | Stop reason: SDUPTHER

## 2022-03-30 RX ADMIN — TICAGRELOR 90 MG: 90 TABLET ORAL at 09:03

## 2022-03-30 RX ADMIN — SODIUM CHLORIDE: 0.9 INJECTION, SOLUTION INTRAVENOUS at 03:03

## 2022-03-30 RX ADMIN — VALSARTAN 160 MG: 160 TABLET, FILM COATED ORAL at 09:03

## 2022-03-30 RX ADMIN — AMLODIPINE BESYLATE 5 MG: 5 TABLET ORAL at 09:03

## 2022-03-30 RX ADMIN — ASPIRIN 81 MG CHEWABLE TABLET 81 MG: 81 TABLET CHEWABLE at 09:03

## 2022-03-30 NOTE — DISCHARGE INSTRUCTIONS
"Post-op Heart Catheterization    1. DIET: It is advisable for you to follow a diet that limits the intake of salt, sugar, saturated fats and cholesterol.     2. FOR THE NEXT 24 HOURS:   For the next 8 hours, you should be watched by a responsible adult. This person should make sure your condition is not getting worse.   Don't drink any alcohol for the next 24 hours.  Don't drive, operate dangerous machinery, or make important business or personal decisions during the next 24 hours.  Your healthcare provider may tell you not to take any medicine by mouth for pain or sleep in the next 4 hours. These medicines may react with the medicines you were given in the hospital. This could cause a much stronger response than usual.       3. ACTIVITY:                                Day of discharge:             NO vigorous activity, lifting or straining                                                   Day after discharge:         Avoid heavy lifting (up to 10 lbs)                                                                        The day after discharge you may shower, but avoid tub baths for 5 days                                                                         Wash site gently with soap and water        NO powder or lotion to your procedure site.                 Before you shower, remove dressing, apply bandaid if desired                                                                                                                                                                            2nd day after discharge:  Resume normal activities                                                                         Exercise program as instructed      4. WOUND CARE: It is not unusual to have a small amount of bruising appear in the groin area. It is also common to have a tender "knot" develop beneath the skin at the puncture site in the groin. This is scar tissue only and is not a cause for concern or alarm. This tender " "knot may take several weeks to fully resolve. The bruise will usually spread over several days. If the lump gets bigger, call you doctor immediately.    5. DISCOMFORT: For general discomfort at the puncture site, you may take 1 or 2 Acetaminophen (Tylenol) tablets every 4 hours as needed. (Do not take more than 4000 mg a day)                 6. CALL YOUR HEALTHCARE PROVIDER IF YOU START TO HAVE THE FOLLOWING SYMPTOMS:        1. Problems with the affected leg: Pain, discomfort, loss of warmth, numbness or tingling                                                                                                                            2. Problems at the groin site: Bleeding, pain that is sudden/sharp/persistent,                   swelling at site or a change in "lump" size, increased redness or drainage at                     puncture site                                                               3. High fever (101 degrees or higher)       4.  Drowsiness that doesn't get better       5. Weakness or dizziness that doesn't get better            6. Repeated vomiting        7. GO TO  THE EMERGENCY ROOM OR CALL 911 IF YOU HAVE: Chest pains or discomforts not relieved with 3 nitroglycerin doses (sublingual tablets or spray), numbness or severe pain or if your foot or leg becomes cold or discolored or uncontrolled bleeding from site (apply direct pressure above site). Post-op Heart Catheterization    "

## 2022-03-30 NOTE — PT/OT/SLP EVAL
Physical Therapy Evaluation and Discharge Note    Patient Name:  Shirlene Olvera   MRN:  [id number]    Recommendations:     Discharge Recommendations:  home   Discharge Equipment Recommendations: none   Barriers to discharge: None    Assessment:     Shirlene Olvera is a 66 y.o. female admitted with a medical diagnosis of History of non-ST elevation myocardial infarction (NSTEMI). .  At this time, patient is functioning at their prior level of function and does not require further acute PT services.     Recent Surgery: Procedure(s) (LRB):  CATHETERIZATION, HEART, LEFT (Left)  PTCA, Single Vessel  IVUS, Coronary  Stent, Drug Eluting, Single Vessel, Coronary  Placement of Closure Device 1 Day Post-Op    Plan:     During this hospitalization, patient does not require further acute PT services.  Please re-consult if situation changes.      Subjective     Chief Complaint: NONE   Patient/Family Comments/goals: GO HOME   Pain/Comfort:  ·      Patients cultural, spiritual, Restoration conflicts given the current situation:      Living Environment:   PT LIVES AT HOME WITH SISTER IN A ONE STORY HOME WITH 4 STEPS AND RAILING   Prior to admission, patients level of function was IND AND DRIVES.  Equipment used at home: none.  DME owned (not currently used): none.  Upon discharge, patient will have assistance from FAMILY .    Objective:     Communicated with NURSE AND Epic CHART REVIEW prior to session.  Patient found supine with peripheral IV upon PT entry to room.    General Precautions: Standard,     Orthopedic Precautions:N/A   Braces:     Respiratory Status: Room air    Exams:  · RLE ROM: WNL  · RLE Strength: WNL  · LLE ROM: WNL  · LLE Strength: WNL    Functional Mobility:  · Bed Mobility:     · Supine to Sit: independence  · Sit to Supine: independence  · Transfers:     · Sit to Stand:  independence with no AD  · Bed to Chair: independence with  no AD  using  Stand Pivot  · Gait: IND COMMUNITY DISTANCES    AM-PAC 6  CLICK MOBILITY  Total Score:      Patient left supine with all lines intact and call button in reach.    GOALS:   Multidisciplinary Problems     Physical Therapy Goals     Not on file                History:     Past Medical History:   Diagnosis Date    Chest pain 5/14/2015    Colon polyp     Coronary artery disease     pt states MI June 2015    Hypertension     Myocardial infarction     PAD (peripheral artery disease)     Tobacco use        Past Surgical History:   Procedure Laterality Date    CHOLECYSTECTOMY  09/03/2015    COLONOSCOPY N/A 10/11/2016    Procedure: COLONOSCOPY;  Surgeon: Haseeb Spears MD;  Location: [facility];  Service: Endoscopy;  Laterality: N/A;    COLONOSCOPY N/A 12/9/2021    Procedure: COLONOSCOPY;  Surgeon: Pat Rios MD;  Location: [facility];  Service: Endoscopy;  Laterality: N/A;    ESOPHAGOGASTRODUODENOSCOPY N/A 12/9/2021    Procedure: EGD (ESOPHAGOGASTRODUODENOSCOPY);  Surgeon: Pat Rios MD;  Location: [facility];  Service: Endoscopy;  Laterality: N/A;    high blood      HYSTERECTOMY      INJECTION OF ANESTHETIC AGENT AROUND MEDIAL BRANCH NERVES INNERVATING CERVICAL FACET JOINT Bilateral 10/15/2021    Procedure: Bilateral C5-7 MBB with RN IV sedation PATIENT WOULD LIKE AFTERNOON ARRIVAL, IF POSSIBLE;  Surgeon: Nano Izquierdo MD;  Location: [facility] PAIN MGT;  Service: Pain Management;  Laterality: Bilateral;    INJECTION OF ANESTHETIC AGENT AROUND MEDIAL BRANCH NERVES INNERVATING CERVICAL FACET JOINT Bilateral 3/21/2022    Procedure: Bilateral C4-6 MBB with RN IV sedation;  Surgeon: Nano Izquierdo MD;  Location: [facility] PAIN [facility];  Service: Pain Management;  Laterality: Bilateral;    INJECTION OF ANESTHETIC AGENT INTO SACROILIAC JOINT Right 12/17/2021    Procedure: Right SIJ Injection;  Surgeon: Nano Izquierdo MD;  Location: [facility]IN [facility];  Service: Pain Management;  Laterality: Right;    LEFT HEART CATHETERIZATION Left 3/29/2022    Procedure:  CATHETERIZATION, HEART, LEFT;  Surgeon: Lion Awad MD;  Location: [facility] CATH LAB;  Service: Cardiology;  Laterality: Left;    OOPHORECTOMY      RIB FRACTURE SURGERY         Time Tracking:     PT Received On: 03/29/22  PT Start Time: 1020     PT Stop Time: 1040  PT Total Time (min): 20 min     Billable Minutes: Evaluation 20 03/30/2022

## 2022-03-30 NOTE — TELEPHONE ENCOUNTER
Followed up with Shirlene, made an appointment for patient. Patient verbalized understanding of appointment date and time.

## 2022-03-30 NOTE — PLAN OF CARE
Patient remains free of falls and injuries during shift. Complains of soreness on right side from heart cath. Gauze and tegaderm clean, dry, and intact. IV fluids running. Telemonitoring in place. Will continue to monitor.

## 2022-03-30 NOTE — PROGRESS NOTES
[facility] - Med Surg  Cardiology  Progress Note    Patient Name: Shirlene Olvera  MRN: [id number]  Admission Date: 3/28/2022  Hospital Length of Stay: 2 days  Code Status: Prior   Attending Physician: No att. providers found   Primary Care Physician: Clayton Frankel MD  Expected Discharge Date: 3/30/2022  Principal Problem:History of non-ST elevation myocardial infarction (NSTEMI)    Subjective:   HPI:  67 yo female, cardiology consult for NSTEMI  PMH HTN HLD PAD, CAD smoker. Statin intolerance  C/o dizziness and high BP today. 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 [facility]  Troponin 0.5  Ekg NSR TWI in I AVL and chronic  2017 echo showed EF 55% and LVH; MPI no ischemia  2018 mild PAD  Pt states that she had remote LHC at [facility] and nonobstructive CAD  Now [facility] Course:   3/30/22-Patient seen and examined today, resting in bed, s/p LHC yesterday with successful PCI of RCA. Feels well. No recurrent chest pain. No SOB. NO other CV complaints. Labs stable.         Review of Systems   Constitutional: Negative.   HENT: Negative.     Eyes: Negative.    Cardiovascular: Negative.    Respiratory: Negative.     Endocrine: Negative.    Skin: Negative.    Musculoskeletal: Negative.    Gastrointestinal: Negative.    Genitourinary: Negative.    Neurological: Negative.    Psychiatric/Behavioral: Negative.     Allergic/Immunologic: Negative.    Objective:     Vital Signs (Most Recent):  Temp: 98.2 °F (36.8 °C) (03/30/22 0714)  Pulse: 73 (03/30/22 1105)  Resp: 18 (03/30/22 0714)  BP: 130/62 (03/30/22 0714)  SpO2: 97 % (03/30/22 0714) Vital Signs (24h Range):  Temp:  [97.8 °F (36.6 °C)-98.5 °F (36.9 °C)] 98.2 °F (36.8 °C)  Pulse:  [55-77] 73  Resp:  [14-18] 18  SpO2:  [94 %-98 %] 97 %  BP: (114-137)/(53-62) 130/62     Weight: 75.4 kg (166 lb 3.6 oz)  Body mass index is 28.53  kg/m².     SpO2: 97 %  O2 Device (Oxygen Therapy): room air      Intake/Output Summary (Last 24 hours) at 3/30/2022 1441  Last data filed at 3/30/2022 1047  Gross per 24 hour   Intake 1823.07 ml   Output --   Net 1823.07 ml       Lines/Drains/Airways       None                   Physical Exam  Vitals and nursing note reviewed.   Constitutional:       General: She is not in acute distress.     Appearance: She is well-developed. She is not diaphoretic.   HENT:      Head: Normocephalic and atraumatic.   Eyes:      General:         Right eye: No discharge.         Left eye: No discharge.      Pupils: Pupils are equal, round, and reactive to light.   Neck:      Thyroid: No thyromegaly.      Vascular: No JVD.      Trachea: No tracheal deviation.   Cardiovascular:      Rate and Rhythm: Normal rate and regular rhythm.      Chest Wall: PMI is not displaced.      Pulses: Intact distal pulses.      Heart sounds: Normal heart sounds. No murmur heard.    No friction rub. No gallop. No S3 or S4 sounds.   Pulmonary:      Effort: Pulmonary effort is normal. No respiratory distress.      Breath sounds: Normal breath sounds. No wheezing or rales.   Abdominal:      General: There is no distension.      Tenderness: There is no abdominal tenderness. There is no rebound.   Musculoskeletal:      Cervical back: Neck supple.   Skin:     General: Skin is warm and dry.      Findings: No erythema.      Comments: Groin access site C/D/I; no bleeding erythema or drainage, intact pulse   Neurological:      Mental Status: She is alert and oriented to person, place, and time.   Psychiatric:         Behavior: Behavior normal.       Significant Labs: CMP   Recent Labs   Lab 03/29/22  0311 03/30/22  0542    137   K 3.7 3.7    104   CO2 22* 19*   GLU 95 90   BUN 23 16   CREATININE 1.2 1.1   CALCIUM 9.0 8.9   PROT 6.2  --    ALBUMIN 3.3*  --    BILITOT 0.7  --    ALKPHOS 69  --    AST 25  --    ALT 45*  --    ANIONGAP 12 14   ESTGFRAFRICA  54* >60   EGFRNONAA 47* 52*   , CBC   Recent Labs   Lab 03/29/22  0311 03/30/22  0542   WBC 6.38 7.49   HGB 11.8* 12.4   HCT 34.0* 35.3*    182   , Troponin   Recent Labs   Lab 03/28/22  1502 03/28/22  2050 03/29/22  0311   TROPONINI 0.509* 0.490* 0.411*   , and All pertinent lab results from the last 24 hours have been reviewed.    Significant Imaging: Echocardiogram: Transthoracic echo (TTE) complete (Cupid Only):   Results for orders placed or performed during the hospital encounter of 03/28/22   Echo   Result Value Ref Range    BSA 1.81 m2    TDI SEPTAL 0.08 m/s    LV LATERAL E/E' RATIO 10.50 m/s    LV SEPTAL E/E' RATIO 13.13 m/s    LA WIDTH 3.50 cm    IVC diameter 1.7 cm    Left Ventricular Outflow Tract Mean Velocity 0.9284589889 cm/s    Left Ventricular Outflow Tract Mean Gradient 3.52 mmHg    TV mean gradient 20 mmHg    TDI LATERAL 0.10 m/s    LVIDd 3.78 3.5 - 6.0 cm    IVS 1.34 (A) 0.6 - 1.1 cm    Posterior Wall 1.27 (A) 0.6 - 1.1 cm    Ao root annulus 2.68 cm    LVIDs 2.42 2.1 - 4.0 cm    FS 36 28 - 44 %    LA volume 40.99 cm3    Ascending aorta 2.52 cm    LV mass 172.75 g    LA size 2.87 cm    TAPSE 2.10 cm    Left Ventricle Relative Wall Thickness 0.67 cm    AV mean gradient 4 mmHg    AV valve area 3.49 cm2    AV Velocity Ratio 0.93     AV index (prosthetic) 1.16     MV valve area p 1/2 method 2.84 cm2    E/A ratio 1.19     Mean e' 0.09 m/s    E wave deceleration time 266.743871542855559 msec    IVRT 65.997649195187461 msec    LVOT diameter 1.96 cm    LVOT area 3.0 cm2    LVOT peak srikanth 1.24 m/s    LVOT peak VTI 34.10 cm    Ao peak srikanth 1.34 m/s    Ao VTI 29.5 cm    RVOT peak srikanth 0.81 m/s    RVOT peak VTI 18.8 cm    LVOT stroke volume 102.83 cm3    AV peak gradient 7 mmHg    PV mean gradient 1.81 mmHg    E/E' ratio 11.67 m/s    MV Peak E Srikanth 1.05 m/s    TR Max Srikanth 2.39 m/s    MV stenosis pressure 1/2 time 77.656910034923443 ms    MV Peak A Srikanth 0.88 m/s    LV Systolic Volume 20.67 mL    LV Systolic  Volume Index 11.6 mL/m2    LV Diastolic Volume 61.07 mL    LV Diastolic Volume Index 34.31 mL/m2    LA Volume Index 23.0 mL/m2    LV Mass Index 97 g/m2    Echo EF Estimated 66 %    RA Major Axis 3.73 cm    Left Atrium Minor Axis 4.48 cm    Left Atrium Major Axis 5.17 cm    Triscuspid Valve Regurgitation Peak Gradient 23 mmHg    Right Atrial Pressure (from IVC) 3 mmHg    EF 55 %    TV rest pulmonary artery pressure 26 mmHg    Narrative    · Concentric hypertrophy and normal systolic function.  · The estimated ejection fraction is 55%.  · Normal left ventricular diastolic function.  · There are segmental left ventricular wall motion abnormalities.  · With normal right ventricular systolic function.  · Normal central venous pressure (3 mmHg).  · The estimated PA systolic pressure is 26 mmHg.      , EKG: REviewed, and X-Ray: CXR: X-Ray Chest 1 View (CXR): No results found for this visit on 03/28/22. and X-Ray Chest PA and Lateral (CXR): No results found for this visit on 03/28/22.    Assessment and Plan:   Patient who presents with chest pain/NSTEMI s/p LHC with successful PCI of RCA. Stable continue same mgmt. Follow-up in clinic.     * History of non-ST elevation myocardial infarction (NSTEMI)  NSTEMI  CAD   Smoker  HTN    -start heparin gtt  -IVF 50 cc /hf for o/n  -NPO after mn. LHC in AM   -add ASA 81mg daily  -no BB due to bradycardia   -ok to continue ARB.   -Add amlodipine and d/c HCTZ  -trending troponin    3/30/2  -s/p LHC with successful PCI of RCA  -Continue ASA, Brilinta, amlodipine, ARB  -No BB given bradycardia  -Counseled on post cath restrictions and importance of DAPT      Tobacco abuse  smoking cessation    Hyperlipidemia  -Statin intolerance  -Consider Repatha as OP    Essential hypertension  Continue ARB and AMlodipine  DASH        VTE Risk Mitigation (From admission, onward)         Ordered     IP VTE HIGH RISK PATIENT  Once         03/28/22 6054                Ifrah Asher,  SHARRI  Cardiology  [facility]

## 2022-03-30 NOTE — PLAN OF CARE
Pt remains free from falls/injuries this shift. Safety precautions maintained. No c/o pain. No s/s of acute distress noted. VSS. Tele monitoring per orders. Will continue to monitor. Chart check completed.

## 2022-03-30 NOTE — NURSING
Pt awakened after procedure and remains AAOx3 at time of transfer.   R groin dressing remains CDI at time of transfer.   Transferred pt to room 557 via hospital bed in no apparent distress.   Report given to Jed. No further questions at this time.

## 2022-03-30 NOTE — PLAN OF CARE
[facility] - Med Surg  Discharge Final Note    Primary Care Provider: Clayton Frankel MD    Expected Discharge Date: 3/30/2022    Final Discharge Note (most recent)     Final Note - 03/30/22 1026        Final Note    Assessment Type Final Discharge Note     Anticipated Discharge Disposition Home or Self Care     Hospital Resources/Appts/Education Provided Provided patient/caregiver with written discharge plan information;Appointments scheduled and added to AVS        Post-Acute Status    Discharge Delays None known at this time                 Important Message from Medicare

## 2022-03-30 NOTE — ASSESSMENT & PLAN NOTE
NSTEMI  CAD   Smoker  HTN    -start heparin gtt  -IVF 50 cc /hf for o/n  -NPO after mn. LHC in AM   -add ASA 81mg daily  -no BB due to bradycardia   -ok to continue ARB.   -Add amlodipine and d/c HCTZ  -trending troponin    3/30/2  -s/p LHC with successful PCI of RCA  -Continue ASA, Brilinta, amlodipine, ARB  -No BB given bradycardia  -Counseled on post cath restrictions and importance of DAPT

## 2022-03-30 NOTE — HOSPITAL COURSE
3/30/22-Patient seen and examined today, resting in bed, s/p [facility] yesterday with successful PCI of RCA. Feels well. No recurrent chest pain. No SOB. NO other CV complaints. Labs stable.

## 2022-03-30 NOTE — TELEPHONE ENCOUNTER
Please arrange hospital f/u with Dr. Huffman at [facility] in 1-2 weeks.    THanks

## 2022-03-30 NOTE — TELEPHONE ENCOUNTER
----- Message from Yeni Layton LPN sent at 3/30/2022 12:17 PM CDT -----  Please call and schedule.  ----- Message -----  From: Em Thomas MA  Sent: 3/30/2022  11:57 AM CDT  To: Zeeshan FAULKNER Staff    Please schedule the patient's Hospital follow up.   ----- Message -----  From: ALLA Benavidez  Sent: 3/30/2022  11:56 AM CDT  To: Em Thomas MA      ----- Message -----  From: Em Thomas MA  Sent: 3/30/2022  11:36 AM CDT  To: ALLA Benavidez    Is this pt to be scheduled with you?     ----- Message -----  From: Gloria Jansen LPN  Sent: 3/30/2022  11:20 AM CDT  To: Abbie Jorge Staff    Please call pt with date and time for hospital follow up appt post heart cath in 1 week.

## 2022-03-30 NOTE — SUBJECTIVE & OBJECTIVE
Review of Systems   Constitutional: Negative.   HENT: Negative.     Eyes: Negative.    Cardiovascular: Negative.    Respiratory: Negative.     Endocrine: Negative.    Skin: Negative.    Musculoskeletal: Negative.    Gastrointestinal: Negative.    Genitourinary: Negative.    Neurological: Negative.    Psychiatric/Behavioral: Negative.     Allergic/Immunologic: Negative.    Objective:     Vital Signs (Most Recent):  Temp: 98.2 °F (36.8 °C) (03/30/22 0714)  Pulse: 73 (03/30/22 1105)  Resp: 18 (03/30/22 0714)  BP: 130/62 (03/30/22 0714)  SpO2: 97 % (03/30/22 0714) Vital Signs (24h Range):  Temp:  [97.8 °F (36.6 °C)-98.5 °F (36.9 °C)] 98.2 °F (36.8 °C)  Pulse:  [55-77] 73  Resp:  [14-18] 18  SpO2:  [94 %-98 %] 97 %  BP: (114-137)/(53-62) 130/62     Weight: 75.4 kg (166 lb 3.6 oz)  Body mass index is 28.53 kg/m².     SpO2: 97 %  O2 Device (Oxygen Therapy): room air      Intake/Output Summary (Last 24 hours) at 3/30/2022 1441  Last data filed at 3/30/2022 1047  Gross per 24 hour   Intake 1823.07 ml   Output --   Net 1823.07 ml       Lines/Drains/Airways       None                   Physical Exam  Vitals and nursing note reviewed.   Constitutional:       General: She is not in acute distress.     Appearance: She is well-developed. She is not diaphoretic.   HENT:      Head: Normocephalic and atraumatic.   Eyes:      General:         Right eye: No discharge.         Left eye: No discharge.      Pupils: Pupils are equal, round, and reactive to light.   Neck:      Thyroid: No thyromegaly.      Vascular: No JVD.      Trachea: No tracheal deviation.   Cardiovascular:      Rate and Rhythm: Normal rate and regular rhythm.      Chest Wall: PMI is not displaced.      Pulses: Intact distal pulses.      Heart sounds: Normal heart sounds. No murmur heard.    No friction rub. No gallop. No S3 or S4 sounds.   Pulmonary:      Effort: Pulmonary effort is normal. No respiratory distress.      Breath sounds: Normal breath sounds. No  wheezing or rales.   Abdominal:      General: There is no distension.      Tenderness: There is no abdominal tenderness. There is no rebound.   Musculoskeletal:      Cervical back: Neck supple.   Skin:     General: Skin is warm and dry.      Findings: No erythema.      Comments: Groin access site C/D/I; no bleeding erythema or drainage, intact pulse   Neurological:      Mental Status: She is alert and oriented to person, place, and time.   Psychiatric:         Behavior: Behavior normal.       Significant Labs: CMP   Recent Labs   Lab 03/29/22  0311 03/30/22  0542    137   K 3.7 3.7    104   CO2 22* 19*   GLU 95 90   BUN 23 16   CREATININE 1.2 1.1   CALCIUM 9.0 8.9   PROT 6.2  --    ALBUMIN 3.3*  --    BILITOT 0.7  --    ALKPHOS 69  --    AST 25  --    ALT 45*  --    ANIONGAP 12 14   ESTGFRAFRICA 54* >60   EGFRNONAA 47* 52*   , CBC   Recent Labs   Lab 03/29/22  0311 03/30/22  0542   WBC 6.38 7.49   HGB 11.8* 12.4   HCT 34.0* 35.3*    182   , Troponin   Recent Labs   Lab 03/28/22  1502 03/28/22  2050 03/29/22  0311   TROPONINI 0.509* 0.490* 0.411*   , and All pertinent lab results from the last 24 hours have been reviewed.    Significant Imaging: Echocardiogram: Transthoracic echo (TTE) complete (Cupid Only):   Results for orders placed or performed during the hospital encounter of 03/28/22   Echo   Result Value Ref Range    BSA 1.81 m2    TDI SEPTAL 0.08 m/s    LV LATERAL E/E' RATIO 10.50 m/s    LV SEPTAL E/E' RATIO 13.13 m/s    LA WIDTH 3.50 cm    IVC diameter 1.7 cm    Left Ventricular Outflow Tract Mean Velocity 0.7648704404 cm/s    Left Ventricular Outflow Tract Mean Gradient 3.52 mmHg    TV mean gradient 20 mmHg    TDI LATERAL 0.10 m/s    LVIDd 3.78 3.5 - 6.0 cm    IVS 1.34 (A) 0.6 - 1.1 cm    Posterior Wall 1.27 (A) 0.6 - 1.1 cm    Ao root annulus 2.68 cm    LVIDs 2.42 2.1 - 4.0 cm    FS 36 28 - 44 %    LA volume 40.99 cm3    Ascending aorta 2.52 cm    LV mass 172.75 g    LA size 2.87 cm     TAPSE 2.10 cm    Left Ventricle Relative Wall Thickness 0.67 cm    AV mean gradient 4 mmHg    AV valve area 3.49 cm2    AV Velocity Ratio 0.93     AV index (prosthetic) 1.16     MV valve area p 1/2 method 2.84 cm2    E/A ratio 1.19     Mean e' 0.09 m/s    E wave deceleration time 266.188825158660269 msec    IVRT 65.617641724836764 msec    LVOT diameter 1.96 cm    LVOT area 3.0 cm2    LVOT peak srikanth 1.24 m/s    LVOT peak VTI 34.10 cm    Ao peak srikanth 1.34 m/s    Ao VTI 29.5 cm    RVOT peak srikanth 0.81 m/s    RVOT peak VTI 18.8 cm    LVOT stroke volume 102.83 cm3    AV peak gradient 7 mmHg    PV mean gradient 1.81 mmHg    E/E' ratio 11.67 m/s    MV Peak E Srikanth 1.05 m/s    TR Max Srikanth 2.39 m/s    MV stenosis pressure 1/2 time 77.044179072064918 ms    MV Peak A Srikanth 0.88 m/s    LV Systolic Volume 20.67 mL    LV Systolic Volume Index 11.6 mL/m2    LV Diastolic Volume 61.07 mL    LV Diastolic Volume Index 34.31 mL/m2    LA Volume Index 23.0 mL/m2    LV Mass Index 97 g/m2    Echo EF Estimated 66 %    RA Major Axis 3.73 cm    Left Atrium Minor Axis 4.48 cm    Left Atrium Major Axis 5.17 cm    Triscuspid Valve Regurgitation Peak Gradient 23 mmHg    Right Atrial Pressure (from IVC) 3 mmHg    EF 55 %    TV rest pulmonary artery pressure 26 mmHg    Narrative    · Concentric hypertrophy and normal systolic function.  · The estimated ejection fraction is 55%.  · Normal left ventricular diastolic function.  · There are segmental left ventricular wall motion abnormalities.  · With normal right ventricular systolic function.  · Normal central venous pressure (3 mmHg).  · The estimated PA systolic pressure is 26 mmHg.      , EKG: REviewed, and X-Ray: CXR: X-Ray Chest 1 View (CXR): No results found for this visit on 03/28/22. and X-Ray Chest PA and Lateral (CXR): No results found for this visit on 03/28/22.

## 2022-03-30 NOTE — PT/OT/SLP EVAL
Occupational Therapy   Evaluation and Discharge Note    Name: Shirlene Olvera  MRN: [id number]  Admitting Diagnosis:  History of non-ST elevation myocardial infarction (NSTEMI)   Recent Surgery: Procedure(s) (LRB):  CATHETERIZATION, HEART, LEFT (Left)  PTCA, Single Vessel  IVUS, Coronary  Stent, Drug Eluting, Single Vessel, Coronary  Placement of Closure Device Day of Surgery    Recommendations:     Discharge Recommendations: home  Discharge Equipment Recommendations:  none  Barriers to discharge:       Assessment:     Shirlene Olvera is a 66 y.o. female with a medical diagnosis of History of non-ST elevation myocardial infarction (NSTEMI). At this time, patient is functioning at their prior level of function and does not require further acute OT services.     Plan:     During this hospitalization, patient does not require further acute OT services.  Please re-consult if situation changes.    · Plan of Care Reviewed with: patient    Subjective     Chief Complaint:   Patient/Family Comments/goals:    Occupational Profile:  Living Environment: LIVES ALONE I  Previous level of function:   Roles and Routines: OCCUPATIONAL THERAPY  Equipment Used at home:  none  Assistance upon Discharge:     Pain/Comfort:  · Pain Rating 1: 0/10    Patients cultural, spiritual, [employer]ian conflicts given the current situation:      Objective:     Communicated with: NURSE AND Epic CHART REVIEW  prior to session.  Patient found HOB elevated with peripheral IV upon OT entry to room.    General Precautions: Standard,     Orthopedic Precautions:N/A   Braces: N/A  Respiratory Status: Room air     Occupational Performance:    Bed Mobility:    · Patient completed Rolling/Turning to Left with  independence  · Patient completed Rolling/Turning to Right with independence  · Patient completed Scooting/Bridging with independence  · Patient completed Supine to Sit with independence  · Patient completed Sit to Supine with  independence    Functional Mobility/Transfers:  · Patient completed Sit <> Stand Transfer with independence  with  no assistive device   · Patient completed Bed <> Chair Transfer using Step Transfer technique with independence with no assistive device    Activities of Daily Living:  · Upper Body Dressing: independence .  · Lower Body Dressing: independence .    Cognitive/Visual Perceptual:  Cognitive/Psychosocial Skills:     -       Oriented to: Person, Place, Time and Situation   -       Follows Commands/attention:Follows multistep  commands  -       Communication: clear/fluent  -       Memory: No Deficits noted  -       Safety awareness/insight to disability: intact     Physical Exam:  Upper Extremity Range of Motion:     -       Right Upper Extremity: WFL  -       Left Upper Extremity: WFL  Upper Extremity Strength:    -       Right Upper Extremity: WFL  -       Left Upper Extremity: WFL   Strength:    -       Right Upper Extremity: WFL  -       Left Upper Extremity: WFL    AMPAC 6 Click ADL:  AMPAC Total Score: 24    Treatment & Education:  · Education:  · O.T. EVAL COMPLETED, PT EDUCATED ON O.T. POC. Tx:Functional Mobility: PT AMBULATED 20 FEET WIITH NO AE OR LOB (I)'LY.. PT DISPLAYED (I) WITH ADL'S AND  BED MOBILITY. PT IS DISCHARGED FROM SKILLED O.T. AND PLACED ON PEOPLE 'S PROGRAM DUE TO IS FUNCTIONING AT HIGH LEVEL. RECOMMEND HOME    Patient left up in chair with all lines intact, call button in reach and NURSE notified    GOALS:   Multidisciplinary Problems     Occupational Therapy Goals     Not on file                History:     Past Medical History:   Diagnosis Date    Chest pain 5/14/2015    Colon polyp     Coronary artery disease     pt states MI June 2015    Hypertension     Myocardial infarction     PAD (peripheral artery disease)     Tobacco use        Past Surgical History:   Procedure Laterality Date    CHOLECYSTECTOMY  09/03/2015    COLONOSCOPY N/A 10/11/2016    Procedure:  COLONOSCOPY;  Surgeon: Haseeb Spears MD;  Location: [facility];  Service: Endoscopy;  Laterality: N/A;    COLONOSCOPY N/A 12/9/2021    Procedure: COLONOSCOPY;  Surgeon: Pat Rios MD;  Location: [facility];  Service: Endoscopy;  Laterality: N/A;    ESOPHAGOGASTRODUODENOSCOPY N/A 12/9/2021    Procedure: EGD (ESOPHAGOGASTRODUODENOSCOPY);  Surgeon: Pat Rios MD;  Location: [facility];  Service: Endoscopy;  Laterality: N/A;    high blood      HYSTERECTOMY      INJECTION OF ANESTHETIC AGENT AROUND MEDIAL BRANCH NERVES INNERVATING CERVICAL FACET JOINT Bilateral 10/15/2021    Procedure: Bilateral C5-7 MBB with RN IV sedation PATIENT WOULD LIKE AFTERNOON ARRIVAL, IF POSSIBLE;  Surgeon: Nano Izquierdo MD;  Location: [facility];  Service: Pain Management;  Laterality: Bilateral;    INJECTION OF ANESTHETIC AGENT AROUND MEDIAL BRANCH NERVES INNERVATING CERVICAL FACET JOINT Bilateral 3/21/2022    Procedure: Bilateral C4-6 MBB with RN IV sedation;  Surgeon: Nano Izquierdo MD;  Location: [facility];  Service: Pain Management;  Laterality: Bilateral;    INJECTION OF ANESTHETIC AGENT INTO SACROILIAC JOINT Right 12/17/2021    Procedure: Right SIJ Injection;  Surgeon: Nano Izquierdo MD;  Location: [facility];  Service: Pain Management;  Laterality: Right;    OOPHORECTOMY      RIB FRACTURE SURGERY         Time Tracking:     OT Date of Treatment: 03/29/22  OT Start Time: 1212  OT Stop Time: 1235  OT Total Time (min): 23 min    Billable Minutes:Evaluation 13 MINUTES'  Therapeutic Activity 10 MINUTES    3/29/2022

## 2022-03-31 DIAGNOSIS — I10 ESSENTIAL HYPERTENSION: Primary | ICD-10-CM

## 2022-03-31 NOTE — DISCHARGE SUMMARY
[facility] Medicine  Discharge Summary      Patient Name: Shirlene Olvera  MRN: [id number]  Patient Class: IP- Inpatient  Admission Date: 3/28/2022  Hospital Length of Stay: 2 days  Discharge Date and Time: 3/30/2022 12:07 PM  Attending Physician: Durga Veras MD   Discharging Provider: Chris Esparza NP  Primary Care Provider: Clayton Frankel MD      HPI:   Ms Olvera is a 66 year old female with PMHx of CAD, HTN, tobacco use and old MI who presented to [facility] from [facility] for evaluation of chest pain. Associated symptoms include shortness of breath, nausea, generalize weakness and fatigue. EKG: Sinus Bradycardia rate 48. Troponin 0.21 at [facility], however increase to 0.509 upon arrival to [facility] ED. All labs reviewed for [facility] stable. At [facility], patient was given 4, 81 mg ASA and a NTG patch.  Patient currently denies chest pain or shortness of breath. She is a full code, daughter Yakelin and son are surrogate decision makers. Patient is being placed in Observation under the care of Hospital Medicine.       Procedure(s) (LRB):  CATHETERIZATION, HEART, LEFT (Left)  PTCA, Single Vessel  IVUS, Coronary  Stent, Drug Eluting, Single Vessel, Coronary  Placement of Closure Device      Hospital Course:   Patient is a 66 year old female who presented to [facility] Room from [facility] for further evaluation of chest pain. Troponin 0.509 upon arrival, she was started on heparin drip. Cardiology consulted, patient underwent Left Heart Cath by Dr Awad. Resulted showed  of the LXC, she received PCI to the proximal and mid RCA. As of 3/30/2022, patient feeling well, vital signs and labs stable. Case reviewed with Cardiology, ok to discharge for there standpoint, continue Brilinta as outpatient. Patient was seen, examined and deemed suitable for discharge.        Goals of Care  Treatment Preferences:  Code Status: Full Code      Consults:   Consults (From admission, onward)        Status Ordering Provider     Inpatient consult to Cardiology  Once        Provider:  Ubaldo Huffman MD    Completed RICK SEGOVIA          No new Assessment & Plan notes have been filed under this hospital service since the last note was generated.  Service: Hospital Medicine    Final Active Diagnoses:    Diagnosis Date Noted POA    PRINCIPAL PROBLEM:  History of non-ST elevation myocardial infarction (NSTEMI) [I25.2] 05/14/2015 Not Applicable    NSTEMI (non-ST elevated myocardial infarction) [I21.4]  Unknown    CKD (chronic kidney disease) stage 3, GFR 30-59 ml/min [N18.30] 08/02/2021 Yes    Tobacco abuse [Z72.0] 07/18/2014 Yes     Chronic    Hyperlipidemia [E78.5] 10/10/2013 Yes     Chronic    Essential hypertension [I10] 10/10/2013 Yes     Chronic      Problems Resolved During this Admission:    Diagnosis Date Noted Date Resolved POA    Chest pain [R07.9] 05/14/2015 03/30/2022 Yes    Elevated troponin [R77.8] 03/28/2022 03/30/2022 Yes    Bradycardia [R00.1] 03/28/2022 03/30/2022 Yes       Discharged Condition: stable    Disposition: Home or Self Care    Follow Up:   Follow-up Information     [facility] Follow up in 1 week(s).    Specialty: Cardiology  Why: Hospital Follow up post Left Heart Cath  Contact information:  [address] [place] [address] [address] Yu [place] [address] [phone]                       Patient Instructions:      Other restrictions (specify):   Scheduling Instructions: No smoking     Notify your health care provider if you experience any of the following:  severe uncontrolled pain     Notify your health care provider if you experience any of the following:  difficulty breathing or increased cough     Activity as tolerated       Significant Diagnostic Studies: Labs:   CMP   Recent Labs   Lab 03/30/22  0542      K 3.7      CO2 19*   GLU 90   BUN 16   CREATININE 1.1    CALCIUM 8.9   ANIONGAP 14   ESTGFRAFRICA >60   EGFRNONAA 52*    and CBC   Recent Labs   Lab 03/30/22  0542   WBC 7.49   HGB 12.4   HCT 35.3*          Pending Diagnostic Studies:     Procedure Component Value Units Date/Time    EKG 12-LEAD on arrival to floor [[id number]] Collected: 03/29/22 1903    Order Status: Sent Lab Status: In process Updated: 03/30/22 1014    Narrative:      Test Reason : I21.4,    Vent. Rate : 068 BPM     Atrial Rate : 068 BPM     P-R Int : 160 ms          QRS Dur : 108 ms      QT Int : 438 ms       P-R-T Axes : 089 036 101 degrees     QTc Int : 465 ms    Normal sinus rhythm  ST and T wave abnormality, consider lateral ischemia  Abnormal ECG  When compared with ECG of 28-MAR-2022 14:11,  No significant change was found    Referred By: PROVIDER NOTINSYSTEM           Confirmed By:          Medications:  Reconciled Home Medications:      Medication List      START taking these medications    BRILINTA 90 mg tablet  Generic drug: ticagrelor  Take 1 tablet (90 mg total) by mouth 2 (two) times daily.     valsartan 160 MG tablet  Commonly known as: DIOVAN  Take 1 tablet (160 mg total) by mouth once daily.        CHANGE how you take these medications    amLODIPine 10 MG tablet  Commonly known as: NORVASC  Take 1/2 (one-half) tablet (5 mg total) by mouth once daily.  What changed: how much to take        CONTINUE taking these medications    ALLEGRA ORAL  Take 1 tablet by mouth once daily.     aspirin 81 MG Chew  Take 1 tablet (81 mg total) by mouth once daily.     buPROPion 150 MG TB24 tablet  Commonly known as: WELLBUTRIN XL  Take 1 tablet (150 mg total) by mouth once daily for 14 days, THEN 2 tablets (300 mg total) once daily.  Start taking on: February 17, 2022     cyanocobalamin 1000 MCG tablet  Commonly known as: VITAMIN B-12  Take 100 mcg by mouth once daily.     fluticasone propionate 50 mcg/actuation nasal spray  Commonly known as: FLONASE  2 sprays (100 mcg total) by Each Nostril route  once daily.     gabapentin 300 MG capsule  Commonly known as: NEURONTIN  Take 1 capsule (300 mg total) by mouth every evening for 7 days, THEN 2 capsules (600 mg total) every evening for 7 days, THEN 3 capsules (900 mg total) every evening for 16 days.  Start taking on: March 10, 2022     loratadine 10 mg tablet  Commonly known as: CLARITIN  Take 10 mg by mouth daily as needed for Allergies.     multivitamin with minerals tablet  Take 1 tablet by mouth once daily.     pantoprazole 20 MG tablet  Commonly known as: PROTONIX  Take 1 tablet (20 mg total) by mouth once daily.     tiZANidine 4 MG tablet  Commonly known as: ZANAFLEX  TAKE 1 TABLET (4 MG TOTAL) BY MOUTH NIGHTLY AS NEEDED (MUSCLE RELAXANT)     vitamin D 1000 units Tab  Commonly known as: VITAMIN D3  Take 1,000 Units by mouth once daily.        STOP taking these medications    metoprolol tartrate 50 MG tablet  Commonly known as: LOPRESSOR     valsartan-hydrochlorothiazide 160-25 mg per tablet  Commonly known as: DIOVAN-HCT            Indwelling Lines/Drains at time of discharge:   Lines/Drains/Airways     None                 Time spent on the discharge of patient: 45 minutes         Chris Esparza NP  Department of Hospital Medicine  [facility] - Med Surg

## 2022-04-05 ENCOUNTER — OFFICE VISIT (OUTPATIENT)
Dept: INTERNAL MEDICINE | Facility: CLINIC | Age: 67
End: 2022-04-05
Payer: MEDICARE

## 2022-04-05 VITALS
DIASTOLIC BLOOD PRESSURE: 70 MMHG | TEMPERATURE: 98 F | SYSTOLIC BLOOD PRESSURE: 128 MMHG | WEIGHT: 156.06 LBS | HEART RATE: 72 BPM | BODY MASS INDEX: 26.79 KG/M2

## 2022-04-05 DIAGNOSIS — N18.31 STAGE 3A CHRONIC KIDNEY DISEASE: ICD-10-CM

## 2022-04-05 DIAGNOSIS — R21 RASH: ICD-10-CM

## 2022-04-05 DIAGNOSIS — Z09 HOSPITAL DISCHARGE FOLLOW-UP: Primary | ICD-10-CM

## 2022-04-05 DIAGNOSIS — I25.2 HISTORY OF NON-ST ELEVATION MYOCARDIAL INFARCTION (NSTEMI): ICD-10-CM

## 2022-04-05 PROCEDURE — 3074F PR MOST RECENT SYSTOLIC BLOOD PRESSURE < 130 MM HG: ICD-10-PCS | Mod: CPTII,S$GLB,, | Performed by: NURSE PRACTITIONER

## 2022-04-05 PROCEDURE — 1126F PR PAIN SEVERITY QUANTIFIED, NO PAIN PRESENT: ICD-10-PCS | Mod: CPTII,S$GLB,, | Performed by: NURSE PRACTITIONER

## 2022-04-05 PROCEDURE — 99999 PR PBB SHADOW E&M-EST. PATIENT-LVL IV: CPT | Mod: PBBFAC,,, | Performed by: NURSE PRACTITIONER

## 2022-04-05 PROCEDURE — 99214 PR OFFICE/OUTPT VISIT, EST, LEVL IV, 30-39 MIN: ICD-10-PCS | Mod: S$GLB,,, | Performed by: NURSE PRACTITIONER

## 2022-04-05 PROCEDURE — 1101F PR PT FALLS ASSESS DOC 0-1 FALLS W/OUT INJ PAST YR: ICD-10-PCS | Mod: CPTII,S$GLB,, | Performed by: NURSE PRACTITIONER

## 2022-04-05 PROCEDURE — 99999 PR PBB SHADOW E&M-EST. PATIENT-LVL IV: ICD-10-PCS | Mod: PBBFAC,,, | Performed by: NURSE PRACTITIONER

## 2022-04-05 PROCEDURE — 99214 OFFICE O/P EST MOD 30 MIN: CPT | Mod: S$GLB,,, | Performed by: NURSE PRACTITIONER

## 2022-04-05 PROCEDURE — 1126F AMNT PAIN NOTED NONE PRSNT: CPT | Mod: CPTII,S$GLB,, | Performed by: NURSE PRACTITIONER

## 2022-04-05 PROCEDURE — 1101F PT FALLS ASSESS-DOCD LE1/YR: CPT | Mod: CPTII,S$GLB,, | Performed by: NURSE PRACTITIONER

## 2022-04-05 PROCEDURE — 1111F DSCHRG MED/CURRENT MED MERGE: CPT | Mod: CPTII,S$GLB,, | Performed by: NURSE PRACTITIONER

## 2022-04-05 PROCEDURE — 3078F DIAST BP <80 MM HG: CPT | Mod: CPTII,S$GLB,, | Performed by: NURSE PRACTITIONER

## 2022-04-05 PROCEDURE — 1111F PR DISCHARGE MEDS RECONCILED W/ CURRENT OUTPATIENT MED LIST: ICD-10-PCS | Mod: CPTII,S$GLB,, | Performed by: NURSE PRACTITIONER

## 2022-04-05 PROCEDURE — 4010F PR ACE/ARB THEARPY RXD/TAKEN: ICD-10-PCS | Mod: CPTII,S$GLB,, | Performed by: NURSE PRACTITIONER

## 2022-04-05 PROCEDURE — 1159F MED LIST DOCD IN RCRD: CPT | Mod: CPTII,S$GLB,, | Performed by: NURSE PRACTITIONER

## 2022-04-05 PROCEDURE — 3008F PR BODY MASS INDEX (BMI) DOCUMENTED: ICD-10-PCS | Mod: CPTII,S$GLB,, | Performed by: NURSE PRACTITIONER

## 2022-04-05 PROCEDURE — 3288F PR FALLS RISK ASSESSMENT DOCUMENTED: ICD-10-PCS | Mod: CPTII,S$GLB,, | Performed by: NURSE PRACTITIONER

## 2022-04-05 PROCEDURE — 1160F RVW MEDS BY RX/DR IN RCRD: CPT | Mod: CPTII,S$GLB,, | Performed by: NURSE PRACTITIONER

## 2022-04-05 PROCEDURE — 99499 RISK ADDL DX/OHS AUDIT: ICD-10-PCS | Mod: HCNC,S$GLB,, | Performed by: NURSE PRACTITIONER

## 2022-04-05 PROCEDURE — 3008F BODY MASS INDEX DOCD: CPT | Mod: CPTII,S$GLB,, | Performed by: NURSE PRACTITIONER

## 2022-04-05 PROCEDURE — 1159F PR MEDICATION LIST DOCUMENTED IN MEDICAL RECORD: ICD-10-PCS | Mod: CPTII,S$GLB,, | Performed by: NURSE PRACTITIONER

## 2022-04-05 PROCEDURE — 3288F FALL RISK ASSESSMENT DOCD: CPT | Mod: CPTII,S$GLB,, | Performed by: NURSE PRACTITIONER

## 2022-04-05 PROCEDURE — 1160F PR REVIEW ALL MEDS BY PRESCRIBER/CLIN PHARMACIST DOCUMENTED: ICD-10-PCS | Mod: CPTII,S$GLB,, | Performed by: NURSE PRACTITIONER

## 2022-04-05 PROCEDURE — 99499 UNLISTED E&M SERVICE: CPT | Mod: HCNC,S$GLB,, | Performed by: NURSE PRACTITIONER

## 2022-04-05 PROCEDURE — 3074F SYST BP LT 130 MM HG: CPT | Mod: CPTII,S$GLB,, | Performed by: NURSE PRACTITIONER

## 2022-04-05 PROCEDURE — 4010F ACE/ARB THERAPY RXD/TAKEN: CPT | Mod: CPTII,S$GLB,, | Performed by: NURSE PRACTITIONER

## 2022-04-05 PROCEDURE — 3078F PR MOST RECENT DIASTOLIC BLOOD PRESSURE < 80 MM HG: ICD-10-PCS | Mod: CPTII,S$GLB,, | Performed by: NURSE PRACTITIONER

## 2022-04-05 NOTE — PROGRESS NOTES
Subjective:       Patient ID: Shirlene Olvera is a 66 y.o. female.    Chief Complaint: Follow-up (Hospital f/u)    Patient here for hospital follow up who was admitted to [facility] for nstemi, was admitted, had heart cath and stent by Dr. Awad,   Med changes: valsartan hctz was stopped and plain valsartan was started, brilinta bid was started, amlodipine was decreased to 5mg.  Doing well since discharge home  Primary cards, Dr Byers, has follow up scheduled this thurs  Admits stopped repatha about 6 months ago due to cost, will be discussing this with Dr. Byers. States she did not tolerate oral statins  Quit smoking cold turkey the day of the MI, has not smoked since  Has rash to cheek area, lateral to nose, has been applying antibacterial topical with no improvement, itches    Patient is a 66 year old female who presented to [facility] from [facility] for further evaluation of chest pain. Troponin 0.509 upon arrival, she was started on heparin drip. Cardiology consulted, patient underwent Left Heart Cath by Dr Awad. Resulted showed  of the LXC, she received PCI to the proximal and mid RCA. As of 3/30/2022, patient feeling well, vital signs and labs stable. Case reviewed with Cardiology, ok to discharge for there standpoint, continue Brilinta as outpatient. Patient was seen, examined and deemed suitable for discharge.        Follow-up  Associated symptoms include a rash. Pertinent negatives include no chest pain, chills, coughing, diaphoresis, fatigue or fever.       /70   Pulse 72   Temp 97.6 °F (36.4 °C) (Temporal)   Wt 70.8 kg (156 lb 1.4 oz)   LMP  (LMP Unknown)   BMI 26.79 kg/m²     Review of Systems   Constitutional: Negative for activity change, appetite change, chills, diaphoresis, fatigue, fever and unexpected weight change.   HENT: Negative.    Respiratory: Negative for cough and shortness of breath.    Cardiovascular: Negative for chest pain, palpitations  and leg swelling.   Gastrointestinal: Negative.    Genitourinary: Negative.    Musculoskeletal: Negative.    Skin: Positive for rash. Negative for color change, pallor and wound.   Allergic/Immunologic: Negative for immunocompromised state.   Neurological: Negative.  Negative for dizziness and facial asymmetry.   Hematological: Negative for adenopathy. Does not bruise/bleed easily.   Psychiatric/Behavioral: Negative for agitation, behavioral problems and confusion.       Objective:      Physical Exam  Vitals and nursing note reviewed.   Constitutional:       General: She is not in acute distress.     Appearance: She is well-developed. She is not diaphoretic.   HENT:      Head: Normocephalic and atraumatic.   Cardiovascular:      Rate and Rhythm: Normal rate and regular rhythm.      Heart sounds: Normal heart sounds. No murmur heard.  Pulmonary:      Effort: Pulmonary effort is normal. No respiratory distress.      Breath sounds: Normal breath sounds.   Musculoskeletal:         General: Normal range of motion.   Skin:     General: Skin is warm and dry.      Findings: No rash.             Comments: Erythematous scaly patch on either side of nose/cheek area   Neurological:      Mental Status: She is alert.   Psychiatric:         Behavior: Behavior normal. Behavior is cooperative.         Thought Content: Thought content normal.         Judgment: Judgment normal.         Assessment:       1. Hospital discharge follow-up    2. History of non-ST elevation myocardial infarction (NSTEMI)    3. Rash    4. Stage 3a chronic kidney disease        Plan:       Shirlene was seen today for follow-up.    Diagnoses and all orders for this visit:    Hospital discharge follow-up  History of non-ST elevation myocardial infarction (NSTEMI)  Reviewed d/c summary  Med rec done  Continue current med regimen   Keep cards follow up  Continue no smoking    Rash  Add otc hydrocortisone and apply aquaphor on top     Stage 3a chronic kidney  disease  Stable avoid nsaids    Patient Instructions   Keep up coming hospital follow up with cardiology   See Dr. Frankel Aug 2022

## 2022-04-07 ENCOUNTER — PATIENT OUTREACH (OUTPATIENT)
Dept: ADMINISTRATIVE | Facility: OTHER | Age: 67
End: 2022-04-07
Payer: MEDICARE

## 2022-04-07 ENCOUNTER — OFFICE VISIT (OUTPATIENT)
Dept: CARDIOLOGY | Facility: CLINIC | Age: 67
End: 2022-04-07
Payer: MEDICARE

## 2022-04-07 VITALS
BODY MASS INDEX: 26.76 KG/M2 | WEIGHT: 156.75 LBS | HEART RATE: 81 BPM | HEIGHT: 64 IN | DIASTOLIC BLOOD PRESSURE: 78 MMHG | SYSTOLIC BLOOD PRESSURE: 130 MMHG | RESPIRATION RATE: 16 BRPM | OXYGEN SATURATION: 99 %

## 2022-04-07 DIAGNOSIS — I10 ESSENTIAL HYPERTENSION: Chronic | ICD-10-CM

## 2022-04-07 DIAGNOSIS — Z72.0 TOBACCO ABUSE: Chronic | ICD-10-CM

## 2022-04-07 DIAGNOSIS — N18.30 STAGE 3 CHRONIC KIDNEY DISEASE, UNSPECIFIED WHETHER STAGE 3A OR 3B CKD: ICD-10-CM

## 2022-04-07 DIAGNOSIS — I73.9 PAD (PERIPHERAL ARTERY DISEASE): ICD-10-CM

## 2022-04-07 DIAGNOSIS — I25.10 CORONARY ARTERY DISEASE INVOLVING NATIVE CORONARY ARTERY OF NATIVE HEART WITHOUT ANGINA PECTORIS: Chronic | ICD-10-CM

## 2022-04-07 DIAGNOSIS — E78.00 PURE HYPERCHOLESTEROLEMIA: Chronic | ICD-10-CM

## 2022-04-07 DIAGNOSIS — Z78.9 STATIN INTOLERANCE: ICD-10-CM

## 2022-04-07 DIAGNOSIS — I25.2 HISTORY OF NON-ST ELEVATION MYOCARDIAL INFARCTION (NSTEMI): Primary | ICD-10-CM

## 2022-04-07 PROCEDURE — 4010F ACE/ARB THERAPY RXD/TAKEN: CPT | Mod: CPTII,S$GLB,, | Performed by: INTERNAL MEDICINE

## 2022-04-07 PROCEDURE — 99499 RISK ADDL DX/OHS AUDIT: ICD-10-PCS | Mod: S$GLB,,, | Performed by: INTERNAL MEDICINE

## 2022-04-07 PROCEDURE — 3075F PR MOST RECENT SYSTOLIC BLOOD PRESS GE 130-139MM HG: ICD-10-PCS | Mod: CPTII,S$GLB,, | Performed by: INTERNAL MEDICINE

## 2022-04-07 PROCEDURE — 1126F AMNT PAIN NOTED NONE PRSNT: CPT | Mod: CPTII,S$GLB,, | Performed by: INTERNAL MEDICINE

## 2022-04-07 PROCEDURE — 3288F FALL RISK ASSESSMENT DOCD: CPT | Mod: CPTII,S$GLB,, | Performed by: INTERNAL MEDICINE

## 2022-04-07 PROCEDURE — 1159F MED LIST DOCD IN RCRD: CPT | Mod: CPTII,S$GLB,, | Performed by: INTERNAL MEDICINE

## 2022-04-07 PROCEDURE — 99215 OFFICE O/P EST HI 40 MIN: CPT | Mod: S$GLB,,, | Performed by: INTERNAL MEDICINE

## 2022-04-07 PROCEDURE — 99999 PR PBB SHADOW E&M-EST. PATIENT-LVL V: ICD-10-PCS | Mod: PBBFAC,,, | Performed by: INTERNAL MEDICINE

## 2022-04-07 PROCEDURE — 3075F SYST BP GE 130 - 139MM HG: CPT | Mod: CPTII,S$GLB,, | Performed by: INTERNAL MEDICINE

## 2022-04-07 PROCEDURE — 1111F PR DISCHARGE MEDS RECONCILED W/ CURRENT OUTPATIENT MED LIST: ICD-10-PCS | Mod: CPTII,S$GLB,, | Performed by: INTERNAL MEDICINE

## 2022-04-07 PROCEDURE — 99499 UNLISTED E&M SERVICE: CPT | Mod: S$GLB,,, | Performed by: INTERNAL MEDICINE

## 2022-04-07 PROCEDURE — 99215 PR OFFICE/OUTPT VISIT, EST, LEVL V, 40-54 MIN: ICD-10-PCS | Mod: S$GLB,,, | Performed by: INTERNAL MEDICINE

## 2022-04-07 PROCEDURE — 99999 PR PBB SHADOW E&M-EST. PATIENT-LVL V: CPT | Mod: PBBFAC,,, | Performed by: INTERNAL MEDICINE

## 2022-04-07 PROCEDURE — 1101F PR PT FALLS ASSESS DOC 0-1 FALLS W/OUT INJ PAST YR: ICD-10-PCS | Mod: CPTII,S$GLB,, | Performed by: INTERNAL MEDICINE

## 2022-04-07 PROCEDURE — 3008F PR BODY MASS INDEX (BMI) DOCUMENTED: ICD-10-PCS | Mod: CPTII,S$GLB,, | Performed by: INTERNAL MEDICINE

## 2022-04-07 PROCEDURE — 1111F DSCHRG MED/CURRENT MED MERGE: CPT | Mod: CPTII,S$GLB,, | Performed by: INTERNAL MEDICINE

## 2022-04-07 PROCEDURE — 3008F BODY MASS INDEX DOCD: CPT | Mod: CPTII,S$GLB,, | Performed by: INTERNAL MEDICINE

## 2022-04-07 PROCEDURE — 1101F PT FALLS ASSESS-DOCD LE1/YR: CPT | Mod: CPTII,S$GLB,, | Performed by: INTERNAL MEDICINE

## 2022-04-07 PROCEDURE — 3288F PR FALLS RISK ASSESSMENT DOCUMENTED: ICD-10-PCS | Mod: CPTII,S$GLB,, | Performed by: INTERNAL MEDICINE

## 2022-04-07 PROCEDURE — 3078F PR MOST RECENT DIASTOLIC BLOOD PRESSURE < 80 MM HG: ICD-10-PCS | Mod: CPTII,S$GLB,, | Performed by: INTERNAL MEDICINE

## 2022-04-07 PROCEDURE — 3078F DIAST BP <80 MM HG: CPT | Mod: CPTII,S$GLB,, | Performed by: INTERNAL MEDICINE

## 2022-04-07 PROCEDURE — 4010F PR ACE/ARB THEARPY RXD/TAKEN: ICD-10-PCS | Mod: CPTII,S$GLB,, | Performed by: INTERNAL MEDICINE

## 2022-04-07 PROCEDURE — 1159F PR MEDICATION LIST DOCUMENTED IN MEDICAL RECORD: ICD-10-PCS | Mod: CPTII,S$GLB,, | Performed by: INTERNAL MEDICINE

## 2022-04-07 PROCEDURE — 1126F PR PAIN SEVERITY QUANTIFIED, NO PAIN PRESENT: ICD-10-PCS | Mod: CPTII,S$GLB,, | Performed by: INTERNAL MEDICINE

## 2022-04-07 RX ORDER — LEVOCARNITINE 500 MG
400 TABLET ORAL NIGHTLY
Qty: 30 EACH | Refills: 11
Start: 2022-04-07 | End: 2022-11-07

## 2022-04-07 NOTE — PROGRESS NOTES
Subjective:   Patient ID:  Shirlene Olvera is a 66 y.o. female who presents for follow-up of No chief complaint on file.  Recent PCI of RCA after NSTEMI, Lcx chronically occluded  Patient denies CP, angina or anginal equivalent.  Pt has stopped smoking since MI  Hypertension  This is a chronic problem. The current episode started more than 1 year ago. The problem has been gradually improving since onset. The problem is controlled. Pertinent negatives include no chest pain, palpitations or shortness of breath. Past treatments include angiotensin blockers and calcium channel blockers. The current treatment provides moderate improvement. There are no compliance problems.    Hyperlipidemia  This is a chronic problem. The current episode started more than 1 year ago. Pertinent negatives include no chest pain or shortness of breath. Current antihyperlipidemic treatment includes nicotinic acid (repatha). The current treatment provides moderate improvement of lipids.   Coronary Artery Disease  Presents for follow-up visit. Pertinent negatives include no chest pain, chest pressure, chest tightness, dizziness, leg swelling, muscle weakness, palpitations, shortness of breath or weight gain. Risk factors include hyperlipidemia. The symptoms have been stable. Compliance with diet is variable. Compliance with exercise is variable. Compliance with medications is good.       Review of Systems   Constitutional: Negative. Negative for weight gain.   HENT: Negative.    Eyes: Negative.    Cardiovascular: Negative.  Negative for chest pain, leg swelling and palpitations.   Respiratory: Negative.  Negative for chest tightness and shortness of breath.    Endocrine: Negative.    Hematologic/Lymphatic: Negative.    Skin: Negative.    Musculoskeletal: Negative for muscle weakness.   Gastrointestinal: Negative.    Genitourinary: Negative.    Neurological: Negative.  Negative for dizziness.   Psychiatric/Behavioral: Negative.     Allergic/Immunologic: Negative.      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 90 tablet 1    aspirin 81 MG Chew Take 1 tablet (81 mg total) by mouth once daily. 360 tablet 1    buPROPion (WELLBUTRIN XL) 150 MG TB24 tablet Take 1 tablet (150 mg total) by mouth once daily for 14 days, THEN 2 tablets (300 mg total) once daily. 166 tablet 0    cyanocobalamin (VITAMIN B-12) 1000 MCG tablet Take 100 mcg by mouth once daily.      fexofenadine HCl (ALLEGRA ORAL) Take 1 tablet by mouth once daily.      fluticasone propionate (FLONASE) 50 mcg/actuation nasal spray USE 2 SPRAYS IN EACH NOSTRIL ONE TIME DAILY 48 g 3    gabapentin (NEURONTIN) 300 MG capsule Take 1 capsule (300 mg total) by mouth every evening for 7 days, THEN 2 capsules (600 mg total) every evening for 7 days, THEN 3 capsules (900 mg total) every evening for 16 days. 69 capsule 0    loratadine  (CLARITIN) 10 mg tablet Take 10 mg by mouth daily as needed for Allergies.      multivitamin with minerals tablet Take 1 tablet by mouth once daily.      pantoprazole (PROTONIX) 20 MG tablet Take 1 tablet (20 mg total) by mouth once daily. 90 tablet 1    ticagrelor (BRILINTA) 90 mg tablet Take 1 tablet (90 mg total) by mouth 2 (two) times daily. 60 tablet 0    tiZANidine (ZANAFLEX) 4 MG tablet TAKE 1 TABLET (4 MG TOTAL) BY MOUTH NIGHTLY AS NEEDED (MUSCLE RELAXANT) 90 tablet 1    valsartan (DIOVAN) 160 MG tablet Take 1 tablet (160 mg total) by mouth once daily. 30 tablet 0    vitamin D (VITAMIN D3) 1000 units Tab Take 1,000 Units by mouth once daily.      [DISCONTINUED] metoprolol tartrate (LOPRESSOR) 50 MG tablet Take 1 tablet (50 mg total) by mouth Daily. 30 tablet 11    [DISCONTINUED] valsartan-hydrochlorothiazide (DIOVAN-HCT) 160-25 mg per tablet Take 1 tablet by mouth once daily. 90 tablet 1     No current facility-administered medications on file prior to visit.     Review of patient's allergies indicates:   Allergen Reactions    Statins-hmg-coa reductase inhibitors Other (See Comments)     Myalgias to lipitor and simvastatin       Objective:     Physical Exam  Vitals and nursing note reviewed.   Constitutional:       Appearance: She is well-developed.   HENT:      Head: Normocephalic and atraumatic.   Eyes:      Conjunctiva/sclera: Conjunctivae normal.      Pupils: Pupils are equal, round, and reactive to light.   Cardiovascular:      Rate and Rhythm: Normal rate and regular rhythm.      Pulses: Intact distal pulses.      Heart sounds: Normal heart sounds.   Pulmonary:      Effort: Pulmonary effort is normal.      Breath sounds: Normal breath sounds.   Abdominal:      General: Bowel sounds are normal.      Palpations: Abdomen is soft.   Musculoskeletal:         General: Normal range of motion.      Cervical back: Normal range of motion and neck supple.   Skin:     General: Skin is warm and dry.    Neurological:      Mental Status: She is alert and oriented to person, place, and time.         Assessment:     1. History of non-ST elevation myocardial infarction (NSTEMI)    2. PAD (peripheral artery disease)    3. Essential hypertension    4. Pure hypercholesterolemia    5. Coronary artery disease involving native coronary artery of native heart without angina pectoris    6. Stage 3 chronic kidney disease, unspecified whether stage 3a or 3b CKD    7. Tobacco abuse    8. Statin intolerance        Plan:     History of non-ST elevation myocardial infarction (NSTEMI)    PAD (peripheral artery disease)    Essential hypertension    Pure hypercholesterolemia    Coronary artery disease involving native coronary artery of native heart without angina pectoris    Stage 3 chronic kidney disease, unspecified whether stage 3a or 3b CKD    Tobacco abuse    Statin intolerance      Continue norvasc, valsartan-htn  Continue asa, brilenta- cad/pci  Continued smoking cessation  Restart repatha, niacin bridge

## 2022-04-17 ENCOUNTER — HOSPITAL ENCOUNTER (EMERGENCY)
Facility: HOSPITAL | Age: 67
Discharge: HOME | End: 2022-04-17
Attending: EMERGENCY MEDICINE
Payer: MEDICARE

## 2022-04-17 VITALS
RESPIRATION RATE: 18 BRPM | DIASTOLIC BLOOD PRESSURE: 74 MMHG | BODY MASS INDEX: 27.09 KG/M2 | TEMPERATURE: 98 F | WEIGHT: 157.88 LBS | SYSTOLIC BLOOD PRESSURE: 151 MMHG | OXYGEN SATURATION: 99 % | HEART RATE: 66 BPM

## 2022-04-17 DIAGNOSIS — R60.0 BILATERAL LOWER EXTREMITY EDEMA: ICD-10-CM

## 2022-04-17 DIAGNOSIS — I10 HYPERTENSION, UNSPECIFIED TYPE: Primary | ICD-10-CM

## 2022-04-17 LAB
ALBUMIN SERPL BCP-MCNC: 4 G/DL (ref 3.5–5.2)
ALP SERPL-CCNC: 81 U/L (ref 55–135)
ALT SERPL W/O P-5'-P-CCNC: 20 U/L (ref 10–44)
ANION GAP SERPL CALC-SCNC: 12 MMOL/L (ref 8–16)
AST SERPL-CCNC: 24 U/L (ref 10–40)
BASOPHILS # BLD AUTO: 0.07 K/UL (ref 0–0.2)
BASOPHILS NFR BLD: 1.3 % (ref 0–1.9)
BILIRUB SERPL-MCNC: 0.5 MG/DL (ref 0.1–1)
BNP SERPL-MCNC: 193 PG/ML (ref 0–99)
BUN SERPL-MCNC: 21 MG/DL (ref 8–23)
CALCIUM SERPL-MCNC: 9.6 MG/DL (ref 8.7–10.5)
CHLORIDE SERPL-SCNC: 107 MMOL/L (ref 95–110)
CK SERPL-CCNC: 148 U/L (ref 20–180)
CO2 SERPL-SCNC: 23 MMOL/L (ref 23–29)
CREAT SERPL-MCNC: 1.7 MG/DL (ref 0.5–1.4)
DIFFERENTIAL METHOD: ABNORMAL
EOSINOPHIL # BLD AUTO: 0.2 K/UL (ref 0–0.5)
EOSINOPHIL NFR BLD: 3.7 % (ref 0–8)
ERYTHROCYTE [DISTWIDTH] IN BLOOD BY AUTOMATED COUNT: 12.2 % (ref 11.5–14.5)
EST. GFR  (AFRICAN AMERICAN): 35 ML/MIN/1.73 M^2
EST. GFR  (NON AFRICAN AMERICAN): 31 ML/MIN/1.73 M^2
GLUCOSE SERPL-MCNC: 89 MG/DL (ref 70–110)
HCT VFR BLD AUTO: 37 % (ref 37–48.5)
HGB BLD-MCNC: 12.4 G/DL (ref 12–16)
IMM GRANULOCYTES # BLD AUTO: 0.02 K/UL (ref 0–0.04)
IMM GRANULOCYTES NFR BLD AUTO: 0.4 % (ref 0–0.5)
LYMPHOCYTES # BLD AUTO: 1.6 K/UL (ref 1–4.8)
LYMPHOCYTES NFR BLD: 30.2 % (ref 18–48)
MCH RBC QN AUTO: 35.4 PG (ref 27–31)
MCHC RBC AUTO-ENTMCNC: 33.5 G/DL (ref 32–36)
MCV RBC AUTO: 106 FL (ref 82–98)
MONOCYTES # BLD AUTO: 0.5 K/UL (ref 0.3–1)
MONOCYTES NFR BLD: 10 % (ref 4–15)
NEUTROPHILS # BLD AUTO: 2.9 K/UL (ref 1.8–7.7)
NEUTROPHILS NFR BLD: 54.4 % (ref 38–73)
NRBC BLD-RTO: 0 /100 WBC
PLATELET # BLD AUTO: 252 K/UL (ref 150–450)
PMV BLD AUTO: 9.7 FL (ref 9.2–12.9)
POTASSIUM SERPL-SCNC: 5.1 MMOL/L (ref 3.5–5.1)
PROT SERPL-MCNC: 7.6 G/DL (ref 6–8.4)
RBC # BLD AUTO: 3.5 M/UL (ref 4–5.4)
SODIUM SERPL-SCNC: 142 MMOL/L (ref 136–145)
TROPONIN I SERPL DL<=0.01 NG/ML-MCNC: 0.01 NG/ML (ref 0–0.03)
WBC # BLD AUTO: 5.4 K/UL (ref 3.9–12.7)

## 2022-04-17 PROCEDURE — 83880 ASSAY OF NATRIURETIC PEPTIDE: CPT | Performed by: NURSE PRACTITIONER

## 2022-04-17 PROCEDURE — 85025 COMPLETE CBC W/AUTO DIFF WBC: CPT | Performed by: NURSE PRACTITIONER

## 2022-04-17 PROCEDURE — 82550 ASSAY OF CK (CPK): CPT | Performed by: NURSE PRACTITIONER

## 2022-04-17 PROCEDURE — 84484 ASSAY OF TROPONIN QUANT: CPT | Performed by: NURSE PRACTITIONER

## 2022-04-17 PROCEDURE — 80053 COMPREHEN METABOLIC PANEL: CPT | Performed by: NURSE PRACTITIONER

## 2022-04-17 PROCEDURE — 99285 EMERGENCY DEPT VISIT HI MDM: CPT | Mod: 25

## 2022-04-17 PROCEDURE — 93010 ELECTROCARDIOGRAM REPORT: CPT | Mod: ,,, | Performed by: INTERNAL MEDICINE

## 2022-04-17 PROCEDURE — 93005 ELECTROCARDIOGRAM TRACING: CPT

## 2022-04-17 PROCEDURE — 93010 EKG 12-LEAD: ICD-10-PCS | Mod: ,,, | Performed by: INTERNAL MEDICINE

## 2022-04-17 NOTE — FIRST PROVIDER EVALUATION
Medical screening exam completed.  I have conducted a focused provider triage encounter, findings are as follows:    Brief history of present illness:  66-year-old female with complaint of bilateral lower leg swelling since this morning.  Patient also reports shortness of breath.  Occasional chest pain with radiation to left arm.  Patient had MI with stent placed 2 weeks ago.    PE: 1+ bilateral LE edema

## 2022-04-17 NOTE — ED PROVIDER NOTES
SCRIBE #1 NOTE: I, Mario Pressley, am scribing for, and in the presence of, Adrian Landaverde MD. I have scribed the entire note.       History     Chief Complaint   Patient presents with    Leg Swelling     Pt 2 weeks post stent placement having pedal edema and pain across back andd intermittently across chest and L arm. Awakened today with L arm and facial tingling and numbness      Review of patient's allergies indicates:   Allergen Reactions    Statins-hmg-coa reductase inhibitors Other (See Comments)     Myalgias to lipitor and simvastatin         History of Present Illness     HPI    4/17/2022, 5:46 PM  History obtained from the patient      History of Present Illness: Shirlene Olvera is a 66 y.o. female patient with a PMHx of HTN, MI, CAD, PAD who presents to the Emergency Department for evaluation of BLE edema. Pt notes that sxs improved initially but have since worsened. Pt reports stent placement on 3/29. Pt is not on lasix. Pt denies numbness and tingling to face, but does report pain across back and chest radiating to face and L arm. Symptoms are constant and moderate in severity. No mitigating or exacerbating factors reported. Patient denies any SOB, CP, HA, n/v, palpitations, and all other sxs at this time. No prior tx reported. No further complaints or concerns at this time.       Arrival mode: Personal vehicle     PCP: Clayton Frankel MD        Past Medical History:  Past Medical History:   Diagnosis Date    Chest pain 5/14/2015    Colon polyp     Coronary artery disease     pt states MI June 2015    Hypertension     Myocardial infarction     PAD (peripheral artery disease)     Tobacco use        Past Surgical History:  Past Surgical History:   Procedure Laterality Date    CHOLECYSTECTOMY  09/03/2015    COLONOSCOPY N/A 10/11/2016    Procedure: COLONOSCOPY;  Surgeon: Haseeb Spears MD;  Location: [facility];  Service: Endoscopy;  Laterality: N/A;    COLONOSCOPY N/A 12/9/2021     Procedure: COLONOSCOPY;  Surgeon: Pat Rios MD;  Location: [facility] ENDO;  Service: Endoscopy;  Laterality: N/A;    ESOPHAGOGASTRODUODENOSCOPY N/A 2021    Procedure: EGD (ESOPHAGOGASTRODUODENOSCOPY);  Surgeon: Pat Rios MD;  Location: [facility] ENDO;  Service: Endoscopy;  Laterality: N/A;    high blood      HYSTERECTOMY      INJECTION OF ANESTHETIC AGENT AROUND MEDIAL BRANCH NERVES INNERVATING CERVICAL FACET JOINT Bilateral 10/15/2021    Procedure: Bilateral C5-7 MBB with RN IV sedation PATIENT WOULD LIKE AFTERNOON ARRIVAL, IF POSSIBLE;  Surgeon: Nano Izquierdo MD;  Location: [facility] PAIN MGT;  Service: Pain Management;  Laterality: Bilateral;    INJECTION OF ANESTHETIC AGENT AROUND MEDIAL BRANCH NERVES INNERVATING CERVICAL FACET JOINT Bilateral 3/21/2022    Procedure: Bilateral C4-6 MBB with RN IV sedation;  Surgeon: Nano Izquierdo MD;  Location: [facility] PAIN MGT;  Service: Pain Management;  Laterality: Bilateral;    INJECTION OF ANESTHETIC AGENT INTO SACROILIAC JOINT Right 2021    Procedure: Right SIJ Injection;  Surgeon: Nano Izquierdo MD;  Location: [facility] PAIN MGT;  Service: Pain Management;  Laterality: Right;    LEFT HEART CATHETERIZATION Left 3/29/2022    Procedure: CATHETERIZATION, HEART, LEFT;  Surgeon: Lion Awad MD;  Location: [facility] CATH LAB;  Service: Cardiology;  Laterality: Left;    OOPHORECTOMY      RIB FRACTURE SURGERY           Family History:  Family History   Problem Relation Age of Onset    Heart attack Father 56        MI    Asthma Neg Hx     Thyroid disease Neg Hx     Migraines Neg Hx     Cancer Neg Hx        Social History:  Social History     Tobacco Use    Smoking status: Former Smoker     Packs/day: 0.25     Quit date: 3/29/2022     Years since quittin.0    Smokeless tobacco: Never Used   Substance and Sexual Activity    Alcohol use: Not Currently     Alcohol/week: 0.0 standard drinks    Drug use: No    Sexual activity: Never        Review of  Systems     Review of Systems   Constitutional: Negative for fever.   HENT: Negative for sore throat.    Respiratory: Negative for shortness of breath.    Cardiovascular: Positive for chest pain and leg swelling (BLE). Negative for palpitations.   Gastrointestinal: Negative for nausea and vomiting.   Genitourinary: Negative for dysuria and frequency.   Musculoskeletal: Negative for back pain.   Skin: Negative for rash.   Neurological: Negative for dizziness, facial asymmetry, speech difficulty, weakness and numbness.   Hematological: Does not bruise/bleed easily.   All other systems reviewed and are negative.     Physical Exam     Initial Vitals [04/17/22 1650]   BP Pulse Resp Temp SpO2   (!) 173/88 76 19 98.1 °F (36.7 °C) 99 %      MAP       --          Physical Exam  Nursing Notes and Vital Signs Reviewed.  Constitutional: Patient is in no acute distress. Well-developed and well-nourished.  Head: Atraumatic. Normocephalic.  Eyes: PERRL. EOM intact. Conjunctivae are not pale. No scleral icterus.  ENT: Mucous membranes are moist. Oropharynx is clear and symmetric.    Neck: Supple. Full ROM. No lymphadenopathy.  Cardiovascular: Regular rate. Regular rhythm. No murmurs, rubs, or gallops. Distal pulses are 2+ and symmetric.  Pulmonary/Chest: No respiratory distress. Clear to auscultation bilaterally. No wheezing or rales.  Abdominal: Soft and non-distended.  There is no tenderness.  No rebound, guarding, or rigidity. Good bowel sounds.  Genitourinary: No CVA tenderness  Musculoskeletal: Moves all extremities. No obvious deformities. Trace edema BLE. No calf tenderness.  Skin: Warm and dry.  Neurological:  Alert, awake, and appropriate.  Normal speech.  No acute focal neurological deficits are appreciated.  Psychiatric: Normal affect. Good eye contact. Appropriate in content.     ED Course   Procedures  ED Vital Signs:  Vitals:    04/17/22 1650 04/17/22 1719 04/17/22 1750 04/17/22 1805   BP: (!) 173/88 (!) 162/75 (!)  168/79 (!) 148/68   Pulse: 76 69 68 69   Resp: 19 13 12 18   Temp: 98.1 °F (36.7 °C)      TempSrc: Oral      SpO2: 99% 100% 99% 100%   Weight: 71.6 kg (157 lb 13.6 oz)       04/17/22 1846   BP: (!) 151/74   Pulse: 66   Resp: 18   Temp:    TempSrc:    SpO2: 99%   Weight:        Abnormal Lab Results:  Labs Reviewed   CBC W/ AUTO DIFFERENTIAL - Abnormal; Notable for the following components:       Result Value    RBC 3.50 (*)      (*)     MCH 35.4 (*)     All other components within normal limits   COMPREHENSIVE METABOLIC PANEL - Abnormal; Notable for the following components:    Creatinine 1.7 (*)     eGFR if  35 (*)     eGFR if non  31 (*)     All other components within normal limits   B-TYPE NATRIURETIC PEPTIDE - Abnormal; Notable for the following components:     (*)     All other components within normal limits   CK   TROPONIN I        All Lab Results:  Results for orders placed or performed during the hospital encounter of 04/17/22   CBC auto differential   Result Value Ref Range    WBC 5.40 3.90 - 12.70 K/uL    RBC 3.50 (L) 4.00 - 5.40 M/uL    Hemoglobin 12.4 12.0 - 16.0 g/dL    Hematocrit 37.0 37.0 - 48.5 %     (H) 82 - 98 fL    MCH 35.4 (H) 27.0 - 31.0 pg    MCHC 33.5 32.0 - 36.0 g/dL    RDW 12.2 11.5 - 14.5 %    Platelets 252 150 - 450 K/uL    MPV 9.7 9.2 - 12.9 fL    Immature Granulocytes 0.4 0.0 - 0.5 %    Gran # (ANC) 2.9 1.8 - 7.7 K/uL    Immature Grans (Abs) 0.02 0.00 - 0.04 K/uL    Lymph # 1.6 1.0 - 4.8 K/uL    Mono # 0.5 0.3 - 1.0 K/uL    Eos # 0.2 0.0 - 0.5 K/uL    Baso # 0.07 0.00 - 0.20 K/uL    nRBC 0 0 /100 WBC    Gran % 54.4 38.0 - 73.0 %    Lymph % 30.2 18.0 - 48.0 %    Mono % 10.0 4.0 - 15.0 %    Eosinophil % 3.7 0.0 - 8.0 %    Basophil % 1.3 0.0 - 1.9 %    Differential Method Automated    Comprehensive metabolic panel   Result Value Ref Range    Sodium 142 136 - 145 mmol/L    Potassium 5.1 3.5 - 5.1 mmol/L    Chloride 107 95 - 110 mmol/L     CO2 23 23 - 29 mmol/L    Glucose 89 70 - 110 mg/dL    BUN 21 8 - 23 mg/dL    Creatinine 1.7 (H) 0.5 - 1.4 mg/dL    Calcium 9.6 8.7 - 10.5 mg/dL    Total Protein 7.6 6.0 - 8.4 g/dL    Albumin 4.0 3.5 - 5.2 g/dL    Total Bilirubin 0.5 0.1 - 1.0 mg/dL    Alkaline Phosphatase 81 55 - 135 U/L    AST 24 10 - 40 U/L    ALT 20 10 - 44 U/L    Anion Gap 12 8 - 16 mmol/L    eGFR if African American 35 (A) >60 mL/min/1.73 m^2    eGFR if non African American 31 (A) >60 mL/min/1.73 m^2   CPK   Result Value Ref Range     20 - 180 U/L   Troponin I   Result Value Ref Range    Troponin I 0.010 0.000 - 0.026 ng/mL   Brain natriuretic peptide   Result Value Ref Range     (H) 0 - 99 pg/mL       Imaging Results:  Imaging Results          X-Ray Chest 1 View (Final result)  Result time 04/17/22 17:32:33    Final result by Mariella Carreno MD (04/17/22 17:32:33)                 Impression:      No acute abnormality.      Electronically signed by: Wai Wolff  Date:    04/17/2022  Time:    17:32             Narrative:    EXAMINATION:  XR CHEST 1 VIEW    CLINICAL HISTORY:  Shortness of breath    TECHNIQUE:  Single frontal view of the chest was performed.    COMPARISON:  None    FINDINGS:  Postsurgical changes left supraclavicular region.The lungs are otherwise clear, with normal appearance of pulmonary vasculature and no pleural effusion or pneumothorax.    The cardiac silhouette is normal in size. The hilar and mediastinal contours are unremarkable.    Bones are intact.                                 The EKG was ordered, reviewed, and independently interpreted by the ED provider.  Interpretation time: 17:07  Rate: 69 BPM  Rhythm: normal sinus rhythm  Interpretation: No acute ST changes. No STEMI.         The Emergency Provider reviewed the vital signs and test results, which are outlined above.     ED Discussion     6:36 PM: Reevaluated pt. Advised pt that for this level of edema I recommends leg elevation and support  stockings. Advised pt to f/u with cardiology in 2 days for reevaluation of kidney function and antidiuretic use. Pt is in no distress and there is a mild amount of edema    6:37 PM:  Discussed with pt all pertinent ED information and results. Discussed pt dx and plan of tx. Gave pt all f/u and return to the ED instructions. All questions and concerns were addressed at this time. Pt expresses understanding of information and instructions, and is comfortable with plan to discharge. Pt is stable for discharge.    I discussed with patient and/or family/caretaker that evaluation in the ED does not suggest any emergent or life threatening medical conditions requiring immediate intervention beyond what was provided in the ED, and I believe patient is safe for discharge.  Regardless, an unremarkable evaluation in the ED does not preclude the development or presence of a serious of life threatening condition. As such, patient was instructed to return immediately for any worsening or change in current symptoms.     Medical Decision Making:   Clinical Tests:   Lab Tests: Ordered and Reviewed  Radiological Study: Reviewed and Ordered  Medical Tests: Ordered and Reviewed           ED Medication(s):  Medications - No data to display    Discharge Medication List as of 4/17/2022  6:35 PM           Follow-up Information     Cardiology. Call in 2 days.           Clayton Frankel MD In 2 days.    Specialty: Family Medicine  Contact information:  [address] [facility] [address] [phone]             [place] - Emergency Dept..    Specialty: Emergency Medicine  Why: If symptoms worsen  Contact information:  [address] [facility] [address] [phone]                           Scribe Attestation:   Scribe #1: I performed the above scribed service and the documentation accurately describes the services I performed. I attest to the accuracy of the note.     Attending:   Physician Attestation  Statement for Scribe #1: I, Adrian Landaverde MD, personally performed the services described in this documentation, as scribed by Mario Pressley, in my presence, and it is both accurate and complete.           Clinical Impression       ICD-10-CM ICD-9-CM   1. Hypertension, unspecified type  I10 401.9   2. Bilateral lower extremity edema  R60.0 782.3       Disposition:   Disposition: Discharged  Condition: Stable         Adrian Landaverde MD  04/17/22 2256       Adrian Landaverde MD  04/17/22 2256

## 2022-04-18 ENCOUNTER — TELEPHONE (OUTPATIENT)
Dept: INTERNAL MEDICINE | Facility: CLINIC | Age: 67
End: 2022-04-18
Payer: MEDICARE

## 2022-04-18 ENCOUNTER — TELEPHONE (OUTPATIENT)
Dept: CARDIOLOGY | Facility: CLINIC | Age: 67
End: 2022-04-18
Payer: MEDICARE

## 2022-04-18 NOTE — TELEPHONE ENCOUNTER
----- Message from Sherly Cagle sent at 4/18/2022  7:38 AM CDT -----  .Type:  Sooner Appointment Request    Caller is requesting a sooner appointment.  Caller declined first available appointment listed below.  Caller will not accept being placed on the waitlist and is requesting a message be sent to doctor.  Name of Caller: SELF  When is the first available appointment? 4/22  Symptoms: 4/17 [facility] (SWELLING IN LEGS)  Would the patient rather a call back or a response via [facility]? CALL  Best Call Back Number:.[phone] (home)   Additional Information:

## 2022-04-18 NOTE — TELEPHONE ENCOUNTER
Followed up with Shirlene, I informed patient that message was sent to Dr. Byers and that I would reach out once I receive a response.

## 2022-04-18 NOTE — TELEPHONE ENCOUNTER
----- Message from Sherly Cagle sent at 4/18/2022  7:41 AM CDT -----  pt was seen 4/17 in [facility] for swelling in legs, please advise..[phone] (home)

## 2022-04-19 ENCOUNTER — OFFICE VISIT (OUTPATIENT)
Dept: URGENT CARE | Facility: CLINIC | Age: 67
End: 2022-04-19
Payer: MEDICARE

## 2022-04-19 VITALS
DIASTOLIC BLOOD PRESSURE: 82 MMHG | OXYGEN SATURATION: 100 % | SYSTOLIC BLOOD PRESSURE: 181 MMHG | RESPIRATION RATE: 16 BRPM | HEIGHT: 64 IN | BODY MASS INDEX: 26.8 KG/M2 | WEIGHT: 157 LBS | HEART RATE: 74 BPM | TEMPERATURE: 98 F

## 2022-04-19 DIAGNOSIS — R03.0 ELEVATED BLOOD PRESSURE READING: ICD-10-CM

## 2022-04-19 DIAGNOSIS — S51.802A AVULSION OF SKIN OF LEFT FOREARM, INITIAL ENCOUNTER: ICD-10-CM

## 2022-04-19 DIAGNOSIS — L30.9 DERMATITIS: Primary | ICD-10-CM

## 2022-04-19 DIAGNOSIS — I25.2 HISTORY OF NON-ST ELEVATION MYOCARDIAL INFARCTION (NSTEMI): Primary | ICD-10-CM

## 2022-04-19 PROCEDURE — 1160F RVW MEDS BY RX/DR IN RCRD: CPT | Mod: CPTII,S$GLB,, | Performed by: STUDENT IN AN ORGANIZED HEALTH CARE EDUCATION/TRAINING PROGRAM

## 2022-04-19 PROCEDURE — 3008F PR BODY MASS INDEX (BMI) DOCUMENTED: ICD-10-PCS | Mod: CPTII,S$GLB,, | Performed by: STUDENT IN AN ORGANIZED HEALTH CARE EDUCATION/TRAINING PROGRAM

## 2022-04-19 PROCEDURE — 1126F AMNT PAIN NOTED NONE PRSNT: CPT | Mod: CPTII,S$GLB,, | Performed by: STUDENT IN AN ORGANIZED HEALTH CARE EDUCATION/TRAINING PROGRAM

## 2022-04-19 PROCEDURE — 3008F BODY MASS INDEX DOCD: CPT | Mod: CPTII,S$GLB,, | Performed by: STUDENT IN AN ORGANIZED HEALTH CARE EDUCATION/TRAINING PROGRAM

## 2022-04-19 PROCEDURE — 1160F PR REVIEW ALL MEDS BY PRESCRIBER/CLIN PHARMACIST DOCUMENTED: ICD-10-PCS | Mod: CPTII,S$GLB,, | Performed by: STUDENT IN AN ORGANIZED HEALTH CARE EDUCATION/TRAINING PROGRAM

## 2022-04-19 PROCEDURE — 3077F PR MOST RECENT SYSTOLIC BLOOD PRESSURE >= 140 MM HG: ICD-10-PCS | Mod: CPTII,S$GLB,, | Performed by: STUDENT IN AN ORGANIZED HEALTH CARE EDUCATION/TRAINING PROGRAM

## 2022-04-19 PROCEDURE — 4010F PR ACE/ARB THEARPY RXD/TAKEN: ICD-10-PCS | Mod: CPTII,S$GLB,, | Performed by: STUDENT IN AN ORGANIZED HEALTH CARE EDUCATION/TRAINING PROGRAM

## 2022-04-19 PROCEDURE — 3079F PR MOST RECENT DIASTOLIC BLOOD PRESSURE 80-89 MM HG: ICD-10-PCS | Mod: CPTII,S$GLB,, | Performed by: STUDENT IN AN ORGANIZED HEALTH CARE EDUCATION/TRAINING PROGRAM

## 2022-04-19 PROCEDURE — 1159F MED LIST DOCD IN RCRD: CPT | Mod: CPTII,S$GLB,, | Performed by: STUDENT IN AN ORGANIZED HEALTH CARE EDUCATION/TRAINING PROGRAM

## 2022-04-19 PROCEDURE — 99213 OFFICE O/P EST LOW 20 MIN: CPT | Mod: S$GLB,,, | Performed by: STUDENT IN AN ORGANIZED HEALTH CARE EDUCATION/TRAINING PROGRAM

## 2022-04-19 PROCEDURE — 1159F PR MEDICATION LIST DOCUMENTED IN MEDICAL RECORD: ICD-10-PCS | Mod: CPTII,S$GLB,, | Performed by: STUDENT IN AN ORGANIZED HEALTH CARE EDUCATION/TRAINING PROGRAM

## 2022-04-19 PROCEDURE — 1126F PR PAIN SEVERITY QUANTIFIED, NO PAIN PRESENT: ICD-10-PCS | Mod: CPTII,S$GLB,, | Performed by: STUDENT IN AN ORGANIZED HEALTH CARE EDUCATION/TRAINING PROGRAM

## 2022-04-19 PROCEDURE — 4010F ACE/ARB THERAPY RXD/TAKEN: CPT | Mod: CPTII,S$GLB,, | Performed by: STUDENT IN AN ORGANIZED HEALTH CARE EDUCATION/TRAINING PROGRAM

## 2022-04-19 PROCEDURE — 99213 PR OFFICE/OUTPT VISIT, EST, LEVL III, 20-29 MIN: ICD-10-PCS | Mod: S$GLB,,, | Performed by: STUDENT IN AN ORGANIZED HEALTH CARE EDUCATION/TRAINING PROGRAM

## 2022-04-19 PROCEDURE — 3077F SYST BP >= 140 MM HG: CPT | Mod: CPTII,S$GLB,, | Performed by: STUDENT IN AN ORGANIZED HEALTH CARE EDUCATION/TRAINING PROGRAM

## 2022-04-19 PROCEDURE — 3079F DIAST BP 80-89 MM HG: CPT | Mod: CPTII,S$GLB,, | Performed by: STUDENT IN AN ORGANIZED HEALTH CARE EDUCATION/TRAINING PROGRAM

## 2022-04-19 RX ORDER — TRIAMCINOLONE ACETONIDE 1 MG/G
CREAM TOPICAL 2 TIMES DAILY
Qty: 45 G | Refills: 0 | Status: SHIPPED | OUTPATIENT
Start: 2022-04-19 | End: 2022-04-26

## 2022-04-19 RX ORDER — FUROSEMIDE 20 MG/1
20 TABLET ORAL DAILY
COMMUNITY
End: 2022-04-19 | Stop reason: SDUPTHER

## 2022-04-19 RX ORDER — HYDROXYZINE HYDROCHLORIDE 10 MG/1
10-20 TABLET, FILM COATED ORAL 3 TIMES DAILY PRN
Qty: 15 TABLET | Refills: 0 | Status: SHIPPED | OUTPATIENT
Start: 2022-04-19 | End: 2022-04-26

## 2022-04-19 RX ORDER — TRIAMCINOLONE ACETONIDE 5 MG/G
OINTMENT TOPICAL 2 TIMES DAILY
Qty: 30 G | Refills: 0 | Status: SHIPPED | OUTPATIENT
Start: 2022-04-19 | End: 2022-04-26

## 2022-04-19 NOTE — PROGRESS NOTES
"Subjective:       Patient ID: Shirlene Olvera is a 66 y.o. female.    Vitals:  height is 5' 4" (1.626 m) and weight is 71.2 kg (157 lb). Her tympanic temperature is 97.8 °F (36.6 °C). Her pulse is 74. Her respiration is 16 and oxygen saturation is 100%.     Chief Complaint: Poison Ivy    Pt presents for rash and wound. Reports gardened this weekend and got into poison ivy and sumac, then yesterday developed itchy rash on face and neck, now developing on arms. States had blisters but "scrubbed them off". Denied f/c, n/v, diarrhea, abd pain, wheezing, SoB, palpitations, or oral/facial swelling.  Also states since hospital discharge for MI on 03/28/22 and started on brilinta has had easy bruising and bleeding. Today scratched arm and skin avulsed with persistent bleeding despite bandage and pressure.   Reports compliance with blood pressure medications but has had elevated home readings since discharge on 03/28/22. Was evaluated in the ER on 04/17/22 for this with chest pain and sent home with instructions to f/u within 2 days. Has PCP appt tomorrow. Currently without CP or other hypertensive symptoms.      Poison Ivy  This is a new problem. The current episode started yesterday. The problem has been gradually worsening since onset. The affected locations include the face, neck, right arm and left arm. The rash is characterized by redness, itchiness and blistering. She was exposed to plant contact. Associated symptoms include fatigue. Pertinent negatives include no anorexia, congestion, cough, diarrhea, eye pain, facial edema, fever, joint pain, rhinorrhea, shortness of breath, sore throat or vomiting. Past treatments include nothing. The treatment provided no relief. Her past medical history is significant for allergies.       Constitution: Positive for fatigue. Negative for chills and fever.   HENT: Negative for ear pain, congestion and sore throat.    Cardiovascular: Positive for leg swelling (improved from ED " visit). Negative for chest pain, palpitations and sob on exertion.   Eyes: Negative for eye discharge, eye itching, eye pain, eye redness, blurred vision and eyelid swelling.   Respiratory: Negative for cough and shortness of breath.    Gastrointestinal: Negative for vomiting and diarrhea.   Skin: Positive for rash and wound.   Neurological: Negative for dizziness, headaches and numbness.   Psychiatric/Behavioral: Negative for confusion.       Objective:      Physical Exam   Constitutional: She is oriented to person, place, and time. She appears well-developed. She does not appear ill. No distress.   HENT:   Head: Normocephalic and atraumatic.   Ears:   Right Ear: External ear normal.   Left Ear: External ear normal.   Eyes: Conjunctivae and EOM are normal. Right eye exhibits no discharge. Left eye exhibits no discharge.   Neck: Neck supple.   Cardiovascular: Normal rate, regular rhythm and normal heart sounds.   Pulmonary/Chest: Effort normal and breath sounds normal. No respiratory distress. She has no wheezes. She has no rhonchi. She has no rales.   Musculoskeletal: Normal range of motion.         General: No tenderness. Normal range of motion.   Neurological: She is alert and oriented to person, place, and time. No cranial nerve deficit (CN II-XII grossly intact).   Skin: Skin is warm, dry and rash.         Comments: Generalized redness of anterior neck and face with scattered small maculopapules without vesicles, purulence, or warmth; b/l ventral forearms with blanching finely coarse erythematous rash without vesicles or SOI  ~3x1cm superficial avulsion on L dorsal forearm with mild sanguinous oozing   Psychiatric: Her behavior is normal. Judgment and thought content normal.   Nursing note and vitals reviewed.        Assessment:       1. Dermatitis    2. Avulsion of skin of left forearm, initial encounter    3. Elevated blood pressure reading          Plan:         Dermatitis  -     triamcinolone (KENALOG) 0.5  % ointment; Apply topically 2 (two) times daily. Use thin layer on arms for 7 days  Dispense: 30 g; Refill: 0  -     triamcinolone acetonide 0.1% (KENALOG) 0.1 % cream; Apply topically 2 (two) times daily. Apply thin layer for face/neck for 7 days  Dispense: 45 g; Refill: 0  -     hydrOXYzine HCL (ATARAX) 10 MG Tab; Take 1-2 tablets (10-20 mg total) by mouth 3 (three) times daily as needed (Itching).  Dispense: 15 tablet; Refill: 0  - rash atypical in appearance for poison ivy without grouped patches of linear vesicular lesions; pt otherwise without known exposure to anything and states only Brilinta as new medication which was started 3wks ago. Was wanting oral or IM steroid, but given elevated BP and recent MI to try topical treatment. Has close follow-up tomorrow and ER precautions given    Avulsion of skin of left forearm, initial encounter  - steristrip placed with pressure bandage, instruction on home care given    Elevated blood pressure reading  - elevated since hospital discharge; reviewed ED visit from 04/17/22 noted to have possible SANTA as well. At discharge 03/28/22 her metoprolol was stopped (bradycardia?) and hctz, now only on valsartan and amlodipine, this may be the cause of elevated BP's  - f/u with PCP tomorrow and is monitoring with home cuff    Medications and diagnosis reviewed with patient, questions answered, and return precautions given.    Follow up in 1 day (on 4/20/2022) for re-evaluation with PCP as scheduled, or sooner if worsening.    Sharad Cabrera MD/MPH  [facility]  [facility]

## 2022-04-19 NOTE — TELEPHONE ENCOUNTER
The patient has been notified of this information and all questions answered.  Start lasix 20 q day, appt within 2 weeks. Patient verbalized understanding.

## 2022-04-20 ENCOUNTER — OFFICE VISIT (OUTPATIENT)
Dept: INTERNAL MEDICINE | Facility: CLINIC | Age: 67
End: 2022-04-20
Payer: MEDICARE

## 2022-04-20 VITALS
OXYGEN SATURATION: 99 % | HEART RATE: 62 BPM | TEMPERATURE: 98 F | SYSTOLIC BLOOD PRESSURE: 130 MMHG | DIASTOLIC BLOOD PRESSURE: 78 MMHG | BODY MASS INDEX: 27.02 KG/M2 | WEIGHT: 157.44 LBS

## 2022-04-20 DIAGNOSIS — E78.00 PURE HYPERCHOLESTEROLEMIA: Chronic | ICD-10-CM

## 2022-04-20 DIAGNOSIS — Z28.9 DELAYED IMMUNIZATIONS: ICD-10-CM

## 2022-04-20 DIAGNOSIS — I25.2 HISTORY OF NON-ST ELEVATION MYOCARDIAL INFARCTION (NSTEMI): ICD-10-CM

## 2022-04-20 DIAGNOSIS — I10 ESSENTIAL HYPERTENSION: Primary | Chronic | ICD-10-CM

## 2022-04-20 DIAGNOSIS — S16.1XXA STRAIN OF NECK MUSCLE, INITIAL ENCOUNTER: ICD-10-CM

## 2022-04-20 DIAGNOSIS — R21 RASH: ICD-10-CM

## 2022-04-20 PROCEDURE — 3288F PR FALLS RISK ASSESSMENT DOCUMENTED: ICD-10-PCS | Mod: CPTII,S$GLB,, | Performed by: FAMILY MEDICINE

## 2022-04-20 PROCEDURE — 1159F PR MEDICATION LIST DOCUMENTED IN MEDICAL RECORD: ICD-10-PCS | Mod: CPTII,S$GLB,, | Performed by: FAMILY MEDICINE

## 2022-04-20 PROCEDURE — 3008F BODY MASS INDEX DOCD: CPT | Mod: CPTII,S$GLB,, | Performed by: FAMILY MEDICINE

## 2022-04-20 PROCEDURE — 1111F DSCHRG MED/CURRENT MED MERGE: CPT | Mod: CPTII,S$GLB,, | Performed by: FAMILY MEDICINE

## 2022-04-20 PROCEDURE — 3075F SYST BP GE 130 - 139MM HG: CPT | Mod: CPTII,S$GLB,, | Performed by: FAMILY MEDICINE

## 2022-04-20 PROCEDURE — 3075F PR MOST RECENT SYSTOLIC BLOOD PRESS GE 130-139MM HG: ICD-10-PCS | Mod: CPTII,S$GLB,, | Performed by: FAMILY MEDICINE

## 2022-04-20 PROCEDURE — 1125F PR PAIN SEVERITY QUANTIFIED, PAIN PRESENT: ICD-10-PCS | Mod: CPTII,S$GLB,, | Performed by: FAMILY MEDICINE

## 2022-04-20 PROCEDURE — 99999 PR PBB SHADOW E&M-EST. PATIENT-LVL V: CPT | Mod: PBBFAC,,, | Performed by: FAMILY MEDICINE

## 2022-04-20 PROCEDURE — 4010F PR ACE/ARB THEARPY RXD/TAKEN: ICD-10-PCS | Mod: CPTII,S$GLB,, | Performed by: FAMILY MEDICINE

## 2022-04-20 PROCEDURE — 90732 PPSV23 VACC 2 YRS+ SUBQ/IM: CPT | Mod: S$GLB,,, | Performed by: FAMILY MEDICINE

## 2022-04-20 PROCEDURE — 90732 PNEUMOCOCCAL POLYSACCHARIDE VACCINE 23-VALENT =>2YO SQ IM: ICD-10-PCS | Mod: S$GLB,,, | Performed by: FAMILY MEDICINE

## 2022-04-20 PROCEDURE — 99214 PR OFFICE/OUTPT VISIT, EST, LEVL IV, 30-39 MIN: ICD-10-PCS | Mod: S$GLB,,, | Performed by: FAMILY MEDICINE

## 2022-04-20 PROCEDURE — 1125F AMNT PAIN NOTED PAIN PRSNT: CPT | Mod: CPTII,S$GLB,, | Performed by: FAMILY MEDICINE

## 2022-04-20 PROCEDURE — 3008F PR BODY MASS INDEX (BMI) DOCUMENTED: ICD-10-PCS | Mod: CPTII,S$GLB,, | Performed by: FAMILY MEDICINE

## 2022-04-20 PROCEDURE — 3078F DIAST BP <80 MM HG: CPT | Mod: CPTII,S$GLB,, | Performed by: FAMILY MEDICINE

## 2022-04-20 PROCEDURE — 3288F FALL RISK ASSESSMENT DOCD: CPT | Mod: CPTII,S$GLB,, | Performed by: FAMILY MEDICINE

## 2022-04-20 PROCEDURE — G0009 ADMIN PNEUMOCOCCAL VACCINE: HCPCS | Mod: S$GLB,,, | Performed by: FAMILY MEDICINE

## 2022-04-20 PROCEDURE — 99214 OFFICE O/P EST MOD 30 MIN: CPT | Mod: S$GLB,,, | Performed by: FAMILY MEDICINE

## 2022-04-20 PROCEDURE — 1159F MED LIST DOCD IN RCRD: CPT | Mod: CPTII,S$GLB,, | Performed by: FAMILY MEDICINE

## 2022-04-20 PROCEDURE — 4010F ACE/ARB THERAPY RXD/TAKEN: CPT | Mod: CPTII,S$GLB,, | Performed by: FAMILY MEDICINE

## 2022-04-20 PROCEDURE — 1111F PR DISCHARGE MEDS RECONCILED W/ CURRENT OUTPATIENT MED LIST: ICD-10-PCS | Mod: CPTII,S$GLB,, | Performed by: FAMILY MEDICINE

## 2022-04-20 PROCEDURE — 1101F PR PT FALLS ASSESS DOC 0-1 FALLS W/OUT INJ PAST YR: ICD-10-PCS | Mod: CPTII,S$GLB,, | Performed by: FAMILY MEDICINE

## 2022-04-20 PROCEDURE — G0009 PNEUMOCOCCAL POLYSACCHARIDE VACCINE 23-VALENT =>2YO SQ IM: ICD-10-PCS | Mod: S$GLB,,, | Performed by: FAMILY MEDICINE

## 2022-04-20 PROCEDURE — 1101F PT FALLS ASSESS-DOCD LE1/YR: CPT | Mod: CPTII,S$GLB,, | Performed by: FAMILY MEDICINE

## 2022-04-20 PROCEDURE — 99999 PR PBB SHADOW E&M-EST. PATIENT-LVL V: ICD-10-PCS | Mod: PBBFAC,,, | Performed by: FAMILY MEDICINE

## 2022-04-20 PROCEDURE — 3078F PR MOST RECENT DIASTOLIC BLOOD PRESSURE < 80 MM HG: ICD-10-PCS | Mod: CPTII,S$GLB,, | Performed by: FAMILY MEDICINE

## 2022-04-20 RX ORDER — FUROSEMIDE 20 MG/1
20 TABLET ORAL DAILY
Qty: 30 TABLET | Refills: 5 | Status: SHIPPED | OUTPATIENT
Start: 2022-04-20 | End: 2022-07-13 | Stop reason: SDUPTHER

## 2022-04-20 RX ORDER — TIZANIDINE 4 MG/1
4 TABLET ORAL EVERY 8 HOURS
Qty: 90 TABLET | Refills: 0 | Status: SHIPPED | OUTPATIENT
Start: 2022-04-20 | End: 2022-05-20

## 2022-04-27 DIAGNOSIS — I25.10 CORONARY ARTERY DISEASE INVOLVING NATIVE CORONARY ARTERY OF NATIVE HEART WITHOUT ANGINA PECTORIS: ICD-10-CM

## 2022-04-27 DIAGNOSIS — E78.00 PURE HYPERCHOLESTEROLEMIA: Primary | ICD-10-CM

## 2022-04-27 DIAGNOSIS — I10 ESSENTIAL HYPERTENSION: ICD-10-CM

## 2022-04-27 DIAGNOSIS — I25.2 HISTORY OF NON-ST ELEVATION MYOCARDIAL INFARCTION (NSTEMI): ICD-10-CM

## 2022-04-27 RX ORDER — VALSARTAN 160 MG/1
160 TABLET ORAL DAILY
Qty: 30 TABLET | Refills: 0 | Status: SHIPPED | OUTPATIENT
Start: 2022-04-27 | End: 2022-04-28 | Stop reason: SDUPTHER

## 2022-04-27 NOTE — PROGRESS NOTES
Established Patient Chronic Pain Note     Referring Physician: No ref. provider found    PCP: Clayton Frankel MD    Chief Complaint:   Chief Complaint   Patient presents with    Neck Pain        SUBJECTIVE:  Interval history 04/20/2022  Patient presents that his post bilateral C4, C5, C6 medial branch block 03/21/2022.  Patient reports 50% sustained relief in bilateral neck pain following medial branch block.  Of note patient reports she recently had a myocardial infarction with PTCA 3 weeks prior.  Patient reports she has been started on Brilinta and continued on aspirin.  Patient would like to pursue interventional treatment but understands necessity of pausing Brilinta prior to cervical radiofrequency ablation.  Today patient denies radiation into the upper extremities, compromise in hand  strength or dexterity or upper extremity weakness.  Patient has continued gabapentin 300 mg in the evening and reports improvement in her pain.  She denies any side effects from this medication.      Interval history 03/10/2022  Patient presents status post right-sided sacroiliac joint injection 12/17/2021 and for review of bilateral hip XR.  Patient reports 100% relief and right sacroiliac territory following her injection.  Today she reports insidious return of bilateral neck pain, which today is rated as a 9/10.  Patient reports pain in bilateral cervical paraspinous territory.  She denies radiation into the upper extremities, compromise in hand  strength or dexterity.  Patient is requesting repeat cervical C4, C5-C6 medial branch block as she obtain greater than 4 months of relief with this procedure.  Patient has continued gabapentin 100 mg 3 times daily and was not able to fill be updated prescription.  She does believe this medication helps with her symptoms and she denies any side effects.      Interval history 11/11/2021  Patient presents status post bilateral medial branch block at C4, C5, C6 10/15/2021.   "Patient reports 100% pain relief following her bilateral medial branch block which is still sustained.  Patient reports significant improvement in range of motion and reduced pain with cervical flexion, extension and lateral flexion.  Today patient primarily ports right-sided sacroiliac joint pain which radiates into the right buttock and right hip territory.  Pain today is rated as a 7/10 and is intermittent.  Pain is described as sharp in nature.  Pain is exacerbated when moving from sitting to standing and patient has positive Jason finger sign.  Patient has continued gabapentin 300 mg at night.  Patient reports when she increase this dose to 600, she just did not feel right.  She will maintain her dose of 300 mg. She denies any new onset lower extremity weakness, bowel or bladder incontinence or saddle anesthesia.      HPI:  10/06/2021  Shirlene Olvera is a 66 y.o. female with past medical history significant for ROMÁN, HTN, HLD, CAD s/p MI, PAD, CKD III, nicotine dependence who presents to the clinic for the evaluation of longstanding neck pain.  Today patient reports neck pain which began several years prior without inciting accident, injury or trauma.  Patient describes pain as intermittent and is a 7/10 today.  At its worst pain is a 10/10.  Pain is described as stiff and a "pins and needles" sensation.  Pain begins at her occiput and radiates down bilateral cervical paraspinous muscles into bilateral trapezius distributions in C4-6 distribution.  Pain is exacerbated with cervical flexion, extension such as when she is gardening.  Pain is improved with Icy Hot.  Patient denies radicular symptoms into bilateral upper extremities, paresthesias or weakness in bilateral hands, compromise in hand  strength or dexterity.  Patient reports receiving prior cervical medial branch block several years prior with significant improvement in her symptoms.  Patient is currently taking gabapentin 100 mg 3 times " daily without noticeable improvement in her symptoms.    Patient denies night fever/night sweats, urinary incontinence, bowel incontinence, significant weight loss, significant motor weakness and loss of sensations.    Pain Disability Index Review:     Last 3 PDI Scores 11/11/2021 10/6/2021   Pain Disability Index (PDI) 39 38       Non-Pharmacologic Treatments:  Physical Therapy/Home Exercise: no  Ice/Heat:yes  TENS: no  Acupuncture: no  Massage: no  Chiropractic: no    Other: no      Pain Medications:  - Adjuvant Medications: Neurontin (Gabapentin), Topical Ointment (Voltaren Gel, Steroid cream, Anti-Inflammatory Cream, Compound cream) and Tylenol (Acetaminophen)  - Anti-Coagulants: Aspirin    Pain Procedures:   -03/21/2022:  Bilateral C4, C5, C6 medial branch block  -12/17/2021:  Right-sided sacroiliac joint injection  -10/15/2021:  Bilateral medial branch block at C4, C5, C6; Dr. Izquierdo    Cervical MBB: several years prior    Past Medical History:   Diagnosis Date    Chest pain 5/14/2015    Colon polyp     Coronary artery disease     pt states MI June 2015    Hypertension     Myocardial infarction     PAD (peripheral artery disease)     Tobacco use      Past Surgical History:   Procedure Laterality Date    CHOLECYSTECTOMY  09/03/2015    COLONOSCOPY N/A 10/11/2016    Procedure: COLONOSCOPY;  Surgeon: Haseeb Spears MD;  Location: [facility];  Service: Endoscopy;  Laterality: N/A;    COLONOSCOPY N/A 12/9/2021    Procedure: COLONOSCOPY;  Surgeon: Pat Rios MD;  Location: [facility];  Service: Endoscopy;  Laterality: N/A;    ESOPHAGOGASTRODUODENOSCOPY N/A 12/9/2021    Procedure: EGD (ESOPHAGOGASTRODUODENOSCOPY);  Surgeon: Pat Rios MD;  Location: [facility];  Service: Endoscopy;  Laterality: N/A;    high blood      HYSTERECTOMY      INJECTION OF ANESTHETIC AGENT AROUND MEDIAL BRANCH NERVES INNERVATING CERVICAL FACET JOINT Bilateral 10/15/2021    Procedure: Bilateral C5-7 MBB with RN IV  sedation PATIENT WOULD LIKE AFTERNOON ARRIVAL, IF POSSIBLE;  Surgeon: Nano Izquierdo MD;  Location: [facility];  Service: Pain Management;  Laterality: Bilateral;    INJECTION OF ANESTHETIC AGENT AROUND MEDIAL BRANCH NERVES INNERVATING CERVICAL FACET JOINT Bilateral 3/21/2022    Procedure: Bilateral C4-6 MBB with RN IV sedation;  Surgeon: Nano Izquierdo MD;  Location: [facility];  Service: Pain Management;  Laterality: Bilateral;    INJECTION OF ANESTHETIC AGENT INTO SACROILIAC JOINT Right 12/17/2021    Procedure: Right SIJ Injection;  Surgeon: Nano Izquierdo MD;  Location: [facility];  Service: Pain Management;  Laterality: Right;    LEFT HEART CATHETERIZATION Left 3/29/2022    Procedure: CATHETERIZATION, HEART, LEFT;  Surgeon: Lion Awad MD;  Location: [facility] CATH LAB;  Service: Cardiology;  Laterality: Left;    OOPHORECTOMY      RIB FRACTURE SURGERY       Review of patient's allergies indicates:   Allergen Reactions    Statins-hmg-coa reductase inhibitors Other (See Comments)     Myalgias to lipitor and simvastatin       Current Outpatient Medications   Medication Sig    amLODIPine (NORVASC) 10 MG tablet Take 1/2 (one-half) tablet (5 mg total) by mouth once daily.    aspirin 81 MG Chew Take 1 tablet (81 mg total) by mouth once daily. (Patient taking differently: Take 81 mg by mouth 2 (two) times a day.)    buPROPion (WELLBUTRIN XL) 150 MG TB24 tablet Take 1 tablet (150 mg total) by mouth once daily for 14 days, THEN 2 tablets (300 mg total) once daily.    cyanocobalamin (VITAMIN B-12) 1000 MCG tablet Take 100 mcg by mouth once daily.    fexofenadine HCl (ALLEGRA ORAL) Take 1 tablet by mouth once daily.    fluticasone propionate (FLONASE) 50 mcg/actuation nasal spray USE 2 SPRAYS IN EACH NOSTRIL ONE TIME DAILY    furosemide (LASIX) 20 MG tablet Take 1 tablet (20 mg total) by mouth once daily.    gabapentin (NEURONTIN) 300 MG capsule Take 1 capsule (300 mg total) by mouth every evening for  7 days, THEN 2 capsules (600 mg total) every evening for 7 days, THEN 3 capsules (900 mg total) every evening for 16 days.    loratadine (CLARITIN) 10 mg tablet Take 10 mg by mouth daily as needed for Allergies.    multivitamin with minerals tablet Take 1 tablet by mouth once daily.    niacin, inositol niacinate, (NIACIN FLUSH FREE) 400 mg niacin (500 mg) Cap Take 400 mg by mouth every evening.    pantoprazole (PROTONIX) 20 MG tablet Take 1 tablet (20 mg total) by mouth once daily.    ticagrelor (BRILINTA) 90 mg tablet Take 1 tablet (90 mg total) by mouth 2 (two) times daily.    tiZANidine (ZANAFLEX) 4 MG tablet Take 1 tablet (4 mg total) by mouth every 8 (eight) hours.    valsartan (DIOVAN) 160 MG tablet Take 1 tablet (160 mg total) by mouth once daily.    vitamin D (VITAMIN D3) 1000 units Tab Take 1,000 Units by mouth once daily.    evolocumab (REPATHA SURECLICK) 140 mg/mL PnIj Inject 1 mL (140 mg total) into the skin every 14 (fourteen) days. (Patient not taking: Reported on 4/28/2022)     No current facility-administered medications for this visit.       Review of Systems     GENERAL:  No weight loss, malaise or fevers.  HEENT:   No recent changes in vision or hearing  NECK:  Negative for lumps, no difficulty with swallowing.  RESPIRATORY:  Negative for cough, wheezing or shortness of breath, patient denies any recent URI.  CARDIOVASCULAR:  Negative for chest pain, leg swelling or palpitations.  GI:  Negative for abdominal discomfort, blood in stools or black stools or change in bowel habits.  MUSCULOSKELETAL:  See HPI.  SKIN:  Negative for lesions, rash, and itching.  PSYCH:  No mood disorder or recent psychosocial stressors.   HEMATOLOGY/LYMPHOLOGY:  Negative for prolonged bleeding, bruising easily or swollen nodes.    NEURO:   No history of headaches, syncope, paralysis, seizures or tremors.  All other reviewed and negative other than HPI.    OBJECTIVE:    BP (!) 144/73   Pulse 73   Wt 71.5 kg (157  lb 10.1 oz)   LMP  (LMP Unknown)   BMI 27.06 kg/m²       Physical Exam    GENERAL: Well appearing, in no acute distress, alert and oriented x3.  PSYCH:  Mood and affect appropriate.  SKIN: Skin color, texture, turgor normal, no rashes or lesions.  HEAD/FACE:  Normocephalic, atraumatic. Cranial nerves grossly intact.    NECK:  pain to palpation over the cervical paraspinous muscles. Spurling Negative.  pain with neck flexion, extension, or lateral flexion.  Full range of motion  Normal testing biceps, triceps and brachioradialis bilaterally.    Negative Antonio's bilaterally.    5/5 strength testing deltoid, biceps, triceps, wrist extensor, wrist flexor and ulnar intrinsics bilaterally.    Normal  strength bilaterally    CV: RRR with palpation of the radial artery.  PULM: No evidence of respiratory difficulty, symmetric chest rise.  GI:  Soft and non-tender.    NEURO: Bilateral upper and lower extremity coordination and muscle stretch reflexes are physiologic and symmetric. No loss of sensation is noted.  GAIT: normal.    Imaging:   Hip  XR 11/11/21  FINDINGS:  No acute fracture.  Hip joint spaces maintained with left greater than right acetabular degenerative findings.  Lower lumbar spine degenerative changes noted.  Soft tissues unremarkable.       09/05/12    MRI Lumbar Spine Without Contrast    Narrative  DATE OF EXAM: Oct  5 2012    Findings: Vertebral alignment is normal.  The conus ends at the level of  L1 and appears normal.  Intervertebral disks are well-hydrated and disk  spaces are maintained.  No compression fractures or acute osseous  abnormalities are seen.  There is no area of abnormal signal intensity  identified within the bone marrow.  Axial images are acquired through the  disk spaces from L1-2 through L5-S1.  There is no focal disk herniation,  canal or foraminal stenosis identified above the L4-5 level.  At L4 R. there is central disk protrusion minimally indenting the thecal  sac.  There  is also some diffuse bulging of the disk and facet and  ligamentum flavum hypertrophy.  There is mild canal and bilateral  foraminal narrowing at this level.  At L5-S1 there is central bulging of the disk without canal or foraminal  stenosis.    Impression  Central disk protrusion at L4-5 with some canal and foraminal  narrowing due to combination of disk abnormality and degenerative change.  Please correlate above findings with the patient's clinical symptoms.    09/05/12    MRI Cervical Spine Without Contrast    Narrative  DATE OF EXAM: Oct  5 2012    Findings: The craniovertebral junction and vertebral alignment are  normal.  No abnormality of bone marrow signal intensity is seen.  Intervertebral disks are dessicated but disk spaces are maintained.  No  abnormality seen within the cervical spinal cord.  Axial images are  acquired through the disk spaces from C2-3 through C7-T1.  C2-3 disk space is normal.  The C3-4 disk space demonstrates spondylosis as well as facet and  uncinate hypertrophy.  There is mild canal and bilateral foraminal  narrowing at this level.  The C4-5 disk space also demonstrates right paracentral spondylosis and  disk bulge with facet and uncinate hypertrophy on the right.  There is  mild canal and moderate right sided foraminal stenosis.  The C5-6 disk space does not demonstrate any significant disk herniation,  canal or foraminal stenosis.  The C6-7 disk space demonstrates minimal bulging of the disk but no canal  or foraminal narrowing.  The C7 T1 disk space is unremarkable.    Impression  Degenerative change and degenerative disk disease most  pronounced at the C3-4 and C4-5 level as discussed above.  Please  correlate with patient's clinical symptoms.       09/05/12    X-Ray Cervical Spine AP And Lateral    Narrative  DATE OF EXAM: Sep 13 2012    Findings: Vertebral alignment is normal.  There is moderate degenerative  change throughout the cervical spine with loss of disk height  and  anterior osteophyte formation.  No acute osseous abnormalities are seen.  Postoperative findings from resection of the left first rib.      ASSESSMENT: 66 y.o. year old female with neck pain, R lower back pain, consistent with     1. Cervical spondylosis     2. Cervical radiculopathy     3. Facet arthropathy, cervical     4. Degenerative disc disease, cervical     5. Sacroiliitis           PLAN:   - Interventions:  Schedule for right-sided cervical C4, C5, C6 radiofrequency ablation, followed by left side 2 weeks following.  Explained the risks and benefits of the procedure in detail with the patient today in clinic along with alternative treatment options, and the patient elected to pursue the intervention at this time.      - Anticoagulation use: yes aspirin  Patient recently had myocardial infarction with PTCA.  For secondary prophylaxis per ASA guidelines, patient can continue aspirin but would need to pause Brilinta 5 days prior to her cervical radiofrequency ablation.  Will recheck to Dr. Byers regarding safety of pausing Brilinta and at what time line (6 months verses 1 year).      - we have discussed with exacerbation of sacroiliac pain, scheduling repeat right-sided sacroiliac joint injection in the future.       report:  Reviewed and consistent with medication use as prescribed.    - Medications:  --  We have discussed increasing gabapentin.  Patient will increase their medication according to the following algorithm.  We have reviewed potential side effects of this medication including daytime somnolence, weight gain and peripheral edema    Gabapentin Titration:  Week 1: 300 mg QHS  Week 2: 600 mg QHS  Week 3: 900 mg QHS  Refill x 2    - Therapy:   We discussed continuing physical therapy to help manage the patient/s painful condition. The patient was counseled that muscle strengthening will improve the long term prognosis in regards to pain and may also help increase range of motion and  mobility.     - Imaging: Reviewed available imaging with patient and answered any questions they had regarding study.      - Follow up visit: return to clinic in 4-6 weeks      The above plan and management options were discussed at length with patient. Patient is in agreement with the above and verbalized understanding.    - I discussed the goals of interventional chronic pain management with the patient on today's visit. We discussed a multimodal and systematic approach to pain.  This includes diagnostic and therapeutic injections, adjuvant pharmacologic treatment, physical therapy, and at times psychiatry.  I emphasized the importance of regular exercise, core strengthening and stretching, diet and weight loss as a cornerstone of long-term pain management.    - This condition does not require this patient to take time off of work, and the primary goal of our Pain Management services is to improve the patient's functional capacity.  - Patient Questions: Answered all of the patient's questions regarding diagnoses, therapy, treatment and next steps        Nano Izquierdo MD  Interventional Pain Management  [facility]    Disclaimer:  This note was prepared using voice recognition system and is likely to have sound alike errors that may have been overlooked even after proof reading.  Please call me with any questions

## 2022-04-28 ENCOUNTER — OFFICE VISIT (OUTPATIENT)
Dept: PAIN MEDICINE | Facility: CLINIC | Age: 67
End: 2022-04-28
Payer: MEDICARE

## 2022-04-28 ENCOUNTER — TELEPHONE (OUTPATIENT)
Dept: PAIN MEDICINE | Facility: CLINIC | Age: 67
End: 2022-04-28
Payer: MEDICARE

## 2022-04-28 ENCOUNTER — PATIENT MESSAGE (OUTPATIENT)
Dept: CARDIOLOGY | Facility: CLINIC | Age: 67
End: 2022-04-28
Payer: MEDICARE

## 2022-04-28 VITALS
SYSTOLIC BLOOD PRESSURE: 144 MMHG | DIASTOLIC BLOOD PRESSURE: 73 MMHG | BODY MASS INDEX: 27.06 KG/M2 | HEART RATE: 73 BPM | WEIGHT: 157.63 LBS

## 2022-04-28 DIAGNOSIS — M54.12 CERVICAL RADICULOPATHY: ICD-10-CM

## 2022-04-28 DIAGNOSIS — M47.812 CERVICAL SPONDYLOSIS: Primary | ICD-10-CM

## 2022-04-28 DIAGNOSIS — M46.1 SACROILIITIS: ICD-10-CM

## 2022-04-28 DIAGNOSIS — M50.30 DEGENERATIVE DISC DISEASE, CERVICAL: ICD-10-CM

## 2022-04-28 DIAGNOSIS — M47.812 FACET ARTHROPATHY, CERVICAL: ICD-10-CM

## 2022-04-28 PROCEDURE — 99499 UNLISTED E&M SERVICE: CPT | Mod: S$GLB,,, | Performed by: ANESTHESIOLOGY

## 2022-04-28 PROCEDURE — 3288F FALL RISK ASSESSMENT DOCD: CPT | Mod: CPTII,S$GLB,, | Performed by: ANESTHESIOLOGY

## 2022-04-28 PROCEDURE — 3078F PR MOST RECENT DIASTOLIC BLOOD PRESSURE < 80 MM HG: ICD-10-PCS | Mod: CPTII,S$GLB,, | Performed by: ANESTHESIOLOGY

## 2022-04-28 PROCEDURE — 1111F PR DISCHARGE MEDS RECONCILED W/ CURRENT OUTPATIENT MED LIST: ICD-10-PCS | Mod: CPTII,S$GLB,, | Performed by: ANESTHESIOLOGY

## 2022-04-28 PROCEDURE — 99214 OFFICE O/P EST MOD 30 MIN: CPT | Mod: S$GLB,,, | Performed by: ANESTHESIOLOGY

## 2022-04-28 PROCEDURE — 1125F PR PAIN SEVERITY QUANTIFIED, PAIN PRESENT: ICD-10-PCS | Mod: CPTII,S$GLB,, | Performed by: ANESTHESIOLOGY

## 2022-04-28 PROCEDURE — 1111F DSCHRG MED/CURRENT MED MERGE: CPT | Mod: CPTII,S$GLB,, | Performed by: ANESTHESIOLOGY

## 2022-04-28 PROCEDURE — 99999 PR PBB SHADOW E&M-EST. PATIENT-LVL IV: ICD-10-PCS | Mod: PBBFAC,,, | Performed by: ANESTHESIOLOGY

## 2022-04-28 PROCEDURE — 1101F PR PT FALLS ASSESS DOC 0-1 FALLS W/OUT INJ PAST YR: ICD-10-PCS | Mod: CPTII,S$GLB,, | Performed by: ANESTHESIOLOGY

## 2022-04-28 PROCEDURE — 3008F BODY MASS INDEX DOCD: CPT | Mod: CPTII,S$GLB,, | Performed by: ANESTHESIOLOGY

## 2022-04-28 PROCEDURE — 3288F PR FALLS RISK ASSESSMENT DOCUMENTED: ICD-10-PCS | Mod: CPTII,S$GLB,, | Performed by: ANESTHESIOLOGY

## 2022-04-28 PROCEDURE — 1159F MED LIST DOCD IN RCRD: CPT | Mod: CPTII,S$GLB,, | Performed by: ANESTHESIOLOGY

## 2022-04-28 PROCEDURE — 3008F PR BODY MASS INDEX (BMI) DOCUMENTED: ICD-10-PCS | Mod: CPTII,S$GLB,, | Performed by: ANESTHESIOLOGY

## 2022-04-28 PROCEDURE — 1101F PT FALLS ASSESS-DOCD LE1/YR: CPT | Mod: CPTII,S$GLB,, | Performed by: ANESTHESIOLOGY

## 2022-04-28 PROCEDURE — 1159F PR MEDICATION LIST DOCUMENTED IN MEDICAL RECORD: ICD-10-PCS | Mod: CPTII,S$GLB,, | Performed by: ANESTHESIOLOGY

## 2022-04-28 PROCEDURE — 4010F ACE/ARB THERAPY RXD/TAKEN: CPT | Mod: CPTII,S$GLB,, | Performed by: ANESTHESIOLOGY

## 2022-04-28 PROCEDURE — 4010F PR ACE/ARB THEARPY RXD/TAKEN: ICD-10-PCS | Mod: CPTII,S$GLB,, | Performed by: ANESTHESIOLOGY

## 2022-04-28 PROCEDURE — 3077F SYST BP >= 140 MM HG: CPT | Mod: CPTII,S$GLB,, | Performed by: ANESTHESIOLOGY

## 2022-04-28 PROCEDURE — 99999 PR PBB SHADOW E&M-EST. PATIENT-LVL IV: CPT | Mod: PBBFAC,,, | Performed by: ANESTHESIOLOGY

## 2022-04-28 PROCEDURE — 1125F AMNT PAIN NOTED PAIN PRSNT: CPT | Mod: CPTII,S$GLB,, | Performed by: ANESTHESIOLOGY

## 2022-04-28 PROCEDURE — 3077F PR MOST RECENT SYSTOLIC BLOOD PRESSURE >= 140 MM HG: ICD-10-PCS | Mod: CPTII,S$GLB,, | Performed by: ANESTHESIOLOGY

## 2022-04-28 PROCEDURE — 3078F DIAST BP <80 MM HG: CPT | Mod: CPTII,S$GLB,, | Performed by: ANESTHESIOLOGY

## 2022-04-28 PROCEDURE — 99214 PR OFFICE/OUTPT VISIT, EST, LEVL IV, 30-39 MIN: ICD-10-PCS | Mod: S$GLB,,, | Performed by: ANESTHESIOLOGY

## 2022-04-28 PROCEDURE — 99499 RISK ADDL DX/OHS AUDIT: ICD-10-PCS | Mod: S$GLB,,, | Performed by: ANESTHESIOLOGY

## 2022-04-28 RX ORDER — GABAPENTIN 300 MG/1
CAPSULE ORAL
Qty: 69 CAPSULE | Refills: 0 | Status: SHIPPED | OUTPATIENT
Start: 2022-04-28 | End: 2022-06-21 | Stop reason: SDUPTHER

## 2022-04-28 RX ORDER — VALSARTAN 160 MG/1
160 TABLET ORAL DAILY
Qty: 10 TABLET | Refills: 0 | Status: SHIPPED | OUTPATIENT
Start: 2022-04-28 | End: 2022-06-02 | Stop reason: SDUPTHER

## 2022-04-28 NOTE — TELEPHONE ENCOUNTER
----- Message from Ariadne Das sent at 4/28/2022 10:32 AM CDT -----  [facility] need to clarify directions on gabapentin (NEURONTIN) 300 MG capsule. Call back number is [phone] and fax number is [phone]. Thx. El

## 2022-04-28 NOTE — PATIENT INSTRUCTIONS
General Neck and Back Pain    Both neck and back pain are usually caused by injury to the muscles or ligaments of the spine. Sometimes the disks that separate each bone of the spine may cause pain by pressing on a nearby nerve. Back and neck pain may appear after a sudden twisting or bending force (such as in a car accident), or sometimes after a simple awkward movement. In either case, muscle spasm is often present and adds to the pain.  Acute neck and back pain usually gets better in 1 to 2 weeks. Pain related to disk disease, arthritis in the spinal joints or spinal stenosis (narrowing of the spinal canal) can become chronic and last for months or years.  Back and neck pain are common problems. Most people feel better in 1 or 2 weeks, and most of the rest in 1 to 2 months. Most people can remain active.  People experience and describe pain differently.  Pain can be sharp, stabbing, shooting, aching, cramping, or burning  Movement, standing, bending, lifting, sitting, or walking may worsen the pain  Pain can be localized to one spot or area, or it can be more generalized  Pain can spread or radiate upwards, downwards, to the front, or go down your arms  Muscle spasm may occur.  Most of the time mechanical problems with the muscles or spine cause the pain. it is usually caused by an injury, whether known or not, to the muscles or ligaments. While illnesses can cause back pain, it is usually not caused by a serious illness. Pain is usually related to physical activity, whether sports, exercise, work, or normal activity. Sometimes it can occur without an identifiable cause. This can happen simply by stretching or moving wrong, without noting pain at the time. Other causes include:  Overexertion, lifting, pushing, pulling incorrectly or too aggressively.  Sudden twisting, bending or stretching from an accident (car or fall), or accidental movement.  Poor posture  Poor conditioning, lack of regular exercise  Spinal  disc disease or arthritis  Stress  Pregnancy, or illness like appendicitis, bladder or kidney infection, pelvic infections   Home care  For neck pain: Use a comfortable pillow that supports the head and keeps the spine in a neutral position. The position of the head should not be tilted forward or backward.  When in bed, try to find a position of comfort. A firm mattress is best. Try lying flat on your back with pillows under your knees. You can also try lying on your side with your knees bent up towards your chest and a pillow between your knees.  At first, do not try to stretch out the sore spots. If there is a strain, it is not like the good soreness you get after exercising without an injury. In this case, stretching may make it worse.  Avoid prolonged sitting, long car rides or travel. This puts more stress on the lower back than standing or walking.  During the first 24 to 72 hours after an injury, apply an ice pack to the painful area for 20 minutes and then remove it for 20 minutes over a period of 60 to 90 minutes or several times a day.   You can alternate ice and heat therapies. Talk with your healthcare provider about the best treatment for your back or neck pain. As a safety precaution, do not use a heating pad at bedtime. Sleeping with a heating pad can lead to skin burns or tissue damage.  Therapeutic massage can help relax the back and neck muscles without stretching them.  Be aware of safe lifting methods and do not lift anything over 15 pounds until all the pain is gone.  Medications  Talk to your healthcare provider before using medicine, especially if you have other medical problems or are taking other medicines.  You may use over-the-counter medicine to control pain, unless another pain medicine was prescribed. If you have chronic conditions like diabetes, liver or kidney disease, stomach ulcers,  gastrointestinal bleeding, or are taking blood thinner medicines.  Be careful if you are given pain  medicines, narcotics, or medicine for muscle spasm. They can cause drowsiness, and can affect your coordination, reflexes, and judgment. Do not drive or operate heavy machinery.  Follow-up care  Follow up with your healthcare provider, or as advised. Physical therapy or further tests may be needed.  If X-rays were taken, you will be notified of any new findings that may affect your care.  Call 911  Seek emergency medical care if any of the following occur:  Trouble breathing  Confusion  Very drowsy or trouble awakening  Fainting or loss of consciousness  Rapid or very slow heart rate  Loss of bowel or bladder control  When to seek medical advice  Call your healthcare provider right away if any of these occur:  Pain becomes worse or spreads into your arms or legs  Weakness, numbness or pain in one or both arms or legs  Numbness in the groin area  Difficulty walking  Fever of 100.4ºF (38ºC) or higher, or as directed by your healthcare provider  Date Last Reviewed: 7/1/2016  © 0252-6022 [employer]. [address] [place] [address]. All rights reserved. This information is not intended as a substitute for professional medical care. Always follow your healthcare professional's instructions.       Exercises: Neck Isometrics  To start, sit in a chair with your feet flat on the floor. Your weight should be slightly forward so that youre balanced evenly on your buttocks. Relax your shoulders and keep your head level. Using a chair with arms may help you keep your balance.       Press your palm against your forehead. Resist with your neck muscles. Hold for 10 seconds. Relax. Repeat 5 times.  Do the exercise again, pressing on the side of your head. Repeat 5 times. Switch sides.  Do the exercise again, pressing on the back of your head. Repeat 5 times.  For your safety, check with your healthcare provider before starting an exercise program.   Date Last Reviewed: 8/16/2015  © 4760-3053 [employer]. [address] [place] [address], [place] [address]. All rights reserved. This information is not intended as a substitute for professional medical care. Always follow your healthcare professional's instructions.

## 2022-04-28 NOTE — TELEPHONE ENCOUNTER
I spoke with Kamla at [facility].  She states the patient's last 4 prescriptions for her Gabapentin have been the same (with the titration dose).  She is wondering can the new script be for whatever dose she is currently on.  I checked with Dr. Izquierdo who states the patient keeps forgetting to go up on the dose so it is still correct.  Kamla verbalized understanding and thanked me for calling her back.

## 2022-04-28 NOTE — TELEPHONE ENCOUNTER
Good morning Dr. Byers,  Dr. Izquierdo would like to perform a right C4 C5 C6 cervical RFA (radiofrequency ablation) followed by left cervical RFA 2 weeks later.  The patient is on ASA, which she can continue.  She has informed Dr. Izquierdo of her recent myocardial infarction with stent placement.    1)  Dr. Izquierdo would like to know if and when it would be safe to move forward with the above procedures.  2)  If it is ok for her to proceed with the procedure, the patient is on Brilinta and would need to hold it.  She would need to hold this for 5 days prior to her procedure.  Please advise if it is safe and ok to hold the Brilinta for 5 days.  We will wait until we hear back from you before getting her scheduled.  Sincerely,  Ania  Medical Assistant   Interventional Pain Medicine - [employer]

## 2022-04-28 NOTE — TELEPHONE ENCOUNTER
Emergency supply of valsartan and brilinta--    I will run out of my brilinta 90 MG today and Valsartan 160 MG. Can you call [facility] and prescribed me a few till I can get my refills.  Don't want to be without.  Thank you

## 2022-05-02 NOTE — TELEPHONE ENCOUNTER
----- Message -----   From: Ania Graf MA   Sent: 4/28/2022  10:10 AM CDT   To: Pepe Byers MD              Patient Calls    Pepe Byers MD  You 4 days ago         Pt with recent placement of Drug eluting stent after MI , 3-29-22,will need to wait 6 months before procedure unless it is an emergency    Message text

## 2022-05-03 ENCOUNTER — NURSE TRIAGE (OUTPATIENT)
Dept: ADMINISTRATIVE | Facility: CLINIC | Age: 67
End: 2022-05-03
Payer: MEDICARE

## 2022-05-03 ENCOUNTER — PATIENT MESSAGE (OUTPATIENT)
Dept: CARDIOLOGY | Facility: CLINIC | Age: 67
End: 2022-05-03
Payer: MEDICARE

## 2022-05-03 DIAGNOSIS — E78.00 PURE HYPERCHOLESTEROLEMIA: ICD-10-CM

## 2022-05-03 NOTE — TELEPHONE ENCOUNTER
Reason for Disposition   Swelling is painful to touch and no fever    Additional Information   Negative: Sounds like a life-threatening emergency to the triager   Negative: Hernia suspected (bulge in groin or abdomen) and painful or vomiting   Negative: Swollen lump in groin and pulsating (like heartbeat)   Negative: Patient sounds very sick or weak to the triager   Negative: SEVERE pain (e.g., excruciating)   Negative: Swelling is painful to touch AND fever   Negative: Swelling is red and fever   Negative: Swelling is red and size > 2 inches (5.0 cm)    Protocols used: SKIN LUMP OR LOCALIZED SWELLING-A-OH

## 2022-05-03 NOTE — TELEPHONE ENCOUNTER
I conveyed the information to the patient that we received from Dr. Byers.  The patient verbalized understanding.  She did thank me for calling her back.  All questions answered.

## 2022-05-03 NOTE — TELEPHONE ENCOUNTER
MD Ania Grey MA  Caller: Unspecified (5 days ago, 10:03 AM)  Got it. Can we please contact the patient and let her know please? For both a MBB or RFA, she would need to pause Brillinta unfortunately and so we have to wait 6 months.

## 2022-05-19 ENCOUNTER — TELEPHONE (OUTPATIENT)
Dept: INTERNAL MEDICINE | Facility: CLINIC | Age: 67
End: 2022-05-19
Payer: MEDICARE

## 2022-05-19 NOTE — TELEPHONE ENCOUNTER
----- Message from Marti Cantu sent at 5/19/2022 11:29 AM CDT -----  Contact: liza Valdes is calling to check status of refill request for pt's diabetic supplies. Please call him back at [phone].          Thanks  DD

## 2022-05-19 NOTE — TELEPHONE ENCOUNTER
Called [facility] back and it was stated they faxed us a request for pts diabetic supplies. Advised I never received a fax. Human sent this request to the wrong fax number. New fax number was given. Stated it would be re faxed.

## 2022-05-30 ENCOUNTER — SPECIALTY PHARMACY (OUTPATIENT)
Dept: PHARMACY | Facility: CLINIC | Age: 67
End: 2022-05-30
Payer: MEDICARE

## 2022-05-30 NOTE — TELEPHONE ENCOUNTER
Repatha rx received   -PA is required.  -PA submitted CMM key: [id number]     PA Case: [id number],   Status: Approved,   Coverage Starts on: 1/1/2022 12:00:00 AM,  Coverage Ends on: 12/31/2022 12:00:00 AM    Test claim successful, $47 copay.     Medicare- [employer]- Med LIS: N/A

## 2022-06-01 NOTE — TELEPHONE ENCOUNTER
Outgoing call to patient regarding Repatha prescription received. Informed pt kadie is available to assist with $47 copay. Pt provided HH size and annual income. Synoste Oy kadei secured and added to [facility]. Pending for initial consult.     FOR DOCUMENTATION ONLY:  Financial Assistance for Repatha approved from 6/1/2022 to 5/1/2023  Source "[employer]  BIN: [id number]  PCN: [id number]   Id: [id number]  GRP: [id number]    Assistance amount: $2,500

## 2022-06-02 ENCOUNTER — SPECIALTY PHARMACY (OUTPATIENT)
Dept: PHARMACY | Facility: CLINIC | Age: 67
End: 2022-06-02
Payer: MEDICARE

## 2022-06-02 ENCOUNTER — OFFICE VISIT (OUTPATIENT)
Dept: CARDIOLOGY | Facility: CLINIC | Age: 67
End: 2022-06-02
Payer: MEDICARE

## 2022-06-02 VITALS
OXYGEN SATURATION: 98 % | HEART RATE: 81 BPM | WEIGHT: 157.88 LBS | BODY MASS INDEX: 26.95 KG/M2 | SYSTOLIC BLOOD PRESSURE: 112 MMHG | RESPIRATION RATE: 16 BRPM | DIASTOLIC BLOOD PRESSURE: 72 MMHG | HEIGHT: 64 IN

## 2022-06-02 DIAGNOSIS — E78.5 HYPERLIPIDEMIA, UNSPECIFIED HYPERLIPIDEMIA TYPE: Primary | ICD-10-CM

## 2022-06-02 DIAGNOSIS — I25.10 CORONARY ARTERY DISEASE INVOLVING NATIVE CORONARY ARTERY OF NATIVE HEART WITHOUT ANGINA PECTORIS: Chronic | ICD-10-CM

## 2022-06-02 DIAGNOSIS — Z78.9 STATIN INTOLERANCE: ICD-10-CM

## 2022-06-02 DIAGNOSIS — I73.9 PAD (PERIPHERAL ARTERY DISEASE): Primary | ICD-10-CM

## 2022-06-02 DIAGNOSIS — I25.2 HISTORY OF NON-ST ELEVATION MYOCARDIAL INFARCTION (NSTEMI): ICD-10-CM

## 2022-06-02 DIAGNOSIS — E78.00 PURE HYPERCHOLESTEROLEMIA: Chronic | ICD-10-CM

## 2022-06-02 DIAGNOSIS — Z72.0 TOBACCO ABUSE: Chronic | ICD-10-CM

## 2022-06-02 DIAGNOSIS — I10 ESSENTIAL HYPERTENSION: Chronic | ICD-10-CM

## 2022-06-02 PROCEDURE — 1159F MED LIST DOCD IN RCRD: CPT | Mod: CPTII,S$GLB,, | Performed by: INTERNAL MEDICINE

## 2022-06-02 PROCEDURE — 3078F PR MOST RECENT DIASTOLIC BLOOD PRESSURE < 80 MM HG: ICD-10-PCS | Mod: CPTII,S$GLB,, | Performed by: INTERNAL MEDICINE

## 2022-06-02 PROCEDURE — 4010F PR ACE/ARB THEARPY RXD/TAKEN: ICD-10-PCS | Mod: CPTII,S$GLB,, | Performed by: INTERNAL MEDICINE

## 2022-06-02 PROCEDURE — 1160F PR REVIEW ALL MEDS BY PRESCRIBER/CLIN PHARMACIST DOCUMENTED: ICD-10-PCS | Mod: CPTII,S$GLB,, | Performed by: INTERNAL MEDICINE

## 2022-06-02 PROCEDURE — 4010F ACE/ARB THERAPY RXD/TAKEN: CPT | Mod: CPTII,S$GLB,, | Performed by: INTERNAL MEDICINE

## 2022-06-02 PROCEDURE — 3078F DIAST BP <80 MM HG: CPT | Mod: CPTII,S$GLB,, | Performed by: INTERNAL MEDICINE

## 2022-06-02 PROCEDURE — 1101F PR PT FALLS ASSESS DOC 0-1 FALLS W/OUT INJ PAST YR: ICD-10-PCS | Mod: CPTII,S$GLB,, | Performed by: INTERNAL MEDICINE

## 2022-06-02 PROCEDURE — 1160F RVW MEDS BY RX/DR IN RCRD: CPT | Mod: CPTII,S$GLB,, | Performed by: INTERNAL MEDICINE

## 2022-06-02 PROCEDURE — 99999 PR PBB SHADOW E&M-EST. PATIENT-LVL IV: ICD-10-PCS | Mod: PBBFAC,,, | Performed by: INTERNAL MEDICINE

## 2022-06-02 PROCEDURE — 3008F BODY MASS INDEX DOCD: CPT | Mod: CPTII,S$GLB,, | Performed by: INTERNAL MEDICINE

## 2022-06-02 PROCEDURE — 99214 PR OFFICE/OUTPT VISIT, EST, LEVL IV, 30-39 MIN: ICD-10-PCS | Mod: S$GLB,,, | Performed by: INTERNAL MEDICINE

## 2022-06-02 PROCEDURE — 3074F PR MOST RECENT SYSTOLIC BLOOD PRESSURE < 130 MM HG: ICD-10-PCS | Mod: CPTII,S$GLB,, | Performed by: INTERNAL MEDICINE

## 2022-06-02 PROCEDURE — 1159F PR MEDICATION LIST DOCUMENTED IN MEDICAL RECORD: ICD-10-PCS | Mod: CPTII,S$GLB,, | Performed by: INTERNAL MEDICINE

## 2022-06-02 PROCEDURE — 99999 PR PBB SHADOW E&M-EST. PATIENT-LVL IV: CPT | Mod: PBBFAC,,, | Performed by: INTERNAL MEDICINE

## 2022-06-02 PROCEDURE — 1126F PR PAIN SEVERITY QUANTIFIED, NO PAIN PRESENT: ICD-10-PCS | Mod: CPTII,S$GLB,, | Performed by: INTERNAL MEDICINE

## 2022-06-02 PROCEDURE — 1126F AMNT PAIN NOTED NONE PRSNT: CPT | Mod: CPTII,S$GLB,, | Performed by: INTERNAL MEDICINE

## 2022-06-02 PROCEDURE — 1101F PT FALLS ASSESS-DOCD LE1/YR: CPT | Mod: CPTII,S$GLB,, | Performed by: INTERNAL MEDICINE

## 2022-06-02 PROCEDURE — 3008F PR BODY MASS INDEX (BMI) DOCUMENTED: ICD-10-PCS | Mod: CPTII,S$GLB,, | Performed by: INTERNAL MEDICINE

## 2022-06-02 PROCEDURE — 3288F PR FALLS RISK ASSESSMENT DOCUMENTED: ICD-10-PCS | Mod: CPTII,S$GLB,, | Performed by: INTERNAL MEDICINE

## 2022-06-02 PROCEDURE — 3074F SYST BP LT 130 MM HG: CPT | Mod: CPTII,S$GLB,, | Performed by: INTERNAL MEDICINE

## 2022-06-02 PROCEDURE — 99214 OFFICE O/P EST MOD 30 MIN: CPT | Mod: S$GLB,,, | Performed by: INTERNAL MEDICINE

## 2022-06-02 PROCEDURE — 3288F FALL RISK ASSESSMENT DOCD: CPT | Mod: CPTII,S$GLB,, | Performed by: INTERNAL MEDICINE

## 2022-06-02 RX ORDER — CLOPIDOGREL BISULFATE 75 MG/1
75 TABLET ORAL DAILY
Qty: 30 TABLET | Refills: 11 | Status: SHIPPED | OUTPATIENT
Start: 2022-06-02 | End: 2022-06-02 | Stop reason: SDUPTHER

## 2022-06-02 RX ORDER — AZITHROMYCIN 250 MG/1
250 TABLET, FILM COATED ORAL DAILY
Qty: 6 TABLET | Refills: 0 | Status: SHIPPED | OUTPATIENT
Start: 2022-06-02 | End: 2022-08-11

## 2022-06-02 RX ORDER — CLOPIDOGREL BISULFATE 75 MG/1
75 TABLET ORAL DAILY
Qty: 90 TABLET | Refills: 3 | Status: SHIPPED | OUTPATIENT
Start: 2022-06-02 | End: 2023-03-23

## 2022-06-02 RX ORDER — VALSARTAN 160 MG/1
160 TABLET ORAL DAILY
Qty: 90 TABLET | Refills: 3 | Status: SHIPPED | OUTPATIENT
Start: 2022-06-02 | End: 2022-06-09

## 2022-06-02 RX ORDER — AMLODIPINE BESYLATE 5 MG/1
5 TABLET ORAL DAILY
Qty: 90 TABLET | Refills: 3 | Status: SHIPPED | OUTPATIENT
Start: 2022-06-02 | End: 2022-08-18

## 2022-06-02 NOTE — PROGRESS NOTES
Subjective:   Patient ID:  Shirlene Olvera is a 66 y.o. female who presents for follow-up of No chief complaint on file.  Recent PCI of RCA after NSTEMI, Lcx chronically occluded  Patient denies CP, angina or anginal equivalent.  Pt has stopped smoking since MI  Pt to start repatha soon.Brilenta costing too much. Pt with bronchitis  Hypertension  This is a chronic problem. The current episode started more than 1 year ago. The problem has been gradually improving since onset. The problem is controlled. Pertinent negatives include no chest pain, palpitations or shortness of breath. Past treatments include angiotensin blockers and calcium channel blockers. The current treatment provides moderate improvement. There are no compliance problems.    Hyperlipidemia  This is a chronic problem. The current episode started more than 1 year ago. Pertinent negatives include no chest pain or shortness of breath. Current antihyperlipidemic treatment includes nicotinic acid (repatha). The current treatment provides moderate improvement of lipids.   Coronary Artery Disease  Presents for follow-up visit. Pertinent negatives include no chest pain, chest pressure, chest tightness, dizziness, leg swelling, muscle weakness, palpitations, shortness of breath or weight gain. Risk factors include hyperlipidemia. The symptoms have been stable. Compliance with diet is variable. Compliance with exercise is variable. Compliance with medications is good.       Review of Systems   Constitutional: Negative. Negative for weight gain.   HENT: Negative.    Eyes: Negative.    Cardiovascular: Negative.  Negative for chest pain, leg swelling and palpitations.   Respiratory: Negative.  Negative for chest tightness and shortness of breath.    Endocrine: Negative.    Hematologic/Lymphatic: Negative.    Skin: Negative.    Musculoskeletal: Negative for muscle weakness.   Gastrointestinal: Negative.    Genitourinary: Negative.    Neurological: Negative.   Negative for dizziness.   Psychiatric/Behavioral: Negative.    Allergic/Immunologic: Negative.      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 90 tablet 1    aspirin 81 MG Chew Take 1 tablet (81 mg total) by mouth once daily. (Patient taking differently: Take 81 mg by mouth 2 (two) times a day.) 360 tablet 1    buPROPion (WELLBUTRIN XL) 150 MG TB24 tablet Take 1 tablet (150 mg total) by mouth once daily for 14 days, THEN 2 tablets (300 mg total) once daily. 166 tablet 0    evolocumab (REPATHA SURECLICK) 140 mg/mL PnIj Inject 1 mL (140 mg total) into the skin every 14 (fourteen) days. 2 each 11    fluticasone propionate (FLONASE) 50 mcg/actuation nasal spray USE 2 SPRAYS IN EACH NOSTRIL ONE TIME DAILY 48 g 3    furosemide (LASIX) 20 MG tablet Take 1 tablet (20 mg total) by mouth once daily. 30 tablet 5    gabapentin (NEURONTIN) 300 MG capsule Take 1 capsule  (300 mg total) by mouth every evening for 7 days, THEN 2 capsules (600 mg total) every evening for 7 days, THEN 3 capsules (900 mg total) every evening for 16 days. 69 capsule 0    loratadine (CLARITIN) 10 mg tablet Take 10 mg by mouth daily as needed for Allergies.      multivitamin with minerals tablet Take 1 tablet by mouth once daily.      niacin, inositol niacinate, (NIACIN FLUSH FREE) 400 mg niacin (500 mg) Cap Take 400 mg by mouth every evening. 30 each 11    pantoprazole (PROTONIX) 20 MG tablet Take 1 tablet (20 mg total) by mouth once daily. 90 tablet 1    ticagrelor (BRILINTA) 90 mg tablet Take 1 tablet (90 mg total) by mouth 2 (two) times daily. 20 tablet 0    valsartan (DIOVAN) 160 MG tablet Take 1 tablet (160 mg total) by mouth once daily. 10 tablet 0    vitamin D (VITAMIN D3) 1000 units Tab Take 1,000 Units by mouth once daily.      cyanocobalamin (VITAMIN B-12) 1000 MCG tablet Take 100 mcg by mouth once daily.      fexofenadine HCl (ALLEGRA ORAL) Take 1 tablet by mouth once daily.      [DISCONTINUED] metoprolol tartrate (LOPRESSOR) 50 MG tablet Take 1 tablet (50 mg total) by mouth Daily. 30 tablet 11    [DISCONTINUED] valsartan-hydrochlorothiazide (DIOVAN-HCT) 160-25 mg per tablet Take 1 tablet by mouth once daily. 90 tablet 1     No current facility-administered medications on file prior to visit.     Review of patient's allergies indicates:   Allergen Reactions    Statins-hmg-coa reductase inhibitors Other (See Comments)     Myalgias to lipitor and simvastatin       Objective:     Physical Exam  Vitals and nursing note reviewed.   Constitutional:       Appearance: She is well-developed.   HENT:      Head: Normocephalic and atraumatic.   Eyes:      Conjunctiva/sclera: Conjunctivae normal.      Pupils: Pupils are equal, round, and reactive to light.   Cardiovascular:      Rate and Rhythm: Normal rate and regular rhythm.      Pulses: Intact distal pulses.      Heart sounds: Normal heart  sounds.   Pulmonary:      Effort: Pulmonary effort is normal.      Breath sounds: Normal breath sounds.   Abdominal:      General: Bowel sounds are normal.      Palpations: Abdomen is soft.   Musculoskeletal:         General: Normal range of motion.      Cervical back: Normal range of motion and neck supple.   Skin:     General: Skin is warm and dry.   Neurological:      Mental Status: She is alert and oriented to person, place, and time.         Assessment:     1. PAD (peripheral artery disease)    2. History of non-ST elevation myocardial infarction (NSTEMI)    3. Essential hypertension    4. Pure hypercholesterolemia    5. Coronary artery disease involving native coronary artery of native heart without angina pectoris    6. Statin intolerance    7. Tobacco abuse        Plan:     PAD (peripheral artery disease)    History of non-ST elevation myocardial infarction (NSTEMI)    Essential hypertension    Pure hypercholesterolemia    Coronary artery disease involving native coronary artery of native heart without angina pectoris    Statin intolerance    Tobacco abuse      Continue norvasc, valsartan-htn  Continue asa, brilenta- cad/pci  Continued smoking cessation  continue repatha, niacin-hlp    Change brilenta to plavix

## 2022-06-02 NOTE — TELEPHONE ENCOUNTER
Specialty Pharmacy - Initial Clinical Assessment    Specialty Medication Orders Linked to Encounter    Flowsheet Row Most Recent Value   Medication #1 evolocumab (REPATHA SURECLICK) 140 mg/mL PnIj (Order#[id number], Rx#[id number])        Patient Diagnosis   E78.5 - Hyperlipidemia  I25.2 - History of non-ST elevation myocardial infarction (NSTEMI)    Subjective    Shirlene Olvera is a 66 y.o. female, who is followed by the specialty pharmacy service for management and education.    Recent Encounters     Date Type Provider Description    06/02/2022 Specialty Pharmacy Danielle Johnson, PharmD Initial Clinical Assessment    05/30/2022 Specialty Pharmacy Yvette Schaefer, PharmD Referral Authorization    08/23/2021 Specialty Pharmacy Khloe Mccartney Refill Coordination    08/02/2021 Specialty Pharmacy Hattie Ly Refill Coordination    07/30/2021 Specialty Pharmacy Estefany Lang Clinical Intervention; Refill Coordination        Clinical call attempts since last clinical assessment   No call attempts found.     Current Outpatient Medications   Medication Sig    amLODIPine (NORVASC) 5 MG tablet Take 1 tablet (5 mg total) by mouth once daily.    aspirin 81 MG Chew Take 1 tablet (81 mg total) by mouth once daily. (Patient taking differently: Take 81 mg by mouth 2 (two) times a day.)    azithromycin (ZITHROMAX Z-JESSICA) 250 MG tablet Take 1 tablet (250 mg total) by mouth once daily. Take 2 tablets 1st day, then 1 tablet q day x 4 days    buPROPion (WELLBUTRIN XL) 150 MG TB24 tablet Take 1 tablet (150 mg total) by mouth once daily for 14 days, THEN 2 tablets (300 mg total) once daily.    clopidogreL (PLAVIX) 75 mg tablet Take 1 tablet (75 mg total) by mouth once daily.    cyanocobalamin (VITAMIN B-12) 1000 MCG tablet Take 100 mcg by mouth once daily.    evolocumab (REPATHA SURECLICK) 140 mg/mL PnIj Inject 1 mL (140 mg total) into the skin every 14 (fourteen) days.    fexofenadine HCl (ALLEGRA ORAL) Take 1 tablet by  mouth once daily.    fluticasone propionate (FLONASE) 50 mcg/actuation nasal spray USE 2 SPRAYS IN EACH NOSTRIL ONE TIME DAILY    furosemide (LASIX) 20 MG tablet Take 1 tablet (20 mg total) by mouth once daily.    gabapentin (NEURONTIN) 300 MG capsule Take 1 capsule (300 mg total) by mouth every evening for 7 days, THEN 2 capsules (600 mg total) every evening for 7 days, THEN 3 capsules (900 mg total) every evening for 16 days.    loratadine (CLARITIN) 10 mg tablet Take 10 mg by mouth daily as needed for Allergies.    multivitamin with minerals tablet Take 1 tablet by mouth once daily.    niacin, inositol niacinate, (NIACIN FLUSH FREE) 400 mg niacin (500 mg) Cap Take 400 mg by mouth every evening.    pantoprazole (PROTONIX) 20 MG tablet Take 1 tablet (20 mg total) by mouth once daily.    valsartan (DIOVAN) 160 MG tablet Take 1 tablet (160 mg total) by mouth once daily.    vitamin D (VITAMIN D3) 1000 units Tab Take 1,000 Units by mouth once daily.   Last reviewed on 6/2/2022  2:07 PM by Pepe Byers MD    Review of patient's allergies indicates:   Allergen Reactions    Statins-hmg-coa reductase inhibitors Other (See Comments)     Myalgias to lipitor and simvastatin   Last reviewed on  6/2/2022 2:01 PM by Olivia Tracy    Drug Interactions    Drug interactions evaluated: no  Clinically relevant drug interactions identified: no  Provided the patient with educational material regarding drug interactions: not applicable         Adverse Effects    Unable to perform a full ROS: Yes   Reason: other        Assessment Questions - Documented Responses    Flowsheet Row Most Recent Value   Assessment    Medication Reconciliation completed for patient No   Goals of Therapy Status Discussed (new start)   Status of the patients ability to self-administer: Is Able   All education points have been covered with patient? No, patient declined- printed education provided   Welcome packet contents reviewed and  "discussed with patient? No   Assesment completed? Yes   Plan Therapy continued   Do you need to open a clinical intervention (i-vent)? No   Do you want to schedule first shipment? Yes   Medication #1 Assessment Info    Patient status Existing medication, Exisiting to [facility]   Is this medication appropriate for the patient? Yes   Is this medication effective? Yes        Refill Questions - Documented Responses    Flowsheet Row Most Recent Value   Patient Availability and HIPAA Verification    Does patient want to proceed with activity? Yes   HIPAA/medical authority confirmed? Yes   Relationship to patient of person spoken to? Self   Refill Screening Questions    When does the patient need to receive the medication? 06/07/22   Refill Delivery Questions    How will the patient receive the medication? Delivery [facility]   When does the patient need to receive the medication? 06/07/22   Shipping Address Home   Address in [facility] confirmed and updated if neccessary? Yes   Expected Copay ($) 0   Is the patient able to afford the medication copay? Yes   Payment Method zero copay   Days supply of Refill 28   Supplies needed? No supplies needed   Refill activity completed? Yes   Refill activity plan Refill scheduled   Shipment/Pickup Date: 06/07/22          Objective    She has a past medical history of Chest pain (5/14/2015), Colon polyp, Coronary artery disease, Hypertension, Myocardial infarction, PAD (peripheral artery disease), and Tobacco use.        BP Readings from Last 4 Encounters:   06/02/22 112/72   04/28/22 (!) 144/73   04/20/22 130/78   04/19/22 (!) 181/82     Ht Readings from Last 4 Encounters:   06/02/22 5' 4" (1.626 m)   04/19/22 5' 4" (1.626 m)   04/07/22 5' 4" (1.626 m)   03/28/22 5' 4" (1.626 m)     Wt Readings from Last 4 Encounters:   06/02/22 71.6 kg (157 lb 13.6 oz)   04/28/22 71.5 kg (157 lb 10.1 oz)   04/20/22 71.4 kg (157 lb 6.5 oz)   04/19/22 71.2 kg (157 lb)       The goals of prescribed drug " therapy management include:  · Supporting patient to meet the prescriber's medical treatment objectives  · Improving or maintaining quality of life  · Maintaining optimal therapy adherence  · Minimizing and managing side effects      Goals of Therapy Status: Discussed (new start)    Assessment/Plan  Patient plans to continue therapy without changes      Indication, dosage, appropriateness, effectiveness, safety and convenience of her specialty medication(s) were reviewed today.     Patient Education   Pharmacist offer to  patient was declined. Printed educational materials will be provided with medication.  Patient did accept verbal education on the following topics:        Tasks added this encounter   No tasks added.   Tasks due within next 3 months   6/1/2022 - Clinical - Initial Assessment     Danielle Johnson, PharmD  [facility]  [address] [facility] [address]  Phone: [phone]  Fax: [phone]

## 2022-06-21 NOTE — TELEPHONE ENCOUNTER
----- Message from Oliviakris Vizcaino sent at 6/21/2022  1:48 PM CDT -----  Contact: patient/[phone]  Patient needs below refill and also would like to know if she should still be following these instructions.    Sig - Route: Take 1 capsule (300 mg total) by mouth every evening for 7 days, THEN 2 capsules (600 mg total) every evening for 7 days, THEN 3 capsules (900 mg total) every evening for 16 days. - Oral   Sent to pharmacy as: gabapentin (NEURONTIN) 300 MG capsule       [facility] [place] - [place] - [address]  [place] [address]  Phone: [phone] Fax: [phone]    Thanks KL

## 2022-06-21 NOTE — TELEPHONE ENCOUNTER
Good Afternoon,     Pt is requesting for a refill of: gabapentin (NEURONTIN) 300 MG capsule  Last filed: 4/28/22  Last encounter: 4/28/2022  Up coming apt:  N/a   Pharmacy:[facility] [place], [place] - [address]  Is this something you can do?      Juan Soares   Medical Assistant

## 2022-06-22 ENCOUNTER — HOSPITAL ENCOUNTER (OUTPATIENT)
Dept: RADIOLOGY | Facility: HOSPITAL | Age: 67
Discharge: HOME | End: 2022-06-22
Attending: NURSE PRACTITIONER
Payer: MEDICARE

## 2022-06-22 ENCOUNTER — OFFICE VISIT (OUTPATIENT)
Dept: PRIMARY CARE CLINIC | Facility: CLINIC | Age: 67
End: 2022-06-22
Payer: MEDICARE

## 2022-06-22 VITALS
SYSTOLIC BLOOD PRESSURE: 130 MMHG | BODY MASS INDEX: 27.03 KG/M2 | DIASTOLIC BLOOD PRESSURE: 80 MMHG | HEART RATE: 67 BPM | TEMPERATURE: 98 F | WEIGHT: 158.31 LBS | OXYGEN SATURATION: 99 % | HEIGHT: 64 IN

## 2022-06-22 DIAGNOSIS — I10 ESSENTIAL HYPERTENSION: Chronic | ICD-10-CM

## 2022-06-22 DIAGNOSIS — J40 BRONCHITIS: Primary | ICD-10-CM

## 2022-06-22 DIAGNOSIS — R05.9 COUGH: ICD-10-CM

## 2022-06-22 PROCEDURE — 3288F PR FALLS RISK ASSESSMENT DOCUMENTED: ICD-10-PCS | Mod: CPTII,S$GLB,, | Performed by: NURSE PRACTITIONER

## 2022-06-22 PROCEDURE — 3079F DIAST BP 80-89 MM HG: CPT | Mod: CPTII,S$GLB,, | Performed by: NURSE PRACTITIONER

## 2022-06-22 PROCEDURE — 99999 PR PBB SHADOW E&M-EST. PATIENT-LVL V: CPT | Mod: PBBFAC,,, | Performed by: NURSE PRACTITIONER

## 2022-06-22 PROCEDURE — 1126F PR PAIN SEVERITY QUANTIFIED, NO PAIN PRESENT: ICD-10-PCS | Mod: CPTII,S$GLB,, | Performed by: NURSE PRACTITIONER

## 2022-06-22 PROCEDURE — 3008F PR BODY MASS INDEX (BMI) DOCUMENTED: ICD-10-PCS | Mod: CPTII,S$GLB,, | Performed by: NURSE PRACTITIONER

## 2022-06-22 PROCEDURE — 4010F ACE/ARB THERAPY RXD/TAKEN: CPT | Mod: CPTII,S$GLB,, | Performed by: NURSE PRACTITIONER

## 2022-06-22 PROCEDURE — 1160F RVW MEDS BY RX/DR IN RCRD: CPT | Mod: CPTII,S$GLB,, | Performed by: NURSE PRACTITIONER

## 2022-06-22 PROCEDURE — 99213 PR OFFICE/OUTPT VISIT, EST, LEVL III, 20-29 MIN: ICD-10-PCS | Mod: S$GLB,,, | Performed by: NURSE PRACTITIONER

## 2022-06-22 PROCEDURE — 3075F SYST BP GE 130 - 139MM HG: CPT | Mod: CPTII,S$GLB,, | Performed by: NURSE PRACTITIONER

## 2022-06-22 PROCEDURE — 3288F FALL RISK ASSESSMENT DOCD: CPT | Mod: CPTII,S$GLB,, | Performed by: NURSE PRACTITIONER

## 2022-06-22 PROCEDURE — 99999 PR PBB SHADOW E&M-EST. PATIENT-LVL V: ICD-10-PCS | Mod: PBBFAC,,, | Performed by: NURSE PRACTITIONER

## 2022-06-22 PROCEDURE — 1126F AMNT PAIN NOTED NONE PRSNT: CPT | Mod: CPTII,S$GLB,, | Performed by: NURSE PRACTITIONER

## 2022-06-22 PROCEDURE — 1160F PR REVIEW ALL MEDS BY PRESCRIBER/CLIN PHARMACIST DOCUMENTED: ICD-10-PCS | Mod: CPTII,S$GLB,, | Performed by: NURSE PRACTITIONER

## 2022-06-22 PROCEDURE — 1101F PR PT FALLS ASSESS DOC 0-1 FALLS W/OUT INJ PAST YR: ICD-10-PCS | Mod: CPTII,S$GLB,, | Performed by: NURSE PRACTITIONER

## 2022-06-22 PROCEDURE — 3079F PR MOST RECENT DIASTOLIC BLOOD PRESSURE 80-89 MM HG: ICD-10-PCS | Mod: CPTII,S$GLB,, | Performed by: NURSE PRACTITIONER

## 2022-06-22 PROCEDURE — 71046 X-RAY EXAM CHEST 2 VIEWS: CPT | Mod: TC,PN

## 2022-06-22 PROCEDURE — 1159F PR MEDICATION LIST DOCUMENTED IN MEDICAL RECORD: ICD-10-PCS | Mod: CPTII,S$GLB,, | Performed by: NURSE PRACTITIONER

## 2022-06-22 PROCEDURE — 1101F PT FALLS ASSESS-DOCD LE1/YR: CPT | Mod: CPTII,S$GLB,, | Performed by: NURSE PRACTITIONER

## 2022-06-22 PROCEDURE — 99213 OFFICE O/P EST LOW 20 MIN: CPT | Mod: S$GLB,,, | Performed by: NURSE PRACTITIONER

## 2022-06-22 PROCEDURE — 3075F PR MOST RECENT SYSTOLIC BLOOD PRESS GE 130-139MM HG: ICD-10-PCS | Mod: CPTII,S$GLB,, | Performed by: NURSE PRACTITIONER

## 2022-06-22 PROCEDURE — 1159F MED LIST DOCD IN RCRD: CPT | Mod: CPTII,S$GLB,, | Performed by: NURSE PRACTITIONER

## 2022-06-22 PROCEDURE — 3008F BODY MASS INDEX DOCD: CPT | Mod: CPTII,S$GLB,, | Performed by: NURSE PRACTITIONER

## 2022-06-22 PROCEDURE — 4010F PR ACE/ARB THEARPY RXD/TAKEN: ICD-10-PCS | Mod: CPTII,S$GLB,, | Performed by: NURSE PRACTITIONER

## 2022-06-22 RX ORDER — ALBUTEROL SULFATE 90 UG/1
2 AEROSOL, METERED RESPIRATORY (INHALATION) EVERY 6 HOURS PRN
Qty: 18 G | Refills: 1 | Status: ON HOLD | OUTPATIENT
Start: 2022-06-22 | End: 2022-11-01 | Stop reason: HOSPADM

## 2022-06-22 RX ORDER — GABAPENTIN 300 MG/1
CAPSULE ORAL
Qty: 69 CAPSULE | Refills: 0 | OUTPATIENT
Start: 2022-06-22 | End: 2022-06-22 | Stop reason: SDUPTHER

## 2022-06-22 RX ORDER — GABAPENTIN 300 MG/1
900 CAPSULE ORAL NIGHTLY
Qty: 90 CAPSULE | Refills: 2 | Status: ON HOLD | OUTPATIENT
Start: 2022-06-22 | End: 2022-11-01 | Stop reason: SDUPTHER

## 2022-06-22 NOTE — PROGRESS NOTES
"Subjective:       Patient ID: Shirlene Olvera is a 66 y.o. female.    Chief Complaint: Fatigue (Pt c/o weakness after getting a stent placed) and Hoarse (Pt c/o being hoarse after getting a stent placed)    Pt presents to clinic today due to not feeling herself. Pt reports she has been coughing, feeling fatigue, and having a hoarse voice since her stent placement a month or so back. Pt reports she was dx with bronichitis and treated with a zpack about 2 weeks ago by cardio for possible pneumonia. Pt does not feel this helped her symptoms. Neg Covid testing. Denies fever. No shortness of breath. Pt feels her symptoms are worse since she quit smoking. She is currently taking loratadine and flonsase otc for her symptoms    BP contorlled today. Follows with cardio. Recent stent placement          /80   Pulse 67   Temp 98.3 °F (36.8 °C) (Temporal)   Ht 5' 4" (1.626 m)   Wt 71.8 kg (158 lb 4.6 oz)   LMP  (LMP Unknown)   SpO2 99%   BMI 27.17 kg/m²     Review of Systems   Constitutional: Negative for appetite change, chills, diaphoresis, fatigue and fever.   HENT: Positive for postnasal drip and voice change (hoarseness). Negative for ear discharge, ear pain, sinus pressure, sinus pain and sneezing.    Eyes: Negative for visual disturbance.   Respiratory: Positive for cough. Negative for shortness of breath and wheezing.    Cardiovascular: Negative for chest pain, palpitations and leg swelling.   Gastrointestinal: Negative for abdominal distention, abdominal pain, blood in stool, constipation, diarrhea, nausea and vomiting.   Genitourinary: Negative for decreased urine volume, difficulty urinating, dysuria, frequency, hematuria and urgency.   Neurological: Negative.  Negative for dizziness, syncope, speech difficulty, light-headedness and headaches.   Psychiatric/Behavioral: Negative for agitation, confusion and hallucinations. The patient is not nervous/anxious.        Objective:      Physical " Exam  Vitals and nursing note reviewed.   Constitutional:       Appearance: She is well-developed. She is not diaphoretic.   HENT:      Head: Normocephalic and atraumatic.      Right Ear: Tympanic membrane, ear canal and external ear normal.      Left Ear: Tympanic membrane, ear canal and external ear normal.      Nose: Mucosal edema and rhinorrhea present.      Right Sinus: No maxillary sinus tenderness or frontal sinus tenderness.      Left Sinus: No maxillary sinus tenderness or frontal sinus tenderness.      Mouth/Throat:      Pharynx: Uvula midline. No oropharyngeal exudate or posterior oropharyngeal erythema.   Eyes:      General:         Right eye: No discharge.         Left eye: No discharge.      Conjunctiva/sclera: Conjunctivae normal.   Cardiovascular:      Rate and Rhythm: Normal rate and regular rhythm.      Heart sounds: Normal heart sounds. No murmur heard.  Pulmonary:      Effort: Pulmonary effort is normal. No accessory muscle usage or respiratory distress.      Breath sounds: Normal breath sounds. No decreased breath sounds, wheezing, rhonchi or rales.   Chest:      Chest wall: No tenderness.   Abdominal:      General: There is no distension.      Palpations: Abdomen is soft.   Musculoskeletal:         General: Normal range of motion.      Cervical back: Normal range of motion.   Skin:     General: Skin is warm and dry.   Neurological:      Mental Status: She is alert and oriented to person, place, and time.   Psychiatric:         Behavior: Behavior normal.         Assessment:       1. Bronchitis    2. Cough    3. Essential hypertension    4. BMI 27.0-27.9,adult        Plan:       Shirlene was seen today for fatigue and hoarse.    Diagnoses and all orders for this visit:    Bronchitis       - Reassured pt lungs sounded good today on exam, use of inhaler PRN    Cough  -     X-Ray Chest PA And Lateral; Future  -     albuterol (PROVENTIL/VENTOLIN HFA) 90 mcg/actuation inhaler; Inhale 2 puffs into the  lungs every 6 (six) hours as needed for Wheezing or Shortness of Breath.  -     Ambulatory referral/consult to Pulmonology; Future    Essential hypertension  Comments:  stable- continue meds per cardio    BMI 27.0-27.9,adult      Patient Instructions   Discussed with pt may be COPD and possible pulm consult. Will obtain xray to check lungs.

## 2022-06-28 ENCOUNTER — SPECIALTY PHARMACY (OUTPATIENT)
Dept: PHARMACY | Facility: CLINIC | Age: 67
End: 2022-06-28
Payer: MEDICARE

## 2022-06-28 NOTE — TELEPHONE ENCOUNTER
Specialty Pharmacy - Refill Coordination    Specialty Medication Orders Linked to Encounter    Flowsheet Row Most Recent Value   Medication #1 evolocumab (REPATHA SURECLICK) 140 mg/mL PnIj (Order#[id number], Rx#[id number])          Refill Questions - Documented Responses    Flowsheet Row Most Recent Value   Patient Availability and HIPAA Verification    Does patient want to proceed with activity? Yes   HIPAA/medical authority confirmed? Yes   Relationship to patient of person spoken to? Self   Refill Screening Questions    Would patient like to speak to a pharmacist? No   When does the patient need to receive the medication? 07/06/22   Refill Delivery Questions    How will the patient receive the medication? Delivery Lyubov   When does the patient need to receive the medication? 07/06/22   Shipping Address Home   Address in [facility] confirmed and updated if neccessary? Yes   Expected Copay ($) 0   Is the patient able to afford the medication copay? Yes   Payment Method zero copay   Days supply of Refill 28   Supplies needed? No supplies needed   Refill activity completed? Yes   Refill activity plan Refill scheduled   Shipment/Pickup Date: 07/05/22          Current Outpatient Medications   Medication Sig    albuterol (PROVENTIL/VENTOLIN HFA) 90 mcg/actuation inhaler Inhale 2 puffs into the lungs every 6 (six) hours as needed for Wheezing or Shortness of Breath.    amLODIPine (NORVASC) 5 MG tablet Take 1 tablet (5 mg total) by mouth once daily.    aspirin 81 MG Chew Take 1 tablet (81 mg total) by mouth once daily. (Patient taking differently: Take 81 mg by mouth 2 (two) times a day.)    azithromycin (ZITHROMAX Z-JESSICA) 250 MG tablet Take 1 tablet (250 mg total) by mouth once daily. Take 2 tablets 1st day, then 1 tablet q day x 4 days (Patient not taking: Reported on 6/22/2022)    BRILINTA 90 mg tablet TAKE ONE (1) TABLET (90 MG TOTAL) BY MOUTH 2 (TWO) TIMES DAILY. (Patient not taking: Reported on 6/22/2022)     buPROPion (WELLBUTRIN XL) 150 MG TB24 tablet Take 1 tablet (150 mg total) by mouth once daily for 14 days, THEN 2 tablets (300 mg total) once daily.    clopidogreL (PLAVIX) 75 mg tablet Take 1 tablet (75 mg total) by mouth once daily.    cyanocobalamin (VITAMIN B-12) 1000 MCG tablet Take 100 mcg by mouth once daily.    evolocumab (REPATHA SURECLICK) 140 mg/mL PnIj Inject 1 mL (140 mg total) into the skin every 14 (fourteen) days.    fexofenadine HCl (ALLEGRA ORAL) Take 1 tablet by mouth once daily.    fluticasone propionate (FLONASE) 50 mcg/actuation nasal spray USE 2 SPRAYS IN EACH NOSTRIL ONE TIME DAILY    furosemide (LASIX) 20 MG tablet Take 1 tablet (20 mg total) by mouth once daily.    gabapentin (NEURONTIN) 300 MG capsule Take 3 capsules (900 mg total) by mouth every evening.    loratadine (CLARITIN) 10 mg tablet Take 10 mg by mouth daily as needed for Allergies.    multivitamin with minerals tablet Take 1 tablet by mouth once daily.    niacin, inositol niacinate, (NIACIN FLUSH FREE) 400 mg niacin (500 mg) Cap Take 400 mg by mouth every evening. (Patient not taking: Reported on 6/22/2022)    pantoprazole (PROTONIX) 20 MG tablet Take 1 tablet (20 mg total) by mouth once daily.    valsartan (DIOVAN) 160 MG tablet TAKE ONE (1) TABLET (160 MG TOTAL) BY MOUTH ONCE DAILY    vitamin D (VITAMIN D3) 1000 units Tab Take 1,000 Units by mouth once daily.   Last reviewed on 6/22/2022  1:26 PM by Luis Manuel Dubose NP    Review of patient's allergies indicates:   Allergen Reactions    Statins-hmg-coa reductase inhibitors Other (See Comments)     Myalgias to lipitor and simvastatin    Last reviewed on  6/22/2022 1:26 PM by Luis Manuel Dubose      Tasks added this encounter   7/27/2022 - Refill Call (Auto Added)   Tasks due within next 3 months   No tasks due.     Izzy Henning, Patient Care Assistant  [facility]  [address]  [facility] [address]  Phone: [phone]  Fax:  [phone]

## 2022-07-13 DIAGNOSIS — I25.2 HISTORY OF NON-ST ELEVATION MYOCARDIAL INFARCTION (NSTEMI): ICD-10-CM

## 2022-07-13 NOTE — TELEPHONE ENCOUNTER
----- Message from Bette Abel sent at 7/13/2022  2:20 PM CDT -----  .Type:  RX Refill Request    Who Called: Pt   Refill or New Rx:refill   RX Name and Strength:furosemide (LASIX) 20 MG tablet  How is the patient currently taking it? (ex. 1XDay):  Is this a 30 day or 90 day RX:30   Preferred Pharmacy with phone number:.  [facility]  [address] [facility] [address]  Phone: [phone] Fax: [phone]        [facility] [place] [facility] [place] - [address]  [address]  [place] [address]  Phone: [phone] Fax: [phone]      Local or Mail Order:local   Ordering Provider:Zeeshan   Would the patient rather a call back or a response via [facility]? Call back   Best Call Back Number:  [phone]    Additional Information:     Thanks bs

## 2022-07-14 RX ORDER — FUROSEMIDE 20 MG/1
20 TABLET ORAL DAILY
Qty: 30 TABLET | Refills: 5 | Status: SHIPPED | OUTPATIENT
Start: 2022-07-14 | End: 2023-03-03 | Stop reason: SDUPTHER

## 2022-07-27 ENCOUNTER — SPECIALTY PHARMACY (OUTPATIENT)
Dept: PHARMACY | Facility: CLINIC | Age: 67
End: 2022-07-27
Payer: MEDICARE

## 2022-07-27 NOTE — TELEPHONE ENCOUNTER
Specialty Pharmacy - Refill Coordination    Specialty Medication Orders Linked to Encounter    Flowsheet Row Most Recent Value   Medication #1 evolocumab (REPATHA SURECLICK) 140 mg/mL PnIj (Order#[id number], Rx#[id number])          Refill Questions - Documented Responses    Flowsheet Row Most Recent Value   Patient Availability and HIPAA Verification    Does patient want to proceed with activity? Yes   HIPAA/medical authority confirmed? Yes   Relationship to patient of person spoken to? Self   Refill Screening Questions    Would patient like to speak to a pharmacist? No   When does the patient need to receive the medication? 08/03/22   Refill Delivery Questions    How will the patient receive the medication? Delivery Ly[facility]ov   When does the patient need to receive the medication? 08/03/22   Shipping Address Home   Address in [facility] confirmed and updated if neccessary? Yes   Expected Copay ($) 0   Is the patient able to afford the medication copay? Yes   Payment Method zero copay   Days supply of Refill 28   Supplies needed? No supplies needed   Refill activity completed? Yes   Refill activity plan Refill scheduled   Shipment/Pickup Date: 08/01/22          Current Outpatient Medications   Medication Sig    albuterol (PROVENTIL/VENTOLIN HFA) 90 mcg/actuation inhaler Inhale 2 puffs into the lungs every 6 (six) hours as needed for Wheezing or Shortness of Breath.    amLODIPine (NORVASC) 5 MG tablet Take 1 tablet (5 mg total) by mouth once daily.    aspirin 81 MG Chew Take 1 tablet (81 mg total) by mouth once daily. (Patient taking differently: Take 81 mg by mouth 2 (two) times a day.)    azithromycin (ZITHROMAX Z-JESSICA) 250 MG tablet Take 1 tablet (250 mg total) by mouth once daily. Take 2 tablets 1st day, then 1 tablet q day x 4 days (Patient not taking: Reported on 6/22/2022)    BRILINTA 90 mg tablet TAKE ONE (1) TABLET (90 MG TOTAL) BY MOUTH 2 (TWO) TIMES DAILY. (Patient not taking: Reported on 6/22/2022)     buPROPion (WELLBUTRIN XL) 150 MG TB24 tablet Take 1 tablet (150 mg total) by mouth once daily for 14 days, THEN 2 tablets (300 mg total) once daily.    clopidogreL (PLAVIX) 75 mg tablet Take 1 tablet (75 mg total) by mouth once daily.    cyanocobalamin (VITAMIN B-12) 1000 MCG tablet Take 100 mcg by mouth once daily.    evolocumab (REPATHA SURECLICK) 140 mg/mL PnIj Inject 1 mL (140 mg total) into the skin every 14 (fourteen) days.    fexofenadine HCl (ALLEGRA ORAL) Take 1 tablet by mouth once daily.    fluticasone propionate (FLONASE) 50 mcg/actuation nasal spray USE 2 SPRAYS IN EACH NOSTRIL ONE TIME DAILY    furosemide (LASIX) 20 MG tablet Take 1 tablet (20 mg total) by mouth once daily.    gabapentin (NEURONTIN) 300 MG capsule Take 3 capsules (900 mg total) by mouth every evening.    loratadine (CLARITIN) 10 mg tablet Take 10 mg by mouth daily as needed for Allergies.    multivitamin with minerals tablet Take 1 tablet by mouth once daily.    niacin, inositol niacinate, (NIACIN FLUSH FREE) 400 mg niacin (500 mg) Cap Take 400 mg by mouth every evening. (Patient not taking: Reported on 6/22/2022)    pantoprazole (PROTONIX) 20 MG tablet Take 1 tablet (20 mg total) by mouth once daily.    valsartan (DIOVAN) 160 MG tablet TAKE ONE (1) TABLET (160 MG TOTAL) BY MOUTH ONCE DAILY    vitamin D (VITAMIN D3) 1000 units Tab Take 1,000 Units by mouth once daily.   Last reviewed on 6/22/2022  1:26 PM by Luis Manuel Dubose NP    Review of patient's allergies indicates:   Allergen Reactions    Statins-hmg-coa reductase inhibitors Other (See Comments)     Myalgias to lipitor and simvastatin    Last reviewed on  6/22/2022 1:26 PM by Luis Manuel Dubose      Tasks added this encounter   8/24/2022 - Refill Call (Auto Added)   Tasks due within next 3 months   No tasks due.     [facility]  [address] [place] [address]  Phone: [phone]  Fax: [phone]

## 2022-08-11 ENCOUNTER — OFFICE VISIT (OUTPATIENT)
Dept: PULMONOLOGY | Facility: CLINIC | Age: 67
End: 2022-08-11
Payer: MEDICARE

## 2022-08-11 VITALS
HEART RATE: 78 BPM | HEIGHT: 64 IN | WEIGHT: 161.81 LBS | DIASTOLIC BLOOD PRESSURE: 82 MMHG | RESPIRATION RATE: 16 BRPM | SYSTOLIC BLOOD PRESSURE: 120 MMHG | BODY MASS INDEX: 27.63 KG/M2 | OXYGEN SATURATION: 95 %

## 2022-08-11 DIAGNOSIS — R05.9 COUGH: ICD-10-CM

## 2022-08-11 DIAGNOSIS — K21.9 GASTROESOPHAGEAL REFLUX DISEASE, UNSPECIFIED WHETHER ESOPHAGITIS PRESENT: ICD-10-CM

## 2022-08-11 DIAGNOSIS — R09.82 POST-NASAL DRIP: ICD-10-CM

## 2022-08-11 DIAGNOSIS — Z87.891 HX OF SMOKING: ICD-10-CM

## 2022-08-11 DIAGNOSIS — J04.0 REFLUX LARYNGITIS: Primary | ICD-10-CM

## 2022-08-11 DIAGNOSIS — R05.9 COUGH: Primary | ICD-10-CM

## 2022-08-11 DIAGNOSIS — J20.9 ACUTE BRONCHITIS, UNSPECIFIED ORGANISM: ICD-10-CM

## 2022-08-11 DIAGNOSIS — K21.9 REFLUX LARYNGITIS: Primary | ICD-10-CM

## 2022-08-11 PROCEDURE — 1160F RVW MEDS BY RX/DR IN RCRD: CPT | Mod: CPTII,S$GLB,, | Performed by: NURSE PRACTITIONER

## 2022-08-11 PROCEDURE — 99214 OFFICE O/P EST MOD 30 MIN: CPT | Mod: S$GLB,,, | Performed by: NURSE PRACTITIONER

## 2022-08-11 PROCEDURE — 3074F PR MOST RECENT SYSTOLIC BLOOD PRESSURE < 130 MM HG: ICD-10-PCS | Mod: CPTII,S$GLB,, | Performed by: NURSE PRACTITIONER

## 2022-08-11 PROCEDURE — 4010F PR ACE/ARB THEARPY RXD/TAKEN: ICD-10-PCS | Mod: CPTII,S$GLB,, | Performed by: NURSE PRACTITIONER

## 2022-08-11 PROCEDURE — 1159F PR MEDICATION LIST DOCUMENTED IN MEDICAL RECORD: ICD-10-PCS | Mod: CPTII,S$GLB,, | Performed by: NURSE PRACTITIONER

## 2022-08-11 PROCEDURE — 3079F DIAST BP 80-89 MM HG: CPT | Mod: CPTII,S$GLB,, | Performed by: NURSE PRACTITIONER

## 2022-08-11 PROCEDURE — 1160F PR REVIEW ALL MEDS BY PRESCRIBER/CLIN PHARMACIST DOCUMENTED: ICD-10-PCS | Mod: CPTII,S$GLB,, | Performed by: NURSE PRACTITIONER

## 2022-08-11 PROCEDURE — 4010F ACE/ARB THERAPY RXD/TAKEN: CPT | Mod: CPTII,S$GLB,, | Performed by: NURSE PRACTITIONER

## 2022-08-11 PROCEDURE — 99999 PR PBB SHADOW E&M-EST. PATIENT-LVL V: ICD-10-PCS | Mod: PBBFAC,,, | Performed by: NURSE PRACTITIONER

## 2022-08-11 PROCEDURE — 3008F PR BODY MASS INDEX (BMI) DOCUMENTED: ICD-10-PCS | Mod: CPTII,S$GLB,, | Performed by: NURSE PRACTITIONER

## 2022-08-11 PROCEDURE — 1101F PR PT FALLS ASSESS DOC 0-1 FALLS W/OUT INJ PAST YR: ICD-10-PCS | Mod: CPTII,S$GLB,, | Performed by: NURSE PRACTITIONER

## 2022-08-11 PROCEDURE — 1101F PT FALLS ASSESS-DOCD LE1/YR: CPT | Mod: CPTII,S$GLB,, | Performed by: NURSE PRACTITIONER

## 2022-08-11 PROCEDURE — 3008F BODY MASS INDEX DOCD: CPT | Mod: CPTII,S$GLB,, | Performed by: NURSE PRACTITIONER

## 2022-08-11 PROCEDURE — 99214 PR OFFICE/OUTPT VISIT, EST, LEVL IV, 30-39 MIN: ICD-10-PCS | Mod: S$GLB,,, | Performed by: NURSE PRACTITIONER

## 2022-08-11 PROCEDURE — 3079F PR MOST RECENT DIASTOLIC BLOOD PRESSURE 80-89 MM HG: ICD-10-PCS | Mod: CPTII,S$GLB,, | Performed by: NURSE PRACTITIONER

## 2022-08-11 PROCEDURE — 1159F MED LIST DOCD IN RCRD: CPT | Mod: CPTII,S$GLB,, | Performed by: NURSE PRACTITIONER

## 2022-08-11 PROCEDURE — 3288F FALL RISK ASSESSMENT DOCD: CPT | Mod: CPTII,S$GLB,, | Performed by: NURSE PRACTITIONER

## 2022-08-11 PROCEDURE — 3288F PR FALLS RISK ASSESSMENT DOCUMENTED: ICD-10-PCS | Mod: CPTII,S$GLB,, | Performed by: NURSE PRACTITIONER

## 2022-08-11 PROCEDURE — 99999 PR PBB SHADOW E&M-EST. PATIENT-LVL V: CPT | Mod: PBBFAC,,, | Performed by: NURSE PRACTITIONER

## 2022-08-11 PROCEDURE — 3074F SYST BP LT 130 MM HG: CPT | Mod: CPTII,S$GLB,, | Performed by: NURSE PRACTITIONER

## 2022-08-11 NOTE — PROGRESS NOTES
"Subjective:      Patient ID: Shirlene Olvera is a 66 y.o. female.    Chief Complaint: Cough    HPI  Presents for hoarse voice, cough.    25pk yr hx smoking . Quit 03/2022  She is having fairly significant reflux symptoms. Bitter taste, irritated throat.   She was treated for bronchitis with zpak. No significant improvement.   Possible emphysema changes to CXR.   Flonase and claritin for post nasal drip    Patient Active Problem List   Diagnosis    Thoracic or lumbosacral neuritis or radiculitis, unspecified    Essential hypertension    Hyperlipidemia    Degenerative disc disease, cervical    DDD (degenerative disc disease), lumbar    Tobacco abuse    Allergic rhinitis, cause unspecified    History of non-ST elevation myocardial infarction (NSTEMI)    Nonobstructive coronary artery disease    Biliary dyskinesia    History of colon polyps    PAD (peripheral artery disease)    ROMÁN (generalized anxiety disorder)    Insomnia    Statin intolerance    CKD (chronic kidney disease) stage 3, GFR 30-59 ml/min    Multiple nevi    Cervical spondylosis    Pharyngoesophageal dysphagia    Schatzki's ring of distal esophagus    Encounter for screening colonoscopy    Sinusitis    Cervical strain    Delayed immunizations    Rash       /82   Pulse 78   Resp 16   Ht 5' 4" (1.626 m)   Wt 73.4 kg (161 lb 13.1 oz)   LMP  (LMP Unknown)   SpO2 95%   BMI 27.78 kg/m²   Body mass index is 27.78 kg/m².    Review of Systems   Constitutional: Negative.    HENT: Positive for postnasal drip and voice change.    Respiratory: Positive for cough.    Cardiovascular: Negative.    Musculoskeletal: Negative.    Gastrointestinal: Negative.  Positive for acid reflux.   Neurological: Negative.    Psychiatric/Behavioral: Negative.      Objective:      Physical Exam  Constitutional:       Appearance: Normal appearance. She is well-developed.   HENT:      Head: Normocephalic and atraumatic.      Nose: Nose normal.      " Mouth/Throat:      Pharynx: Oropharynx is clear.   Cardiovascular:      Rate and Rhythm: Normal rate and regular rhythm.      Heart sounds: No murmur heard.    No gallop.   Pulmonary:      Effort: Pulmonary effort is normal.      Breath sounds: Normal breath sounds.   Abdominal:      Palpations: Abdomen is soft. There is no mass.      Tenderness: There is no abdominal tenderness.   Musculoskeletal:         General: Normal range of motion.      Cervical back: Normal range of motion and neck supple.   Skin:     General: Skin is warm and dry.   Neurological:      Mental Status: She is alert and oriented to person, place, and time.   Psychiatric:         Mood and Affect: Mood normal.         Behavior: Behavior normal.       Personal Diagnostic Review  Results for orders placed during the hospital encounter of 06/22/22    X-Ray Chest PA And Lateral    Narrative  EXAMINATION:  XR CHEST PA AND LATERAL    CLINICAL HISTORY:  Cough, unspecified    COMPARISON:  None    FINDINGS:  Cardiac silhouette is normal.  The lungs demonstrate no evidence of active disease.  Suspect emphysematous changes within the upper lobes.  No evidence of pleural effusion or pneumothorax.  Bones appear intact.   Clips are seen within the left neck.    Impression  No acute process seen.      Electronically signed by: Celio Bustos MD  Date:    06/22/2022  Time:    14:13          Assessment:       1. Reflux laryngitis    2. Cough    3. Gastroesophageal reflux disease, unspecified whether esophagitis present    4. Post-nasal drip    5. Acute bronchitis, unspecified organism    6. Hx of smoking        Outpatient Encounter Medications as of 8/11/2022   Medication Sig Dispense Refill    amLODIPine (NORVASC) 5 MG tablet Take 1 tablet (5 mg total) by mouth once daily. 90 tablet 3    aspirin 81 MG Chew Take 1 tablet (81 mg total) by mouth once daily. (Patient taking differently: Take 81 mg by mouth 2 (two) times a day.) 360 tablet 1    clopidogreL (PLAVIX)  75 mg tablet Take 1 tablet (75 mg total) by mouth once daily. 90 tablet 3    cyanocobalamin (VITAMIN B-12) 1000 MCG tablet Take 100 mcg by mouth once daily.      evolocumab (REPATHA SURECLICK) 140 mg/mL PnIj Inject 1 mL (140 mg total) into the skin every 14 (fourteen) days. 2 each 11    fexofenadine HCl (ALLEGRA ORAL) Take 1 tablet by mouth once daily.      fluticasone propionate (FLONASE) 50 mcg/actuation nasal spray USE 2 SPRAYS IN EACH NOSTRIL ONE TIME DAILY 48 g 3    furosemide (LASIX) 20 MG tablet Take 1 tablet (20 mg total) by mouth once daily. 30 tablet 5    gabapentin (NEURONTIN) 300 MG capsule Take 3 capsules (900 mg total) by mouth every evening. 90 capsule 2    loratadine (CLARITIN) 10 mg tablet Take 10 mg by mouth daily as needed for Allergies.      multivitamin with minerals tablet Take 1 tablet by mouth once daily.      niacin, inositol niacinate, (NIACIN FLUSH FREE) 400 mg niacin (500 mg) Cap Take 400 mg by mouth every evening. 30 each 11    pantoprazole (PROTONIX) 20 MG tablet Take 1 tablet (20 mg total) by mouth once daily. 90 tablet 0    valsartan (DIOVAN) 160 MG tablet TAKE ONE (1) TABLET (160 MG TOTAL) BY MOUTH ONCE DAILY 30 tablet 11    vitamin D (VITAMIN D3) 1000 units Tab Take 1,000 Units by mouth once daily.      albuterol (PROVENTIL/VENTOLIN HFA) 90 mcg/actuation inhaler Inhale 2 puffs into the lungs every 6 (six) hours as needed for Wheezing or Shortness of Breath. 18 g 1    BRILINTA 90 mg tablet TAKE ONE (1) TABLET (90 MG TOTAL) BY MOUTH 2 (TWO) TIMES DAILY. 20 tablet 0    buPROPion (WELLBUTRIN XL) 150 MG TB24 tablet Take 1 tablet (150 mg total) by mouth once daily for 14 days, THEN 2 tablets (300 mg total) once daily. 166 tablet 0    [DISCONTINUED] azithromycin (ZITHROMAX Z-JESSICA) 250 MG tablet Take 1 tablet (250 mg total) by mouth once daily. Take 2 tablets 1st day, then 1 tablet q day x 4 days 6 tablet 0    [DISCONTINUED] metoprolol tartrate (LOPRESSOR) 50 MG tablet Take  1 tablet (50 mg total) by mouth Daily. 30 tablet 11    [DISCONTINUED] valsartan-hydrochlorothiazide (DIOVAN-HCT) 160-25 mg per tablet Take 1 tablet by mouth once daily. 90 tablet 1     No facility-administered encounter medications on file as of 8/11/2022.     No orders of the defined types were placed in this encounter.    Plan:        Problem List Items Addressed This Visit    None     Visit Diagnoses     Reflux laryngitis    -  Primary    Cough        Gastroesophageal reflux disease, unspecified whether esophagitis present        Post-nasal drip        Acute bronchitis, unspecified organism        Hx of smoking            Start back on protonix.  Continue allergy medication.  Albuterol when needed.  Spirometry- schedule another day- needs preprocedural covid test

## 2022-08-18 ENCOUNTER — OFFICE VISIT (OUTPATIENT)
Dept: INTERNAL MEDICINE | Facility: CLINIC | Age: 67
End: 2022-08-18
Payer: MEDICARE

## 2022-08-18 ENCOUNTER — HOSPITAL ENCOUNTER (OUTPATIENT)
Dept: RADIOLOGY | Facility: HOSPITAL | Age: 67
Discharge: HOME | End: 2022-08-18
Attending: FAMILY MEDICINE
Payer: MEDICARE

## 2022-08-18 VITALS
SYSTOLIC BLOOD PRESSURE: 128 MMHG | HEART RATE: 67 BPM | TEMPERATURE: 98 F | BODY MASS INDEX: 28 KG/M2 | DIASTOLIC BLOOD PRESSURE: 78 MMHG | WEIGHT: 163.13 LBS

## 2022-08-18 DIAGNOSIS — M25.473 ANKLE SWELLING, UNSPECIFIED LATERALITY: ICD-10-CM

## 2022-08-18 DIAGNOSIS — Z78.0 POSTMENOPAUSAL: ICD-10-CM

## 2022-08-18 DIAGNOSIS — Z00.00 WELLNESS EXAMINATION: Primary | ICD-10-CM

## 2022-08-18 DIAGNOSIS — I10 ESSENTIAL HYPERTENSION: ICD-10-CM

## 2022-08-18 DIAGNOSIS — E78.00 PURE HYPERCHOLESTEROLEMIA: ICD-10-CM

## 2022-08-18 DIAGNOSIS — F41.1 GAD (GENERALIZED ANXIETY DISORDER): ICD-10-CM

## 2022-08-18 DIAGNOSIS — R10.13 DYSPEPSIA: ICD-10-CM

## 2022-08-18 PROBLEM — R21 RASH: Status: RESOLVED | Noted: 2022-04-20 | Resolved: 2022-08-18

## 2022-08-18 PROCEDURE — 3078F DIAST BP <80 MM HG: CPT | Mod: CPTII,S$GLB,, | Performed by: FAMILY MEDICINE

## 2022-08-18 PROCEDURE — 1125F PR PAIN SEVERITY QUANTIFIED, PAIN PRESENT: ICD-10-PCS | Mod: CPTII,S$GLB,, | Performed by: FAMILY MEDICINE

## 2022-08-18 PROCEDURE — 3078F PR MOST RECENT DIASTOLIC BLOOD PRESSURE < 80 MM HG: ICD-10-PCS | Mod: CPTII,S$GLB,, | Performed by: FAMILY MEDICINE

## 2022-08-18 PROCEDURE — 99999 PR PBB SHADOW E&M-EST. PATIENT-LVL IV: ICD-10-PCS | Mod: PBBFAC,,, | Performed by: FAMILY MEDICINE

## 2022-08-18 PROCEDURE — 3288F PR FALLS RISK ASSESSMENT DOCUMENTED: ICD-10-PCS | Mod: CPTII,S$GLB,, | Performed by: FAMILY MEDICINE

## 2022-08-18 PROCEDURE — 3008F PR BODY MASS INDEX (BMI) DOCUMENTED: ICD-10-PCS | Mod: CPTII,S$GLB,, | Performed by: FAMILY MEDICINE

## 2022-08-18 PROCEDURE — 4010F ACE/ARB THERAPY RXD/TAKEN: CPT | Mod: CPTII,S$GLB,, | Performed by: FAMILY MEDICINE

## 2022-08-18 PROCEDURE — 1125F AMNT PAIN NOTED PAIN PRSNT: CPT | Mod: CPTII,S$GLB,, | Performed by: FAMILY MEDICINE

## 2022-08-18 PROCEDURE — 4010F PR ACE/ARB THEARPY RXD/TAKEN: ICD-10-PCS | Mod: CPTII,S$GLB,, | Performed by: FAMILY MEDICINE

## 2022-08-18 PROCEDURE — 73610 XR ANKLE COMPLETE 3 VIEW RIGHT: ICD-10-PCS | Mod: 26,RT,, | Performed by: RADIOLOGY

## 2022-08-18 PROCEDURE — 1159F PR MEDICATION LIST DOCUMENTED IN MEDICAL RECORD: ICD-10-PCS | Mod: CPTII,S$GLB,, | Performed by: FAMILY MEDICINE

## 2022-08-18 PROCEDURE — 1101F PR PT FALLS ASSESS DOC 0-1 FALLS W/OUT INJ PAST YR: ICD-10-PCS | Mod: CPTII,S$GLB,, | Performed by: FAMILY MEDICINE

## 2022-08-18 PROCEDURE — 99999 PR PBB SHADOW E&M-EST. PATIENT-LVL IV: CPT | Mod: PBBFAC,,, | Performed by: FAMILY MEDICINE

## 2022-08-18 PROCEDURE — 3074F SYST BP LT 130 MM HG: CPT | Mod: CPTII,S$GLB,, | Performed by: FAMILY MEDICINE

## 2022-08-18 PROCEDURE — 3288F FALL RISK ASSESSMENT DOCD: CPT | Mod: CPTII,S$GLB,, | Performed by: FAMILY MEDICINE

## 2022-08-18 PROCEDURE — 73610 X-RAY EXAM OF ANKLE: CPT | Mod: 26,RT,, | Performed by: RADIOLOGY

## 2022-08-18 PROCEDURE — 99397 PR PREVENTIVE VISIT,EST,65 & OVER: ICD-10-PCS | Mod: S$GLB,,, | Performed by: FAMILY MEDICINE

## 2022-08-18 PROCEDURE — 1159F MED LIST DOCD IN RCRD: CPT | Mod: CPTII,S$GLB,, | Performed by: FAMILY MEDICINE

## 2022-08-18 PROCEDURE — 1101F PT FALLS ASSESS-DOCD LE1/YR: CPT | Mod: CPTII,S$GLB,, | Performed by: FAMILY MEDICINE

## 2022-08-18 PROCEDURE — 3008F BODY MASS INDEX DOCD: CPT | Mod: CPTII,S$GLB,, | Performed by: FAMILY MEDICINE

## 2022-08-18 PROCEDURE — 99397 PER PM REEVAL EST PAT 65+ YR: CPT | Mod: S$GLB,,, | Performed by: FAMILY MEDICINE

## 2022-08-18 PROCEDURE — 3074F PR MOST RECENT SYSTOLIC BLOOD PRESSURE < 130 MM HG: ICD-10-PCS | Mod: CPTII,S$GLB,, | Performed by: FAMILY MEDICINE

## 2022-08-18 PROCEDURE — 73610 X-RAY EXAM OF ANKLE: CPT | Mod: TC,FY,PO,RT

## 2022-08-18 RX ORDER — PANTOPRAZOLE SODIUM 20 MG/1
20 TABLET, DELAYED RELEASE ORAL DAILY
Qty: 90 TABLET | Refills: 1 | Status: SHIPPED | OUTPATIENT
Start: 2022-08-18 | End: 2023-01-11 | Stop reason: SDUPTHER

## 2022-08-18 RX ORDER — BUPROPION HYDROCHLORIDE 150 MG/1
150 TABLET ORAL DAILY
Qty: 90 TABLET | Refills: 1 | Status: SHIPPED | OUTPATIENT
Start: 2022-08-18 | End: 2023-08-21

## 2022-08-18 RX ORDER — VALSARTAN 40 MG/1
40 TABLET ORAL DAILY
Qty: 90 TABLET | Refills: 1 | Status: ON HOLD | OUTPATIENT
Start: 2022-08-18 | End: 2022-11-01 | Stop reason: HOSPADM

## 2022-08-18 NOTE — PROGRESS NOTES
Subjective:       Patient ID: Shirlene Olvera is a 66 y.o. female.    Chief Complaint: Hypertension    Shirlene Olvera is a 66 y.o. female and is here for a comprehensive physical exam.    Do you take any herbs or supplements that were not prescribed by a doctor? Vit C, zinc, b12  Are you taking calcium supplements? no  Are you taking aspirin daily? yes     History:  Any STD's in the past? none    The following portions of the patient's history were reviewed and updated as appropriate: allergies, current medications, past family history, past medical history, past social history, past surgical history and problem list.    Review of Systems  Do you have pain that bothers you in your daily life? Ankle swelling  Pertinent items are noted in HPI.      2. Patient Counseling:  --Nutrition: Stressed importance of moderation in sodium/caffeine intake, saturated fat and cholesterol, caloric balance.  --Exercise: Stressed the importance of regular exercise.   --Substance Abuse: Discussed cessation/primary prevention of tobacco, alcohol - non-user   --Sexuality: Discussed sexually transmitted disease.  --Injury prevention: Discussed safety belts, smoke detector.   --Dental health: Discussed dental health.  --Immunizations reviewed.      3. Discussed the patient's BMI.  4. Follow up as needed for acute illness      Review of Systems   Constitutional: Negative for fever.   HENT: Negative for congestion.    Eyes: Negative for discharge.   Respiratory: Negative for shortness of breath.    Cardiovascular: Negative for chest pain.   Gastrointestinal: Negative for abdominal pain.   Genitourinary: Negative for difficulty urinating.   Musculoskeletal: Positive for joint swelling.   Neurological: Negative for dizziness.   Psychiatric/Behavioral: Negative for agitation.       Objective:      Physical Exam  Vitals and nursing note reviewed.   Constitutional:       General: She is not in acute distress.     Appearance: Normal  appearance. She is well-developed. She is not diaphoretic.   HENT:      Head: Normocephalic and atraumatic.   Eyes:      General: No scleral icterus.     Conjunctiva/sclera: Conjunctivae normal.   Cardiovascular:      Rate and Rhythm: Normal rate and regular rhythm.   Pulmonary:      Effort: Pulmonary effort is normal. No respiratory distress.      Breath sounds: Normal breath sounds. No wheezing.   Abdominal:      General: There is no distension.      Palpations: Abdomen is soft.      Tenderness: There is no abdominal tenderness. There is no guarding.   Musculoskeletal:      Comments: Swelling to Right ankle   Skin:     General: Skin is warm.      Coloration: Skin is not pale.      Findings: No erythema or rash.      Comments: Good turgor   Neurological:      Mental Status: She is alert.   Psychiatric:         Mood and Affect: Mood normal.         Thought Content: Thought content normal.         Assessment:       1. Wellness examination    2. Postmenopausal    3. Pure hypercholesterolemia    4. Essential hypertension    5. ROMÁN (generalized anxiety disorder)    6. Dyspepsia        Plan:     Problem List Items Addressed This Visit        Psychiatric    ROMÁN (generalized anxiety disorder)    Relevant Medications    buPROPion (WELLBUTRIN XL) 150 MG TB24 tablet       Cardiac/Vascular    Essential hypertension (Chronic)    Overview     Chronic, Stable, cont toprol, diovan-hctz           Current Assessment & Plan     D/C amlodipine 2/2 ankle swelling           Relevant Orders    CBC Auto Differential    Comprehensive Metabolic Panel    Hemoglobin A1C    Ferritin    Iron and TIBC    TSH    Pure hypercholesterolemia    Relevant Orders    Lipid Panel       Renal/    Postmenopausal    Relevant Orders    DXA Bone Density Spine And Hip       Other    Wellness examination - Primary      Other Visit Diagnoses     Dyspepsia        Relevant Medications    pantoprazole (PROTONIX) 20 MG tablet

## 2022-08-19 ENCOUNTER — PATIENT MESSAGE (OUTPATIENT)
Dept: INTERNAL MEDICINE | Facility: CLINIC | Age: 67
End: 2022-08-19
Payer: MEDICARE

## 2022-08-22 DIAGNOSIS — N18.31 STAGE 3A CHRONIC KIDNEY DISEASE: Primary | ICD-10-CM

## 2022-08-24 ENCOUNTER — SPECIALTY PHARMACY (OUTPATIENT)
Dept: PHARMACY | Facility: CLINIC | Age: 67
End: 2022-08-24
Payer: MEDICARE

## 2022-08-24 NOTE — TELEPHONE ENCOUNTER
Specialty Pharmacy - Refill Coordination    Specialty Medication Orders Linked to Encounter    Flowsheet Row Most Recent Value   Medication #1 gabapentin (NEURONTIN) 300 MG capsule (Order#[id number], Rx#)          Refill Questions - Documented Responses    Flowsheet Row Most Recent Value   Patient Availability and HIPAA Verification    Does patient want to proceed with activity? Yes   HIPAA/medical authority confirmed? Yes   Relationship to patient of person spoken to? Self   Refill Screening Questions    Would patient like to speak to a pharmacist? No   When does the patient need to receive the medication? 08/31/22   Refill Delivery Questions    How will the patient receive the medication? Delivery Lyubov   When does the patient need to receive the medication? 08/31/22   Shipping Address Home   Address in [facility] confirmed and updated if neccessary? Yes   Expected Copay ($) 0   Is the patient able to afford the medication copay? Yes   Payment Method zero copay   Days supply of Refill 28   Supplies needed? No supplies needed   Refill activity completed? Yes   Refill activity plan Refill scheduled   Shipment/Pickup Date: 08/29/22          Current Outpatient Medications   Medication Sig    albuterol (PROVENTIL/VENTOLIN HFA) 90 mcg/actuation inhaler Inhale 2 puffs into the lungs every 6 (six) hours as needed for Wheezing or Shortness of Breath.    aspirin 81 MG Chew Take 1 tablet (81 mg total) by mouth once daily. (Patient taking differently: Take 81 mg by mouth once.)    buPROPion (WELLBUTRIN XL) 150 MG TB24 tablet Take 1 tablet (150 mg total) by mouth once daily.    clopidogreL (PLAVIX) 75 mg tablet Take 1 tablet (75 mg total) by mouth once daily.    cyanocobalamin (VITAMIN B-12) 1000 MCG tablet Take 100 mcg by mouth once daily.    evolocumab (REPATHA SURECLICK) 140 mg/mL PnIj Inject 1 mL (140 mg total) into the skin every 14 (fourteen) days.    fexofenadine HCl (ALLEGRA ORAL) Take 1 tablet by mouth  once daily.    fluticasone propionate (FLONASE) 50 mcg/actuation nasal spray USE 2 SPRAYS IN EACH NOSTRIL ONE TIME DAILY    furosemide (LASIX) 20 MG tablet Take 1 tablet (20 mg total) by mouth once daily.    gabapentin (NEURONTIN) 300 MG capsule Take 3 capsules (900 mg total) by mouth every evening.    loratadine (CLARITIN) 10 mg tablet Take 10 mg by mouth daily as needed for Allergies.    multivitamin with minerals tablet Take 1 tablet by mouth once daily.    niacin, inositol niacinate, (NIACIN FLUSH FREE) 400 mg niacin (500 mg) Cap Take 400 mg by mouth every evening.    pantoprazole (PROTONIX) 20 MG tablet Take 1 tablet (20 mg total) by mouth once daily.    valsartan (DIOVAN) 160 MG tablet TAKE ONE (1) TABLET (160 MG TOTAL) BY MOUTH ONCE DAILY    valsartan (DIOVAN) 40 MG tablet Take 1 tablet (40 mg total) by mouth once daily.    vitamin D (VITAMIN D3) 1000 units Tab Take 1,000 Units by mouth once daily.   Last reviewed on 8/18/2022  9:24 AM by Ama Herr MA    Review of patient's allergies indicates:   Allergen Reactions    Amlodipine Swelling    Statins-hmg-coa reductase inhibitors Other (See Comments)     Myalgias to lipitor and simvastatin    Last reviewed on  8/18/2022 9:23 AM by Ama Herr      Tasks added this encounter   9/21/2022 - Refill Call (Auto Added)   Tasks due within next 3 months   No tasks due.     [facility]  [address] [place] [address]  Phone: [phone]  Fax: [phone]

## 2022-08-25 ENCOUNTER — PATIENT MESSAGE (OUTPATIENT)
Dept: INTERNAL MEDICINE | Facility: CLINIC | Age: 67
End: 2022-08-25
Payer: MEDICARE

## 2022-09-09 ENCOUNTER — APPOINTMENT (OUTPATIENT)
Dept: RADIOLOGY | Facility: HOSPITAL | Age: 67
End: 2022-09-09
Attending: FAMILY MEDICINE
Payer: MEDICARE

## 2022-09-09 DIAGNOSIS — Z78.0 POSTMENOPAUSAL: ICD-10-CM

## 2022-09-09 PROCEDURE — 77080 DXA BONE DENSITY AXIAL: CPT | Mod: 26,,, | Performed by: RADIOLOGY

## 2022-09-09 PROCEDURE — 77080 DXA BONE DENSITY AXIAL: CPT | Mod: TC

## 2022-09-09 PROCEDURE — 77080 DEXA BONE DENSITY SPINE HIP: ICD-10-PCS | Mod: 26,,, | Performed by: RADIOLOGY

## 2022-09-21 ENCOUNTER — SPECIALTY PHARMACY (OUTPATIENT)
Dept: PHARMACY | Facility: CLINIC | Age: 67
End: 2022-09-21
Payer: MEDICARE

## 2022-09-21 NOTE — TELEPHONE ENCOUNTER
Specialty Pharmacy - Refill Coordination    Specialty Medication Orders Linked to Encounter      Flowsheet Row Most Recent Value   Medication #1 evolocumab (REPATHA SURECLICK) 140 mg/mL PnIj (Order#[id number], Rx#[id number])            Refill Questions - Documented Responses      Flowsheet Row Most Recent Value   Patient Availability and HIPAA Verification    Does patient want to proceed with activity? Yes   HIPAA/medical authority confirmed? Yes   Relationship to patient of person spoken to? Self   Refill Screening Questions    Would patient like to speak to a pharmacist? No   When does the patient need to receive the medication? 09/28/22   Refill Delivery Questions    How will the patient receive the medication? Delivery [facility]   When does the patient need to receive the medication? 09/28/22   Shipping Address Home   Address in [facility] confirmed and updated if neccessary? Yes   Expected Copay ($) 0   Is the patient able to afford the medication copay? Yes   Payment Method zero copay   Days supply of Refill 28   Supplies needed? No supplies needed   Refill activity completed? Yes   Refill activity plan Refill scheduled   Shipment/Pickup Date: 09/26/22            Current Outpatient Medications   Medication Sig    albuterol (PROVENTIL/VENTOLIN HFA) 90 mcg/actuation inhaler Inhale 2 puffs into the lungs every 6 (six) hours as needed for Wheezing or Shortness of Breath.    aspirin 81 MG Chew Take 1 tablet (81 mg total) by mouth once daily. (Patient taking differently: Take 81 mg by mouth once.)    buPROPion (WELLBUTRIN XL) 150 MG TB24 tablet Take 1 tablet (150 mg total) by mouth once daily.    clopidogreL (PLAVIX) 75 mg tablet Take 1 tablet (75 mg total) by mouth once daily.    cyanocobalamin (VITAMIN B-12) 1000 MCG tablet Take 100 mcg by mouth once daily.    evolocumab (REPATHA SURECLICK) 140 mg/mL PnIj Inject 1 mL (140 mg total) into the skin every 14 (fourteen) days.    fexofenadine HCl (ALLEGRA ORAL) Take 1  tablet by mouth once daily.    fluticasone propionate (FLONASE) 50 mcg/actuation nasal spray USE 2 SPRAYS IN EACH NOSTRIL ONE TIME DAILY    furosemide (LASIX) 20 MG tablet Take 1 tablet (20 mg total) by mouth once daily.    gabapentin (NEURONTIN) 300 MG capsule Take 3 capsules (900 mg total) by mouth every evening.    loratadine (CLARITIN) 10 mg tablet Take 10 mg by mouth daily as needed for Allergies.    multivitamin with minerals tablet Take 1 tablet by mouth once daily.    niacin, inositol niacinate, (NIACIN FLUSH FREE) 400 mg niacin (500 mg) Cap Take 400 mg by mouth every evening.    pantoprazole (PROTONIX) 20 MG tablet Take 1 tablet (20 mg total) by mouth once daily.    valsartan (DIOVAN) 160 MG tablet TAKE ONE (1) TABLET (160 MG TOTAL) BY MOUTH ONCE DAILY    valsartan (DIOVAN) 40 MG tablet Take 1 tablet (40 mg total) by mouth once daily.    vitamin D (VITAMIN D3) 1000 units Tab Take 1,000 Units by mouth once daily.   Last reviewed on 8/18/2022  9:24 AM by Ama Herr MA    Review of patient's allergies indicates:   Allergen Reactions    Amlodipine Swelling    Statins-hmg-coa reductase inhibitors Other (See Comments)     Myalgias to lipitor and simvastatin    Last reviewed on  8/18/2022 9:23 AM by Ama Herr      Tasks added this encounter   10/19/2022 - Refill Call (Auto Added)   Tasks due within next 3 months   No tasks due.     Erica Young formerly [facility]  [address] [place] [id number]  Phone: [phone]  Fax: [phone]

## 2022-10-14 ENCOUNTER — PATIENT MESSAGE (OUTPATIENT)
Dept: ADMINISTRATIVE | Facility: OTHER | Age: 67
End: 2022-10-14
Payer: MEDICARE

## 2022-10-15 ENCOUNTER — PATIENT MESSAGE (OUTPATIENT)
Dept: ADMINISTRATIVE | Facility: OTHER | Age: 67
End: 2022-10-15
Payer: MEDICARE

## 2022-10-18 ENCOUNTER — OFFICE VISIT (OUTPATIENT)
Dept: OTOLARYNGOLOGY | Facility: CLINIC | Age: 67
End: 2022-10-18
Payer: MEDICARE

## 2022-10-18 VITALS
TEMPERATURE: 97 F | DIASTOLIC BLOOD PRESSURE: 83 MMHG | HEART RATE: 71 BPM | WEIGHT: 162.06 LBS | BODY MASS INDEX: 27.81 KG/M2 | SYSTOLIC BLOOD PRESSURE: 176 MMHG

## 2022-10-18 DIAGNOSIS — K22.2 SCHATZKI'S RING: ICD-10-CM

## 2022-10-18 DIAGNOSIS — R13.10 DYSPHAGIA, UNSPECIFIED TYPE: Primary | ICD-10-CM

## 2022-10-18 DIAGNOSIS — H60.8X3 CHRONIC ECZEMATOUS OTITIS EXTERNA OF BOTH EARS: ICD-10-CM

## 2022-10-18 PROCEDURE — 1101F PR PT FALLS ASSESS DOC 0-1 FALLS W/OUT INJ PAST YR: ICD-10-PCS | Mod: CPTII,S$GLB,, | Performed by: ORTHOPAEDIC SURGERY

## 2022-10-18 PROCEDURE — 3077F PR MOST RECENT SYSTOLIC BLOOD PRESSURE >= 140 MM HG: ICD-10-PCS | Mod: CPTII,S$GLB,, | Performed by: ORTHOPAEDIC SURGERY

## 2022-10-18 PROCEDURE — 99214 OFFICE O/P EST MOD 30 MIN: CPT | Mod: 25,S$GLB,, | Performed by: ORTHOPAEDIC SURGERY

## 2022-10-18 PROCEDURE — 4010F PR ACE/ARB THEARPY RXD/TAKEN: ICD-10-PCS | Mod: CPTII,S$GLB,, | Performed by: ORTHOPAEDIC SURGERY

## 2022-10-18 PROCEDURE — 4010F ACE/ARB THERAPY RXD/TAKEN: CPT | Mod: CPTII,S$GLB,, | Performed by: ORTHOPAEDIC SURGERY

## 2022-10-18 PROCEDURE — 3044F HG A1C LEVEL LT 7.0%: CPT | Mod: CPTII,S$GLB,, | Performed by: ORTHOPAEDIC SURGERY

## 2022-10-18 PROCEDURE — 1159F PR MEDICATION LIST DOCUMENTED IN MEDICAL RECORD: ICD-10-PCS | Mod: CPTII,S$GLB,, | Performed by: ORTHOPAEDIC SURGERY

## 2022-10-18 PROCEDURE — 3079F DIAST BP 80-89 MM HG: CPT | Mod: CPTII,S$GLB,, | Performed by: ORTHOPAEDIC SURGERY

## 2022-10-18 PROCEDURE — 99214 PR OFFICE/OUTPT VISIT, EST, LEVL IV, 30-39 MIN: ICD-10-PCS | Mod: 25,S$GLB,, | Performed by: ORTHOPAEDIC SURGERY

## 2022-10-18 PROCEDURE — 3044F PR MOST RECENT HEMOGLOBIN A1C LEVEL <7.0%: ICD-10-PCS | Mod: CPTII,S$GLB,, | Performed by: ORTHOPAEDIC SURGERY

## 2022-10-18 PROCEDURE — 99999 PR PBB SHADOW E&M-EST. PATIENT-LVL IV: ICD-10-PCS | Mod: PBBFAC,,, | Performed by: ORTHOPAEDIC SURGERY

## 2022-10-18 PROCEDURE — 3077F SYST BP >= 140 MM HG: CPT | Mod: CPTII,S$GLB,, | Performed by: ORTHOPAEDIC SURGERY

## 2022-10-18 PROCEDURE — 1159F MED LIST DOCD IN RCRD: CPT | Mod: CPTII,S$GLB,, | Performed by: ORTHOPAEDIC SURGERY

## 2022-10-18 PROCEDURE — 31575 PR LARYNGOSCOPY, FLEXIBLE; DIAGNOSTIC: ICD-10-PCS | Mod: S$GLB,,, | Performed by: ORTHOPAEDIC SURGERY

## 2022-10-18 PROCEDURE — 99999 PR PBB SHADOW E&M-EST. PATIENT-LVL IV: CPT | Mod: PBBFAC,,, | Performed by: ORTHOPAEDIC SURGERY

## 2022-10-18 PROCEDURE — 3288F FALL RISK ASSESSMENT DOCD: CPT | Mod: CPTII,S$GLB,, | Performed by: ORTHOPAEDIC SURGERY

## 2022-10-18 PROCEDURE — 1101F PT FALLS ASSESS-DOCD LE1/YR: CPT | Mod: CPTII,S$GLB,, | Performed by: ORTHOPAEDIC SURGERY

## 2022-10-18 PROCEDURE — 1126F AMNT PAIN NOTED NONE PRSNT: CPT | Mod: CPTII,S$GLB,, | Performed by: ORTHOPAEDIC SURGERY

## 2022-10-18 PROCEDURE — 1126F PR PAIN SEVERITY QUANTIFIED, NO PAIN PRESENT: ICD-10-PCS | Mod: CPTII,S$GLB,, | Performed by: ORTHOPAEDIC SURGERY

## 2022-10-18 PROCEDURE — 3079F PR MOST RECENT DIASTOLIC BLOOD PRESSURE 80-89 MM HG: ICD-10-PCS | Mod: CPTII,S$GLB,, | Performed by: ORTHOPAEDIC SURGERY

## 2022-10-18 PROCEDURE — 31575 DIAGNOSTIC LARYNGOSCOPY: CPT | Mod: S$GLB,,, | Performed by: ORTHOPAEDIC SURGERY

## 2022-10-18 PROCEDURE — 3288F PR FALLS RISK ASSESSMENT DOCUMENTED: ICD-10-PCS | Mod: CPTII,S$GLB,, | Performed by: ORTHOPAEDIC SURGERY

## 2022-10-18 RX ORDER — FLUOCINOLONE ACETONIDE 0.11 MG/ML
3 OIL AURICULAR (OTIC) 2 TIMES DAILY
Qty: 20 ML | Refills: 2 | Status: SHIPPED | OUTPATIENT
Start: 2022-10-18 | End: 2023-09-27 | Stop reason: SDUPTHER

## 2022-10-18 NOTE — PROGRESS NOTES
Subjective:      Patient ID: Shirlene Olvera is a 67 y.o. female.    Chief Complaint: Ear Drainage (Ear drainage bilateral ) and Sinus Problem    Patient is a 67 year old female seen today for evaluation of dysphagia.  She says that she has had difficulty swallowing solids and liquids over the last two weeks.  She has a history of smoking, quit within the last year.  Prior to that she smoked 30 years, less than one pack daily.  She has had an EGD and had a Schatzki's ring dilated 12/2021, does not follow with GI at this time.  Has known severe reflux, is on medication but continues with symptoms.  She has had some itching and dry skin of her ears, she has been using Vaseline.        Review of Systems   HENT:  Positive for ear discharge, trouble swallowing and voice change.    Eyes:  Positive for itching.   Respiratory:  Positive for wheezing.    Gastrointestinal:  Positive for diarrhea.   Endocrine: Positive for cold intolerance.   Genitourinary: Negative.    Musculoskeletal:  Positive for back pain and neck pain.   Skin: Negative.    Neurological:  Positive for dizziness, light-headedness and headaches.   Psychiatric/Behavioral:  The patient is nervous/anxious.      Objective:       Physical Exam  Constitutional:       General: She is not in acute distress.     Appearance: She is well-developed.   HENT:      Head: Normocephalic and atraumatic.      Right Ear: Tympanic membrane, ear canal and external ear normal.      Left Ear: Tympanic membrane, ear canal and external ear normal.      Ears:      Comments: Dry flaky skin     Nose: Nose normal. No nasal deformity, septal deviation, mucosal edema or rhinorrhea.      Right Sinus: No maxillary sinus tenderness or frontal sinus tenderness.      Left Sinus: No maxillary sinus tenderness or frontal sinus tenderness.      Mouth/Throat:      Mouth: Mucous membranes are not pale and not dry.      Dentition: No dental caries.      Pharynx: Uvula midline. No oropharyngeal  exudate or posterior oropharyngeal erythema.   Eyes:      General: Lids are normal. No scleral icterus.     Extraocular Movements:      Right eye: Normal extraocular motion and no nystagmus.      Left eye: Normal extraocular motion and no nystagmus.      Conjunctiva/sclera: Conjunctivae normal.      Right eye: Right conjunctiva is not injected. No chemosis.     Left eye: Left conjunctiva is not injected. No chemosis.     Pupils: Pupils are equal, round, and reactive to light.   Neck:      Thyroid: No thyroid mass or thyromegaly.      Trachea: Trachea and phonation normal. No tracheal tenderness or tracheal deviation.   Pulmonary:      Effort: Pulmonary effort is normal. No respiratory distress.      Breath sounds: No stridor.   Abdominal:      General: There is no distension.   Lymphadenopathy:      Head:      Right side of head: No submental, submandibular, preauricular, posterior auricular or occipital adenopathy.      Left side of head: No submental, submandibular, preauricular, posterior auricular or occipital adenopathy.      Cervical: No cervical adenopathy.   Skin:     General: Skin is warm and dry.      Findings: No erythema or rash.   Neurological:      Mental Status: She is alert and oriented to person, place, and time.      Cranial Nerves: No cranial nerve deficit.   Psychiatric:         Behavior: Behavior normal.       Due to indication in patient's history, presentation or risk factors,  a fiber optic exam was performed.    SEPARATE PROCEDURE NOTE:    ANESTHESIA:  Topical xylocaine with srinivas-synephrine  FINDINGS:  Normal exam      PROCEDURE:  After verbal consent was obtained, the flexible scope was passed through the patient's nasal cavity without difficulty.  The nasopharynx (adenoid pad) and eustachian tube orifices were first visualized and were found to be normal, without masses or irregularity.  The posterior pharyngeal wall and base of tongue were then examined and no mass or irregular tissue was  seen.  The scope was then advanced to the larynx, and the epiglottis, valleculae, and piriform sinuses were normal, without masses or mucosal irregularity.  The false vocal folds and true vocal folds were then examined and were found to have normal mobility (full abduction and adduction) and no masses or mucosal irregularity was seen.  The arytenoids and the interarytenoid area were normal.      Assessment:       1. Dysphagia, unspecified type    2. Chronic eczematous otitis externa of both ears    3. Schatzki's ring        Plan:     Dysphagia, unspecified type:  History of EGD with ring that was dilated, scope today was normal.  Would recommend followup and further eval with GI.  -     Ambulatory referral/consult to Gastroenterology; Future; Expected date: 10/25/2022    Chronic eczematous otitis externa of both ears  -     fluocinolone acetonide oiL (DERMOTIC OIL) 0.01 % Drop; Place 3 drops in ear(s) 2 (two) times daily.  Dispense: 20 mL; Refill: 2    Schatzki's ring  -     Ambulatory referral/consult to Gastroenterology; Future; Expected date: 10/25/2022

## 2022-10-19 ENCOUNTER — SPECIALTY PHARMACY (OUTPATIENT)
Dept: PHARMACY | Facility: CLINIC | Age: 67
End: 2022-10-19
Payer: MEDICARE

## 2022-10-19 NOTE — TELEPHONE ENCOUNTER
Specialty Pharmacy - Refill Coordination    Specialty Medication Orders Linked to Encounter      Flowsheet Row Most Recent Value   Medication #1 evolocumab (REPATHA SURECLICK) 140 mg/mL PnIj (Order#[id number], Rx#[id number])            Refill Questions - Documented Responses      Flowsheet Row Most Recent Value   Patient Availability and HIPAA Verification    Does patient want to proceed with activity? Yes   HIPAA/medical authority confirmed? Yes   Relationship to patient of person spoken to? Self   Refill Screening Questions    Would patient like to speak to a pharmacist? No   When does the patient need to receive the medication? 10/26/22   Refill Delivery Questions    How will the patient receive the medication? [facility]   When does the patient need to receive the medication? 10/26/22   Shipping Address Home   Address in [facility] confirmed and updated if neccessary? Yes   Expected Copay ($) 0   Is the patient able to afford the medication copay? Yes   Payment Method zero copay   Days supply of Refill 28   Refill activity completed? Yes   Refill activity plan Refill scheduled   Shipment/Pickup Date: 10/24/22            Current Outpatient Medications   Medication Sig    albuterol (PROVENTIL/VENTOLIN HFA) 90 mcg/actuation inhaler Inhale 2 puffs into the lungs every 6 (six) hours as needed for Wheezing or Shortness of Breath.    aspirin 81 MG Chew Take 1 tablet (81 mg total) by mouth once daily. (Patient taking differently: Take 81 mg by mouth once.)    buPROPion (WELLBUTRIN XL) 150 MG TB24 tablet Take 1 tablet (150 mg total) by mouth once daily.    clopidogreL (PLAVIX) 75 mg tablet Take 1 tablet (75 mg total) by mouth once daily.    cyanocobalamin (VITAMIN B-12) 1000 MCG tablet Take 100 mcg by mouth once daily.    evolocumab (REPATHA SURECLICK) 140 mg/mL PnIj Inject 1 mL (140 mg total) into the skin every 14 (fourteen) days.    fexofenadine HCl (ALLEGRA ORAL) Take 1 tablet by mouth once daily.    fluocinolone  acetonide oiL (DERMOTIC OIL) 0.01 % Drop Place 3 drops in ear(s) 2 (two) times daily.    fluticasone propionate (FLONASE) 50 mcg/actuation nasal spray USE 2 SPRAYS IN EACH NOSTRIL ONE TIME DAILY    furosemide (LASIX) 20 MG tablet Take 1 tablet (20 mg total) by mouth once daily.    gabapentin (NEURONTIN) 300 MG capsule Take 3 capsules (900 mg total) by mouth every evening.    loratadine (CLARITIN) 10 mg tablet Take 10 mg by mouth daily as needed for Allergies.    multivitamin with minerals tablet Take 1 tablet by mouth once daily.    niacin, inositol niacinate, (NIACIN FLUSH FREE) 400 mg niacin (500 mg) Cap Take 400 mg by mouth every evening.    pantoprazole (PROTONIX) 20 MG tablet Take 1 tablet (20 mg total) by mouth once daily.    valsartan (DIOVAN) 160 MG tablet TAKE ONE (1) TABLET (160 MG TOTAL) BY MOUTH ONCE DAILY    valsartan (DIOVAN) 40 MG tablet Take 1 tablet (40 mg total) by mouth once daily.    vitamin D (VITAMIN D3) 1000 units Tab Take 1,000 Units by mouth once daily.   Last reviewed on 10/18/2022  9:55 AM by Johana Segovia MA    Review of patient's allergies indicates:   Allergen Reactions    Amlodipine Swelling    Statins-hmg-coa reductase inhibitors Other (See Comments)     Myalgias to lipitor and simvastatin    Last reviewed on  10/18/2022 9:54 AM by Johana Segovia      Tasks added this encounter   11/16/2022 - Refill Call (Auto Added)   Tasks due within next 3 months   No tasks due.     Roshni Grande  [facility]  [address] [place] [id number]  Phone: [phone]  Fax: [phone]

## 2022-10-24 ENCOUNTER — TELEPHONE (OUTPATIENT)
Dept: ADMINISTRATIVE | Facility: HOSPITAL | Age: 67
End: 2022-10-24
Payer: MEDICARE

## 2022-10-31 ENCOUNTER — HOSPITAL ENCOUNTER (OUTPATIENT)
Facility: HOSPITAL | Age: 67
Discharge: HOME | End: 2022-11-01
Attending: EMERGENCY MEDICINE | Admitting: EMERGENCY MEDICINE
Payer: MEDICARE

## 2022-10-31 DIAGNOSIS — I21.4 NSTEMI (NON-ST ELEVATED MYOCARDIAL INFARCTION): ICD-10-CM

## 2022-10-31 DIAGNOSIS — R07.9 CHEST PAIN: ICD-10-CM

## 2022-10-31 DIAGNOSIS — R79.89 ELEVATED TROPONIN: Primary | ICD-10-CM

## 2022-10-31 DIAGNOSIS — I25.10 CORONARY ARTERY DISEASE: ICD-10-CM

## 2022-10-31 LAB
ALBUMIN SERPL BCP-MCNC: 3.7 G/DL (ref 3.5–5.2)
ALP SERPL-CCNC: 93 U/L (ref 55–135)
ALT SERPL W/O P-5'-P-CCNC: 15 U/L (ref 10–44)
ANION GAP SERPL CALC-SCNC: 10 MMOL/L (ref 8–16)
AST SERPL-CCNC: 20 U/L (ref 10–40)
BASOPHILS # BLD AUTO: 0.04 K/UL (ref 0–0.2)
BASOPHILS NFR BLD: 0.8 % (ref 0–1.9)
BILIRUB SERPL-MCNC: 0.8 MG/DL (ref 0.1–1)
BUN SERPL-MCNC: 25 MG/DL (ref 8–23)
CALCIUM SERPL-MCNC: 9.6 MG/DL (ref 8.7–10.5)
CHLORIDE SERPL-SCNC: 105 MMOL/L (ref 95–110)
CO2 SERPL-SCNC: 29 MMOL/L (ref 23–29)
CREAT SERPL-MCNC: 1.6 MG/DL (ref 0.5–1.4)
DIFFERENTIAL METHOD: ABNORMAL
EOSINOPHIL # BLD AUTO: 0.1 K/UL (ref 0–0.5)
EOSINOPHIL NFR BLD: 1.8 % (ref 0–8)
ERYTHROCYTE [DISTWIDTH] IN BLOOD BY AUTOMATED COUNT: 12.2 % (ref 11.5–14.5)
EST. GFR  (NO RACE VARIABLE): 35 ML/MIN/1.73 M^2
GLUCOSE SERPL-MCNC: 98 MG/DL (ref 70–110)
HCT VFR BLD AUTO: 35.6 % (ref 37–48.5)
HGB BLD-MCNC: 11.9 G/DL (ref 12–16)
IMM GRANULOCYTES # BLD AUTO: 0.01 K/UL (ref 0–0.04)
IMM GRANULOCYTES NFR BLD AUTO: 0.2 % (ref 0–0.5)
LYMPHOCYTES # BLD AUTO: 1.2 K/UL (ref 1–4.8)
LYMPHOCYTES NFR BLD: 24.7 % (ref 18–48)
MAGNESIUM SERPL-MCNC: 1.9 MG/DL (ref 1.6–2.6)
MCH RBC QN AUTO: 32.7 PG (ref 27–31)
MCHC RBC AUTO-ENTMCNC: 33.4 G/DL (ref 32–36)
MCV RBC AUTO: 98 FL (ref 82–98)
MONOCYTES # BLD AUTO: 0.6 K/UL (ref 0.3–1)
MONOCYTES NFR BLD: 11.4 % (ref 4–15)
NEUTROPHILS # BLD AUTO: 3.1 K/UL (ref 1.8–7.7)
NEUTROPHILS NFR BLD: 61.1 % (ref 38–73)
NRBC BLD-RTO: 0 /100 WBC
PLATELET # BLD AUTO: 209 K/UL (ref 150–450)
PMV BLD AUTO: 9.6 FL (ref 9.2–12.9)
POTASSIUM SERPL-SCNC: 4 MMOL/L (ref 3.5–5.1)
PROT SERPL-MCNC: 6.6 G/DL (ref 6–8.4)
RBC # BLD AUTO: 3.64 M/UL (ref 4–5.4)
SODIUM SERPL-SCNC: 144 MMOL/L (ref 136–145)
TROPONIN I SERPL DL<=0.01 NG/ML-MCNC: 0.08 NG/ML (ref 0–0.03)
WBC # BLD AUTO: 5.02 K/UL (ref 3.9–12.7)

## 2022-10-31 PROCEDURE — 83735 ASSAY OF MAGNESIUM: CPT | Performed by: NURSE PRACTITIONER

## 2022-10-31 PROCEDURE — 84484 ASSAY OF TROPONIN QUANT: CPT | Performed by: NURSE PRACTITIONER

## 2022-10-31 PROCEDURE — 25000003 PHARM REV CODE 250: Performed by: NURSE PRACTITIONER

## 2022-10-31 PROCEDURE — G0379 DIRECT REFER HOSPITAL OBSERV: HCPCS

## 2022-10-31 PROCEDURE — G0378 HOSPITAL OBSERVATION PER HR: HCPCS

## 2022-10-31 PROCEDURE — 85025 COMPLETE CBC W/AUTO DIFF WBC: CPT | Performed by: NURSE PRACTITIONER

## 2022-10-31 PROCEDURE — 36415 COLL VENOUS BLD VENIPUNCTURE: CPT | Performed by: NURSE PRACTITIONER

## 2022-10-31 PROCEDURE — 80053 COMPREHEN METABOLIC PANEL: CPT | Performed by: NURSE PRACTITIONER

## 2022-10-31 RX ORDER — NAPROXEN SODIUM 220 MG/1
81 TABLET, FILM COATED ORAL DAILY
Status: DISCONTINUED | OUTPATIENT
Start: 2022-11-01 | End: 2022-11-01 | Stop reason: HOSPADM

## 2022-10-31 RX ORDER — NALOXONE HCL 0.4 MG/ML
0.02 VIAL (ML) INJECTION
Status: DISCONTINUED | OUTPATIENT
Start: 2022-10-31 | End: 2022-11-01 | Stop reason: HOSPADM

## 2022-10-31 RX ORDER — MAG HYDROX/ALUMINUM HYD/SIMETH 200-200-20
30 SUSPENSION, ORAL (FINAL DOSE FORM) ORAL 4 TIMES DAILY PRN
Status: DISCONTINUED | OUTPATIENT
Start: 2022-10-31 | End: 2022-11-01 | Stop reason: HOSPADM

## 2022-10-31 RX ORDER — NITROGLYCERIN 0.4 MG/1
0.4 TABLET SUBLINGUAL EVERY 5 MIN PRN
Status: DISCONTINUED | OUTPATIENT
Start: 2022-10-31 | End: 2022-11-01 | Stop reason: HOSPADM

## 2022-10-31 RX ORDER — FUROSEMIDE 20 MG/1
20 TABLET ORAL DAILY
Status: DISCONTINUED | OUTPATIENT
Start: 2022-11-01 | End: 2022-11-01 | Stop reason: HOSPADM

## 2022-10-31 RX ORDER — BISACODYL 10 MG
10 SUPPOSITORY, RECTAL RECTAL DAILY PRN
Status: DISCONTINUED | OUTPATIENT
Start: 2022-10-31 | End: 2022-11-01 | Stop reason: HOSPADM

## 2022-10-31 RX ORDER — AMLODIPINE BESYLATE 5 MG/1
5 TABLET ORAL DAILY
Status: ON HOLD | COMMUNITY
Start: 2022-10-27 | End: 2022-11-01 | Stop reason: HOSPADM

## 2022-10-31 RX ORDER — PROMETHAZINE HYDROCHLORIDE 25 MG/1
25 TABLET ORAL EVERY 6 HOURS PRN
Status: DISCONTINUED | OUTPATIENT
Start: 2022-10-31 | End: 2022-11-01 | Stop reason: HOSPADM

## 2022-10-31 RX ORDER — LANOLIN ALCOHOL/MO/W.PET/CERES
500 CREAM (GRAM) TOPICAL NIGHTLY
Status: DISCONTINUED | OUTPATIENT
Start: 2022-10-31 | End: 2022-11-01 | Stop reason: HOSPADM

## 2022-10-31 RX ORDER — ONDANSETRON 2 MG/ML
4 INJECTION INTRAMUSCULAR; INTRAVENOUS EVERY 8 HOURS PRN
Status: DISCONTINUED | OUTPATIENT
Start: 2022-10-31 | End: 2022-11-01 | Stop reason: HOSPADM

## 2022-10-31 RX ORDER — VALSARTAN 160 MG/1
160 TABLET ORAL DAILY
Status: DISCONTINUED | OUTPATIENT
Start: 2022-11-01 | End: 2022-11-01 | Stop reason: HOSPADM

## 2022-10-31 RX ORDER — IPRATROPIUM BROMIDE AND ALBUTEROL SULFATE 2.5; .5 MG/3ML; MG/3ML
3 SOLUTION RESPIRATORY (INHALATION) EVERY 4 HOURS PRN
Status: DISCONTINUED | OUTPATIENT
Start: 2022-10-31 | End: 2022-11-01 | Stop reason: HOSPADM

## 2022-10-31 RX ORDER — TALC
6 POWDER (GRAM) TOPICAL NIGHTLY PRN
Status: DISCONTINUED | OUTPATIENT
Start: 2022-10-31 | End: 2022-11-01 | Stop reason: HOSPADM

## 2022-10-31 RX ORDER — BUPROPION HYDROCHLORIDE 150 MG/1
150 TABLET ORAL DAILY
Status: DISCONTINUED | OUTPATIENT
Start: 2022-11-01 | End: 2022-11-01 | Stop reason: HOSPADM

## 2022-10-31 RX ORDER — SODIUM CHLORIDE 0.9 % (FLUSH) 0.9 %
10 SYRINGE (ML) INJECTION EVERY 12 HOURS PRN
Status: DISCONTINUED | OUTPATIENT
Start: 2022-10-31 | End: 2022-11-01 | Stop reason: HOSPADM

## 2022-10-31 RX ORDER — ACETAMINOPHEN 325 MG/1
650 TABLET ORAL EVERY 6 HOURS PRN
Status: DISCONTINUED | OUTPATIENT
Start: 2022-10-31 | End: 2022-11-01 | Stop reason: HOSPADM

## 2022-10-31 RX ORDER — CLONIDINE HYDROCHLORIDE 0.1 MG/1
0.1 TABLET ORAL EVERY 6 HOURS PRN
Status: DISCONTINUED | OUTPATIENT
Start: 2022-10-31 | End: 2022-11-01 | Stop reason: HOSPADM

## 2022-10-31 RX ORDER — METOPROLOL TARTRATE 50 MG/1
50 TABLET ORAL 2 TIMES DAILY
Status: DISCONTINUED | OUTPATIENT
Start: 2022-10-31 | End: 2022-11-01 | Stop reason: HOSPADM

## 2022-10-31 RX ORDER — CLOPIDOGREL BISULFATE 75 MG/1
75 TABLET ORAL DAILY
Status: DISCONTINUED | OUTPATIENT
Start: 2022-11-01 | End: 2022-11-01 | Stop reason: HOSPADM

## 2022-10-31 RX ORDER — GABAPENTIN 300 MG/1
900 CAPSULE ORAL NIGHTLY
Status: DISCONTINUED | OUTPATIENT
Start: 2022-10-31 | End: 2022-11-01 | Stop reason: HOSPADM

## 2022-10-31 RX ORDER — PANTOPRAZOLE SODIUM 40 MG/1
40 TABLET, DELAYED RELEASE ORAL DAILY
Status: DISCONTINUED | OUTPATIENT
Start: 2022-11-01 | End: 2022-11-01 | Stop reason: HOSPADM

## 2022-10-31 RX ADMIN — CLONIDINE HYDROCHLORIDE 0.1 MG: 0.1 TABLET ORAL at 11:10

## 2022-10-31 RX ADMIN — METOPROLOL TARTRATE 50 MG: 50 TABLET, FILM COATED ORAL at 11:10

## 2022-10-31 RX ADMIN — GABAPENTIN 900 MG: 300 CAPSULE ORAL at 11:10

## 2022-10-31 NOTE — PROVIDER TRANSFER
Outside Transfer Acceptance Note / Regional Referral Center    Referring facility: [facility]   Referring provider: OLAMIDE PROVIDER  Accepting facility: [facility]  Accepting provider: MEJIA HARVEY  Admitting provider: ROSA BRYANT  Reason for transfer: Insurance Out of Network   Transfer diagnosis: Chest pain  Transfer specialty requested: Cardiology  Transfer specialty notified: yes  Transfer level: NUMBER 1-5: 2  Bed type requested: tele  Isolation status: No active isolations   Admission class or status: OP- Observation      Narrative     67-year-old female with a history of arthritis, hypertension, hyperlipidemia, coronary artery disease with PCI in March of this year, peripheral artery disease, chronic kidney disease, and dysphagia presented to [facility] on October 31 with elevated blood pressure over the past 2 days along with arm and chest arm discomfort that began on the day of presentation.  She took sublingual nitroglycerin with improvement in her blood pressure.  EMS noted systolic blood pressure 230s.  She received sublingual nitroglycerin and subsequently 1 inch of nitroglycerin paste.  Pain-free at present.  They are giving aspirin.  On review of her chart it appears that she is on aspirin and Plavix at home.  Referring provider spoke with Cardiology at [facility].  Requesting transfer to Hospital Medicine at [facility] for further treatment of chest pain/CAD.    BNP 87, troponin 0.05 (normal range 0-0.03), sodium 141, potassium 3.8, chloride 106, CO2 22, BUN 24, creatinine 1.43, glucose 107, AST 23, ALT 17, D-dimer 1.2, white blood cells 7.1, hemoglobin 13.9, hematocrit 42, platelets 181    CTA chest negative for pulmonary emboli or other acute cardiopulmonary abnormality.  Mild emphysema.    EKG had normal sinus rhythm. No acute ischemic changes noted.    Objective     Vitals:  Temperature 98°, pulse 80,  respirations 20, blood pressure 147/80, oxygen saturation 99%  Allergies:   Review of patient's allergies indicates:   Allergen Reactions    Amlodipine Swelling    Statins-hmg-coa reductase inhibitors Other (See Comments)     Myalgias to lipitor and simvastatin      NPO: No    Anticoagulation:   Anticoagulants       None             Instructions    Admit to Hospital Medicine  Consult Cardiology      Upon patient arrival to floor, please contact Hospital Medicine on call.     NANCY Snow MD  Hospital Medicine Staff  Cell: [phone]

## 2022-11-01 ENCOUNTER — NURSE TRIAGE (OUTPATIENT)
Dept: ADMINISTRATIVE | Facility: CLINIC | Age: 67
End: 2022-11-01
Payer: MEDICARE

## 2022-11-01 VITALS
DIASTOLIC BLOOD PRESSURE: 65 MMHG | HEART RATE: 53 BPM | TEMPERATURE: 98 F | OXYGEN SATURATION: 97 % | HEIGHT: 64 IN | RESPIRATION RATE: 18 BRPM | WEIGHT: 158 LBS | BODY MASS INDEX: 26.98 KG/M2 | SYSTOLIC BLOOD PRESSURE: 130 MMHG

## 2022-11-01 PROBLEM — I21.4 NSTEMI (NON-ST ELEVATED MYOCARDIAL INFARCTION): Status: ACTIVE | Noted: 2022-11-01

## 2022-11-01 PROBLEM — N18.30 CKD (CHRONIC KIDNEY DISEASE) STAGE 3, GFR 30-59 ML/MIN: Chronic | Status: ACTIVE | Noted: 2021-08-02

## 2022-11-01 PROBLEM — I10 ACCELERATED HYPERTENSION: Status: ACTIVE | Noted: 2022-11-01

## 2022-11-01 LAB
ANION GAP SERPL CALC-SCNC: 10 MMOL/L (ref 8–16)
AORTIC ROOT ANNULUS: 2.61 CM
ASCENDING AORTA: 2.77 CM
AV INDEX (PROSTH): 0.93
AV MEAN GRADIENT: 7 MMHG
AV PEAK GRADIENT: 10 MMHG
AV VALVE AREA: 2.55 CM2
AV VELOCITY RATIO: 0.85
BASOPHILS # BLD AUTO: 0.04 K/UL (ref 0–0.2)
BASOPHILS NFR BLD: 0.8 % (ref 0–1.9)
BSA FOR ECHO PROCEDURE: 1.8 M2
BUN SERPL-MCNC: 24 MG/DL (ref 8–23)
CALCIUM SERPL-MCNC: 9.6 MG/DL (ref 8.7–10.5)
CHLORIDE SERPL-SCNC: 107 MMOL/L (ref 95–110)
CO2 SERPL-SCNC: 28 MMOL/L (ref 23–29)
CREAT SERPL-MCNC: 1.6 MG/DL (ref 0.5–1.4)
CV ECHO LV RWT: 0.59 CM
DIFFERENTIAL METHOD: ABNORMAL
DOP CALC AO PEAK VEL: 1.56 M/S
DOP CALC AO VTI: 40 CM
DOP CALC LVOT AREA: 2.7 CM2
DOP CALC LVOT DIAMETER: 1.87 CM
DOP CALC LVOT PEAK VEL: 1.32 M/S
DOP CALC LVOT STROKE VOLUME: 102.12 CM3
DOP CALC RVOT PEAK VEL: 0.68 M/S
DOP CALC RVOT VTI: 16.8 CM
DOP CALCLVOT PEAK VEL VTI: 37.2 CM
E WAVE DECELERATION TIME: 196.65 MSEC
E/A RATIO: 1.13
E/E' RATIO: 13.43 M/S
ECHO LV POSTERIOR WALL: 1.28 CM (ref 0.6–1.1)
EJECTION FRACTION: 55 %
EOSINOPHIL # BLD AUTO: 0.2 K/UL (ref 0–0.5)
EOSINOPHIL NFR BLD: 3.4 % (ref 0–8)
ERYTHROCYTE [DISTWIDTH] IN BLOOD BY AUTOMATED COUNT: 12.3 % (ref 11.5–14.5)
EST. GFR  (NO RACE VARIABLE): 35 ML/MIN/1.73 M^2
FRACTIONAL SHORTENING: 36 % (ref 28–44)
GLUCOSE SERPL-MCNC: 88 MG/DL (ref 70–110)
HCT VFR BLD AUTO: 35.2 % (ref 37–48.5)
HGB BLD-MCNC: 11.7 G/DL (ref 12–16)
IMM GRANULOCYTES # BLD AUTO: 0.01 K/UL (ref 0–0.04)
IMM GRANULOCYTES NFR BLD AUTO: 0.2 % (ref 0–0.5)
INTERVENTRICULAR SEPTUM: 1.38 CM (ref 0.6–1.1)
IVC DIAMETER: 1.66 CM
IVRT: 53.28 MSEC
LA MAJOR: 4.15 CM
LA MINOR: 4.13 CM
LA WIDTH: 3.2 CM
LEFT ATRIUM SIZE: 2.92 CM
LEFT ATRIUM VOLUME INDEX: 18.6 ML/M2
LEFT ATRIUM VOLUME: 32.88 CM3
LEFT INTERNAL DIMENSION IN SYSTOLE: 2.77 CM (ref 2.1–4)
LEFT VENTRICLE DIASTOLIC VOLUME INDEX: 48.56 ML/M2
LEFT VENTRICLE DIASTOLIC VOLUME: 85.96 ML
LEFT VENTRICLE MASS INDEX: 124 G/M2
LEFT VENTRICLE SYSTOLIC VOLUME INDEX: 16.2 ML/M2
LEFT VENTRICLE SYSTOLIC VOLUME: 28.75 ML
LEFT VENTRICULAR INTERNAL DIMENSION IN DIASTOLE: 4.36 CM (ref 3.5–6)
LEFT VENTRICULAR MASS: 219.47 G
LV LATERAL E/E' RATIO: 13.43 M/S
LV SEPTAL E/E' RATIO: 13.43 M/S
LVOT MG: 4.65 MMHG
LVOT MV: 1.06 CM/S
LYMPHOCYTES # BLD AUTO: 1.3 K/UL (ref 1–4.8)
LYMPHOCYTES NFR BLD: 26.6 % (ref 18–48)
MCH RBC QN AUTO: 32.9 PG (ref 27–31)
MCHC RBC AUTO-ENTMCNC: 33.2 G/DL (ref 32–36)
MCV RBC AUTO: 99 FL (ref 82–98)
MONOCYTES # BLD AUTO: 0.5 K/UL (ref 0.3–1)
MONOCYTES NFR BLD: 11.2 % (ref 4–15)
MV PEAK A VEL: 0.83 M/S
MV PEAK E VEL: 0.94 M/S
MV STENOSIS PRESSURE HALF TIME: 57.03 MS
MV VALVE AREA P 1/2 METHOD: 3.86 CM2
NEUTROPHILS # BLD AUTO: 2.7 K/UL (ref 1.8–7.7)
NEUTROPHILS NFR BLD: 57.8 % (ref 38–73)
NRBC BLD-RTO: 0 /100 WBC
PISA TR MAX VEL: 2.07 M/S
PLATELET # BLD AUTO: 200 K/UL (ref 150–450)
PMV BLD AUTO: 9.8 FL (ref 9.2–12.9)
POTASSIUM SERPL-SCNC: 4.5 MMOL/L (ref 3.5–5.1)
PV MEAN GRADIENT: 1.04 MMHG
RA MAJOR: 3.42 CM
RA PRESSURE: 3 MMHG
RA WIDTH: 2.3 CM
RBC # BLD AUTO: 3.56 M/UL (ref 4–5.4)
SODIUM SERPL-SCNC: 145 MMOL/L (ref 136–145)
STJ: 2.81 CM
TDI LATERAL: 0.07 M/S
TDI SEPTAL: 0.07 M/S
TDI: 0.07 M/S
TR MAX PG: 17 MMHG
TR MEAN GRADIENT: 17 MMHG
TRICUSPID ANNULAR PLANE SYSTOLIC EXCURSION: 2.1 CM
TROPONIN I SERPL DL<=0.01 NG/ML-MCNC: 0.04 NG/ML (ref 0–0.03)
TROPONIN I SERPL DL<=0.01 NG/ML-MCNC: 0.05 NG/ML (ref 0–0.03)
TV REST PULMONARY ARTERY PRESSURE: 20 MMHG
WBC # BLD AUTO: 4.73 K/UL (ref 3.9–12.7)

## 2022-11-01 PROCEDURE — 99225 PR SUBSEQUENT OBSERVATION CARE,LEVEL II: CPT | Mod: 25,,, | Performed by: INTERNAL MEDICINE

## 2022-11-01 PROCEDURE — G0378 HOSPITAL OBSERVATION PER HR: HCPCS

## 2022-11-01 PROCEDURE — 25000003 PHARM REV CODE 250

## 2022-11-01 PROCEDURE — 63600175 PHARM REV CODE 636 W HCPCS: Performed by: NURSE PRACTITIONER

## 2022-11-01 PROCEDURE — 25000003 PHARM REV CODE 250: Performed by: NURSE PRACTITIONER

## 2022-11-01 PROCEDURE — 84484 ASSAY OF TROPONIN QUANT: CPT | Mod: 91 | Performed by: NURSE PRACTITIONER

## 2022-11-01 PROCEDURE — 93005 ELECTROCARDIOGRAM TRACING: CPT

## 2022-11-01 PROCEDURE — 93010 ELECTROCARDIOGRAM REPORT: CPT | Mod: ,,, | Performed by: INTERNAL MEDICINE

## 2022-11-01 PROCEDURE — 99225 PR SUBSEQUENT OBSERVATION CARE,LEVEL II: ICD-10-PCS | Mod: 25,,, | Performed by: INTERNAL MEDICINE

## 2022-11-01 PROCEDURE — 96372 THER/PROPH/DIAG INJ SC/IM: CPT | Performed by: NURSE PRACTITIONER

## 2022-11-01 PROCEDURE — 85025 COMPLETE CBC W/AUTO DIFF WBC: CPT | Performed by: NURSE PRACTITIONER

## 2022-11-01 PROCEDURE — 80048 BASIC METABOLIC PNL TOTAL CA: CPT | Performed by: NURSE PRACTITIONER

## 2022-11-01 PROCEDURE — 36415 COLL VENOUS BLD VENIPUNCTURE: CPT | Performed by: NURSE PRACTITIONER

## 2022-11-01 PROCEDURE — 93010 EKG 12-LEAD: ICD-10-PCS | Mod: ,,, | Performed by: INTERNAL MEDICINE

## 2022-11-01 RX ORDER — NITROGLYCERIN 0.4 MG/1
0.4 TABLET SUBLINGUAL EVERY 5 MIN PRN
Qty: 25 TABLET | Refills: 0 | Status: SHIPPED | OUTPATIENT
Start: 2022-11-01 | End: 2023-11-01

## 2022-11-01 RX ORDER — METOPROLOL TARTRATE 50 MG/1
50 TABLET ORAL 2 TIMES DAILY
Qty: 60 TABLET | Refills: 0 | Status: SHIPPED | OUTPATIENT
Start: 2022-11-01 | End: 2022-11-10 | Stop reason: SDUPTHER

## 2022-11-01 RX ORDER — CYCLOBENZAPRINE HCL 10 MG
10 TABLET ORAL ONCE
Status: COMPLETED | OUTPATIENT
Start: 2022-11-01 | End: 2022-11-01

## 2022-11-01 RX ORDER — ISOSORBIDE MONONITRATE 30 MG/1
30 TABLET, EXTENDED RELEASE ORAL DAILY
Qty: 30 TABLET | Refills: 0 | Status: SHIPPED | OUTPATIENT
Start: 2022-11-01 | End: 2022-11-07 | Stop reason: SDUPTHER

## 2022-11-01 RX ORDER — GABAPENTIN 300 MG/1
900 CAPSULE ORAL NIGHTLY
Qty: 30 CAPSULE | Refills: 0 | Status: SHIPPED | OUTPATIENT
Start: 2022-11-01 | End: 2022-12-08 | Stop reason: SDUPTHER

## 2022-11-01 RX ORDER — ENOXAPARIN SODIUM 100 MG/ML
1 INJECTION SUBCUTANEOUS
Status: DISCONTINUED | OUTPATIENT
Start: 2022-11-01 | End: 2022-11-01 | Stop reason: HOSPADM

## 2022-11-01 RX ORDER — ISOSORBIDE MONONITRATE 30 MG/1
30 TABLET, EXTENDED RELEASE ORAL DAILY
Status: DISCONTINUED | OUTPATIENT
Start: 2022-11-01 | End: 2022-11-01 | Stop reason: HOSPADM

## 2022-11-01 RX ADMIN — NITROGLYCERIN 1 INCH: 20 OINTMENT TOPICAL at 05:11

## 2022-11-01 RX ADMIN — CLOPIDOGREL BISULFATE 75 MG: 75 TABLET ORAL at 08:11

## 2022-11-01 RX ADMIN — Medication 500 MG: at 01:11

## 2022-11-01 RX ADMIN — ISOSORBIDE MONONITRATE 30 MG: 30 TABLET, EXTENDED RELEASE ORAL at 12:11

## 2022-11-01 RX ADMIN — VALSARTAN 160 MG: 160 TABLET, FILM COATED ORAL at 08:11

## 2022-11-01 RX ADMIN — CYCLOBENZAPRINE HYDROCHLORIDE 10 MG: 10 TABLET, FILM COATED ORAL at 12:11

## 2022-11-01 RX ADMIN — PANTOPRAZOLE SODIUM 40 MG: 40 TABLET, DELAYED RELEASE ORAL at 08:11

## 2022-11-01 RX ADMIN — FUROSEMIDE 20 MG: 20 TABLET ORAL at 08:11

## 2022-11-01 RX ADMIN — ASPIRIN 81 MG CHEWABLE TABLET 81 MG: 81 TABLET CHEWABLE at 08:11

## 2022-11-01 RX ADMIN — BUPROPION HYDROCHLORIDE 150 MG: 150 TABLET, FILM COATED, EXTENDED RELEASE ORAL at 08:11

## 2022-11-01 RX ADMIN — ENOXAPARIN SODIUM 70 MG: 80 INJECTION SUBCUTANEOUS at 01:11

## 2022-11-01 RX ADMIN — NITROGLYCERIN 1 INCH: 20 OINTMENT TOPICAL at 12:11

## 2022-11-01 NOTE — HOSPITAL COURSE
Patient denies any chest pain or SOB. Troponin trending down. Likely demand ischemia from HTN. Bp pressure better controlled. Imdur added. Echo showed EF 55%. Patient seen and examined on the date of discharge and found stable for discharge. Patient to follow-up with her PCP and cardiologist outpatient.

## 2022-11-01 NOTE — CONSULTS
[facility] (Hospital)  Cardiology  Consult Note    Patient Name: Shirlene Olvera  MRN: [id number]  Admission Date: 10/31/2022  Hospital Length of Stay: 0 days  Code Status: Full Code   Attending Provider: Luca Linn MD   Consulting Provider: Keisha Caicedo NP  Primary Care Physician: Clayton Frankel MD  Principal Problem:NSTEMI (non-ST elevated myocardial infarction)    Patient information was obtained from patient and ER records.     Inpatient consult to Cardiology  Consult performed by: Keisha Caicedo NP  Consult ordered by: Genevieve Armendariz NP        Subjective:     Chief Complaint:  HTN     HPI:   Ms. Olvera is a 68y/o  female with PMHx of arthritis, hypertension, hyperlipidemia, coronary artery disease with PCI in March of this year, peripheral artery disease, chronic kidney disease, and dysphagia presented to [facility] on October 31 with elevated blood pressure over the past 2 days along with left arm and chest discomfort that began on the day of presentation.  She took sublingual nitroglycerin with improvement in her blood pressure.  EMS noted systolic blood pressure 230s.  She received sublingual nitroglycerin and subsequently 1 inch of nitroglycerin paste. Cardiology consulted for NSTEMI, HTN, CAD. Pt seen and examined today no CV complaints at this time. She states she has back and neck pain with associated leg pain worse with mvmt, she sees pain management. Pt reports her pain is different from when she got her stents in March of this year. Pt reports she was a smoker for many years, quit in March 22'. Labs reviewed,  Crt 1.6, troponin trending down, d-dimer 1.2. EKG NSR, CTA chest negative for PE. 3/2022 PCI to RCA, Echo pending             Past Medical History:   Diagnosis Date    Chest pain 5/14/2015    Colon polyp     Coronary artery disease     pt states MI June 2015    Hypertension     Myocardial infarction     PAD  (peripheral artery disease)     Rash 4/20/2022    Tobacco use        Past Surgical History:   Procedure Laterality Date    CHOLECYSTECTOMY  09/03/2015    COLONOSCOPY N/A 10/11/2016    Procedure: COLONOSCOPY;  Surgeon: Haseeb Spears MD;  Location: [facility] ENDO;  Service: Endoscopy;  Laterality: N/A;    COLONOSCOPY N/A 12/9/2021    Procedure: COLONOSCOPY;  Surgeon: Pat Rios MD;  Location: [facility] ENDO;  Service: Endoscopy;  Laterality: N/A;    ESOPHAGOGASTRODUODENOSCOPY N/A 12/9/2021    Procedure: EGD (ESOPHAGOGASTRODUODENOSCOPY);  Surgeon: Pat Rios MD;  Location: [facility] ENDO;  Service: Endoscopy;  Laterality: N/A;    high blood      HYSTERECTOMY      INJECTION OF ANESTHETIC AGENT AROUND MEDIAL BRANCH NERVES INNERVATING CERVICAL FACET JOINT Bilateral 10/15/2021    Procedure: Bilateral C5-7 MBB with RN IV sedation PATIENT WOULD LIKE AFTERNOON ARRIVAL, IF POSSIBLE;  Surgeon: Nano Izquierdo MD;  Location: [facility] PAIN MGT;  Service: Pain Management;  Laterality: Bilateral;    INJECTION OF ANESTHETIC AGENT AROUND MEDIAL BRANCH NERVES INNERVATING CERVICAL FACET JOINT Bilateral 3/21/2022    Procedure: Bilateral C4-6 MBB with RN IV sedation;  Surgeon: Nano Izquierdo MD;  Location: [facility] PAIN MGT;  Service: Pain Management;  Laterality: Bilateral;    INJECTION OF ANESTHETIC AGENT INTO SACROILIAC JOINT Right 12/17/2021    Procedure: Right SIJ Injection;  Surgeon: Nano Izquierdo MD;  Location: [facility] PAIN MGT;  Service: Pain Management;  Laterality: Right;    LEFT HEART CATHETERIZATION Left 3/29/2022    Procedure: CATHETERIZATION, HEART, LEFT;  Surgeon: Lion Awad MD;  Location: [facility] CATH LAB;  Service: Cardiology;  Laterality: Left;    OOPHORECTOMY      RIB FRACTURE SURGERY         Review of patient's allergies indicates:   Allergen Reactions    Amlodipine Swelling    Statins-hmg-coa reductase inhibitors Other (See Comments)     Myalgias to lipitor and simvastatin       No current  facility-administered medications on file prior to encounter.     Current Outpatient Medications on File Prior to Encounter   Medication Sig    albuterol (PROVENTIL/VENTOLIN HFA) 90 mcg/actuation inhaler Inhale 2 puffs into the lungs every 6 (six) hours as needed for Wheezing or Shortness of Breath.    amLODIPine (NORVASC) 5 MG tablet Take 5 mg by mouth once daily.    aspirin 81 MG Chew Take 1 tablet (81 mg total) by mouth once daily. (Patient taking differently: Take 81 mg by mouth once.)    buPROPion (WELLBUTRIN XL) 150 MG TB24 tablet Take 1 tablet (150 mg total) by mouth once daily.    clopidogreL (PLAVIX) 75 mg tablet Take 1 tablet (75 mg total) by mouth once daily.    cyanocobalamin (VITAMIN B-12) 1000 MCG tablet Take 100 mcg by mouth once daily.    evolocumab (REPATHA SURECLICK) 140 mg/mL PnIj Inject 1 mL (140 mg total) into the skin every 14 (fourteen) days.    fexofenadine HCl (ALLEGRA ORAL) Take 1 tablet by mouth once daily.    fluocinolone acetonide oiL (DERMOTIC OIL) 0.01 % Drop Place 3 drops in ear(s) 2 (two) times daily.    fluticasone propionate (FLONASE) 50 mcg/actuation nasal spray USE 2 SPRAYS IN EACH NOSTRIL ONE TIME DAILY    furosemide (LASIX) 20 MG tablet Take 1 tablet (20 mg total) by mouth once daily.    gabapentin (NEURONTIN) 300 MG capsule Take 3 capsules (900 mg total) by mouth every evening.    loratadine (CLARITIN) 10 mg tablet Take 10 mg by mouth daily as needed for Allergies.    multivitamin with minerals tablet Take 1 tablet by mouth once daily.    niacin, inositol niacinate, (NIACIN FLUSH FREE) 400 mg niacin (500 mg) Cap Take 400 mg by mouth every evening.    pantoprazole (PROTONIX) 20 MG tablet Take 1 tablet (20 mg total) by mouth once daily.    valsartan (DIOVAN) 160 MG tablet TAKE ONE (1) TABLET (160 MG TOTAL) BY MOUTH ONCE DAILY    valsartan (DIOVAN) 40 MG tablet Take 1 tablet (40 mg total) by mouth once daily.    vitamin D (VITAMIN D3) 1000 units Tab Take 1,000  Units by mouth once daily.    [DISCONTINUED] metoprolol tartrate (LOPRESSOR) 50 MG tablet Take 1 tablet (50 mg total) by mouth Daily.    [DISCONTINUED] valsartan-hydrochlorothiazide (DIOVAN-HCT) 160-25 mg per tablet Take 1 tablet by mouth once daily.     Family History       Problem Relation (Age of Onset)    Heart attack Father (56)          Tobacco Use    Smoking status: Former     Packs/day: 0.25     Types: Cigarettes     Quit date: 3/29/2022     Years since quittin.5    Smokeless tobacco: Never   Substance and Sexual Activity    Alcohol use: Not Currently     Alcohol/week: 0.0 standard drinks    Drug use: No    Sexual activity: Never     Review of Systems   Constitutional: Negative.   HENT: Negative.     Eyes: Negative.    Cardiovascular: Negative.    Respiratory: Negative.     Skin: Negative.    Musculoskeletal:  Positive for back pain, muscle cramps and neck pain.   Gastrointestinal: Negative.    Genitourinary: Negative.    Neurological: Negative.    Psychiatric/Behavioral: Negative.     Objective:     Vital Signs (Most Recent):  Temp: 98.1 °F (36.7 °C) (22)  Pulse: (!) 53 (22)  Resp: 18 (22)  BP: 130/65 (22)  SpO2: 97 % (22)   Vital Signs (24h Range):  Temp:  [97.5 °F (36.4 °C)-98.1 °F (36.7 °C)] 98.1 °F (36.7 °C)  Pulse:  [52-88] 53  Resp:  [17-20] 18  SpO2:  [95 %-99 %] 97 %  BP: (120-182)/(57-88) 130/65     Weight: 71.7 kg (158 lb)  Body mass index is 27.12 kg/m².    SpO2: 97 %  O2 Device (Oxygen Therapy): room air    No intake or output data in the 24 hours ending 22 1158    Lines/Drains/Airways       None                   Physical Exam  Vitals and nursing note reviewed.   Constitutional:       Appearance: Normal appearance.   HENT:      Head: Normocephalic.   Eyes:      Pupils: Pupils are equal, round, and reactive to light.   Cardiovascular:      Rate and Rhythm: Normal rate and regular rhythm.      Heart sounds: Normal heart  sounds, S1 normal and S2 normal. No murmur heard.    No S3 or S4 sounds.   Pulmonary:      Effort: Pulmonary effort is normal.      Breath sounds: Normal breath sounds.   Abdominal:      General: Bowel sounds are normal.      Palpations: Abdomen is soft.   Musculoskeletal:         General: Normal range of motion.      Cervical back: Normal range of motion.   Skin:     Capillary Refill: Capillary refill takes less than 2 seconds.   Neurological:      General: No focal deficit present.      Mental Status: She is alert and oriented to person, place, and time.   Psychiatric:         Mood and Affect: Mood normal.         Behavior: Behavior normal.         Thought Content: Thought content normal.       Significant Labs: BMP:   Recent Labs   Lab 10/31/22  2230 11/01/22  0510   GLU 98 88    145   K 4.0 4.5    107   CO2 29 28   BUN 25* 24*   CREATININE 1.6* 1.6*   CALCIUM 9.6 9.6   MG 1.9  --    , CMP   Recent Labs   Lab 10/31/22  2230 11/01/22  0510    145   K 4.0 4.5    107   CO2 29 28   GLU 98 88   BUN 25* 24*   CREATININE 1.6* 1.6*   CALCIUM 9.6 9.6   PROT 6.6  --    ALBUMIN 3.7  --    BILITOT 0.8  --    ALKPHOS 93  --    AST 20  --    ALT 15  --    ANIONGAP 10 10   , CBC   Recent Labs   Lab 10/31/22  2230 11/01/22  0510   WBC 5.02 4.73   HGB 11.9* 11.7*   HCT 35.6* 35.2*    200   , INR No results for input(s): INR, PROTIME in the last 48 hours., Lipid Panel No results for input(s): CHOL, HDL, LDLCALC, TRIG, CHOLHDL in the last 48 hours., Troponin   Recent Labs   Lab 10/31/22  2230 11/01/22  0511 11/01/22  1035   TROPONINI 0.083* 0.054* 0.044*   , and All pertinent lab results from the last 24 hours have been reviewed.    Significant Imaging: Cardiac Cath: reviewed, Echocardiogram: Transthoracic echo (TTE) complete (Cupid Only):   Results for orders placed or performed during the hospital encounter of 10/31/22   Echo   Result Value Ref Range    BSA 1.8 m2    TDI SEPTAL 0.07 m/s    LV  LATERAL E/E' RATIO 13.43 m/s    LV SEPTAL E/E' RATIO 13.43 m/s    LA WIDTH 3.20 cm    IVC diameter 1.66 cm    Left Ventricular Outflow Tract Mean Velocity 1.06 cm/s    Left Ventricular Outflow Tract Mean Gradient 4.65 mmHg    TV mean gradient 17 mmHg    TDI LATERAL 0.07 m/s    LVIDd 4.36 3.5 - 6.0 cm    IVS 1.38 (A) 0.6 - 1.1 cm    Posterior Wall 1.28 (A) 0.6 - 1.1 cm    Ao root annulus 2.61 cm    LVIDs 2.77 2.1 - 4.0 cm    FS 36 28 - 44 %    LA volume 32.88 cm3    STJ 2.81 cm    Ascending aorta 2.77 cm    LV mass 219.47 g    LA size 2.92 cm    TAPSE 2.10 cm    Left Ventricle Relative Wall Thickness 0.59 cm    AV mean gradient 7 mmHg    AV valve area 2.55 cm2    AV Velocity Ratio 0.85     AV index (prosthetic) 0.93     MV valve area p 1/2 method 3.86 cm2    PV peak gradient 1.84 mmHg    E/A ratio 1.13     Mean e' 0.07 m/s    E wave deceleration time 196.65 msec    IVRT 53.28 msec    LVOT diameter 1.87 cm    LVOT area 2.7 cm2    LVOT peak srikanth 1.32 m/s    LVOT peak VTI 37.20 cm    Ao peak srikanth 1.56 m/s    Ao VTI 40.0 cm    RVOT peak srikanth 0.68 m/s    RVOT peak VTI 16.8 cm    LVOT stroke volume 102.12 cm3    AV peak gradient 10 mmHg    PV mean gradient 1.04 mmHg    E/E' ratio 13.43 m/s    MV Peak E Srikanth 0.94 m/s    TR Max Srikanth 2.07 m/s    MV stenosis pressure 1/2 time 57.03 ms    MV Peak A Srikanth 0.83 m/s    LV Systolic Volume 28.75 mL    LV Systolic Volume Index 16.2 mL/m2    LV Diastolic Volume 85.96 mL    LV Diastolic Volume Index 48.56 mL/m2    LA Volume Index 18.6 mL/m2    LV Mass Index 124 g/m2    RA Major Axis 3.42 cm    Left Atrium Minor Axis 4.13 cm    Left Atrium Major Axis 4.15 cm    Triscuspid Valve Regurgitation Peak Gradient 17 mmHg    RA Width 2.30 cm   , EKG: reviewed, and X-Ray: CXR: X-Ray Chest 1 View (CXR): No results found for this visit on 10/31/22. and X-Ray Chest PA and Lateral (CXR): No results found for this visit on 10/31/22.    Assessment and Plan:     * NSTEMI (non-ST elevated myocardial  infarction)  Troponin trending down, elevation likely secondary to HTN    Accelerated hypertension  Cont OMT- BB, valsartan  Add Imdur  Pain control    CKD (chronic kidney disease) stage 3, GFR 30-59 ml/min  Cont tx per primary team    Coronary artery disease  PCI (ALEXX) of RCA x 2 in 3/2022 with residual 100% stenosis of LCx.  - Continue OMT        VTE Risk Mitigation (From admission, onward)         Ordered     enoxaparin injection 70 mg  Every 12 hours (non-standard times)         11/01/22 0001     IP VTE LOW RISK PATIENT  Once         10/31/22 2223     Place sequential compression device  Until discontinued         10/31/22 2223                Thank you for your consult. I will sign off. Please contact us if you have any additional questions.    Keisha Caicedo NP  Cardiology   [facility] - Telemetry ([facility])

## 2022-11-01 NOTE — NURSING
Pt educated on discharge information. Pt medications at bedside delivered. Piv removed, telemetry removed and returned.     Pt has no further questions at this time.

## 2022-11-01 NOTE — HPI
67-year-old female with a history of arthritis, hypertension, hyperlipidemia, coronary artery disease with PCI in March of this year, peripheral artery disease, chronic kidney disease, and dysphagia presented to [facility] Department on October 31 with elevated blood pressure over the past 2 days along with left arm and chest discomfort that began on the day of presentation.  She took sublingual nitroglycerin with improvement in her blood pressure.  EMS noted systolic blood pressure 230s.  She received sublingual nitroglycerin and subsequently 1 inch of nitroglycerin paste.  Pain-free at present.  They are giving aspirin.  On review of her chart it appears that she is on aspirin and Plavix at home.  Referring provider spoke with Cardiology at [facility].  Requesting transfer to Hospital Medicine at [facility] for further treatment of chest pain/CAD.     BNP 87, troponin 0.05 (normal range 0-0.03), sodium 141, potassium 3.8, chloride 106, CO2 22, BUN 24, creatinine 1.43, glucose 107, AST 23, ALT 17, D-dimer 1.2, white blood cells 7.1, hemoglobin 13.9, hematocrit 42, platelets 181     CTA chest negative for pulmonary emboli or other acute cardiopulmonary abnormality.  Mild emphysema.     EKG had normal sinus rhythm. No acute ischemic changes noted.

## 2022-11-01 NOTE — ASSESSMENT & PLAN NOTE
- BP improved with nitropaste in the ED. BP remains above goal on admission.  - Continue home antihypertensives. Adding nitropaste Q 6. Clonidine PRN.   - Cardiology consult.

## 2022-11-01 NOTE — ASSESSMENT & PLAN NOTE
- s/p PCI (ALEXX) of RCA x 2 in 3/2022 with residual 100% stenosis of LCx.  - Continue medical management as above.

## 2022-11-01 NOTE — ASSESSMENT & PLAN NOTE
- Type I vs. Type II demand due to accelerated HTN. Troponin 0.05, repeat on admission resulted 0.08. EKG unrevealing. CP free on admission.    - Will start Lovenox 1 mg/kg Q 12.  - Continue home ASA, Plavix, and BB. No statin due to h/o intolerance. Add nitropaste Q 6.    - Trend serial cardiac enzymes and EKG.  - Recent FLP results reviewed. On Repatha at home.  - Previous TTE report from 3/2022 reviewed.   - Cardiology consult.

## 2022-11-01 NOTE — DISCHARGE SUMMARY
[facility] ([facility])  [facility] Medicine  Discharge Summary      Patient Name: Shirlene Olvera  MRN: [id number]  Patient Class: OP- Observation  Admission Date: 10/31/2022  Hospital Length of Stay: 0 days  Discharge Date and Time:  11/01/2022 5:59 PM  Attending Physician: No att. providers found   Discharging Provider: Lisa Castillo NP  Primary Care Provider: Clayton Frankel MD      HPI:   67-year-old female with a history of arthritis, hypertension, hyperlipidemia, coronary artery disease with PCI in March of this year, peripheral artery disease, chronic kidney disease, and dysphagia presented to [facility] Department on October 31 with elevated blood pressure over the past 2 days along with left arm and chest discomfort that began on the day of presentation.  She took sublingual nitroglycerin with improvement in her blood pressure.  EMS noted systolic blood pressure 230s.  She received sublingual nitroglycerin and subsequently 1 inch of nitroglycerin paste.  Pain-free at present.  They are giving aspirin.  On review of her chart it appears that she is on aspirin and Plavix at home.  Referring provider spoke with Cardiology at [facility].  Requesting transfer to Hospital Medicine at [facility] for further treatment of chest pain/CAD.     BNP 87, troponin 0.05 (normal range 0-0.03), sodium 141, potassium 3.8, chloride 106, CO2 22, BUN 24, creatinine 1.43, glucose 107, AST 23, ALT 17, D-dimer 1.2, white blood cells 7.1, hemoglobin 13.9, hematocrit 42, platelets 181     CTA chest negative for pulmonary emboli or other acute cardiopulmonary abnormality.  Mild emphysema.     EKG had normal sinus rhythm. No acute ischemic changes noted.        * No surgery found *      Hospital Course:   Patient denies any chest pain or SOB. Troponin trending down. Likely demand ischemia from HTN. Bp pressure better controlled. Imdur added. Echo showed EF 55%. Patient seen and  examined on the date of discharge and found stable for discharge. Patient to follow-up with her PCP and cardiologist outpatient.        Goals of Care Treatment Preferences:  Code Status: Full Code      Consults:   Consults (From admission, onward)        Status Ordering Provider     Inpatient consult to Cardiology  Once        Provider:  Emelina Armendariz NP    Completed EMELINA ARMENDARIZ.          No new Assessment & Plan notes have been filed under this hospital service since the last note was generated.  Service: Hospital Medicine    Final Active Diagnoses:    Diagnosis Date Noted POA    PRINCIPAL PROBLEM:  NSTEMI (non-ST elevated myocardial infarction) [I21.4] 11/01/2022 Yes    Accelerated hypertension [I10] 11/01/2022 Yes    CKD (chronic kidney disease) stage 3, GFR 30-59 ml/min [N18.30] 08/02/2021 Yes     Chronic    Coronary artery disease [I25.10] 08/31/2015 Yes     Chronic      Problems Resolved During this Admission:       Discharged Condition: stable    Disposition: Home or Self Care    Follow Up:   Follow-up Information     Clayton Frankel MD Follow up in 3 day(s).    Specialty: Family Medicine  Why: for hospital follow-up  Contact information:  [address]  [place] [address] [phone]             Pepe Byers MD Follow up in 1 week(s).    Specialties: Interventional Cardiology, Cardiology  Why: for hospital follow-up of uncontrolled bp  Contact information:  [address]  [place] [address] [phone]                       Patient Instructions:      Activity as tolerated       Significant Diagnostic Studies: Labs:   BMP:   Recent Labs   Lab 10/31/22  2230 11/01/22  0510   GLU 98 88    145   K 4.0 4.5    107   CO2 29 28   BUN 25* 24*   CREATININE 1.6* 1.6*   CALCIUM 9.6 9.6   MG 1.9  --    , CMP   Recent Labs   Lab 10/31/22  2230 11/01/22  0510    145   K 4.0 4.5    107   CO2 29 28   GLU 98 88   BUN 25* 24*   CREATININE 1.6* 1.6*   CALCIUM 9.6  9.6   PROT 6.6  --    ALBUMIN 3.7  --    BILITOT 0.8  --    ALKPHOS 93  --    AST 20  --    ALT 15  --    ANIONGAP 10 10   , CBC   Recent Labs   Lab 10/31/22  2230 11/01/22  0510   WBC 5.02 4.73   HGB 11.9* 11.7*   HCT 35.6* 35.2*    200    and Troponin   Recent Labs   Lab 10/31/22  2230 11/01/22  0511 11/01/22  1035   TROPONINI 0.083* 0.054* 0.044*       Pending Diagnostic Studies:     None         Medications:  Reconciled Home Medications:      Medication List      START taking these medications    isosorbide mononitrate 30 MG 24 hr tablet  Commonly known as: IMDUR  Take 1 tablet (30 mg total) by mouth once daily.  Notes to patient: Taken dose 11/1     nitroGLYCERIN 0.4 MG SL tablet  Commonly known as: NITROSTAT  Place 1 tablet (0.4 mg total) under the tongue every 5 (five) minutes as needed for Chest pain.  Notes to patient: As needed for chest pain        CHANGE how you take these medications    aspirin 81 MG Chew  Take 1 tablet (81 mg total) by mouth once daily.  What changed: when to take this     metoprolol tartrate 50 MG tablet  Commonly known as: LOPRESSOR  Take 1 tablet (50 mg total) by mouth 2 (two) times daily.  What changed: when to take this     valsartan 160 MG tablet  Commonly known as: DIOVAN  TAKE ONE (1) TABLET (160 MG TOTAL) BY MOUTH ONCE DAILY  What changed: Another medication with the same name was removed. Continue taking this medication, and follow the directions you see here.        CONTINUE taking these medications    ALLEGRA ORAL  Take 1 tablet by mouth once daily.     buPROPion 150 MG TB24 tablet  Commonly known as: WELLBUTRIN XL  Take 1 tablet (150 mg total) by mouth once daily.     clopidogreL 75 mg tablet  Commonly known as: PLAVIX  Take 1 tablet (75 mg total) by mouth once daily.     cyanocobalamin 1000 MCG tablet  Commonly known as: VITAMIN B-12  Take 100 mcg by mouth once daily.     fluocinolone acetonide oiL 0.01 % Drop  Commonly known as: DERMOTIC OIL  Place 3 drops in  ear(s) 2 (two) times daily.     fluticasone propionate 50 mcg/actuation nasal spray  Commonly known as: FLONASE  USE 2 SPRAYS IN EACH NOSTRIL ONE TIME DAILY     furosemide 20 MG tablet  Commonly known as: LASIX  Take 1 tablet (20 mg total) by mouth once daily.     gabapentin 300 MG capsule  Commonly known as: NEURONTIN  Take 3 capsules (900 mg total) by mouth every evening.     loratadine 10 mg tablet  Commonly known as: CLARITIN  Take 10 mg by mouth daily as needed for Allergies.     multivitamin with minerals tablet  Take 1 tablet by mouth once daily.     niacin (inositol niacinate) 400 mg niacin (500 mg) Cap  Commonly known as: NIACIN FLUSH FREE  Take 400 mg by mouth every evening.     pantoprazole 20 MG tablet  Commonly known as: PROTONIX  Take 1 tablet (20 mg total) by mouth once daily.     REPATHA SURECLICK 140 mg/mL Pnij  Generic drug: evolocumab  Inject 1 mL (140 mg total) into the skin every 14 (fourteen) days.     vitamin D 1000 units Tab  Commonly known as: VITAMIN D3  Take 1,000 Units by mouth once daily.        STOP taking these medications    albuterol 90 mcg/actuation inhaler  Commonly known as: PROVENTIL/VENTOLIN HFA     amLODIPine 5 MG tablet  Commonly known as: NORVASC            Indwelling Lines/Drains at time of discharge:   Lines/Drains/Airways     None                 Time spent on the discharge of patient: 50 minutes         Lisa Castillo NP  Department of Hospital Medicine  [facility] ([facility])

## 2022-11-01 NOTE — SUBJECTIVE & OBJECTIVE
Past Medical History:   Diagnosis Date    Chest pain 5/14/2015    Colon polyp     Coronary artery disease     pt states MI June 2015    Hypertension     Myocardial infarction     PAD (peripheral artery disease)     Rash 4/20/2022    Tobacco use        Past Surgical History:   Procedure Laterality Date    CHOLECYSTECTOMY  09/03/2015    COLONOSCOPY N/A 10/11/2016    Procedure: COLONOSCOPY;  Surgeon: Haseeb Spears MD;  Location: [facility];  Service: Endoscopy;  Laterality: N/A;    COLONOSCOPY N/A 12/9/2021    Procedure: COLONOSCOPY;  Surgeon: Pat Rios MD;  Location: [facility];  Service: Endoscopy;  Laterality: N/A;    ESOPHAGOGASTRODUODENOSCOPY N/A 12/9/2021    Procedure: EGD (ESOPHAGOGASTRODUODENOSCOPY);  Surgeon: Pat Rios MD;  Location: [facility];  Service: Endoscopy;  Laterality: N/A;    high blood      HYSTERECTOMY      INJECTION OF ANESTHETIC AGENT AROUND MEDIAL BRANCH NERVES INNERVATING CERVICAL FACET JOINT Bilateral 10/15/2021    Procedure: Bilateral C5-7 MBB with RN IV sedation PATIENT WOULD LIKE AFTERNOON ARRIVAL, IF POSSIBLE;  Surgeon: Nano Izquierdo MD;  Location: [facility] PAIN MGT;  Service: Pain Management;  Laterality: Bilateral;    INJECTION OF ANESTHETIC AGENT AROUND MEDIAL BRANCH NERVES INNERVATING CERVICAL FACET JOINT Bilateral 3/21/2022    Procedure: Bilateral C4-6 MBB with RN IV sedation;  Surgeon: Nano Izquierdo MD;  Location: [facility] PAIN [facility]GT;  Service: Pain Management;  Laterality: Bilateral;    INJECTION OF ANESTHETIC AGENT INTO SACROILIAC JOINT Right 12/17/2021    Procedure: Right SIJ Injection;  Surgeon: Nano Izquierdo MD;  Location: [facility] PAIN [facility]GT;  Service: Pain Management;  Laterality: Right;    LEFT HEART CATHETERIZATION Left 3/29/2022    Procedure: CATHETERIZATION, HEART, LEFT;  Surgeon: Lion Awad MD;  Location: [facility] CATH LAB;  Service: Cardiology;  Laterality: Left;    OOPHORECTOMY      RIB FRACTURE SURGERY         Review of patient's allergies indicates:    Allergen Reactions    Amlodipine Swelling    Statins-hmg-coa reductase inhibitors Other (See Comments)     Myalgias to lipitor and simvastatin       No current facility-administered medications on file prior to encounter.     Current Outpatient Medications on File Prior to Encounter   Medication Sig    albuterol (PROVENTIL/VENTOLIN HFA) 90 mcg/actuation inhaler Inhale 2 puffs into the lungs every 6 (six) hours as needed for Wheezing or Shortness of Breath.    amLODIPine (NORVASC) 5 MG tablet Take 5 mg by mouth once daily.    aspirin 81 MG Chew Take 1 tablet (81 mg total) by mouth once daily. (Patient taking differently: Take 81 mg by mouth once.)    buPROPion (WELLBUTRIN XL) 150 MG TB24 tablet Take 1 tablet (150 mg total) by mouth once daily.    clopidogreL (PLAVIX) 75 mg tablet Take 1 tablet (75 mg total) by mouth once daily.    cyanocobalamin (VITAMIN B-12) 1000 MCG tablet Take 100 mcg by mouth once daily.    evolocumab (REPATHA SURECLICK) 140 mg/mL PnIj Inject 1 mL (140 mg total) into the skin every 14 (fourteen) days.    fexofenadine HCl (ALLEGRA ORAL) Take 1 tablet by mouth once daily.    fluocinolone acetonide oiL (DERMOTIC OIL) 0.01 % Drop Place 3 drops in ear(s) 2 (two) times daily.    fluticasone propionate (FLONASE) 50 mcg/actuation nasal spray USE 2 SPRAYS IN EACH NOSTRIL ONE TIME DAILY    furosemide (LASIX) 20 MG tablet Take 1 tablet (20 mg total) by mouth once daily.    gabapentin (NEURONTIN) 300 MG capsule Take 3 capsules (900 mg total) by mouth every evening.    loratadine (CLARITIN) 10 mg tablet Take 10 mg by mouth daily as needed for Allergies.    multivitamin with minerals tablet Take 1 tablet by mouth once daily.    niacin, inositol niacinate, (NIACIN FLUSH FREE) 400 mg niacin (500 mg) Cap Take 400 mg by mouth every evening.    pantoprazole (PROTONIX) 20 MG tablet Take 1 tablet (20 mg total) by mouth once daily.    valsartan (DIOVAN) 160 MG tablet TAKE ONE (1) TABLET (160 MG TOTAL) BY MOUTH ONCE  DAILY    valsartan (DIOVAN) 40 MG tablet Take 1 tablet (40 mg total) by mouth once daily.    vitamin D (VITAMIN D3) 1000 units Tab Take 1,000 Units by mouth once daily.    [DISCONTINUED] metoprolol tartrate (LOPRESSOR) 50 MG tablet Take 1 tablet (50 mg total) by mouth Daily.    [DISCONTINUED] valsartan-hydrochlorothiazide (DIOVAN-HCT) 160-25 mg per tablet Take 1 tablet by mouth once daily.     Family History       Problem Relation (Age of Onset)    Heart attack Father (56)          Tobacco Use    Smoking status: Former     Packs/day: 0.25     Types: Cigarettes     Quit date: 3/29/2022     Years since quittin.5    Smokeless tobacco: Never   Substance and Sexual Activity    Alcohol use: Not Currently     Alcohol/week: 0.0 standard drinks    Drug use: No    Sexual activity: Never     Review of Systems   Constitutional: Negative.   HENT: Negative.     Eyes: Negative.    Cardiovascular: Negative.    Respiratory: Negative.     Skin: Negative.    Musculoskeletal:  Positive for back pain, muscle cramps and neck pain.   Gastrointestinal: Negative.    Genitourinary: Negative.    Neurological: Negative.    Psychiatric/Behavioral: Negative.     Objective:     Vital Signs (Most Recent):  Temp: 98.1 °F (36.7 °C) (22)  Pulse: (!) 53 (22)  Resp: 18 (22)  BP: 130/65 (22)  SpO2: 97 % (22)   Vital Signs (24h Range):  Temp:  [97.5 °F (36.4 °C)-98.1 °F (36.7 °C)] 98.1 °F (36.7 °C)  Pulse:  [52-88] 53  Resp:  [17-20] 18  SpO2:  [95 %-99 %] 97 %  BP: (120-182)/(57-88) 130/65     Weight: 71.7 kg (158 lb)  Body mass index is 27.12 kg/m².    SpO2: 97 %  O2 Device (Oxygen Therapy): room air    No intake or output data in the 24 hours ending 22 1158    Lines/Drains/Airways       None                   Physical Exam  Vitals and nursing note reviewed.   Constitutional:       Appearance: Normal appearance.   HENT:      Head: Normocephalic.   Eyes:      Pupils: Pupils are equal,  round, and reactive to light.   Cardiovascular:      Rate and Rhythm: Normal rate and regular rhythm.      Heart sounds: Normal heart sounds, S1 normal and S2 normal. No murmur heard.    No S3 or S4 sounds.   Pulmonary:      Effort: Pulmonary effort is normal.      Breath sounds: Normal breath sounds.   Abdominal:      General: Bowel sounds are normal.      Palpations: Abdomen is soft.   Musculoskeletal:         General: Normal range of motion.      Cervical back: Normal range of motion.   Skin:     Capillary Refill: Capillary refill takes less than 2 seconds.   Neurological:      General: No focal deficit present.      Mental Status: She is alert and oriented to person, place, and time.   Psychiatric:         Mood and Affect: Mood normal.         Behavior: Behavior normal.         Thought Content: Thought content normal.       Significant Labs: BMP:   Recent Labs   Lab 10/31/22  2230 11/01/22  0510   GLU 98 88    145   K 4.0 4.5    107   CO2 29 28   BUN 25* 24*   CREATININE 1.6* 1.6*   CALCIUM 9.6 9.6   MG 1.9  --    , CMP   Recent Labs   Lab 10/31/22  2230 11/01/22  0510    145   K 4.0 4.5    107   CO2 29 28   GLU 98 88   BUN 25* 24*   CREATININE 1.6* 1.6*   CALCIUM 9.6 9.6   PROT 6.6  --    ALBUMIN 3.7  --    BILITOT 0.8  --    ALKPHOS 93  --    AST 20  --    ALT 15  --    ANIONGAP 10 10   , CBC   Recent Labs   Lab 10/31/22  2230 11/01/22  0510   WBC 5.02 4.73   HGB 11.9* 11.7*   HCT 35.6* 35.2*    200   , INR No results for input(s): INR, PROTIME in the last 48 hours., Lipid Panel No results for input(s): CHOL, HDL, LDLCALC, TRIG, CHOLHDL in the last 48 hours., Troponin   Recent Labs   Lab 10/31/22  2230 11/01/22  0511 11/01/22  1035   TROPONINI 0.083* 0.054* 0.044*   , and All pertinent lab results from the last 24 hours have been reviewed.    Significant Imaging: Cardiac Cath: reviewed, Echocardiogram: Transthoracic echo (TTE) complete (Cupid Only):   Results for orders placed or  performed during the hospital encounter of 10/31/22   Echo   Result Value Ref Range    BSA 1.8 m2    TDI SEPTAL 0.07 m/s    LV LATERAL E/E' RATIO 13.43 m/s    LV SEPTAL E/E' RATIO 13.43 m/s    LA WIDTH 3.20 cm    IVC diameter 1.66 cm    Left Ventricular Outflow Tract Mean Velocity 1.06 cm/s    Left Ventricular Outflow Tract Mean Gradient 4.65 mmHg    TV mean gradient 17 mmHg    TDI LATERAL 0.07 m/s    LVIDd 4.36 3.5 - 6.0 cm    IVS 1.38 (A) 0.6 - 1.1 cm    Posterior Wall 1.28 (A) 0.6 - 1.1 cm    Ao root annulus 2.61 cm    LVIDs 2.77 2.1 - 4.0 cm    FS 36 28 - 44 %    LA volume 32.88 cm3    STJ 2.81 cm    Ascending aorta 2.77 cm    LV mass 219.47 g    LA size 2.92 cm    TAPSE 2.10 cm    Left Ventricle Relative Wall Thickness 0.59 cm    AV mean gradient 7 mmHg    AV valve area 2.55 cm2    AV Velocity Ratio 0.85     AV index (prosthetic) 0.93     MV valve area p 1/2 method 3.86 cm2    PV peak gradient 1.84 mmHg    E/A ratio 1.13     Mean e' 0.07 m/s    E wave deceleration time 196.65 msec    IVRT 53.28 msec    LVOT diameter 1.87 cm    LVOT area 2.7 cm2    LVOT peak srikanth 1.32 m/s    LVOT peak VTI 37.20 cm    Ao peak srikanth 1.56 m/s    Ao VTI 40.0 cm    RVOT peak srikanth 0.68 m/s    RVOT peak VTI 16.8 cm    LVOT stroke volume 102.12 cm3    AV peak gradient 10 mmHg    PV mean gradient 1.04 mmHg    E/E' ratio 13.43 m/s    MV Peak E Srikanth 0.94 m/s    TR Max Srikanth 2.07 m/s    MV stenosis pressure 1/2 time 57.03 ms    MV Peak A Srikanth 0.83 m/s    LV Systolic Volume 28.75 mL    LV Systolic Volume Index 16.2 mL/m2    LV Diastolic Volume 85.96 mL    LV Diastolic Volume Index 48.56 mL/m2    LA Volume Index 18.6 mL/m2    LV Mass Index 124 g/m2    RA Major Axis 3.42 cm    Left Atrium Minor Axis 4.13 cm    Left Atrium Major Axis 4.15 cm    Triscuspid Valve Regurgitation Peak Gradient 17 mmHg    RA Width 2.30 cm   , EKG: reviewed, and X-Ray: CXR: X-Ray Chest 1 View (CXR): No results found for this visit on 10/31/22. and X-Ray Chest PA and Lateral  (CXR): No results found for this visit on 10/31/22.

## 2022-11-01 NOTE — HPI
Ms. Olvera is a 66y/o  female with PMHx of arthritis, hypertension, hyperlipidemia, coronary artery disease with PCI in March of this year, peripheral artery disease, chronic kidney disease, and dysphagia presented to [facility] on October 31 with elevated blood pressure over the past 2 days along with left arm and chest discomfort that began on the day of presentation.  She took sublingual nitroglycerin with improvement in her blood pressure.  EMS noted systolic blood pressure 230s.  She received sublingual nitroglycerin and subsequently 1 inch of nitroglycerin paste. Cardiology consulted for NSTEMI, HTN, CAD. Pt seen and examined today no CV complaints at this time. She states she has back and neck pain with associated leg pain worse with mvmt, she sees pain management. Pt reports her pain is different from when she got her stents in March of this year. Pt reports she was a smoker for many years, quit in March 22'. Labs reviewed,  Crt 1.6, troponin trending down, d-dimer 1.2. EKG NSR, CTA chest negative for PE. 3/2022 PCI to RCA, Echo pending

## 2022-11-01 NOTE — H&P
[facility] Medicine  History & Physical    Patient Name: Shirlene Olvera  MRN: [id number]  Patient Class: OP- Observation  Admission Date: 10/31/2022  Attending Physician: Anatoly Dunn MD   Primary Care Provider: Clayton Frankel MD         Patient information was obtained from patient, past medical records and ER records.     Subjective:     Principal Problem:NSTEMI (non-ST elevated myocardial infarction)    Chief Complaint:   Chief Complaint   Patient presents with    Chest Pain    Hypertension        HPI: 67-year-old female with a history of arthritis, hypertension, hyperlipidemia, coronary artery disease with PCI in March of this year, peripheral artery disease, chronic kidney disease, and dysphagia presented to [facility] Department on October 31 with elevated blood pressure over the past 2 days along with left arm and chest discomfort that began on the day of presentation.  She took sublingual nitroglycerin with improvement in her blood pressure.  EMS noted systolic blood pressure 230s.  She received sublingual nitroglycerin and subsequently 1 inch of nitroglycerin paste.  Pain-free at present.  They are giving aspirin.  On review of her chart it appears that she is on aspirin and Plavix at home.  Referring provider spoke with Cardiology at [facility].  Requesting transfer to Hospital Medicine at [facility] for further treatment of chest pain/CAD.     BNP 87, troponin 0.05 (normal range 0-0.03), sodium 141, potassium 3.8, chloride 106, CO2 22, BUN 24, creatinine 1.43, glucose 107, AST 23, ALT 17, D-dimer 1.2, white blood cells 7.1, hemoglobin 13.9, hematocrit 42, platelets 181     CTA chest negative for pulmonary emboli or other acute cardiopulmonary abnormality.  Mild emphysema.     EKG had normal sinus rhythm. No acute ischemic changes noted.        Past Medical History:   Diagnosis Date    Chest pain 5/14/2015    Colon polyp      Coronary artery disease     pt states MI June 2015    Hypertension     Myocardial infarction     PAD (peripheral artery disease)     Rash 4/20/2022    Tobacco use        Past Surgical History:   Procedure Laterality Date    CHOLECYSTECTOMY  09/03/2015    COLONOSCOPY N/A 10/11/2016    Procedure: COLONOSCOPY;  Surgeon: Haseeb Spears MD;  Location: [facility] ENDO;  Service: Endoscopy;  Laterality: N/A;    COLONOSCOPY N/A 12/9/2021    Procedure: COLONOSCOPY;  Surgeon: Pat Rios MD;  Location: [facility] ENDO;  Service: Endoscopy;  Laterality: N/A;    ESOPHAGOGASTRODUODENOSCOPY N/A 12/9/2021    Procedure: EGD (ESOPHAGOGASTRODUODENOSCOPY);  Surgeon: Pat Rios MD;  Location: [facility];  Service: Endoscopy;  Laterality: N/A;    high blood      HYSTERECTOMY      INJECTION OF ANESTHETIC AGENT AROUND MEDIAL BRANCH NERVES INNERVATING CERVICAL FACET JOINT Bilateral 10/15/2021    Procedure: Bilateral C5-7 MBB with RN IV sedation PATIENT WOULD LIKE AFTERNOON ARRIVAL, IF POSSIBLE;  Surgeon: Nano Izquierdo MD;  Location: [facility] PAIN MGT;  Service: Pain Management;  Laterality: Bilateral;    INJECTION OF ANESTHETIC AGENT AROUND MEDIAL BRANCH NERVES INNERVATING CERVICAL FACET JOINT Bilateral 3/21/2022    Procedure: Bilateral C4-6 MBB with RN IV sedation;  Surgeon: Nano Izquierdo MD;  Location: [facility] PAIN [facility]GT;  Service: Pain Management;  Laterality: Bilateral;    INJECTION OF ANESTHETIC AGENT INTO SACROILIAC JOINT Right 12/17/2021    Procedure: Right SIJ Injection;  Surgeon: Nano Izquierdo MD;  Location: [facility] PAIN [facility]GT;  Service: Pain Management;  Laterality: Right;    LEFT HEART CATHETERIZATION Left 3/29/2022    Procedure: CATHETERIZATION, HEART, LEFT;  Surgeon: Lion Awad MD;  Location: [facility] CATH LAB;  Service: Cardiology;  Laterality: Left;    OOPHORECTOMY      RIB FRACTURE SURGERY         Review of patient's allergies indicates:   Allergen Reactions    Amlodipine Swelling    Statins-hmg-coa  reductase inhibitors Other (See Comments)     Myalgias to lipitor and simvastatin       No current facility-administered medications on file prior to encounter.     Current Outpatient Medications on File Prior to Encounter   Medication Sig    albuterol (PROVENTIL/VENTOLIN HFA) 90 mcg/actuation inhaler Inhale 2 puffs into the lungs every 6 (six) hours as needed for Wheezing or Shortness of Breath.    amLODIPine (NORVASC) 5 MG tablet Take 5 mg by mouth once daily.    aspirin 81 MG Chew Take 1 tablet (81 mg total) by mouth once daily. (Patient taking differently: Take 81 mg by mouth once.)    buPROPion (WELLBUTRIN XL) 150 MG TB24 tablet Take 1 tablet (150 mg total) by mouth once daily.    clopidogreL (PLAVIX) 75 mg tablet Take 1 tablet (75 mg total) by mouth once daily.    cyanocobalamin (VITAMIN B-12) 1000 MCG tablet Take 100 mcg by mouth once daily.    evolocumab (REPATHA SURECLICK) 140 mg/mL PnIj Inject 1 mL (140 mg total) into the skin every 14 (fourteen) days.    fexofenadine HCl (ALLEGRA ORAL) Take 1 tablet by mouth once daily.    fluocinolone acetonide oiL (DERMOTIC OIL) 0.01 % Drop Place 3 drops in ear(s) 2 (two) times daily.    fluticasone propionate (FLONASE) 50 mcg/actuation nasal spray USE 2 SPRAYS IN EACH NOSTRIL ONE TIME DAILY    furosemide (LASIX) 20 MG tablet Take 1 tablet (20 mg total) by mouth once daily.    gabapentin (NEURONTIN) 300 MG capsule Take 3 capsules (900 mg total) by mouth every evening.    loratadine (CLARITIN) 10 mg tablet Take 10 mg by mouth daily as needed for Allergies.    multivitamin with minerals tablet Take 1 tablet by mouth once daily.    niacin, inositol niacinate, (NIACIN FLUSH FREE) 400 mg niacin (500 mg) Cap Take 400 mg by mouth every evening.    pantoprazole (PROTONIX) 20 MG tablet Take 1 tablet (20 mg total) by mouth once daily.    valsartan (DIOVAN) 160 MG tablet TAKE ONE (1) TABLET (160 MG TOTAL) BY MOUTH ONCE DAILY    valsartan (DIOVAN) 40 MG tablet  Take 1 tablet (40 mg total) by mouth once daily.    vitamin D (VITAMIN D3) 1000 units Tab Take 1,000 Units by mouth once daily.    [DISCONTINUED] metoprolol tartrate (LOPRESSOR) 50 MG tablet Take 1 tablet (50 mg total) by mouth Daily.    [DISCONTINUED] valsartan-hydrochlorothiazide (DIOVAN-HCT) 160-25 mg per tablet Take 1 tablet by mouth once daily.     Family History       Problem Relation (Age of Onset)    Heart attack Father (56)          Tobacco Use    Smoking status: Former     Packs/day: 0.25     Types: Cigarettes     Quit date: 3/29/2022     Years since quittin.5    Smokeless tobacco: Never   Substance and Sexual Activity    Alcohol use: Not Currently     Alcohol/week: 0.0 standard drinks    Drug use: No    Sexual activity: Never     Review of Systems   Constitutional:  Negative for chills, diaphoresis, fatigue and fever.   HENT:  Negative for congestion and sore throat.    Respiratory:  Negative for cough, shortness of breath and wheezing.    Cardiovascular:  Positive for chest pain. Negative for palpitations and leg swelling.   Gastrointestinal:  Negative for abdominal pain, constipation, diarrhea, nausea and vomiting.   Genitourinary:  Negative for dysuria and hematuria.   Musculoskeletal:  Positive for myalgias (left arm). Negative for arthralgias and back pain.   Skin:  Negative for pallor and rash.   Neurological:  Negative for dizziness, syncope, weakness, light-headedness, numbness and headaches.   Psychiatric/Behavioral:  The patient is not nervous/anxious.    All other systems reviewed and are negative.  Objective:     Vital Signs (Most Recent):  Temp: 97.9 °F (36.6 °C) (10/31/22 2158)  Pulse: 88 (10/31/22 2315)  Resp: 20 (10/31/22 2158)  BP: (!) 182/88 (10/31/22 2315)  SpO2: 96 % (10/31/22 2158)   Vital Signs (24h Range):  Temp:  [97.9 °F (36.6 °C)-98 °F (36.7 °C)] 97.9 °F (36.6 °C)  Pulse:  [72-88] 88  Resp:  [17-20] 20  SpO2:  [96 %-99 %] 96 %  BP: (162-182)/(80-88) 182/88     Weight:  72.1 kg (158 lb 15.9 oz)  Body mass index is 27.29 kg/m².    Physical Exam  Vitals and nursing note reviewed.   Constitutional:       General: She is awake. She is not in acute distress.     Appearance: Normal appearance. She is well-developed. She is not diaphoretic.   HENT:      Head: Normocephalic and atraumatic.   Eyes:      Conjunctiva/sclera: Conjunctivae normal.      Comments: PERRL; EOM intact.   Cardiovascular:      Rate and Rhythm: Normal rate and regular rhythm. No extrasystoles are present.     Heart sounds: S1 normal and S2 normal. No murmur heard.  Pulmonary:      Effort: Pulmonary effort is normal. No tachypnea.      Breath sounds: Normal breath sounds and air entry. No wheezing, rhonchi or rales.   Abdominal:      General: Bowel sounds are normal. There is no distension.      Palpations: Abdomen is soft.      Tenderness: There is no abdominal tenderness.   Musculoskeletal:         General: No tenderness or deformity. Normal range of motion.      Cervical back: Normal range of motion and neck supple.      Right lower leg: No edema.      Left lower leg: No edema.   Skin:     General: Skin is warm and dry.      Capillary Refill: Capillary refill takes less than 2 seconds.      Findings: No erythema or rash.   Neurological:      General: No focal deficit present.      Mental Status: She is alert and oriented to person, place, and time.   Psychiatric:         Mood and Affect: Mood and affect normal.         Behavior: Behavior normal. Behavior is cooperative.           Significant Labs:  Results for orders placed or performed during the hospital encounter of 10/31/22   Troponin I   Result Value Ref Range    Troponin I 0.083 (H) 0.000 - 0.026 ng/mL   CBC Auto Differential   Result Value Ref Range    WBC 5.02 3.90 - 12.70 K/uL    RBC 3.64 (L) 4.00 - 5.40 M/uL    Hemoglobin 11.9 (L) 12.0 - 16.0 g/dL    Hematocrit 35.6 (L) 37.0 - 48.5 %    MCV 98 82 - 98 fL    MCH 32.7 (H) 27.0 - 31.0 pg    MCHC 33.4 32.0 -  36.0 g/dL    RDW 12.2 11.5 - 14.5 %    Platelets 209 150 - 450 K/uL    MPV 9.6 9.2 - 12.9 fL    Immature Granulocytes 0.2 0.0 - 0.5 %    Gran # (ANC) 3.1 1.8 - 7.7 K/uL    Immature Grans (Abs) 0.01 0.00 - 0.04 K/uL    Lymph # 1.2 1.0 - 4.8 K/uL    Mono # 0.6 0.3 - 1.0 K/uL    Eos # 0.1 0.0 - 0.5 K/uL    Baso # 0.04 0.00 - 0.20 K/uL    nRBC 0 0 /100 WBC    Gran % 61.1 38.0 - 73.0 %    Lymph % 24.7 18.0 - 48.0 %    Mono % 11.4 4.0 - 15.0 %    Eosinophil % 1.8 0.0 - 8.0 %    Basophil % 0.8 0.0 - 1.9 %    Differential Method Automated    Comprehensive Metabolic Panel   Result Value Ref Range    Sodium 144 136 - 145 mmol/L    Potassium 4.0 3.5 - 5.1 mmol/L    Chloride 105 95 - 110 mmol/L    CO2 29 23 - 29 mmol/L    Glucose 98 70 - 110 mg/dL    BUN 25 (H) 8 - 23 mg/dL    Creatinine 1.6 (H) 0.5 - 1.4 mg/dL    Calcium 9.6 8.7 - 10.5 mg/dL    Total Protein 6.6 6.0 - 8.4 g/dL    Albumin 3.7 3.5 - 5.2 g/dL    Total Bilirubin 0.8 0.1 - 1.0 mg/dL    Alkaline Phosphatase 93 55 - 135 U/L    AST 20 10 - 40 U/L    ALT 15 10 - 44 U/L    Anion Gap 10 8 - 16 mmol/L    eGFR 35 (A) >60 mL/min/1.73 m^2   Magnesium   Result Value Ref Range    Magnesium 1.9 1.6 - 2.6 mg/dL      All pertinent labs within the past 24 hours have been reviewed.    Significant Imaging:    I have reviewed all pertinent imaging results/findings within the past 24 hours.            Assessment/Plan:     * NSTEMI (non-ST elevated myocardial infarction)  - Type I vs. Type II demand due to accelerated HTN. Troponin 0.05, repeat on admission resulted 0.08. EKG unrevealing. CP free on admission.    - Will start Lovenox 1 mg/kg Q 12.  - Continue home ASA, Plavix, and BB. No statin due to h/o intolerance. Add nitropaste Q 6.    - Trend serial cardiac enzymes and EKG.  - Recent FLP results reviewed. On Repatha at home.  - Previous TTE report from 3/2022 reviewed.   - Cardiology consult.        Accelerated hypertension  - BP improved with nitropaste in the ED. BP remains  above goal on admission.  - Continue home antihypertensives. Adding nitropaste Q 6. Clonidine PRN.   - Cardiology consult.       Coronary artery disease  - s/p PCI (ALEXX) of RCA x 2 in 3/2022 with residual 100% stenosis of LCx.  - Continue medical management as above.       CKD (chronic kidney disease) stage 3, GFR 30-59 ml/min  - Creatinine stable at baseline. Avoid nephrotoxic agents. Follow labs.         VTE Risk Mitigation (From admission, onward)         Ordered     enoxaparin injection 70 mg  Every 12 hours (non-standard times)         11/01/22 0001     IP VTE LOW RISK PATIENT  Once         10/31/22 2223     Place sequential compression device  Until discontinued         10/31/22 2223                   Genevieve Armendariz NP  Department of Hospital Medicine   [place] - Telemetry ([facility])

## 2022-11-01 NOTE — SUBJECTIVE & OBJECTIVE
Past Medical History:   Diagnosis Date    Chest pain 5/14/2015    Colon polyp     Coronary artery disease     pt states MI June 2015    Hypertension     Myocardial infarction     PAD (peripheral artery disease)     Rash 4/20/2022    Tobacco use        Past Surgical History:   Procedure Laterality Date    CHOLECYSTECTOMY  09/03/2015    COLONOSCOPY N/A 10/11/2016    Procedure: COLONOSCOPY;  Surgeon: Haseeb Spears MD;  Location: [facility];  Service: Endoscopy;  Laterality: N/A;    COLONOSCOPY N/A 12/9/2021    Procedure: COLONOSCOPY;  Surgeon: Pat Rios MD;  Location: [facility];  Service: Endoscopy;  Laterality: N/A;    ESOPHAGOGASTRODUODENOSCOPY N/A 12/9/2021    Procedure: EGD (ESOPHAGOGASTRODUODENOSCOPY);  Surgeon: Pat Rios MD;  Location: [facility];  Service: Endoscopy;  Laterality: N/A;    high blood      HYSTERECTOMY      INJECTION OF ANESTHETIC AGENT AROUND MEDIAL BRANCH NERVES INNERVATING CERVICAL FACET JOINT Bilateral 10/15/2021    Procedure: Bilateral C5-7 MBB with RN IV sedation PATIENT WOULD LIKE AFTERNOON ARRIVAL, IF POSSIBLE;  Surgeon: Nano Izquierdo MD;  Location: [facility]GT;  Service: Pain Management;  Laterality: Bilateral;    INJECTION OF ANESTHETIC AGENT AROUND MEDIAL BRANCH NERVES INNERVATING CERVICAL FACET JOINT Bilateral 3/21/2022    Procedure: Bilateral C4-6 MBB with RN IV sedation;  Surgeon: Nano Izquierdo MD;  Location: [facility];  Service: Pain Management;  Laterality: Bilateral;    INJECTION OF ANESTHETIC AGENT INTO SACROILIAC JOINT Right 12/17/2021    Procedure: Right SIJ Injection;  Surgeon: Nano Izquierdo MD;  Location: [facility];  Service: Pain Management;  Laterality: Right;    LEFT HEART CATHETERIZATION Left 3/29/2022    Procedure: CATHETERIZATION, HEART, LEFT;  Surgeon: Lion Awad MD;  Location: [facility] CATH LAB;  Service: Cardiology;  Laterality: Left;    OOPHORECTOMY      RIB FRACTURE SURGERY         Review of patient's allergies indicates:    Allergen Reactions    Amlodipine Swelling    Statins-hmg-coa reductase inhibitors Other (See Comments)     Myalgias to lipitor and simvastatin       No current facility-administered medications on file prior to encounter.     Current Outpatient Medications on File Prior to Encounter   Medication Sig    albuterol (PROVENTIL/VENTOLIN HFA) 90 mcg/actuation inhaler Inhale 2 puffs into the lungs every 6 (six) hours as needed for Wheezing or Shortness of Breath.    amLODIPine (NORVASC) 5 MG tablet Take 5 mg by mouth once daily.    aspirin 81 MG Chew Take 1 tablet (81 mg total) by mouth once daily. (Patient taking differently: Take 81 mg by mouth once.)    buPROPion (WELLBUTRIN XL) 150 MG TB24 tablet Take 1 tablet (150 mg total) by mouth once daily.    clopidogreL (PLAVIX) 75 mg tablet Take 1 tablet (75 mg total) by mouth once daily.    cyanocobalamin (VITAMIN B-12) 1000 MCG tablet Take 100 mcg by mouth once daily.    evolocumab (REPATHA SURECLICK) 140 mg/mL PnIj Inject 1 mL (140 mg total) into the skin every 14 (fourteen) days.    fexofenadine HCl (ALLEGRA ORAL) Take 1 tablet by mouth once daily.    fluocinolone acetonide oiL (DERMOTIC OIL) 0.01 % Drop Place 3 drops in ear(s) 2 (two) times daily.    fluticasone propionate (FLONASE) 50 mcg/actuation nasal spray USE 2 SPRAYS IN EACH NOSTRIL ONE TIME DAILY    furosemide (LASIX) 20 MG tablet Take 1 tablet (20 mg total) by mouth once daily.    gabapentin (NEURONTIN) 300 MG capsule Take 3 capsules (900 mg total) by mouth every evening.    loratadine (CLARITIN) 10 mg tablet Take 10 mg by mouth daily as needed for Allergies.    multivitamin with minerals tablet Take 1 tablet by mouth once daily.    niacin, inositol niacinate, (NIACIN FLUSH FREE) 400 mg niacin (500 mg) Cap Take 400 mg by mouth every evening.    pantoprazole (PROTONIX) 20 MG tablet Take 1 tablet (20 mg total) by mouth once daily.    valsartan (DIOVAN) 160 MG tablet TAKE ONE (1) TABLET (160 MG TOTAL) BY MOUTH ONCE  DAILY    valsartan (DIOVAN) 40 MG tablet Take 1 tablet (40 mg total) by mouth once daily.    vitamin D (VITAMIN D3) 1000 units Tab Take 1,000 Units by mouth once daily.    [DISCONTINUED] metoprolol tartrate (LOPRESSOR) 50 MG tablet Take 1 tablet (50 mg total) by mouth Daily.    [DISCONTINUED] valsartan-hydrochlorothiazide (DIOVAN-HCT) 160-25 mg per tablet Take 1 tablet by mouth once daily.     Family History       Problem Relation (Age of Onset)    Heart attack Father (56)          Tobacco Use    Smoking status: Former     Packs/day: 0.25     Types: Cigarettes     Quit date: 3/29/2022     Years since quittin.5    Smokeless tobacco: Never   Substance and Sexual Activity    Alcohol use: Not Currently     Alcohol/week: 0.0 standard drinks    Drug use: No    Sexual activity: Never     Review of Systems   Constitutional:  Negative for chills, diaphoresis, fatigue and fever.   HENT:  Negative for congestion and sore throat.    Respiratory:  Negative for cough, shortness of breath and wheezing.    Cardiovascular:  Positive for chest pain. Negative for palpitations and leg swelling.   Gastrointestinal:  Negative for abdominal pain, constipation, diarrhea, nausea and vomiting.   Genitourinary:  Negative for dysuria and hematuria.   Musculoskeletal:  Positive for myalgias (left arm). Negative for arthralgias and back pain.   Skin:  Negative for pallor and rash.   Neurological:  Negative for dizziness, syncope, weakness, light-headedness, numbness and headaches.   Psychiatric/Behavioral:  The patient is not nervous/anxious.    All other systems reviewed and are negative.  Objective:     Vital Signs (Most Recent):  Temp: 97.9 °F (36.6 °C) (10/31/22 2158)  Pulse: 88 (10/31/22 2315)  Resp: 20 (10/31/22 2158)  BP: (!) 182/88 (10/31/22 2315)  SpO2: 96 % (10/31/22 2158)   Vital Signs (24h Range):  Temp:  [97.9 °F (36.6 °C)-98 °F (36.7 °C)] 97.9 °F (36.6 °C)  Pulse:  [72-88] 88  Resp:  [17-20] 20  SpO2:  [96 %-99 %] 96 %  BP:  (162-182)/(80-88) 182/88     Weight: 72.1 kg (158 lb 15.9 oz)  Body mass index is 27.29 kg/m².    Physical Exam  Vitals and nursing note reviewed.   Constitutional:       General: She is awake. She is not in acute distress.     Appearance: Normal appearance. She is well-developed. She is not diaphoretic.   HENT:      Head: Normocephalic and atraumatic.   Eyes:      Conjunctiva/sclera: Conjunctivae normal.      Comments: PERRL; EOM intact.   Cardiovascular:      Rate and Rhythm: Normal rate and regular rhythm. No extrasystoles are present.     Heart sounds: S1 normal and S2 normal. No murmur heard.  Pulmonary:      Effort: Pulmonary effort is normal. No tachypnea.      Breath sounds: Normal breath sounds and air entry. No wheezing, rhonchi or rales.   Abdominal:      General: Bowel sounds are normal. There is no distension.      Palpations: Abdomen is soft.      Tenderness: There is no abdominal tenderness.   Musculoskeletal:         General: No tenderness or deformity. Normal range of motion.      Cervical back: Normal range of motion and neck supple.      Right lower leg: No edema.      Left lower leg: No edema.   Skin:     General: Skin is warm and dry.      Capillary Refill: Capillary refill takes less than 2 seconds.      Findings: No erythema or rash.   Neurological:      General: No focal deficit present.      Mental Status: She is alert and oriented to person, place, and time.   Psychiatric:         Mood and Affect: Mood and affect normal.         Behavior: Behavior normal. Behavior is cooperative.           Significant Labs:  Results for orders placed or performed during the hospital encounter of 10/31/22   Troponin I   Result Value Ref Range    Troponin I 0.083 (H) 0.000 - 0.026 ng/mL   CBC Auto Differential   Result Value Ref Range    WBC 5.02 3.90 - 12.70 K/uL    RBC 3.64 (L) 4.00 - 5.40 M/uL    Hemoglobin 11.9 (L) 12.0 - 16.0 g/dL    Hematocrit 35.6 (L) 37.0 - 48.5 %    MCV 98 82 - 98 fL    MCH 32.7 (H)  27.0 - 31.0 pg    MCHC 33.4 32.0 - 36.0 g/dL    RDW 12.2 11.5 - 14.5 %    Platelets 209 150 - 450 K/uL    MPV 9.6 9.2 - 12.9 fL    Immature Granulocytes 0.2 0.0 - 0.5 %    Gran # (ANC) 3.1 1.8 - 7.7 K/uL    Immature Grans (Abs) 0.01 0.00 - 0.04 K/uL    Lymph # 1.2 1.0 - 4.8 K/uL    Mono # 0.6 0.3 - 1.0 K/uL    Eos # 0.1 0.0 - 0.5 K/uL    Baso # 0.04 0.00 - 0.20 K/uL    nRBC 0 0 /100 WBC    Gran % 61.1 38.0 - 73.0 %    Lymph % 24.7 18.0 - 48.0 %    Mono % 11.4 4.0 - 15.0 %    Eosinophil % 1.8 0.0 - 8.0 %    Basophil % 0.8 0.0 - 1.9 %    Differential Method Automated    Comprehensive Metabolic Panel   Result Value Ref Range    Sodium 144 136 - 145 mmol/L    Potassium 4.0 3.5 - 5.1 mmol/L    Chloride 105 95 - 110 mmol/L    CO2 29 23 - 29 mmol/L    Glucose 98 70 - 110 mg/dL    BUN 25 (H) 8 - 23 mg/dL    Creatinine 1.6 (H) 0.5 - 1.4 mg/dL    Calcium 9.6 8.7 - 10.5 mg/dL    Total Protein 6.6 6.0 - 8.4 g/dL    Albumin 3.7 3.5 - 5.2 g/dL    Total Bilirubin 0.8 0.1 - 1.0 mg/dL    Alkaline Phosphatase 93 55 - 135 U/L    AST 20 10 - 40 U/L    ALT 15 10 - 44 U/L    Anion Gap 10 8 - 16 mmol/L    eGFR 35 (A) >60 mL/min/1.73 m^2   Magnesium   Result Value Ref Range    Magnesium 1.9 1.6 - 2.6 mg/dL      All pertinent labs within the past 24 hours have been reviewed.    Significant Imaging:    I have reviewed all pertinent imaging results/findings within the past 24 hours.

## 2022-11-02 NOTE — TELEPHONE ENCOUNTER
Was discharged from hospital today after being seen for elevated blood pressure. Was started on a new blood pressure medication and now blood pressure is low. Last blood pressure was 87/45 about 20 minutes ago. Current blood pressure at this time was not reported. Caller reported that the patient is weak. Advised to call #[phone]. Caller hung up before triage nurse completed all instructions.  Reason for Disposition   [1] Systolic BP < 90 AND [2] dizzy, lightheaded, or weak    Additional Information   Negative: Started suddenly after an allergic medicine, an allergic food, or bee sting   Negative: Shock suspected (e.g., cold/pale/clammy skin, too weak to stand, low BP, rapid pulse)   Negative: Difficult to awaken or acting confused (e.g., disoriented, slurred speech)   Negative: Fainted    Protocols used: Blood Pressure - Low-A-

## 2022-11-03 ENCOUNTER — PATIENT OUTREACH (OUTPATIENT)
Dept: ADMINISTRATIVE | Facility: HOSPITAL | Age: 67
End: 2022-11-03
Payer: MEDICARE

## 2022-11-03 NOTE — PROGRESS NOTES
HOSPITAL FOLLOW UP: Contacted patient, confirmed hospital follow up appointment with Dr Clayton Frankel on November 7 2022 @ [id number].

## 2022-11-07 ENCOUNTER — OFFICE VISIT (OUTPATIENT)
Dept: CARDIOLOGY | Facility: CLINIC | Age: 67
End: 2022-11-07
Payer: MEDICARE

## 2022-11-07 ENCOUNTER — OFFICE VISIT (OUTPATIENT)
Dept: INTERNAL MEDICINE | Facility: CLINIC | Age: 67
End: 2022-11-07
Payer: MEDICARE

## 2022-11-07 VITALS
SYSTOLIC BLOOD PRESSURE: 120 MMHG | HEIGHT: 64 IN | DIASTOLIC BLOOD PRESSURE: 70 MMHG | TEMPERATURE: 98 F | WEIGHT: 158.94 LBS | BODY MASS INDEX: 27.13 KG/M2 | HEART RATE: 58 BPM

## 2022-11-07 VITALS
HEIGHT: 64 IN | OXYGEN SATURATION: 96 % | WEIGHT: 160.69 LBS | HEART RATE: 61 BPM | DIASTOLIC BLOOD PRESSURE: 72 MMHG | BODY MASS INDEX: 27.43 KG/M2 | SYSTOLIC BLOOD PRESSURE: 146 MMHG

## 2022-11-07 DIAGNOSIS — N18.4 CKD (CHRONIC KIDNEY DISEASE) STAGE 4, GFR 15-29 ML/MIN: ICD-10-CM

## 2022-11-07 DIAGNOSIS — E78.5 HYPERLIPIDEMIA, UNSPECIFIED HYPERLIPIDEMIA TYPE: Chronic | ICD-10-CM

## 2022-11-07 DIAGNOSIS — Z12.31 SCREENING MAMMOGRAM, ENCOUNTER FOR: ICD-10-CM

## 2022-11-07 DIAGNOSIS — Z78.9 STATIN INTOLERANCE: ICD-10-CM

## 2022-11-07 DIAGNOSIS — I10 ESSENTIAL HYPERTENSION: Primary | Chronic | ICD-10-CM

## 2022-11-07 DIAGNOSIS — I25.2 HISTORY OF NON-ST ELEVATION MYOCARDIAL INFARCTION (NSTEMI): ICD-10-CM

## 2022-11-07 DIAGNOSIS — I21.4 NSTEMI (NON-ST ELEVATED MYOCARDIAL INFARCTION): ICD-10-CM

## 2022-11-07 DIAGNOSIS — R79.89 ELEVATED TROPONIN: Primary | ICD-10-CM

## 2022-11-07 DIAGNOSIS — N18.30 STAGE 3 CHRONIC KIDNEY DISEASE, UNSPECIFIED WHETHER STAGE 3A OR 3B CKD: ICD-10-CM

## 2022-11-07 DIAGNOSIS — Z72.0 TOBACCO ABUSE: ICD-10-CM

## 2022-11-07 DIAGNOSIS — R07.89 OTHER CHEST PAIN: ICD-10-CM

## 2022-11-07 PROCEDURE — 3074F PR MOST RECENT SYSTOLIC BLOOD PRESSURE < 130 MM HG: ICD-10-PCS | Mod: CPTII,S$GLB,, | Performed by: FAMILY MEDICINE

## 2022-11-07 PROCEDURE — 3078F PR MOST RECENT DIASTOLIC BLOOD PRESSURE < 80 MM HG: ICD-10-PCS | Mod: CPTII,S$GLB,, | Performed by: FAMILY MEDICINE

## 2022-11-07 PROCEDURE — 1101F PR PT FALLS ASSESS DOC 0-1 FALLS W/OUT INJ PAST YR: ICD-10-PCS | Mod: CPTII,S$GLB,, | Performed by: FAMILY MEDICINE

## 2022-11-07 PROCEDURE — 1101F PR PT FALLS ASSESS DOC 0-1 FALLS W/OUT INJ PAST YR: ICD-10-PCS | Mod: CPTII,S$GLB,, | Performed by: INTERNAL MEDICINE

## 2022-11-07 PROCEDURE — 3078F DIAST BP <80 MM HG: CPT | Mod: CPTII,S$GLB,, | Performed by: FAMILY MEDICINE

## 2022-11-07 PROCEDURE — 1159F PR MEDICATION LIST DOCUMENTED IN MEDICAL RECORD: ICD-10-PCS | Mod: CPTII,S$GLB,, | Performed by: INTERNAL MEDICINE

## 2022-11-07 PROCEDURE — 99214 PR OFFICE/OUTPT VISIT, EST, LEVL IV, 30-39 MIN: ICD-10-PCS | Mod: S$GLB,,, | Performed by: INTERNAL MEDICINE

## 2022-11-07 PROCEDURE — 3044F PR MOST RECENT HEMOGLOBIN A1C LEVEL <7.0%: ICD-10-PCS | Mod: CPTII,S$GLB,, | Performed by: FAMILY MEDICINE

## 2022-11-07 PROCEDURE — 1126F PR PAIN SEVERITY QUANTIFIED, NO PAIN PRESENT: ICD-10-PCS | Mod: CPTII,S$GLB,, | Performed by: INTERNAL MEDICINE

## 2022-11-07 PROCEDURE — 1159F MED LIST DOCD IN RCRD: CPT | Mod: CPTII,S$GLB,, | Performed by: FAMILY MEDICINE

## 2022-11-07 PROCEDURE — 1126F AMNT PAIN NOTED NONE PRSNT: CPT | Mod: CPTII,S$GLB,, | Performed by: INTERNAL MEDICINE

## 2022-11-07 PROCEDURE — 3077F SYST BP >= 140 MM HG: CPT | Mod: CPTII,S$GLB,, | Performed by: INTERNAL MEDICINE

## 2022-11-07 PROCEDURE — 3044F HG A1C LEVEL LT 7.0%: CPT | Mod: CPTII,S$GLB,, | Performed by: INTERNAL MEDICINE

## 2022-11-07 PROCEDURE — 4010F PR ACE/ARB THEARPY RXD/TAKEN: ICD-10-PCS | Mod: CPTII,S$GLB,, | Performed by: INTERNAL MEDICINE

## 2022-11-07 PROCEDURE — 3008F BODY MASS INDEX DOCD: CPT | Mod: CPTII,S$GLB,, | Performed by: INTERNAL MEDICINE

## 2022-11-07 PROCEDURE — 1160F RVW MEDS BY RX/DR IN RCRD: CPT | Mod: CPTII,S$GLB,, | Performed by: INTERNAL MEDICINE

## 2022-11-07 PROCEDURE — 3288F PR FALLS RISK ASSESSMENT DOCUMENTED: ICD-10-PCS | Mod: CPTII,S$GLB,, | Performed by: FAMILY MEDICINE

## 2022-11-07 PROCEDURE — 1160F PR REVIEW ALL MEDS BY PRESCRIBER/CLIN PHARMACIST DOCUMENTED: ICD-10-PCS | Mod: CPTII,S$GLB,, | Performed by: INTERNAL MEDICINE

## 2022-11-07 PROCEDURE — 3288F FALL RISK ASSESSMENT DOCD: CPT | Mod: CPTII,S$GLB,, | Performed by: FAMILY MEDICINE

## 2022-11-07 PROCEDURE — 1125F PR PAIN SEVERITY QUANTIFIED, PAIN PRESENT: ICD-10-PCS | Mod: CPTII,S$GLB,, | Performed by: FAMILY MEDICINE

## 2022-11-07 PROCEDURE — 3078F DIAST BP <80 MM HG: CPT | Mod: CPTII,S$GLB,, | Performed by: INTERNAL MEDICINE

## 2022-11-07 PROCEDURE — 1160F PR REVIEW ALL MEDS BY PRESCRIBER/CLIN PHARMACIST DOCUMENTED: ICD-10-PCS | Mod: CPTII,S$GLB,, | Performed by: FAMILY MEDICINE

## 2022-11-07 PROCEDURE — 99499 UNLISTED E&M SERVICE: CPT | Mod: S$GLB,,, | Performed by: INTERNAL MEDICINE

## 2022-11-07 PROCEDURE — 3044F HG A1C LEVEL LT 7.0%: CPT | Mod: CPTII,S$GLB,, | Performed by: FAMILY MEDICINE

## 2022-11-07 PROCEDURE — 3074F SYST BP LT 130 MM HG: CPT | Mod: CPTII,S$GLB,, | Performed by: FAMILY MEDICINE

## 2022-11-07 PROCEDURE — 1125F AMNT PAIN NOTED PAIN PRSNT: CPT | Mod: CPTII,S$GLB,, | Performed by: FAMILY MEDICINE

## 2022-11-07 PROCEDURE — 3008F PR BODY MASS INDEX (BMI) DOCUMENTED: ICD-10-PCS | Mod: CPTII,S$GLB,, | Performed by: INTERNAL MEDICINE

## 2022-11-07 PROCEDURE — 1160F RVW MEDS BY RX/DR IN RCRD: CPT | Mod: CPTII,S$GLB,, | Performed by: FAMILY MEDICINE

## 2022-11-07 PROCEDURE — 3078F PR MOST RECENT DIASTOLIC BLOOD PRESSURE < 80 MM HG: ICD-10-PCS | Mod: CPTII,S$GLB,, | Performed by: INTERNAL MEDICINE

## 2022-11-07 PROCEDURE — 3008F PR BODY MASS INDEX (BMI) DOCUMENTED: ICD-10-PCS | Mod: CPTII,S$GLB,, | Performed by: FAMILY MEDICINE

## 2022-11-07 PROCEDURE — 99214 OFFICE O/P EST MOD 30 MIN: CPT | Mod: S$GLB,,, | Performed by: INTERNAL MEDICINE

## 2022-11-07 PROCEDURE — 99999 PR PBB SHADOW E&M-EST. PATIENT-LVL V: ICD-10-PCS | Mod: PBBFAC,,, | Performed by: FAMILY MEDICINE

## 2022-11-07 PROCEDURE — 1101F PT FALLS ASSESS-DOCD LE1/YR: CPT | Mod: CPTII,S$GLB,, | Performed by: FAMILY MEDICINE

## 2022-11-07 PROCEDURE — 4010F PR ACE/ARB THEARPY RXD/TAKEN: ICD-10-PCS | Mod: CPTII,S$GLB,, | Performed by: FAMILY MEDICINE

## 2022-11-07 PROCEDURE — 3044F PR MOST RECENT HEMOGLOBIN A1C LEVEL <7.0%: ICD-10-PCS | Mod: CPTII,S$GLB,, | Performed by: INTERNAL MEDICINE

## 2022-11-07 PROCEDURE — 99999 PR PBB SHADOW E&M-EST. PATIENT-LVL IV: ICD-10-PCS | Mod: PBBFAC,,, | Performed by: INTERNAL MEDICINE

## 2022-11-07 PROCEDURE — 1159F PR MEDICATION LIST DOCUMENTED IN MEDICAL RECORD: ICD-10-PCS | Mod: CPTII,S$GLB,, | Performed by: FAMILY MEDICINE

## 2022-11-07 PROCEDURE — 99999 PR PBB SHADOW E&M-EST. PATIENT-LVL IV: CPT | Mod: PBBFAC,,, | Performed by: INTERNAL MEDICINE

## 2022-11-07 PROCEDURE — 3288F FALL RISK ASSESSMENT DOCD: CPT | Mod: CPTII,S$GLB,, | Performed by: INTERNAL MEDICINE

## 2022-11-07 PROCEDURE — 99499 UNLISTED E&M SERVICE: CPT | Mod: S$GLB,,, | Performed by: FAMILY MEDICINE

## 2022-11-07 PROCEDURE — 4010F ACE/ARB THERAPY RXD/TAKEN: CPT | Mod: CPTII,S$GLB,, | Performed by: INTERNAL MEDICINE

## 2022-11-07 PROCEDURE — 99214 PR OFFICE/OUTPT VISIT, EST, LEVL IV, 30-39 MIN: ICD-10-PCS | Mod: S$GLB,,, | Performed by: FAMILY MEDICINE

## 2022-11-07 PROCEDURE — 1159F MED LIST DOCD IN RCRD: CPT | Mod: CPTII,S$GLB,, | Performed by: INTERNAL MEDICINE

## 2022-11-07 PROCEDURE — 3077F PR MOST RECENT SYSTOLIC BLOOD PRESSURE >= 140 MM HG: ICD-10-PCS | Mod: CPTII,S$GLB,, | Performed by: INTERNAL MEDICINE

## 2022-11-07 PROCEDURE — 3008F BODY MASS INDEX DOCD: CPT | Mod: CPTII,S$GLB,, | Performed by: FAMILY MEDICINE

## 2022-11-07 PROCEDURE — 1101F PT FALLS ASSESS-DOCD LE1/YR: CPT | Mod: CPTII,S$GLB,, | Performed by: INTERNAL MEDICINE

## 2022-11-07 PROCEDURE — 99214 OFFICE O/P EST MOD 30 MIN: CPT | Mod: S$GLB,,, | Performed by: FAMILY MEDICINE

## 2022-11-07 PROCEDURE — 99999 PR PBB SHADOW E&M-EST. PATIENT-LVL V: CPT | Mod: PBBFAC,,, | Performed by: FAMILY MEDICINE

## 2022-11-07 PROCEDURE — 3288F PR FALLS RISK ASSESSMENT DOCUMENTED: ICD-10-PCS | Mod: CPTII,S$GLB,, | Performed by: INTERNAL MEDICINE

## 2022-11-07 PROCEDURE — 4010F ACE/ARB THERAPY RXD/TAKEN: CPT | Mod: CPTII,S$GLB,, | Performed by: FAMILY MEDICINE

## 2022-11-07 RX ORDER — TIZANIDINE 4 MG/1
TABLET ORAL
COMMUNITY
Start: 2022-11-02

## 2022-11-07 RX ORDER — GABAPENTIN 300 MG/1
900 CAPSULE ORAL NIGHTLY
Qty: 30 CAPSULE | Refills: 1 | Status: CANCELLED | OUTPATIENT
Start: 2022-11-07

## 2022-11-07 RX ORDER — ISOSORBIDE MONONITRATE 60 MG/1
60 TABLET, EXTENDED RELEASE ORAL NIGHTLY
Qty: 90 TABLET | Refills: 1 | Status: SHIPPED | OUTPATIENT
Start: 2022-11-07 | End: 2022-11-07

## 2022-11-07 RX ORDER — ISOSORBIDE MONONITRATE 60 MG/1
60 TABLET, EXTENDED RELEASE ORAL NIGHTLY
Qty: 90 TABLET | Refills: 1 | Status: SHIPPED | OUTPATIENT
Start: 2022-11-07 | End: 2023-07-13 | Stop reason: SDUPTHER

## 2022-11-07 NOTE — TELEPHONE ENCOUNTER
Good Afternoon,     Pt is requesting for a refill of: gabapentin (NEURONTIN) 300 MG capsule  Last filed:11/01/22  Last encounter: 4/28/22   Last proc: 3/21/22   Up coming apt: 1/19/22   Pharmacy:[facility] [place] - [address]  Is this something you can do?      Juan Soares   Medical Assistant

## 2022-11-07 NOTE — TELEPHONE ENCOUNTER
----- Message from Stewart Roldan sent at 11/7/2022 11:32 AM CST -----  Regarding: Refill  Please follow up with patient, she is requesting refill on Gabapentin.

## 2022-11-07 NOTE — PROGRESS NOTES
Subjective:   Patient ID:  Shirlene Olvera is a 67 y.o. female who presents for cardiac consult of No chief complaint on file.      HPI  The patient came in today for cardiac consult of No chief complaint on file.    Shirlene Olvera is a 67 y.o. female pt with CAD s/p PCI (ALEXX) of RCA x 2 in 3/2022 with residual 100% stenosis of LCx; HTN, HLD, PAD, CKD3, ROMÁN, OA, DDD presents for CV follow up.     11/1/22 HPI:   Ms. Olvera is a 68y/o  female with PMHx of arthritis, hypertension, hyperlipidemia, coronary artery disease with PCI in March of this year, peripheral artery disease, chronic kidney disease, and dysphagia presented to [facility] on October 31 with elevated blood pressure over the past 2 days along with left arm and chest discomfort that began on the day of presentation.  She took sublingual nitroglycerin with improvement in her blood pressure.  EMS noted systolic blood pressure 230s.  She received sublingual nitroglycerin and subsequently 1 inch of nitroglycerin paste. Cardiology consulted for NSTEMI, HTN, CAD. Pt seen and examined today no CV complaints at this time. She states she has back and neck pain with associated leg pain worse with mvmt, she sees pain management. Pt reports her pain is different from when she got her stents in March of this year. Pt reports she was a smoker for many years, quit in March 22'. Labs reviewed,  Crt 1.6, troponin trending down, d-dimer 1.2. EKG NSR, CTA chest negative for PE. 3/2022 PCI to RCA, Echo pending       11/7/22  Hosp Follow up. ECHO 10/2022 with normal bi V function. BP today 140s/70s - improved from hosp. HR 60. BMI 27 - 160 lbs.     Recent Readings 11/7/2022 11/7/2022 11/6/2022 11/5/2022 11/4/2022   SBP (mmHg) 160 162 150 139 141   DBP (mmHg) 89 79 78 71 91   Pulse 59 60 50 52 54       Patient feels no leg swelling, no PND, no palpitation, no dizziness, no syncope, no CNS symptoms.    Patient has fairly good  exercise tolerance.    Patient is compliant with medications.    Results for orders placed during the hospital encounter of 10/31/22    Echo    Interpretation Summary  · The left ventricle is normal in size with concentric hypertrophy and normal systolic function.  · The estimated ejection fraction is 55%.  · Normal left ventricular diastolic function.  · Normal right ventricular size with normal right ventricular systolic function.  · Mild tricuspid regurgitation.  · Normal central venous pressure (3 mmHg).  · The estimated PA systolic pressure is 20 mmHg.    Results for orders placed during the hospital encounter of 03/28/22    Cardiac catheterization    Conclusion  · The pre-procedure left ventricular end diastolic pressure was 16.  · The post-procedure left ventricular end diastolic pressure was 16.  · The ejection fraction was calculated to be 55%.  · A STENT RESOLUTE KIM [id number] stent was successfully placed.  · The Prox RCA lesion was 99% stenosed with 0% stenosis post-intervention.  · The Mid RCA lesion was 99% stenosed with 0% stenosis post-intervention.  · The Prox Cx lesion was 100% stenosed with 100% stenosis post-intervention.  · The estimated blood loss was none.  · There was two vessel coronary artery disease.  · The PCI was successful.    The procedure log was documented by Documenter: Getachew Zurita and verified by Sanchez Awad MD.    Date: 3/29/2022  Time: 7:01 PM        Past Medical History:   Diagnosis Date    Chest pain 5/14/2015    Colon polyp     Coronary artery disease     pt states MI June 2015    Hypertension     Myocardial infarction     PAD (peripheral artery disease)     Rash 4/20/2022    Tobacco use        Past Surgical History:   Procedure Laterality Date    CHOLECYSTECTOMY  09/03/2015    COLONOSCOPY N/A 10/11/2016    Procedure: COLONOSCOPY;  Surgeon: Haseeb Spears MD;  Location: [facility];  Service: Endoscopy;  Laterality: N/A;    COLONOSCOPY N/A 12/9/2021    Procedure:  COLONOSCOPY;  Surgeon: Pat Rios MD;  Location: [facility] ENDO;  Service: Endoscopy;  Laterality: N/A;    ESOPHAGOGASTRODUODENOSCOPY N/A 2021    Procedure: EGD (ESOPHAGOGASTRODUODENOSCOPY);  Surgeon: Pat Rios MD;  Location: [facility] ENDO;  Service: Endoscopy;  Laterality: N/A;    high blood      HYSTERECTOMY      INJECTION OF ANESTHETIC AGENT AROUND MEDIAL BRANCH NERVES INNERVATING CERVICAL FACET JOINT Bilateral 10/15/2021    Procedure: Bilateral C5-7 MBB with RN IV sedation PATIENT WOULD LIKE AFTERNOON ARRIVAL, IF POSSIBLE;  Surgeon: Nano Izquierdo MD;  Location: [facility]GT;  Service: Pain Management;  Laterality: Bilateral;    INJECTION OF ANESTHETIC AGENT AROUND MEDIAL BRANCH NERVES INNERVATING CERVICAL FACET JOINT Bilateral 3/21/2022    Procedure: Bilateral C4-6 MBB with RN IV sedation;  Surgeon: Nano Izquierdo MD;  Location: [facility];  Service: Pain Management;  Laterality: Bilateral;    INJECTION OF ANESTHETIC AGENT INTO SACROILIAC JOINT Right 2021    Procedure: Right SIJ Injection;  Surgeon: Nano Izquierdo MD;  Location: [facility]GT;  Service: Pain Management;  Laterality: Right;    LEFT HEART CATHETERIZATION Left 3/29/2022    Procedure: CATHETERIZATION, HEART, LEFT;  Surgeon: Lion Awad MD;  Location: [facility] CATH LAB;  Service: Cardiology;  Laterality: Left;    OOPHORECTOMY      RIB FRACTURE SURGERY         Social History     Tobacco Use    Smoking status: Former     Packs/day: 0.25     Types: Cigarettes     Quit date: 3/29/2022     Years since quittin.6    Smokeless tobacco: Never   Substance Use Topics    Alcohol use: Not Currently     Alcohol/week: 0.0 standard drinks    Drug use: No       Family History   Problem Relation Age of Onset    Heart attack Father 56        MI    Asthma Neg Hx     Thyroid disease Neg Hx     Migraines Neg Hx     Cancer Neg Hx        Patient's Medications   New Prescriptions    No medications on file   Previous Medications    ASPIRIN 81 MG  CHEW    Take 1 tablet (81 mg total) by mouth once daily.    BUPROPION (WELLBUTRIN XL) 150 MG TB24 TABLET    Take 1 tablet (150 mg total) by mouth once daily.    CLOPIDOGREL (PLAVIX) 75 MG TABLET    Take 1 tablet (75 mg total) by mouth once daily.    CYANOCOBALAMIN (VITAMIN B-12) 1000 MCG TABLET    Take 100 mcg by mouth once daily.    EVOLOCUMAB (REPATHA SURECLICK) 140 MG/ML PNIJ    Inject 1 mL (140 mg total) into the skin every 14 (fourteen) days.    FEXOFENADINE HCL (ALLEGRA ORAL)    Take 1 tablet by mouth once daily.    FLUOCINOLONE ACETONIDE OIL (DERMOTIC OIL) 0.01 % DROP    Place 3 drops in ear(s) 2 (two) times daily.    FLUTICASONE PROPIONATE (FLONASE) 50 MCG/ACTUATION NASAL SPRAY    USE 2 SPRAYS IN EACH NOSTRIL ONE TIME DAILY    FUROSEMIDE (LASIX) 20 MG TABLET    Take 1 tablet (20 mg total) by mouth once daily.    GABAPENTIN (NEURONTIN) 300 MG CAPSULE    Take 3 capsules (900 mg total) by mouth every evening.    LORATADINE (CLARITIN) 10 MG TABLET    Take 10 mg by mouth daily as needed for Allergies.    METOPROLOL TARTRATE (LOPRESSOR) 50 MG TABLET    Take 1 tablet (50 mg total) by mouth 2 (two) times daily.    MULTIVITAMIN WITH MINERALS TABLET    Take 1 tablet by mouth once daily.    NIACIN, INOSITOL NIACINATE, (NIACIN FLUSH FREE) 400 MG NIACIN (500 MG) CAP    Take 400 mg by mouth every evening.    NITROGLYCERIN (NITROSTAT) 0.4 MG SL TABLET    Place 1 tablet (0.4 mg total) under the tongue every 5 (five) minutes as needed for Chest pain.    PANTOPRAZOLE (PROTONIX) 20 MG TABLET    Take 1 tablet (20 mg total) by mouth once daily.    VALSARTAN (DIOVAN) 160 MG TABLET    TAKE ONE (1) TABLET (160 MG TOTAL) BY MOUTH ONCE DAILY    VITAMIN D (VITAMIN D3) 1000 UNITS TAB    Take 1,000 Units by mouth once daily.   Modified Medications    Modified Medication Previous Medication    ISOSORBIDE MONONITRATE (IMDUR) 60 MG 24 HR TABLET isosorbide mononitrate (IMDUR) 30 MG 24 hr tablet       Take 1 tablet (60 mg total) by mouth  "every evening.    Take 1 tablet (30 mg total) by mouth once daily.   Discontinued Medications    No medications on file       Review of Systems   Constitutional: Negative.    HENT: Negative.     Eyes: Negative.    Respiratory: Negative.     Cardiovascular: Negative.    Gastrointestinal: Negative.    Genitourinary: Negative.    Musculoskeletal: Negative.    Skin: Negative.    Neurological: Negative.    Endo/Heme/Allergies: Negative.    Psychiatric/Behavioral: Negative.     All 12 systems otherwise negative.    Wt Readings from Last 3 Encounters:   11/07/22 72.9 kg (160 lb 11.5 oz)   11/01/22 71.7 kg (158 lb)   10/18/22 73.5 kg (162 lb 0.6 oz)     Temp Readings from Last 3 Encounters:   11/02/22 98.6 °F (37 °C) (Oral)   11/01/22 98.1 °F (36.7 °C) (Oral)   10/31/22 98 °F (36.7 °C)     BP Readings from Last 3 Encounters:   11/07/22 (!) 146/72   11/02/22 115/60   11/01/22 130/65     Pulse Readings from Last 3 Encounters:   11/07/22 61   11/02/22 (!) 57   11/01/22 (!) 53       BP (!) 146/72 (BP Location: Left arm, Patient Position: Sitting, BP Method: Medium (Manual))   Pulse 61   Ht 5' 4" (1.626 m)   Wt 72.9 kg (160 lb 11.5 oz)   LMP  (LMP Unknown)   SpO2 96%   BMI 27.59 kg/m²     Objective:   Physical Exam  Vitals and nursing note reviewed.   Constitutional:       General: She is not in acute distress.     Appearance: She is well-developed. She is not diaphoretic.   HENT:      Head: Normocephalic and atraumatic.      Nose: Nose normal.   Eyes:      General: No scleral icterus.     Conjunctiva/sclera: Conjunctivae normal.   Neck:      Thyroid: No thyromegaly.      Vascular: No JVD.   Cardiovascular:      Rate and Rhythm: Normal rate and regular rhythm.      Heart sounds: S1 normal and S2 normal. No murmur heard.    No friction rub. No gallop. No S3 or S4 sounds.   Pulmonary:      Effort: Pulmonary effort is normal. No respiratory distress.      Breath sounds: Normal breath sounds. No stridor. No wheezing or rales. "   Chest:      Chest wall: No tenderness.   Abdominal:      General: Bowel sounds are normal. There is no distension.      Palpations: Abdomen is soft. There is no mass.      Tenderness: There is no abdominal tenderness. There is no rebound.   Genitourinary:     Comments: Deferred  Musculoskeletal:         General: No tenderness or deformity. Normal range of motion.      Cervical back: Normal range of motion and neck supple.   Lymphadenopathy:      Cervical: No cervical adenopathy.   Skin:     General: Skin is warm and dry.      Coloration: Skin is not pale.      Findings: No erythema or rash.   Neurological:      Mental Status: She is alert and oriented to person, place, and time.      Motor: No abnormal muscle tone.      Coordination: Coordination normal.   Psychiatric:         Behavior: Behavior normal.         Thought Content: Thought content normal.         Judgment: Judgment normal.       Lab Results   Component Value Date     11/01/2022    K 4.4 11/01/2022     11/01/2022    CO2 25 11/01/2022    BUN 28 (H) 11/01/2022    CREATININE 2.4 (H) 11/01/2022     (H) 11/01/2022    HGBA1C 5.4 08/18/2022    MG 1.9 10/31/2022    AST 21 11/01/2022    ALT 18 11/01/2022    ALBUMIN 3.9 11/01/2022    PROT 7.3 11/01/2022    BILITOT 0.6 11/01/2022    WBC 6.65 11/01/2022    HGB 11.7 (L) 11/01/2022    HCT 35.1 (L) 11/01/2022    MCV 99 (H) 11/01/2022     11/01/2022    INR 0.9 03/28/2022    TSH 1.848 08/18/2022    CHOL 168 08/18/2022    HDL 48 08/18/2022    LDLCALC 64.2 08/18/2022    TRIG 279 (H) 08/18/2022    BNP 69 11/01/2022     Assessment:      1. Elevated troponin    2. Other chest pain    3. NSTEMI (non-ST elevated myocardial infarction)    4. History of non-ST elevation myocardial infarction (NSTEMI)    5. Tobacco abuse    6. Statin intolerance    7. Stage 3 chronic kidney disease, unspecified whether stage 3a or 3b CKD        Plan:         NSTEMI (non-ST elevated myocardial infarction)  Troponin  trended down, elevation likely secondary to HTN     Accelerated hypertension  Cont OMT- BB, valsartan  Added Imdur at hospital - increase to 60mg QHS  Pain control     CKD (chronic kidney disease) stage 3, GFR 30-59 ml/min  Cont to monitor     Coronary artery disease with CP, h/o NSTEMI  PCI (ALEXX) of RCA x 2 in 3/2022 with residual 100% stenosis of LCx.  - Continue OMT - asa, plavix, BB     HLD with elevated Tgs  - cont Repatha; add fish oils     Tobacco abuse  - continue cessation    F/u with me or Dr. Byers     Thank you for allowing me to participate in this patient's care. Please do not hesitate to contact me with any questions or concerns. Consult note has been forwarded to the referral physician.

## 2022-11-07 NOTE — PROGRESS NOTES
Subjective:       Patient ID: Shirlene Olvera is a 67 y.o. female.    Chief Complaint: Hypertension    Hosp f/u for HTN.    Review of Systems   Respiratory:  Negative for shortness of breath.    Cardiovascular:  Negative for chest pain.   Gastrointestinal:  Negative for abdominal pain.     Objective:      Physical Exam  Vitals and nursing note reviewed.   Constitutional:       General: She is not in acute distress.     Appearance: Normal appearance. She is well-developed. She is not diaphoretic.   HENT:      Head: Normocephalic and atraumatic.   Eyes:      General: No scleral icterus.     Conjunctiva/sclera: Conjunctivae normal.   Cardiovascular:      Rate and Rhythm: Normal rate and regular rhythm.   Pulmonary:      Effort: Pulmonary effort is normal. No respiratory distress.      Breath sounds: Normal breath sounds. No wheezing.   Abdominal:      General: There is no distension.      Palpations: Abdomen is soft.      Tenderness: There is no abdominal tenderness. There is no guarding.   Musculoskeletal:      Comments: Swelling to Right ankle   Skin:     General: Skin is warm.      Coloration: Skin is not pale.      Findings: No erythema or rash.      Comments: Good turgor   Neurological:      Mental Status: She is alert.   Psychiatric:         Mood and Affect: Mood normal.         Thought Content: Thought content normal.       Assessment:       1. Essential hypertension    2. Screening mammogram, encounter for    3. CKD (chronic kidney disease) stage 4, GFR 15-29 ml/min    4. Hyperlipidemia, unspecified hyperlipidemia type          Plan:     Problem List Items Addressed This Visit          Cardiac/Vascular    Essential hypertension - Primary (Chronic)    Overview     Chronic, Stable, cont toprol, diovan-hctz         Hyperlipidemia (Chronic)    Overview     Cont asa, cont repatha            Renal/    CKD (chronic kidney disease) stage 4, GFR 15-29 ml/min    Overview     GFR of 21 on 11/2022         Relevant  Orders    Ambulatory referral/consult to Nephrology    Screening mammogram, encounter for    Relevant Orders    Mammo Digital Screening Bilat w/ Brant

## 2022-11-10 ENCOUNTER — PATIENT MESSAGE (OUTPATIENT)
Dept: CARDIOLOGY | Facility: CLINIC | Age: 67
End: 2022-11-10
Payer: MEDICARE

## 2022-11-10 ENCOUNTER — PATIENT MESSAGE (OUTPATIENT)
Dept: PAIN MEDICINE | Facility: CLINIC | Age: 67
End: 2022-11-10
Payer: MEDICARE

## 2022-11-10 RX ORDER — METOPROLOL TARTRATE 50 MG/1
50 TABLET ORAL 2 TIMES DAILY
Qty: 180 TABLET | Refills: 1 | Status: SHIPPED | OUTPATIENT
Start: 2022-11-10 | End: 2022-11-30 | Stop reason: SDUPTHER

## 2022-11-16 ENCOUNTER — SPECIALTY PHARMACY (OUTPATIENT)
Dept: PHARMACY | Facility: CLINIC | Age: 67
End: 2022-11-16
Payer: MEDICARE

## 2022-11-16 NOTE — TELEPHONE ENCOUNTER
----- Message from Pepe Byers MD sent at 4/19/2022  4:34 AM CDT -----  Start lasix 20 q day, appt within 2 weeks  ----- Message -----  From: Shital Stephen MA  Sent: 4/18/2022   2:13 PM CDT  To: Pepe Beyrs MD    Er recommended that patient reach out to you regarding being put on a fluid pill. They did give her any because her stents. Please advise.  ----- Message -----  From: Sherly Cagle  Sent: 4/18/2022   7:43 AM CDT  To: [facility] Staff    pt was seen 4/17 in [facility] for swelling in legs, please advise..[phone] ([place])            Cosentyx Amount: 300 mg

## 2022-11-17 ENCOUNTER — HOSPITAL ENCOUNTER (OUTPATIENT)
Dept: RADIOLOGY | Facility: HOSPITAL | Age: 67
Discharge: HOME | End: 2022-11-17
Attending: FAMILY MEDICINE
Payer: MEDICARE

## 2022-11-17 DIAGNOSIS — Z12.31 SCREENING MAMMOGRAM, ENCOUNTER FOR: ICD-10-CM

## 2022-11-17 PROCEDURE — 77063 BREAST TOMOSYNTHESIS BI: CPT | Mod: 26,,, | Performed by: RADIOLOGY

## 2022-11-17 PROCEDURE — 77063 BREAST TOMOSYNTHESIS BI: CPT | Mod: TC,PN

## 2022-11-17 PROCEDURE — 77067 MAMMO DIGITAL SCREENING BILAT WITH TOMO: ICD-10-PCS | Mod: 26,,, | Performed by: RADIOLOGY

## 2022-11-17 PROCEDURE — 77063 MAMMO DIGITAL SCREENING BILAT WITH TOMO: ICD-10-PCS | Mod: 26,,, | Performed by: RADIOLOGY

## 2022-11-17 PROCEDURE — 77067 SCR MAMMO BI INCL CAD: CPT | Mod: 26,,, | Performed by: RADIOLOGY

## 2022-11-21 PROBLEM — Z00.00 WELLNESS EXAMINATION: Status: RESOLVED | Noted: 2022-08-18 | Resolved: 2022-11-21

## 2022-11-30 DIAGNOSIS — I10 ESSENTIAL HYPERTENSION: Primary | ICD-10-CM

## 2022-11-30 RX ORDER — METOPROLOL TARTRATE 50 MG/1
50 TABLET ORAL 2 TIMES DAILY
Qty: 180 TABLET | Refills: 1 | Status: SHIPPED | OUTPATIENT
Start: 2022-11-30 | End: 2023-07-13 | Stop reason: SDUPTHER

## 2022-12-07 NOTE — H&P (VIEW-ONLY)
Established Patient Chronic Pain Note     Referring Physician: No ref. provider found    PCP: Clayton Frankel MD    Chief Complaint:   Chief Complaint   Patient presents with    Neck Pain     Pt is here today for 3 month f/u, pt c.o neck pain that radiates down her R hip & back rating pain 8/10. Pt states that she did get some relief since her last injections pt denies physical therapy and currently takes Tramadol to help ease pain.           SUBJECTIVE:  Interval history 12/08/2022  At our last clinic visit, we discussed cervical radiofrequency ablation, not performed secondary to recent percutaneous angioplasty and necessity of remaining on anticoagulation.    Today patient again reports bilateral neck pain.  Pain again radiates to bilateral trapezius distribution in C5-6 dermatomal distribution.  She denies more distal radiculopathy into the upper extremities or hands.  She denies weakness in the upper extremities or compromise in hand  strength or dexterity.  Pain is constant and today is rated an 8/10.  Pain is exacerbated with cervical flexion, extension and lateral flexion.  Patient again reiterates at least 50% relief following bilateral neck cervical medial branch block and she would like to move forward with radiofrequency ablation pending safety of pausing anticoagulation.  Patient has continued gabapentin 900 mg in the evening and is requesting a refill as she has run out of this medication.  Patient denies any side effects from this medication.      Interval history 04/20/2022  Patient presents that his post bilateral C4, C5, C6 medial branch block 03/21/2022.  Patient reports 50% sustained relief in bilateral neck pain following medial branch block.  Of note patient reports she recently had a myocardial infarction with PTCA 3 weeks prior.  Patient reports she has been started on Brilinta and continued on aspirin.  Patient would like to pursue interventional treatment but understands necessity of  pausing Brilinta prior to cervical radiofrequency ablation.  Today patient denies radiation into the upper extremities, compromise in hand  strength or dexterity or upper extremity weakness.  Patient has continued gabapentin 300 mg in the evening and reports improvement in her pain.  She denies any side effects from this medication.      Interval history 03/10/2022  Patient presents status post right-sided sacroiliac joint injection 12/17/2021 and for review of bilateral hip XR.  Patient reports 100% relief and right sacroiliac territory following her injection.  Today she reports insidious return of bilateral neck pain, which today is rated as a 9/10.  Patient reports pain in bilateral cervical paraspinous territory.  She denies radiation into the upper extremities, compromise in hand  strength or dexterity.  Patient is requesting repeat cervical C4, C5-C6 medial branch block as she obtain greater than 4 months of relief with this procedure.  Patient has continued gabapentin 100 mg 3 times daily and was not able to fill be updated prescription.  She does believe this medication helps with her symptoms and she denies any side effects.      Interval history 11/11/2021  Patient presents status post bilateral medial branch block at C4, C5, C6 10/15/2021.  Patient reports 100% pain relief following her bilateral medial branch block which is still sustained.  Patient reports significant improvement in range of motion and reduced pain with cervical flexion, extension and lateral flexion.  Today patient primarily ports right-sided sacroiliac joint pain which radiates into the right buttock and right hip territory.  Pain today is rated as a 7/10 and is intermittent.  Pain is described as sharp in nature.  Pain is exacerbated when moving from sitting to standing and patient has positive Jason finger sign.  Patient has continued gabapentin 300 mg at night.  Patient reports when she increase this dose to 600, she  "just did not feel right.  She will maintain her dose of 300 mg. She denies any new onset lower extremity weakness, bowel or bladder incontinence or saddle anesthesia.      HPI:  10/06/2021  Shirlene Olvera is a 67 y.o. female with past medical history significant for ROMÁN, HTN, HLD, CAD s/p MI, PAD, CKD III, nicotine dependence who presents to the clinic for the evaluation of longstanding neck pain.  Today patient reports neck pain which began several years prior without inciting accident, injury or trauma.  Patient describes pain as intermittent and is a 7/10 today.  At its worst pain is a 10/10.  Pain is described as stiff and a "pins and needles" sensation.  Pain begins at her occiput and radiates down bilateral cervical paraspinous muscles into bilateral trapezius distributions in C4-6 distribution.  Pain is exacerbated with cervical flexion, extension such as when she is gardening.  Pain is improved with Icy Hot.  Patient denies radicular symptoms into bilateral upper extremities, paresthesias or weakness in bilateral hands, compromise in hand  strength or dexterity.  Patient reports receiving prior cervical medial branch block several years prior with significant improvement in her symptoms.  Patient is currently taking gabapentin 100 mg 3 times daily without noticeable improvement in her symptoms.    Patient denies night fever/night sweats, urinary incontinence, bowel incontinence, significant weight loss, significant motor weakness and loss of sensations.    Pain Disability Index Review:     Last 3 PDI Scores 12/8/2022 4/28/2022 11/11/2021   Pain Disability Index (PDI) 48 29 39       Non-Pharmacologic Treatments:  Physical Therapy/Home Exercise: no  Ice/Heat:yes  TENS: no  Acupuncture: no  Massage: no  Chiropractic: no    Other: no      Pain Medications:  - Adjuvant Medications: Neurontin (Gabapentin), Topical Ointment (Voltaren Gel, Steroid cream, Anti-Inflammatory Cream, Compound cream) and " Tylenol (Acetaminophen)  - Anti-Coagulants: Aspirin    Pain Procedures:   -03/21/2022:  Bilateral C4, C5, C6 medial branch block  -12/17/2021:  Right-sided sacroiliac joint injection  -10/15/2021:  Bilateral medial branch block at C4, C5, C6; Dr. Izquierdo    Cervical MBB: several years prior    Past Medical History:   Diagnosis Date    Chest pain 5/14/2015    Colon polyp     Coronary artery disease     pt states MI June 2015    Hypertension     Myocardial infarction     PAD (peripheral artery disease)     Rash 4/20/2022    Tobacco use      Past Surgical History:   Procedure Laterality Date    CHOLECYSTECTOMY  09/03/2015    COLONOSCOPY N/A 10/11/2016    Procedure: COLONOSCOPY;  Surgeon: Haseeb Spears MD;  Location: [facility];  Service: Endoscopy;  Laterality: N/A;    COLONOSCOPY N/A 12/09/2021    Procedure: COLONOSCOPY;  Surgeon: Pat Rios MD;  Location: [facility];  Service: Endoscopy;  Laterality: N/A;    ESOPHAGOGASTRODUODENOSCOPY N/A 12/09/2021    Procedure: EGD (ESOPHAGOGASTRODUODENOSCOPY);  Surgeon: Pat Rios MD;  Location: [facility];  Service: Endoscopy;  Laterality: N/A;    high blood      HYSTERECTOMY      INJECTION OF ANESTHETIC AGENT AROUND MEDIAL BRANCH NERVES INNERVATING CERVICAL FACET JOINT Bilateral 10/15/2021    Procedure: Bilateral C5-7 MBB with RN IV sedation PATIENT WOULD LIKE AFTERNOON ARRIVAL, IF POSSIBLE;  Surgeon: Nano Izquierdo MD;  Location: [facility];  Service: Pain Management;  Laterality: Bilateral;    INJECTION OF ANESTHETIC AGENT AROUND MEDIAL BRANCH NERVES INNERVATING CERVICAL FACET JOINT Bilateral 03/21/2022    Procedure: Bilateral C4-6 MBB with RN IV sedation;  Surgeon: Nano Izquierdo MD;  Location: [facility];  Service: Pain Management;  Laterality: Bilateral;    INJECTION OF ANESTHETIC AGENT INTO SACROILIAC JOINT Right 12/17/2021    Procedure: Right SIJ Injection;  Surgeon: Nano Izquierdo MD;  Location: [facility];  Service: Pain Management;  Laterality:  Right;    LEFT HEART CATHETERIZATION Left 03/29/2022    Procedure: CATHETERIZATION, HEART, LEFT;  Surgeon: Lion Awad MD;  Location[facility] CATH LAB;  Service: Cardiology;  Laterality: Left;    OOPHORECTOMY      RIB FRACTURE SURGERY       Review of patient's allergies indicates:   Allergen Reactions    Amlodipine Swelling    Statins-hmg-coa reductase inhibitors Other (See Comments)     Myalgias to lipitor and simvastatin       Current Outpatient Medications   Medication Sig    aspirin 81 MG Chew Take 1 tablet (81 mg total) by mouth once daily.    buPROPion (WELLBUTRIN XL) 150 MG TB24 tablet Take 1 tablet (150 mg total) by mouth once daily.    clopidogreL (PLAVIX) 75 mg tablet Take 1 tablet (75 mg total) by mouth once daily.    cyanocobalamin (VITAMIN B-12) 1000 MCG tablet Take 100 mcg by mouth once daily.    evolocumab (REPATHA SURECLICK) 140 mg/mL PnIj Inject 1 mL (140 mg total) into the skin every 14 (fourteen) days.    fexofenadine HCl (ALLEGRA ORAL) Take 1 tablet by mouth once daily.    fluocinolone acetonide oiL (DERMOTIC OIL) 0.01 % Drop Place 3 drops in ear(s) 2 (two) times daily.    fluticasone propionate (FLONASE) 50 mcg/actuation nasal spray USE 2 SPRAYS IN EACH NOSTRIL ONE TIME DAILY    furosemide (LASIX) 20 MG tablet Take 1 tablet (20 mg total) by mouth once daily.    gabapentin (NEURONTIN) 300 MG capsule Take 3 capsules (900 mg total) by mouth every evening.    isosorbide mononitrate (IMDUR) 60 MG 24 hr tablet Take 1 tablet (60 mg total) by mouth every evening.    loratadine (CLARITIN) 10 mg tablet Take 10 mg by mouth daily as needed for Allergies.    metoprolol tartrate (LOPRESSOR) 50 MG tablet Take 1 tablet (50 mg total) by mouth 2 (two) times daily.    multivitamin with minerals tablet Take 1 tablet by mouth once daily.    nitroGLYCERIN (NITROSTAT) 0.4 MG SL tablet Place 1 tablet (0.4 mg total) under the tongue every 5 (five) minutes as needed for Chest pain.    pantoprazole (PROTONIX) 20 MG  "tablet Take 1 tablet (20 mg total) by mouth once daily.    predniSONE (DELTASONE) 20 MG tablet     tiZANidine (ZANAFLEX) 4 MG tablet     valsartan (DIOVAN) 160 MG tablet TAKE ONE (1) TABLET (160 MG TOTAL) BY MOUTH ONCE DAILY    vitamin D (VITAMIN D3) 1000 units Tab Take 1,000 Units by mouth once daily.     No current facility-administered medications for this visit.       Review of Systems     GENERAL:  No weight loss, malaise or fevers.  HEENT:   No recent changes in vision or hearing  NECK:  Negative for lumps, no difficulty with swallowing.  RESPIRATORY:  Negative for cough, wheezing or shortness of breath, patient denies any recent URI.  CARDIOVASCULAR:  Negative for chest pain, leg swelling or palpitations.  GI:  Negative for abdominal discomfort, blood in stools or black stools or change in bowel habits.  MUSCULOSKELETAL:  See HPI.  SKIN:  Negative for lesions, rash, and itching.  PSYCH:  No mood disorder or recent psychosocial stressors.   HEMATOLOGY/LYMPHOLOGY:  Negative for prolonged bleeding, bruising easily or swollen nodes.    NEURO:   No history of headaches, syncope, paralysis, seizures or tremors.  All other reviewed and negative other than HPI.    OBJECTIVE:    BP (!) 202/80   Pulse (!) 52   Resp 18   Ht 5' 4" (1.626 m)   Wt 71.6 kg (157 lb 13.6 oz)   LMP  (LMP Unknown)   BMI 27.09 kg/m²       Physical Exam    GENERAL: Well appearing, in no acute distress, alert and oriented x3.  PSYCH:  Mood and affect appropriate.  SKIN: Skin color, texture, turgor normal, no rashes or lesions.  HEAD/FACE:  Normocephalic, atraumatic. Cranial nerves grossly intact.    NECK:  pain to palpation over the cervical paraspinous muscles. Spurling Negative.  pain with neck flexion, extension, or lateral flexion.  Full range of motion  Normal testing biceps, triceps and brachioradialis bilaterally.    Negative Antonio's bilaterally.    5/5 strength testing deltoid, biceps, triceps, wrist extensor, wrist flexor and " ulnar intrinsics bilaterally.    Normal  strength bilaterally    CV: RRR with palpation of the radial artery.  PULM: No evidence of respiratory difficulty, symmetric chest rise.  GI:  Soft and non-tender.    NEURO: Bilateral upper and lower extremity coordination and muscle stretch reflexes are physiologic and symmetric. No loss of sensation is noted.  GAIT: normal.    Imaging:   Hip  XR 11/11/21  FINDINGS:  No acute fracture.  Hip joint spaces maintained with left greater than right acetabular degenerative findings.  Lower lumbar spine degenerative changes noted.  Soft tissues unremarkable.       09/05/12    MRI Lumbar Spine Without Contrast    Narrative  DATE OF EXAM: Oct  5 2012    Findings: Vertebral alignment is normal.  The conus ends at the level of  L1 and appears normal.  Intervertebral disks are well-hydrated and disk  spaces are maintained.  No compression fractures or acute osseous  abnormalities are seen.  There is no area of abnormal signal intensity  identified within the bone marrow.  Axial images are acquired through the  disk spaces from L1-2 through L5-S1.  There is no focal disk herniation,  canal or foraminal stenosis identified above the L4-5 level.  At L4 R. there is central disk protrusion minimally indenting the thecal  sac.  There is also some diffuse bulging of the disk and facet and  ligamentum flavum hypertrophy.  There is mild canal and bilateral  foraminal narrowing at this level.  At L5-S1 there is central bulging of the disk without canal or foraminal  stenosis.    Impression  Central disk protrusion at L4-5 with some canal and foraminal  narrowing due to combination of disk abnormality and degenerative change.  Please correlate above findings with the patient's clinical symptoms.    09/05/12    MRI Cervical Spine Without Contrast    Narrative  DATE OF EXAM: Oct  5 2012    Findings: The craniovertebral junction and vertebral alignment are  normal.  No abnormality of bone marrow  signal intensity is seen.  Intervertebral disks are dessicated but disk spaces are maintained.  No  abnormality seen within the cervical spinal cord.  Axial images are  acquired through the disk spaces from C2-3 through C7-T1.  C2-3 disk space is normal.  The C3-4 disk space demonstrates spondylosis as well as facet and  uncinate hypertrophy.  There is mild canal and bilateral foraminal  narrowing at this level.  The C4-5 disk space also demonstrates right paracentral spondylosis and  disk bulge with facet and uncinate hypertrophy on the right.  There is  mild canal and moderate right sided foraminal stenosis.  The C5-6 disk space does not demonstrate any significant disk herniation,  canal or foraminal stenosis.  The C6-7 disk space demonstrates minimal bulging of the disk but no canal  or foraminal narrowing.  The C7 T1 disk space is unremarkable.    Impression  Degenerative change and degenerative disk disease most  pronounced at the C3-4 and C4-5 level as discussed above.  Please  correlate with patient's clinical symptoms.       09/05/12    X-Ray Cervical Spine AP And Lateral    Narrative  DATE OF EXAM: Sep 13 2012    Findings: Vertebral alignment is normal.  There is moderate degenerative  change throughout the cervical spine with loss of disk height and  anterior osteophyte formation.  No acute osseous abnormalities are seen.  Postoperative findings from resection of the left first rib.      ASSESSMENT: 67 y.o. year old female with neck pain, R lower back pain, consistent with     1. Cervical spondylosis  IR RF Ablation Cervical Thoracic with Im    IR RF Ablation Cervical Thoracic with Im    IR RF Ablation Cervical Thoracic with Im    Case Request-RAD/Other Procedure Area: Right C4-6 RFA with RN IV sedation    IR RF Ablation Cerv Thoracic Ea Add with    IR RF Ablation Cerv Thoracic Ea Add with    IR RF Ablation Cervical Thoracic with Im    Case Request-RAD/Other Procedure Area: Left C4-6 RFA with RN IV  sedation      2. Cervical radiculopathy        3. Facet arthropathy, cervical        4. Degenerative disc disease, cervical                PLAN:   - Interventions:  Schedule for right-sided cervical C4, C5, C6 radiofrequency ablation, followed by left side 2 weeks following.  Explained the risks and benefits of the procedure in detail with the patient today in clinic along with alternative treatment options, and the patient elected to pursue the intervention at this time.      - Anticoagulation use: yes aspirin and Plavix  Patient recently had myocardial infarction with PTCA 3/2022.  For secondary prophylaxis per ASA guidelines, patient can continue aspirin but would need to pause Plavix 5 days prior to her cervical radiofrequency ablation.  Will recheck to Dr. Byers/Reji regarding clearance    - we have discussed with exacerbation of sacroiliac pain, scheduling repeat right-sided sacroiliac joint injection in the future.       report:  Reviewed and consistent with medication use as prescribed.    - Medications:  --  We have discussed continuing gabapentin.  We have reviewed potential side effects of this medication including daytime somnolence, weight gain and peripheral edema    Gabapentin : 900 mg QHS  -90 day supply given    - Therapy:   We discussed continuing physical therapy to help manage the patient/s painful condition. The patient was counseled that muscle strengthening will improve the long term prognosis in regards to pain and may also help increase range of motion and mobility.     - Imaging: Reviewed available imaging with patient and answered any questions they had regarding study.      - Follow up visit: return to clinic in 4-6 weeks      The above plan and management options were discussed at length with patient. Patient is in agreement with the above and verbalized understanding.    - I discussed the goals of interventional chronic pain management with the patient on today's visit. We  discussed a multimodal and systematic approach to pain.  This includes diagnostic and therapeutic injections, adjuvant pharmacologic treatment, physical therapy, and at times psychiatry.  I emphasized the importance of regular exercise, core strengthening and stretching, diet and weight loss as a cornerstone of long-term pain management.    - This condition does not require this patient to take time off of work, and the primary goal of our Pain Management services is to improve the patient's functional capacity.  - Patient Questions: Answered all of the patient's questions regarding diagnoses, therapy, treatment and next steps        Nano Izquierdo MD  Interventional Pain Management  [facility]    Disclaimer:  This note was prepared using voice recognition system and is likely to have sound alike errors that may have been overlooked even after proof reading.  Please call me with any questions

## 2022-12-08 ENCOUNTER — TELEPHONE (OUTPATIENT)
Dept: PAIN MEDICINE | Facility: CLINIC | Age: 67
End: 2022-12-08

## 2022-12-08 ENCOUNTER — OFFICE VISIT (OUTPATIENT)
Dept: CARDIOLOGY | Facility: CLINIC | Age: 67
End: 2022-12-08
Payer: MEDICARE

## 2022-12-08 ENCOUNTER — OFFICE VISIT (OUTPATIENT)
Dept: PAIN MEDICINE | Facility: CLINIC | Age: 67
End: 2022-12-08
Payer: MEDICARE

## 2022-12-08 VITALS
OXYGEN SATURATION: 100 % | HEIGHT: 64 IN | DIASTOLIC BLOOD PRESSURE: 88 MMHG | SYSTOLIC BLOOD PRESSURE: 138 MMHG | WEIGHT: 157.44 LBS | BODY MASS INDEX: 26.88 KG/M2 | HEART RATE: 50 BPM

## 2022-12-08 VITALS
RESPIRATION RATE: 18 BRPM | DIASTOLIC BLOOD PRESSURE: 80 MMHG | HEIGHT: 64 IN | HEART RATE: 52 BPM | WEIGHT: 157.88 LBS | SYSTOLIC BLOOD PRESSURE: 202 MMHG | BODY MASS INDEX: 26.95 KG/M2

## 2022-12-08 DIAGNOSIS — I73.9 PAD (PERIPHERAL ARTERY DISEASE): ICD-10-CM

## 2022-12-08 DIAGNOSIS — I10 ESSENTIAL HYPERTENSION: Chronic | ICD-10-CM

## 2022-12-08 DIAGNOSIS — Z72.0 TOBACCO ABUSE: Chronic | ICD-10-CM

## 2022-12-08 DIAGNOSIS — M54.12 CERVICAL RADICULOPATHY: ICD-10-CM

## 2022-12-08 DIAGNOSIS — E78.00 PURE HYPERCHOLESTEROLEMIA: ICD-10-CM

## 2022-12-08 DIAGNOSIS — Z78.9 STATIN INTOLERANCE: ICD-10-CM

## 2022-12-08 DIAGNOSIS — M47.812 FACET ARTHROPATHY, CERVICAL: ICD-10-CM

## 2022-12-08 DIAGNOSIS — I25.2 HISTORY OF NON-ST ELEVATION MYOCARDIAL INFARCTION (NSTEMI): Primary | ICD-10-CM

## 2022-12-08 DIAGNOSIS — M47.812 CERVICAL SPONDYLOSIS: Primary | ICD-10-CM

## 2022-12-08 DIAGNOSIS — I25.10 CORONARY ARTERY DISEASE INVOLVING NATIVE CORONARY ARTERY OF NATIVE HEART WITHOUT ANGINA PECTORIS: Chronic | ICD-10-CM

## 2022-12-08 DIAGNOSIS — M50.30 DEGENERATIVE DISC DISEASE, CERVICAL: ICD-10-CM

## 2022-12-08 PROCEDURE — 3288F PR FALLS RISK ASSESSMENT DOCUMENTED: ICD-10-PCS | Mod: CPTII,S$GLB,, | Performed by: INTERNAL MEDICINE

## 2022-12-08 PROCEDURE — 99214 OFFICE O/P EST MOD 30 MIN: CPT | Mod: S$GLB,,, | Performed by: INTERNAL MEDICINE

## 2022-12-08 PROCEDURE — 1160F PR REVIEW ALL MEDS BY PRESCRIBER/CLIN PHARMACIST DOCUMENTED: ICD-10-PCS | Mod: CPTII,S$GLB,, | Performed by: INTERNAL MEDICINE

## 2022-12-08 PROCEDURE — 99214 PR OFFICE/OUTPT VISIT, EST, LEVL IV, 30-39 MIN: ICD-10-PCS | Mod: HCNC,S$GLB,, | Performed by: ANESTHESIOLOGY

## 2022-12-08 PROCEDURE — 3008F BODY MASS INDEX DOCD: CPT | Mod: HCNC,CPTII,S$GLB, | Performed by: ANESTHESIOLOGY

## 2022-12-08 PROCEDURE — 4010F PR ACE/ARB THEARPY RXD/TAKEN: ICD-10-PCS | Mod: CPTII,S$GLB,, | Performed by: INTERNAL MEDICINE

## 2022-12-08 PROCEDURE — 3288F FALL RISK ASSESSMENT DOCD: CPT | Mod: CPTII,S$GLB,, | Performed by: INTERNAL MEDICINE

## 2022-12-08 PROCEDURE — 1101F PT FALLS ASSESS-DOCD LE1/YR: CPT | Mod: CPTII,S$GLB,, | Performed by: INTERNAL MEDICINE

## 2022-12-08 PROCEDURE — 1101F PR PT FALLS ASSESS DOC 0-1 FALLS W/OUT INJ PAST YR: ICD-10-PCS | Mod: HCNC,CPTII,S$GLB, | Performed by: ANESTHESIOLOGY

## 2022-12-08 PROCEDURE — 1125F PR PAIN SEVERITY QUANTIFIED, PAIN PRESENT: ICD-10-PCS | Mod: CPTII,S$GLB,, | Performed by: INTERNAL MEDICINE

## 2022-12-08 PROCEDURE — 1125F AMNT PAIN NOTED PAIN PRSNT: CPT | Mod: CPTII,S$GLB,, | Performed by: INTERNAL MEDICINE

## 2022-12-08 PROCEDURE — 3044F PR MOST RECENT HEMOGLOBIN A1C LEVEL <7.0%: ICD-10-PCS | Mod: CPTII,S$GLB,, | Performed by: INTERNAL MEDICINE

## 2022-12-08 PROCEDURE — 1125F PR PAIN SEVERITY QUANTIFIED, PAIN PRESENT: ICD-10-PCS | Mod: HCNC,CPTII,S$GLB, | Performed by: ANESTHESIOLOGY

## 2022-12-08 PROCEDURE — 1159F PR MEDICATION LIST DOCUMENTED IN MEDICAL RECORD: ICD-10-PCS | Mod: CPTII,S$GLB,, | Performed by: INTERNAL MEDICINE

## 2022-12-08 PROCEDURE — 3075F PR MOST RECENT SYSTOLIC BLOOD PRESS GE 130-139MM HG: ICD-10-PCS | Mod: CPTII,S$GLB,, | Performed by: INTERNAL MEDICINE

## 2022-12-08 PROCEDURE — 1101F PR PT FALLS ASSESS DOC 0-1 FALLS W/OUT INJ PAST YR: ICD-10-PCS | Mod: CPTII,S$GLB,, | Performed by: INTERNAL MEDICINE

## 2022-12-08 PROCEDURE — 4010F PR ACE/ARB THEARPY RXD/TAKEN: ICD-10-PCS | Mod: HCNC,CPTII,S$GLB, | Performed by: ANESTHESIOLOGY

## 2022-12-08 PROCEDURE — 99214 OFFICE O/P EST MOD 30 MIN: CPT | Mod: HCNC,S$GLB,, | Performed by: ANESTHESIOLOGY

## 2022-12-08 PROCEDURE — 3077F SYST BP >= 140 MM HG: CPT | Mod: HCNC,CPTII,S$GLB, | Performed by: ANESTHESIOLOGY

## 2022-12-08 PROCEDURE — 99999 PR PBB SHADOW E&M-EST. PATIENT-LVL IV: CPT | Mod: PBBFAC,HCNC,, | Performed by: ANESTHESIOLOGY

## 2022-12-08 PROCEDURE — 1160F RVW MEDS BY RX/DR IN RCRD: CPT | Mod: CPTII,S$GLB,, | Performed by: INTERNAL MEDICINE

## 2022-12-08 PROCEDURE — 99999 PR PBB SHADOW E&M-EST. PATIENT-LVL V: CPT | Mod: PBBFAC,,, | Performed by: INTERNAL MEDICINE

## 2022-12-08 PROCEDURE — 3079F PR MOST RECENT DIASTOLIC BLOOD PRESSURE 80-89 MM HG: ICD-10-PCS | Mod: CPTII,S$GLB,, | Performed by: INTERNAL MEDICINE

## 2022-12-08 PROCEDURE — 99214 PR OFFICE/OUTPT VISIT, EST, LEVL IV, 30-39 MIN: ICD-10-PCS | Mod: S$GLB,,, | Performed by: INTERNAL MEDICINE

## 2022-12-08 PROCEDURE — 1159F MED LIST DOCD IN RCRD: CPT | Mod: CPTII,S$GLB,, | Performed by: INTERNAL MEDICINE

## 2022-12-08 PROCEDURE — 3079F DIAST BP 80-89 MM HG: CPT | Mod: CPTII,S$GLB,, | Performed by: INTERNAL MEDICINE

## 2022-12-08 PROCEDURE — 3044F HG A1C LEVEL LT 7.0%: CPT | Mod: CPTII,S$GLB,, | Performed by: INTERNAL MEDICINE

## 2022-12-08 PROCEDURE — 4010F ACE/ARB THERAPY RXD/TAKEN: CPT | Mod: HCNC,CPTII,S$GLB, | Performed by: ANESTHESIOLOGY

## 2022-12-08 PROCEDURE — 3077F PR MOST RECENT SYSTOLIC BLOOD PRESSURE >= 140 MM HG: ICD-10-PCS | Mod: HCNC,CPTII,S$GLB, | Performed by: ANESTHESIOLOGY

## 2022-12-08 PROCEDURE — 1125F AMNT PAIN NOTED PAIN PRSNT: CPT | Mod: HCNC,CPTII,S$GLB, | Performed by: ANESTHESIOLOGY

## 2022-12-08 PROCEDURE — 99999 PR PBB SHADOW E&M-EST. PATIENT-LVL V: ICD-10-PCS | Mod: PBBFAC,,, | Performed by: INTERNAL MEDICINE

## 2022-12-08 PROCEDURE — 1159F MED LIST DOCD IN RCRD: CPT | Mod: HCNC,CPTII,S$GLB, | Performed by: ANESTHESIOLOGY

## 2022-12-08 PROCEDURE — 4010F ACE/ARB THERAPY RXD/TAKEN: CPT | Mod: CPTII,S$GLB,, | Performed by: INTERNAL MEDICINE

## 2022-12-08 PROCEDURE — 3008F BODY MASS INDEX DOCD: CPT | Mod: CPTII,S$GLB,, | Performed by: INTERNAL MEDICINE

## 2022-12-08 PROCEDURE — 3079F DIAST BP 80-89 MM HG: CPT | Mod: HCNC,CPTII,S$GLB, | Performed by: ANESTHESIOLOGY

## 2022-12-08 PROCEDURE — 3044F PR MOST RECENT HEMOGLOBIN A1C LEVEL <7.0%: ICD-10-PCS | Mod: HCNC,CPTII,S$GLB, | Performed by: ANESTHESIOLOGY

## 2022-12-08 PROCEDURE — 1160F RVW MEDS BY RX/DR IN RCRD: CPT | Mod: HCNC,CPTII,S$GLB, | Performed by: ANESTHESIOLOGY

## 2022-12-08 PROCEDURE — 3288F FALL RISK ASSESSMENT DOCD: CPT | Mod: HCNC,CPTII,S$GLB, | Performed by: ANESTHESIOLOGY

## 2022-12-08 PROCEDURE — 3008F PR BODY MASS INDEX (BMI) DOCUMENTED: ICD-10-PCS | Mod: CPTII,S$GLB,, | Performed by: INTERNAL MEDICINE

## 2022-12-08 PROCEDURE — 3008F PR BODY MASS INDEX (BMI) DOCUMENTED: ICD-10-PCS | Mod: HCNC,CPTII,S$GLB, | Performed by: ANESTHESIOLOGY

## 2022-12-08 PROCEDURE — 3079F PR MOST RECENT DIASTOLIC BLOOD PRESSURE 80-89 MM HG: ICD-10-PCS | Mod: HCNC,CPTII,S$GLB, | Performed by: ANESTHESIOLOGY

## 2022-12-08 PROCEDURE — 1101F PT FALLS ASSESS-DOCD LE1/YR: CPT | Mod: HCNC,CPTII,S$GLB, | Performed by: ANESTHESIOLOGY

## 2022-12-08 PROCEDURE — 1159F PR MEDICATION LIST DOCUMENTED IN MEDICAL RECORD: ICD-10-PCS | Mod: HCNC,CPTII,S$GLB, | Performed by: ANESTHESIOLOGY

## 2022-12-08 PROCEDURE — 3288F PR FALLS RISK ASSESSMENT DOCUMENTED: ICD-10-PCS | Mod: HCNC,CPTII,S$GLB, | Performed by: ANESTHESIOLOGY

## 2022-12-08 PROCEDURE — 99999 PR PBB SHADOW E&M-EST. PATIENT-LVL IV: ICD-10-PCS | Mod: PBBFAC,HCNC,, | Performed by: ANESTHESIOLOGY

## 2022-12-08 PROCEDURE — 3075F SYST BP GE 130 - 139MM HG: CPT | Mod: CPTII,S$GLB,, | Performed by: INTERNAL MEDICINE

## 2022-12-08 PROCEDURE — 1160F PR REVIEW ALL MEDS BY PRESCRIBER/CLIN PHARMACIST DOCUMENTED: ICD-10-PCS | Mod: HCNC,CPTII,S$GLB, | Performed by: ANESTHESIOLOGY

## 2022-12-08 PROCEDURE — 3044F HG A1C LEVEL LT 7.0%: CPT | Mod: HCNC,CPTII,S$GLB, | Performed by: ANESTHESIOLOGY

## 2022-12-08 RX ORDER — VALSARTAN 320 MG/1
320 TABLET ORAL DAILY
Qty: 30 TABLET | Refills: 11 | Status: SHIPPED | OUTPATIENT
Start: 2022-12-08 | End: 2023-07-13 | Stop reason: SDUPTHER

## 2022-12-08 RX ORDER — PREDNISONE 20 MG/1
TABLET ORAL
COMMUNITY
Start: 2022-09-15 | End: 2023-01-13 | Stop reason: ALTCHOICE

## 2022-12-08 RX ORDER — GABAPENTIN 300 MG/1
900 CAPSULE ORAL NIGHTLY
Qty: 270 CAPSULE | Refills: 0 | Status: SHIPPED | OUTPATIENT
Start: 2022-12-08 | End: 2023-01-13

## 2022-12-08 NOTE — PROGRESS NOTES
Subjective:   Patient ID:  Shirlene Olvera is a 67 y.o. female who presents for follow-up of No chief complaint on file.  Pt with ER visit with high BP. Metoprolol increased.Pt labile [id number].  Pt planned for neck injections- pain/bone spurs.    Hypertension  This is a chronic problem. The current episode started more than 1 year ago. The problem has been gradually improving since onset. The problem is controlled. Pertinent negatives include no chest pain, palpitations or shortness of breath. Past treatments include angiotensin blockers and calcium channel blockers. The current treatment provides moderate improvement. There are no compliance problems.    Hyperlipidemia  This is a chronic problem. The current episode started more than 1 year ago. Pertinent negatives include no chest pain or shortness of breath. Current antihyperlipidemic treatment includes nicotinic acid (repatha). The current treatment provides moderate improvement of lipids.   Coronary Artery Disease  Presents for follow-up visit. Pertinent negatives include no chest pain, chest pressure, chest tightness, dizziness, leg swelling, muscle weakness, palpitations, shortness of breath or weight gain. The symptoms have been stable. Compliance with diet is variable. Compliance with exercise is variable. Compliance with medications is good.     Review of Systems   Constitutional: Negative. Negative for weight gain.   HENT: Negative.     Eyes: Negative.    Cardiovascular: Negative.  Negative for chest pain, leg swelling and palpitations.   Respiratory: Negative.  Negative for chest tightness and shortness of breath.    Endocrine: Negative.    Hematologic/Lymphatic: Negative.    Skin: Negative.    Musculoskeletal:  Negative for muscle weakness.   Gastrointestinal: Negative.    Genitourinary: Negative.    Neurological: Negative.  Negative for dizziness.   Psychiatric/Behavioral: Negative.     Allergic/Immunologic: Negative.    All other systems  reviewed and are negative.  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 360 tablet 1    buPROPion (WELLBUTRIN XL) 150 MG TB24 tablet Take 1 tablet (150 mg total) by mouth once daily. 90 tablet 1    clopidogreL (PLAVIX) 75 mg tablet Take 1 tablet (75 mg total) by mouth once daily. 90 tablet 3    cyanocobalamin (VITAMIN B-12) 1000 MCG tablet Take 100 mcg by mouth once daily.      evolocumab (REPATHA SURECLICK) 140 mg/mL PnIj Inject 1 mL (140 mg total) into the skin every 14 (fourteen) days. 2 each 11    fexofenadine HCl (ALLEGRA ORAL) Take 1 tablet by mouth once daily.      fluocinolone acetonide oiL (DERMOTIC OIL) 0.01 % Drop Place 3 drops in ear(s) 2 (two) times daily. 20 mL 2    fluticasone propionate (FLONASE) 50 mcg/actuation nasal spray USE 2 SPRAYS IN EACH NOSTRIL ONE TIME DAILY 48 g 3    furosemide (LASIX) 20 MG tablet Take 1 tablet (20 mg total) by mouth once daily. 30 tablet 5     isosorbide mononitrate (IMDUR) 60 MG 24 hr tablet Take 1 tablet (60 mg total) by mouth every evening. 90 tablet 1    loratadine (CLARITIN) 10 mg tablet Take 10 mg by mouth daily as needed for Allergies.      metoprolol tartrate (LOPRESSOR) 50 MG tablet Take 1 tablet (50 mg total) by mouth 2 (two) times daily. 180 tablet 1    multivitamin with minerals tablet Take 1 tablet by mouth once daily.      nitroGLYCERIN (NITROSTAT) 0.4 MG SL tablet Place 1 tablet (0.4 mg total) under the tongue every 5 (five) minutes as needed for Chest pain. 25 tablet 0    pantoprazole (PROTONIX) 20 MG tablet Take 1 tablet (20 mg total) by mouth once daily. 90 tablet 1    tiZANidine (ZANAFLEX) 4 MG tablet       valsartan (DIOVAN) 160 MG tablet TAKE ONE (1) TABLET (160 MG TOTAL) BY MOUTH ONCE DAILY 30 tablet 11    vitamin D (VITAMIN D3) 1000 units Tab Take 1,000 Units by mouth once daily.      [DISCONTINUED] gabapentin (NEURONTIN) 300 MG capsule Take 3 capsules (900 mg total) by mouth every evening. 30 capsule 0    [DISCONTINUED] valsartan-hydrochlorothiazide (DIOVAN-HCT) 160-25 mg per tablet Take 1 tablet by mouth once daily. 90 tablet 1     No current facility-administered medications on file prior to visit.     Review of patient's allergies indicates:   Allergen Reactions    Amlodipine Swelling    Statins-hmg-coa reductase inhibitors Other (See Comments)     Myalgias to lipitor and simvastatin       Objective:     Physical Exam  Vitals and nursing note reviewed.   Constitutional:       Appearance: She is well-developed.   HENT:      Head: Normocephalic and atraumatic.   Eyes:      Conjunctiva/sclera: Conjunctivae normal.      Pupils: Pupils are equal, round, and reactive to light.   Cardiovascular:      Rate and Rhythm: Normal rate and regular rhythm.      Pulses: Intact distal pulses.      Heart sounds: Normal heart sounds.   Pulmonary:      Effort: Pulmonary effort is normal.      Breath sounds: Normal breath sounds.   Abdominal:       General: Bowel sounds are normal.      Palpations: Abdomen is soft.   Musculoskeletal:         General: Normal range of motion.      Cervical back: Normal range of motion and neck supple.   Skin:     General: Skin is warm and dry.   Neurological:      Mental Status: She is alert and oriented to person, place, and time.       Assessment:     1. History of non-ST elevation myocardial infarction (NSTEMI)    2. PAD (peripheral artery disease)    3. Pure hypercholesterolemia    4. Coronary artery disease involving native coronary artery of native heart without angina pectoris    5. Tobacco abuse    6. Statin intolerance        Plan:     History of non-ST elevation myocardial infarction (NSTEMI)    PAD (peripheral artery disease)    Pure hypercholesterolemia    Coronary artery disease involving native coronary artery of native heart without angina pectoris    Tobacco abuse    Statin intolerance        Increase valsartan to 320 mg q day  Continue norvasc, valsartan-htn  Continue asa, brilenta- cad/pci  Continued smoking cessation  continue repatha, niacin-hlp    Pt can hold asa and plavix 5 days before procedure

## 2022-12-09 ENCOUNTER — TELEPHONE (OUTPATIENT)
Dept: PAIN MEDICINE | Facility: CLINIC | Age: 67
End: 2022-12-09
Payer: MEDICARE

## 2022-12-09 NOTE — TELEPHONE ENCOUNTER
----- Message from Pepe Byers MD sent at 12/9/2022  4:56 AM CST -----  Regarding: RE: Cardiac Clerance  Yes can hold asa 5 days before procedure  ----- Message -----  From: Juan Soares MA  Sent: 12/8/2022  10:21 AM CST  To: Pepe Byers MD  Subject: Cardiac Clerance                                 Good Morning Shirlene Mchugh was seen in office  with complaints of severe neck pain. Dr. Izquierdo would like to perform  Cervical RFA , and Shirlene Olvera would like to proceed. Dr. Izquierdo is requesting a clearance to hold ASA 5 days prior to procedure. Shirlene Olvera can resume medication following the procedure.   Please advise so we may proceed.       Sincerely,    Juan Soares MA

## 2022-12-14 ENCOUNTER — PATIENT OUTREACH (OUTPATIENT)
Dept: ADMINISTRATIVE | Facility: HOSPITAL | Age: 67
End: 2022-12-14
Payer: MEDICARE

## 2022-12-14 ENCOUNTER — SPECIALTY PHARMACY (OUTPATIENT)
Dept: PHARMACY | Facility: CLINIC | Age: 67
End: 2022-12-14
Payer: MEDICARE

## 2022-12-14 NOTE — PROGRESS NOTES
HTN: Called and spoke with patient. She does take at home blood pressure readings. She is part of the HTN program with [facility]. She actually had just spoke with her  today as her blood pressure had been high when taking it again. This has been going on. Her blood pressure medicine had been increased recently by cardiology. She agreed to take blood pressure again this afternoon and will send in.

## 2022-12-14 NOTE — TELEPHONE ENCOUNTER
Specialty Pharmacy - Refill Coordination    Specialty Medication Orders Linked to Encounter      Flowsheet Row Most Recent Value   Medication #1 evolocumab (REPATHA SURECLICK) 140 mg/mL PnIj (Order#[id number], Rx#[id number])            Refill Questions - Documented Responses      Flowsheet Row Most Recent Value   Patient Availability and HIPAA Verification    Does patient want to proceed with activity? Yes   HIPAA/medical authority confirmed? Yes   Relationship to patient of person spoken to? Self   Refill Screening Questions    Would patient like to speak to a pharmacist? No   When does the patient need to receive the medication? 12/23/22   Refill Delivery Questions    How will the patient receive the medication? MEDRx   When does the patient need to receive the medication? 12/23/22   Shipping Address Home   Address in [facility] confirmed and updated if neccessary? Yes   Expected Copay ($) 0   Is the patient able to afford the medication copay? Yes   Payment Method zero copay   Days supply of Refill 28   Supplies needed? No supplies needed   Refill activity completed? Yes   Refill activity plan Refill scheduled   Shipment/Pickup Date: 12/19/22            Current Outpatient Medications   Medication Sig    aspirin 81 MG Chew Take 1 tablet (81 mg total) by mouth once daily.    buPROPion (WELLBUTRIN XL) 150 MG TB24 tablet Take 1 tablet (150 mg total) by mouth once daily.    clopidogreL (PLAVIX) 75 mg tablet Take 1 tablet (75 mg total) by mouth once daily.    cyanocobalamin (VITAMIN B-12) 1000 MCG tablet Take 100 mcg by mouth once daily.    evolocumab (REPATHA SURECLICK) 140 mg/mL PnIj Inject 1 mL (140 mg total) into the skin every 14 (fourteen) days.    fexofenadine HCl (ALLEGRA ORAL) Take 1 tablet by mouth once daily.    fluocinolone acetonide oiL (DERMOTIC OIL) 0.01 % Drop Place 3 drops in ear(s) 2 (two) times daily.    fluticasone propionate (FLONASE) 50 mcg/actuation nasal spray USE 2 SPRAYS IN EACH NOSTRIL  ONE TIME DAILY    furosemide (LASIX) 20 MG tablet Take 1 tablet (20 mg total) by mouth once daily.    gabapentin (NEURONTIN) 300 MG capsule Take 3 capsules (900 mg total) by mouth every evening.    isosorbide mononitrate (IMDUR) 60 MG 24 hr tablet Take 1 tablet (60 mg total) by mouth every evening.    loratadine (CLARITIN) 10 mg tablet Take 10 mg by mouth daily as needed for Allergies.    metoprolol tartrate (LOPRESSOR) 50 MG tablet Take 1 tablet (50 mg total) by mouth 2 (two) times daily.    multivitamin with minerals tablet Take 1 tablet by mouth once daily.    nitroGLYCERIN (NITROSTAT) 0.4 MG SL tablet Place 1 tablet (0.4 mg total) under the tongue every 5 (five) minutes as needed for Chest pain.    pantoprazole (PROTONIX) 20 MG tablet Take 1 tablet (20 mg total) by mouth once daily.    predniSONE (DELTASONE) 20 MG tablet     tiZANidine (ZANAFLEX) 4 MG tablet     valsartan (DIOVAN) 320 MG tablet Take 1 tablet (320 mg total) by mouth once daily.    vitamin D (VITAMIN D3) 1000 units Tab Take 1,000 Units by mouth once daily.   Last reviewed on 12/8/2022 10:29 AM by Pepe Byers MD    Review of patient's allergies indicates:   Allergen Reactions    Amlodipine Swelling    Statins-hmg-coa reductase inhibitors Other (See Comments)     Myalgias to lipitor and simvastatin    Last reviewed on  12/8/2022 10:21 AM by Gregg [place]      Tasks added this encounter   1/13/2023 - Refill Call (Auto Added)   Tasks due within next 3 months   No tasks due.     Erica Luke  [facility]  [address] [address]  Phone: [phone]  Fax: [phone]

## 2022-12-16 ENCOUNTER — TELEPHONE (OUTPATIENT)
Dept: PAIN MEDICINE | Facility: CLINIC | Age: 67
End: 2022-12-16
Payer: MEDICARE

## 2022-12-16 NOTE — TELEPHONE ENCOUNTER
----- Message from Pepe Byers MD sent at 12/16/2022  3:00 PM CST -----  Regarding: RE: Cardiac Clerance  Pt can hold plavix 5 days before procedure    ----- Message -----  From: Juan Soares MA  Sent: 12/16/2022  12:41 PM CST  To: Pepe Byers MD, #  Subject: Cardiac Clerance                                 Good Afternoon Dr. Byers,     Shirlene Olvera was seen in office  with complaints of severe neck pain. Dr. Izquierdo would like to perform  Cervical RFA , and Shirlene Olvera would like to proceed. Dr. Izquierdo is requesting a clearance to hold Plavix 5 days prior to procedure. Shirlene Olvera can resume medication following the procedure.   Please advise so we may proceed.         Sincerely,   Juan Soares   Medical Assistant

## 2022-12-16 NOTE — PRE-PROCEDURE INSTRUCTIONS
Spoke with patient regarding procedure scheduled on 12.27    Arrival time 0630    Has patient been sick with fever or on antibiotics within the last 7 days? No    Does the patient have any open wounds, sores or rashes? No    Does the patient have any recent fractures? no    Has patient received a vaccination within the last 7 days? No    Received the COVID vaccination? yes    Has the patient stopped all medications as directed? Hold plavix 5 days prior to procedure. Cardiac clearance obtained from dr del rosario on 12.8.    Does patient have a pacemaker and or defibrillator? no    Does the patient have a ride to and from procedure and someone reliable to remain with patient? granddaughter    Is the patient diabetic? no    Does the patient have sleep apnea? Or use O2 at home? No and no     Is the patient receiving sedation? yes    Is the patient instructed to remain NPO beginning at midnight the night before their procedure? yes    Procedure location confirmed with patient? Yes    Covid- Denies signs/symptoms. Instructed to notify PAT/MD if any changes.

## 2022-12-27 ENCOUNTER — HOSPITAL ENCOUNTER (OUTPATIENT)
Facility: HOSPITAL | Age: 67
Discharge: HOME | End: 2022-12-27
Attending: ANESTHESIOLOGY | Admitting: ANESTHESIOLOGY
Payer: MEDICARE

## 2022-12-27 VITALS
HEIGHT: 64 IN | DIASTOLIC BLOOD PRESSURE: 77 MMHG | BODY MASS INDEX: 28.05 KG/M2 | OXYGEN SATURATION: 95 % | TEMPERATURE: 98 F | WEIGHT: 164.31 LBS | HEART RATE: 50 BPM | RESPIRATION RATE: 16 BRPM | SYSTOLIC BLOOD PRESSURE: 162 MMHG

## 2022-12-27 DIAGNOSIS — M47.812 CERVICAL SPONDYLOSIS: ICD-10-CM

## 2022-12-27 PROCEDURE — 64634 PR DESTROY C/TH FACET JNT ADDL: ICD-10-PCS | Mod: RT,,, | Performed by: ANESTHESIOLOGY

## 2022-12-27 PROCEDURE — 64633 DESTROY CERV/THOR FACET JNT: CPT | Mod: RT,,, | Performed by: ANESTHESIOLOGY

## 2022-12-27 PROCEDURE — 64634 DESTROY C/TH FACET JNT ADDL: CPT | Mod: RT,,, | Performed by: ANESTHESIOLOGY

## 2022-12-27 PROCEDURE — 25000003 PHARM REV CODE 250: Performed by: ANESTHESIOLOGY

## 2022-12-27 PROCEDURE — 64634 DESTROY C/TH FACET JNT ADDL: CPT | Performed by: ANESTHESIOLOGY

## 2022-12-27 PROCEDURE — 63600175 PHARM REV CODE 636 W HCPCS: Performed by: ANESTHESIOLOGY

## 2022-12-27 PROCEDURE — 64633 PR DESTROY CERV/THOR FACET JNT: ICD-10-PCS | Mod: RT,,, | Performed by: ANESTHESIOLOGY

## 2022-12-27 PROCEDURE — 64633 DESTROY CERV/THOR FACET JNT: CPT | Performed by: ANESTHESIOLOGY

## 2022-12-27 RX ORDER — MIDAZOLAM HYDROCHLORIDE 1 MG/ML
INJECTION, SOLUTION INTRAMUSCULAR; INTRAVENOUS
Status: DISCONTINUED | OUTPATIENT
Start: 2022-12-27 | End: 2022-12-27 | Stop reason: HOSPADM

## 2022-12-27 RX ORDER — FENTANYL CITRATE 50 UG/ML
INJECTION, SOLUTION INTRAMUSCULAR; INTRAVENOUS
Status: DISCONTINUED | OUTPATIENT
Start: 2022-12-27 | End: 2022-12-27 | Stop reason: HOSPADM

## 2022-12-27 RX ORDER — INDOMETHACIN 25 MG/1
CAPSULE ORAL
Status: DISCONTINUED | OUTPATIENT
Start: 2022-12-27 | End: 2022-12-27 | Stop reason: HOSPADM

## 2022-12-27 RX ORDER — BUPIVACAINE HYDROCHLORIDE 2.5 MG/ML
INJECTION, SOLUTION EPIDURAL; INFILTRATION; INTRACAUDAL
Status: DISCONTINUED | OUTPATIENT
Start: 2022-12-27 | End: 2022-12-27 | Stop reason: HOSPADM

## 2022-12-27 RX ORDER — LIDOCAINE HYDROCHLORIDE 20 MG/ML
INJECTION, SOLUTION EPIDURAL; INFILTRATION; INTRACAUDAL; PERINEURAL
Status: DISCONTINUED | OUTPATIENT
Start: 2022-12-27 | End: 2022-12-27 | Stop reason: HOSPADM

## 2022-12-27 RX ORDER — DEXAMETHASONE SODIUM PHOSPHATE 100 MG/10ML
INJECTION INTRAMUSCULAR; INTRAVENOUS
Status: DISCONTINUED | OUTPATIENT
Start: 2022-12-27 | End: 2022-12-27 | Stop reason: HOSPADM

## 2022-12-27 NOTE — OP NOTE
Cervical Medial nerve branch block radiofrequency ablation Under Fluoroscopy     Time-out taken to identify patient and procedure side prior to starting the procedure.     12/27/2022    PROCEDURE: Right radiofrequency ablation of the the medial branch nerves at the   transverse process of  C4, C5, C6    2)Conscious sedation provided by MD     REASON FOR PROCEDURE: Cervical spondylosis [M47.812]     PHYSICIAN: Nano Izquierdo MD    ASSISTANTS: None     MEDICATIONS INJECTED: 0.25% Bupivicaine total 4mL     LOCAL ANESTHETIC USED: Xylocaine 1% 1mL / site     ESTIMATED BLOOD LOSS: None.     COMPLICATIONS: None.     Interval history: Patient reports that he had complete relief of pain for the day of the procedure, we will proceed with the RFA     TECHNIQUE: Laying in a prone position, the patient was prepped and draped in the usual sterile fashion using ChloraPrep and fenestrated drape. The level was determined under fluoroscopic guidance. Local anesthetic was given by going down to the hub of the 27-gauge 1.25in needle and raising a wheel. A 18-gauge 10mm curved active tip needle was introduced to the anatomic local of the medial branch at each of the stated above levels using fluoroscopy. Then sensory and motor testing was performed to confirm that the needle tips were in the correct location. Then after negative aspiration, 1 mL of 2% lidocaine was injected into each level. This was followed by thermal lesioning at 80 degrees celsius for 90 seconds. Then after negative aspiration, 1 mL of 0.25% bupivacaine with 10 mg dexamethasone was injected into each level.     Conscious sedation provided by M.D   The patient was monitored with continuous pulse oximetry, EKG, and intermittent blood pressure monitors. The patient was hemodynamically stable throughout the entire process was responsive to voice, and breathing spontaneously. Supplemental O2 was provided at 2L/min via nasal cannula. Patient was comfortable for the duration  of the procedure. (See nurse documentation and case log for sedation time)    There was a total of 2mg IV Midazolam and 100mcg Fentanyl titrated for the procedure    The patient tolerated the procedure well. Did not have any procedure related motor deficit at the conclusion of the procedure    The patient was monitored after the procedure. Patient was given post procedure and discharge instructions to follow at home. We will see the patient back in two weeks or the patient may call to inform of status. The patient was discharged in a stable condition

## 2022-12-27 NOTE — DISCHARGE SUMMARY
Discharge Note  Short Stay      SUMMARY     Admit Date: 12/27/2022    Attending Physician: Nano Izquierdo MD        Discharge Physician: Nano Izquierdo MD        Discharge Date: 12/27/2022 7:40 AM    Procedure(s) (LRB):  Right C4-6 RFA with RN IV sedation (Right)    Final Diagnosis: Cervical spondylosis [M47.812]    Disposition: Home or self care    Patient Instructions:   Current Discharge Medication List        CONTINUE these medications which have NOT CHANGED    Details   aspirin 81 MG Chew Take 1 tablet (81 mg total) by mouth once daily.  Qty: 360 tablet, Refills: 1      buPROPion (WELLBUTRIN XL) 150 MG TB24 tablet Take 1 tablet (150 mg total) by mouth once daily.  Qty: 90 tablet, Refills: 1    Associated Diagnoses: ROMÁN (generalized anxiety disorder)      cyanocobalamin (VITAMIN B-12) 1000 MCG tablet Take 100 mcg by mouth once daily.      evolocumab (REPATHA SURECLICK) 140 mg/mL PnIj Inject 1 mL (140 mg total) into the skin every 14 (fourteen) days.  Qty: 2 each, Refills: 11    Associated Diagnoses: Pure hypercholesterolemia      fexofenadine HCl (ALLEGRA ORAL) Take 1 tablet by mouth once daily.      fluticasone propionate (FLONASE) 50 mcg/actuation nasal spray USE 2 SPRAYS IN EACH NOSTRIL ONE TIME DAILY  Qty: 48 g, Refills: 3      furosemide (LASIX) 20 MG tablet Take 1 tablet (20 mg total) by mouth once daily.  Qty: 30 tablet, Refills: 5    Associated Diagnoses: History of non-ST elevation myocardial infarction (NSTEMI)      gabapentin (NEURONTIN) 300 MG capsule Take 3 capsules (900 mg total) by mouth every evening.  Qty: 270 capsule, Refills: 0      isosorbide mononitrate (IMDUR) 60 MG 24 hr tablet Take 1 tablet (60 mg total) by mouth every evening.  Qty: 90 tablet, Refills: 1      loratadine (CLARITIN) 10 mg tablet Take 10 mg by mouth daily as needed for Allergies.      metoprolol tartrate (LOPRESSOR) 50 MG tablet Take 1 tablet (50 mg total) by mouth 2 (two) times daily.  Qty: 180 tablet, Refills: 1     Comments: .  Associated Diagnoses: Essential hypertension      multivitamin with minerals tablet Take 1 tablet by mouth once daily.      NIFEdipine (PROCARDIA-XL) 30 MG (OSM) 24 hr tablet Take 1 tablet (30 mg total) by mouth once daily.  Qty: 30 tablet, Refills: 1    Comments: .  Associated Diagnoses: Essential hypertension      pantoprazole (PROTONIX) 20 MG tablet Take 1 tablet (20 mg total) by mouth once daily.  Qty: 90 tablet, Refills: 1    Associated Diagnoses: Dyspepsia      valsartan (DIOVAN) 320 MG tablet Take 1 tablet (320 mg total) by mouth once daily.  Qty: 30 tablet, Refills: 11    Comments: .  Associated Diagnoses: Essential hypertension      vitamin D (VITAMIN D3) 1000 units Tab Take 1,000 Units by mouth once daily.      clopidogreL (PLAVIX) 75 mg tablet Take 1 tablet (75 mg total) by mouth once daily.  Qty: 90 tablet, Refills: 3      fluocinolone acetonide oiL (DERMOTIC OIL) 0.01 % Drop Place 3 drops in ear(s) 2 (two) times daily.  Qty: 20 mL, Refills: 2    Associated Diagnoses: Chronic eczematous otitis externa of both ears      nitroGLYCERIN (NITROSTAT) 0.4 MG SL tablet Place 1 tablet (0.4 mg total) under the tongue every 5 (five) minutes as needed for Chest pain.  Qty: 25 tablet, Refills: 0      predniSONE (DELTASONE) 20 MG tablet       tiZANidine (ZANAFLEX) 4 MG tablet                  Discharge Diagnosis: Cervical spondylosis [M47.812]  Condition on Discharge: Stable with no complications to procedure   Diet on Discharge: Same as before.  Activity: as per instruction sheet.  Discharge to: Home with a responsible adult.  Follow up: 2-4 weeks       Please call the office at [phone] if you experience any weakness or loss of sensation, fever > 101.5, pain uncontrolled with oral medications, persistent nausea/vomiting/or diarrhea, redness or drainage from the incisions, or any other worrisome concerns. If physician on call was not reached or could not communicate with our office for any reason  please go to the nearest emergency department

## 2022-12-27 NOTE — DISCHARGE INSTRUCTIONS

## 2023-01-03 NOTE — PRE-PROCEDURE INSTRUCTIONS
Spoke with patient regarding procedure scheduled on 1.10     Arrival time 0630     Has patient been sick with fever or on antibiotics within the last 7 days? No     Does the patient have any open wounds, sores or rashes? No     Does the patient have any recent fractures? no     Has patient received a vaccination within the last 7 days? No     Received the COVID vaccination? yes     Has the patient stopped all medications as directed? Hold plavix 5 days prior to procedure. Cardiac clearance obtained from dr del rosario on 12.16.     Does patient have a pacemaker and or defibrillator? no     Does the patient have a ride to and from procedure and someone reliable to remain with patient? granddaughter     Is the patient diabetic? no     Does the patient have sleep apnea? Or use O2 at home? No and no      Is the patient receiving sedation? yes     Is the patient instructed to remain NPO beginning at midnight the night before their procedure? yes     Procedure location confirmed with patient? Yes     Covid- Denies signs/symptoms. Instructed to notify PAT/MD if any changes.

## 2023-01-10 ENCOUNTER — HOSPITAL ENCOUNTER (OUTPATIENT)
Facility: HOSPITAL | Age: 68
Discharge: HOME | End: 2023-01-10
Attending: ANESTHESIOLOGY | Admitting: ANESTHESIOLOGY
Payer: MEDICARE

## 2023-01-10 VITALS
SYSTOLIC BLOOD PRESSURE: 148 MMHG | WEIGHT: 158.81 LBS | TEMPERATURE: 97 F | RESPIRATION RATE: 12 BRPM | HEART RATE: 56 BPM | DIASTOLIC BLOOD PRESSURE: 70 MMHG | BODY MASS INDEX: 27.11 KG/M2 | OXYGEN SATURATION: 97 % | HEIGHT: 64 IN

## 2023-01-10 DIAGNOSIS — M47.812 CERVICAL SPONDYLOSIS: ICD-10-CM

## 2023-01-10 PROCEDURE — 64634 PR DESTROY C/TH FACET JNT ADDL: ICD-10-PCS | Mod: HCNC,LT,, | Performed by: ANESTHESIOLOGY

## 2023-01-10 PROCEDURE — 64633 DESTROY CERV/THOR FACET JNT: CPT | Mod: HCNC | Performed by: ANESTHESIOLOGY

## 2023-01-10 PROCEDURE — 64634 DESTROY C/TH FACET JNT ADDL: CPT | Mod: HCNC | Performed by: ANESTHESIOLOGY

## 2023-01-10 PROCEDURE — 25000003 PHARM REV CODE 250: Mod: HCNC | Performed by: ANESTHESIOLOGY

## 2023-01-10 PROCEDURE — 99152 MOD SED SAME PHYS/QHP 5/>YRS: CPT | Mod: HCNC | Performed by: ANESTHESIOLOGY

## 2023-01-10 PROCEDURE — 64633 PR DESTROY CERV/THOR FACET JNT: ICD-10-PCS | Mod: HCNC,LT,, | Performed by: ANESTHESIOLOGY

## 2023-01-10 PROCEDURE — 64634 DESTROY C/TH FACET JNT ADDL: CPT | Mod: HCNC,LT,, | Performed by: ANESTHESIOLOGY

## 2023-01-10 PROCEDURE — 63600175 PHARM REV CODE 636 W HCPCS: Mod: HCNC | Performed by: ANESTHESIOLOGY

## 2023-01-10 PROCEDURE — 64633 DESTROY CERV/THOR FACET JNT: CPT | Mod: HCNC,LT,, | Performed by: ANESTHESIOLOGY

## 2023-01-10 RX ORDER — MIDAZOLAM HYDROCHLORIDE 1 MG/ML
INJECTION, SOLUTION INTRAMUSCULAR; INTRAVENOUS
Status: DISCONTINUED | OUTPATIENT
Start: 2023-01-10 | End: 2023-01-10 | Stop reason: HOSPADM

## 2023-01-10 RX ORDER — INDOMETHACIN 25 MG/1
CAPSULE ORAL
Status: DISCONTINUED | OUTPATIENT
Start: 2023-01-10 | End: 2023-01-10 | Stop reason: HOSPADM

## 2023-01-10 RX ORDER — DEXAMETHASONE SODIUM PHOSPHATE 100 MG/10ML
INJECTION INTRAMUSCULAR; INTRAVENOUS
Status: DISCONTINUED | OUTPATIENT
Start: 2023-01-10 | End: 2023-01-10 | Stop reason: HOSPADM

## 2023-01-10 RX ORDER — FENTANYL CITRATE 50 UG/ML
INJECTION, SOLUTION INTRAMUSCULAR; INTRAVENOUS
Status: DISCONTINUED | OUTPATIENT
Start: 2023-01-10 | End: 2023-01-10 | Stop reason: HOSPADM

## 2023-01-10 RX ORDER — LIDOCAINE HYDROCHLORIDE 20 MG/ML
INJECTION, SOLUTION EPIDURAL; INFILTRATION; INTRACAUDAL; PERINEURAL
Status: DISCONTINUED | OUTPATIENT
Start: 2023-01-10 | End: 2023-01-10 | Stop reason: HOSPADM

## 2023-01-10 RX ORDER — BUPIVACAINE HYDROCHLORIDE 2.5 MG/ML
INJECTION, SOLUTION EPIDURAL; INFILTRATION; INTRACAUDAL
Status: DISCONTINUED | OUTPATIENT
Start: 2023-01-10 | End: 2023-01-10 | Stop reason: HOSPADM

## 2023-01-10 NOTE — DISCHARGE SUMMARY
Discharge Note  Short Stay      SUMMARY     Admit Date: 1/10/2023    Attending Physician: Nano Izquierdo MD        Discharge Physician: Nano Izquierdo MD        Discharge Date: 1/10/2023 7:27 AM    Procedure(s) (LRB):  Left C4-6 RFA with RN IV sedation (Left)    Final Diagnosis: Cervical spondylosis [M47.812]    Disposition: Home or self care    Patient Instructions:   Current Discharge Medication List        CONTINUE these medications which have NOT CHANGED    Details   aspirin 81 MG Chew Take 1 tablet (81 mg total) by mouth once daily.  Qty: 360 tablet, Refills: 1      buPROPion (WELLBUTRIN XL) 150 MG TB24 tablet Take 1 tablet (150 mg total) by mouth once daily.  Qty: 90 tablet, Refills: 1    Associated Diagnoses: ROMÁN (generalized anxiety disorder)      isosorbide mononitrate (IMDUR) 60 MG 24 hr tablet Take 1 tablet (60 mg total) by mouth every evening.  Qty: 90 tablet, Refills: 1      metoprolol tartrate (LOPRESSOR) 50 MG tablet Take 1 tablet (50 mg total) by mouth 2 (two) times daily.  Qty: 180 tablet, Refills: 1    Comments: .  Associated Diagnoses: Essential hypertension      NIFEdipine (PROCARDIA-XL) 30 MG (OSM) 24 hr tablet Take 1 tablet (30 mg total) by mouth once daily.  Qty: 30 tablet, Refills: 1    Comments: .  Associated Diagnoses: Essential hypertension      pantoprazole (PROTONIX) 20 MG tablet Take 1 tablet (20 mg total) by mouth once daily.  Qty: 90 tablet, Refills: 1    Associated Diagnoses: Dyspepsia      valsartan (DIOVAN) 320 MG tablet Take 1 tablet (320 mg total) by mouth once daily.  Qty: 30 tablet, Refills: 11    Comments: .  Associated Diagnoses: Essential hypertension      clopidogreL (PLAVIX) 75 mg tablet Take 1 tablet (75 mg total) by mouth once daily.  Qty: 90 tablet, Refills: 3      cyanocobalamin (VITAMIN B-12) 1000 MCG tablet Take 100 mcg by mouth once daily.      evolocumab (REPATHA SURECLICK) 140 mg/mL PnIj Inject 1 mL (140 mg total) into the skin every 14 (fourteen) days.  Qty: 2  each, Refills: 11    Associated Diagnoses: Pure hypercholesterolemia      fexofenadine HCl (ALLEGRA ORAL) Take 1 tablet by mouth once daily.      fluocinolone acetonide oiL (DERMOTIC OIL) 0.01 % Drop Place 3 drops in ear(s) 2 (two) times daily.  Qty: 20 mL, Refills: 2    Associated Diagnoses: Chronic eczematous otitis externa of both ears      fluticasone propionate (FLONASE) 50 mcg/actuation nasal spray USE 2 SPRAYS IN EACH NOSTRIL ONE TIME DAILY  Qty: 48 g, Refills: 3      furosemide (LASIX) 20 MG tablet Take 1 tablet (20 mg total) by mouth once daily.  Qty: 30 tablet, Refills: 5    Associated Diagnoses: History of non-ST elevation myocardial infarction (NSTEMI)      gabapentin (NEURONTIN) 300 MG capsule Take 3 capsules (900 mg total) by mouth every evening.  Qty: 270 capsule, Refills: 0      loratadine (CLARITIN) 10 mg tablet Take 10 mg by mouth daily as needed for Allergies.      multivitamin with minerals tablet Take 1 tablet by mouth once daily.      nitroGLYCERIN (NITROSTAT) 0.4 MG SL tablet Place 1 tablet (0.4 mg total) under the tongue every 5 (five) minutes as needed for Chest pain.  Qty: 25 tablet, Refills: 0      predniSONE (DELTASONE) 20 MG tablet       tiZANidine (ZANAFLEX) 4 MG tablet       vitamin D (VITAMIN D3) 1000 units Tab Take 1,000 Units by mouth once daily.                 Discharge Diagnosis: Cervical spondylosis [M47.812]  Condition on Discharge: Stable with no complications to procedure   Diet on Discharge: Same as before.  Activity: as per instruction sheet.  Discharge to: Home with a responsible adult.  Follow up: 2-4 weeks       Please call the office at [phone] if you experience any weakness or loss of sensation, fever > 101.5, pain uncontrolled with oral medications, persistent nausea/vomiting/or diarrhea, redness or drainage from the incisions, or any other worrisome concerns. If physician on call was not reached or could not communicate with our office for any reason please go  to the nearest emergency department

## 2023-01-10 NOTE — DISCHARGE INSTRUCTIONS

## 2023-01-10 NOTE — OP NOTE
Cervical Medial nerve branch block radiofrequency ablation Under Fluoroscopy     Time-out taken to identify patient and procedure side prior to starting the procedure.     01/10/2023    PROCEDURE: LEFT radiofrequency ablation of the the medial branch nerves at the   transverse process of  C4, C5, C6    2)Conscious sedation provided by MD     REASON FOR PROCEDURE: Cervical spondylosis [M47.812]     PHYSICIAN: Nano Izquierdo MD    ASSISTANTS: None     MEDICATIONS INJECTED: 0.25% Bupivicaine total 4mL     LOCAL ANESTHETIC USED: Xylocaine 1% 1mL / site     ESTIMATED BLOOD LOSS: None.     COMPLICATIONS: None.     Interval history: Patient reports that he had complete relief of pain for the day of the procedure, we will proceed with the RFA     TECHNIQUE: Laying in a prone position, the patient was prepped and draped in the usual sterile fashion using ChloraPrep and fenestrated drape. The level was determined under fluoroscopic guidance. Local anesthetic was given by going down to the hub of the 27-gauge 1.25in needle and raising a wheel. A 18-gauge 10mm curved active tip needle was introduced to the anatomic local of the medial branch at each of the stated above levels using fluoroscopy. Then sensory and motor testing was performed to confirm that the needle tips were in the correct location. Then after negative aspiration, 1 mL of 2% lidocaine was injected into each level. This was followed by thermal lesioning at 80 degrees celsius for 90 seconds. Then after negative aspiration, 1 mL of 0.25% bupivacaine with 10 mg dexamethasone was injected into each level.     Conscious sedation provided by M.D   The patient was monitored with continuous pulse oximetry, EKG, and intermittent blood pressure monitors. The patient was hemodynamically stable throughout the entire process was responsive to voice, and breathing spontaneously. Supplemental O2 was provided at 2L/min via nasal cannula. Patient was comfortable for the duration  of the procedure. (See nurse documentation and case log for sedation time)    There was a total of 1mg IV Midazolam and 75mcg Fentanyl titrated for the procedure    The patient tolerated the procedure well. Did not have any procedure related motor deficit at the conclusion of the procedure    The patient was monitored after the procedure. Patient was given post procedure and discharge instructions to follow at home. We will see the patient back in two weeks or the patient may call to inform of status. The patient was discharged in a stable condition

## 2023-01-10 NOTE — H&P
HPI  Patient presenting for Procedure(s) (LRB):  Left C4-6 RFA with RN IV sedation (Left)     Patient on Anti-coagulation No    No health changes since previous encounter    Past Medical History:   Diagnosis Date    Chest pain 5/14/2015    Colon polyp     Coronary artery disease     pt states MI June 2015    Hypertension     Myocardial infarction     PAD (peripheral artery disease)     Rash 4/20/2022    Tobacco use      Past Surgical History:   Procedure Laterality Date    CHOLECYSTECTOMY  09/03/2015    COLONOSCOPY N/A 10/11/2016    Procedure: COLONOSCOPY;  Surgeon: Haseeb Spears MD;  Location: [facility];  Service: Endoscopy;  Laterality: N/A;    COLONOSCOPY N/A 12/09/2021    Procedure: COLONOSCOPY;  Surgeon: Pat Rios MD;  Location: [facility];  Service: Endoscopy;  Laterality: N/A;    ESOPHAGOGASTRODUODENOSCOPY N/A 12/09/2021    Procedure: EGD (ESOPHAGOGASTRODUODENOSCOPY);  Surgeon: Pat Rios MD;  Location: [facility];  Service: Endoscopy;  Laterality: N/A;    high blood      HYSTERECTOMY      INJECTION OF ANESTHETIC AGENT AROUND MEDIAL BRANCH NERVES INNERVATING CERVICAL FACET JOINT Bilateral 10/15/2021    Procedure: Bilateral C5-7 MBB with RN IV sedation PATIENT WOULD LIKE AFTERNOON ARRIVAL, IF POSSIBLE;  Surgeon: Nano Izquierdo MD;  Location: [facility];  Service: Pain Management;  Laterality: Bilateral;    INJECTION OF ANESTHETIC AGENT AROUND MEDIAL BRANCH NERVES INNERVATING CERVICAL FACET JOINT Bilateral 03/21/2022    Procedure: Bilateral C4-6 MBB with RN IV sedation;  Surgeon: Nano Izquierdo MD;  Location: [facility];  Service: Pain Management;  Laterality: Bilateral;    INJECTION OF ANESTHETIC AGENT INTO SACROILIAC JOINT Right 12/17/2021    Procedure: Right SIJ Injection;  Surgeon: Nano Izquierdo MD;  Location: [facility];  Service: Pain Management;  Laterality: Right;    LEFT HEART CATHETERIZATION Left 03/29/2022    Procedure: CATHETERIZATION, HEART, LEFT;  Surgeon: Lion GAN  MD Delmi;  Location: [facility] CATH LAB;  Service: Cardiology;  Laterality: Left;    OOPHORECTOMY      RADIOFREQUENCY THERMOCOAGULATION Right 12/27/2022    Procedure: Right C4-6 RFA with RN IV sedation;  Surgeon: Nano Izquierdo MD;  Location: [facility];  Service: Pain Management;  Laterality: Right;    RIB FRACTURE SURGERY       Review of patient's allergies indicates:   Allergen Reactions    Amlodipine Swelling    Statins-hmg-coa reductase inhibitors Other (See Comments)     Myalgias to lipitor and simvastatin        No current facility-administered medications on file prior to encounter.     Current Outpatient Medications on File Prior to Encounter   Medication Sig Dispense Refill    aspirin 81 MG Chew Take 1 tablet (81 mg total) by mouth once daily. 360 tablet 1    buPROPion (WELLBUTRIN XL) 150 MG TB24 tablet Take 1 tablet (150 mg total) by mouth once daily. 90 tablet 1    isosorbide mononitrate (IMDUR) 60 MG 24 hr tablet Take 1 tablet (60 mg total) by mouth every evening. 90 tablet 1    metoprolol tartrate (LOPRESSOR) 50 MG tablet Take 1 tablet (50 mg total) by mouth 2 (two) times daily. 180 tablet 1    pantoprazole (PROTONIX) 20 MG tablet Take 1 tablet (20 mg total) by mouth once daily. 90 tablet 1    valsartan (DIOVAN) 320 MG tablet Take 1 tablet (320 mg total) by mouth once daily. 30 tablet 11    clopidogreL (PLAVIX) 75 mg tablet Take 1 tablet (75 mg total) by mouth once daily. 90 tablet 3    cyanocobalamin (VITAMIN B-12) 1000 MCG tablet Take 100 mcg by mouth once daily.      evolocumab (REPATHA SURECLICK) 140 mg/mL PnIj Inject 1 mL (140 mg total) into the skin every 14 (fourteen) days. 2 each 11    fexofenadine HCl (ALLEGRA ORAL) Take 1 tablet by mouth once daily.      fluocinolone acetonide oiL (DERMOTIC OIL) 0.01 % Drop Place 3 drops in ear(s) 2 (two) times daily. 20 mL 2    fluticasone propionate (FLONASE) 50 mcg/actuation nasal spray USE 2 SPRAYS IN EACH NOSTRIL ONE TIME DAILY 48 g 3    furosemide  "(LASIX) 20 MG tablet Take 1 tablet (20 mg total) by mouth once daily. 30 tablet 5    gabapentin (NEURONTIN) 300 MG capsule Take 3 capsules (900 mg total) by mouth every evening. 270 capsule 0    loratadine (CLARITIN) 10 mg tablet Take 10 mg by mouth daily as needed for Allergies.      multivitamin with minerals tablet Take 1 tablet by mouth once daily.      nitroGLYCERIN (NITROSTAT) 0.4 MG SL tablet Place 1 tablet (0.4 mg total) under the tongue every 5 (five) minutes as needed for Chest pain. 25 tablet 0    predniSONE (DELTASONE) 20 MG tablet       tiZANidine (ZANAFLEX) 4 MG tablet       vitamin D (VITAMIN D3) 1000 units Tab Take 1,000 Units by mouth once daily.      [DISCONTINUED] valsartan-hydrochlorothiazide (DIOVAN-HCT) 160-25 mg per tablet Take 1 tablet by mouth once daily. 90 tablet 1        PMHx, PSHx, Allergies, Medications reviewed in epic    ROS negative except pain complaints in HPI    OBJECTIVE:    BP (!) 170/72   Pulse 61   Temp 97.8 °F (36.6 °C) (Temporal)   Resp 15   Ht 5' 4" (1.626 m)   Wt 72 kg (158 lb 13.5 oz)   LMP  (LMP Unknown)   SpO2 98%   BMI 27.27 kg/m²     PHYSICAL EXAMINATION:    GENERAL: Well appearing, in no acute distress, alert and oriented x3.  PSYCH:  Mood and affect appropriate.  SKIN: Skin color, texture, turgor normal, no rashes or lesions which will impact the procedure.  CV: RRR with palpation of the radial artery.  PULM: No evidence of respiratory difficulty, symmetric chest rise. Clear to auscultation.  NEURO: Cranial nerves grossly intact.    Plan:    Proceed with procedure as planned Procedure(s) (LRB):  Left C4-6 RFA with RN IV sedation (Left)    Nano Izquierdo MD  01/10/2023            "

## 2023-01-10 NOTE — PROGRESS NOTES
GI Clinic Note    Referring provider: Dr. Alanna Villa    Chief complaint:   Chief Complaint   Patient presents with    Gastroesophageal Reflux    Dysphagia    Abdominal Pain    Gas    irregular bowel movements       Clinical Summary: Shirlene Olvera is a 67 y.o. female with PMH of CAD w/ PCI, PAD, HTN, CKD4 who presents to clinic for Acid reflux    Patient has long history of GI issues. Mainly Acid reflux, indigestion and bloating. Denies nausea, vomiting, hematemesis, melena, or hematochezia. Patient had EGD on 12/21 for dysphagia which showed small hiatal hernia and colonoscopy was normal except 2 diminutive polyps and hemorrhoids. Patient get partial relief with Protonix. Constipation responds well to laxatives    Patient was recently evaluated for Hypertension with changing her medication . She is on five anti HTN and patient mentions that her GI symptoms worsened after adding those medications    Dysphagia - no   Bowel habits - normal  GI bleeding - none  NSAID usage - none    Past Medical History:   Diagnosis Date    Chest pain 5/14/2015    Colon polyp     Coronary artery disease     pt states MI June 2015    Hypertension     Myocardial infarction     PAD (peripheral artery disease)     Rash 4/20/2022    Tobacco use      Past Surgical History:   Procedure Laterality Date    CHOLECYSTECTOMY  09/03/2015    COLONOSCOPY N/A 10/11/2016    Procedure: COLONOSCOPY;  Surgeon: Haseeb Spears MD;  Location: [facility];  Service: Endoscopy;  Laterality: N/A;    COLONOSCOPY N/A 12/09/2021    Procedure: COLONOSCOPY;  Surgeon: Pat Rios MD;  Location: [facility];  Service: Endoscopy;  Laterality: N/A;    ESOPHAGOGASTRODUODENOSCOPY N/A 12/09/2021    Procedure: EGD (ESOPHAGOGASTRODUODENOSCOPY);  Surgeon: Pat Rios MD;  Location: [facility];  Service: Endoscopy;  Laterality: N/A;    high blood      HYSTERECTOMY      INJECTION OF ANESTHETIC AGENT AROUND MEDIAL BRANCH NERVES INNERVATING CERVICAL FACET  JOINT Bilateral 10/15/2021    Procedure: Bilateral C5-7 MBB with RN IV sedation PATIENT WOULD LIKE AFTERNOON ARRIVAL, IF POSSIBLE;  Surgeon: Nano Izquierdo MD;  Location: [facility];  Service: Pain Management;  Laterality: Bilateral;    INJECTION OF ANESTHETIC AGENT AROUND MEDIAL BRANCH NERVES INNERVATING CERVICAL FACET JOINT Bilateral 2022    Procedure: Bilateral C4-6 MBB with RN IV sedation;  Surgeon: Nano Izquierdo MD;  Location: [facility];  Service: Pain Management;  Laterality: Bilateral;    INJECTION OF ANESTHETIC AGENT INTO SACROILIAC JOINT Right 2021    Procedure: Right SIJ Injection;  Surgeon: Nano Izquierdo MD;  [facility];  Service: Pain Management;  Laterality: Right;    LEFT HEART CATHETERIZATION Left 2022    Procedure: CATHETERIZATION, HEART, LEFT;  Surgeon: Lion Awad MD;  Location: [facility] C[facility];  Service: Cardiology;  Laterality: Left;    OOPHORECTOMY      RADIOFREQUENCY THERMOCOAGULATION Right 2022    Procedure: Right C4-6 RFA with RN IV sedation;  Surgeon: Nano Izquierdo MD;  [facility];  Service: Pain Management;  Laterality: Right;    RADIOFREQUENCY THERMOCOAGULATION Left 1/10/2023    Procedure: Left C4-6 RFA with RN IV sedation;  Surgeon: Nano Izquierdo MD;  Location: [facility];  Service: Pain Management;  Laterality: Left;    RIB FRACTURE SURGERY       Family History   Problem Relation Age of Onset    Heart attack Father 56        MI    Asthma Neg Hx     Thyroid disease Neg Hx     Migraines Neg Hx     Cancer Neg Hx      Social History     Socioeconomic History    Marital status:     Number of children: 3   Occupational History    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    Review of Systems   Constitutional:  Negative for chills, fever and weight loss.   HENT:  Negative for ear pain.    Eyes:  Negative for double vision and pain.   Cardiovascular:  Negative for claudication.   Gastrointestinal:  Negative for abdominal pain, diarrhea and vomiting.   Genitourinary:  Negative for hematuria.   Musculoskeletal:  Negative for back pain and neck pain.   Skin:  Negative for itching and rash.   Neurological:  Negative for tremors.   Endo/Heme/Allergies:  Does not bruise/bleed easily.   Psychiatric/Behavioral:  Negative for substance abuse and suicidal ideas.          Objective:    Physical Exam:  Vitals:    01/11/23 1022   BP: (!) 185/88   Pulse: (!) 58       Physical Exam  Constitutional:       Appearance: Normal appearance.   HENT:      Head: Normocephalic and atraumatic.      Right Ear: External ear normal.      Left Ear: External ear normal.      Nose: No congestion or rhinorrhea.      Mouth/Throat:      Mouth: Mucous membranes are dry.   Eyes:      Extraocular Movements: Extraocular movements intact.      Pupils: Pupils are equal, round, and reactive to light.   Cardiovascular:      Rate and Rhythm: Normal rate and regular rhythm.      Pulses: Normal pulses.      Heart sounds: Normal heart sounds. No murmur heard.  Pulmonary:      Effort: Pulmonary effort is normal.      Breath sounds: Normal breath sounds.   Abdominal:      General: There is no distension.      Palpations: There is no mass.      Tenderness: There is no abdominal tenderness. There is no guarding or rebound.   Musculoskeletal:         General: No swelling or deformity.      Cervical back: Normal range of motion and neck supple.   Skin:     General: Skin is warm.      Coloration: Skin is not jaundiced or pale.   Neurological:      General:  No focal deficit present.      Mental Status: She is alert.      Cranial Nerves: No cranial nerve deficit.   Psychiatric:         Mood and Affect: Mood normal.         Behavior: Behavior normal.         Pertinent Labs:     Admission on 11/01/2022, Discharged on 11/02/2022   Component Date Value Ref Range Status    WBC 11/01/2022 6.65  3.90 - 12.70 K/uL Final    RBC 11/01/2022 3.54 (L)  4.00 - 5.40 M/uL Final    Hemoglobin 11/01/2022 11.7 (L)  12.0 - 16.0 g/dL Final    Hematocrit 11/01/2022 35.1 (L)  37.0 - 48.5 % Final    MCV 11/01/2022 99 (H)  82 - 98 fL Final    MCH 11/01/2022 33.1 (H)  27.0 - 31.0 pg Final    MCHC 11/01/2022 33.3  32.0 - 36.0 g/dL Final    RDW 11/01/2022 12.5  11.5 - 14.5 % Final    Platelets 11/01/2022 231  150 - 450 K/uL Final    MPV 11/01/2022 9.7  9.2 - 12.9 fL Final    Immature Granulocytes 11/01/2022 0.5  0.0 - 0.5 % Final    Gran # (ANC) 11/01/2022 3.8  1.8 - 7.7 K/uL Final    Immature Grans (Abs) 11/01/2022 0.03  0.00 - 0.04 K/uL Final    Lymph # 11/01/2022 1.7  1.0 - 4.8 K/uL Final    Mono # 11/01/2022 0.8  0.3 - 1.0 K/uL Final    Eos # 11/01/2022 0.2  0.0 - 0.5 K/uL Final    Baso # 11/01/2022 0.07  0.00 - 0.20 K/uL Final    nRBC 11/01/2022 0  0 /100 WBC Final    Gran % 11/01/2022 57.5  38.0 - 73.0 % Final    Lymph % 11/01/2022 26.2  18.0 - 48.0 % Final    Mono % 11/01/2022 11.7  4.0 - 15.0 % Final    Eosinophil % 11/01/2022 3.0  0.0 - 8.0 % Final    Basophil % 11/01/2022 1.1  0.0 - 1.9 % Final    Differential Method 11/01/2022 Automated   Final    Sodium 11/01/2022 141  136 - 145 mmol/L Final    Potassium 11/01/2022 4.4  3.5 - 5.1 mmol/L Final    Chloride 11/01/2022 103  95 - 110 mmol/L Final    CO2 11/01/2022 25  23 - 29 mmol/L Final    Glucose 11/01/2022 113 (H)  70 - 110 mg/dL Final    BUN 11/01/2022 28 (H)  8 - 23 mg/dL Final    Creatinine 11/01/2022 2.4 (H)  0.5 - 1.4 mg/dL Final    Calcium 11/01/2022 9.2  8.7 - 10.5 mg/dL Final    Total Protein 11/01/2022 7.3  6.0 - 8.4 g/dL Final     Albumin 11/01/2022 3.9  3.5 - 5.2 g/dL Final    Total Bilirubin 11/01/2022 0.6  0.1 - 1.0 mg/dL Final    Alkaline Phosphatase 11/01/2022 105  55 - 135 U/L Final    AST 11/01/2022 21  10 - 40 U/L Final    ALT 11/01/2022 18  10 - 44 U/L Final    Anion Gap 11/01/2022 13  8 - 16 mmol/L Final    eGFR 11/01/2022 21 (A)  >60 mL/min/1.73 m^2 Final    BNP 11/01/2022 69  0 - 99 pg/mL Final    Troponin I 11/01/2022 0.038 (H)  0.000 - 0.026 ng/mL Final   Admission on 10/31/2022, Discharged on 11/01/2022   Component Date Value Ref Range Status    Troponin I 10/31/2022 0.083 (H)  0.000 - 0.026 ng/mL Final    WBC 10/31/2022 5.02  3.90 - 12.70 K/uL Final    RBC 10/31/2022 3.64 (L)  4.00 - 5.40 M/uL Final    Hemoglobin 10/31/2022 11.9 (L)  12.0 - 16.0 g/dL Final    Hematocrit 10/31/2022 35.6 (L)  37.0 - 48.5 % Final    MCV 10/31/2022 98  82 - 98 fL Final    MCH 10/31/2022 32.7 (H)  27.0 - 31.0 pg Final    MCHC 10/31/2022 33.4  32.0 - 36.0 g/dL Final    RDW 10/31/2022 12.2  11.5 - 14.5 % Final    Platelets 10/31/2022 209  150 - 450 K/uL Final    MPV 10/31/2022 9.6  9.2 - 12.9 fL Final    Immature Granulocytes 10/31/2022 0.2  0.0 - 0.5 % Final    Gran # (ANC) 10/31/2022 3.1  1.8 - 7.7 K/uL Final    Immature Grans (Abs) 10/31/2022 0.01  0.00 - 0.04 K/uL Final    Lymph # 10/31/2022 1.2  1.0 - 4.8 K/uL Final    Mono # 10/31/2022 0.6  0.3 - 1.0 K/uL Final    Eos # 10/31/2022 0.1  0.0 - 0.5 K/uL Final    Baso # 10/31/2022 0.04  0.00 - 0.20 K/uL Final    nRBC 10/31/2022 0  0 /100 WBC Final    Gran % 10/31/2022 61.1  38.0 - 73.0 % Final    Lymph % 10/31/2022 24.7  18.0 - 48.0 % Final    Mono % 10/31/2022 11.4  4.0 - 15.0 % Final    Eosinophil % 10/31/2022 1.8  0.0 - 8.0 % Final    Basophil % 10/31/2022 0.8  0.0 - 1.9 % Final    Differential Method 10/31/2022 Automated   Final    Sodium 10/31/2022 144  136 - 145 mmol/L Final    Potassium 10/31/2022 4.0  3.5 - 5.1 mmol/L Final    Chloride 10/31/2022 105  95 - 110 mmol/L Final    CO2  10/31/2022 29  23 - 29 mmol/L Final    Glucose 10/31/2022 98  70 - 110 mg/dL Final    BUN 10/31/2022 25 (H)  8 - 23 mg/dL Final    Creatinine 10/31/2022 1.6 (H)  0.5 - 1.4 mg/dL Final    Calcium 10/31/2022 9.6  8.7 - 10.5 mg/dL Final    Total Protein 10/31/2022 6.6  6.0 - 8.4 g/dL Final    Albumin 10/31/2022 3.7  3.5 - 5.2 g/dL Final    Total Bilirubin 10/31/2022 0.8  0.1 - 1.0 mg/dL Final    Alkaline Phosphatase 10/31/2022 93  55 - 135 U/L Final    AST 10/31/2022 20  10 - 40 U/L Final    ALT 10/31/2022 15  10 - 44 U/L Final    Anion Gap 10/31/2022 10  8 - 16 mmol/L Final    eGFR 10/31/2022 35 (A)  >60 mL/min/1.73 m^2 Final    Magnesium 10/31/2022 1.9  1.6 - 2.6 mg/dL Final    Troponin I 11/01/2022 0.054 (H)  0.000 - 0.026 ng/mL Final    WBC 11/01/2022 4.73  3.90 - 12.70 K/uL Final    RBC 11/01/2022 3.56 (L)  4.00 - 5.40 M/uL Final    Hemoglobin 11/01/2022 11.7 (L)  12.0 - 16.0 g/dL Final    Hematocrit 11/01/2022 35.2 (L)  37.0 - 48.5 % Final    MCV 11/01/2022 99 (H)  82 - 98 fL Final    MCH 11/01/2022 32.9 (H)  27.0 - 31.0 pg Final    MCHC 11/01/2022 33.2  32.0 - 36.0 g/dL Final    RDW 11/01/2022 12.3  11.5 - 14.5 % Final    Platelets 11/01/2022 200  150 - 450 K/uL Final    MPV 11/01/2022 9.8  9.2 - 12.9 fL Final    Immature Granulocytes 11/01/2022 0.2  0.0 - 0.5 % Final    Gran # (ANC) 11/01/2022 2.7  1.8 - 7.7 K/uL Final    Immature Grans (Abs) 11/01/2022 0.01  0.00 - 0.04 K/uL Final    Lymph # 11/01/2022 1.3  1.0 - 4.8 K/uL Final    Mono # 11/01/2022 0.5  0.3 - 1.0 K/uL Final    Eos # 11/01/2022 0.2  0.0 - 0.5 K/uL Final    Baso # 11/01/2022 0.04  0.00 - 0.20 K/uL Final    nRBC 11/01/2022 0  0 /100 WBC Final    Gran % 11/01/2022 57.8  38.0 - 73.0 % Final    Lymph % 11/01/2022 26.6  18.0 - 48.0 % Final    Mono % 11/01/2022 11.2  4.0 - 15.0 % Final    Eosinophil % 11/01/2022 3.4  0.0 - 8.0 % Final    Basophil % 11/01/2022 0.8  0.0 - 1.9 % Final    Differential Method 11/01/2022 Automated   Final    Sodium  11/01/2022 145  136 - 145 mmol/L Final    Potassium 11/01/2022 4.5  3.5 - 5.1 mmol/L Final    Chloride 11/01/2022 107  95 - 110 mmol/L Final    CO2 11/01/2022 28  23 - 29 mmol/L Final    Glucose 11/01/2022 88  70 - 110 mg/dL Final    BUN 11/01/2022 24 (H)  8 - 23 mg/dL Final    Creatinine 11/01/2022 1.6 (H)  0.5 - 1.4 mg/dL Final    Calcium 11/01/2022 9.6  8.7 - 10.5 mg/dL Final    Anion Gap 11/01/2022 10  8 - 16 mmol/L Final    eGFR 11/01/2022 35 (A)  >60 mL/min/1.73 m^2 Final    BSA 11/01/2022 1.8  m2 Final    TDI SEPTAL 11/01/2022 0.07  m/s Final    LV LATERAL E/E' RATIO 11/01/2022 13.43  m/s Final    LV SEPTAL E/E' RATIO 11/01/2022 13.43  m/s Final    LA WIDTH 11/01/2022 3.20  cm Final    IVC diameter 11/01/2022 1.66  cm Final    Left Ventricular Outflow Tract Esther* 11/01/2022 1.06  cm/s Final    Left Ventricular Outflow Tract Esther* 11/01/2022 4.65  mmHg Final    TV mean gradient 11/01/2022 17  mmHg Final    TDI LATERAL 11/01/2022 0.07  m/s Final    LVIDd 11/01/2022 4.36  3.5 - 6.0 cm Final    IVS 11/01/2022 1.38 (A)  0.6 - 1.1 cm Final    Posterior Wall 11/01/2022 1.28 (A)  0.6 - 1.1 cm Final    Ao root annulus 11/01/2022 2.61  cm Final    LVIDs 11/01/2022 2.77  2.1 - 4.0 cm Final    FS 11/01/2022 36  28 - 44 % Final    LA volume 11/01/2022 32.88  cm3 Final    STJ 11/01/2022 2.81  cm Final    Ascending aorta 11/01/2022 2.77  cm Final    LV mass 11/01/2022 219.47  g Final    LA size 11/01/2022 2.92  cm Final    TAPSE 11/01/2022 2.10  cm Final    Left Ventricle Relative Wall Thick* 11/01/2022 0.59  cm Final    AV mean gradient 11/01/2022 7  mmHg Final    AV valve area 11/01/2022 2.55  cm2 Final    AV Velocity Ratio 11/01/2022 0.85   Final    AV index (prosthetic) 11/01/2022 0.93   Final    MV valve area p 1/2 method 11/01/2022 3.86  cm2 Final    E/A ratio 11/01/2022 1.13   Final    Mean e' 11/01/2022 0.07  m/s Final    E wave deceleration time 11/01/2022 196.65  msec Final    IVRT 11/01/2022 53.28  msec Final     LVOT diameter 11/01/2022 1.87  cm Final    LVOT area 11/01/2022 2.7  cm2 Final    LVOT peak srikanth 11/01/2022 1.32  m/s Final    LVOT peak VTI 11/01/2022 37.20  cm Final    Ao peak srikanth 11/01/2022 1.56  m/s Final    Ao VTI 11/01/2022 40.0  cm Final    RVOT peak srikanth 11/01/2022 0.68  m/s Final    RVOT peak VTI 11/01/2022 16.8  cm Final    LVOT stroke volume 11/01/2022 102.12  cm3 Final    AV peak gradient 11/01/2022 10  mmHg Final    PV mean gradient 11/01/2022 1.04  mmHg Final    E/E' ratio 11/01/2022 13.43  m/s Final    MV Peak E Srikanth 11/01/2022 0.94  m/s Final    TR Max Srikanth 11/01/2022 2.07  m/s Final    MV stenosis pressure 1/2 time 11/01/2022 57.03  ms Final    MV Peak A Srikanth 11/01/2022 0.83  m/s Final    LV Systolic Volume 11/01/2022 28.75  mL Final    LV Systolic Volume Index 11/01/2022 16.2  mL/m2 Final    LV Diastolic Volume 11/01/2022 85.96  mL Final    LV Diastolic Volume Index 11/01/2022 48.56  mL/m2 Final    LA Volume Index 11/01/2022 18.6  mL/m2 Final    LV Mass Index 11/01/2022 124  g/m2 Final    RA Major Axis 11/01/2022 3.42  cm Final    Left Atrium Minor Axis 11/01/2022 4.13  cm Final    Left Atrium Major Axis 11/01/2022 4.15  cm Final    Triscuspid Valve Regurgitation Pea* 11/01/2022 17  mmHg Final    RA Width 11/01/2022 2.30  cm Final    Right Atrial Pressure (from IVC) 11/01/2022 3  mmHg Final    EF 11/01/2022 55  % Final    TV rest pulmonary artery pressure 11/01/2022 20  mmHg Final    Troponin I 11/01/2022 0.044 (H)  0.000 - 0.026 ng/mL Final        Imaging:  No images are attached to the encounter.    Assessment:  Problem List Items Addressed This Visit          GI    Dyspepsia    Relevant Medications    pantoprazole (PROTONIX) 20 MG tablet     Other Visit Diagnoses       Gastroesophageal reflux disease with esophagitis without hemorrhage    -  Primary    Hiatal hernia        History of colonic polyps        Grade I hemorrhoids        Dysphagia, unspecified type        Schatzki's ring                 Shirlene was seen today for gastroesophageal reflux, dysphagia, abdominal pain, gas and irregular bowel movements.    Diagnoses and all orders for this visit:    Gastroesophageal reflux disease with esophagitis without hemorrhage    Hiatal hernia    History of colonic polyps    Grade I hemorrhoids    Dysphagia, unspecified type  -     Ambulatory referral/consult to Gastroenterology    [facility]  -     Ambulatory referral/consult to Gastroenterology    Dyspepsia  -     pantoprazole (PROTONIX) 20 MG tablet; Take 2 tablets (40 mg total) by mouth 2 (two) times daily before meals.    Other orders  -     ondansetron (ZOFRAN-ODT) 4 MG TbDL; Take 1 tablet (4 mg total) by mouth every 8 (eight) hours as needed.          Plan:  - PPI dose adjusted  -EGD if dysphagia recurs  -Precaution end education on Hiatal hernia symptoms      Hiatal Hernia  Diet and education      Hemorrhoids  Monitor  Avoid straining  Proctofoam    Colon Polyps  Colonoscopy due in 2025    CAD and HTN  Chronic; unstable on current treatment plan; follow up with PCP      Follow up if symptoms worsen or fail to improve.      Time Statement  A total Time Includes preparing to see patient, reviewing  diagnostic studies and records, direct face-to-face visit, completing orders, medications , reconciliation, prescription management, and care coordination    We discussed in depth the nature of the patient's disease, the management plan in details. I have provided the patient with an opportunity to ask questions and have all questions answered to his satisfaction.       DARSHANA LOPEZ MD  Gastroenterology & Transplant Hepatology  [facility], [place] [facility], [place] [facility], [place]

## 2023-01-11 ENCOUNTER — TELEPHONE (OUTPATIENT)
Dept: GASTROENTEROLOGY | Facility: CLINIC | Age: 68
End: 2023-01-11

## 2023-01-11 ENCOUNTER — OFFICE VISIT (OUTPATIENT)
Dept: GASTROENTEROLOGY | Facility: CLINIC | Age: 68
End: 2023-01-11
Payer: MEDICARE

## 2023-01-11 VITALS
SYSTOLIC BLOOD PRESSURE: 185 MMHG | DIASTOLIC BLOOD PRESSURE: 88 MMHG | WEIGHT: 159.38 LBS | HEART RATE: 58 BPM | BODY MASS INDEX: 27.21 KG/M2 | OXYGEN SATURATION: 97 % | HEIGHT: 64 IN

## 2023-01-11 DIAGNOSIS — K22.2 SCHATZKI'S RING: ICD-10-CM

## 2023-01-11 DIAGNOSIS — K64.0 GRADE I HEMORRHOIDS: ICD-10-CM

## 2023-01-11 DIAGNOSIS — R10.13 DYSPEPSIA: ICD-10-CM

## 2023-01-11 DIAGNOSIS — Z86.010 HISTORY OF COLONIC POLYPS: ICD-10-CM

## 2023-01-11 DIAGNOSIS — K44.9 HIATAL HERNIA: ICD-10-CM

## 2023-01-11 DIAGNOSIS — K21.00 GASTROESOPHAGEAL REFLUX DISEASE WITH ESOPHAGITIS WITHOUT HEMORRHAGE: Primary | ICD-10-CM

## 2023-01-11 DIAGNOSIS — R13.10 DYSPHAGIA, UNSPECIFIED TYPE: ICD-10-CM

## 2023-01-11 PROCEDURE — 3288F PR FALLS RISK ASSESSMENT DOCUMENTED: ICD-10-PCS | Mod: HCNC,CPTII,S$GLB, | Performed by: INTERNAL MEDICINE

## 2023-01-11 PROCEDURE — 4010F PR ACE/ARB THEARPY RXD/TAKEN: ICD-10-PCS | Mod: HCNC,CPTII,S$GLB, | Performed by: INTERNAL MEDICINE

## 2023-01-11 PROCEDURE — 1101F PR PT FALLS ASSESS DOC 0-1 FALLS W/OUT INJ PAST YR: ICD-10-PCS | Mod: HCNC,CPTII,S$GLB, | Performed by: INTERNAL MEDICINE

## 2023-01-11 PROCEDURE — 1159F PR MEDICATION LIST DOCUMENTED IN MEDICAL RECORD: ICD-10-PCS | Mod: HCNC,CPTII,S$GLB, | Performed by: INTERNAL MEDICINE

## 2023-01-11 PROCEDURE — 99999 PR PBB SHADOW E&M-EST. PATIENT-LVL V: ICD-10-PCS | Mod: PBBFAC,HCNC,, | Performed by: INTERNAL MEDICINE

## 2023-01-11 PROCEDURE — 1160F PR REVIEW ALL MEDS BY PRESCRIBER/CLIN PHARMACIST DOCUMENTED: ICD-10-PCS | Mod: HCNC,CPTII,S$GLB, | Performed by: INTERNAL MEDICINE

## 2023-01-11 PROCEDURE — 4010F ACE/ARB THERAPY RXD/TAKEN: CPT | Mod: HCNC,CPTII,S$GLB, | Performed by: INTERNAL MEDICINE

## 2023-01-11 PROCEDURE — 1125F AMNT PAIN NOTED PAIN PRSNT: CPT | Mod: HCNC,CPTII,S$GLB, | Performed by: INTERNAL MEDICINE

## 2023-01-11 PROCEDURE — 99203 OFFICE O/P NEW LOW 30 MIN: CPT | Mod: HCNC,S$GLB,, | Performed by: INTERNAL MEDICINE

## 2023-01-11 PROCEDURE — 3079F PR MOST RECENT DIASTOLIC BLOOD PRESSURE 80-89 MM HG: ICD-10-PCS | Mod: HCNC,CPTII,S$GLB, | Performed by: INTERNAL MEDICINE

## 2023-01-11 PROCEDURE — 1125F PR PAIN SEVERITY QUANTIFIED, PAIN PRESENT: ICD-10-PCS | Mod: HCNC,CPTII,S$GLB, | Performed by: INTERNAL MEDICINE

## 2023-01-11 PROCEDURE — 1101F PT FALLS ASSESS-DOCD LE1/YR: CPT | Mod: HCNC,CPTII,S$GLB, | Performed by: INTERNAL MEDICINE

## 2023-01-11 PROCEDURE — 99203 PR OFFICE/OUTPT VISIT, NEW, LEVL III, 30-44 MIN: ICD-10-PCS | Mod: HCNC,S$GLB,, | Performed by: INTERNAL MEDICINE

## 2023-01-11 PROCEDURE — 3288F FALL RISK ASSESSMENT DOCD: CPT | Mod: HCNC,CPTII,S$GLB, | Performed by: INTERNAL MEDICINE

## 2023-01-11 PROCEDURE — 99999 PR PBB SHADOW E&M-EST. PATIENT-LVL V: CPT | Mod: PBBFAC,HCNC,, | Performed by: INTERNAL MEDICINE

## 2023-01-11 PROCEDURE — 3077F PR MOST RECENT SYSTOLIC BLOOD PRESSURE >= 140 MM HG: ICD-10-PCS | Mod: HCNC,CPTII,S$GLB, | Performed by: INTERNAL MEDICINE

## 2023-01-11 PROCEDURE — 1159F MED LIST DOCD IN RCRD: CPT | Mod: HCNC,CPTII,S$GLB, | Performed by: INTERNAL MEDICINE

## 2023-01-11 PROCEDURE — 1160F RVW MEDS BY RX/DR IN RCRD: CPT | Mod: HCNC,CPTII,S$GLB, | Performed by: INTERNAL MEDICINE

## 2023-01-11 PROCEDURE — 3079F DIAST BP 80-89 MM HG: CPT | Mod: HCNC,CPTII,S$GLB, | Performed by: INTERNAL MEDICINE

## 2023-01-11 PROCEDURE — 3077F SYST BP >= 140 MM HG: CPT | Mod: HCNC,CPTII,S$GLB, | Performed by: INTERNAL MEDICINE

## 2023-01-11 PROCEDURE — 3008F BODY MASS INDEX DOCD: CPT | Mod: HCNC,CPTII,S$GLB, | Performed by: INTERNAL MEDICINE

## 2023-01-11 PROCEDURE — 3008F PR BODY MASS INDEX (BMI) DOCUMENTED: ICD-10-PCS | Mod: HCNC,CPTII,S$GLB, | Performed by: INTERNAL MEDICINE

## 2023-01-11 RX ORDER — ONDANSETRON 4 MG/1
4 TABLET, ORALLY DISINTEGRATING ORAL EVERY 8 HOURS PRN
Qty: 1 TABLET | Refills: 0 | Status: SHIPPED | OUTPATIENT
Start: 2023-01-11 | End: 2024-02-05

## 2023-01-11 RX ORDER — PANTOPRAZOLE SODIUM 20 MG/1
40 TABLET, DELAYED RELEASE ORAL
Qty: 90 TABLET | Refills: 1 | Status: SHIPPED | OUTPATIENT
Start: 2023-01-11 | End: 2023-07-13 | Stop reason: SDUPTHER

## 2023-01-11 NOTE — TELEPHONE ENCOUNTER
----- Message from Stephanie Alvarez sent at 1/11/2023 11:17 AM CST -----  Contact: [facility]  ..Type:  Pharmacy Calling to Clarify an RX    Name of Caller:Ny   Pharmacy Name: [facility]   Prescription Name:ondansetron (ZOFRAN-ODT) 4 MG TbDL  What do they need to clarify?:qty   Best Call Back Number:[phone]  Additional Information:       Spoke to Ny and let her know that a quantity of 90 was needed

## 2023-01-13 ENCOUNTER — OFFICE VISIT (OUTPATIENT)
Dept: PRIMARY CARE CLINIC | Facility: CLINIC | Age: 68
End: 2023-01-13
Payer: MEDICARE

## 2023-01-13 ENCOUNTER — SPECIALTY PHARMACY (OUTPATIENT)
Dept: PHARMACY | Facility: CLINIC | Age: 68
End: 2023-01-13
Payer: MEDICARE

## 2023-01-13 VITALS
TEMPERATURE: 97 F | DIASTOLIC BLOOD PRESSURE: 84 MMHG | OXYGEN SATURATION: 97 % | SYSTOLIC BLOOD PRESSURE: 144 MMHG | WEIGHT: 158.31 LBS | RESPIRATION RATE: 15 BRPM | HEIGHT: 64 IN | BODY MASS INDEX: 27.03 KG/M2 | HEART RATE: 54 BPM

## 2023-01-13 DIAGNOSIS — M54.16 LUMBAR RADICULOPATHY: ICD-10-CM

## 2023-01-13 DIAGNOSIS — I10 ESSENTIAL HYPERTENSION: Primary | ICD-10-CM

## 2023-01-13 DIAGNOSIS — N18.31 STAGE 3A CHRONIC KIDNEY DISEASE: ICD-10-CM

## 2023-01-13 DIAGNOSIS — I49.9 CARDIAC ARRHYTHMIA, UNSPECIFIED CARDIAC ARRHYTHMIA TYPE: ICD-10-CM

## 2023-01-13 DIAGNOSIS — K44.9 HIATAL HERNIA WITH GERD: ICD-10-CM

## 2023-01-13 DIAGNOSIS — I21.4 NSTEMI (NON-ST ELEVATED MYOCARDIAL INFARCTION): ICD-10-CM

## 2023-01-13 DIAGNOSIS — M54.12 CERVICAL RADICULOPATHY: ICD-10-CM

## 2023-01-13 DIAGNOSIS — K21.9 HIATAL HERNIA WITH GERD: ICD-10-CM

## 2023-01-13 DIAGNOSIS — E78.2 MIXED HYPERLIPIDEMIA: Chronic | ICD-10-CM

## 2023-01-13 PROCEDURE — 3008F BODY MASS INDEX DOCD: CPT | Mod: HCNC,CPTII,S$GLB, | Performed by: FAMILY MEDICINE

## 2023-01-13 PROCEDURE — 3008F PR BODY MASS INDEX (BMI) DOCUMENTED: ICD-10-PCS | Mod: HCNC,CPTII,S$GLB, | Performed by: FAMILY MEDICINE

## 2023-01-13 PROCEDURE — 1125F AMNT PAIN NOTED PAIN PRSNT: CPT | Mod: HCNC,CPTII,S$GLB, | Performed by: FAMILY MEDICINE

## 2023-01-13 PROCEDURE — 1125F PR PAIN SEVERITY QUANTIFIED, PAIN PRESENT: ICD-10-PCS | Mod: HCNC,CPTII,S$GLB, | Performed by: FAMILY MEDICINE

## 2023-01-13 PROCEDURE — 1160F RVW MEDS BY RX/DR IN RCRD: CPT | Mod: HCNC,CPTII,S$GLB, | Performed by: FAMILY MEDICINE

## 2023-01-13 PROCEDURE — 99999 PR PBB SHADOW E&M-EST. PATIENT-LVL V: CPT | Mod: PBBFAC,HCNC,, | Performed by: FAMILY MEDICINE

## 2023-01-13 PROCEDURE — 99215 OFFICE O/P EST HI 40 MIN: CPT | Mod: HCNC,S$GLB,, | Performed by: FAMILY MEDICINE

## 2023-01-13 PROCEDURE — 3077F SYST BP >= 140 MM HG: CPT | Mod: HCNC,CPTII,S$GLB, | Performed by: FAMILY MEDICINE

## 2023-01-13 PROCEDURE — 99999 PR PBB SHADOW E&M-EST. PATIENT-LVL V: ICD-10-PCS | Mod: PBBFAC,HCNC,, | Performed by: FAMILY MEDICINE

## 2023-01-13 PROCEDURE — 4010F PR ACE/ARB THEARPY RXD/TAKEN: ICD-10-PCS | Mod: HCNC,CPTII,S$GLB, | Performed by: FAMILY MEDICINE

## 2023-01-13 PROCEDURE — 1159F MED LIST DOCD IN RCRD: CPT | Mod: HCNC,CPTII,S$GLB, | Performed by: FAMILY MEDICINE

## 2023-01-13 PROCEDURE — 3079F DIAST BP 80-89 MM HG: CPT | Mod: HCNC,CPTII,S$GLB, | Performed by: FAMILY MEDICINE

## 2023-01-13 PROCEDURE — 99215 PR OFFICE/OUTPT VISIT, EST, LEVL V, 40-54 MIN: ICD-10-PCS | Mod: HCNC,S$GLB,, | Performed by: FAMILY MEDICINE

## 2023-01-13 PROCEDURE — 4010F ACE/ARB THERAPY RXD/TAKEN: CPT | Mod: HCNC,CPTII,S$GLB, | Performed by: FAMILY MEDICINE

## 2023-01-13 PROCEDURE — 3079F PR MOST RECENT DIASTOLIC BLOOD PRESSURE 80-89 MM HG: ICD-10-PCS | Mod: HCNC,CPTII,S$GLB, | Performed by: FAMILY MEDICINE

## 2023-01-13 PROCEDURE — 1160F PR REVIEW ALL MEDS BY PRESCRIBER/CLIN PHARMACIST DOCUMENTED: ICD-10-PCS | Mod: HCNC,CPTII,S$GLB, | Performed by: FAMILY MEDICINE

## 2023-01-13 PROCEDURE — 3077F PR MOST RECENT SYSTOLIC BLOOD PRESSURE >= 140 MM HG: ICD-10-PCS | Mod: HCNC,CPTII,S$GLB, | Performed by: FAMILY MEDICINE

## 2023-01-13 PROCEDURE — 1159F PR MEDICATION LIST DOCUMENTED IN MEDICAL RECORD: ICD-10-PCS | Mod: HCNC,CPTII,S$GLB, | Performed by: FAMILY MEDICINE

## 2023-01-13 PROCEDURE — 93005 EKG 12-LEAD: ICD-10-PCS | Mod: HCNC,S$GLB,, | Performed by: FAMILY MEDICINE

## 2023-01-13 PROCEDURE — 93010 ELECTROCARDIOGRAM REPORT: CPT | Mod: HCNC,S$GLB,, | Performed by: INTERNAL MEDICINE

## 2023-01-13 PROCEDURE — 93005 ELECTROCARDIOGRAM TRACING: CPT | Mod: HCNC,S$GLB,, | Performed by: FAMILY MEDICINE

## 2023-01-13 PROCEDURE — 93010 EKG 12-LEAD: ICD-10-PCS | Mod: HCNC,S$GLB,, | Performed by: INTERNAL MEDICINE

## 2023-01-13 RX ORDER — HYDRALAZINE HYDROCHLORIDE 50 MG/1
50 TABLET, FILM COATED ORAL EVERY 8 HOURS PRN
Qty: 90 TABLET | Refills: 3 | Status: SHIPPED | OUTPATIENT
Start: 2023-01-13 | End: 2024-01-13

## 2023-01-13 NOTE — TELEPHONE ENCOUNTER
Specialty Pharmacy - Refill Coordination    Specialty Medication Orders Linked to Encounter      Flowsheet Row Most Recent Value   Medication #1 evolocumab (REPATHA SURECLICK) 140 mg/mL PnIj (Order#[id number], Rx#[id number])            Refill Questions - Documented Responses      Flowsheet Row Most Recent Value   Patient Availability and HIPAA Verification    Does patient want to proceed with activity? Yes   HIPAA/medical authority confirmed? Yes   Relationship to patient of person spoken to? Self   Refill Screening Questions    Would patient like to speak to a pharmacist? No   When does the patient need to receive the medication? 01/21/23   Refill Delivery Questions    How will the patient receive the medication? MEDRx   When does the patient need to receive the medication? 01/21/23   Shipping Address Home   Address in [facility] confirmed and updated if neccessary? Yes   Expected Copay ($) 0   Is the patient able to afford the medication copay? Yes   Payment Method zero copay   Days supply of Refill 28   Supplies needed? No supplies needed   Refill activity completed? Yes   Refill activity plan Refill scheduled   Shipment/Pickup Date: 01/18/23            Current Outpatient Medications   Medication Sig    aspirin 81 MG Chew Take 1 tablet (81 mg total) by mouth once daily.    buPROPion (WELLBUTRIN XL) 150 MG TB24 tablet Take 1 tablet (150 mg total) by mouth once daily.    clopidogreL (PLAVIX) 75 mg tablet Take 1 tablet (75 mg total) by mouth once daily.    cyanocobalamin (VITAMIN B-12) 1000 MCG tablet Take 100 mcg by mouth once daily.    evolocumab (REPATHA SURECLICK) 140 mg/mL PnIj Inject 1 mL (140 mg total) into the skin every 14 (fourteen) days.    fluocinolone acetonide oiL (DERMOTIC OIL) 0.01 % Drop Place 3 drops in ear(s) 2 (two) times daily.    fluticasone propionate (FLONASE) 50 mcg/actuation nasal spray USE 2 SPRAYS IN EACH NOSTRIL ONE TIME DAILY    furosemide (LASIX) 20 MG tablet Take 1 tablet (20  mg total) by mouth once daily.    hydrALAZINE (APRESOLINE) 50 MG tablet Take 1 tablet (50 mg total) by mouth every 8 (eight) hours as needed (SBP>180 or DBP>100).    isosorbide mononitrate (IMDUR) 60 MG 24 hr tablet Take 1 tablet (60 mg total) by mouth every evening.    metoprolol tartrate (LOPRESSOR) 50 MG tablet Take 1 tablet (50 mg total) by mouth 2 (two) times daily.    multivitamin with minerals tablet Take 1 tablet by mouth once daily.    nitroGLYCERIN (NITROSTAT) 0.4 MG SL tablet Place 1 tablet (0.4 mg total) under the tongue every 5 (five) minutes as needed for Chest pain.    ondansetron (ZOFRAN-ODT) 4 MG TbDL Take 1 tablet (4 mg total) by mouth every 8 (eight) hours as needed.    pantoprazole (PROTONIX) 20 MG tablet Take 2 tablets (40 mg total) by mouth 2 (two) times daily before meals.    tiZANidine (ZANAFLEX) 4 MG tablet     valsartan (DIOVAN) 320 MG tablet Take 1 tablet (320 mg total) by mouth once daily.    vitamin D (VITAMIN D3) 1000 units Tab Take 1,000 Units by mouth once daily.   Last reviewed on 1/13/2023  9:31 AM by Hermes Miranda MD    Review of patient's allergies indicates:   Allergen Reactions    Amlodipine Swelling    Statins-hmg-coa reductase inhibitors Other (See Comments)     Myalgias to lipitor and simvastatin    Last reviewed on  1/13/2023 9:31 AM by Hermes Miranda      Tasks added this encounter   2/11/2023 - Refill Call (Auto Added)   Tasks due within next 3 months   No tasks due.     Liliana Mayfield  [facility]  [address] [place] [id number]  Phone: [phone]  Fax: [phone]

## 2023-01-13 NOTE — PROGRESS NOTES
"    [facility]       [address] [place] [address]      Phone: [phone]      Fax: [phone]    Hermes Miranda MD                Office Visit  01/13/2023        Subjective      HPI:  Shirlene Olvera is a 67 y.o. female presents today in clinic for "Medication Management  ."     67-year-old female presents today to establish care, discuss multiple issues.      Patient states that she feels relatively okay today.  No chest pain, shortness a breath.  No fever, chills, body aches.  No coughing, sneezing, URI type symptoms.  No urinary symptoms.    States appetite is okay.  Gets frequent indigestion.  Recently saw GI, was diagnosed with hiatal hernia.  Just started on Zofran for nausea, Protonix for GERD.  Bowel movements are also hit or miss.  Frequent constipation.  Was instructed to take MiraLax daily    States her blood pressure has been really hard to control lately.  Has been following with Cardiology.  Has extensive history of CAD, stent placement.  Currently takes both aspirin and Plavix.  Also takes Lasix 20 mg daily.  For blood pressure, uses Imdur 60 mg in the evenings.  States this med sometimes gives her indigestion, makes her feel bad.  Is on metoprolol tartrate 50 mg twice a day.  Valsartan 320 mg daily.  Was on amlodipine in the past.  Caused severe swelling, so it was discontinued.  Digital medicine program tried putting her on nifedipine instead, but that made her swell up, as well.    Has elevated cholesterol, triglycerides.  Labs done in August showed cholesterol levels were normal, but triglycerides remain elevated.  Has tried several statins in the past, but they all caused joint pains, swelling.  Currently taking Repatha injections every other week.  No issues with this.    Also has ongoing issues with cervical and lumbar radiculopathy.  Gets pains in her arms, neck.  Also has low back pain, radiating down to her legs.  Legs tend to " tingle, burn at night.  Has been seeing Dr. Izquierdo, getting injections.  Was taking gabapentin, but that was discontinued.  Would like to consider medical marijuana.    PMH:  HTN, HLD, CKD, CAD/stent, GERD/hiatal hernia, cervical/lumbar radiculopathy, environmental allergies.    PSH: Cardiac stent.  Gallbladder.  Hysterectomy.  Rib removal (thoracic outlet syndrome).    F MH: HTN.  DM.    Allergies:  Statins caused arthralgias.  Amlodipine causes swelling.  Imdur causes indigestion.    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  Lives with daughter.    T:  No current use.  Quit in .  Previously, one ppd times 30+ years.    A:  Denies.  Also quit in .    D:  Denies    Exercise:  Frequently goes up and down the stairs.  Tries to stay active.      GI:  [facility]  Cardiology:  Dr. Byers   ENT:  [facility]   Neck: [facility]    MM2022     Colon:  2021.  Repeat five years ()      The following were updated and reviewed by myself in the chart: medications, past medical history, past surgical history, family history, social history, and allergies.     Medications:  Current Outpatient Medications on File Prior to Visit   Medication Sig Dispense Refill    aspirin 81 MG Chew Take 1 tablet (81 mg total) by mouth once daily. 360 tablet 1    buPROPion (WELLBUTRIN XL) 150 MG TB24 tablet Take 1 tablet (150 mg total) by mouth once daily. 90 tablet 1    clopidogreL (PLAVIX) 75 mg tablet Take 1 tablet (75 mg total) by mouth once daily. 90 tablet 3    cyanocobalamin (VITAMIN B-12) 1000 MCG tablet Take 100 mcg by mouth once daily.      evolocumab (REPATHA SURECLICK) 140 mg/mL PnIj Inject 1 mL (140 mg total) into the skin every 14 (fourteen) days. 2 each 11    fluocinolone acetonide oiL (DERMOTIC OIL) 0.01 % Drop Place 3 drops in ear(s) 2 (two) times daily. 20 mL 2    fluticasone propionate (FLONASE) 50 mcg/actuation nasal spray USE 2 SPRAYS IN EACH NOSTRIL ONE TIME DAILY 48 g 3    furosemide (LASIX) 20 MG tablet Take 1 tablet  (20 mg total) by mouth once daily. 30 tablet 5    isosorbide mononitrate (IMDUR) 60 MG 24 hr tablet Take 1 tablet (60 mg total) by mouth every evening. 90 tablet 1    metoprolol tartrate (LOPRESSOR) 50 MG tablet Take 1 tablet (50 mg total) by mouth 2 (two) times daily. 180 tablet 1    multivitamin with minerals tablet Take 1 tablet by mouth once daily.      nitroGLYCERIN (NITROSTAT) 0.4 MG SL tablet Place 1 tablet (0.4 mg total) under the tongue every 5 (five) minutes as needed for Chest pain. 25 tablet 0    ondansetron (ZOFRAN-ODT) 4 MG TbDL Take 1 tablet (4 mg total) by mouth every 8 (eight) hours as needed. 1 tablet 0    pantoprazole (PROTONIX) 20 MG tablet Take 2 tablets (40 mg total) by mouth 2 (two) times daily before meals. 90 tablet 1    tiZANidine (ZANAFLEX) 4 MG tablet       valsartan (DIOVAN) 320 MG tablet Take 1 tablet (320 mg total) by mouth once daily. 30 tablet 11    vitamin D (VITAMIN D3) 1000 units Tab Take 1,000 Units by mouth once daily.      [DISCONTINUED] fexofenadine HCl (ALLEGRA ORAL) Take 1 tablet by mouth once daily.      [DISCONTINUED] loratadine (CLARITIN) 10 mg tablet Take 10 mg by mouth daily as needed for Allergies.      [DISCONTINUED] gabapentin (NEURONTIN) 300 MG capsule Take 3 capsules (900 mg total) by mouth every evening. (Patient not taking: Reported on 1/11/2023) 270 capsule 0    [DISCONTINUED] NIFEdipine (PROCARDIA-XL) 30 MG (OSM) 24 hr tablet Take 1 tablet (30 mg total) by mouth once daily. (Patient not taking: Reported on 1/13/2023) 30 tablet 1    [DISCONTINUED] predniSONE (DELTASONE) 20 MG tablet       [DISCONTINUED] valsartan-hydrochlorothiazide (DIOVAN-HCT) 160-25 mg per tablet Take 1 tablet by mouth once daily. 90 tablet 1     No current facility-administered medications on file prior to visit.        PMHx:  Past Medical History:   Diagnosis Date    Chest pain 5/14/2015    Colon polyp     Coronary artery disease     pt states MI June 2015    Hypertension     Myocardial  infarction     PAD (peripheral artery disease)     Rash 4/20/2022    Tobacco use       Patient Active Problem List    Diagnosis Date Noted    Hiatal hernia 01/11/2023    Screening mammogram, encounter for 11/07/2022    Accelerated hypertension 11/01/2022    NSTEMI (non-ST elevated myocardial infarction) 11/01/2022    Postmenopausal 08/18/2022    Pure hypercholesterolemia 08/18/2022    Dyspepsia 08/18/2022    Ankle swelling 08/18/2022    Cervical strain 04/20/2022    Delayed immunizations 04/20/2022    Sinusitis 02/17/2022    Pharyngoesophageal dysphagia 12/09/2021    Schatzki's ring of distal esophagus 12/09/2021    Encounter for screening colonoscopy 12/09/2021    Cervical spondylosis 10/15/2021    Multiple nevi 08/16/2021    CKD (chronic kidney disease) stage 4, GFR 15-29 ml/min 08/02/2021    Statin intolerance 01/02/2019    PAD (peripheral artery disease) 06/09/2018    ROMÁN (generalized anxiety disorder) 06/09/2018    Insomnia 06/09/2018    History of colon polyps 10/11/2016    Biliary dyskinesia 09/03/2015    Coronary artery disease 08/31/2015    History of non-ST elevation myocardial infarction (NSTEMI) 05/14/2015    Tobacco abuse 07/18/2014    Allergic rhinitis, cause unspecified 07/18/2014    Essential hypertension 10/10/2013    Hyperlipidemia 10/10/2013    Degenerative disc disease, cervical 10/10/2013    DDD (degenerative disc disease), lumbar 10/10/2013    Thoracic or lumbosacral neuritis or radiculitis, unspecified 07/16/2013        PSHx:  Past Surgical History:   Procedure Laterality Date    CHOLECYSTECTOMY  09/03/2015    COLONOSCOPY N/A 10/11/2016    Procedure: COLONOSCOPY;  Surgeon: Haseeb Spears MD;  Location: [facility] ENDO;  Service: Endoscopy;  Laterality: N/A;    COLONOSCOPY N/A 12/09/2021    Procedure: COLONOSCOPY;  Surgeon: Pat Rios MD;  Location: [facility] ENDO;  Service: Endoscopy;  Laterality: N/A;    ESOPHAGOGASTRODUODENOSCOPY N/A 12/09/2021    Procedure: EGD  (ESOPHAGOGASTRODUODENOSCOPY);  Surgeon: Pat Rios MD;  Location: [facility] ENDO;  Service: Endoscopy;  Laterality: N/A;    high blood      HYSTERECTOMY      INJECTION OF ANESTHETIC AGENT AROUND MEDIAL BRANCH NERVES INNERVATING CERVICAL FACET JOINT Bilateral 10/15/2021    Procedure: Bilateral C5-7 MBB with RN IV sedation PATIENT WOULD LIKE AFTERNOON ARRIVAL, IF POSSIBLE;  Surgeon: Nano Izquierdo MD;  [facility];  Service: Pain Management;  Laterality: Bilateral;    INJECTION OF ANESTHETIC AGENT AROUND MEDIAL BRANCH NERVES INNERVATING CERVICAL FACET JOINT Bilateral 03/21/2022    Procedure: Bilateral C4-6 MBB with RN IV sedation;  Surgeon: Nano Izquierdo MD;  Location: [facility];  Service: Pain Management;  Laterality: Bilateral;    INJECTION OF ANESTHETIC AGENT INTO SACROILIAC JOINT Right 12/17/2021    Procedure: Right SIJ Injection;  Surgeon: Nano Izquierdo MD;  Location: [facility] PAIN MGT;  Service: Pain Management;  Laterality: Right;    LEFT HEART CATHETERIZATION Left 03/29/2022    Procedure: CATHETERIZATION, HEART, LEFT;  Surgeon: Lion Awad MD;  Location: [facility] CATH LAB;  Service: Cardiology;  Laterality: Left;    OOPHORECTOMY      RADIOFREQUENCY THERMOCOAGULATION Right 12/27/2022    Procedure: Right C4-6 RFA with RN IV sedation;  Surgeon: Nano Izquierdo MD;  [facility];  Service: Pain Management;  Laterality: Right;    RADIOFREQUENCY THERMOCOAGULATION Left 1/10/2023    Procedure: Left C4-6 RFA with RN IV sedation;  Surgeon: Nano Izquierdo MD;  [facility];  Service: Pain Management;  Laterality: Left;    RIB FRACTURE SURGERY          FHx:  Family History   Problem Relation Age of Onset    Heart attack Father 56        MI    Stroke Sister     Asthma Neg Hx     Thyroid disease Neg Hx     Migraines Neg Hx     Cancer Neg Hx         Social:  Social History     Socioeconomic History    Marital status:     Number of children: 3   Occupational History    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     Employer: [employer]   Tobacco Use    Smoking status: Every Day     Packs/day: 0.25     Types: Cigarettes     Last attempt to quit: 3/29/2022     Years since quittin.7    Smokeless tobacco: Never   Substance and Sexual Activity    Alcohol use: Not Currently    Drug use: No    Sexual activity: Not Currently     Partners: Male     Birth control/protection: None        Allergies:  Review of patient's allergies indicates:   Allergen Reactions    Amlodipine Swelling    Statins-hmg-coa reductase inhibitors Other (See Comments)     Myalgias to lipitor and simvastatin        ROS:  Review of Systems   Constitutional:  Negative for activity change, appetite change, chills and fever.   HENT:  Negative for congestion, postnasal drip, rhinorrhea, sore throat and trouble swallowing.    Respiratory:  Negative for cough, shortness of breath and wheezing.    Cardiovascular:  Negative for chest pain and palpitations.   Gastrointestinal:  Positive for constipation. Negative for abdominal pain, diarrhea, nausea and vomiting.   Genitourinary:  Negative for difficulty urinating.   Musculoskeletal:  Positive for arthralgias and myalgias.   Skin:  Negative for color change and rash.   Neurological:  Negative for speech difficulty and headaches.   All other systems reviewed and are negative.       Objective      BP (!) 144/84   Pulse (!) 54   Temp 97.3 °F (36.3 °C) (Temporal)   Resp 15   Ht 5' 4" (1.626 m)   Wt 71.8 kg (158 lb 4.6 oz)   LMP  (LMP Unknown)   SpO2 97%   BMI 27.17 kg/m²   Ht Readings from Last 3 Encounters:   23 5' 4" (1.626 m)   23 5' 4" (1.626 m)   01/10/23 5' 4" (1.626 m)     Wt Readings from Last 3 Encounters:   23 71.8 kg (158 lb 4.6 oz)   23 72.3 kg (159 lb 6.3 oz)   01/10/23 72 kg (158 lb 13.5 oz)       PHYSICAL EXAM:  Physical Exam  Vitals and nursing note reviewed.   Constitutional:       General: She is not in acute distress.     " Appearance: Normal appearance.   HENT:      Head: Normocephalic and atraumatic.      Right Ear: Tympanic membrane, ear canal and external ear normal.      Left Ear: Tympanic membrane, ear canal and external ear normal.      Nose: Nose normal. No congestion or rhinorrhea.      Mouth/Throat:      Mouth: Mucous membranes are moist.      Pharynx: Oropharynx is clear. No oropharyngeal exudate or posterior oropharyngeal erythema.   Eyes:      Extraocular Movements: Extraocular movements intact.      Conjunctiva/sclera: Conjunctivae normal.      Pupils: Pupils are equal, round, and reactive to light.   Cardiovascular:      Rate and Rhythm: Normal rate. Rhythm irregular.   Pulmonary:      Effort: Pulmonary effort is normal. No respiratory distress.      Breath sounds: No wheezing, rhonchi or rales.   Musculoskeletal:         General: Normal range of motion.      Cervical back: Normal range of motion.      Lumbar back: Tenderness present.        Back:       Comments: Tenderness over right SI joint.   Lymphadenopathy:      Cervical: No cervical adenopathy.   Skin:     General: Skin is warm and dry.      Findings: No rash.   Neurological:      Mental Status: She is alert.            LABS / IMAGING:  Recent Results (from the past 4368 hour(s))   CBC Auto Differential    Collection Time: 08/18/22 10:06 AM   Result Value Ref Range    WBC 5.07 3.90 - 12.70 K/uL    RBC 3.76 (L) 4.00 - 5.40 M/uL    Hemoglobin 12.8 12.0 - 16.0 g/dL    Hematocrit 38.7 37.0 - 48.5 %     (H) 82 - 98 fL    MCH 34.0 (H) 27.0 - 31.0 pg    MCHC 33.1 32.0 - 36.0 g/dL    RDW 12.0 11.5 - 14.5 %    Platelets 207 150 - 450 K/uL    MPV 10.1 9.2 - 12.9 fL    Immature Granulocytes 0.2 0.0 - 0.5 %    Gran # (ANC) 2.5 1.8 - 7.7 K/uL    Immature Grans (Abs) 0.01 0.00 - 0.04 K/uL    Lymph # 1.5 1.0 - 4.8 K/uL    Mono # 0.6 0.3 - 1.0 K/uL    Eos # 0.5 0.0 - 0.5 K/uL    Baso # 0.07 0.00 - 0.20 K/uL    nRBC 0 0 /100 WBC    Gran % 48.5 38.0 - 73.0 %    Lymph % 29.0  18.0 - 48.0 %    Mono % 11.8 4.0 - 15.0 %    Eosinophil % 9.1 (H) 0.0 - 8.0 %    Basophil % 1.4 0.0 - 1.9 %    Differential Method Automated    Comprehensive Metabolic Panel    Collection Time: 08/18/22 10:06 AM   Result Value Ref Range    Sodium 143 136 - 145 mmol/L    Potassium 4.9 3.5 - 5.1 mmol/L    Chloride 104 95 - 110 mmol/L    CO2 30 (H) 23 - 29 mmol/L    Glucose 85 70 - 110 mg/dL    BUN 25 (H) 8 - 23 mg/dL    Creatinine 1.7 (H) 0.5 - 1.4 mg/dL    Calcium 10.0 8.7 - 10.5 mg/dL    Total Protein 6.9 6.0 - 8.4 g/dL    Albumin 4.0 3.5 - 5.2 g/dL    Total Bilirubin 0.9 0.1 - 1.0 mg/dL    Alkaline Phosphatase 109 55 - 135 U/L    AST 33 10 - 40 U/L    ALT 33 10 - 44 U/L    Anion Gap 9 8 - 16 mmol/L    eGFR 32.7 (A) >60 mL/min/1.73 m^2   Hemoglobin A1C    Collection Time: 08/18/22 10:06 AM   Result Value Ref Range    Hemoglobin A1C 5.4 4.0 - 5.6 %    Estimated Avg Glucose 108 68 - 131 mg/dL   Ferritin    Collection Time: 08/18/22 10:06 AM   Result Value Ref Range    Ferritin 92 20.0 - 300.0 ng/mL   Iron and TIBC    Collection Time: 08/18/22 10:06 AM   Result Value Ref Range    Iron 99 30 - 160 ug/dL    Transferrin 237 200 - 375 mg/dL    TIBC 351 250 - 450 ug/dL    Saturated Iron 28 20 - 50 %   Lipid Panel    Collection Time: 08/18/22 10:06 AM   Result Value Ref Range    Cholesterol 168 120 - 199 mg/dL    Triglycerides 279 (H) 30 - 150 mg/dL    HDL 48 40 - 75 mg/dL    LDL Cholesterol 64.2 63.0 - 159.0 mg/dL    HDL/Cholesterol Ratio 28.6 20.0 - 50.0 %    Total Cholesterol/HDL Ratio 3.5 2.0 - 5.0    Non-HDL Cholesterol 120 mg/dL   TSH    Collection Time: 08/18/22 10:06 AM   Result Value Ref Range    TSH 1.848 0.400 - 4.000 uIU/mL   Troponin I    Collection Time: 10/31/22 10:30 PM   Result Value Ref Range    Troponin I 0.083 (H) 0.000 - 0.026 ng/mL   CBC Auto Differential    Collection Time: 10/31/22 10:30 PM   Result Value Ref Range    WBC 5.02 3.90 - 12.70 K/uL    RBC 3.64 (L) 4.00 - 5.40 M/uL    Hemoglobin 11.9 (L)  12.0 - 16.0 g/dL    Hematocrit 35.6 (L) 37.0 - 48.5 %    MCV 98 82 - 98 fL    MCH 32.7 (H) 27.0 - 31.0 pg    MCHC 33.4 32.0 - 36.0 g/dL    RDW 12.2 11.5 - 14.5 %    Platelets 209 150 - 450 K/uL    MPV 9.6 9.2 - 12.9 fL    Immature Granulocytes 0.2 0.0 - 0.5 %    Gran # (ANC) 3.1 1.8 - 7.7 K/uL    Immature Grans (Abs) 0.01 0.00 - 0.04 K/uL    Lymph # 1.2 1.0 - 4.8 K/uL    Mono # 0.6 0.3 - 1.0 K/uL    Eos # 0.1 0.0 - 0.5 K/uL    Baso # 0.04 0.00 - 0.20 K/uL    nRBC 0 0 /100 WBC    Gran % 61.1 38.0 - 73.0 %    Lymph % 24.7 18.0 - 48.0 %    Mono % 11.4 4.0 - 15.0 %    Eosinophil % 1.8 0.0 - 8.0 %    Basophil % 0.8 0.0 - 1.9 %    Differential Method Automated    Comprehensive Metabolic Panel    Collection Time: 10/31/22 10:30 PM   Result Value Ref Range    Sodium 144 136 - 145 mmol/L    Potassium 4.0 3.5 - 5.1 mmol/L    Chloride 105 95 - 110 mmol/L    CO2 29 23 - 29 mmol/L    Glucose 98 70 - 110 mg/dL    BUN 25 (H) 8 - 23 mg/dL    Creatinine 1.6 (H) 0.5 - 1.4 mg/dL    Calcium 9.6 8.7 - 10.5 mg/dL    Total Protein 6.6 6.0 - 8.4 g/dL    Albumin 3.7 3.5 - 5.2 g/dL    Total Bilirubin 0.8 0.1 - 1.0 mg/dL    Alkaline Phosphatase 93 55 - 135 U/L    AST 20 10 - 40 U/L    ALT 15 10 - 44 U/L    Anion Gap 10 8 - 16 mmol/L    eGFR 35 (A) >60 mL/min/1.73 m^2   Magnesium    Collection Time: 10/31/22 10:30 PM   Result Value Ref Range    Magnesium 1.9 1.6 - 2.6 mg/dL   CBC Auto Differential    Collection Time: 11/01/22  5:10 AM   Result Value Ref Range    WBC 4.73 3.90 - 12.70 K/uL    RBC 3.56 (L) 4.00 - 5.40 M/uL    Hemoglobin 11.7 (L) 12.0 - 16.0 g/dL    Hematocrit 35.2 (L) 37.0 - 48.5 %    MCV 99 (H) 82 - 98 fL    MCH 32.9 (H) 27.0 - 31.0 pg    MCHC 33.2 32.0 - 36.0 g/dL    RDW 12.3 11.5 - 14.5 %    Platelets 200 150 - 450 K/uL    MPV 9.8 9.2 - 12.9 fL    Immature Granulocytes 0.2 0.0 - 0.5 %    Gran # (ANC) 2.7 1.8 - 7.7 K/uL    Immature Grans (Abs) 0.01 0.00 - 0.04 K/uL    Lymph # 1.3 1.0 - 4.8 K/uL    Mono # 0.5 0.3 - 1.0 K/uL     Eos # 0.2 0.0 - 0.5 K/uL    Baso # 0.04 0.00 - 0.20 K/uL    nRBC 0 0 /100 WBC    Gran % 57.8 38.0 - 73.0 %    Lymph % 26.6 18.0 - 48.0 %    Mono % 11.2 4.0 - 15.0 %    Eosinophil % 3.4 0.0 - 8.0 %    Basophil % 0.8 0.0 - 1.9 %    Differential Method Automated    Basic Metabolic Panel    Collection Time: 11/01/22  5:10 AM   Result Value Ref Range    Sodium 145 136 - 145 mmol/L    Potassium 4.5 3.5 - 5.1 mmol/L    Chloride 107 95 - 110 mmol/L    CO2 28 23 - 29 mmol/L    Glucose 88 70 - 110 mg/dL    BUN 24 (H) 8 - 23 mg/dL    Creatinine 1.6 (H) 0.5 - 1.4 mg/dL    Calcium 9.6 8.7 - 10.5 mg/dL    Anion Gap 10 8 - 16 mmol/L    eGFR 35 (A) >60 mL/min/1.73 m^2   Troponin I    Collection Time: 11/01/22  5:11 AM   Result Value Ref Range    Troponin I 0.054 (H) 0.000 - 0.026 ng/mL   Echo    Collection Time: 11/01/22  9:56 AM   Result Value Ref Range    BSA 1.8 m2    TDI SEPTAL 0.07 m/s    LV LATERAL E/E' RATIO 13.43 m/s    LV SEPTAL E/E' RATIO 13.43 m/s    LA WIDTH 3.20 cm    IVC diameter 1.66 cm    Left Ventricular Outflow Tract Mean Velocity 1.06 cm/s    Left Ventricular Outflow Tract Mean Gradient 4.65 mmHg    TV mean gradient 17 mmHg    TDI LATERAL 0.07 m/s    LVIDd 4.36 3.5 - 6.0 cm    IVS 1.38 (A) 0.6 - 1.1 cm    Posterior Wall 1.28 (A) 0.6 - 1.1 cm    Ao root annulus 2.61 cm    LVIDs 2.77 2.1 - 4.0 cm    FS 36 28 - 44 %    LA volume 32.88 cm3    STJ 2.81 cm    Ascending aorta 2.77 cm    LV mass 219.47 g    LA size 2.92 cm    TAPSE 2.10 cm    Left Ventricle Relative Wall Thickness 0.59 cm    AV mean gradient 7 mmHg    AV valve area 2.55 cm2    AV Velocity Ratio 0.85     AV index (prosthetic) 0.93     MV valve area p 1/2 method 3.86 cm2    E/A ratio 1.13     Mean e' 0.07 m/s    E wave deceleration time 196.65 msec    IVRT 53.28 msec    LVOT diameter 1.87 cm    LVOT area 2.7 cm2    LVOT peak mita 1.32 m/s    LVOT peak VTI 37.20 cm    Ao peak mita 1.56 m/s    Ao VTI 40.0 cm    RVOT peak mita 0.68 m/s    RVOT peak VTI 16.8 cm     LVOT stroke volume 102.12 cm3    AV peak gradient 10 mmHg    PV mean gradient 1.04 mmHg    E/E' ratio 13.43 m/s    MV Peak E Srikanth 0.94 m/s    TR Max Srikanth 2.07 m/s    MV stenosis pressure 1/2 time 57.03 ms    MV Peak A Srikanth 0.83 m/s    LV Systolic Volume 28.75 mL    LV Systolic Volume Index 16.2 mL/m2    LV Diastolic Volume 85.96 mL    LV Diastolic Volume Index 48.56 mL/m2    LA Volume Index 18.6 mL/m2    LV Mass Index 124 g/m2    RA Major Axis 3.42 cm    Left Atrium Minor Axis 4.13 cm    Left Atrium Major Axis 4.15 cm    Triscuspid Valve Regurgitation Peak Gradient 17 mmHg    RA Width 2.30 cm    Right Atrial Pressure (from IVC) 3 mmHg    EF 55 %    TV rest pulmonary artery pressure 20 mmHg   Troponin I    Collection Time: 11/01/22 10:35 AM   Result Value Ref Range    Troponin I 0.044 (H) 0.000 - 0.026 ng/mL   CBC auto differential    Collection Time: 11/01/22 10:07 PM   Result Value Ref Range    WBC 6.65 3.90 - 12.70 K/uL    RBC 3.54 (L) 4.00 - 5.40 M/uL    Hemoglobin 11.7 (L) 12.0 - 16.0 g/dL    Hematocrit 35.1 (L) 37.0 - 48.5 %    MCV 99 (H) 82 - 98 fL    MCH 33.1 (H) 27.0 - 31.0 pg    MCHC 33.3 32.0 - 36.0 g/dL    RDW 12.5 11.5 - 14.5 %    Platelets 231 150 - 450 K/uL    MPV 9.7 9.2 - 12.9 fL    Immature Granulocytes 0.5 0.0 - 0.5 %    Gran # (ANC) 3.8 1.8 - 7.7 K/uL    Immature Grans (Abs) 0.03 0.00 - 0.04 K/uL    Lymph # 1.7 1.0 - 4.8 K/uL    Mono # 0.8 0.3 - 1.0 K/uL    Eos # 0.2 0.0 - 0.5 K/uL    Baso # 0.07 0.00 - 0.20 K/uL    nRBC 0 0 /100 WBC    Gran % 57.5 38.0 - 73.0 %    Lymph % 26.2 18.0 - 48.0 %    Mono % 11.7 4.0 - 15.0 %    Eosinophil % 3.0 0.0 - 8.0 %    Basophil % 1.1 0.0 - 1.9 %    Differential Method Automated    Comprehensive metabolic panel    Collection Time: 11/01/22 10:07 PM   Result Value Ref Range    Sodium 141 136 - 145 mmol/L    Potassium 4.4 3.5 - 5.1 mmol/L    Chloride 103 95 - 110 mmol/L    CO2 25 23 - 29 mmol/L    Glucose 113 (H) 70 - 110 mg/dL    BUN 28 (H) 8 - 23 mg/dL     Creatinine 2.4 (H) 0.5 - 1.4 mg/dL    Calcium 9.2 8.7 - 10.5 mg/dL    Total Protein 7.3 6.0 - 8.4 g/dL    Albumin 3.9 3.5 - 5.2 g/dL    Total Bilirubin 0.6 0.1 - 1.0 mg/dL    Alkaline Phosphatase 105 55 - 135 U/L    AST 21 10 - 40 U/L    ALT 18 10 - 44 U/L    Anion Gap 13 8 - 16 mmol/L    eGFR 21 (A) >60 mL/min/1.73 m^2   B-Type natriuretic peptide (BNP)    Collection Time: 11/01/22 10:07 PM   Result Value Ref Range    BNP 69 0 - 99 pg/mL   Troponin I    Collection Time: 11/01/22 10:07 PM   Result Value Ref Range    Troponin I 0.038 (H) 0.000 - 0.026 ng/mL         Assessment    1. Essential hypertension    2. NSTEMI (non-ST elevated myocardial infarction)    3. Cardiac arrhythmia, unspecified cardiac arrhythmia type    4. Mixed hyperlipidemia    5. Stage 3a chronic kidney disease    6. Hiatal hernia with GERD    7. Cervical radiculopathy    8. Lumbar radiculopathy          Plan    Shirlene was seen today for medication management.    Diagnoses and all orders for this visit:    Essential hypertension  -     IN OFFICE EKG 12-LEAD (to [place])  -     Ambulatory referral/consult to Cardiology; Future  -     hydrALAZINE (APRESOLINE) 50 MG tablet; Take 1 tablet (50 mg total) by mouth every 8 (eight) hours as needed (SBP>180 or DBP>100).    NSTEMI (non-ST elevated myocardial infarction)  -     Ambulatory referral/consult to Cardiology; Future    Cardiac arrhythmia, unspecified cardiac arrhythmia type  -     IN OFFICE EKG 12-LEAD (to [place])  -     Ambulatory referral/consult to Cardiology; Future    Mixed hyperlipidemia    Stage 3a chronic kidney disease    Hiatal hernia with GERD    Cervical radiculopathy  -     Ambulatory referral/consult to Pain Clinic; Future  -     Ambulatory referral/consult to Neurology; Future    Lumbar radiculopathy  -     Ambulatory referral/consult to Pain Clinic; Future  -     Ambulatory referral/consult to Neurology; Future      Physically, everything looks pretty good today.      Blood pressure  slightly elevated, but acceptable.  Since it has been quite labile at home, we will give her hydralazine to keep on hand for SBP greater than 180, DBP greater than 100.    Will get EKG in clinic today.  EKG shows sinus rhythm, bradycardia.  Would also like to get 2nd opinion from outside Cardiology.  Referral faxed to Dr. Stevens today.    This location is a bit more convenient for her, so she would like to get established with Dr. AMOS for cervical/lumbar radiculopathy.  Referral placed today.    Will also have her see Dr. Cohn to discuss medical marijuana.    FOLLOW-UP:  Follow up in about 6 months (around 7/13/2023) for check up.    I spent a total of 45 minutes face to face and non-face to face on the date of this visit.This includes time preparing to see the patient (eg, review of tests, notes), obtaining and/or reviewing additional history from an independent historian and/or outside medical records, documenting clinical information in the electronic health record, independently interpreting results and/or communicating results to the patient/family/caregiver, or care coordinator.    Signed by:  Hermes Miranda MD

## 2023-01-13 NOTE — PATIENT INSTRUCTIONS
Physically, everything looks pretty good today.      When listening to your heart, there does appear to be an abnormal rhythm.  Let us get an EKG today to take a look at it.      Let us also get a 2nd opinion from an outside cardiologist.  I am faxing a referral to Dr. Stevens with [facility].  His office is located at [facility], on [address].  Feel free to call his office at [phone] to schedule an appointment.    Meanwhile, if you are feeling bad and you check your blood pressure and the top number is over 180, or the bottom number is over 100, take a dose of hydralazine.  This will help bring it back down and help you feel better.  Only take it if your pressure is elevated.      For the back and neck, let us get another opinion from our spine specialist, Dr. Mares.  He comes here every Tuesday.  Referral placed today.      In the meantime, let us also have you see Dr. Cohn.  He is my neurologist in [place].  You can discuss medical marijuana with him.  Feel free to call him at [phone] to schedule an appointment.  His office is located at [address] [place] [address]  In .    Continue to eat a healthy diet.  Be careful with portion sizes.  Includes lots of fresh fruits, vegetables, whole grains, lean proteins.  See info below.    Keep hydrated.  Be sure to drink at least 8-10, 8 oz, glasses of water every day.    Stay active.  Try to do some sort of physical activity every day.  Nothing outrageous, just try walking for 10-15 minutes each day.

## 2023-01-14 NOTE — PROGRESS NOTES
EKG actually looks pretty good.  No signs of rhythm abnormality here.    Let us see what Dr. Stevens has to say.  His office should have reached out to you by now.  Have you heard from them?

## 2023-01-20 NOTE — PROGRESS NOTES
Janine BELTRAN Erica  [address]   [facility] [address]    Pre-Operative Instructions  Dr. Daley, February 17th, 2023  Hi Janine Maldonado,     Here are your pre-operative instructions. Please feel free to contact me if you have any questions regarding these instructions.     Thank you,   Jeannette Narvaez  Surgical Coordinator  [facility]        [facility]  Surgery Preparation     [address]  [place] [address]  Office: [phone]  Fax: [phone]    Your Surgery Has Been Scheduled At:  [facility]   [address] [place] [address]  [phone]    Patient's Name:  Janine Maldonado    Established Patient Chronic Pain Note     Referring Physician: No ref. provider found    PCP: Clayton Frankel MD    Chief Complaint:   Chief Complaint   Patient presents with    Neck Pain     Pt is here today for 3 month f/u, pt c.o neck pain that radiates down her R hip & back rating pain 8/10. Pt states that she did get some relief since her last injections pt denies physical therapy and currently takes Tramadol to help ease pain.           SUBJECTIVE:  Interval history 12/08/2022  At our last clinic visit, we discussed cervical radiofrequency ablation, not performed secondary to recent percutaneous angioplasty and necessity of remaining on anticoagulation.    Today patient again reports bilateral neck pain.  Pain again radiates to bilateral trapezius distribution in C5-6 dermatomal distribution.  She denies more distal radiculopathy into the upper extremities or hands.  She denies weakness in the upper extremities or compromise in hand  strength or dexterity.  Pain is constant and today is rated an 8/10.  Pain is exacerbated with cervical flexion, extension and lateral flexion.  Patient again reiterates at least 50% relief following bilateral neck cervical medial branch block and she would like to move forward with radiofrequency ablation pending safety of pausing anticoagulation.  Patient has continued gabapentin 900 mg in the evening and is requesting a refill as she has run out of this medication.  Patient denies any side effects from this medication.      Interval history 04/20/2022  Patient presents that his post bilateral C4, C5, C6 medial branch block 03/21/2022.  Patient reports 50% sustained relief in bilateral neck pain following medial branch block.  Of note patient reports she recently had a myocardial infarction with PTCA 3 weeks prior.  Patient reports she has been started on Brilinta and continued on aspirin.  Patient would like to pursue interventional treatment but understands necessity of  Jeannette Narvaez, Surgical Coordinator at [phone].   [facility] [phone]      [facility] lab during  pausing Brilinta prior to cervical radiofrequency ablation.  Today patient denies radiation into the upper extremities, compromise in hand  strength or dexterity or upper extremity weakness.  Patient has continued gabapentin 300 mg in the evening and reports improvement in her pain.  She denies any side effects from this medication.      Interval history 03/10/2022  Patient presents status post right-sided sacroiliac joint injection 12/17/2021 and for review of bilateral hip XR.  Patient reports 100% relief and right sacroiliac territory following her injection.  Today she reports insidious return of bilateral neck pain, which today is rated as a 9/10.  Patient reports pain in bilateral cervical paraspinous territory.  She denies radiation into the upper extremities, compromise in hand  strength or dexterity.  Patient is requesting repeat cervical C4, C5-C6 medial branch block as she obtain greater than 4 months of relief with this procedure.  Patient has continued gabapentin 100 mg 3 times daily and was not able to fill be updated prescription.  She does believe this medication helps with her symptoms and she denies any side effects.      Interval history 11/11/2021  Patient presents status post bilateral medial branch block at C4, C5, C6 10/15/2021.  Patient reports 100% pain relief following her bilateral medial branch block which is still sustained.  Patient reports significant improvement in range of motion and reduced pain with cervical flexion, extension and lateral flexion.  Today patient primarily ports right-sided sacroiliac joint pain which radiates into the right buttock and right hip territory.  Pain today is rated as a 7/10 and is intermittent.  Pain is described as sharp in nature.  Pain is exacerbated when moving from sitting to standing and patient has positive Jason finger sign.  Patient has continued gabapentin 300 mg at night.  Patient reports when she increase this dose to 600, she  "just did not feel right.  She will maintain her dose of 300 mg. She denies any new onset lower extremity weakness, bowel or bladder incontinence or saddle anesthesia.      HPI:  10/06/2021  Shirlene Olvera is a 67 y.o. female with past medical history significant for ROMÁN, HTN, HLD, CAD s/p MI, PAD, CKD III, nicotine dependence who presents to the clinic for the evaluation of longstanding neck pain.  Today patient reports neck pain which began several years prior without inciting accident, injury or trauma.  Patient describes pain as intermittent and is a 7/10 today.  At its worst pain is a 10/10.  Pain is described as stiff and a "pins and needles" sensation.  Pain begins at her occiput and radiates down bilateral cervical paraspinous muscles into bilateral trapezius distributions in C4-6 distribution.  Pain is exacerbated with cervical flexion, extension such as when she is gardening.  Pain is improved with Icy Hot.  Patient denies radicular symptoms into bilateral upper extremities, paresthesias or weakness in bilateral hands, compromise in hand  strength or dexterity.  Patient reports receiving prior cervical medial branch block several years prior with significant improvement in her symptoms.  Patient is currently taking gabapentin 100 mg 3 times daily without noticeable improvement in her symptoms.    Patient denies night fever/night sweats, urinary incontinence, bowel incontinence, significant weight loss, significant motor weakness and loss of sensations.    Pain Disability Index Review:     Last 3 PDI Scores 12/8/2022 4/28/2022 11/11/2021   Pain Disability Index (PDI) 48 [phone] 39       Non-Pharmacologic Treatments:  Physical Therapy/Home Exercise: no  Ice/Heat:yes  TENS: no  Acupuncture: no  Massage: no  Chiropractic: no    Other: no      Pain Medications:  - Adjuvant Medications: Neurontin (Gabapentin), Topical Ointment (Voltaren Gel, Steroid cream, Anti-Inflammatory Cream, Compound cream) and " normal business hours no appointment needed. These need to be completed within 30 days of the scheduled procedure.   Tylenol (Acetaminophen)  - Anti-Coagulants: Aspirin    Pain Procedures:   -03/21/2022:  Bilateral C4, C5, C6 medial branch block  -12/17/2021:  Right-sided sacroiliac joint injection  -10/15/2021:  Bilateral medial branch block at C4, C5, C6; Dr. Izquierdo    Cervical MBB: several years prior    Past Medical History:   Diagnosis Date    Chest pain 5/14/2015    Colon polyp     Coronary artery disease     pt states MI June 2015    Hypertension     Myocardial infarction     PAD (peripheral artery disease)     Rash 4/20/2022    Tobacco use      Past Surgical History:   Procedure Laterality Date    CHOLECYSTECTOMY  09/03/2015    COLONOSCOPY N/A 10/11/2016    Procedure: COLONOSCOPY;  Surgeon: Haseeb Spears MD;  Location: [facility];  Service: Endoscopy;  Laterality: N/A;    COLONOSCOPY N/A 12/09/2021    Procedure: COLONOSCOPY;  Surgeon: Pat Rios MD;  Location: [facility];  Service: Endoscopy;  Laterality: N/A;    ESOPHAGOGASTRODUODENOSCOPY N/A 12/09/2021    Procedure: EGD (ESOPHAGOGASTRODUODENOSCOPY);  Surgeon: Pat Rios MD;  Location: [facility];  Service: Endoscopy;  Laterality: N/A;    high blood      HYSTERECTOMY      INJECTION OF ANESTHETIC AGENT AROUND MEDIAL BRANCH NERVES INNERVATING CERVICAL FACET JOINT Bilateral 10/15/2021    Procedure: Bilateral C5-7 MBB with RN IV sedation PATIENT WOULD LIKE AFTERNOON ARRIVAL, IF POSSIBLE;  Surgeon: Nano Izquierdo MD;  Location: [facility];  Service: Pain Management;  Laterality: Bilateral;    INJECTION OF ANESTHETIC AGENT AROUND MEDIAL BRANCH NERVES INNERVATING CERVICAL FACET JOINT Bilateral 03/21/2022    Procedure: Bilateral C4-6 MBB with RN IV sedation;  Surgeon: Nano Izquierdo MD;  Location: [facility];  Service: Pain Management;  Laterality: Bilateral;    INJECTION OF ANESTHETIC AGENT INTO SACROILIAC JOINT Right 12/17/2021    Procedure: Right SIJ Injection;  Surgeon: Nano Izquierdo MD;  Location: [facility];  Service: Pain Management;  Laterality:  Right;    LEFT HEART CATHETERIZATION Left 03/29/2022    Procedure: CATHETERIZATION, HEART, LEFT;  Surgeon: Lion Awad MD;  Location: [facility] CATH LAB;  Service: Cardiology;  Laterality: Left;    OOPHORECTOMY      RIB FRACTURE SURGERY       Review of patient's allergies indicates:   Allergen Reactions    Amlodipine Swelling    Statins-hmg-coa reductase inhibitors Other (See Comments)     Myalgias to lipitor and simvastatin       Current Outpatient Medications   Medication Sig    aspirin 81 MG Chew Take 1 tablet (81 mg total) by mouth once daily.    buPROPion (WELLBUTRIN XL) 150 MG TB24 tablet Take 1 tablet (150 mg total) by mouth once daily.    clopidogreL (PLAVIX) 75 mg tablet Take 1 tablet (75 mg total) by mouth once daily.    cyanocobalamin (VITAMIN B-12) 1000 MCG tablet Take 100 mcg by mouth once daily.    evolocumab (REPATHA SURECLICK) 140 mg/mL PnIj Inject 1 mL (140 mg total) into the skin every 14 (fourteen) days.    fexofenadine HCl (ALLEGRA ORAL) Take 1 tablet by mouth once daily.    fluocinolone acetonide oiL (DERMOTIC OIL) 0.01 % Drop Place 3 drops in ear(s) 2 (two) times daily.    fluticasone propionate (FLONASE) 50 mcg/actuation nasal spray USE 2 SPRAYS IN EACH NOSTRIL ONE TIME DAILY    furosemide (LASIX) 20 MG tablet Take 1 tablet (20 mg total) by mouth once daily.    gabapentin (NEURONTIN) 300 MG capsule Take 3 capsules (900 mg total) by mouth every evening.    isosorbide mononitrate (IMDUR) 60 MG 24 hr tablet Take 1 tablet (60 mg total) by mouth every evening.    loratadine (CLARITIN) 10 mg tablet Take 10 mg by mouth daily as needed for Allergies.    metoprolol tartrate (LOPRESSOR) 50 MG tablet Take 1 tablet (50 mg total) by mouth 2 (two) times daily.    multivitamin with minerals tablet Take 1 tablet by mouth once daily.    nitroGLYCERIN (NITROSTAT) 0.4 MG SL tablet Place 1 tablet (0.4 mg total) under the tongue every 5 (five) minutes as needed for Chest pain.    pantoprazole (PROTONIX) 20 MG  "tablet Take 1 tablet (20 mg total) by mouth once daily.    predniSONE (DELTASONE) 20 MG tablet     tiZANidine (ZANAFLEX) 4 MG tablet     valsartan (DIOVAN) 160 MG tablet TAKE ONE (1) TABLET (160 MG TOTAL) BY MOUTH ONCE DAILY    vitamin D (VITAMIN D3) 1000 units Tab Take 1,000 Units by mouth once daily.     No current facility-administered medications for this visit.       Review of Systems     GENERAL:  No weight loss, malaise or fevers.  HEENT:   No recent changes in vision or hearing  NECK:  Negative for lumps, no difficulty with swallowing.  RESPIRATORY:  Negative for cough, wheezing or shortness of breath, patient denies any recent URI.  CARDIOVASCULAR:  Negative for chest pain, leg swelling or palpitations.  GI:  Negative for abdominal discomfort, blood in stools or black stools or change in bowel habits.  MUSCULOSKELETAL:  See HPI.  SKIN:  Negative for lesions, rash, and itching.  PSYCH:  No mood disorder or recent psychosocial stressors.   HEMATOLOGY/LYMPHOLOGY:  Negative for prolonged bleeding, bruising easily or swollen nodes.    NEURO:   No history of headaches, syncope, paralysis, seizures or tremors.  All other reviewed and negative other than HPI.    OBJECTIVE:    BP (!) 202/80   Pulse (!) 52   Resp 18   Ht 5' 4" (1.626 m)   Wt 71.6 kg (157 lb 13.6 oz)   LMP  (LMP Unknown)   BMI 27.09 kg/m²       Physical Exam    GENERAL: Well appearing, in no acute distress, alert and oriented x3.  PSYCH:  Mood and affect appropriate.  SKIN: Skin color, texture, turgor normal, no rashes or lesions.  HEAD/FACE:  Normocephalic, atraumatic. Cranial nerves grossly intact.    NECK:  pain to palpation over the cervical paraspinous muscles. Spurling Negative.  pain with neck flexion, extension, or lateral flexion.  Full range of motion  Normal testing biceps, triceps and brachioradialis bilaterally.    Negative Antonio's bilaterally.    5/5 strength testing deltoid, biceps, triceps, wrist extensor, wrist flexor and " ulnar intrinsics bilaterally.    Normal  strength bilaterally    CV: RRR with palpation of the radial artery.  PULM: No evidence of respiratory difficulty, symmetric chest rise.  GI:  Soft and non-tender.    NEURO: Bilateral upper and lower extremity coordination and muscle stretch reflexes are physiologic and symmetric. No loss of sensation is noted.  GAIT: normal.    Imaging:   Hip  XR 11/11/21  FINDINGS:  No acute fracture.  Hip joint spaces maintained with left greater than right acetabular degenerative findings.  Lower lumbar spine degenerative changes noted.  Soft tissues unremarkable.       09/05/12    MRI Lumbar Spine Without Contrast    Narrative  DATE OF EXAM: Oct  5 2012    Findings: Vertebral alignment is normal.  The conus ends at the level of  L1 and appears normal.  Intervertebral disks are well-hydrated and disk  spaces are maintained.  No compression fractures or acute osseous  abnormalities are seen.  There is no area of abnormal signal intensity  identified within the bone marrow.  Axial images are acquired through the  disk spaces from L1-2 through L5-S1.  There is no focal disk herniation,  canal or foraminal stenosis identified above the L4-5 level.  At L4 R. there is central disk protrusion minimally indenting the thecal  sac.  There is also some diffuse bulging of the disk and facet and  ligamentum flavum hypertrophy.  There is mild canal and bilateral  foraminal narrowing at this level.  At L5-S1 there is central bulging of the disk without canal or foraminal  stenosis.    Impression  Central disk protrusion at L4-5 with some canal and foraminal  narrowing due to combination of disk abnormality and degenerative change.  Please correlate above findings with the patient's clinical symptoms.    09/05/12    MRI Cervical Spine Without Contrast    Narrative  DATE OF EXAM: Oct  5 2012    Findings: The craniovertebral junction and vertebral alignment are  normal.  No abnormality of bone marrow  signal intensity is seen.  Intervertebral disks are dessicated but disk spaces are maintained.  No  abnormality seen within the cervical spinal cord.  Axial images are  acquired through the disk spaces from C2-3 through C7-T1.  C2-3 disk space is normal.  The C3-4 disk space demonstrates spondylosis as well as facet and  uncinate hypertrophy.  There is mild canal and bilateral foraminal  narrowing at this level.  The C4-5 disk space also demonstrates right paracentral spondylosis and  disk bulge with facet and uncinate hypertrophy on the right.  There is  mild canal and moderate right sided foraminal stenosis.  The C5-6 disk space does not demonstrate any significant disk herniation,  canal or foraminal stenosis.  The C6-7 disk space demonstrates minimal bulging of the disk but no canal  or foraminal narrowing.  The C7 T1 disk space is unremarkable.    Impression  Degenerative change and degenerative disk disease most  pronounced at the C3-4 and C4-5 level as discussed above.  Please  correlate with patient's clinical symptoms.       09/05/12    X-Ray Cervical Spine AP And Lateral    Narrative  DATE OF EXAM: Sep 13 2012    Findings: Vertebral alignment is normal.  There is moderate degenerative  change throughout the cervical spine with loss of disk height and  anterior osteophyte formation.  No acute osseous abnormalities are seen.  Postoperative findings from resection of the left first rib.      ASSESSMENT: 67 y.o. year old female with neck pain, R lower back pain, consistent with     1. Cervical spondylosis  IR RF Ablation Cervical Thoracic with Im    IR RF Ablation Cervical Thoracic with Im    IR RF Ablation Cervical Thoracic with Im    Case Request-RAD/Other Procedure Area: Right C4-6 RFA with RN IV sedation    IR RF Ablation Cerv Thoracic Ea Add with    IR RF Ablation Cerv Thoracic Ea Add with    IR RF Ablation Cervical Thoracic with Im    Case Request-RAD/Other Procedure Area: Left C4-6 RFA with RN IV  sedation      2. Cervical radiculopathy        3. Facet arthropathy, cervical        4. Degenerative disc disease, cervical                PLAN:   - Interventions:  Schedule for right-sided cervical C4, C5, C6 radiofrequency ablation, followed by left side 2 weeks following.  Explained the risks and benefits of the procedure in detail with the patient today in clinic along with alternative treatment options, and the patient elected to pursue the intervention at this time.      - Anticoagulation use: yes aspirin and Plavix  Patient recently had myocardial infarction with PTCA 3/2022.  For secondary prophylaxis per ASA guidelines, patient can continue aspirin but would need to pause Plavix 5 days prior to her cervical radiofrequency ablation.  Will recheck to Dr. Byers/Reji regarding clearance    - we have discussed with exacerbation of sacroiliac pain, scheduling repeat right-sided sacroiliac joint injection in the future.       report:  Reviewed and consistent with medication use as prescribed.    - Medications:  --  We have discussed continuing gabapentin.  We have reviewed potential side effects of this medication including daytime somnolence, weight gain and peripheral edema    Gabapentin : 900 mg QHS  -90 day supply given    - Therapy:   We discussed continuing physical therapy to help manage the patient/s painful condition. The patient was counseled that muscle strengthening will improve the long term prognosis in regards to pain and may also help increase range of motion and mobility.     - Imaging: Reviewed available imaging with patient and answered any questions they had regarding study.      - Follow up visit: return to clinic in 4-6 weeks      The above plan and management options were discussed at length with patient. Patient is in agreement with the above and verbalized understanding.    - I discussed the goals of interventional chronic pain management with the patient on today's visit. We  discussed a multimodal and systematic approach to pain.  This includes diagnostic and therapeutic injections, adjuvant pharmacologic treatment, physical therapy, and at times psychiatry.  I emphasized the importance of regular exercise, core strengthening and stretching, diet and weight loss as a cornerstone of long-term pain management.    - This condition does not require this patient to take time off of work, and the primary goal of our Pain Management services is to improve the patient's functional capacity.  - Patient Questions: Answered all of the patient's questions regarding diagnoses, therapy, treatment and next steps        Nano Izquierdo MD  Interventional Pain Management  [facility]    Disclaimer:  This note was prepared using voice recognition system and is likely to have sound alike errors that may have been overlooked even after proof reading.  Please call me with any questions

## 2023-02-06 PROBLEM — I21.4 NSTEMI (NON-ST ELEVATED MYOCARDIAL INFARCTION): Status: RESOLVED | Noted: 2022-11-01 | Resolved: 2023-02-06

## 2023-02-07 DIAGNOSIS — Z00.00 ENCOUNTER FOR MEDICARE ANNUAL WELLNESS EXAM: ICD-10-CM

## 2023-02-09 ENCOUNTER — OFFICE VISIT (OUTPATIENT)
Dept: PAIN MEDICINE | Facility: CLINIC | Age: 68
End: 2023-02-09
Payer: MEDICARE

## 2023-02-09 VITALS
DIASTOLIC BLOOD PRESSURE: 100 MMHG | WEIGHT: 159.38 LBS | BODY MASS INDEX: 27.21 KG/M2 | SYSTOLIC BLOOD PRESSURE: 180 MMHG | HEART RATE: 63 BPM | HEIGHT: 64 IN

## 2023-02-09 DIAGNOSIS — M50.30 DEGENERATIVE DISC DISEASE, CERVICAL: ICD-10-CM

## 2023-02-09 DIAGNOSIS — M54.2 CERVICAL MUSCLE PAIN: ICD-10-CM

## 2023-02-09 DIAGNOSIS — M47.812 CERVICAL SPONDYLOSIS: Primary | ICD-10-CM

## 2023-02-09 DIAGNOSIS — M54.2 PAIN OF CERVICAL FACET JOINT: ICD-10-CM

## 2023-02-09 DIAGNOSIS — Z00.00 ENCOUNTER FOR MEDICARE ANNUAL WELLNESS EXAM: ICD-10-CM

## 2023-02-09 PROCEDURE — 1160F PR REVIEW ALL MEDS BY PRESCRIBER/CLIN PHARMACIST DOCUMENTED: ICD-10-PCS | Mod: HCNC,CPTII,S$GLB, | Performed by: PHYSICIAN ASSISTANT

## 2023-02-09 PROCEDURE — 99999 PR PBB SHADOW E&M-EST. PATIENT-LVL V: ICD-10-PCS | Mod: PBBFAC,HCNC,, | Performed by: PHYSICIAN ASSISTANT

## 2023-02-09 PROCEDURE — 1101F PR PT FALLS ASSESS DOC 0-1 FALLS W/OUT INJ PAST YR: ICD-10-PCS | Mod: HCNC,CPTII,S$GLB, | Performed by: PHYSICIAN ASSISTANT

## 2023-02-09 PROCEDURE — 1160F RVW MEDS BY RX/DR IN RCRD: CPT | Mod: HCNC,CPTII,S$GLB, | Performed by: PHYSICIAN ASSISTANT

## 2023-02-09 PROCEDURE — 4010F ACE/ARB THERAPY RXD/TAKEN: CPT | Mod: HCNC,CPTII,S$GLB, | Performed by: PHYSICIAN ASSISTANT

## 2023-02-09 PROCEDURE — 99214 OFFICE O/P EST MOD 30 MIN: CPT | Mod: HCNC,S$GLB,, | Performed by: PHYSICIAN ASSISTANT

## 2023-02-09 PROCEDURE — 3008F BODY MASS INDEX DOCD: CPT | Mod: HCNC,CPTII,S$GLB, | Performed by: PHYSICIAN ASSISTANT

## 2023-02-09 PROCEDURE — 3080F PR MOST RECENT DIASTOLIC BLOOD PRESSURE >= 90 MM HG: ICD-10-PCS | Mod: HCNC,CPTII,S$GLB, | Performed by: PHYSICIAN ASSISTANT

## 2023-02-09 PROCEDURE — 1159F MED LIST DOCD IN RCRD: CPT | Mod: HCNC,CPTII,S$GLB, | Performed by: PHYSICIAN ASSISTANT

## 2023-02-09 PROCEDURE — 3288F PR FALLS RISK ASSESSMENT DOCUMENTED: ICD-10-PCS | Mod: HCNC,CPTII,S$GLB, | Performed by: PHYSICIAN ASSISTANT

## 2023-02-09 PROCEDURE — 1125F PR PAIN SEVERITY QUANTIFIED, PAIN PRESENT: ICD-10-PCS | Mod: HCNC,CPTII,S$GLB, | Performed by: PHYSICIAN ASSISTANT

## 2023-02-09 PROCEDURE — 3288F FALL RISK ASSESSMENT DOCD: CPT | Mod: HCNC,CPTII,S$GLB, | Performed by: PHYSICIAN ASSISTANT

## 2023-02-09 PROCEDURE — 3008F PR BODY MASS INDEX (BMI) DOCUMENTED: ICD-10-PCS | Mod: HCNC,CPTII,S$GLB, | Performed by: PHYSICIAN ASSISTANT

## 2023-02-09 PROCEDURE — 1101F PT FALLS ASSESS-DOCD LE1/YR: CPT | Mod: HCNC,CPTII,S$GLB, | Performed by: PHYSICIAN ASSISTANT

## 2023-02-09 PROCEDURE — 3080F DIAST BP >= 90 MM HG: CPT | Mod: HCNC,CPTII,S$GLB, | Performed by: PHYSICIAN ASSISTANT

## 2023-02-09 PROCEDURE — 1125F AMNT PAIN NOTED PAIN PRSNT: CPT | Mod: HCNC,CPTII,S$GLB, | Performed by: PHYSICIAN ASSISTANT

## 2023-02-09 PROCEDURE — 3077F SYST BP >= 140 MM HG: CPT | Mod: HCNC,CPTII,S$GLB, | Performed by: PHYSICIAN ASSISTANT

## 2023-02-09 PROCEDURE — 99214 PR OFFICE/OUTPT VISIT, EST, LEVL IV, 30-39 MIN: ICD-10-PCS | Mod: HCNC,S$GLB,, | Performed by: PHYSICIAN ASSISTANT

## 2023-02-09 PROCEDURE — 99999 PR PBB SHADOW E&M-EST. PATIENT-LVL V: CPT | Mod: PBBFAC,HCNC,, | Performed by: PHYSICIAN ASSISTANT

## 2023-02-09 PROCEDURE — 3077F PR MOST RECENT SYSTOLIC BLOOD PRESSURE >= 140 MM HG: ICD-10-PCS | Mod: HCNC,CPTII,S$GLB, | Performed by: PHYSICIAN ASSISTANT

## 2023-02-09 PROCEDURE — 4010F PR ACE/ARB THEARPY RXD/TAKEN: ICD-10-PCS | Mod: HCNC,CPTII,S$GLB, | Performed by: PHYSICIAN ASSISTANT

## 2023-02-09 PROCEDURE — 1159F PR MEDICATION LIST DOCUMENTED IN MEDICAL RECORD: ICD-10-PCS | Mod: HCNC,CPTII,S$GLB, | Performed by: PHYSICIAN ASSISTANT

## 2023-02-09 RX ORDER — GABAPENTIN 300 MG/1
900 CAPSULE ORAL NIGHTLY
Qty: 270 CAPSULE | Refills: 0 | Status: SHIPPED | OUTPATIENT
Start: 2023-02-09 | End: 2023-02-22 | Stop reason: DRUGHIGH

## 2023-02-09 NOTE — PROGRESS NOTES
Established Patient Chronic Pain Note     Referring Physician: Clayton Frankel MD    PCP: Hermes Miranda MD      Chief Complaint:   Chief Complaint   Patient presents with    Neck Pain     L>R          SUBJECTIVE:    Interval History (2/9/2023): Shirlene Olvera presents today for follow-up visit.  she underwent right C4-6 RFA on 12/27/22 then left C4-6 RFA on 1/10/23.  The patient reports that she is/was better following the procedure.  she reports about 50% pain relief.  The changes lasted 4 weeks so far.  The changes have continued through this visit.  Patient reports pain as 8/10 today. Pain is very localized in cervical muscular area on left.    Interval history 12/08/2022  At our last clinic visit, we discussed cervical radiofrequency ablation, not performed secondary to recent percutaneous angioplasty and necessity of remaining on anticoagulation.    Today patient again reports bilateral neck pain.  Pain again radiates to bilateral trapezius distribution in C5-6 dermatomal distribution.  She denies more distal radiculopathy into the upper extremities or hands.  She denies weakness in the upper extremities or compromise in hand  strength or dexterity.  Pain is constant and today is rated an 8/10.  Pain is exacerbated with cervical flexion, extension and lateral flexion.  Patient again reiterates at least 50% relief following bilateral neck cervical medial branch block and she would like to move forward with radiofrequency ablation pending safety of pausing anticoagulation.  Patient has continued gabapentin 900 mg in the evening and is requesting a refill as she has run out of this medication.  Patient denies any side effects from this medication.    Interval history 04/20/2022  Patient presents that his post bilateral C4, C5, C6 medial branch block 03/21/2022.  Patient reports 50% sustained relief in bilateral neck pain following medial branch block.  Of note patient reports she recently had a  myocardial infarction with PTCA 3 weeks prior.  Patient reports she has been started on Brilinta and continued on aspirin.  Patient would like to pursue interventional treatment but understands necessity of pausing Brilinta prior to cervical radiofrequency ablation.  Today patient denies radiation into the upper extremities, compromise in hand  strength or dexterity or upper extremity weakness.  Patient has continued gabapentin 300 mg in the evening and reports improvement in her pain.  She denies any side effects from this medication.    Interval history 03/10/2022  Patient presents status post right-sided sacroiliac joint injection 12/17/2021 and for review of bilateral hip XR.  Patient reports 100% relief and right sacroiliac territory following her injection.  Today she reports insidious return of bilateral neck pain, which today is rated as a 9/10.  Patient reports pain in bilateral cervical paraspinous territory.  She denies radiation into the upper extremities, compromise in hand  strength or dexterity.  Patient is requesting repeat cervical C4, C5-C6 medial branch block as she obtain greater than 4 months of relief with this procedure.  Patient has continued gabapentin 100 mg 3 times daily and was not able to fill be updated prescription.  She does believe this medication helps with her symptoms and she denies any side effects.    Interval history 11/11/2021  Patient presents status post bilateral medial branch block at C4, C5, C6 10/15/2021.  Patient reports 100% pain relief following her bilateral medial branch block which is still sustained.  Patient reports significant improvement in range of motion and reduced pain with cervical flexion, extension and lateral flexion.  Today patient primarily ports right-sided sacroiliac joint pain which radiates into the right buttock and right hip territory.  Pain today is rated as a 7/10 and is intermittent.  Pain is described as sharp in nature.  Pain is  "exacerbated when moving from sitting to standing and patient has positive Jason finger sign.  Patient has continued gabapentin 300 mg at night.  Patient reports when she increase this dose to 600, she just did not feel right.  She will maintain her dose of 300 mg. She denies any new onset lower extremity weakness, bowel or bladder incontinence or saddle anesthesia.    HPI:  10/06/2021  Shirlene Olvera is a 66 y.o. female with past medical history significant for ROMÁN, HTN, HLD, CAD s/p MI, PAD, CKD III, nicotine dependence who presents to the clinic for the evaluation of longstanding neck pain.  Today patient reports neck pain which began several years prior without inciting accident, injury or trauma.  Patient describes pain as intermittent and is a 7/10 today.  At its worst pain is a 10/10.  Pain is described as stiff and a "pins and needles" sensation.  Pain begins at her occiput and radiates down bilateral cervical paraspinous muscles into bilateral trapezius distributions in C4-6 distribution.  Pain is exacerbated with cervical flexion, extension such as when she is gardening.  Pain is improved with Icy Hot.  Patient denies radicular symptoms into bilateral upper extremities, paresthesias or weakness in bilateral hands, compromise in hand  strength or dexterity.  Patient reports receiving prior cervical medial branch block several years prior with significant improvement in her symptoms.  Patient is currently taking gabapentin 100 mg 3 times daily without noticeable improvement in her symptoms.  Patient denies night fever/night sweats, urinary incontinence, bowel incontinence, significant weight loss, significant motor weakness and loss of sensations.    Pain Disability Index Review:  Last 3 PDI Scores 12/8/2022 4/28/2022 11/11/2021   Pain Disability Index (PDI) 48 29 39       Non-Pharmacologic Treatments:  Physical Therapy/Home Exercise: no  Ice/Heat:yes  TENS: no  Acupuncture: no  Massage: " "no  Chiropractic: no    Other: no      Pain Medications:  - Adjuvant Medications: Neurontin (Gabapentin), Topical Ointment (Voltaren Gel, Steroid cream, Anti-Inflammatory Cream, Compound cream) and Tylenol (Acetaminophen)  - Anti-Coagulants: Aspirin    Pain Procedures:    - right C4-6 RFA on 12/27/22 then left C4-6 RFA on 1/10/23 with 50-70% pain relief so far, less pain relief on left side  - 3/21/2022:  Bilateral C4, C5, C6 medial branch block  - 12/17/2021:  Right-sided sacroiliac joint injection  - 10/15/2021:  Bilateral medial branch block at C4, C5, C6; Dr. Izquierdo    Cervical MBB: several years prior -- externally          Review of Systems:  GENERAL:  No weight loss, malaise or fevers.  HEENT:   No recent changes in vision or hearing  NECK:  Negative for lumps, no difficulty with swallowing.  RESPIRATORY:  Negative for cough, wheezing or shortness of breath, patient denies any recent URI.  CARDIOVASCULAR:  Negative for chest pain, leg swelling or palpitations.  GI:  Negative for abdominal discomfort, blood in stools or black stools or change in bowel habits.  MUSCULOSKELETAL:  See HPI.  SKIN:  Negative for lesions, rash, and itching.  PSYCH:  No mood disorder or recent psychosocial stressors.   HEMATOLOGY/LYMPHOLOGY:  Negative for prolonged bleeding, bruising easily or swollen nodes.    NEURO:   No history of headaches, syncope, paralysis, seizures or tremors.  All other reviewed and negative other than HPI.      OBJECTIVE:    Physical Exam:  Vitals:    02/09/23 0916   BP: (!) 180/100   Pulse: 63   Weight: 72.3 kg (159 lb 6.3 oz)   Height: 5' 4" (1.626 m)   PainSc:   8   PainLoc: Neck    Body mass index is 27.36 kg/m².   (reviewed on 2/9/2023)    GENERAL: Well appearing, in no acute distress, alert and oriented x3.  PSYCH:  Mood and affect appropriate.  SKIN: Skin color, texture, turgor normal, no rashes or lesions.  HEAD/FACE:  Normocephalic, atraumatic. Cranial nerves grossly intact.    NECK:  pain to " palpation over the cervical paraspinous muscles, L>R. TTP over left trapezius. Spurling Negative.  pain with neck flexion, extension, or lateral flexion.  Somewhat limited ROM.  Normal testing biceps, triceps and brachioradialis bilaterally.    Negative Antonio's bilaterally.    5/5 strength testing deltoid, biceps, triceps, wrist extensor, wrist flexor and ulnar intrinsics bilaterally.    Normal  strength bilaterally    PULM: No evidence of respiratory difficulty, symmetric chest rise.  GI:  Soft and non-tender.    NEURO: BUE & BLE coordination and muscle stretch reflexes are physiologic and symmetric. No loss of sensation is noted.  GAIT: normal.      Imaging (Reviewed on 2/9/2023):    Hip  XR 11/11/21  FINDINGS:  No acute fracture.  Hip joint spaces maintained with left greater than right acetabular degenerative findings.  Lower lumbar spine degenerative changes noted.  Soft tissues unremarkable.     08/05/12 MRI Lumbar Spine Without Contrast  Findings: Vertebral alignment is normal.  The conus ends at the level of  L1 and appears normal.  Intervertebral disks are well-hydrated and disk  spaces are maintained.  No compression fractures or acute osseous  abnormalities are seen.  There is no area of abnormal signal intensity  identified within the bone marrow.  Axial images are acquired through the  disk spaces from L1-2 through L5-S1.  There is no focal disk herniation,  canal or foraminal stenosis identified above the L4-5 level.  At L4 R. there is central disk protrusion minimally indenting the thecal  sac.  There is also some diffuse bulging of the disk and facet and  ligamentum flavum hypertrophy.  There is mild canal and bilateral  foraminal narrowing at this level.  At L5-S1 there is central bulging of the disk without canal or foraminal  stenosis.  Impression  Central disk protrusion at L4-5 with some canal and foraminal  narrowing due to combination of disk abnormality and degenerative change.  Please  correlate above findings with the patient's clinical symptoms.    08/05/12 MRI Cervical Spine Without Contrast  Findings: The craniovertebral junction and vertebral alignment are  normal.  No abnormality of bone marrow signal intensity is seen.  Intervertebral disks are dessicated but disk spaces are maintained.  No  abnormality seen within the cervical spinal cord.  Axial images are  acquired through the disk spaces from C2-3 through C7-T1.  C2-3 disk space is normal.  The C3-4 disk space demonstrates spondylosis as well as facet and  uncinate hypertrophy.  There is mild canal and bilateral foraminal  narrowing at this level.  The C4-5 disk space also demonstrates right paracentral spondylosis and  disk bulge with facet and uncinate hypertrophy on the right.  There is  mild canal and moderate right sided foraminal stenosis.  The C5-6 disk space does not demonstrate any significant disk herniation,  canal or foraminal stenosis.  The C6-7 disk space demonstrates minimal bulging of the disk but no canal  or foraminal narrowing.  The C7 T1 disk space is unremarkable.    Impression  Degenerative change and degenerative disk disease most  pronounced at the C3-4 and C4-5 level as discussed above.  Please  correlate with patient's clinical symptoms.       09/13/12 X-Ray Cervical Spine AP And Lateral  Findings: Vertebral alignment is normal.  There is moderate degenerative  change throughout the cervical spine with loss of disk height and  anterior osteophyte formation.  No acute osseous abnormalities are seen.  Postoperative findings from resection of the left first rib.      ASSESSMENT: 67 y.o. year old female with neck pain, R lower back pain, consistent with     1. Cervical spondylosis  Ambulatory referral/consult to Physical/Occupational Therapy      2. Degenerative disc disease, cervical        3. Pain of cervical facet joint  Ambulatory referral/consult to Physical/Occupational Therapy      4. Cervical muscle pain   NAGLPKM nabumetone 15%- amitriptyline 2%- gabapentin 6%- LIDOcaine 2%- prilocaine 2%- ketamine 5%- magnesium 5% in Lipoderm ActiveMax    Ambulatory referral/consult to Physical/Occupational Therapy          PLAN:   - Interventions:  - S/p right C4-6 RFA on 22 then left C4-6 RFA on 1/10/23 with 50-70% pain relief so far, less pain relief on left side.  Will give it at least 2-4 more week for maximal pain relief, since it has only been about 4 weeks since the left side was completed.  Also, the left side seems to be bigger issue.  - Plan for in clinic left cervical TPI in a few weeks for continued pain.    - We have previously discussed with exacerbation of sacroiliac pain, scheduling repeat right-sided SIJ injection in the future.      - Anticoagulation use: yes aspirin and Plavix - secondary - Dr. Byers/ Dr. Mendoza (Cardiology)   Patient had myocardial infarction with PTCA 3/2022.  For secondary prophylaxis per ASA guidelines, patient can continue ASA but would need to pause Plavix 5 days prior to her cervical radiofrequency ablation.  Will recheck to Dr. Byers/Reji regarding clearance    - Medications:  --  Refill gabapentin 900 mg QHS. X 90 day supply.  We have previously reviewed potential side effects of this medication including daytime somnolence, weight gain and peripheral edema  -- Order compound cream (could be varying components includin.5% nabumetone, 2.5% gabapentin, 2.5% lidocaine, 2.5% prilocaine - depending on insurance coverage) for potential topical pain relief. Patent will be contacted by [facility] regarding cost and payment.     -- Continue Zanaflex (tizanidine) 4mg QHS PRN.      report:  Reviewed and consistent with medication use as prescribed.      - Therapy: We discussed continuing exercises at home learned at physical therapy to help manage the patient/s painful condition. The patient was counseled that muscle strengthening will improve the long term  prognosis in regards to pain and may also help increase range of motion and mobility. Will also restart PT; order sent to [facility] to include dry needling, massage, and heat.    - Imaging: Reviewed available imaging with patient and answered any questions they had regarding study.      - Follow up visit: in clinic cervical TPI     - This condition does not require this patient to take time off of work, and the primary goal of our Pain Management services is to improve the patient's functional capacity.   - I discussed the risks, benefits, and alternatives to potential treatment options. All questions and concerns were fully addressed today in clinic.         Kimberly Gutierrez PA-C  Interventional Pain Management - [facility]    Disclaimer:  This note was prepared using voice recognition system and is likely to have sound alike errors that may have been overlooked even after proof reading.  Please call me with any questions.

## 2023-02-13 ENCOUNTER — SPECIALTY PHARMACY (OUTPATIENT)
Dept: PHARMACY | Facility: CLINIC | Age: 68
End: 2023-02-13
Payer: MEDICARE

## 2023-02-13 NOTE — TELEPHONE ENCOUNTER
Outgoing call regarding repatha refill; per pt, she's due to inject on 2/26; informed her that [facility] will follow up on 2/16 to schedule delivery

## 2023-02-16 NOTE — TELEPHONE ENCOUNTER
Specialty Pharmacy - Refill Coordination    Specialty Medication Orders Linked to Encounter      Flowsheet Row Most Recent Value   Medication #1 evolocumab (REPATHA SURECLICK) 140 mg/mL PnIj (Order#[id number], Rx#[id number])            Refill Questions - Documented Responses      Flowsheet Row Most Recent Value   Patient Availability and HIPAA Verification    Does patient want to proceed with activity? Yes   HIPAA/medical authority confirmed? Yes   Relationship to patient of person spoken to? Self   Refill Screening Questions    Would patient like to speak to a pharmacist? No   When does the patient need to receive the medication? 02/26/23   Refill Delivery Questions    How will the patient receive the medication? MEDRx   When does the patient need to receive the medication? 02/26/23   Shipping Address Home   Address in [facility] confirmed and updated if neccessary? Yes   Expected Copay ($) 0   Is the patient able to afford the medication copay? Yes   Payment Method zero copay   Days supply of Refill 28   Supplies needed? No supplies needed   Refill activity completed? Yes   Refill activity plan Refill scheduled   Shipment/Pickup Date: 02/23/23            Current Outpatient Medications   Medication Sig    aspirin 81 MG Chew Take 1 tablet (81 mg total) by mouth once daily.    buPROPion (WELLBUTRIN XL) 150 MG TB24 tablet Take 1 tablet (150 mg total) by mouth once daily.    clopidogreL (PLAVIX) 75 mg tablet Take 1 tablet (75 mg total) by mouth once daily.    cyanocobalamin (VITAMIN B-12) 1000 MCG tablet Take 100 mcg by mouth once daily.    evolocumab (REPATHA SURECLICK) 140 mg/mL PnIj Inject 1 mL (140 mg total) into the skin every 14 (fourteen) days.    fluocinolone acetonide oiL (DERMOTIC OIL) 0.01 % Drop Place 3 drops in ear(s) 2 (two) times daily.    fluticasone propionate (FLONASE) 50 mcg/actuation nasal spray 2 sprays (100 mcg total) by Each Nostril route once daily.    furosemide (LASIX) 20 MG tablet Take  1 tablet (20 mg total) by mouth once daily.    gabapentin (NEURONTIN) 300 MG capsule Take 3 capsules (900 mg total) by mouth every evening.    hydrALAZINE (APRESOLINE) 50 MG tablet Take 1 tablet (50 mg total) by mouth every 8 (eight) hours as needed (SBP>180 or DBP>100).    isosorbide mononitrate (IMDUR) 60 MG 24 hr tablet Take 1 tablet (60 mg total) by mouth every evening.    metoprolol tartrate (LOPRESSOR) 50 MG tablet Take 1 tablet (50 mg total) by mouth 2 (two) times daily.    multivitamin with minerals tablet Take 1 tablet by mouth once daily.    NAGLPKM nabumetone 15%- amitriptyline 2%- gabapentin 6%- LIDOcaine 2%- prilocaine 2%- ketamine 5%- magnesium 5% in Lipoderm ActiveMax 1.5% diclofenac, 2.5% gabapentin, 2.5% lidocaine, 2.5% prilocaine (or variations of different formulary depending on insurance coverage) from [facility]    nitroGLYCERIN (NITROSTAT) 0.4 MG SL tablet Place 1 tablet (0.4 mg total) under the tongue every 5 (five) minutes as needed for Chest pain.    ondansetron (ZOFRAN-ODT) 4 MG TbDL Take 1 tablet (4 mg total) by mouth every 8 (eight) hours as needed.    pantoprazole (PROTONIX) 20 MG tablet Take 2 tablets (40 mg total) by mouth 2 (two) times daily before meals.    tiZANidine (ZANAFLEX) 4 MG tablet     valsartan (DIOVAN) 320 MG tablet Take 1 tablet (320 mg total) by mouth once daily.    vitamin D (VITAMIN D3) 1000 units Tab Take 1,000 Units by mouth once daily.   Last reviewed on 2/9/2023  9:27 AM by Kimberly Gutierrez PA-C    Review of patient's allergies indicates:   Allergen Reactions    Amlodipine Swelling    Statins-hmg-coa reductase inhibitors Other (See Comments)     Myalgias to lipitor and simvastatin    Last reviewed on  2/9/2023 9:27 AM by Kimberly Gutierrez      Tasks added this encounter   3/19/2023 - Refill Call (Auto Added)   Tasks due within next 3 months   No tasks due.     Izzy Henning, Patient Care Assistant  [facility]  [address]  [facility]  [facility] [address]  Phone: [phone]  Fax: [phone]

## 2023-02-20 ENCOUNTER — TELEPHONE (OUTPATIENT)
Dept: PAIN MEDICINE | Facility: CLINIC | Age: 68
End: 2023-02-20
Payer: MEDICARE

## 2023-02-20 NOTE — TELEPHONE ENCOUNTER
----- Message from Asha Diallo sent at 2/20/2023 11:10 AM CST -----  Contact: self/[phone]  Patient is calling to consult with nurse regarding a later appt around 12:30 or 1:00 pm on the same day. Please call her back at [phone]. Thanks/ar

## 2023-02-22 ENCOUNTER — OFFICE VISIT (OUTPATIENT)
Dept: PRIMARY CARE CLINIC | Facility: CLINIC | Age: 68
End: 2023-02-22
Payer: MEDICARE

## 2023-02-22 VITALS
RESPIRATION RATE: 15 BRPM | WEIGHT: 161.81 LBS | HEART RATE: 58 BPM | SYSTOLIC BLOOD PRESSURE: 130 MMHG | DIASTOLIC BLOOD PRESSURE: 74 MMHG | OXYGEN SATURATION: 97 % | HEIGHT: 64 IN | TEMPERATURE: 98 F | BODY MASS INDEX: 27.63 KG/M2

## 2023-02-22 DIAGNOSIS — M54.12 CERVICAL RADICULOPATHY: ICD-10-CM

## 2023-02-22 DIAGNOSIS — I10 ESSENTIAL HYPERTENSION: ICD-10-CM

## 2023-02-22 DIAGNOSIS — I25.10 CORONARY ARTERY DISEASE INVOLVING NATIVE CORONARY ARTERY OF NATIVE HEART WITHOUT ANGINA PECTORIS: ICD-10-CM

## 2023-02-22 DIAGNOSIS — R20.8 BURNING SENSATION OF FEET: ICD-10-CM

## 2023-02-22 DIAGNOSIS — J02.9 SORE THROAT: Primary | ICD-10-CM

## 2023-02-22 DIAGNOSIS — M54.16 LUMBAR RADICULOPATHY: ICD-10-CM

## 2023-02-22 LAB
CTP QC/QA: YES
S PYO RRNA THROAT QL PROBE: NEGATIVE

## 2023-02-22 PROCEDURE — 99215 OFFICE O/P EST HI 40 MIN: CPT | Mod: HCNC,S$GLB,, | Performed by: FAMILY MEDICINE

## 2023-02-22 PROCEDURE — 3075F PR MOST RECENT SYSTOLIC BLOOD PRESS GE 130-139MM HG: ICD-10-PCS | Mod: HCNC,CPTII,S$GLB, | Performed by: FAMILY MEDICINE

## 2023-02-22 PROCEDURE — 4010F ACE/ARB THERAPY RXD/TAKEN: CPT | Mod: HCNC,CPTII,S$GLB, | Performed by: FAMILY MEDICINE

## 2023-02-22 PROCEDURE — 99999 PR PBB SHADOW E&M-EST. PATIENT-LVL V: ICD-10-PCS | Mod: PBBFAC,HCNC,, | Performed by: FAMILY MEDICINE

## 2023-02-22 PROCEDURE — 3008F PR BODY MASS INDEX (BMI) DOCUMENTED: ICD-10-PCS | Mod: HCNC,CPTII,S$GLB, | Performed by: FAMILY MEDICINE

## 2023-02-22 PROCEDURE — 3078F PR MOST RECENT DIASTOLIC BLOOD PRESSURE < 80 MM HG: ICD-10-PCS | Mod: HCNC,CPTII,S$GLB, | Performed by: FAMILY MEDICINE

## 2023-02-22 PROCEDURE — 87880 POCT RAPID STREP A: ICD-10-PCS | Mod: QW,HCNC,S$GLB, | Performed by: FAMILY MEDICINE

## 2023-02-22 PROCEDURE — 1159F MED LIST DOCD IN RCRD: CPT | Mod: HCNC,CPTII,S$GLB, | Performed by: FAMILY MEDICINE

## 2023-02-22 PROCEDURE — 4010F PR ACE/ARB THEARPY RXD/TAKEN: ICD-10-PCS | Mod: HCNC,CPTII,S$GLB, | Performed by: FAMILY MEDICINE

## 2023-02-22 PROCEDURE — 1159F PR MEDICATION LIST DOCUMENTED IN MEDICAL RECORD: ICD-10-PCS | Mod: HCNC,CPTII,S$GLB, | Performed by: FAMILY MEDICINE

## 2023-02-22 PROCEDURE — 3078F DIAST BP <80 MM HG: CPT | Mod: HCNC,CPTII,S$GLB, | Performed by: FAMILY MEDICINE

## 2023-02-22 PROCEDURE — 99999 PR PBB SHADOW E&M-EST. PATIENT-LVL V: CPT | Mod: PBBFAC,HCNC,, | Performed by: FAMILY MEDICINE

## 2023-02-22 PROCEDURE — 3075F SYST BP GE 130 - 139MM HG: CPT | Mod: HCNC,CPTII,S$GLB, | Performed by: FAMILY MEDICINE

## 2023-02-22 PROCEDURE — 87880 STREP A ASSAY W/OPTIC: CPT | Mod: QW,HCNC,S$GLB, | Performed by: FAMILY MEDICINE

## 2023-02-22 PROCEDURE — 3008F BODY MASS INDEX DOCD: CPT | Mod: HCNC,CPTII,S$GLB, | Performed by: FAMILY MEDICINE

## 2023-02-22 PROCEDURE — 99215 PR OFFICE/OUTPT VISIT, EST, LEVL V, 40-54 MIN: ICD-10-PCS | Mod: HCNC,S$GLB,, | Performed by: FAMILY MEDICINE

## 2023-02-22 RX ORDER — GABAPENTIN 300 MG/1
CAPSULE ORAL
Qty: 360 CAPSULE | Refills: 3 | Status: SHIPPED | OUTPATIENT
Start: 2023-02-22 | End: 2023-05-11 | Stop reason: SDUPTHER

## 2023-02-22 NOTE — PATIENT INSTRUCTIONS
Strep swab today was negative.  You might be at the early stages of catching a cold.  Okay to use OTC antihistamine, as needed.  You can also supplement with Tylenol, ibuprofen.    Try gargling with warm salt water a couple of times per day.  Also recommend OTC zinc lozenges.      For the burning in the feet, let us do an ultrasound of the blood vessels in the legs to make sure the flow is good.  If we have a blockage, this could be contributing to the burning sensation/pains in your feet.    Let us also increase your dose of gabapentin to see if that will help.  Keep taking three capsules at bedtime, the way you have been.  Now, start taking one additional capsule in the morning.      Keep your appointment with Dr. Izquierdo, as scheduled.      Since your blood pressure has been so erratic, while we are getting the ultrasound of the legs, let us also get an ultrasound of the vessels going to the kidneys.  A blockage in one of these vessels could be causing your blood pressure to go up and down.    Continue to eat a healthy diet.  Be careful with portion sizes.  Includes lots of fresh fruits, vegetables, whole grains, lean proteins.  See info below.    Keep hydrated.  Be sure to drink at least 8-10, 8 oz, glasses of water every day.    Stay active.  Try to do some sort of physical activity every day.  Nothing outrageous, just try walking for 10-15 minutes each day.

## 2023-02-22 NOTE — PROGRESS NOTES
"    [facility]       [address] [place] [address]      Phone: [phone]      Fax: [phone]    Hermes Miranda MD                Office Visit  02/22/2023        Subjective      HPI:  Shirlene Olvera is a 67 y.o. female presents today in clinic for "Follow-up (C/o outward ankle pain when walking, bilaterally/Worse on rt) and Sore Throat (C/o sore, burning throat since yesterday)  ."     67-year-old female presents today to discuss multiple issues.      She reports sore throat that started yesterday.  Feels needle-like.  Makes her cough occasionally.  Some sneezing.  No fever, body aches.  Had some chills.  Grand kid was sick recently.    Otherwise, appetite is okay.  Bowel movements are normal.  No urinary issues.    She states that both of her feet tend to hurt, burn.  Feels like a tingling, burning sensation.  Worse at night.  Right foot is constant, hurts all day.  Left foot just tight night.  She prop him up on a pillow to elevate them, but they get a throbbing sensation.  Feet always feel cold to her.  They swell during the day, but the swelling goes down overnight.    Still has issues with cervical/lumbar radiculopathy.  Also has pains in her arms, neck, low back.  This pain radiates to her legs.  Has been seeing Dr. Izquierdo.  Scheduled to get an injection in her neck in about two weeks.    Blood pressure still fluctuating quite a bit.  Was able to see a different cardiologist for 2nd opinion.  Had EKG, Holter monitor.  Everything appeared normal.  He recommended she continue her current medications.  Follow up with him, as needed.  She is also enrolled in the digital medicine blood pressure program.  Pressure good today.    PMH:  HTN, HLD, CKD, CAD/stent, GERD/hiatal hernia, cervical/lumbar radiculopathy, environmental allergies.    PSH: Cardiac stent.  Gallbladder.  Hysterectomy.  Rib removal (thoracic outlet syndrome).    F MH: HTN.  DM.  "   Allergies:  Statins caused arthralgias.  Amlodipine causes swelling.  Imdur causes indigestion.    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  Lives with daughter.    T:  No current use.  Quit in .  Previously, one ppd times 30+ years.    A:  Denies.  Also quit in .    D:  Denies    Exercise:  Frequently goes up and down the stairs.  Tries to stay active.      GI:  [facility]  Cardiology:  Dr. Byers   ENT:  [facility]   Neck: Izquierdo    MM2022     Colon:  2021.  Repeat five years ()      The following were updated and reviewed by myself in the chart: medications, past medical history, past surgical history, family history, social history, and allergies.     Medications:  Current Outpatient Medications on File Prior to Visit   Medication Sig Dispense Refill    aspirin 81 MG Chew Take 1 tablet (81 mg total) by mouth once daily. 360 tablet 1    clopidogreL (PLAVIX) 75 mg tablet Take 1 tablet (75 mg total) by mouth once daily. 90 tablet 3    cyanocobalamin (VITAMIN B-12) 1000 MCG tablet Take 100 mcg by mouth once daily.      evolocumab (REPATHA SURECLICK) 140 mg/mL PnIj Inject 1 mL (140 mg total) into the skin every 14 (fourteen) days. 2 each 11    fluocinolone acetonide oiL (DERMOTIC OIL) 0.01 % Drop Place 3 drops in ear(s) 2 (two) times daily. 20 mL 2    fluticasone propionate (FLONASE) 50 mcg/actuation nasal spray 2 sprays (100 mcg total) by Each Nostril route once daily. 48 g 3    furosemide (LASIX) 20 MG tablet Take 1 tablet (20 mg total) by mouth once daily. 30 tablet 5    hydrALAZINE (APRESOLINE) 50 MG tablet Take 1 tablet (50 mg total) by mouth every 8 (eight) hours as needed (SBP>180 or DBP>100). 90 tablet 3    isosorbide mononitrate (IMDUR) 60 MG 24 hr tablet Take 1 tablet (60 mg total) by mouth every evening. 90 tablet 1    metoprolol tartrate (LOPRESSOR) 50 MG tablet Take 1 tablet (50 mg total) by mouth 2 (two) times daily. 180 tablet 1    multivitamin with minerals tablet Take 1 tablet by  mouth once daily.      NAGLPKM nabumetone 15%- amitriptyline 2%- gabapentin 6%- LIDOcaine 2%- prilocaine 2%- ketamine 5%- magnesium 5% in Lipoderm ActiveMax 1.5% diclofenac, 2.5% gabapentin, 2.5% lidocaine, 2.5% prilocaine (or variations of different formulary depending on insurance coverage) from [facility] 240 g 1    nitroGLYCERIN (NITROSTAT) 0.4 MG SL tablet Place 1 tablet (0.4 mg total) under the tongue every 5 (five) minutes as needed for Chest pain. 25 tablet 0    ondansetron (ZOFRAN-ODT) 4 MG TbDL Take 1 tablet (4 mg total) by mouth every 8 (eight) hours as needed. 1 tablet 0    pantoprazole (PROTONIX) 20 MG tablet Take 2 tablets (40 mg total) by mouth 2 (two) times daily before meals. 90 tablet 1    tiZANidine (ZANAFLEX) 4 MG tablet       valsartan (DIOVAN) 320 MG tablet Take 1 tablet (320 mg total) by mouth once daily. 30 tablet 11    vitamin D (VITAMIN D3) 1000 units Tab Take 1,000 Units by mouth once daily.      [DISCONTINUED] gabapentin (NEURONTIN) 300 MG capsule Take 3 capsules (900 mg total) by mouth every evening. 270 capsule 0    buPROPion (WELLBUTRIN XL) 150 MG TB24 tablet Take 1 tablet (150 mg total) by mouth once daily. 90 tablet 1    [DISCONTINUED] valsartan-hydrochlorothiazide (DIOVAN-HCT) 160-25 mg per tablet Take 1 tablet by mouth once daily. 90 tablet 1     No current facility-administered medications on file prior to visit.        PMHx:  Past Medical History:   Diagnosis Date    Chest pain 5/14/2015    Colon polyp     Coronary artery disease     pt states MI June 2015    Hypertension     Myocardial infarction     PAD (peripheral artery disease)     Rash 4/20/2022    Tobacco use       Patient Active Problem List    Diagnosis Date Noted    Hiatal hernia 01/11/2023    Screening mammogram, encounter for 11/07/2022    Accelerated hypertension 11/01/2022    Postmenopausal 08/18/2022    Pure hypercholesterolemia 08/18/2022    Dyspepsia 08/18/2022    Ankle swelling 08/18/2022     Cervical strain 04/20/2022    Delayed immunizations 04/20/2022    Sinusitis 02/17/2022    Pharyngoesophageal dysphagia 12/09/2021    Schatzki's ring of distal esophagus 12/09/2021    Encounter for screening colonoscopy 12/09/2021    Cervical spondylosis 10/15/2021    Multiple nevi 08/16/2021    CKD (chronic kidney disease) stage 4, GFR 15-29 ml/min 08/02/2021    Statin intolerance 01/02/2019    PAD (peripheral artery disease) 06/09/2018    ROMÁN (generalized anxiety disorder) 06/09/2018    Insomnia 06/09/2018    History of colon polyps 10/11/2016    Biliary dyskinesia 09/03/2015    Coronary artery disease 08/31/2015    History of non-ST elevation myocardial infarction (NSTEMI) 05/14/2015    Tobacco abuse 07/18/2014    Allergic rhinitis, cause unspecified 07/18/2014    Essential hypertension 10/10/2013    Hyperlipidemia 10/10/2013    Degenerative disc disease, cervical 10/10/2013    DDD (degenerative disc disease), lumbar 10/10/2013    Thoracic or lumbosacral neuritis or radiculitis, unspecified 07/16/2013        PSHx:  Past Surgical History:   Procedure Laterality Date    CHOLECYSTECTOMY  09/03/2015    COLONOSCOPY N/A 10/11/2016    Procedure: COLONOSCOPY;  Surgeon: Haseeb Spears MD;  Location: [facility];  Service: Endoscopy;  Laterality: N/A;    COLONOSCOPY N/A 12/09/2021    Procedure: COLONOSCOPY;  Surgeon: Pat Rios MD;  Location: [facility];  Service: Endoscopy;  Laterality: N/A;    ESOPHAGOGASTRODUODENOSCOPY N/A 12/09/2021    Procedure: EGD (ESOPHAGOGASTRODUODENOSCOPY);  Surgeon: Pat Rios MD;  Location: [facility];  Service: Endoscopy;  Laterality: N/A;    high blood      HYSTERECTOMY      INJECTION OF ANESTHETIC AGENT AROUND MEDIAL BRANCH NERVES INNERVATING CERVICAL FACET JOINT Bilateral 10/15/2021    Procedure: Bilateral C5-7 MBB with RN IV sedation PATIENT WOULD LIKE AFTERNOON ARRIVAL, IF POSSIBLE;  Surgeon: Nano Izquierdo MD;  [facility];  Service: Pain Management;   Laterality: Bilateral;    INJECTION OF ANESTHETIC AGENT AROUND MEDIAL BRANCH NERVES INNERVATING CERVICAL FACET JOINT Bilateral 2022    Procedure: Bilateral C4-6 MBB with RN IV sedation;  Surgeon: Nano Izquierdo MD;  Location: [facility]IN [facility]GT;  Service: Pain Management;  Laterality: Bilateral;    INJECTION OF ANESTHETIC AGENT INTO SACROILIAC JOINT Right 2021    Procedure: Right SIJ Injection;  Surgeon: Nano Izquierdo MD;  Location: [facility];  Service: Pain Management;  Laterality: Right;    LEFT HEART CATHETERIZATION Left 2022    Procedure: CATHETERIZATION, HEART, LEFT;  Surgeon: Lion Awad MD;  Location: [facility] CATH LAB;  Service: Cardiology;  Laterality: Left;    OOPHORECTOMY      RADIOFREQUENCY THERMOCOAGULATION Right 2022    Procedure: Right C4-6 RFA with RN IV sedation;  Surgeon: Nano Izquierdo MD;  Location: [facility];  Service: Pain Management;  Laterality: Right;    RADIOFREQUENCY THERMOCOAGULATION Left 01/10/2023    Procedure: Left C4-6 RFA with RN IV sedation;  Surgeon: Nano Izquierdo MD;  Location: [facility];  Service: Pain Management;  Laterality: Left;    RIB FRACTURE SURGERY          FHx:  Family History   Problem Relation Age of Onset    Heart attack Father 56        MI    Stroke Sister     Asthma Neg Hx     Thyroid disease Neg Hx     Migraines Neg Hx     Cancer Neg Hx         Social:  Social History     Socioeconomic History    Marital status:     Number of children: 3   Occupational History    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 [employer]     Employer: [employer]   Tobacco Use    Smoking status: Former     Packs/day: 0.25     Types: Cigarettes     Quit date: 3/29/2022     Years since quittin.9    Smokeless tobacco: Never   Substance and Sexual Activity    Alcohol use: Not Currently    Drug use: No    Sexual activity: Not Currently     Partners: Male     Birth control/protection: None        Allergies:  Review of patient's allergies  "indicates:   Allergen Reactions    Amlodipine Swelling    Statins-hmg-coa reductase inhibitors Other (See Comments)     Myalgias to lipitor and simvastatin        ROS:  Review of Systems   Constitutional:  Positive for chills. Negative for activity change, appetite change and fever.   HENT:  Positive for sneezing and sore throat. Negative for congestion, postnasal drip, rhinorrhea and trouble swallowing.    Respiratory:  Positive for cough. Negative for shortness of breath and wheezing.    Cardiovascular:  Negative for chest pain and palpitations.   Gastrointestinal:  Negative for abdominal pain, constipation, diarrhea, nausea and vomiting.   Genitourinary:  Negative for difficulty urinating.   Musculoskeletal:  Positive for arthralgias and myalgias.   Skin:  Negative for color change and rash.   Neurological:  Negative for speech difficulty and headaches.   All other systems reviewed and are negative.       Objective      /74   Pulse (!) 58   Temp 97.9 °F (36.6 °C) (Temporal)   Resp 15   Ht 5' 4" (1.626 m)   Wt 73.4 kg (161 lb 13.1 oz)   LMP  (LMP Unknown)   SpO2 97%   BMI 27.78 kg/m²   Ht Readings from Last 3 Encounters:   02/22/23 5' 4" (1.626 m)   02/09/23 5' 4" (1.626 m)   01/13/23 5' 4" (1.626 m)     Wt Readings from Last 3 Encounters:   02/22/23 73.4 kg (161 lb 13.1 oz)   02/09/23 72.3 kg (159 lb 6.3 oz)   01/13/23 71.8 kg (158 lb 4.6 oz)       PHYSICAL EXAM:  Physical Exam  Vitals and nursing note reviewed.   Constitutional:       General: She is not in acute distress.     Appearance: Normal appearance.   HENT:      Head: Normocephalic and atraumatic.      Right Ear: Tympanic membrane, ear canal and external ear normal.      Left Ear: Tympanic membrane, ear canal and external ear normal.      Nose: Nose normal. No congestion or rhinorrhea.      Mouth/Throat:      Mouth: Mucous membranes are moist.      Pharynx: Oropharynx is clear. No oropharyngeal exudate or posterior oropharyngeal erythema. "   Eyes:      Extraocular Movements: Extraocular movements intact.      Conjunctiva/sclera: Conjunctivae normal.      Pupils: Pupils are equal, round, and reactive to light.   Cardiovascular:      Rate and Rhythm: Normal rate and regular rhythm.      Pulses:           Dorsalis pedis pulses are 1+ on the right side and 1+ on the left side.   Pulmonary:      Effort: Pulmonary effort is normal. No respiratory distress.      Breath sounds: No wheezing, rhonchi or rales.   Musculoskeletal:         General: Normal range of motion.      Cervical back: Normal range of motion.      Right lower leg: No edema.      Left lower leg: No edema.   Lymphadenopathy:      Cervical: No cervical adenopathy.   Skin:     General: Skin is warm and dry.      Findings: No rash.   Neurological:      Mental Status: She is alert.            LABS / IMAGING:  Recent Results (from the past 4368 hour(s))   Troponin I    Collection Time: 10/31/22 10:30 PM   Result Value Ref Range    Troponin I 0.083 (H) 0.000 - 0.026 ng/mL   CBC Auto Differential    Collection Time: 10/31/22 10:30 PM   Result Value Ref Range    WBC 5.02 3.90 - 12.70 K/uL    RBC 3.64 (L) 4.00 - 5.40 M/uL    Hemoglobin 11.9 (L) 12.0 - 16.0 g/dL    Hematocrit 35.6 (L) 37.0 - 48.5 %    MCV 98 82 - 98 fL    MCH 32.7 (H) 27.0 - 31.0 pg    MCHC 33.4 32.0 - 36.0 g/dL    RDW 12.2 11.5 - 14.5 %    Platelets 209 150 - 450 K/uL    MPV 9.6 9.2 - 12.9 fL    Immature Granulocytes 0.2 0.0 - 0.5 %    Gran # (ANC) 3.1 1.8 - 7.7 K/uL    Immature Grans (Abs) 0.01 0.00 - 0.04 K/uL    Lymph # 1.2 1.0 - 4.8 K/uL    Mono # 0.6 0.3 - 1.0 K/uL    Eos # 0.1 0.0 - 0.5 K/uL    Baso # 0.04 0.00 - 0.20 K/uL    nRBC 0 0 /100 WBC    Gran % 61.1 38.0 - 73.0 %    Lymph % 24.7 18.0 - 48.0 %    Mono % 11.4 4.0 - 15.0 %    Eosinophil % 1.8 0.0 - 8.0 %    Basophil % 0.8 0.0 - 1.9 %    Differential Method Automated    Comprehensive Metabolic Panel    Collection Time: 10/31/22 10:30 PM   Result Value Ref Range    Sodium 144  136 - 145 mmol/L    Potassium 4.0 3.5 - 5.1 mmol/L    Chloride 105 95 - 110 mmol/L    CO2 29 23 - 29 mmol/L    Glucose 98 70 - 110 mg/dL    BUN 25 (H) 8 - 23 mg/dL    Creatinine 1.6 (H) 0.5 - 1.4 mg/dL    Calcium 9.6 8.7 - 10.5 mg/dL    Total Protein 6.6 6.0 - 8.4 g/dL    Albumin 3.7 3.5 - 5.2 g/dL    Total Bilirubin 0.8 0.1 - 1.0 mg/dL    Alkaline Phosphatase 93 55 - 135 U/L    AST 20 10 - 40 U/L    ALT 15 10 - 44 U/L    Anion Gap 10 8 - 16 mmol/L    eGFR 35 (A) >60 mL/min/1.73 m^2   Magnesium    Collection Time: 10/31/22 10:30 PM   Result Value Ref Range    Magnesium 1.9 1.6 - 2.6 mg/dL   CBC Auto Differential    Collection Time: 11/01/22  5:10 AM   Result Value Ref Range    WBC 4.73 3.90 - 12.70 K/uL    RBC 3.56 (L) 4.00 - 5.40 M/uL    Hemoglobin 11.7 (L) 12.0 - 16.0 g/dL    Hematocrit 35.2 (L) 37.0 - 48.5 %    MCV 99 (H) 82 - 98 fL    MCH 32.9 (H) 27.0 - 31.0 pg    MCHC 33.2 32.0 - 36.0 g/dL    RDW 12.3 11.5 - 14.5 %    Platelets 200 150 - 450 K/uL    MPV 9.8 9.2 - 12.9 fL    Immature Granulocytes 0.2 0.0 - 0.5 %    Gran # (ANC) 2.7 1.8 - 7.7 K/uL    Immature Grans (Abs) 0.01 0.00 - 0.04 K/uL    Lymph # 1.3 1.0 - 4.8 K/uL    Mono # 0.5 0.3 - 1.0 K/uL    Eos # 0.2 0.0 - 0.5 K/uL    Baso # 0.04 0.00 - 0.20 K/uL    nRBC 0 0 /100 WBC    Gran % 57.8 38.0 - 73.0 %    Lymph % 26.6 18.0 - 48.0 %    Mono % 11.2 4.0 - 15.0 %    Eosinophil % 3.4 0.0 - 8.0 %    Basophil % 0.8 0.0 - 1.9 %    Differential Method Automated    Basic Metabolic Panel    Collection Time: 11/01/22  5:10 AM   Result Value Ref Range    Sodium 145 136 - 145 mmol/L    Potassium 4.5 3.5 - 5.1 mmol/L    Chloride 107 95 - 110 mmol/L    CO2 28 23 - 29 mmol/L    Glucose 88 70 - 110 mg/dL    BUN 24 (H) 8 - 23 mg/dL    Creatinine 1.6 (H) 0.5 - 1.4 mg/dL    Calcium 9.6 8.7 - 10.5 mg/dL    Anion Gap 10 8 - 16 mmol/L    eGFR 35 (A) >60 mL/min/1.73 m^2   Troponin I    Collection Time: 11/01/22  5:11 AM   Result Value Ref Range    Troponin I 0.054 (H) 0.000 -  0.026 ng/mL   Echo    Collection Time: 11/01/22  9:56 AM   Result Value Ref Range    BSA 1.8 m2    TDI SEPTAL 0.07 m/s    LV LATERAL E/E' RATIO 13.43 m/s    LV SEPTAL E/E' RATIO 13.43 m/s    LA WIDTH 3.20 cm    IVC diameter 1.66 cm    Left Ventricular Outflow Tract Mean Velocity 1.06 cm/s    Left Ventricular Outflow Tract Mean Gradient 4.65 mmHg    TV mean gradient 17 mmHg    TDI LATERAL 0.07 m/s    LVIDd 4.36 3.5 - 6.0 cm    IVS 1.38 (A) 0.6 - 1.1 cm    Posterior Wall 1.28 (A) 0.6 - 1.1 cm    Ao root annulus 2.61 cm    LVIDs 2.77 2.1 - 4.0 cm    FS 36 28 - 44 %    LA volume 32.88 cm3    STJ 2.81 cm    Ascending aorta 2.77 cm    LV mass 219.47 g    LA size 2.92 cm    TAPSE 2.10 cm    Left Ventricle Relative Wall Thickness 0.59 cm    AV mean gradient 7 mmHg    AV valve area 2.55 cm2    AV Velocity Ratio 0.85     AV index (prosthetic) 0.93     MV valve area p 1/2 method 3.86 cm2    E/A ratio 1.13     Mean e' 0.07 m/s    E wave deceleration time 196.65 msec    IVRT 53.28 msec    LVOT diameter 1.87 cm    LVOT area 2.7 cm2    LVOT peak srikanth 1.32 m/s    LVOT peak VTI 37.20 cm    Ao peak srikanth 1.56 m/s    Ao VTI 40.0 cm    RVOT peak srikanth 0.68 m/s    RVOT peak VTI 16.8 cm    LVOT stroke volume 102.12 cm3    AV peak gradient 10 mmHg    PV mean gradient 1.04 mmHg    E/E' ratio 13.43 m/s    MV Peak E Srikanth 0.94 m/s    TR Max Srikanth 2.07 m/s    MV stenosis pressure 1/2 time 57.03 ms    MV Peak A Srikanth 0.83 m/s    LV Systolic Volume 28.75 mL    LV Systolic Volume Index 16.2 mL/m2    LV Diastolic Volume 85.96 mL    LV Diastolic Volume Index 48.56 mL/m2    LA Volume Index 18.6 mL/m2    LV Mass Index 124 g/m2    RA Major Axis 3.42 cm    Left Atrium Minor Axis 4.13 cm    Left Atrium Major Axis 4.15 cm    Triscuspid Valve Regurgitation Peak Gradient 17 mmHg    RA Width 2.30 cm    Right Atrial Pressure (from IVC) 3 mmHg    EF 55 %    TV rest pulmonary artery pressure 20 mmHg   Troponin I    Collection Time: 11/01/22 10:35 AM   Result Value Ref  Range    Troponin I 0.044 (H) 0.000 - 0.026 ng/mL   CBC auto differential    Collection Time: 11/01/22 10:07 PM   Result Value Ref Range    WBC 6.65 3.90 - 12.70 K/uL    RBC 3.54 (L) 4.00 - 5.40 M/uL    Hemoglobin 11.7 (L) 12.0 - 16.0 g/dL    Hematocrit 35.1 (L) 37.0 - 48.5 %    MCV 99 (H) 82 - 98 fL    MCH 33.1 (H) 27.0 - 31.0 pg    MCHC 33.3 32.0 - 36.0 g/dL    RDW 12.5 11.5 - 14.5 %    Platelets 231 150 - 450 K/uL    MPV 9.7 9.2 - 12.9 fL    Immature Granulocytes 0.5 0.0 - 0.5 %    Gran # (ANC) 3.8 1.8 - 7.7 K/uL    Immature Grans (Abs) 0.03 0.00 - 0.04 K/uL    Lymph # 1.7 1.0 - 4.8 K/uL    Mono # 0.8 0.3 - 1.0 K/uL    Eos # 0.2 0.0 - 0.5 K/uL    Baso # 0.07 0.00 - 0.20 K/uL    nRBC 0 0 /100 WBC    Gran % 57.5 38.0 - 73.0 %    Lymph % 26.2 18.0 - 48.0 %    Mono % 11.7 4.0 - 15.0 %    Eosinophil % 3.0 0.0 - 8.0 %    Basophil % 1.1 0.0 - 1.9 %    Differential Method Automated    Comprehensive metabolic panel    Collection Time: 11/01/22 10:07 PM   Result Value Ref Range    Sodium 141 136 - 145 mmol/L    Potassium 4.4 3.5 - 5.1 mmol/L    Chloride 103 95 - 110 mmol/L    CO2 25 23 - 29 mmol/L    Glucose 113 (H) 70 - 110 mg/dL    BUN 28 (H) 8 - 23 mg/dL    Creatinine 2.4 (H) 0.5 - 1.4 mg/dL    Calcium 9.2 8.7 - 10.5 mg/dL    Total Protein 7.3 6.0 - 8.4 g/dL    Albumin 3.9 3.5 - 5.2 g/dL    Total Bilirubin 0.6 0.1 - 1.0 mg/dL    Alkaline Phosphatase 105 55 - 135 U/L    AST 21 10 - 40 U/L    ALT 18 10 - 44 U/L    Anion Gap 13 8 - 16 mmol/L    eGFR 21 (A) >60 mL/min/1.73 m^2   B-Type natriuretic peptide (BNP)    Collection Time: 11/01/22 10:07 PM   Result Value Ref Range    BNP 69 0 - 99 pg/mL   Troponin I    Collection Time: 11/01/22 10:07 PM   Result Value Ref Range    Troponin I 0.038 (H) 0.000 - 0.026 ng/mL         Assessment    1. Sore throat    2. Burning sensation of feet    3. Cervical radiculopathy    4. Lumbar radiculopathy    5. Coronary artery disease involving native coronary artery of native heart without  angina pectoris    6. Essential hypertension          Plan    Shirlene was seen today for follow-up and sore throat.    Diagnoses and all orders for this visit:    Sore throat  -     Ambulatory Referral/Consult to Enhanced Annual Wellness Visit (eAWV)  -     POCT Rapid Strep A    Burning sensation of feet  -     gabapentin (NEURONTIN) 300 MG capsule; Take 1 capsule (300 mg total) by mouth every morning AND 3 capsules (900 mg total) every evening.  -     US Lower Extremity Arteries Bilateral; Future    Cervical radiculopathy  -     gabapentin (NEURONTIN) 300 MG capsule; Take 1 capsule (300 mg total) by mouth every morning AND 3 capsules (900 mg total) every evening.    Lumbar radiculopathy  -     gabapentin (NEURONTIN) 300 MG capsule; Take 1 capsule (300 mg total) by mouth every morning AND 3 capsules (900 mg total) every evening.    Coronary artery disease involving native coronary artery of native heart without angina pectoris  -     US Lower Extremity Arteries Bilateral; Future  -     US Renal Artery Stenosis Hyperten Ltd; Future    Essential hypertension  -     US Renal Artery Stenosis Hyperten Ltd; Future    Diminished pulses in both lower extremities.  With her extensive history of CAD, stents, coronary blockages, wonder if she could have PA D which is either causing or exacerbating her foot pains?  Will get ultrasound of the femoral arteries to investigate.      Since we are getting an ultrasound anyway, we will also include the renal arteries to check for stenosis.  This could explain her labile blood pressures.      For the pains, will have her start taking gabapentin 300 mg in the morning, along with her 900 mg at bedtime.  New prescription sent to pharmacy.      Throat looks okay.  Strep swab negative.  Suspect she might be at the beginning of a viral URI.  Recommended rest, fluids, Tylenol/ibuprofen, saltwater gargles, zinc lozenges.  OTC antihistamine as needed.      FOLLOW-UP:  Follow up if symptoms worsen  or fail to improve.    I spent a total of 45 minutes face to face and non-face to face on the date of this visit.This includes time preparing to see the patient (eg, review of tests, notes), obtaining and/or reviewing additional history from an independent historian and/or outside medical records, documenting clinical information in the electronic health record, independently interpreting results and/or communicating results to the patient/family/caregiver, or care coordinator.    Signed by:  Hermes Miranda MD

## 2023-02-28 ENCOUNTER — HOSPITAL ENCOUNTER (OUTPATIENT)
Dept: RADIOLOGY | Facility: HOSPITAL | Age: 68
Discharge: HOME | End: 2023-02-28
Attending: FAMILY MEDICINE
Payer: MEDICARE

## 2023-02-28 DIAGNOSIS — R20.8 BURNING SENSATION OF FEET: ICD-10-CM

## 2023-02-28 DIAGNOSIS — I10 ESSENTIAL HYPERTENSION: ICD-10-CM

## 2023-02-28 DIAGNOSIS — I70.203 BILATERAL FEMORAL ARTERY STENOSIS: Primary | ICD-10-CM

## 2023-02-28 DIAGNOSIS — I73.9 PAD (PERIPHERAL ARTERY DISEASE): ICD-10-CM

## 2023-02-28 DIAGNOSIS — I25.10 CORONARY ARTERY DISEASE INVOLVING NATIVE CORONARY ARTERY OF NATIVE HEART WITHOUT ANGINA PECTORIS: ICD-10-CM

## 2023-02-28 PROCEDURE — 93975 VASCULAR STUDY: CPT | Mod: 26,HCNC,, | Performed by: RADIOLOGY

## 2023-02-28 PROCEDURE — 93925 LOWER EXTREMITY STUDY: CPT | Mod: 26,HCNC,, | Performed by: RADIOLOGY

## 2023-02-28 PROCEDURE — 93975 US RENAL ARTERY STENOSIS HYPERTEN (XPD): ICD-10-PCS | Mod: 26,HCNC,, | Performed by: RADIOLOGY

## 2023-02-28 PROCEDURE — 76770 US RENAL ARTERY STENOSIS HYPERTEN (XPD): ICD-10-PCS | Mod: 26,HCNC,XS, | Performed by: RADIOLOGY

## 2023-02-28 PROCEDURE — 93975 VASCULAR STUDY: CPT | Mod: TC,HCNC,PN

## 2023-02-28 PROCEDURE — 93925 LOWER EXTREMITY STUDY: CPT | Mod: TC,HCNC,PN

## 2023-02-28 PROCEDURE — 76770 US EXAM ABDO BACK WALL COMP: CPT | Mod: 26,HCNC,XS, | Performed by: RADIOLOGY

## 2023-02-28 PROCEDURE — 93925 US LOWER EXTREMITY ARTERIES BILATERAL: ICD-10-PCS | Mod: 26,HCNC,, | Performed by: RADIOLOGY

## 2023-02-28 NOTE — PROGRESS NOTES
Ms. May,    You should be able to see the results of your recent ultrasounds in [facility].      The kidney ultrasounds look fine.  Blood flow in and out of the kidneys are appropriate.  I figured since we were looking at the legs, we may as well checked this to see if it could be causing your labile blood pressure.  Fortunately, everything looks fine.      The legs are another matter.  It does look like there is some significant stenosis in some of the arteries in both legs.  I wonder if this could be contributing to your pains.  If the blood flow is not getting to the muscles, it can certainly cause them to ache and burn the way you described.    I am going to place a referral to our vascular specialist.  They should contact you to schedule an appointment.  They can review the ultrasound and discuss options about restoring blood flow to the legs and possibly relieving some of your discomfort.  If you do not hear from them in the next few days, please let me know.      By the way, my vascular specialists are located in [place].  If you would prefer to see someone in [place], please let me know that, as well.  I can certainly refer you to somebody in [facility] instead.    Please let us know if you have any questions!

## 2023-03-01 NOTE — PROGRESS NOTES
Established Patient Chronic Pain Note     Referring Physician: Clayton Frankel MD    PCP: Hermes Miranda MD      Chief Complaint:   Chief Complaint   Patient presents with    Neck Pain          SUBJECTIVE:  Interval Hx:  3/2/23  Patient presents for one-month follow-up.  She continues to report at least 50% sustained relief in bilateral axial neck pain following bilateral cervical radiofrequency ablation, right side 12/27/2022 and left-sided 01/10/2023.  Patient continues to report myofascial pain in the left trapezius and periscapular distribution.  Patient presents today for a trigger point injection.  Patient has continued gabapentin for neuropathic pain as well as tizanidine as needed for myofascial pain.  She reports she is scheduled to start physical therapy in [place] on March 7, 2023 for cervical traction exercises.  Today she denies more distal radiculopathy into the upper extremities or compromise in hand  strength or weakness in the upper extremities or hands.      Interval History (2/9/2023): Shirlene Olvera presents today for follow-up visit.  she underwent right C4-6 RFA on 12/27/22 then left C4-6 RFA on 1/10/23.  The patient reports that she is/was better following the procedure.  she reports about 50% pain relief.  The changes lasted 4 weeks so far.  The changes have continued through this visit.  Patient reports pain as 8/10 today. Pain is very localized in cervical muscular area on left.    Interval history 12/08/2022  At our last clinic visit, we discussed cervical radiofrequency ablation, not performed secondary to recent percutaneous angioplasty and necessity of remaining on anticoagulation.    Today patient again reports bilateral neck pain.  Pain again radiates to bilateral trapezius distribution in C5-6 dermatomal distribution.  She denies more distal radiculopathy into the upper extremities or hands.  She denies weakness in the upper extremities or compromise in hand   strength or dexterity.  Pain is constant and today is rated an 8/10.  Pain is exacerbated with cervical flexion, extension and lateral flexion.  Patient again reiterates at least 50% relief following bilateral neck cervical medial branch block and she would like to move forward with radiofrequency ablation pending safety of pausing anticoagulation.  Patient has continued gabapentin 900 mg in the evening and is requesting a refill as she has run out of this medication.  Patient denies any side effects from this medication.    Interval history 04/20/2022  Patient presents that his post bilateral C4, C5, C6 medial branch block 03/21/2022.  Patient reports 50% sustained relief in bilateral neck pain following medial branch block.  Of note patient reports she recently had a myocardial infarction with PTCA 3 weeks prior.  Patient reports she has been started on Brilinta and continued on aspirin.  Patient would like to pursue interventional treatment but understands necessity of pausing Brilinta prior to cervical radiofrequency ablation.  Today patient denies radiation into the upper extremities, compromise in hand  strength or dexterity or upper extremity weakness.  Patient has continued gabapentin 300 mg in the evening and reports improvement in her pain.  She denies any side effects from this medication.    Interval history 03/10/2022  Patient presents status post right-sided sacroiliac joint injection 12/17/2021 and for review of bilateral hip XR.  Patient reports 100% relief and right sacroiliac territory following her injection.  Today she reports insidious return of bilateral neck pain, which today is rated as a 9/10.  Patient reports pain in bilateral cervical paraspinous territory.  She denies radiation into the upper extremities, compromise in hand  strength or dexterity.  Patient is requesting repeat cervical C4, C5-C6 medial branch block as she obtain greater than 4 months of relief with this procedure.  " Patient has continued gabapentin 100 mg 3 times daily and was not able to fill be updated prescription.  She does believe this medication helps with her symptoms and she denies any side effects.    Interval history 11/11/2021  Patient presents status post bilateral medial branch block at C4, C5, C6 10/15/2021.  Patient reports 100% pain relief following her bilateral medial branch block which is still sustained.  Patient reports significant improvement in range of motion and reduced pain with cervical flexion, extension and lateral flexion.  Today patient primarily ports right-sided sacroiliac joint pain which radiates into the right buttock and right hip territory.  Pain today is rated as a 7/10 and is intermittent.  Pain is described as sharp in nature.  Pain is exacerbated when moving from sitting to standing and patient has positive Jason finger sign.  Patient has continued gabapentin 300 mg at night.  Patient reports when she increase this dose to 600, she just did not feel right.  She will maintain her dose of 300 mg. She denies any new onset lower extremity weakness, bowel or bladder incontinence or saddle anesthesia.    HPI:  10/06/2021  Shirlene Olvera is a 66 y.o. female with past medical history significant for ROMÁN, HTN, HLD, CAD s/p MI, PAD, CKD III, nicotine dependence who presents to the clinic for the evaluation of longstanding neck pain.  Today patient reports neck pain which began several years prior without inciting accident, injury or trauma.  Patient describes pain as intermittent and is a 7/10 today.  At its worst pain is a 10/10.  Pain is described as stiff and a "pins and needles" sensation.  Pain begins at her occiput and radiates down bilateral cervical paraspinous muscles into bilateral trapezius distributions in C4-6 distribution.  Pain is exacerbated with cervical flexion, extension such as when she is gardening.  Pain is improved with Icy Hot.  Patient denies radicular symptoms " into bilateral upper extremities, paresthesias or weakness in bilateral hands, compromise in hand  strength or dexterity.  Patient reports receiving prior cervical medial branch block several years prior with significant improvement in her symptoms.  Patient is currently taking gabapentin 100 mg 3 times daily without noticeable improvement in her symptoms.  Patient denies night fever/night sweats, urinary incontinence, bowel incontinence, significant weight loss, significant motor weakness and loss of sensations.    Pain Disability Index Review:  Last 3 PDI Scores 3/2/2023 12/8/2022 4/28/2022   Pain Disability Index (PDI) 40 48 29       Non-Pharmacologic Treatments:  Physical Therapy/Home Exercise: no  Ice/Heat:yes  TENS: no  Acupuncture: no  Massage: no  Chiropractic: no    Other: no      Pain Medications:  - Adjuvant Medications: Neurontin (Gabapentin), Topical Ointment (Voltaren Gel, Steroid cream, Anti-Inflammatory Cream, Compound cream) and Tylenol (Acetaminophen)  - Anti-Coagulants: Aspirin    Pain Procedures:    - right C4-6 RFA on 12/27/22 then left C4-6 RFA on 1/10/23 with 50-70% pain relief so far, less pain relief on left side  - 3/21/2022:  Bilateral C4, C5, C6 medial branch block  - 12/17/2021:  Right-sided sacroiliac joint injection  - 10/15/2021:  Bilateral medial branch block at C4, C5, C6; Dr. Izquierdo    Cervical MBB: several years prior -- externally          Review of Systems:  GENERAL:  No weight loss, malaise or fevers.  HEENT:   No recent changes in vision or hearing  NECK:  Negative for lumps, no difficulty with swallowing.  RESPIRATORY:  Negative for cough, wheezing or shortness of breath, patient denies any recent URI.  CARDIOVASCULAR:  Negative for chest pain, leg swelling or palpitations.  GI:  Negative for abdominal discomfort, blood in stools or black stools or change in bowel habits.  MUSCULOSKELETAL:  See HPI.  SKIN:  Negative for lesions, rash, and itching.  PSYCH:  No mood disorder  "or recent psychosocial stressors.   HEMATOLOGY/LYMPHOLOGY:  Negative for prolonged bleeding, bruising easily or swollen nodes.    NEURO:   No history of headaches, syncope, paralysis, seizures or tremors.  All other reviewed and negative other than HPI.      OBJECTIVE:    Physical Exam:  Vitals:    03/02/23 0835   BP: (!) 149/82   Pulse: (!) 58   Resp: 17   Weight: 73 kg (161 lb)   Height: 5' 4" (1.626 m)   PainSc:   7      Body mass index is 27.64 kg/m².   (reviewed on 3/2/2023)    GENERAL: Well appearing, in no acute distress, alert and oriented x3.  PSYCH:  Mood and affect appropriate.  SKIN: Skin color, texture, turgor normal, no rashes or lesions.  HEAD/FACE:  Normocephalic, atraumatic. Cranial nerves grossly intact.    NECK:  pain to palpation over the cervical paraspinous muscles, L>R. TTP over left trapezius. Spurling Negative.  pain with neck flexion, extension, or lateral flexion.  Somewhat limited ROM.  Normal testing biceps, triceps and brachioradialis bilaterally.    Negative Antonio's bilaterally.    5/5 strength testing deltoid, biceps, triceps, wrist extensor, wrist flexor and ulnar intrinsics bilaterally.    Normal  strength bilaterally    PULM: No evidence of respiratory difficulty, symmetric chest rise.  GI:  Soft and non-tender.    NEURO: BUE & BLE coordination and muscle stretch reflexes are physiologic and symmetric. No loss of sensation is noted.  GAIT: normal.      Imaging (Reviewed on 3/2/2023):    Hip  XR 11/11/21  FINDINGS:  No acute fracture.  Hip joint spaces maintained with left greater than right acetabular degenerative findings.  Lower lumbar spine degenerative changes noted.  Soft tissues unremarkable.     08/05/12 MRI Lumbar Spine Without Contrast  Findings: Vertebral alignment is normal.  The conus ends at the level of  L1 and appears normal.  Intervertebral disks are well-hydrated and disk  spaces are maintained.  No compression fractures or acute osseous  abnormalities are " seen.  There is no area of abnormal signal intensity  identified within the bone marrow.  Axial images are acquired through the  disk spaces from L1-2 through L5-S1.  There is no focal disk herniation,  canal or foraminal stenosis identified above the L4-5 level.  At L4 R. there is central disk protrusion minimally indenting the thecal  sac.  There is also some diffuse bulging of the disk and facet and  ligamentum flavum hypertrophy.  There is mild canal and bilateral  foraminal narrowing at this level.  At L5-S1 there is central bulging of the disk without canal or foraminal  stenosis.  Impression  Central disk protrusion at L4-5 with some canal and foraminal  narrowing due to combination of disk abnormality and degenerative change.  Please correlate above findings with the patient's clinical symptoms.    08/05/12 MRI Cervical Spine Without Contrast  Findings: The craniovertebral junction and vertebral alignment are  normal.  No abnormality of bone marrow signal intensity is seen.  Intervertebral disks are dessicated but disk spaces are maintained.  No  abnormality seen within the cervical spinal cord.  Axial images are  acquired through the disk spaces from C2-3 through C7-T1.  C2-3 disk space is normal.  The C3-4 disk space demonstrates spondylosis as well as facet and  uncinate hypertrophy.  There is mild canal and bilateral foraminal  narrowing at this level.  The C4-5 disk space also demonstrates right paracentral spondylosis and  disk bulge with facet and uncinate hypertrophy on the right.  There is  mild canal and moderate right sided foraminal stenosis.  The C5-6 disk space does not demonstrate any significant disk herniation,  canal or foraminal stenosis.  The C6-7 disk space demonstrates minimal bulging of the disk but no canal  or foraminal narrowing.  The C7 T1 disk space is unremarkable.    Impression  Degenerative change and degenerative disk disease most  pronounced at the C3-4 and C4-5 level as  discussed above.  Please  correlate with patient's clinical symptoms.       09/13/12 X-Ray Cervical Spine AP And Lateral  Findings: Vertebral alignment is normal.  There is moderate degenerative  change throughout the cervical spine with loss of disk height and  anterior osteophyte formation.  No acute osseous abnormalities are seen.  Postoperative findings from resection of the left first rib.      ASSESSMENT: 67 y.o. year old female with neck pain, R lower back pain, consistent with     1. Cervical spondylosis        2. Degenerative disc disease, cervical        3. Pain of cervical facet joint        4. Cervical radiculopathy              PLAN:   - Interventions:  Procedure note:     4cc of 2% lidocaine + 1 cc 40 mg methylprednisolone was injected equally into 3  trigger point muscles along the L trapezius/rhomboid mm after sterilization, & negative aspiration using a different 5cc syringe & 27 gauge needle. The patient tolerated the procedure well with no adverse effects & attested to obtaining pain relief.    - S/p right C4-6 RFA on 12/27/22 then left C4-6 RFA on 1/10/23 with sustained 50-70% pain relief so far.  We have discussed repeating this procedure no sooner than 6 months following the procedure date should symptoms exacerbate.    - We have previously discussed with exacerbation of sacroiliac pain, scheduling repeat right-sided SIJ injection in the future.      - Anticoagulation use: yes aspirin and Plavix - secondary - Dr. Byers/ Dr. Mendoza (Cardiology)   Patient had myocardial infarction with PTCA 3/2022.      - Medications:  -Continue gabapentin 900 mg QHS. X 90 day supply.  We have previously reviewed potential side effects of this medication including daytime somnolence, weight gain and peripheral edema    -REorder prescription compound cream to see if this helps with their pain.  Compound medication will include flurbiprofen 10%, baclofen 2%, cyclobenzaprine 2%, gabapentin 6%, orphenadrine 5%,  tetracaine 2% for anti-inflammatory, neuropathic and anesthetic properties.  Patient can apply this medication 2-4 times daily to painful areas.       -Continue Zanaflex (tizanidine) 4mg QHS PRN. We have discussed potential deleterious side effects associated with this medication including  dizziness, drowsiness, dry mouth or tingling sensation in the upper or lower extremities.        report:  Reviewed and consistent with medication use as prescribed.      - Therapy: We discussed continuing exercises at home learned at physical therapy to help manage the patient/s painful condition. The patient was counseled that muscle strengthening will improve the long term prognosis in regards to pain and may also help increase range of motion and mobility. Starting PT/Dry Needling @ [facility] 3/7/23.    - Imaging: Reviewed available imaging with patient and answered any questions they had regarding study.      - Follow up visit: 2 months./ anticipate repeat TPI    - This condition does not require this patient to take time off of work, and the primary goal of our Pain Management services is to improve the patient's functional capacity.   - I discussed the risks, benefits, and alternatives to potential treatment options. All questions and concerns were fully addressed today in clinic.         Nano Izquierdo MD  Interventional Pain Management - [facility]    Disclaimer:  This note was prepared using voice recognition system and is likely to have sound alike errors that may have been overlooked even after proof reading.  Please call me with any questions.

## 2023-03-02 ENCOUNTER — OFFICE VISIT (OUTPATIENT)
Dept: PAIN MEDICINE | Facility: CLINIC | Age: 68
End: 2023-03-02
Payer: MEDICARE

## 2023-03-02 ENCOUNTER — PATIENT MESSAGE (OUTPATIENT)
Dept: PRIMARY CARE CLINIC | Facility: CLINIC | Age: 68
End: 2023-03-02
Payer: MEDICARE

## 2023-03-02 VITALS
DIASTOLIC BLOOD PRESSURE: 82 MMHG | HEART RATE: 58 BPM | HEIGHT: 64 IN | SYSTOLIC BLOOD PRESSURE: 149 MMHG | RESPIRATION RATE: 17 BRPM | WEIGHT: 161 LBS | BODY MASS INDEX: 27.49 KG/M2

## 2023-03-02 DIAGNOSIS — M54.12 CERVICAL RADICULOPATHY: ICD-10-CM

## 2023-03-02 DIAGNOSIS — M54.2 PAIN OF CERVICAL FACET JOINT: ICD-10-CM

## 2023-03-02 DIAGNOSIS — M47.812 CERVICAL SPONDYLOSIS: Primary | ICD-10-CM

## 2023-03-02 DIAGNOSIS — M50.30 DEGENERATIVE DISC DISEASE, CERVICAL: ICD-10-CM

## 2023-03-02 DIAGNOSIS — I25.2 HISTORY OF NON-ST ELEVATION MYOCARDIAL INFARCTION (NSTEMI): ICD-10-CM

## 2023-03-02 PROCEDURE — 3008F PR BODY MASS INDEX (BMI) DOCUMENTED: ICD-10-PCS | Mod: HCNC,CPTII,S$GLB, | Performed by: ANESTHESIOLOGY

## 2023-03-02 PROCEDURE — 1101F PT FALLS ASSESS-DOCD LE1/YR: CPT | Mod: HCNC,CPTII,S$GLB, | Performed by: ANESTHESIOLOGY

## 2023-03-02 PROCEDURE — 20553 PR INJECT TRIGGER POINTS, > 3: ICD-10-PCS | Mod: HCNC,S$GLB,, | Performed by: ANESTHESIOLOGY

## 2023-03-02 PROCEDURE — 99214 PR OFFICE/OUTPT VISIT, EST, LEVL IV, 30-39 MIN: ICD-10-PCS | Mod: 25,HCNC,S$GLB, | Performed by: ANESTHESIOLOGY

## 2023-03-02 PROCEDURE — 1125F PR PAIN SEVERITY QUANTIFIED, PAIN PRESENT: ICD-10-PCS | Mod: HCNC,CPTII,S$GLB, | Performed by: ANESTHESIOLOGY

## 2023-03-02 PROCEDURE — 3288F FALL RISK ASSESSMENT DOCD: CPT | Mod: HCNC,CPTII,S$GLB, | Performed by: ANESTHESIOLOGY

## 2023-03-02 PROCEDURE — 1159F MED LIST DOCD IN RCRD: CPT | Mod: HCNC,CPTII,S$GLB, | Performed by: ANESTHESIOLOGY

## 2023-03-02 PROCEDURE — 4010F ACE/ARB THERAPY RXD/TAKEN: CPT | Mod: HCNC,CPTII,S$GLB, | Performed by: ANESTHESIOLOGY

## 2023-03-02 PROCEDURE — 99214 OFFICE O/P EST MOD 30 MIN: CPT | Mod: 25,HCNC,S$GLB, | Performed by: ANESTHESIOLOGY

## 2023-03-02 PROCEDURE — 3077F SYST BP >= 140 MM HG: CPT | Mod: HCNC,CPTII,S$GLB, | Performed by: ANESTHESIOLOGY

## 2023-03-02 PROCEDURE — 1125F AMNT PAIN NOTED PAIN PRSNT: CPT | Mod: HCNC,CPTII,S$GLB, | Performed by: ANESTHESIOLOGY

## 2023-03-02 PROCEDURE — 99999 PR PBB SHADOW E&M-EST. PATIENT-LVL V: ICD-10-PCS | Mod: PBBFAC,HCNC,, | Performed by: ANESTHESIOLOGY

## 2023-03-02 PROCEDURE — 20553 NJX 1/MLT TRIGGER POINTS 3/>: CPT | Mod: HCNC,S$GLB,, | Performed by: ANESTHESIOLOGY

## 2023-03-02 PROCEDURE — 3079F DIAST BP 80-89 MM HG: CPT | Mod: HCNC,CPTII,S$GLB, | Performed by: ANESTHESIOLOGY

## 2023-03-02 PROCEDURE — 3079F PR MOST RECENT DIASTOLIC BLOOD PRESSURE 80-89 MM HG: ICD-10-PCS | Mod: HCNC,CPTII,S$GLB, | Performed by: ANESTHESIOLOGY

## 2023-03-02 PROCEDURE — 99999 PR PBB SHADOW E&M-EST. PATIENT-LVL V: CPT | Mod: PBBFAC,HCNC,, | Performed by: ANESTHESIOLOGY

## 2023-03-02 PROCEDURE — 1101F PR PT FALLS ASSESS DOC 0-1 FALLS W/OUT INJ PAST YR: ICD-10-PCS | Mod: HCNC,CPTII,S$GLB, | Performed by: ANESTHESIOLOGY

## 2023-03-02 PROCEDURE — 3077F PR MOST RECENT SYSTOLIC BLOOD PRESSURE >= 140 MM HG: ICD-10-PCS | Mod: HCNC,CPTII,S$GLB, | Performed by: ANESTHESIOLOGY

## 2023-03-02 PROCEDURE — 3288F PR FALLS RISK ASSESSMENT DOCUMENTED: ICD-10-PCS | Mod: HCNC,CPTII,S$GLB, | Performed by: ANESTHESIOLOGY

## 2023-03-02 PROCEDURE — 4010F PR ACE/ARB THEARPY RXD/TAKEN: ICD-10-PCS | Mod: HCNC,CPTII,S$GLB, | Performed by: ANESTHESIOLOGY

## 2023-03-02 PROCEDURE — 1159F PR MEDICATION LIST DOCUMENTED IN MEDICAL RECORD: ICD-10-PCS | Mod: HCNC,CPTII,S$GLB, | Performed by: ANESTHESIOLOGY

## 2023-03-02 PROCEDURE — 3008F BODY MASS INDEX DOCD: CPT | Mod: HCNC,CPTII,S$GLB, | Performed by: ANESTHESIOLOGY

## 2023-03-02 RX ORDER — METHYLPREDNISOLONE ACETATE 40 MG/ML
40 INJECTION, SUSPENSION INTRA-ARTICULAR; INTRALESIONAL; INTRAMUSCULAR; SOFT TISSUE
Status: COMPLETED | OUTPATIENT
Start: 2023-03-02 | End: 2023-03-02

## 2023-03-02 RX ADMIN — METHYLPREDNISOLONE ACETATE 40 MG: 40 INJECTION, SUSPENSION INTRA-ARTICULAR; INTRALESIONAL; INTRAMUSCULAR; SOFT TISSUE at 08:03

## 2023-03-03 RX ORDER — FUROSEMIDE 20 MG/1
20 TABLET ORAL DAILY
Qty: 30 TABLET | Refills: 5 | Status: SHIPPED | OUTPATIENT
Start: 2023-03-03 | End: 2023-07-13 | Stop reason: SDUPTHER

## 2023-03-10 ENCOUNTER — PATIENT OUTREACH (OUTPATIENT)
Dept: ADMINISTRATIVE | Facility: HOSPITAL | Age: 68
End: 2023-03-10
Payer: MEDICARE

## 2023-03-10 NOTE — PROGRESS NOTES
Working Statin Report:     Pt has documented statin intolerance. Intolerance code due to be dropped in 2023. Remind me set to make PCP aware prior to upcoming appt in 07/2023.

## 2023-03-20 ENCOUNTER — SPECIALTY PHARMACY (OUTPATIENT)
Dept: PHARMACY | Facility: CLINIC | Age: 68
End: 2023-03-20
Payer: MEDICARE

## 2023-03-20 NOTE — TELEPHONE ENCOUNTER
Specialty Pharmacy - Refill Coordination    Specialty Medication Orders Linked to Encounter      Flowsheet Row Most Recent Value   Medication #1 evolocumab (REPATHA SURECLICK) 140 mg/mL PnIj (Order#[id number], Rx#[id number])            Refill Questions - Documented Responses      Flowsheet Row Most Recent Value   Patient Availability and HIPAA Verification    Does patient want to proceed with activity? Yes   HIPAA/medical authority confirmed? Yes   Relationship to patient of person spoken to? Self   Refill Screening Questions    Would patient like to speak to a pharmacist? No   When does the patient need to receive the medication? 03/26/23   Refill Delivery Questions    How will the patient receive the medication? MEDRx   When does the patient need to receive the medication? 03/26/23   Shipping Address Home   Address in [facility] confirmed and updated if neccessary? Yes   Expected Copay ($) 0   Is the patient able to afford the medication copay? Yes   Payment Method zero copay   Days supply of Refill 28   Refill activity completed? Yes   Refill activity plan Refill scheduled   Shipment/Pickup Date: 03/23/23            Current Outpatient Medications   Medication Sig    aspirin 81 MG Chew Take 1 tablet (81 mg total) by mouth once daily.    buPROPion (WELLBUTRIN XL) 150 MG TB24 tablet Take 1 tablet (150 mg total) by mouth once daily.    clopidogreL (PLAVIX) 75 mg tablet Take 1 tablet (75 mg total) by mouth once daily.    cyanocobalamin (VITAMIN B-12) 1000 MCG tablet Take 100 mcg by mouth once daily.    evolocumab (REPATHA SURECLICK) 140 mg/mL PnIj Inject 1 mL (140 mg total) into the skin every 14 (fourteen) days.    fluocinolone acetonide oiL (DERMOTIC OIL) 0.01 % Drop Place 3 drops in ear(s) 2 (two) times daily.    fluticasone propionate (FLONASE) 50 mcg/actuation nasal spray 2 sprays (100 mcg total) by Each Nostril route once daily.    furosemide (LASIX) 20 MG tablet Take 1 tablet (20 mg total) by mouth once  daily.    gabapentin (NEURONTIN) 300 MG capsule Take 1 capsule (300 mg total) by mouth every morning AND 3 capsules (900 mg total) every evening.    hydrALAZINE (APRESOLINE) 50 MG tablet Take 1 tablet (50 mg total) by mouth every 8 (eight) hours as needed (SBP>180 or DBP>100).    isosorbide mononitrate (IMDUR) 60 MG 24 hr tablet Take 1 tablet (60 mg total) by mouth every evening.    metoprolol tartrate (LOPRESSOR) 50 MG tablet Take 1 tablet (50 mg total) by mouth 2 (two) times daily.    multivitamin with minerals tablet Take 1 tablet by mouth once daily.    NAGLPKM nabumetone 15%- amitriptyline 2%- gabapentin 6%- LIDOcaine 2%- prilocaine 2%- ketamine 5%- magnesium 5% in Lipoderm ActiveMax 1.5% diclofenac, 2.5% gabapentin, 2.5% lidocaine, 2.5% prilocaine (or variations of different formulary depending on insurance coverage) from [facility]    nitroGLYCERIN (NITROSTAT) 0.4 MG SL tablet Place 1 tablet (0.4 mg total) under the tongue every 5 (five) minutes as needed for Chest pain.    ondansetron (ZOFRAN-ODT) 4 MG TbDL Take 1 tablet (4 mg total) by mouth every 8 (eight) hours as needed.    pantoprazole (PROTONIX) 20 MG tablet Take 2 tablets (40 mg total) by mouth 2 (two) times daily before meals.    tiZANidine (ZANAFLEX) 4 MG tablet     valsartan (DIOVAN) 320 MG tablet Take 1 tablet (320 mg total) by mouth once daily.    vitamin D (VITAMIN D3) 1000 units Tab Take 1,000 Units by mouth once daily.   Last reviewed on 3/2/2023  8:38 AM by Juan Soares MA    Review of patient's allergies indicates:   Allergen Reactions    Amlodipine Swelling    Statins-hmg-coa reductase inhibitors Other (See Comments)     Myalgias to lipitor and simvastatin    Last reviewed on  3/2/2023 8:36 AM by Juan Soares      Tasks added this encounter   4/16/2023 - Refill Call (Auto Added)   Tasks due within next 3 months   No tasks due.     Flaca Major, PharmD  [facility]  [address]  [place] [address]  Phone: [phone]  Fax: [phone]

## 2023-04-04 ENCOUNTER — HOSPITAL ENCOUNTER (OUTPATIENT)
Dept: RADIOLOGY | Facility: HOSPITAL | Age: 68
Discharge: HOME | End: 2023-04-04
Attending: NURSE PRACTITIONER
Payer: MEDICARE

## 2023-04-04 ENCOUNTER — OFFICE VISIT (OUTPATIENT)
Dept: INTERNAL MEDICINE | Facility: CLINIC | Age: 68
End: 2023-04-04
Payer: MEDICARE

## 2023-04-04 VITALS
BODY MASS INDEX: 28.31 KG/M2 | DIASTOLIC BLOOD PRESSURE: 98 MMHG | HEART RATE: 56 BPM | HEIGHT: 64 IN | OXYGEN SATURATION: 98 % | SYSTOLIC BLOOD PRESSURE: 150 MMHG | WEIGHT: 165.81 LBS | TEMPERATURE: 97 F

## 2023-04-04 DIAGNOSIS — M25.572 ACUTE LEFT ANKLE PAIN: ICD-10-CM

## 2023-04-04 DIAGNOSIS — I10 ESSENTIAL HYPERTENSION: ICD-10-CM

## 2023-04-04 DIAGNOSIS — M25.572 ACUTE LEFT ANKLE PAIN: Primary | ICD-10-CM

## 2023-04-04 PROCEDURE — 1101F PR PT FALLS ASSESS DOC 0-1 FALLS W/OUT INJ PAST YR: ICD-10-PCS | Mod: HCNC,CPTII,S$GLB, | Performed by: NURSE PRACTITIONER

## 2023-04-04 PROCEDURE — 73610 X-RAY EXAM OF ANKLE: CPT | Mod: 26,HCNC,LT, | Performed by: RADIOLOGY

## 2023-04-04 PROCEDURE — 1125F AMNT PAIN NOTED PAIN PRSNT: CPT | Mod: HCNC,CPTII,S$GLB, | Performed by: NURSE PRACTITIONER

## 2023-04-04 PROCEDURE — 99999 PR PBB SHADOW E&M-EST. PATIENT-LVL V: CPT | Mod: PBBFAC,HCNC,, | Performed by: NURSE PRACTITIONER

## 2023-04-04 PROCEDURE — 1159F PR MEDICATION LIST DOCUMENTED IN MEDICAL RECORD: ICD-10-PCS | Mod: HCNC,CPTII,S$GLB, | Performed by: NURSE PRACTITIONER

## 2023-04-04 PROCEDURE — 1125F PR PAIN SEVERITY QUANTIFIED, PAIN PRESENT: ICD-10-PCS | Mod: HCNC,CPTII,S$GLB, | Performed by: NURSE PRACTITIONER

## 2023-04-04 PROCEDURE — 4010F ACE/ARB THERAPY RXD/TAKEN: CPT | Mod: HCNC,CPTII,S$GLB, | Performed by: NURSE PRACTITIONER

## 2023-04-04 PROCEDURE — 1101F PT FALLS ASSESS-DOCD LE1/YR: CPT | Mod: HCNC,CPTII,S$GLB, | Performed by: NURSE PRACTITIONER

## 2023-04-04 PROCEDURE — 3077F SYST BP >= 140 MM HG: CPT | Mod: HCNC,CPTII,S$GLB, | Performed by: NURSE PRACTITIONER

## 2023-04-04 PROCEDURE — 73610 XR ANKLE COMPLETE 3 VIEW LEFT: ICD-10-PCS | Mod: 26,HCNC,LT, | Performed by: RADIOLOGY

## 2023-04-04 PROCEDURE — 99213 OFFICE O/P EST LOW 20 MIN: CPT | Mod: HCNC,S$GLB,, | Performed by: NURSE PRACTITIONER

## 2023-04-04 PROCEDURE — 3008F BODY MASS INDEX DOCD: CPT | Mod: HCNC,CPTII,S$GLB, | Performed by: NURSE PRACTITIONER

## 2023-04-04 PROCEDURE — 73610 X-RAY EXAM OF ANKLE: CPT | Mod: TC,HCNC,FY,PO,LT

## 2023-04-04 PROCEDURE — 3077F PR MOST RECENT SYSTOLIC BLOOD PRESSURE >= 140 MM HG: ICD-10-PCS | Mod: HCNC,CPTII,S$GLB, | Performed by: NURSE PRACTITIONER

## 2023-04-04 PROCEDURE — 1159F MED LIST DOCD IN RCRD: CPT | Mod: HCNC,CPTII,S$GLB, | Performed by: NURSE PRACTITIONER

## 2023-04-04 PROCEDURE — 4010F PR ACE/ARB THEARPY RXD/TAKEN: ICD-10-PCS | Mod: HCNC,CPTII,S$GLB, | Performed by: NURSE PRACTITIONER

## 2023-04-04 PROCEDURE — 1160F PR REVIEW ALL MEDS BY PRESCRIBER/CLIN PHARMACIST DOCUMENTED: ICD-10-PCS | Mod: HCNC,CPTII,S$GLB, | Performed by: NURSE PRACTITIONER

## 2023-04-04 PROCEDURE — 3080F PR MOST RECENT DIASTOLIC BLOOD PRESSURE >= 90 MM HG: ICD-10-PCS | Mod: HCNC,CPTII,S$GLB, | Performed by: NURSE PRACTITIONER

## 2023-04-04 PROCEDURE — 99213 PR OFFICE/OUTPT VISIT, EST, LEVL III, 20-29 MIN: ICD-10-PCS | Mod: HCNC,S$GLB,, | Performed by: NURSE PRACTITIONER

## 2023-04-04 PROCEDURE — 3080F DIAST BP >= 90 MM HG: CPT | Mod: HCNC,CPTII,S$GLB, | Performed by: NURSE PRACTITIONER

## 2023-04-04 PROCEDURE — 3008F PR BODY MASS INDEX (BMI) DOCUMENTED: ICD-10-PCS | Mod: HCNC,CPTII,S$GLB, | Performed by: NURSE PRACTITIONER

## 2023-04-04 PROCEDURE — 1160F RVW MEDS BY RX/DR IN RCRD: CPT | Mod: HCNC,CPTII,S$GLB, | Performed by: NURSE PRACTITIONER

## 2023-04-04 PROCEDURE — 99999 PR PBB SHADOW E&M-EST. PATIENT-LVL V: ICD-10-PCS | Mod: PBBFAC,HCNC,, | Performed by: NURSE PRACTITIONER

## 2023-04-04 PROCEDURE — 3288F FALL RISK ASSESSMENT DOCD: CPT | Mod: HCNC,CPTII,S$GLB, | Performed by: NURSE PRACTITIONER

## 2023-04-04 PROCEDURE — 3288F PR FALLS RISK ASSESSMENT DOCUMENTED: ICD-10-PCS | Mod: HCNC,CPTII,S$GLB, | Performed by: NURSE PRACTITIONER

## 2023-04-04 NOTE — PROGRESS NOTES
"Subjective:       Patient ID: Shirlene Olvera is a 67 y.o. female.    Chief Complaint: heel pain     Pt presents to clinic today for left ankle pain  New to me, PCP Dr. Miranda   Started suddenly yesterday after wearing some flip flops  No fall or injury  No erythema or swelling  Feels pain goes from her ankle up the side of her leg  No history of gout  Has a bad right hip, feels like now it is causing pain to her left foot  Takes gabapentin and zanaflex prn for neck and back pain  Follows with Dr. Izquierdo    BP elevated today  Pt reports compliance with her BP meds  Reports this is "good for her"  Denies HA, blurry vision, chest pain      BP (!) 150/98   Pulse (!) 56   Temp 97 °F (36.1 °C)   Ht 5' 4" (1.626 m)   Wt 75.2 kg (165 lb 12.6 oz)   LMP  (LMP Unknown)   SpO2 98%   BMI 28.46 kg/m²     Review of Systems   Constitutional:  Negative for activity change, appetite change, chills, diaphoresis, fatigue, fever and unexpected weight change.   Respiratory:  Negative for cough and shortness of breath.    Cardiovascular:  Negative for chest pain, palpitations and leg swelling.   Gastrointestinal: Negative.    Genitourinary: Negative.    Musculoskeletal:  Positive for arthralgias, gait problem and myalgias.   Skin:  Negative for color change, pallor, rash and wound.   Allergic/Immunologic: Negative for immunocompromised state.   Neurological:  Negative for dizziness and facial asymmetry.   Hematological:  Negative for adenopathy. Does not bruise/bleed easily.   Psychiatric/Behavioral:  Negative for agitation, behavioral problems and confusion.      Objective:      Physical Exam  Vitals and nursing note reviewed.   Constitutional:       General: She is not in acute distress.     Appearance: Normal appearance. She is well-developed. She is not diaphoretic.   HENT:      Head: Normocephalic and atraumatic.   Cardiovascular:      Rate and Rhythm: Normal rate and regular rhythm.      Heart sounds: Normal heart sounds. " No murmur heard.  Pulmonary:      Effort: Pulmonary effort is normal. No respiratory distress.      Breath sounds: Normal breath sounds.   Musculoskeletal:         General: Normal range of motion.      Left foot: Normal range of motion.        Feet:    Feet:      Left foot:      Skin integrity: No skin breakdown, erythema, warmth or dry skin.      Comments: Pain with dorsiflexion of left ankle. Pain to light touch  Skin:     General: Skin is warm and dry.      Findings: No rash.   Neurological:      Mental Status: She is alert.   Psychiatric:         Mood and Affect: Mood normal.         Behavior: Behavior normal. Behavior is cooperative.         Thought Content: Thought content normal.         Judgment: Judgment normal.       Assessment:       1. Acute left ankle pain    2. Essential hypertension    3. BMI 28.0-28.9,adult        Plan:       Shirlene was seen today for heel pain .    Diagnoses and all orders for this visit:    Acute left ankle pain  -     X-Ray Ankle Complete Left; Future  - RICE therapy discussed, tylenol PRN. Avoid NSAIDS due to CKD  - Will xray and notify pt of results via portal     Essential hypertension       - Chronic, elevated today. Advised follow up with PCP in 2 weeks for BP check    BMI 28.0-28.9,adult      Tylenol 3 times per day for pain  RICE therapy  Will notify pt of results via portal  Follow up for worsening or no improvement in symptoms and PRN.

## 2023-04-05 ENCOUNTER — INITIAL CONSULT (OUTPATIENT)
Dept: VASCULAR SURGERY | Facility: CLINIC | Age: 68
End: 2023-04-05
Payer: MEDICARE

## 2023-04-05 VITALS
HEART RATE: 55 BPM | SYSTOLIC BLOOD PRESSURE: 149 MMHG | DIASTOLIC BLOOD PRESSURE: 70 MMHG | WEIGHT: 166.88 LBS | BODY MASS INDEX: 28.65 KG/M2

## 2023-04-05 DIAGNOSIS — I73.9 PAD (PERIPHERAL ARTERY DISEASE): Primary | ICD-10-CM

## 2023-04-05 DIAGNOSIS — I70.203 BILATERAL FEMORAL ARTERY STENOSIS: ICD-10-CM

## 2023-04-05 PROCEDURE — 99204 OFFICE O/P NEW MOD 45 MIN: CPT | Mod: HCNC,S$GLB,, | Performed by: SURGERY

## 2023-04-05 PROCEDURE — 99204 PR OFFICE/OUTPT VISIT, NEW, LEVL IV, 45-59 MIN: ICD-10-PCS | Mod: HCNC,S$GLB,, | Performed by: SURGERY

## 2023-04-05 PROCEDURE — 99999 PR PBB SHADOW E&M-EST. PATIENT-LVL IV: ICD-10-PCS | Mod: PBBFAC,HCNC,, | Performed by: SURGERY

## 2023-04-05 PROCEDURE — 99999 PR PBB SHADOW E&M-EST. PATIENT-LVL IV: CPT | Mod: PBBFAC,HCNC,, | Performed by: SURGERY

## 2023-04-05 RX ORDER — SODIUM CHLORIDE 0.9 % (FLUSH) 0.9 %
10 SYRINGE (ML) INJECTION
Status: DISCONTINUED | OUTPATIENT
Start: 2023-04-05 | End: 2023-07-13

## 2023-04-05 RX ORDER — SODIUM CHLORIDE 9 MG/ML
INJECTION, SOLUTION INTRAVENOUS CONTINUOUS
Status: SHIPPED | OUTPATIENT
Start: 2023-04-05 | End: 2023-04-05

## 2023-04-05 RX ORDER — DIPHENHYDRAMINE HCL 25 MG
50 CAPSULE ORAL ONCE
Status: DISCONTINUED | OUTPATIENT
Start: 2023-04-05 | End: 2023-08-21

## 2023-04-05 NOTE — H&P
[facility] Vascular Surgery  History & Physical  Vascular Surgery    SUBJECTIVE:     Chief Complaint/Reason for Admission:  Left leg rest pain    History of Present Illness:  Patient is a 67 y.o. female presents with bilateral lower extremity rest pain.  Left-greater-than-right.  Patient has suffered with lifestyle limiting claudication over many years however over the past 6-7 months this has developed into rest pain.    (Not in a hospital admission)      Review of patient's allergies indicates:   Allergen Reactions    Amlodipine Swelling    Statins-hmg-coa reductase inhibitors Other (See Comments)     Myalgias to lipitor and simvastatin       Past Medical History:   Diagnosis Date    Chest pain 5/14/2015    Colon polyp     Coronary artery disease     pt states MI June 2015    Hypertension     Myocardial infarction     PAD (peripheral artery disease)     Rash 4/20/2022    Tobacco use      Past Surgical History:   Procedure Laterality Date    CHOLECYSTECTOMY  09/03/2015    COLONOSCOPY N/A 10/11/2016    Procedure: COLONOSCOPY;  Surgeon: Haseeb Spears MD;  Location: [facility];  Service: Endoscopy;  Laterality: N/A;    COLONOSCOPY N/A 12/09/2021    Procedure: COLONOSCOPY;  Surgeon: Pat Rios MD;  Location: [facility];  Service: Endoscopy;  Laterality: N/A;    ESOPHAGOGASTRODUODENOSCOPY N/A 12/09/2021    Procedure: EGD (ESOPHAGOGASTRODUODENOSCOPY);  Surgeon: Pat Rios MD;  Location: [facility];  Service: Endoscopy;  Laterality: N/A;    high blood      HYSTERECTOMY      INJECTION OF ANESTHETIC AGENT AROUND MEDIAL BRANCH NERVES INNERVATING CERVICAL FACET JOINT Bilateral 10/15/2021    Procedure: Bilateral C5-7 MBB with RN IV sedation PATIENT WOULD LIKE AFTERNOON ARRIVAL, IF POSSIBLE;  Surgeon: Nano Izquierdo MD;  Location: [facility];  Service: Pain Management;  Laterality: Bilateral;    INJECTION OF ANESTHETIC AGENT AROUND MEDIAL BRANCH NERVES INNERVATING CERVICAL FACET JOINT Bilateral 03/21/2022     Procedure: Bilateral C4-6 MBB with RN IV sedation;  Surgeon: Nano Izquierdo MD;  Location: [facility] PAIN MGT;  Service: Pain Management;  Laterality: Bilateral;    INJECTION OF ANESTHETIC AGENT INTO SACROILIAC JOINT Right 2021    Procedure: Right SIJ Injection;  Surgeon: Nano Izquierdo MD;  Location: [facility] PAIN MGT;  Service: Pain Management;  Laterality: Right;    LEFT HEART CATHETERIZATION Left 2022    Procedure: CATHETERIZATION, HEART, LEFT;  Surgeon: Lion Awad MD;  Location: [facility] CATH LAB;  Service: Cardiology;  Laterality: Left;    OOPHORECTOMY      RADIOFREQUENCY THERMOCOAGULATION Right 2022    Procedure: Right C4-6 RFA with RN IV sedation;  Surgeon: Nano Izquierdo MD;  Location: [facility] PAIN MGT;  Service: Pain Management;  Laterality: Right;    RADIOFREQUENCY THERMOCOAGULATION Left 01/10/2023    Procedure: Left C4-6 RFA with RN IV sedation;  Surgeon: Nano Izquierdo MD;  Location: [facility] PAIN MGT;  Service: Pain Management;  Laterality: Left;    RIB FRACTURE SURGERY       Family History   Problem Relation Age of Onset    Heart attack Father 56        MI    Stroke Sister     Asthma Neg Hx     Thyroid disease Neg Hx     Migraines Neg Hx     Cancer Neg Hx      Social History     Tobacco Use    Smoking status: Former     Packs/day: 0.25     Types: Cigarettes     Quit date: 3/29/2022     Years since quittin.0    Smokeless tobacco: Never   Substance Use Topics    Alcohol use: Not Currently    Drug use: No        Review of Systems:  Peripheral Vascular: Ischemic Rest Pain: bilateral feet  Constitutional: no fever or chills  Respiratory: no cough or shortness of breath  Cardiovascular: no chest pain or palpitations  Neurological: no seizures or tremors    OBJECTIVE:     Vital Signs (Most Recent):  Pulse: (!) 55 (23 1058)  BP: (!) 149/70 (23 1058)    Physical Exam:  General: well developed, well nourished, appears stated age, no distress  Lungs:  clear to auscultation bilaterally and  normal respiratory effort  Heart: regular rate and rhythm, S1, S2 normal, no murmur, rub or gallop  Abdomen: soft, non-tender non-distented; bowel sounds normal; no masses,  no organomegaly  Extremities:  Cool to touch  Pulses: FEM: present 2+, DP: absent, and PT: absent  Neurologic:  No focal numbness or weakness    Laboratory:  I have reviewed all pertinent lab results within the past 24 hours.    Diagnostic Results:  Vascular Lab Results: Reviewed    ASSESSMENT/PLAN:     Bilateral lower extremity rest pain.  Left-greater-than-right.  Duplex ultrasound suggestive of high-grade near occlusive stenosis throughout the bilateral lower extremities.    Will proceed with left lower extremity angiogram with atherectomy  After left leg has been revascularized she will need right leg angiogram 1 week after

## 2023-04-16 ENCOUNTER — PATIENT MESSAGE (OUTPATIENT)
Dept: VASCULAR SURGERY | Facility: CLINIC | Age: 68
End: 2023-04-16
Payer: MEDICARE

## 2023-04-17 ENCOUNTER — OFFICE VISIT (OUTPATIENT)
Dept: SPORTS MEDICINE | Facility: CLINIC | Age: 68
End: 2023-04-17
Payer: MEDICARE

## 2023-04-17 ENCOUNTER — SPECIALTY PHARMACY (OUTPATIENT)
Dept: PHARMACY | Facility: CLINIC | Age: 68
End: 2023-04-17
Payer: MEDICARE

## 2023-04-17 DIAGNOSIS — M76.72 TENDINITIS OF LEFT PERONEUS LONGUS TENDON: Primary | ICD-10-CM

## 2023-04-17 DIAGNOSIS — M76.62 ACHILLES TENDINITIS OF LEFT LOWER EXTREMITY: ICD-10-CM

## 2023-04-17 DIAGNOSIS — M25.572 ACUTE LEFT ANKLE PAIN: ICD-10-CM

## 2023-04-17 PROCEDURE — 4010F ACE/ARB THERAPY RXD/TAKEN: CPT | Mod: HCNC,CPTII,S$GLB, | Performed by: STUDENT IN AN ORGANIZED HEALTH CARE EDUCATION/TRAINING PROGRAM

## 2023-04-17 PROCEDURE — 1125F PR PAIN SEVERITY QUANTIFIED, PAIN PRESENT: ICD-10-PCS | Mod: HCNC,CPTII,S$GLB, | Performed by: STUDENT IN AN ORGANIZED HEALTH CARE EDUCATION/TRAINING PROGRAM

## 2023-04-17 PROCEDURE — 99204 OFFICE O/P NEW MOD 45 MIN: CPT | Mod: HCNC,S$GLB,, | Performed by: STUDENT IN AN ORGANIZED HEALTH CARE EDUCATION/TRAINING PROGRAM

## 2023-04-17 PROCEDURE — 99999 PR PBB SHADOW E&M-EST. PATIENT-LVL IV: CPT | Mod: PBBFAC,HCNC,, | Performed by: STUDENT IN AN ORGANIZED HEALTH CARE EDUCATION/TRAINING PROGRAM

## 2023-04-17 PROCEDURE — 99999 PR PBB SHADOW E&M-EST. PATIENT-LVL IV: ICD-10-PCS | Mod: PBBFAC,HCNC,, | Performed by: STUDENT IN AN ORGANIZED HEALTH CARE EDUCATION/TRAINING PROGRAM

## 2023-04-17 PROCEDURE — 1160F RVW MEDS BY RX/DR IN RCRD: CPT | Mod: HCNC,CPTII,S$GLB, | Performed by: STUDENT IN AN ORGANIZED HEALTH CARE EDUCATION/TRAINING PROGRAM

## 2023-04-17 PROCEDURE — 99204 PR OFFICE/OUTPT VISIT, NEW, LEVL IV, 45-59 MIN: ICD-10-PCS | Mod: HCNC,S$GLB,, | Performed by: STUDENT IN AN ORGANIZED HEALTH CARE EDUCATION/TRAINING PROGRAM

## 2023-04-17 PROCEDURE — 3288F FALL RISK ASSESSMENT DOCD: CPT | Mod: HCNC,CPTII,S$GLB, | Performed by: STUDENT IN AN ORGANIZED HEALTH CARE EDUCATION/TRAINING PROGRAM

## 2023-04-17 PROCEDURE — 4010F PR ACE/ARB THEARPY RXD/TAKEN: ICD-10-PCS | Mod: HCNC,CPTII,S$GLB, | Performed by: STUDENT IN AN ORGANIZED HEALTH CARE EDUCATION/TRAINING PROGRAM

## 2023-04-17 PROCEDURE — 1159F MED LIST DOCD IN RCRD: CPT | Mod: HCNC,CPTII,S$GLB, | Performed by: STUDENT IN AN ORGANIZED HEALTH CARE EDUCATION/TRAINING PROGRAM

## 2023-04-17 PROCEDURE — 1159F PR MEDICATION LIST DOCUMENTED IN MEDICAL RECORD: ICD-10-PCS | Mod: HCNC,CPTII,S$GLB, | Performed by: STUDENT IN AN ORGANIZED HEALTH CARE EDUCATION/TRAINING PROGRAM

## 2023-04-17 PROCEDURE — 1125F AMNT PAIN NOTED PAIN PRSNT: CPT | Mod: HCNC,CPTII,S$GLB, | Performed by: STUDENT IN AN ORGANIZED HEALTH CARE EDUCATION/TRAINING PROGRAM

## 2023-04-17 PROCEDURE — 1160F PR REVIEW ALL MEDS BY PRESCRIBER/CLIN PHARMACIST DOCUMENTED: ICD-10-PCS | Mod: HCNC,CPTII,S$GLB, | Performed by: STUDENT IN AN ORGANIZED HEALTH CARE EDUCATION/TRAINING PROGRAM

## 2023-04-17 PROCEDURE — 1101F PR PT FALLS ASSESS DOC 0-1 FALLS W/OUT INJ PAST YR: ICD-10-PCS | Mod: HCNC,CPTII,S$GLB, | Performed by: STUDENT IN AN ORGANIZED HEALTH CARE EDUCATION/TRAINING PROGRAM

## 2023-04-17 PROCEDURE — 1101F PT FALLS ASSESS-DOCD LE1/YR: CPT | Mod: HCNC,CPTII,S$GLB, | Performed by: STUDENT IN AN ORGANIZED HEALTH CARE EDUCATION/TRAINING PROGRAM

## 2023-04-17 PROCEDURE — 3288F PR FALLS RISK ASSESSMENT DOCUMENTED: ICD-10-PCS | Mod: HCNC,CPTII,S$GLB, | Performed by: STUDENT IN AN ORGANIZED HEALTH CARE EDUCATION/TRAINING PROGRAM

## 2023-04-17 NOTE — PATIENT INSTRUCTIONS
Assessment:  Shirlene Olvera is a 67 y.o. female with a chief complaint of Pain and Swelling of the Left Ankle    Encounter Diagnoses   Name Primary?    Tendinitis of left peroneus longus tendon Yes    Achilles tendinitis of left lower extremity     Acute left ankle pain       Plan:  XR reviewed - chronic degenerative changes of left ankle, no acute pathology/injury  Labs reviewed - Cr 1.28, GFR 46, LFTs WNL  History and clinical exam is consistent with tendinitis of the peroneal musculature as well as insertional Achilles tendinitis  No indication for corticosteroid injection at this time  Recommend conservative management  NSAIDs to be avoided given CKD 3A, as well as her being on anticoagulation for her CAD/PAD  I recommend over-the-counter Tylenol, as needed as 1st line for her pain  We will start her on a home exercise program for her ankle, with focus on stretching strengthening  At least 15 minutes were spent developing, teaching, and performing a home exercise program.  A written summary was provided and all questions were answered. This service was performed under the direction of Dr. Navarro Stevenson. CPT 48148-QF    Follow-up: 6 weeks or sooner if there are any problems between now and then.    Thank you for choosing [facility] and Dr. Navarro Stevenson for your orthopedic & sports medicine care. It is our goal to provide you with exceptional care that will help keep you healthy, active, and get you back in the game.    Please do not hesitate to reach out to us via email, phone, or MyChart with any questions, concerns, or feedback.    If you are experiencing pain/discomfort ,or have questions after 5pm and would like to be connected to the [facility] on-call team, please call this number and specify which Sports Medicine provider is treating you: [phone]           STRETCHING EXERCISES                STRENGTHENING EXERCISES              If  you have any difficulties reading this information, you may visit the online version using the following link: Foot and Ankle Conditioning Program (https://orthoinfo.aaos.org/globalassets/pdfs/[email or web address]-rehab_foot-and-ankle.pdf)       1. Stand on the step on the balls of your feet and keep your heels over the edge and your knees straight      2. Lift your good leg off the ground      3. Lower your painful heel down below the step edge      4 & 5. Put your good side onto step and use it to lift your painful side back to the level starting position then go back to the start

## 2023-04-17 NOTE — TELEPHONE ENCOUNTER
Specialty Pharmacy - Refill Coordination    Specialty Medication Orders Linked to Encounter      Flowsheet Row Most Recent Value   Medication #1 evolocumab (REPATHA SURECLICK) 140 mg/mL PnIj (Order#[id number], Rx#[id number])            Refill Questions - Documented Responses      Flowsheet Row Most Recent Value   Patient Availability and HIPAA Verification    Does patient want to proceed with activity? Yes   HIPAA/medical authority confirmed? Yes   Relationship to patient of person spoken to? Self   Refill Screening Questions    Would patient like to speak to a pharmacist? No   When does the patient need to receive the medication? 04/23/23   Refill Delivery Questions    How will the patient receive the medication? MEDRx   When does the patient need to receive the medication? 04/23/23   Shipping Address Home   Address in [facility] confirmed and updated if neccessary? Yes   Expected Copay ($) 0   Is the patient able to afford the medication copay? Yes   Payment Method zero copay   Days supply of Refill 28   Supplies needed? No supplies needed   Refill activity completed? Yes   Refill activity plan Refill scheduled   Shipment/Pickup Date: 04/20/23            Current Outpatient Medications   Medication Sig    aspirin 81 MG Chew Take 1 tablet (81 mg total) by mouth once daily.    buPROPion (WELLBUTRIN XL) 150 MG TB24 tablet Take 1 tablet (150 mg total) by mouth once daily.    clopidogreL (PLAVIX) 75 mg tablet TAKE 1 TABLET ONE TIME DAILY    cyanocobalamin (VITAMIN B-12) 1000 MCG tablet Take 100 mcg by mouth once daily.    evolocumab (REPATHA SURECLICK) 140 mg/mL PnIj Inject 1 mL (140 mg total) into the skin every 14 (fourteen) days.    fluocinolone acetonide oiL (DERMOTIC OIL) 0.01 % Drop Place 3 drops in ear(s) 2 (two) times daily.    fluticasone propionate (FLONASE) 50 mcg/actuation nasal spray 2 sprays (100 mcg total) by Each Nostril route once daily.    furosemide (LASIX) 20 MG tablet Take 1 tablet (20 mg  total) by mouth once daily.    gabapentin (NEURONTIN) 300 MG capsule Take 1 capsule (300 mg total) by mouth every morning AND 3 capsules (900 mg total) every evening.    hydrALAZINE (APRESOLINE) 50 MG tablet Take 1 tablet (50 mg total) by mouth every 8 (eight) hours as needed (SBP>180 or DBP>100).    isosorbide mononitrate (IMDUR) 60 MG 24 hr tablet Take 1 tablet (60 mg total) by mouth every evening.    metoprolol tartrate (LOPRESSOR) 50 MG tablet Take 1 tablet (50 mg total) by mouth 2 (two) times daily.    multivitamin with minerals tablet Take 1 tablet by mouth once daily.    NAGLPKM nabumetone 15%- amitriptyline 2%- gabapentin 6%- LIDOcaine 2%- prilocaine 2%- ketamine 5%- magnesium 5% in Lipoderm ActiveMax 1.5% diclofenac, 2.5% gabapentin, 2.5% lidocaine, 2.5% prilocaine (or variations of different formulary depending on insurance coverage) from [facility]    nitroGLYCERIN (NITROSTAT) 0.4 MG SL tablet Place 1 tablet (0.4 mg total) under the tongue every 5 (five) minutes as needed for Chest pain.    ondansetron (ZOFRAN-ODT) 4 MG TbDL Take 1 tablet (4 mg total) by mouth every 8 (eight) hours as needed.    pantoprazole (PROTONIX) 20 MG tablet Take 2 tablets (40 mg total) by mouth 2 (two) times daily before meals.    tiZANidine (ZANAFLEX) 4 MG tablet     valsartan (DIOVAN) 320 MG tablet Take 1 tablet (320 mg total) by mouth once daily.    vitamin D (VITAMIN D3) 1000 units Tab Take 1,000 Units by mouth once daily.   Last reviewed on 4/17/2023  2:38 PM by Navarro Stevenson MD    Review of patient's allergies indicates:   Allergen Reactions    Amlodipine Swelling    Statins-hmg-coa reductase inhibitors Other (See Comments)     Myalgias to lipitor and simvastatin    Last reviewed on  4/17/2023 2:38 PM by Navarro Stevenson      Tasks added this encounter   5/14/2023 - Refill Call (Auto Added)   Tasks due within next 3 months   No tasks due.     [facility]  [address]  [facility] [place]  [facility] [address]  Phone: [phone]  Fax: [phone]

## 2023-04-17 NOTE — PROGRESS NOTES
Patient ID: Shirlene Olvera  YOB: 1955  MRN: [id number]    Chief Complaint: Pain and Swelling of the Left Ankle    Referred By: Luis Manuel Dubose NP    Occupation: licensed       History of Present Illness: Shirlene Olvera is a 67 y.o. female who presents today with Pain and Swelling of the Left Ankle    Patient reports to the clinic today with 7/10 left ankle pain and swelling.  She reports that the pain started 3-4 weeks ago when she dropped a heavy can on her lateral ankle.  She reports that since then, she has had pain.  She has been seen by Luis Manuel Dubose NP and was referred to Sports Medicine.  She reports that the pain is worse when standing, going up steps, dorsiflexing, and plantarflexion.  She states that rest and elevation are the only things that make her ankle feel better.  She reports that the pain is an intermittent aching pain.      Past Medical History:   Past Medical History:   Diagnosis Date    Chest pain 5/14/2015    Colon polyp     Coronary artery disease     pt states MI June 2015    Hypertension     Myocardial infarction     PAD (peripheral artery disease)     Rash 4/20/2022    Tobacco use      Past Surgical History:   Procedure Laterality Date    CHOLECYSTECTOMY  09/03/2015    COLONOSCOPY N/A 10/11/2016    Procedure: COLONOSCOPY;  Surgeon: Haseeb Spears MD;  Location: [facility];  Service: Endoscopy;  Laterality: N/A;    COLONOSCOPY N/A 12/09/2021    Procedure: COLONOSCOPY;  Surgeon: Pat Rios MD;  Location: [facility];  Service: Endoscopy;  Laterality: N/A;    ESOPHAGOGASTRODUODENOSCOPY N/A 12/09/2021    Procedure: EGD (ESOPHAGOGASTRODUODENOSCOPY);  Surgeon: Pat Rios MD;  Location: [facility];  Service: Endoscopy;  Laterality: N/A;    high blood      HYSTERECTOMY      INJECTION OF ANESTHETIC AGENT AROUND MEDIAL BRANCH NERVES INNERVATING CERVICAL FACET JOINT Bilateral 10/15/2021    Procedure: Bilateral C5-7 MBB with RN IV sedation PATIENT  WOULD LIKE AFTERNOON ARRIVAL, IF POSSIBLE;  Surgeon: Nano Izquierdo MD;  Location: [facility] PAIN MGT;  Service: Pain Management;  Laterality: Bilateral;    INJECTION OF ANESTHETIC AGENT AROUND MEDIAL BRANCH NERVES INNERVATING CERVICAL FACET JOINT Bilateral 2022    Procedure: Bilateral C4-6 MBB with RN IV sedation;  Surgeon: Nano Izquierdo MD;  [facility];  Service: Pain Management;  Laterality: Bilateral;    INJECTION OF ANESTHETIC AGENT INTO SACROILIAC JOINT Right 2021    Procedure: Right SIJ Injection;  Surgeon: Nano Izquierdo MD;  [facility];  Service: Pain Management;  Laterality: Right;    LEFT HEART CATHETERIZATION Left 2022    Procedure: CATHETERIZATION, HEART, LEFT;  Surgeon: Lion Awad MD;  Location: [facility] CATH LAB;  Service: Cardiology;  Laterality: Left;    OOPHORECTOMY      RADIOFREQUENCY THERMOCOAGULATION Right 2022    Procedure: Right C4-6 RFA with RN IV sedation;  Surgeon: Nano Izquierdo MD;  Location: [facility] PAIN MGT;  Service: Pain Management;  Laterality: Right;    RADIOFREQUENCY THERMOCOAGULATION Left 01/10/2023    Procedure: Left C4-6 RFA with RN IV sedation;  Surgeon: Nano Izquierdo MD;  Location: [facility] PAIN MGT;  Service: Pain Management;  Laterality: Left;    RIB FRACTURE SURGERY       Family History   Problem Relation Age of Onset    Heart attack Father 56        MI    Stroke Sister     Asthma Neg Hx     Thyroid disease Neg Hx     Migraines Neg Hx     Cancer Neg Hx      Social History     Socioeconomic History    Marital status:     Number of children: 3   Occupational History    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    BUPROPION (WELLBUTRIN XL) 150 MG TB24 TABLET    Take 1 tablet (150 mg total) by mouth once daily.    CLOPIDOGREL (PLAVIX) 75 MG TABLET    TAKE 1 TABLET ONE TIME DAILY    CYANOCOBALAMIN (VITAMIN B-12) 1000 MCG TABLET    Take 100 mcg by mouth once daily.    EVOLOCUMAB (REPATHA SURECLICK) 140 MG/ML PNIJ    Inject 1 mL (140 mg total) into the skin every 14 (fourteen) days.    FLUOCINOLONE ACETONIDE OIL (DERMOTIC OIL) 0.01 % DROP    Place 3 drops in ear(s) 2 (two) times daily.    FLUTICASONE PROPIONATE (FLONASE) 50 MCG/ACTUATION NASAL SPRAY    2 sprays (100 mcg total) by Each Nostril route once daily.    FUROSEMIDE (LASIX) 20 MG TABLET    Take 1 tablet (20 mg total) by mouth once daily.    GABAPENTIN (NEURONTIN) 300 MG CAPSULE    Take 1 capsule (300 mg total) by mouth every morning AND 3 capsules (900 mg total) every evening.    HYDRALAZINE (APRESOLINE) 50 MG TABLET    Take 1 tablet (50 mg total) by mouth every 8 (eight) hours as needed (SBP>180 or DBP>100).    ISOSORBIDE MONONITRATE (IMDUR) 60 MG 24 HR TABLET    Take 1 tablet (60 mg total) by mouth every evening.    METOPROLOL TARTRATE (LOPRESSOR) 50 MG TABLET    Take 1 tablet (50 mg total) by mouth 2 (two) times daily.    MULTIVITAMIN WITH MINERALS TABLET    Take 1 tablet by mouth once daily.    NAGLPKM NABUMETONE 15%- AMITRIPTYLINE 2%- GABAPENTIN 6%- LIDOCAINE 2%- PRILOCAINE 2%- KETAMINE 5%- MAGNESIUM 5% IN LIPODERM ACTIVEMAX    1.5% diclofenac, 2.5% gabapentin, 2.5% lidocaine, 2.5% prilocaine (or variations of different formulary depending on insurance coverage) from [facility]    NITROGLYCERIN (NITROSTAT) 0.4 MG SL TABLET    Place 1 tablet (0.4 mg total) under the tongue every 5 (five) minutes as needed for Chest pain.    ONDANSETRON (ZOFRAN-ODT) 4 MG TBDL    Take  1 tablet (4 mg total) by mouth every 8 (eight) hours as needed.    PANTOPRAZOLE (PROTONIX) 20 MG TABLET    Take 2 tablets (40 mg total) by mouth 2 (two) times daily before meals.    TIZANIDINE (ZANAFLEX) 4 MG TABLET        VALSARTAN (DIOVAN) 320 MG TABLET    Take 1 tablet (320 mg total) by mouth once daily.    VITAMIN D (VITAMIN D3) 1000 UNITS TAB    Take 1,000 Units by mouth once daily.     Review of patient's allergies indicates:   Allergen Reactions    Amlodipine Swelling    Statins-hmg-coa reductase inhibitors Other (See Comments)     Myalgias to lipitor and simvastatin       Physical Exam:   There is no height or weight on file to calculate BMI.    Physical Exam  Constitutional:       General: She is not in acute distress.     Appearance: Normal appearance.   HENT:      Head: Normocephalic and atraumatic.   Eyes:      Extraocular Movements: Extraocular movements intact.      Conjunctiva/sclera: Conjunctivae normal.   Pulmonary:      Effort: Pulmonary effort is normal. No respiratory distress.   Skin:     General: Skin is warm and dry.   Neurological:      General: No focal deficit present.      Mental Status: She is alert and oriented to person, place, and time.   Psychiatric:         Mood and Affect: Mood normal.     Detailed MSK exam:   General    Constitutional: She is oriented to person, place, and time. No distress.   HENT:   Head: Normocephalic and atraumatic.   Eyes: Conjunctivae are normal.   Pulmonary/Chest: Effort normal. No respiratory distress.   Abdominal: Normal appearance.   Neurological: She is alert and oriented to person, place, and time.   Psychiatric: Mood normal.              Left Ankle:    Inspection: Normal    Palpation tenderness: Achilles tendon and insertion, Fibularis tendons, Sinus tarsi    Range of motion:  0 deg DF       60 deg PF       20 Inversion       15 Eversion    Strength:  4+/5 DF    4+/5 PF    4+/5 Inversion    4+/5 Eversion    N/V Exam:  Tibial:    Normal sensory (plantar  foot)  Normal motor (FHL)    Sup Peroneal:   Normal sensory (dorsal foot)  Normal motor (Peroneals)            Deep Peroneal:   Normal sensory (1st web space)  Normal motor (EHL)    Sural:   Normal sensory (lateral foot)   Saphenous:   Normal sensory (medial lower leg)   Normal pedal pulses, warm and well perfused with capillary refill < 2 sec   Calf soft and compressible  Patella tendon reflex normal  Achilles tendon reflex normal      Imaging:  X-Ray Ankle Complete Left  Narrative: EXAM:  XR ANKLE COMPLETE 3 VIEW LEFT    TECHNIQUE: 3 views of the left ankle    CLINICAL HISTORY: :[facility]#[[id number]]-Pain in left ankle and joints of left foot    FINDINGS:  Achilles insertional calcaneal enthesopathy/spurring.  Mild chronic-appearing posttraumatic change inferior aspect of the medial malleolus.  No acute fracture.  Joint alignment is anatomic.  No osteochondral defect.  Joint spaces appear relatively well maintained.  Impression: No acute radiographic abnormality of the left ankle.    Finalized on: 4/4/2023 10:54 AM By:  Cristino Llanos MD  [facility]# [id number]      2023-04-04 10:57:03.141    [facility]      Relevant imaging results were reviewed and interpreted by me and per my read:  Chronic degenerative changes throughout the left ankle.  Normal alignment overall.  Enthesopathy at the Achilles insertion.  No fractures or other acute abnormalities    This was discussed with the patient and / or family today.     Patient Instructions   Assessment:  Shirlene Olvera is a 67 y.o. female with a chief complaint of Pain and Swelling of the Left Ankle    Encounter Diagnoses   Name Primary?    Tendinitis of left peroneus longus tendon Yes    Achilles tendinitis of left lower extremity     Acute left ankle pain       Plan:  XR reviewed - chronic degenerative changes of left ankle, no acute pathology/injury  Labs reviewed - Cr 1.28, GFR 46, LFTs WNL  History and clinical exam is consistent with tendinitis of the peroneal musculature as well  as insertional Achilles tendinitis  No indication for corticosteroid injection at this time  Recommend conservative management  NSAIDs to be avoided given CKD 3A, as well as her being on anticoagulation for her CAD/PAD  I recommend over-the-counter Tylenol, as needed as 1st line for her pain  We will start her on a home exercise program for her ankle, with focus on stretching strengthening  At least 15 minutes were spent developing, teaching, and performing a home exercise program.  A written summary was provided and all questions were answered. This service was performed under the direction of Dr. Navarro Stevenson. CPT 57222-OV    Follow-up: 6 weeks or sooner if there are any problems between now and then.    Thank you for choosing [facility] and Dr. Navarro Stevenson for your orthopedic & sports medicine care. It is our goal to provide you with exceptional care that will help keep you healthy, active, and get you back in the game.    Please do not hesitate to reach out to us via email, phone, or MyChart with any questions, concerns, or feedback.    If you are experiencing pain/discomfort ,or have questions after 5pm and would like to be connected to the [facility] on-call team, please call this number and specify which Sports Medicine provider is treating you: [phone]         A copy of today's visit note has been sent to the referring provider.       Navarro Stevenson MD  Primary Care Sports Medicine    Disclaimer: This note was prepared using a voice recognition system and is likely to have sound alike errors within the text.

## 2023-05-10 NOTE — PROGRESS NOTES
Established Patient Chronic Pain Note     Referring Physician: Clayton Frankel MD    PCP: Hermes Miranda MD      Chief Complaint:   Chief Complaint   Patient presents with    Hip Pain          SUBJECTIVE:  Interval history 05/11/2023  Patient presents for 2 month follow-up and for trigger point injection.    Today she reports maintained relief in neck pain following prior cervical radiofrequency ablation, on the right 12/27/2022 and on the left 01/10/2023.  Patient reports exacerbation of right-sided sacroiliitis.  Pain today is rated a 6/10.  Patient reports pain overlying right sacroiliac territory which radiates into the right groin.  90% of pain overlying the right sacroiliac territory.  Patient would like to repeat prior sacroiliac joint injection as she received 100% relief lasting almost 5 months in duration following her prior sacroiliac joint injection 12/27/2022.  Patient has continued gabapentin 900 mg nightly and is requesting a refill on this medication.  Patient reports she is still not receive topical compound cream as of yet.      Of note patient is scheduled to receive left lower extremity angiogram with atherectomy with Dr. Miner followed by right leg angiogram 1 week following in a few weeks.       Interval Hx:  3/2/23  Patient presents for one-month follow-up.  She continues to report at least 50% sustained relief in bilateral axial neck pain following bilateral cervical radiofrequency ablation, right side 12/27/2022 and left-sided 01/10/2023.  Patient continues to report myofascial pain in the left trapezius and periscapular distribution.  Patient presents today for a trigger point injection.  Patient has continued gabapentin for neuropathic pain as well as tizanidine as needed for myofascial pain.  She reports she is scheduled to start physical therapy in [place] on March 7, 2023 for cervical traction exercises.  Today she denies more distal radiculopathy into the upper extremities or  compromise in hand  strength or weakness in the upper extremities or hands.      Interval History (2/9/2023): Shirlene Olvera presents today for follow-up visit.  she underwent right C4-6 RFA on 12/27/22 then left C4-6 RFA on 1/10/23.  The patient reports that she is/was better following the procedure.  she reports about 50% pain relief.  The changes lasted 4 weeks so far.  The changes have continued through this visit.  Patient reports pain as 8/10 today. Pain is very localized in cervical muscular area on left.    Interval history 12/08/2022  At our last clinic visit, we discussed cervical radiofrequency ablation, not performed secondary to recent percutaneous angioplasty and necessity of remaining on anticoagulation.    Today patient again reports bilateral neck pain.  Pain again radiates to bilateral trapezius distribution in C5-6 dermatomal distribution.  She denies more distal radiculopathy into the upper extremities or hands.  She denies weakness in the upper extremities or compromise in hand  strength or dexterity.  Pain is constant and today is rated an 8/10.  Pain is exacerbated with cervical flexion, extension and lateral flexion.  Patient again reiterates at least 50% relief following bilateral neck cervical medial branch block and she would like to move forward with radiofrequency ablation pending safety of pausing anticoagulation.  Patient has continued gabapentin 900 mg in the evening and is requesting a refill as she has run out of this medication.  Patient denies any side effects from this medication.    Interval history 04/20/2022  Patient presents that his post bilateral C4, C5, C6 medial branch block 03/21/2022.  Patient reports 50% sustained relief in bilateral neck pain following medial branch block.  Of note patient reports she recently had a myocardial infarction with PTCA 3 weeks prior.  Patient reports she has been started on Brilinta and continued on aspirin.  Patient would  like to pursue interventional treatment but understands necessity of pausing Brilinta prior to cervical radiofrequency ablation.  Today patient denies radiation into the upper extremities, compromise in hand  strength or dexterity or upper extremity weakness.  Patient has continued gabapentin 300 mg in the evening and reports improvement in her pain.  She denies any side effects from this medication.    Interval history 03/10/2022  Patient presents status post right-sided sacroiliac joint injection 12/17/2021 and for review of bilateral hip XR.  Patient reports 100% relief and right sacroiliac territory following her injection.  Today she reports insidious return of bilateral neck pain, which today is rated as a 9/10.  Patient reports pain in bilateral cervical paraspinous territory.  She denies radiation into the upper extremities, compromise in hand  strength or dexterity.  Patient is requesting repeat cervical C4, C5-C6 medial branch block as she obtain greater than 4 months of relief with this procedure.  Patient has continued gabapentin 100 mg 3 times daily and was not able to fill be updated prescription.  She does believe this medication helps with her symptoms and she denies any side effects.    Interval history 11/11/2021  Patient presents status post bilateral medial branch block at C4, C5, C6 10/15/2021.  Patient reports 100% pain relief following her bilateral medial branch block which is still sustained.  Patient reports significant improvement in range of motion and reduced pain with cervical flexion, extension and lateral flexion.  Today patient primarily ports right-sided sacroiliac joint pain which radiates into the right buttock and right hip territory.  Pain today is rated as a 7/10 and is intermittent.  Pain is described as sharp in nature.  Pain is exacerbated when moving from sitting to standing and patient has positive Jason finger sign.  Patient has continued gabapentin 300 mg at  "night.  Patient reports when she increase this dose to 600, she just did not feel right.  She will maintain her dose of 300 mg. She denies any new onset lower extremity weakness, bowel or bladder incontinence or saddle anesthesia.    HPI:  10/06/2021  Shirlene Olvera is a 66 y.o. female with past medical history significant for ROMÁN, HTN, HLD, CAD s/p MI, PAD, CKD III, nicotine dependence who presents to the clinic for the evaluation of longstanding neck pain.  Today patient reports neck pain which began several years prior without inciting accident, injury or trauma.  Patient describes pain as intermittent and is a 7/10 today.  At its worst pain is a 10/10.  Pain is described as stiff and a "pins and needles" sensation.  Pain begins at her occiput and radiates down bilateral cervical paraspinous muscles into bilateral trapezius distributions in C4-6 distribution.  Pain is exacerbated with cervical flexion, extension such as when she is gardening.  Pain is improved with Icy Hot.  Patient denies radicular symptoms into bilateral upper extremities, paresthesias or weakness in bilateral hands, compromise in hand  strength or dexterity.  Patient reports receiving prior cervical medial branch block several years prior with significant improvement in her symptoms.  Patient is currently taking gabapentin 100 mg 3 times daily without noticeable improvement in her symptoms.  Patient denies night fever/night sweats, urinary incontinence, bowel incontinence, significant weight loss, significant motor weakness and loss of sensations.    Pain Disability Index Review:  Last 3 PDI Scores 5/11/2023 3/2/2023 12/8/2022   Pain Disability Index (PDI) 55 40 48       Non-Pharmacologic Treatments:  Physical Therapy/Home Exercise: no  Ice/Heat:yes  TENS: no  Acupuncture: no  Massage: no  Chiropractic: no    Other: no      Pain Medications:  - Adjuvant Medications: Neurontin (Gabapentin), Topical Ointment (Voltaren Gel, Steroid " "cream, Anti-Inflammatory Cream, Compound cream) and Tylenol (Acetaminophen)  - Anti-Coagulants: Aspirin    Pain Procedures:    - right C4-6 RFA on 12/27/22 then left C4-6 RFA on 1/10/23 with 50-70% pain relief so far, less pain relief on left side  - 3/21/2022:  Bilateral C4, C5, C6 medial branch block  - 12/17/2021:  Right-sided sacroiliac joint injection  - 10/15/2021:  Bilateral medial branch block at C4, C5, C6; Dr. Izquierdo    Cervical MBB: several years prior -- externally          Review of Systems:  GENERAL:  No weight loss, malaise or fevers.  HEENT:   No recent changes in vision or hearing  NECK:  Negative for lumps, no difficulty with swallowing.  RESPIRATORY:  Negative for cough, wheezing or shortness of breath, patient denies any recent URI.  CARDIOVASCULAR:  Negative for chest pain, leg swelling or palpitations.  GI:  Negative for abdominal discomfort, blood in stools or black stools or change in bowel habits.  MUSCULOSKELETAL:  See HPI.  SKIN:  Negative for lesions, rash, and itching.  PSYCH:  No mood disorder or recent psychosocial stressors.   HEMATOLOGY/LYMPHOLOGY:  Negative for prolonged bleeding, bruising easily or swollen nodes.    NEURO:   No history of headaches, syncope, paralysis, seizures or tremors.  All other reviewed and negative other than HPI.      OBJECTIVE:    Physical Exam:  Vitals:    05/11/23 1039   Resp: 14   Weight: 75.3 kg (166 lb)   Height: 5' 4" (1.626 m)   PainSc:   9   PainLoc: Hip        Body mass index is 28.49 kg/m².   (reviewed on 5/11/2023)    GENERAL: Well appearing, in no acute distress, alert and oriented x3.  PSYCH:  Mood and affect appropriate.  SKIN: Skin color, texture, turgor normal, no rashes or lesions.  HEAD/FACE:  Normocephalic, atraumatic. Cranial nerves grossly intact.    NECK:  pain to palpation over the cervical paraspinous muscles, L>R. TTP over left trapezius. Spurling Negative.  pain with neck flexion, extension, or lateral flexion.  Somewhat limited " ROM.  Normal testing biceps, triceps and brachioradialis bilaterally.    Negative Antonio's bilaterally.    5/5 strength testing deltoid, biceps, triceps, wrist extensor, wrist flexor and ulnar intrinsics bilaterally.    Normal  strength bilaterally    SIJ testing: RIGHT  - TTP over SI joint: Present  - Alphonse's/ Bo's: Positive    - Sacroiliac Distraction Test (anterior pressure): Positive  - Sacroiliac Compression Test (lateral pressure): Positive   - SacralThrust Test (posterior pressure): Positive    PULM: No evidence of respiratory difficulty, symmetric chest rise.  GI:  Soft and non-tender.    NEURO: BUE & BLE coordination and muscle stretch reflexes are physiologic and symmetric. No loss of sensation is noted.  GAIT: normal.      Imaging (Reviewed on 5/11/2023):    Hip  XR 11/11/21  FINDINGS:  No acute fracture.  Hip joint spaces maintained with left greater than right acetabular degenerative findings.  Lower lumbar spine degenerative changes noted.  Soft tissues unremarkable.     08/05/12 MRI Lumbar Spine Without Contrast  Findings: Vertebral alignment is normal.  The conus ends at the level of  L1 and appears normal.  Intervertebral disks are well-hydrated and disk  spaces are maintained.  No compression fractures or acute osseous  abnormalities are seen.  There is no area of abnormal signal intensity  identified within the bone marrow.  Axial images are acquired through the  disk spaces from L1-2 through L5-S1.  There is no focal disk herniation,  canal or foraminal stenosis identified above the L4-5 level.  At L4 R. there is central disk protrusion minimally indenting the thecal  sac.  There is also some diffuse bulging of the disk and facet and  ligamentum flavum hypertrophy.  There is mild canal and bilateral  foraminal narrowing at this level.  At L5-S1 there is central bulging of the disk without canal or foraminal  stenosis.  Impression  Central disk protrusion at L4-5 with some canal and  foraminal  narrowing due to combination of disk abnormality and degenerative change.  Please correlate above findings with the patient's clinical symptoms.    08/05/12 MRI Cervical Spine Without Contrast  Findings: The craniovertebral junction and vertebral alignment are  normal.  No abnormality of bone marrow signal intensity is seen.  Intervertebral disks are dessicated but disk spaces are maintained.  No  abnormality seen within the cervical spinal cord.  Axial images are  acquired through the disk spaces from C2-3 through C7-T1.  C2-3 disk space is normal.  The C3-4 disk space demonstrates spondylosis as well as facet and  uncinate hypertrophy.  There is mild canal and bilateral foraminal  narrowing at this level.  The C4-5 disk space also demonstrates right paracentral spondylosis and  disk bulge with facet and uncinate hypertrophy on the right.  There is  mild canal and moderate right sided foraminal stenosis.  The C5-6 disk space does not demonstrate any significant disk herniation,  canal or foraminal stenosis.  The C6-7 disk space demonstrates minimal bulging of the disk but no canal  or foraminal narrowing.  The C7 T1 disk space is unremarkable.    Impression  Degenerative change and degenerative disk disease most  pronounced at the C3-4 and C4-5 level as discussed above.  Please  correlate with patient's clinical symptoms.       09/13/12 X-Ray Cervical Spine AP And Lateral  Findings: Vertebral alignment is normal.  There is moderate degenerative  change throughout the cervical spine with loss of disk height and  anterior osteophyte formation.  No acute osseous abnormalities are seen.  Postoperative findings from resection of the left first rib.      ASSESSMENT: 67 y.o. year old female with neck pain, R lower back pain, consistent with     1. Cervical spondylosis        2. Degenerative disc disease, cervical        3. Cervical muscle pain        4. Sacroiliitis  IR SI Joint Injection w/Imaging    Case  Request-RAD/Other Procedure Area: Right SIJ Injection with local      5. Burning sensation of feet  gabapentin (NEURONTIN) 300 MG capsule      6. Cervical radiculopathy  gabapentin (NEURONTIN) 300 MG capsule      7. Lumbar radiculopathy  gabapentin (NEURONTIN) 300 MG capsule            PLAN:   - Interventions:  Procedure note:     4cc of 2% lidocaine + 1 cc 40 mg methylprednisolone was injected equally into 4  trigger point muscles along the L trapezius/rhomboid mm after sterilization, & negative aspiration using a different 5cc syringe & 27 gauge needle. The patient tolerated the procedure well with no adverse effects & attested to obtaining pain relief.    - S/p right C4-6 RFA on 12/27/22 then left C4-6 RFA on 1/10/23 with sustained 50-70% pain relief so far.  We have discussed repeating this procedure no sooner than 6 months following the procedure date should symptoms exacerbate.    - Schedule for right-sided sacroiliac joint injection as she is obtained greater than 100% relief in sacroiliitis exceeding 5 months in duration with prior injection 12/2022.  We have discussed the procedure, benefits, potential risk in detail.  Patient has agreed to pursue this procedure.    - Anticoagulation use: yes aspirin and Plavix - secondary - Dr. Byers/ Dr. Mendoza (Cardiology)   Patient had myocardial infarction with PTCA 3/2022.  Can continue for sacroiliac joint injection.      - Medications:  -Continue gabapentin 300 mg in AM and 900 mg QHS. X 90 day supply.  We have previously reviewed potential side effects of this medication including daytime somnolence, weight gain and peripheral edema  -30 day supply. Refill x 3    -REorder prescription compound cream to see if this helps with their pain.  Compound medication will include flurbiprofen 10%, baclofen 2%, cyclobenzaprine 2%, gabapentin 6%, orphenadrine 5%, tetracaine 2% for anti-inflammatory, neuropathic and anesthetic properties.  Patient can apply this medication 2-4  times daily to painful areas.       -Continue Zanaflex (tizanidine) 4mg QHS PRN. We have discussed potential deleterious side effects associated with this medication including  dizziness, drowsiness, dry mouth or tingling sensation in the upper or lower extremities.        report:  Reviewed and consistent with medication use as prescribed.      - Therapy: We discussed continuing exercises at home learned at physical therapy to help manage the patient/s painful condition. The patient was counseled that muscle strengthening will improve the long term prognosis in regards to pain and may also help increase range of motion and mobility. Continue PT/Dry Needling @ [facility]    - Imaging: Reviewed available imaging with patient and answered any questions they had regarding study.      - Follow up visit: 4-6 weeks post injection      The above plan and management options were discussed at length with patient. Patient is in agreement with the above and verbalized understanding.    - I discussed the goals of interventional chronic pain management with the patient on today's visit. We discussed a multimodal and systematic approach to pain.  This includes diagnostic and therapeutic injections, adjuvant pharmacologic treatment, physical therapy, and at times psychiatry.  I emphasized the importance of regular exercise, core strengthening and stretching, diet and weight loss as a cornerstone of long-term pain management.    - This condition does not require this patient to take time off of work, and the primary goal of our Pain Management services is to improve the patient's functional capacity.  - Patient Questions: Answered all of the patient's questions regarding diagnoses, therapy, treatment and next steps          aNno Izquierdo MD  Interventional Pain Management - [facility]    Disclaimer:  This note was prepared using voice recognition system and is likely to have sound alike errors that may have been  overlooked even after proof reading.  Please call me with any questions.

## 2023-05-11 ENCOUNTER — OFFICE VISIT (OUTPATIENT)
Dept: PAIN MEDICINE | Facility: CLINIC | Age: 68
End: 2023-05-11
Payer: MEDICARE

## 2023-05-11 VITALS — RESPIRATION RATE: 14 BRPM | WEIGHT: 166 LBS | BODY MASS INDEX: 28.34 KG/M2 | HEIGHT: 64 IN

## 2023-05-11 DIAGNOSIS — M46.1 SACROILIITIS: ICD-10-CM

## 2023-05-11 DIAGNOSIS — M50.30 DEGENERATIVE DISC DISEASE, CERVICAL: ICD-10-CM

## 2023-05-11 DIAGNOSIS — M54.16 LUMBAR RADICULOPATHY: ICD-10-CM

## 2023-05-11 DIAGNOSIS — M54.12 CERVICAL RADICULOPATHY: ICD-10-CM

## 2023-05-11 DIAGNOSIS — M54.2 CERVICAL MUSCLE PAIN: ICD-10-CM

## 2023-05-11 DIAGNOSIS — R20.8 BURNING SENSATION OF FEET: ICD-10-CM

## 2023-05-11 DIAGNOSIS — M47.812 CERVICAL SPONDYLOSIS: Primary | ICD-10-CM

## 2023-05-11 PROCEDURE — 3288F PR FALLS RISK ASSESSMENT DOCUMENTED: ICD-10-PCS | Mod: CPTII,S$GLB,, | Performed by: ANESTHESIOLOGY

## 2023-05-11 PROCEDURE — 1159F PR MEDICATION LIST DOCUMENTED IN MEDICAL RECORD: ICD-10-PCS | Mod: CPTII,S$GLB,, | Performed by: ANESTHESIOLOGY

## 2023-05-11 PROCEDURE — 99214 PR OFFICE/OUTPT VISIT, EST, LEVL IV, 30-39 MIN: ICD-10-PCS | Mod: 25,S$GLB,, | Performed by: ANESTHESIOLOGY

## 2023-05-11 PROCEDURE — 20553 NJX 1/MLT TRIGGER POINTS 3/>: CPT | Mod: S$GLB,,, | Performed by: ANESTHESIOLOGY

## 2023-05-11 PROCEDURE — 4010F ACE/ARB THERAPY RXD/TAKEN: CPT | Mod: CPTII,S$GLB,, | Performed by: ANESTHESIOLOGY

## 2023-05-11 PROCEDURE — 99999 PR PBB SHADOW E&M-EST. PATIENT-LVL V: CPT | Mod: PBBFAC,,, | Performed by: ANESTHESIOLOGY

## 2023-05-11 PROCEDURE — 99999 PR PBB SHADOW E&M-EST. PATIENT-LVL V: ICD-10-PCS | Mod: PBBFAC,,, | Performed by: ANESTHESIOLOGY

## 2023-05-11 PROCEDURE — 1125F AMNT PAIN NOTED PAIN PRSNT: CPT | Mod: CPTII,S$GLB,, | Performed by: ANESTHESIOLOGY

## 2023-05-11 PROCEDURE — 3008F PR BODY MASS INDEX (BMI) DOCUMENTED: ICD-10-PCS | Mod: CPTII,S$GLB,, | Performed by: ANESTHESIOLOGY

## 2023-05-11 PROCEDURE — 1101F PT FALLS ASSESS-DOCD LE1/YR: CPT | Mod: CPTII,S$GLB,, | Performed by: ANESTHESIOLOGY

## 2023-05-11 PROCEDURE — 4010F PR ACE/ARB THEARPY RXD/TAKEN: ICD-10-PCS | Mod: CPTII,S$GLB,, | Performed by: ANESTHESIOLOGY

## 2023-05-11 PROCEDURE — 99214 OFFICE O/P EST MOD 30 MIN: CPT | Mod: 25,S$GLB,, | Performed by: ANESTHESIOLOGY

## 2023-05-11 PROCEDURE — 1159F MED LIST DOCD IN RCRD: CPT | Mod: CPTII,S$GLB,, | Performed by: ANESTHESIOLOGY

## 2023-05-11 PROCEDURE — 20553 PR INJECT TRIGGER POINTS, > 3: ICD-10-PCS | Mod: S$GLB,,, | Performed by: ANESTHESIOLOGY

## 2023-05-11 PROCEDURE — 3008F BODY MASS INDEX DOCD: CPT | Mod: CPTII,S$GLB,, | Performed by: ANESTHESIOLOGY

## 2023-05-11 PROCEDURE — 1101F PR PT FALLS ASSESS DOC 0-1 FALLS W/OUT INJ PAST YR: ICD-10-PCS | Mod: CPTII,S$GLB,, | Performed by: ANESTHESIOLOGY

## 2023-05-11 PROCEDURE — 3288F FALL RISK ASSESSMENT DOCD: CPT | Mod: CPTII,S$GLB,, | Performed by: ANESTHESIOLOGY

## 2023-05-11 PROCEDURE — 1125F PR PAIN SEVERITY QUANTIFIED, PAIN PRESENT: ICD-10-PCS | Mod: CPTII,S$GLB,, | Performed by: ANESTHESIOLOGY

## 2023-05-11 RX ORDER — METHYLPREDNISOLONE ACETATE 40 MG/ML
40 INJECTION, SUSPENSION INTRA-ARTICULAR; INTRALESIONAL; INTRAMUSCULAR; SOFT TISSUE
Status: COMPLETED | OUTPATIENT
Start: 2023-05-11 | End: 2023-05-11

## 2023-05-11 RX ORDER — GABAPENTIN 300 MG/1
CAPSULE ORAL
Qty: 360 CAPSULE | Refills: 3 | Status: SHIPPED | OUTPATIENT
Start: 2023-05-11 | End: 2024-05-10

## 2023-05-11 RX ADMIN — METHYLPREDNISOLONE ACETATE 40 MG: 40 INJECTION, SUSPENSION INTRA-ARTICULAR; INTRALESIONAL; INTRAMUSCULAR; SOFT TISSUE at 10:05

## 2023-05-15 ENCOUNTER — TELEPHONE (OUTPATIENT)
Dept: VASCULAR SURGERY | Facility: CLINIC | Age: 68
End: 2023-05-15
Payer: MEDICARE

## 2023-05-15 NOTE — TELEPHONE ENCOUNTER
----- Message from Ankita Draper sent at 5/15/2023 11:03 AM CDT -----  Please call patient back at [phone]. She needs to know directions on appointmnent

## 2023-05-15 NOTE — TELEPHONE ENCOUNTER
Returned call to patient. She had questions about her procedure at [facility] on [facility]. Patient is given the correct contact information

## 2023-05-16 DIAGNOSIS — E78.00 PURE HYPERCHOLESTEROLEMIA: ICD-10-CM

## 2023-05-16 RX ORDER — EVOLOCUMAB 140 MG/ML
140 INJECTION, SOLUTION SUBCUTANEOUS
Qty: 2 EACH | Refills: 11 | Status: ACTIVE | OUTPATIENT
Start: 2023-05-16 | End: 2023-07-13 | Stop reason: SDUPTHER

## 2023-05-18 ENCOUNTER — PATIENT MESSAGE (OUTPATIENT)
Dept: PAIN MEDICINE | Facility: CLINIC | Age: 68
End: 2023-05-18
Payer: MEDICARE

## 2023-05-22 ENCOUNTER — SPECIALTY PHARMACY (OUTPATIENT)
Dept: PHARMACY | Facility: CLINIC | Age: 68
End: 2023-05-22
Payer: MEDICARE

## 2023-05-22 NOTE — TELEPHONE ENCOUNTER
Specialty Pharmacy - Refill Coordination    Specialty Medication Orders Linked to Encounter      Flowsheet Row Most Recent Value   Medication #1 evolocumab (REPATHA SURECLICK) 140 mg/mL PnIj (Order#[id number], Rx#[id number])            Refill Questions - Documented Responses      Flowsheet Row Most Recent Value   Refill Screening Questions    Would patient like to speak to a pharmacist? No   When does the patient need to receive the medication? 05/28/23   Refill Delivery Questions    How will the patient receive the medication? MEDRx   When does the patient need to receive the medication? 05/28/23   Shipping Address Home   Address in [facility] confirmed and updated if neccessary? Yes   Expected Copay ($) 47   Is the patient able to afford the medication copay? Yes   Payment Method new CC added to file   Days supply of Refill 28   Supplies needed? No supplies needed   Refill activity completed? Yes   Refill activity plan Refill scheduled   Shipment/Pickup Date: 05/24/23            Current Outpatient Medications   Medication Sig    aspirin 81 MG Chew Take 1 tablet (81 mg total) by mouth once daily.    buPROPion (WELLBUTRIN XL) 150 MG TB24 tablet Take 1 tablet (150 mg total) by mouth once daily.    clopidogreL (PLAVIX) 75 mg tablet TAKE 1 TABLET ONE TIME DAILY    cyanocobalamin (VITAMIN B-12) 1000 MCG tablet Take 100 mcg by mouth once daily.    evolocumab (REPATHA SURECLICK) 140 mg/mL PnIj Inject 1 mL (140 mg total) into the skin every 14 (fourteen) days.    fluocinolone acetonide oiL (DERMOTIC OIL) 0.01 % Drop Place 3 drops in ear(s) 2 (two) times daily.    fluticasone propionate (FLONASE) 50 mcg/actuation nasal spray 2 sprays (100 mcg total) by Each Nostril route once daily.    furosemide (LASIX) 20 MG tablet Take 1 tablet (20 mg total) by mouth once daily.    gabapentin (NEURONTIN) 300 MG capsule Take 1 capsule (300 mg total) by mouth every morning AND 3 capsules (900 mg total) every evening.    hydrALAZINE  (APRESOLINE) 50 MG tablet Take 1 tablet (50 mg total) by mouth every 8 (eight) hours as needed (SBP>180 or DBP>100).    isosorbide mononitrate (IMDUR) 60 MG 24 hr tablet Take 1 tablet (60 mg total) by mouth every evening.    metoprolol tartrate (LOPRESSOR) 50 MG tablet Take 1 tablet (50 mg total) by mouth 2 (two) times daily.    multivitamin with minerals tablet Take 1 tablet by mouth once daily.    NAGLPKM nabumetone 15%- amitriptyline 2%- gabapentin 6%- LIDOcaine 2%- prilocaine 2%- ketamine 5%- magnesium 5% in Lipoderm ActiveMax 1.5% diclofenac, 2.5% gabapentin, 2.5% lidocaine, 2.5% prilocaine (or variations of different formulary depending on insurance coverage) from [facility]    nitroGLYCERIN (NITROSTAT) 0.4 MG SL tablet Place 1 tablet (0.4 mg total) under the tongue every 5 (five) minutes as needed for Chest pain.    ondansetron (ZOFRAN-ODT) 4 MG TbDL Take 1 tablet (4 mg total) by mouth every 8 (eight) hours as needed.    pantoprazole (PROTONIX) 20 MG tablet Take 2 tablets (40 mg total) by mouth 2 (two) times daily before meals.    tiZANidine (ZANAFLEX) 4 MG tablet     valsartan (DIOVAN) 320 MG tablet Take 1 tablet (320 mg total) by mouth once daily.    vitamin D (VITAMIN D3) 1000 units Tab Take 1,000 Units by mouth once daily.   Last reviewed on 5/11/2023 10:38 AM by Gómez Tijerina MA    Review of patient's allergies indicates:   Allergen Reactions    Amlodipine Swelling    Statins-hmg-coa reductase inhibitors Other (See Comments)     Myalgias to lipitor and simvastatin    Last reviewed on  5/11/2023 10:38 AM by Gómez Tijerina      Tasks added this encounter   No tasks added.   Tasks due within next 3 months   5/19/2023 - Refill Coordination Outreach (1 time occurrence)     [facility]  [address] [place] [id number]  Phone: [phone]  Fax: [phone]

## 2023-05-23 NOTE — PRE-PROCEDURE INSTRUCTIONS
Spoke with patient regarding procedure scheduled on 5.30     Arrival time 0800     Has patient been sick with fever or on antibiotics within the last 7 days? No     Does the patient have any open wounds, sores or rashes? No     Does the patient have any recent fractures? no     Has patient received a vaccination within the last 7 days? No     Received the COVID vaccination? yes     Has the patient stopped all medications as directed? na     Does patient have a pacemaker and or defibrillator? no     Does the patient have a ride to and from procedure and someone reliable to remain with patient? SON     Is the patient diabetic? no     Does the patient have sleep apnea? Or use O2 at home? NO     Is the patient receiving sedation? Yes     Is the patient instructed to remain NPO beginning at midnight the night before their procedure? yes     Procedure location confirmed with patient? Yes     Covid- Denies signs/symptoms. Instructed to notify PAT/MD if any changes.

## 2023-06-13 NOTE — PRE-PROCEDURE INSTRUCTIONS
Spoke with patient regarding procedure scheduled on 6.20     Arrival time 0830     Has patient been sick with fever or on antibiotics within the last 7 days? No     Does the patient have any open wounds, sores or rashes? No     Does the patient have any recent fractures? no     Has patient received a vaccination within the last 7 days? No     Received the COVID vaccination? yes     Has the patient stopped all medications as directed? na     Does patient have a pacemaker and or defibrillator? no     Does the patient have a ride to and from procedure and someone reliable to remain with patient? son     Is the patient diabetic? no     Does the patient have sleep apnea? Or use O2 at home? no     Is the patient receiving sedation? Yes     Is the patient instructed to remain NPO beginning at midnight the night before their procedure? yes     Procedure location confirmed with patient? Yes     Covid- Denies signs/symptoms. Instructed to notify PAT/MD if any changes.

## 2023-06-22 ENCOUNTER — TELEPHONE (OUTPATIENT)
Dept: PAIN MEDICINE | Facility: CLINIC | Age: 68
End: 2023-06-22
Payer: MEDICARE

## 2023-06-22 ENCOUNTER — PATIENT MESSAGE (OUTPATIENT)
Dept: PAIN MEDICINE | Facility: CLINIC | Age: 68
End: 2023-06-22
Payer: MEDICARE

## 2023-06-22 NOTE — TELEPHONE ENCOUNTER
----- Message from Licha Del Rosario sent at 6/22/2023  1:19 PM CDT -----  Contact: Shirlene Tello is calling to speak with nurse cindy regarding appointment, report being told there was an opening Tuesday @ 9 and wanted to know if it was still available. Please call patient back at [phone]         Thanks

## 2023-06-22 NOTE — TELEPHONE ENCOUNTER
Called pt to set up their procedure. Pt answered and procedure has been made. Inform pt on the procedure instruction.Pt understood and all question answered.   - Anticoagulation use: yes aspirin and Plavix - secondary - Dr. Byers/ Dr. Mendoza (Cardiology)   Patient had myocardial infarction with PTCA 3/2022.  Can continue for sacroiliac joint injection.    Juan Soares   Medical Assistant

## 2023-06-23 NOTE — PRE-PROCEDURE INSTRUCTIONS
Spoke with patient regarding procedure scheduled on 6.27     Arrival time 0900     Has patient been sick with fever or on antibiotics within the last 7 days? No     Does the patient have any open wounds, sores or rashes? No     Does the patient have any recent fractures? no     Has patient received a vaccination within the last 7 days? No     Received the COVID vaccination? yes     Has the patient stopped all medications as directed? na     Does patient have a pacemaker and or defibrillator? no     Does the patient have a ride to and from procedure and someone reliable to remain with patient? son     Is the patient diabetic? no     Does the patient have sleep apnea? Or use O2 at home? no     Is the patient receiving sedation? yes     Is the patient instructed to remain NPO beginning at midnight the night before their procedure? yes     Procedure location confirmed with patient? Yes     Covid- Denies signs/symptoms. Instructed to notify PAT/MD if any changes.

## 2023-06-27 ENCOUNTER — HOSPITAL ENCOUNTER (OUTPATIENT)
Facility: HOSPITAL | Age: 68
Discharge: HOME | End: 2023-06-27
Attending: ANESTHESIOLOGY | Admitting: ANESTHESIOLOGY
Payer: MEDICARE

## 2023-06-27 VITALS
DIASTOLIC BLOOD PRESSURE: 65 MMHG | HEART RATE: 55 BPM | SYSTOLIC BLOOD PRESSURE: 148 MMHG | TEMPERATURE: 97 F | RESPIRATION RATE: 18 BRPM | BODY MASS INDEX: 29.5 KG/M2 | HEIGHT: 64 IN | WEIGHT: 172.81 LBS | OXYGEN SATURATION: 99 %

## 2023-06-27 DIAGNOSIS — M46.1 SACROILIITIS: ICD-10-CM

## 2023-06-27 PROCEDURE — 27096 PR INJECTION,SACROILIAC JOINT: ICD-10-PCS | Mod: 50,,, | Performed by: ANESTHESIOLOGY

## 2023-06-27 PROCEDURE — 25000003 PHARM REV CODE 250: Performed by: ANESTHESIOLOGY

## 2023-06-27 PROCEDURE — 63600175 PHARM REV CODE 636 W HCPCS: Performed by: ANESTHESIOLOGY

## 2023-06-27 PROCEDURE — 25500020 PHARM REV CODE 255: Performed by: ANESTHESIOLOGY

## 2023-06-27 PROCEDURE — 27096 INJECT SACROILIAC JOINT: CPT | Mod: 50,,, | Performed by: ANESTHESIOLOGY

## 2023-06-27 PROCEDURE — 27096 INJECT SACROILIAC JOINT: CPT | Mod: RT | Performed by: ANESTHESIOLOGY

## 2023-06-27 RX ORDER — BUPIVACAINE HYDROCHLORIDE 2.5 MG/ML
INJECTION, SOLUTION EPIDURAL; INFILTRATION; INTRACAUDAL
Status: DISCONTINUED | OUTPATIENT
Start: 2023-06-27 | End: 2023-06-27 | Stop reason: HOSPADM

## 2023-06-27 RX ORDER — TRIAMCINOLONE ACETONIDE 40 MG/ML
INJECTION, SUSPENSION INTRA-ARTICULAR; INTRAMUSCULAR
Status: DISCONTINUED | OUTPATIENT
Start: 2023-06-27 | End: 2023-06-27 | Stop reason: HOSPADM

## 2023-06-27 RX ORDER — INDOMETHACIN 25 MG/1
CAPSULE ORAL
Status: DISCONTINUED | OUTPATIENT
Start: 2023-06-27 | End: 2023-06-27 | Stop reason: HOSPADM

## 2023-06-27 NOTE — DISCHARGE SUMMARY
Discharge Note  Short Stay      SUMMARY     Admit Date: 6/27/2023    Attending Physician: Nano Izquierdo MD        Discharge Physician: Nano Izquierdo MD        Discharge Date: 6/27/2023 9:33 AM    Procedure(s) (LRB):  Right SIJ Injection with local (Right)    Final Diagnosis: Sacroiliitis [M46.1]    Disposition: Home or self care    Patient Instructions:   Current Discharge Medication List        CONTINUE these medications which have NOT CHANGED    Details   aspirin 81 MG Chew Take 1 tablet (81 mg total) by mouth once daily.  Qty: 360 tablet, Refills: 1      clopidogreL (PLAVIX) 75 mg tablet TAKE 1 TABLET ONE TIME DAILY  Qty: 90 tablet, Refills: 3      cyanocobalamin (VITAMIN B-12) 1000 MCG tablet Take 100 mcg by mouth once daily.      evolocumab (REPATHA SURECLICK) 140 mg/mL PnIj Inject 1 mL (140 mg total) into the skin every 14 (fourteen) days.  Qty: 2 each, Refills: 11    Associated Diagnoses: Pure hypercholesterolemia      furosemide (LASIX) 20 MG tablet Take 1 tablet (20 mg total) by mouth once daily.  Qty: 30 tablet, Refills: 5    Associated Diagnoses: History of non-ST elevation myocardial infarction (NSTEMI)      gabapentin (NEURONTIN) 300 MG capsule Take 1 capsule (300 mg total) by mouth every morning AND 3 capsules (900 mg total) every evening.  Qty: 360 capsule, Refills: 3    Associated Diagnoses: Burning sensation of feet; Cervical radiculopathy; Lumbar radiculopathy      isosorbide mononitrate (IMDUR) 60 MG 24 hr tablet Take 1 tablet (60 mg total) by mouth every evening.  Qty: 90 tablet, Refills: 1      metoprolol tartrate (LOPRESSOR) 50 MG tablet Take 1 tablet (50 mg total) by mouth 2 (two) times daily.  Qty: 180 tablet, Refills: 1    Comments: .  Associated Diagnoses: Essential hypertension      multivitamin with minerals tablet Take 1 tablet by mouth once daily.      pantoprazole (PROTONIX) 20 MG tablet Take 2 tablets (40 mg total) by mouth 2 (two) times daily before meals.  Qty: 90 tablet, Refills: 1     Associated Diagnoses: Dyspepsia      valsartan (DIOVAN) 320 MG tablet Take 1 tablet (320 mg total) by mouth once daily.  Qty: 30 tablet, Refills: 11    Comments: .  Associated Diagnoses: Essential hypertension      vitamin D (VITAMIN D3) 1000 units Tab Take 1,000 Units by mouth once daily.      buPROPion (WELLBUTRIN XL) 150 MG TB24 tablet Take 1 tablet (150 mg total) by mouth once daily.  Qty: 90 tablet, Refills: 1    Associated Diagnoses: ROMÁN (generalized anxiety disorder)      fluocinolone acetonide oiL (DERMOTIC OIL) 0.01 % Drop Place 3 drops in ear(s) 2 (two) times daily.  Qty: 20 mL, Refills: 2    Associated Diagnoses: Chronic eczematous otitis externa of both ears      fluticasone propionate (FLONASE) 50 mcg/actuation nasal spray 2 sprays (100 mcg total) by Each Nostril route once daily.  Qty: 48 g, Refills: 3    Associated Diagnoses: Allergic rhinitis, unspecified seasonality, unspecified trigger      hydrALAZINE (APRESOLINE) 50 MG tablet Take 1 tablet (50 mg total) by mouth every 8 (eight) hours as needed (SBP>180 or DBP>100).  Qty: 90 tablet, Refills: 3    Comments: .  Associated Diagnoses: Essential hypertension      NAGLPKM nabumetone 15%- amitriptyline 2%- gabapentin 6%- LIDOcaine 2%- prilocaine 2%- ketamine 5%- magnesium 5% in Lipoderm ActiveMax 1.5% diclofenac, 2.5% gabapentin, 2.5% lidocaine, 2.5% prilocaine (or variations of different formulary depending on insurance coverage) from [facility]  Qty: 240 g, Refills: 1    Associated Diagnoses: Cervical muscle pain      nitroGLYCERIN (NITROSTAT) 0.4 MG SL tablet Place 1 tablet (0.4 mg total) under the tongue every 5 (five) minutes as needed for Chest pain.  Qty: 25 tablet, Refills: 0      ondansetron (ZOFRAN-ODT) 4 MG TbDL Take 1 tablet (4 mg total) by mouth every 8 (eight) hours as needed.  Qty: 1 tablet, Refills: 0      tiZANidine (ZANAFLEX) 4 MG tablet                  Discharge Diagnosis: Sacroiliitis [M46.1]  Condition on  Discharge: Stable with no complications to procedure   Diet on Discharge: Same as before.  Activity: as per instruction sheet.  Discharge to: Home with a responsible adult.  Follow up: 2-4 weeks       Please call the office at [phone] if you experience any weakness or loss of sensation, fever > 101.5, pain uncontrolled with oral medications, persistent nausea/vomiting/or diarrhea, redness or drainage from the incisions, or any other worrisome concerns. If physician on call was not reached or could not communicate with our office for any reason please go to the nearest emergency department

## 2023-06-27 NOTE — DISCHARGE INSTRUCTIONS

## 2023-06-27 NOTE — H&P
HPI  Patient presenting for Procedure(s) (LRB):  Right SIJ Injection with local (Right)     Patient on Anti-coagulation No    No health changes since previous encounter    Past Medical History:   Diagnosis Date    Chest pain 5/14/2015    Colon polyp     Coronary artery disease     pt states MI June 2015    Hypertension     Myocardial infarction     PAD (peripheral artery disease)     Rash 4/20/2022    Tobacco use      Past Surgical History:   Procedure Laterality Date    CHOLECYSTECTOMY  09/03/2015    COLONOSCOPY N/A 10/11/2016    Procedure: COLONOSCOPY;  Surgeon: Haseeb Spears MD;  Location: [facility] ENDO;  Service: Endoscopy;  Laterality: N/A;    COLONOSCOPY N/A 12/09/2021    Procedure: COLONOSCOPY;  Surgeon: Pat Rios MD;  Location: [facility];  Service: Endoscopy;  Laterality: N/A;    ESOPHAGOGASTRODUODENOSCOPY N/A 12/09/2021    Procedure: EGD (ESOPHAGOGASTRODUODENOSCOPY);  Surgeon: Pat Rios MD;  Location: [facility];  Service: Endoscopy;  Laterality: N/A;    high blood      HYSTERECTOMY      INJECTION OF ANESTHETIC AGENT AROUND MEDIAL BRANCH NERVES INNERVATING CERVICAL FACET JOINT Bilateral 10/15/2021    Procedure: Bilateral C5-7 MBB with RN IV sedation PATIENT WOULD LIKE AFTERNOON ARRIVAL, IF POSSIBLE;  Surgeon: Nano Izquierdo MD;  Location: [facility] PAIN MGT;  Service: Pain Management;  Laterality: Bilateral;    INJECTION OF ANESTHETIC AGENT AROUND MEDIAL BRANCH NERVES INNERVATING CERVICAL FACET JOINT Bilateral 03/21/2022    Procedure: Bilateral C4-6 MBB with RN IV sedation;  Surgeon: Nano Izquierdo MD;  Location: [facility] PAIN MGT;  Service: Pain Management;  Laterality: Bilateral;    INJECTION OF ANESTHETIC AGENT INTO SACROILIAC JOINT Right 12/17/2021    Procedure: Right SIJ Injection;  Surgeon: Nano Izquierdo MD;  Location: [facility] PAIN MGT;  Service: Pain Management;  Laterality: Right;    LEFT HEART CATHETERIZATION Left 03/29/2022    Procedure: CATHETERIZATION, HEART, LEFT;  Surgeon: Lion GAN  MD Delmi;  Location: [facility] CATH LAB;  Service: Cardiology;  Laterality: Left;    OOPHORECTOMY      RADIOFREQUENCY THERMOCOAGULATION Right 12/27/2022    Procedure: Right C4-6 RFA with RN IV sedation;  Surgeon: Nano Izquierdo MD;  Location: [facility] PAIN MGT;  Service: Pain Management;  Laterality: Right;    RADIOFREQUENCY THERMOCOAGULATION Left 01/10/2023    Procedure: Left C4-6 RFA with RN IV sedation;  Surgeon: Nano Izquierdo MD;  Location: [facility] PAIN MGT;  Service: Pain Management;  Laterality: Left;    RIB FRACTURE SURGERY       Review of patient's allergies indicates:   Allergen Reactions    Amlodipine Swelling    Statins-hmg-coa reductase inhibitors Other (See Comments)     Myalgias to lipitor and simvastatin        No current facility-administered medications on file prior to encounter.     Current Outpatient Medications on File Prior to Encounter   Medication Sig Dispense Refill    aspirin 81 MG Chew Take 1 tablet (81 mg total) by mouth once daily. 360 tablet 1    clopidogreL (PLAVIX) 75 mg tablet TAKE 1 TABLET ONE TIME DAILY 90 tablet 3    cyanocobalamin (VITAMIN B-12) 1000 MCG tablet Take 100 mcg by mouth once daily.      furosemide (LASIX) 20 MG tablet Take 1 tablet (20 mg total) by mouth once daily. 30 tablet 5    gabapentin (NEURONTIN) 300 MG capsule Take 1 capsule (300 mg total) by mouth every morning AND 3 capsules (900 mg total) every evening. 360 capsule 3    isosorbide mononitrate (IMDUR) 60 MG 24 hr tablet Take 1 tablet (60 mg total) by mouth every evening. 90 tablet 1    metoprolol tartrate (LOPRESSOR) 50 MG tablet Take 1 tablet (50 mg total) by mouth 2 (two) times daily. 180 tablet 1    multivitamin with minerals tablet Take 1 tablet by mouth once daily.      pantoprazole (PROTONIX) 20 MG tablet Take 2 tablets (40 mg total) by mouth 2 (two) times daily before meals. 90 tablet 1    valsartan (DIOVAN) 320 MG tablet Take 1 tablet (320 mg total) by mouth once daily. 30 tablet 11    vitamin D (VITAMIN D3)  "1000 units Tab Take 1,000 Units by mouth once daily.      buPROPion (WELLBUTRIN XL) 150 MG TB24 tablet Take 1 tablet (150 mg total) by mouth once daily. 90 tablet 1    fluocinolone acetonide oiL (DERMOTIC OIL) 0.01 % Drop Place 3 drops in ear(s) 2 (two) times daily. 20 mL 2    fluticasone propionate (FLONASE) 50 mcg/actuation nasal spray 2 sprays (100 mcg total) by Each Nostril route once daily. 48 g 3    hydrALAZINE (APRESOLINE) 50 MG tablet Take 1 tablet (50 mg total) by mouth every 8 (eight) hours as needed (SBP>180 or DBP>100). 90 tablet 3    NAGLPKM nabumetone 15%- amitriptyline 2%- gabapentin 6%- LIDOcaine 2%- prilocaine 2%- ketamine 5%- magnesium 5% in Lipoderm ActiveMax 1.5% diclofenac, 2.5% gabapentin, 2.5% lidocaine, 2.5% prilocaine (or variations of different formulary depending on insurance coverage) from [facility] 240 g 1    nitroGLYCERIN (NITROSTAT) 0.4 MG SL tablet Place 1 tablet (0.4 mg total) under the tongue every 5 (five) minutes as needed for Chest pain. 25 tablet 0    ondansetron (ZOFRAN-ODT) 4 MG TbDL Take 1 tablet (4 mg total) by mouth every 8 (eight) hours as needed. 1 tablet 0    tiZANidine (ZANAFLEX) 4 MG tablet       [DISCONTINUED] valsartan-hydrochlorothiazide (DIOVAN-HCT) 160-25 mg per tablet Take 1 tablet by mouth once daily. 90 tablet 1        PMHx, PSHx, Allergies, Medications reviewed in epic    ROS negative except pain complaints in HPI    OBJECTIVE:    BP (!) 183/85 (BP Location: Right arm, Patient Position: Sitting)   Pulse (!) 55   Temp 97.4 °F (36.3 °C) (Temporal)   Resp 18   Ht 5' 4" (1.626 m)   Wt 78.4 kg (172 lb 13.5 oz)   LMP  (LMP Unknown)   SpO2 97%   Breastfeeding No   BMI 29.67 kg/m²     PHYSICAL EXAMINATION:    GENERAL: Well appearing, in no acute distress, alert and oriented x3.  PSYCH:  Mood and affect appropriate.  SKIN: Skin color, texture, turgor normal, no rashes or lesions which will impact the procedure.  CV: RRR with palpation of the " radial artery.  PULM: No evidence of respiratory difficulty, symmetric chest rise. Clear to auscultation.  NEURO: Cranial nerves grossly intact.    Plan:    Proceed with procedure as planned Procedure(s) (LRB):  Right SIJ Injection with local (Right)    Nano Izquierdo MD  06/27/2023

## 2023-06-27 NOTE — OP NOTE
Shirlene Olvera  67 y.o. female      Vitals:    06/27/23 0909   BP: (!) 183/85   Pulse: (!) 55   Resp: 18   Temp: 97.4 °F (36.3 °C)       Procedure Date:06/27/2023        INFORMED CONSENT: The procedure, risks, benefits and options were discussed with patient. There are no contraindications to the procedure. The patient expressed understanding and agreed to proceed. The personnel performing the procedure was discussed. I verify that I personally obtained consent prior to the start of the procedure and the signed consent can be found on the patient's chart.       Anesthesia:   local    The patient was monitored with continuous pulse oximetry, EKG, and intermittent blood pressure monitors.  The patient was hemodynamically stable throughout the entire process was responsive to voice, and breathing spontaneously.  Supplemental O2 was provided at 2L/min via nasal cannula.  Patient was comfortable for the duration of the procedure. (See nurse documentation and case log for sedation time)      Pre Procedure diagnosis: Sacroiliitis [M46.1]  Post-Procedure diagnosis: SAME      PROCEDURE:   Right sacroiliac joint injection                            REASON FOR PROCEDURE:   Sacroiliitis [M46.1]        MEDICATIONS INJECTED: 1mL 40mg/ml Kenalog and 4mL Bupivacaine 0.25% into each site    LOCAL ANESTHETIC USED: Xylocaine 1% 6ml     ESTIMATED BLOOD LOSS: None.   COMPLICATIONS: None.     TECHNIQUE:       Sacroiliac joint injection:   Laying in the prone position, the patient was prepped and draped in the usual sterile fashion using ChloraPrep and fenestrated drape.  The area was determined under fluoroscopy.  Local Xylocaine was injected by raising a wheel and going down to the periosteum using a 27-gauge hypodermic needle.  The 3.5 inch 22-gauge spinal needle was introduce into the Right sacroiliac joint.  Negative pressure applied to confirm no intravascular placement.  Omnipaque was injected to confirm placement and to  confirm that there was no vascular runoff.  The medication was then injected slowly.  The patient tolerated the procedure well.                       The patient was monitored for approximately 30 minutes after the procedure. Patient was given post procedure and discharge instructions to follow at home. We will see the patient back in two weeks or the patient may call to inform of status. The patient was discharged in a stable condition

## 2023-07-06 ENCOUNTER — PES CALL (OUTPATIENT)
Dept: ADMINISTRATIVE | Facility: CLINIC | Age: 68
End: 2023-07-06
Payer: MEDICARE

## 2023-07-13 ENCOUNTER — OFFICE VISIT (OUTPATIENT)
Dept: PRIMARY CARE CLINIC | Facility: CLINIC | Age: 68
End: 2023-07-13
Payer: MEDICARE

## 2023-07-13 ENCOUNTER — LAB VISIT (OUTPATIENT)
Dept: LAB | Facility: HOSPITAL | Age: 68
End: 2023-07-13
Attending: FAMILY MEDICINE
Payer: MEDICARE

## 2023-07-13 VITALS
DIASTOLIC BLOOD PRESSURE: 90 MMHG | HEART RATE: 58 BPM | WEIGHT: 166.44 LBS | HEIGHT: 64 IN | OXYGEN SATURATION: 97 % | BODY MASS INDEX: 28.42 KG/M2 | TEMPERATURE: 97 F | SYSTOLIC BLOOD PRESSURE: 134 MMHG

## 2023-07-13 DIAGNOSIS — N18.31 STAGE 3A CHRONIC KIDNEY DISEASE: ICD-10-CM

## 2023-07-13 DIAGNOSIS — K21.00 GASTROESOPHAGEAL REFLUX DISEASE WITH ESOPHAGITIS WITHOUT HEMORRHAGE: ICD-10-CM

## 2023-07-13 DIAGNOSIS — E78.2 MIXED HYPERLIPIDEMIA: ICD-10-CM

## 2023-07-13 DIAGNOSIS — I25.10 CORONARY ARTERY DISEASE INVOLVING NATIVE CORONARY ARTERY OF NATIVE HEART WITHOUT ANGINA PECTORIS: ICD-10-CM

## 2023-07-13 DIAGNOSIS — I73.9 PAD (PERIPHERAL ARTERY DISEASE): ICD-10-CM

## 2023-07-13 DIAGNOSIS — Z12.31 BREAST CANCER SCREENING BY MAMMOGRAM: ICD-10-CM

## 2023-07-13 DIAGNOSIS — I10 ESSENTIAL HYPERTENSION: Primary | ICD-10-CM

## 2023-07-13 DIAGNOSIS — I10 ESSENTIAL HYPERTENSION: ICD-10-CM

## 2023-07-13 PROBLEM — N18.4 CKD (CHRONIC KIDNEY DISEASE) STAGE 4, GFR 15-29 ML/MIN: Status: RESOLVED | Noted: 2021-08-02 | Resolved: 2023-07-13

## 2023-07-13 LAB
ALBUMIN SERPL BCP-MCNC: 4.1 G/DL (ref 3.5–5.2)
ALP SERPL-CCNC: 89 U/L (ref 55–135)
ALT SERPL W/O P-5'-P-CCNC: 23 U/L (ref 10–44)
ANION GAP SERPL CALC-SCNC: 11 MMOL/L (ref 8–16)
AST SERPL-CCNC: 24 U/L (ref 10–40)
BASOPHILS # BLD AUTO: 0.06 K/UL (ref 0–0.2)
BASOPHILS NFR BLD: 0.7 % (ref 0–1.9)
BILIRUB SERPL-MCNC: 1.2 MG/DL (ref 0.1–1)
BUN SERPL-MCNC: 36 MG/DL (ref 8–23)
CALCIUM SERPL-MCNC: 10.4 MG/DL (ref 8.7–10.5)
CHLORIDE SERPL-SCNC: 103 MMOL/L (ref 95–110)
CHOLEST SERPL-MCNC: 238 MG/DL (ref 120–199)
CHOLEST/HDLC SERPL: 4 {RATIO} (ref 2–5)
CO2 SERPL-SCNC: 26 MMOL/L (ref 23–29)
CREAT SERPL-MCNC: 1.6 MG/DL (ref 0.5–1.4)
DIFFERENTIAL METHOD: ABNORMAL
EOSINOPHIL # BLD AUTO: 0.1 K/UL (ref 0–0.5)
EOSINOPHIL NFR BLD: 1.6 % (ref 0–8)
ERYTHROCYTE [DISTWIDTH] IN BLOOD BY AUTOMATED COUNT: 12.7 % (ref 11.5–14.5)
EST. GFR  (NO RACE VARIABLE): 34.9 ML/MIN/1.73 M^2
GLUCOSE SERPL-MCNC: 105 MG/DL (ref 70–110)
HCT VFR BLD AUTO: 43.2 % (ref 37–48.5)
HDLC SERPL-MCNC: 59 MG/DL (ref 40–75)
HDLC SERPL: 24.8 % (ref 20–50)
HGB BLD-MCNC: 14.2 G/DL (ref 12–16)
IMM GRANULOCYTES # BLD AUTO: 0.02 K/UL (ref 0–0.04)
IMM GRANULOCYTES NFR BLD AUTO: 0.2 % (ref 0–0.5)
LDLC SERPL CALC-MCNC: 131.8 MG/DL (ref 63–159)
LYMPHOCYTES # BLD AUTO: 1.7 K/UL (ref 1–4.8)
LYMPHOCYTES NFR BLD: 20.3 % (ref 18–48)
MCH RBC QN AUTO: 34 PG (ref 27–31)
MCHC RBC AUTO-ENTMCNC: 32.9 G/DL (ref 32–36)
MCV RBC AUTO: 103 FL (ref 82–98)
MONOCYTES # BLD AUTO: 0.7 K/UL (ref 0.3–1)
MONOCYTES NFR BLD: 8.4 % (ref 4–15)
NEUTROPHILS # BLD AUTO: 5.6 K/UL (ref 1.8–7.7)
NEUTROPHILS NFR BLD: 68.8 % (ref 38–73)
NONHDLC SERPL-MCNC: 179 MG/DL
NRBC BLD-RTO: 0 /100 WBC
PLATELET # BLD AUTO: 221 K/UL (ref 150–450)
PMV BLD AUTO: 10.1 FL (ref 9.2–12.9)
POTASSIUM SERPL-SCNC: 5.3 MMOL/L (ref 3.5–5.1)
PROT SERPL-MCNC: 7.7 G/DL (ref 6–8.4)
RBC # BLD AUTO: 4.18 M/UL (ref 4–5.4)
SODIUM SERPL-SCNC: 140 MMOL/L (ref 136–145)
TRIGL SERPL-MCNC: 236 MG/DL (ref 30–150)
TSH SERPL DL<=0.005 MIU/L-ACNC: 1.15 UIU/ML (ref 0.4–4)
WBC # BLD AUTO: 8.11 K/UL (ref 3.9–12.7)

## 2023-07-13 PROCEDURE — 1159F MED LIST DOCD IN RCRD: CPT | Mod: HCNC,CPTII,S$GLB, | Performed by: FAMILY MEDICINE

## 2023-07-13 PROCEDURE — 85025 COMPLETE CBC W/AUTO DIFF WBC: CPT | Mod: HCNC | Performed by: FAMILY MEDICINE

## 2023-07-13 PROCEDURE — 80061 LIPID PANEL: CPT | Mod: HCNC | Performed by: FAMILY MEDICINE

## 2023-07-13 PROCEDURE — 99999 PR PBB SHADOW E&M-EST. PATIENT-LVL V: CPT | Mod: PBBFAC,HCNC,, | Performed by: FAMILY MEDICINE

## 2023-07-13 PROCEDURE — 4010F PR ACE/ARB THEARPY RXD/TAKEN: ICD-10-PCS | Mod: HCNC,CPTII,S$GLB, | Performed by: FAMILY MEDICINE

## 2023-07-13 PROCEDURE — 3008F BODY MASS INDEX DOCD: CPT | Mod: HCNC,CPTII,S$GLB, | Performed by: FAMILY MEDICINE

## 2023-07-13 PROCEDURE — 3075F PR MOST RECENT SYSTOLIC BLOOD PRESS GE 130-139MM HG: ICD-10-PCS | Mod: HCNC,CPTII,S$GLB, | Performed by: FAMILY MEDICINE

## 2023-07-13 PROCEDURE — 1125F AMNT PAIN NOTED PAIN PRSNT: CPT | Mod: HCNC,CPTII,S$GLB, | Performed by: FAMILY MEDICINE

## 2023-07-13 PROCEDURE — 84443 ASSAY THYROID STIM HORMONE: CPT | Mod: HCNC | Performed by: FAMILY MEDICINE

## 2023-07-13 PROCEDURE — 36415 COLL VENOUS BLD VENIPUNCTURE: CPT | Mod: HCNC,PN | Performed by: FAMILY MEDICINE

## 2023-07-13 PROCEDURE — 99215 PR OFFICE/OUTPT VISIT, EST, LEVL V, 40-54 MIN: ICD-10-PCS | Mod: HCNC,S$GLB,, | Performed by: FAMILY MEDICINE

## 2023-07-13 PROCEDURE — 3288F PR FALLS RISK ASSESSMENT DOCUMENTED: ICD-10-PCS | Mod: HCNC,CPTII,S$GLB, | Performed by: FAMILY MEDICINE

## 2023-07-13 PROCEDURE — 99215 OFFICE O/P EST HI 40 MIN: CPT | Mod: HCNC,S$GLB,, | Performed by: FAMILY MEDICINE

## 2023-07-13 PROCEDURE — 1125F PR PAIN SEVERITY QUANTIFIED, PAIN PRESENT: ICD-10-PCS | Mod: HCNC,CPTII,S$GLB, | Performed by: FAMILY MEDICINE

## 2023-07-13 PROCEDURE — 1159F PR MEDICATION LIST DOCUMENTED IN MEDICAL RECORD: ICD-10-PCS | Mod: HCNC,CPTII,S$GLB, | Performed by: FAMILY MEDICINE

## 2023-07-13 PROCEDURE — 99999 PR PBB SHADOW E&M-EST. PATIENT-LVL V: ICD-10-PCS | Mod: PBBFAC,HCNC,, | Performed by: FAMILY MEDICINE

## 2023-07-13 PROCEDURE — 3008F PR BODY MASS INDEX (BMI) DOCUMENTED: ICD-10-PCS | Mod: HCNC,CPTII,S$GLB, | Performed by: FAMILY MEDICINE

## 2023-07-13 PROCEDURE — 3080F PR MOST RECENT DIASTOLIC BLOOD PRESSURE >= 90 MM HG: ICD-10-PCS | Mod: HCNC,CPTII,S$GLB, | Performed by: FAMILY MEDICINE

## 2023-07-13 PROCEDURE — 3075F SYST BP GE 130 - 139MM HG: CPT | Mod: HCNC,CPTII,S$GLB, | Performed by: FAMILY MEDICINE

## 2023-07-13 PROCEDURE — 4010F ACE/ARB THERAPY RXD/TAKEN: CPT | Mod: HCNC,CPTII,S$GLB, | Performed by: FAMILY MEDICINE

## 2023-07-13 PROCEDURE — 80053 COMPREHEN METABOLIC PANEL: CPT | Mod: HCNC | Performed by: FAMILY MEDICINE

## 2023-07-13 PROCEDURE — 3288F FALL RISK ASSESSMENT DOCD: CPT | Mod: HCNC,CPTII,S$GLB, | Performed by: FAMILY MEDICINE

## 2023-07-13 PROCEDURE — 1101F PR PT FALLS ASSESS DOC 0-1 FALLS W/OUT INJ PAST YR: ICD-10-PCS | Mod: HCNC,CPTII,S$GLB, | Performed by: FAMILY MEDICINE

## 2023-07-13 PROCEDURE — 1101F PT FALLS ASSESS-DOCD LE1/YR: CPT | Mod: HCNC,CPTII,S$GLB, | Performed by: FAMILY MEDICINE

## 2023-07-13 PROCEDURE — 3080F DIAST BP >= 90 MM HG: CPT | Mod: HCNC,CPTII,S$GLB, | Performed by: FAMILY MEDICINE

## 2023-07-13 RX ORDER — ISOSORBIDE MONONITRATE 60 MG/1
60 TABLET, EXTENDED RELEASE ORAL NIGHTLY
Qty: 90 TABLET | Refills: 3 | Status: SHIPPED | OUTPATIENT
Start: 2023-07-13 | End: 2024-02-05 | Stop reason: SDUPTHER

## 2023-07-13 RX ORDER — VALSARTAN 320 MG/1
320 TABLET ORAL DAILY
Qty: 90 TABLET | Refills: 3 | Status: SHIPPED | OUTPATIENT
Start: 2023-07-13 | End: 2024-02-05 | Stop reason: SDUPTHER

## 2023-07-13 RX ORDER — NAPROXEN SODIUM 220 MG/1
81 TABLET, FILM COATED ORAL DAILY
Qty: 360 TABLET | Refills: 1 | COMMUNITY
Start: 2023-07-13 | End: 2025-07-02

## 2023-07-13 RX ORDER — METOPROLOL TARTRATE 50 MG/1
50 TABLET ORAL 2 TIMES DAILY
Qty: 180 TABLET | Refills: 3 | Status: SHIPPED | OUTPATIENT
Start: 2023-07-13 | End: 2024-02-05 | Stop reason: ALTCHOICE

## 2023-07-13 RX ORDER — PANTOPRAZOLE SODIUM 40 MG/1
40 TABLET, DELAYED RELEASE ORAL DAILY
Qty: 90 TABLET | Refills: 3 | Status: SHIPPED | OUTPATIENT
Start: 2023-07-13 | End: 2024-02-05

## 2023-07-13 RX ORDER — CLOPIDOGREL BISULFATE 75 MG/1
75 TABLET ORAL DAILY
Qty: 90 TABLET | Refills: 3 | Status: SHIPPED | OUTPATIENT
Start: 2023-07-13 | End: 2024-02-05

## 2023-07-13 RX ORDER — EVOLOCUMAB 140 MG/ML
140 INJECTION, SOLUTION SUBCUTANEOUS
Qty: 6 EACH | Refills: 3 | Status: SHIPPED | OUTPATIENT
Start: 2023-07-13 | End: 2023-07-27 | Stop reason: SDUPTHER

## 2023-07-13 RX ORDER — FUROSEMIDE 20 MG/1
20 TABLET ORAL DAILY
Qty: 90 TABLET | Refills: 3 | Status: SHIPPED | OUTPATIENT
Start: 2023-07-13 | End: 2024-02-05 | Stop reason: SDUPTHER

## 2023-07-13 NOTE — PROGRESS NOTES
"    [facility]       [address] [place] [address]      Phone: [phone]      Fax: [phone]    Hermes Miranda MD                Office Visit  07/13/2023        Subjective      HPI:  Shirlene Olvera is a 67 y.o. female presents today in clinic for "Follow-up (6 mo f/u)  ."     67-year-old female presents today to follow-up on multiple issues.      Patient states that she feels relatively okay today.  No chest pain, shortness a breath.  No fever, chills, body aches.  No coughing, sneezing, URI type symptoms.  Appetite okay.  Bowel movements fairly stable.  Has chronic issues with constipation.  Takes MiraLax.  No urinary symptoms.    Has hypertension, CAD, PA D, history of MI. Has coronary and femoral stents.  Currently takes aspirin and Plavix daily.  Also takes valsartan, metoprolol tartrate 50 mg twice a day, Imdur 60 mg nightly, Lasix 20 mg daily.  Also has prescription for hydralazine to be used as needed for SBP greater than 180, DBP greater than 100.  Doing well on these medications.  No chest pains.  Recently saw vascular and had stent placed in left femoral artery.  Right artery was also revascularized.  Thinks she is due for follow-up with vascular, but does not have anything scheduled.  Saw Cardiology in June.  Everything looked fine.  We will follow up with him again in December.    Has ongoing issues with cervical/lumbar radiculopathy.  Pain in the legs has been improved since getting blood flow restored.  Recently got injection.  Starting to see some improvement from it.  Was also started on medical marijuana.  That definitely helps with the pains, as well.    Also has anxiety.  The medical marijuana helps with that also.    Has CKD.  Avoids NSAIDs.  Stays hydrated.  No issues at this time    PMH:  HTN, HLD, CKD, CAD/stent, GERD/hiatal hernia, cervical/lumbar radiculopathy, environmental allergies.    PSH: Cardiac stent.  Gallbladder.  " Hysterectomy.  Rib removal (thoracic outlet syndrome).  Left femoral stent.  Right femoral revascularization.  F MH: HTN.  DM.    Allergies:  Statins caused arthralgias.  Amlodipine causes swelling.  Imdur causes indigestion.    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  Lives with daughter.    T:  No current use.  Quit in .  Previously, one ppd times 30+ years.    A:  Denies.  Also quit in .    D:  Denies    Exercise:  Frequently goes up and down the stairs.  Tries to stay active.      GI:  [facility]  Cardiology:  Dr. Byers / Dr. Stevens  ENT:  [facility]   Neck: Izquierdo  Vascular: Miner    MM2022     Colon:  2021.  Repeat five years ()      The following were updated and reviewed by myself in the chart: medications, past medical history, past surgical history, family history, social history, and allergies.     Medications:  Current Outpatient Medications on File Prior to Visit   Medication Sig Dispense Refill    cyanocobalamin (VITAMIN B-12) 1000 MCG tablet Take 100 mcg by mouth once daily.      fluocinolone acetonide oiL (DERMOTIC OIL) 0.01 % Drop Place 3 drops in ear(s) 2 (two) times daily. 20 mL 2    fluticasone propionate (FLONASE) 50 mcg/actuation nasal spray 2 sprays (100 mcg total) by Each Nostril route once daily. 48 g 3    gabapentin (NEURONTIN) 300 MG capsule Take 1 capsule (300 mg total) by mouth every morning AND 3 capsules (900 mg total) every evening. 360 capsule 3    hydrALAZINE (APRESOLINE) 50 MG tablet Take 1 tablet (50 mg total) by mouth every 8 (eight) hours as needed (SBP>180 or DBP>100). 90 tablet 3    multivitamin with minerals tablet Take 1 tablet by mouth once daily.      nitroGLYCERIN (NITROSTAT) 0.4 MG SL tablet Place 1 tablet (0.4 mg total) under the tongue every 5 (five) minutes as needed for Chest pain. 25 tablet 0    ondansetron (ZOFRAN-ODT) 4 MG TbDL Take 1 tablet (4 mg total) by mouth every 8 (eight) hours as needed. 1 tablet 0    tiZANidine (ZANAFLEX) 4 MG tablet        vitamin D (VITAMIN D3) 1000 units Tab Take 1,000 Units by mouth once daily.      [DISCONTINUED] aspirin 81 MG Chew Take 1 tablet (81 mg total) by mouth once daily. 360 tablet 1    [DISCONTINUED] clopidogreL (PLAVIX) 75 mg tablet TAKE 1 TABLET ONE TIME DAILY 90 tablet 3    [DISCONTINUED] evolocumab (REPATHA SURECLICK) 140 mg/mL PnIj Inject 1 mL (140 mg total) into the skin every 14 (fourteen) days. 2 each 11    [DISCONTINUED] furosemide (LASIX) 20 MG tablet Take 1 tablet (20 mg total) by mouth once daily. 30 tablet 5    [DISCONTINUED] isosorbide mononitrate (IMDUR) 60 MG 24 hr tablet Take 1 tablet (60 mg total) by mouth every evening. 90 tablet 1    [DISCONTINUED] metoprolol tartrate (LOPRESSOR) 50 MG tablet Take 1 tablet (50 mg total) by mouth 2 (two) times daily. 180 tablet 1    [DISCONTINUED] pantoprazole (PROTONIX) 20 MG tablet Take 2 tablets (40 mg total) by mouth 2 (two) times daily before meals. 90 tablet 1    [DISCONTINUED] valsartan (DIOVAN) 320 MG tablet Take 1 tablet (320 mg total) by mouth once daily. 30 tablet 11    buPROPion (WELLBUTRIN XL) 150 MG TB24 tablet Take 1 tablet (150 mg total) by mouth once daily. 90 tablet 1    [DISCONTINUED] NAGLPKM nabumetone 15%- amitriptyline 2%- gabapentin 6%- LIDOcaine 2%- prilocaine 2%- ketamine 5%- magnesium 5% in Lipoderm ActiveMax 1.5% diclofenac, 2.5% gabapentin, 2.5% lidocaine, 2.5% prilocaine (or variations of different formulary depending on insurance coverage) from [facility] (Patient not taking: Reported on 7/13/2023) 240 g 1    [DISCONTINUED] valsartan-hydrochlorothiazide (DIOVAN-HCT) 160-25 mg per tablet Take 1 tablet by mouth once daily. 90 tablet 1     Current Facility-Administered Medications on File Prior to Visit   Medication Dose Route Frequency Provider Last Rate Last Admin    diphenhydrAMINE capsule 50 mg  50 mg Oral Once Orville Miner IV, MD        [DISCONTINUED] sodium chloride 0.9% flush 10 mL  10 mL Intravenous PRN Orville  ELVIRA Miner IV, MD            PMHx:  Past Medical History:   Diagnosis Date    Chest pain 5/14/2015    Colon polyp     Coronary artery disease     pt states MI June 2015    Hypertension     Myocardial infarction     PAD (peripheral artery disease)     Rash 4/20/2022    Tobacco use       Patient Active Problem List    Diagnosis Date Noted    Sacroiliitis 06/27/2023    Hiatal hernia 01/11/2023    Screening mammogram, encounter for 11/07/2022    Accelerated hypertension 11/01/2022    Postmenopausal 08/18/2022    Pure hypercholesterolemia 08/18/2022    Dyspepsia 08/18/2022    Ankle swelling 08/18/2022    Cervical strain 04/20/2022    Delayed immunizations 04/20/2022    Sinusitis 02/17/2022    Pharyngoesophageal dysphagia 12/09/2021    Schatzki's ring of distal esophagus 12/09/2021    Encounter for screening colonoscopy 12/09/2021    Cervical spondylosis 10/15/2021    Multiple nevi 08/16/2021    Statin intolerance 01/02/2019    PAD (peripheral artery disease) 06/09/2018    ROMÁN (generalized anxiety disorder) 06/09/2018    Insomnia 06/09/2018    History of colon polyps 10/11/2016    Biliary dyskinesia 09/03/2015    Coronary artery disease 08/31/2015    History of non-ST elevation myocardial infarction (NSTEMI) 05/14/2015    Tobacco abuse 07/18/2014    Allergic rhinitis, cause unspecified 07/18/2014    Essential hypertension 10/10/2013    Hyperlipidemia 10/10/2013    Degenerative disc disease, cervical 10/10/2013    DDD (degenerative disc disease), lumbar 10/10/2013    Thoracic or lumbosacral neuritis or radiculitis, unspecified 07/16/2013        PSHx:  Past Surgical History:   Procedure Laterality Date    CHOLECYSTECTOMY  09/03/2015    COLONOSCOPY N/A 10/11/2016    Procedure: COLONOSCOPY;  Surgeon: Haseeb Spears MD;  Location: [facility] ENDO;  Service: Endoscopy;  Laterality: N/A;    COLONOSCOPY N/A 12/09/2021    Procedure: COLONOSCOPY;  Surgeon: Pat Rios MD;  Location: [facility] ENDO;  Service: Endoscopy;  Laterality:  N/A;    ESOPHAGOGASTRODUODENOSCOPY N/A 12/09/2021    Procedure: EGD (ESOPHAGOGASTRODUODENOSCOPY);  Surgeon: Pat Rios MD;  Location: [facility] ENDO;  Service: Endoscopy;  Laterality: N/A;    high blood      HYSTERECTOMY      INJECTION OF ANESTHETIC AGENT AROUND MEDIAL BRANCH NERVES INNERVATING CERVICAL FACET JOINT Bilateral 10/15/2021    Procedure: Bilateral C5-7 MBB with RN IV sedation PATIENT WOULD LIKE AFTERNOON ARRIVAL, IF POSSIBLE;  Surgeon: Nano Izquierdo MD;  Location: [facility] PAIN MGT;  Service: Pain Management;  Laterality: Bilateral;    INJECTION OF ANESTHETIC AGENT AROUND MEDIAL BRANCH NERVES INNERVATING CERVICAL FACET JOINT Bilateral 03/21/2022    Procedure: Bilateral C4-6 MBB with RN IV sedation;  Surgeon: Nano Izquierdo MD;  Location: [facility] PAIN MGT;  Service: Pain Management;  Laterality: Bilateral;    INJECTION OF ANESTHETIC AGENT INTO SACROILIAC JOINT Right 12/17/2021    Procedure: Right SIJ Injection;  Surgeon: Nano Izquierdo MD;  Location: [facility] PAIN MGT;  Service: Pain Management;  Laterality: Right;    INJECTION OF ANESTHETIC AGENT INTO SACROILIAC JOINT Right 6/27/2023    Procedure: Right SIJ Injection with local;  Surgeon: Nano Izquierdo MD;  Location: [facility] PAIN MGT;  Service: Pain Management;  Laterality: Right;    LEFT HEART CATHETERIZATION Left 03/29/2022    Procedure: CATHETERIZATION, HEART, LEFT;  Surgeon: Lion Awad MD;  Location: [facility] CATH LAB;  Service: Cardiology;  Laterality: Left;    OOPHORECTOMY      RADIOFREQUENCY THERMOCOAGULATION Right 12/27/2022    Procedure: Right C4-6 RFA with RN IV sedation;  Surgeon: Nano Izquierdo MD;  Location: [facility] PAIN MGT;  Service: Pain Management;  Laterality: Right;    RADIOFREQUENCY THERMOCOAGULATION Left 01/10/2023    Procedure: Left C4-6 RFA with RN IV sedation;  Surgeon: Nano Izquierdo MD;  Location: [facility] PAIN MGT;  Service: Pain Management;  Laterality: Left;    RIB FRACTURE SURGERY          FHx:  Family History   Problem Relation Age of  "Onset    Heart attack Father 56        MI    Stroke Sister     Asthma Neg Hx     Thyroid disease Neg Hx     Migraines Neg Hx     Cancer Neg Hx         Social:  Social History     Socioeconomic History    Marital status:     Number of children: 3   Occupational History    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 [employer]     Employer: [employer]   Tobacco Use    Smoking status: Former     Packs/day: 0.25     Types: Cigarettes     Quit date: 3/29/2022     Years since quittin.2    Smokeless tobacco: Never   Substance and Sexual Activity    Alcohol use: Not Currently    Drug use: No    Sexual activity: Not Currently     Partners: Male     Birth control/protection: None        Allergies:  Review of patient's allergies indicates:   Allergen Reactions    Amlodipine Swelling    Statins-hmg-coa reductase inhibitors Other (See Comments)     Myalgias to lipitor and simvastatin        ROS:  Review of Systems   Constitutional:  Negative for activity change, appetite change, chills and fever.   HENT:  Negative for congestion, postnasal drip, rhinorrhea, sore throat and trouble swallowing.    Respiratory:  Negative for cough, shortness of breath and wheezing.    Cardiovascular:  Negative for chest pain and palpitations.   Gastrointestinal:  Positive for constipation. Negative for abdominal pain, diarrhea, nausea and vomiting.   Genitourinary:  Negative for difficulty urinating.   Musculoskeletal:  Positive for arthralgias and myalgias.   Skin:  Negative for color change and rash.   Neurological:  Negative for speech difficulty and headaches.   Psychiatric/Behavioral:  The patient is nervous/anxious.    All other systems reviewed and are negative.       Objective      BP (!) 134/90   Pulse (!) 58   Temp 97.2 °F (36.2 °C)   Ht 5' 4" (1.626 m)   Wt 75.5 kg (166 lb 7.2 oz)   LMP  (LMP Unknown)   SpO2 97%   BMI 28.57 kg/m²   Ht Readings from Last 3 Encounters:   23 5' 4" (1.626 m)   23 5' 4" " "(1.626 m)   05/11/23 5' 4" (1.626 m)     Wt Readings from Last 3 Encounters:   07/13/23 75.5 kg (166 lb 7.2 oz)   06/27/23 78.4 kg (172 lb 13.5 oz)   05/11/23 75.3 kg (166 lb)       PHYSICAL EXAM:  Physical Exam  Vitals and nursing note reviewed.   Constitutional:       General: She is not in acute distress.     Appearance: Normal appearance.   HENT:      Head: Normocephalic and atraumatic.      Right Ear: Tympanic membrane, ear canal and external ear normal.      Left Ear: Tympanic membrane, ear canal and external ear normal.      Nose: Nose normal. No congestion or rhinorrhea.      Mouth/Throat:      Mouth: Mucous membranes are moist.      Pharynx: Oropharynx is clear. No oropharyngeal exudate or posterior oropharyngeal erythema.   Eyes:      Extraocular Movements: Extraocular movements intact.      Conjunctiva/sclera: Conjunctivae normal.      Pupils: Pupils are equal, round, and reactive to light.   Cardiovascular:      Rate and Rhythm: Normal rate and regular rhythm.   Pulmonary:      Effort: Pulmonary effort is normal. No respiratory distress.      Breath sounds: No wheezing, rhonchi or rales.   Musculoskeletal:         General: Normal range of motion.      Cervical back: Normal range of motion.   Lymphadenopathy:      Cervical: No cervical adenopathy.   Skin:     General: Skin is warm and dry.      Findings: No rash.   Neurological:      Mental Status: She is alert.            LABS / IMAGING:  Recent Results (from the past 4368 hour(s))   POCT Rapid Strep A    Collection Time: 02/22/23  1:56 PM   Result Value Ref Range    Rapid Strep A Screen Negative Negative     Acceptable Yes          Assessment    1. Essential hypertension    2. Mixed hyperlipidemia    3. Coronary artery disease involving native coronary artery of native heart without angina pectoris    4. PAD (peripheral artery disease)    5. Stage 3a chronic kidney disease    6. Gastroesophageal reflux disease with esophagitis without " hemorrhage    7. Breast cancer screening by mammogram          Plan    Shirlene was seen today for follow-up.    Diagnoses and all orders for this visit:    Essential hypertension  -     CBC Auto Differential; Future  -     Comprehensive Metabolic Panel; Future  -     TSH; Future  -     Lipid Panel; Future  -     furosemide (LASIX) 20 MG tablet; Take 1 tablet (20 mg total) by mouth once daily.  -     isosorbide mononitrate (IMDUR) 60 MG 24 hr tablet; Take 1 tablet (60 mg total) by mouth every evening.  -     metoprolol tartrate (LOPRESSOR) 50 MG tablet; Take 1 tablet (50 mg total) by mouth 2 (two) times daily.  -     valsartan (DIOVAN) 320 MG tablet; Take 1 tablet (320 mg total) by mouth once daily.    Mixed hyperlipidemia  -     Lipid Panel; Future  -     evolocumab (REPATHA SURECLICK) 140 mg/mL PnIj; Inject 1 mL (140 mg total) into the skin every 14 (fourteen) days.    Coronary artery disease involving native coronary artery of native heart without angina pectoris  -     aspirin 81 MG Chew; Take 1 tablet (81 mg total) by mouth once daily.  -     clopidogreL (PLAVIX) 75 mg tablet; Take 1 tablet (75 mg total) by mouth once daily.  -     evolocumab (REPATHA SURECLICK) 140 mg/mL PnIj; Inject 1 mL (140 mg total) into the skin every 14 (fourteen) days.  -     furosemide (LASIX) 20 MG tablet; Take 1 tablet (20 mg total) by mouth once daily.    PAD (peripheral artery disease)  -     aspirin 81 MG Chew; Take 1 tablet (81 mg total) by mouth once daily.  -     clopidogreL (PLAVIX) 75 mg tablet; Take 1 tablet (75 mg total) by mouth once daily.  -     evolocumab (REPATHA SURECLICK) 140 mg/mL PnIj; Inject 1 mL (140 mg total) into the skin every 14 (fourteen) days.    Stage 3a chronic kidney disease  -     Comprehensive Metabolic Panel; Future    Gastroesophageal reflux disease with esophagitis without hemorrhage  -     pantoprazole (PROTONIX) 40 MG tablet; Take 1 tablet (40 mg total) by mouth once daily.    Breast cancer screening  by mammogram  -     Mammo Digital Screening Bilat w/ Brant; Future    Physically, everything looks good today.    Screening labs, as above.      Med refills, as above.      Will reach out to Dr. Miner's office to see about getting her scheduled for follow-up.      Mammogram will be due in November.  Order placed today so she can get scheduled.      FOLLOW-UP:  Follow up in about 6 months (around 1/13/2024) for check up.    I spent a total of 45 minutes face to face and non-face to face on the date of this visit.This includes time preparing to see the patient (eg, review of tests, notes), obtaining and/or reviewing additional history from an independent historian and/or outside medical records, documenting clinical information in the electronic health record, independently interpreting results and/or communicating results to the patient/family/caregiver, or care coordinator.    Signed by:  Hermes Miranda MD

## 2023-07-13 NOTE — PATIENT INSTRUCTIONS
Physically, everything looks pretty good today.      Let us get some screening blood work done today.  Results will be posted to [employer] as soon as they are available.  Dr. Stevens should be able to see them online, as well.    Recommend calling Dr. Miner's office to schedule your follow-up appointment with him.  I will also try sending him a message to see about getting you scheduled.      Keep your follow-up appointments with other specialists, as scheduled.    Continue to eat a healthy diet.  Be careful with portion sizes.  Includes lots of fresh fruits, vegetables, whole grains, lean proteins.  See info below.    Keep hydrated.  Be sure to drink at least 8-10, 8 oz, glasses of water every day.    Stay active.  Try to do some sort of physical activity every day.  Nothing outrageous, just try walking for 10-15 minutes each day.     We will be due for screening mammogram anytime after November 17.  Order placed today.  We can schedule that here, at your convenience.

## 2023-07-27 DIAGNOSIS — E78.2 MIXED HYPERLIPIDEMIA: ICD-10-CM

## 2023-07-27 DIAGNOSIS — I73.9 PAD (PERIPHERAL ARTERY DISEASE): ICD-10-CM

## 2023-07-27 DIAGNOSIS — I25.10 CORONARY ARTERY DISEASE INVOLVING NATIVE CORONARY ARTERY OF NATIVE HEART WITHOUT ANGINA PECTORIS: ICD-10-CM

## 2023-07-27 RX ORDER — EVOLOCUMAB 140 MG/ML
140 INJECTION, SOLUTION SUBCUTANEOUS
Qty: 6 EACH | Refills: 3 | Status: ACTIVE | OUTPATIENT
Start: 2023-07-27 | End: 2023-10-30

## 2023-07-27 NOTE — TELEPHONE ENCOUNTER
Refill Routing Note   Medication(s) are not appropriate for processing by [facility] for the following reason(s):      Medication outside of protocol    [facility] action(s):  Route Care Due:  None identified            Appointments  past 12m or future 3m with PCP    Date Provider   Last Visit   7/13/2023 Hermes Miranda MD   Next Visit   1/16/2024 Hermes Miranda MD   ED visits in past 90 days: 0        Note composed:10:40 AM 07/27/2023

## 2023-07-31 ENCOUNTER — TELEPHONE (OUTPATIENT)
Dept: PRIMARY CARE CLINIC | Facility: CLINIC | Age: 68
End: 2023-07-31
Payer: MEDICARE

## 2023-07-31 NOTE — TELEPHONE ENCOUNTER
Spoke with pt and advised her that Dr. Miranda don't have any openings today. Pt advised me taht she will go to Urgent Care.

## 2023-07-31 NOTE — TELEPHONE ENCOUNTER
----- Message from Leila Castillo sent at 7/31/2023  8:43 AM CDT -----  Contact: Shirlene  Shirlene is calling to speak to the nurse regarding a appointment for congestion and a sore throat. Please give her a call to schedule at [phone]    Thanks  LJ

## 2023-08-07 ENCOUNTER — TELEPHONE (OUTPATIENT)
Dept: ADMINISTRATIVE | Facility: HOSPITAL | Age: 68
End: 2023-08-07
Payer: MEDICARE

## 2023-08-10 ENCOUNTER — SPECIALTY PHARMACY (OUTPATIENT)
Dept: PHARMACY | Facility: CLINIC | Age: 68
End: 2023-08-10
Payer: MEDICARE

## 2023-08-10 NOTE — TELEPHONE ENCOUNTER
Specialty Pharmacy - Refill Coordination    Specialty Medication Orders Linked to Encounter      Flowsheet Row Most Recent Value   Medication #1 evolocumab (REPATHA SURECLICK) 140 mg/mL PnIj (Order#[id number], Rx#[id number])          Refill Questions - Documented Responses      Flowsheet Row Most Recent Value   Patient Availability and HIPAA Verification    Does patient want to proceed with activity? Unable to Reach          We have had multiple attempts to the patient and have been unsuccessful to reach the patient. We will stop reaching out to the patient but in the event that the patient needs the med and contacts us, we will communicate and begin dispensing for the patient. At your next visit with the patient, please review the importance of being in contact with our specialty pharmacy as a part of our care team.      Billie Griffin, PharmD  [facility]  [address]  [facility] [address]  Phone: [phone]  Fax: [phone]

## 2023-08-14 PROBLEM — J84.10 CALCIFIED GRANULOMA OF LUNG: Status: ACTIVE | Noted: 2023-08-14

## 2023-08-14 PROBLEM — E78.00 PURE HYPERCHOLESTEROLEMIA: Status: RESOLVED | Noted: 2022-08-18 | Resolved: 2023-08-14

## 2023-08-14 PROBLEM — I73.9 PERIPHERAL VASCULAR DISEASE: Status: RESOLVED | Noted: 2023-08-14 | Resolved: 2023-08-14

## 2023-08-14 PROBLEM — K21.9 GASTROESOPHAGEAL REFLUX DISEASE: Status: ACTIVE | Noted: 2023-08-14

## 2023-08-14 PROBLEM — I70.0 AORTIC ATHEROSCLEROSIS: Status: ACTIVE | Noted: 2023-08-14

## 2023-08-14 PROBLEM — M85.80 OSTEOPENIA: Status: ACTIVE | Noted: 2023-08-14

## 2023-08-14 PROBLEM — J98.4 CALCIFIED GRANULOMA OF LUNG: Status: ACTIVE | Noted: 2023-08-14

## 2023-08-14 PROBLEM — N18.32 STAGE 3B CHRONIC KIDNEY DISEASE: Status: ACTIVE | Noted: 2023-08-14

## 2023-08-14 PROBLEM — G89.4 CHRONIC PAIN SYNDROME: Status: ACTIVE | Noted: 2023-04-03

## 2023-08-14 PROBLEM — I73.9 PERIPHERAL VASCULAR DISEASE: Status: ACTIVE | Noted: 2023-08-14

## 2023-08-21 ENCOUNTER — OFFICE VISIT (OUTPATIENT)
Dept: PRIMARY CARE CLINIC | Facility: CLINIC | Age: 68
End: 2023-08-21
Payer: MEDICARE

## 2023-08-21 ENCOUNTER — TELEPHONE (OUTPATIENT)
Dept: PRIMARY CARE CLINIC | Facility: CLINIC | Age: 68
End: 2023-08-21

## 2023-08-21 VITALS
HEART RATE: 62 BPM | OXYGEN SATURATION: 97 % | BODY MASS INDEX: 28.86 KG/M2 | SYSTOLIC BLOOD PRESSURE: 132 MMHG | WEIGHT: 169.06 LBS | DIASTOLIC BLOOD PRESSURE: 68 MMHG | HEIGHT: 64 IN | RESPIRATION RATE: 18 BRPM

## 2023-08-21 DIAGNOSIS — I10 ESSENTIAL HYPERTENSION: Chronic | ICD-10-CM

## 2023-08-21 DIAGNOSIS — I25.10 CORONARY ARTERY DISEASE INVOLVING NATIVE CORONARY ARTERY OF NATIVE HEART WITHOUT ANGINA PECTORIS: Chronic | ICD-10-CM

## 2023-08-21 DIAGNOSIS — M50.30 DEGENERATIVE DISC DISEASE, CERVICAL: ICD-10-CM

## 2023-08-21 DIAGNOSIS — E78.2 MIXED HYPERLIPIDEMIA: Chronic | ICD-10-CM

## 2023-08-21 DIAGNOSIS — M85.80 OSTEOPENIA, UNSPECIFIED LOCATION: ICD-10-CM

## 2023-08-21 DIAGNOSIS — F41.1 GAD (GENERALIZED ANXIETY DISORDER): ICD-10-CM

## 2023-08-21 DIAGNOSIS — I73.9 PAD (PERIPHERAL ARTERY DISEASE): ICD-10-CM

## 2023-08-21 DIAGNOSIS — Z00.00 ENCOUNTER FOR MEDICARE ANNUAL WELLNESS EXAM: Primary | ICD-10-CM

## 2023-08-21 DIAGNOSIS — Z86.010 HISTORY OF COLON POLYPS: ICD-10-CM

## 2023-08-21 DIAGNOSIS — J84.10 CALCIFIED GRANULOMA OF LUNG: ICD-10-CM

## 2023-08-21 DIAGNOSIS — K21.9 GASTROESOPHAGEAL REFLUX DISEASE, UNSPECIFIED WHETHER ESOPHAGITIS PRESENT: ICD-10-CM

## 2023-08-21 DIAGNOSIS — M51.36 DDD (DEGENERATIVE DISC DISEASE), LUMBAR: ICD-10-CM

## 2023-08-21 DIAGNOSIS — N18.32 STAGE 3B CHRONIC KIDNEY DISEASE: ICD-10-CM

## 2023-08-21 DIAGNOSIS — H53.8 BLURRY VISION, BILATERAL: ICD-10-CM

## 2023-08-21 DIAGNOSIS — G89.4 CHRONIC PAIN SYNDROME: ICD-10-CM

## 2023-08-21 DIAGNOSIS — K22.2 SCHATZKI'S RING OF DISTAL ESOPHAGUS: ICD-10-CM

## 2023-08-21 DIAGNOSIS — M65.30 TRIGGER FINGER, UNSPECIFIED FINGER, UNSPECIFIED LATERALITY: ICD-10-CM

## 2023-08-21 DIAGNOSIS — K44.9 HIATAL HERNIA: ICD-10-CM

## 2023-08-21 DIAGNOSIS — Z72.0 TOBACCO ABUSE: Chronic | ICD-10-CM

## 2023-08-21 DIAGNOSIS — I70.0 AORTIC ATHEROSCLEROSIS: ICD-10-CM

## 2023-08-21 PROCEDURE — 1125F AMNT PAIN NOTED PAIN PRSNT: CPT | Mod: HCNC,CPTII,S$GLB, | Performed by: NURSE PRACTITIONER

## 2023-08-21 PROCEDURE — 3008F PR BODY MASS INDEX (BMI) DOCUMENTED: ICD-10-PCS | Mod: HCNC,CPTII,S$GLB, | Performed by: NURSE PRACTITIONER

## 2023-08-21 PROCEDURE — 1160F PR REVIEW ALL MEDS BY PRESCRIBER/CLIN PHARMACIST DOCUMENTED: ICD-10-PCS | Mod: HCNC,CPTII,S$GLB, | Performed by: NURSE PRACTITIONER

## 2023-08-21 PROCEDURE — G0439 PR MEDICARE ANNUAL WELLNESS SUBSEQUENT VISIT: ICD-10-PCS | Mod: HCNC,S$GLB,, | Performed by: NURSE PRACTITIONER

## 2023-08-21 PROCEDURE — 4010F PR ACE/ARB THEARPY RXD/TAKEN: ICD-10-PCS | Mod: HCNC,CPTII,S$GLB, | Performed by: NURSE PRACTITIONER

## 2023-08-21 PROCEDURE — 3075F SYST BP GE 130 - 139MM HG: CPT | Mod: HCNC,CPTII,S$GLB, | Performed by: NURSE PRACTITIONER

## 2023-08-21 PROCEDURE — 99999 PR PBB SHADOW E&M-EST. PATIENT-LVL V: CPT | Mod: PBBFAC,HCNC,, | Performed by: NURSE PRACTITIONER

## 2023-08-21 PROCEDURE — 3288F PR FALLS RISK ASSESSMENT DOCUMENTED: ICD-10-PCS | Mod: HCNC,CPTII,S$GLB, | Performed by: NURSE PRACTITIONER

## 2023-08-21 PROCEDURE — 3008F BODY MASS INDEX DOCD: CPT | Mod: HCNC,CPTII,S$GLB, | Performed by: NURSE PRACTITIONER

## 2023-08-21 PROCEDURE — 1160F RVW MEDS BY RX/DR IN RCRD: CPT | Mod: HCNC,CPTII,S$GLB, | Performed by: NURSE PRACTITIONER

## 2023-08-21 PROCEDURE — 99999 PR PBB SHADOW E&M-EST. PATIENT-LVL V: ICD-10-PCS | Mod: PBBFAC,HCNC,, | Performed by: NURSE PRACTITIONER

## 2023-08-21 PROCEDURE — 1170F PR FUNCTIONAL STATUS ASSESSED: ICD-10-PCS | Mod: HCNC,CPTII,S$GLB, | Performed by: NURSE PRACTITIONER

## 2023-08-21 PROCEDURE — 3078F DIAST BP <80 MM HG: CPT | Mod: HCNC,CPTII,S$GLB, | Performed by: NURSE PRACTITIONER

## 2023-08-21 PROCEDURE — G0439 PPPS, SUBSEQ VISIT: HCPCS | Mod: HCNC,S$GLB,, | Performed by: NURSE PRACTITIONER

## 2023-08-21 PROCEDURE — 4010F ACE/ARB THERAPY RXD/TAKEN: CPT | Mod: HCNC,CPTII,S$GLB, | Performed by: NURSE PRACTITIONER

## 2023-08-21 PROCEDURE — 3288F FALL RISK ASSESSMENT DOCD: CPT | Mod: HCNC,CPTII,S$GLB, | Performed by: NURSE PRACTITIONER

## 2023-08-21 PROCEDURE — 3075F PR MOST RECENT SYSTOLIC BLOOD PRESS GE 130-139MM HG: ICD-10-PCS | Mod: HCNC,CPTII,S$GLB, | Performed by: NURSE PRACTITIONER

## 2023-08-21 PROCEDURE — 1170F FXNL STATUS ASSESSED: CPT | Mod: HCNC,CPTII,S$GLB, | Performed by: NURSE PRACTITIONER

## 2023-08-21 PROCEDURE — 1159F MED LIST DOCD IN RCRD: CPT | Mod: HCNC,CPTII,S$GLB, | Performed by: NURSE PRACTITIONER

## 2023-08-21 PROCEDURE — 3078F PR MOST RECENT DIASTOLIC BLOOD PRESSURE < 80 MM HG: ICD-10-PCS | Mod: HCNC,CPTII,S$GLB, | Performed by: NURSE PRACTITIONER

## 2023-08-21 PROCEDURE — 1159F PR MEDICATION LIST DOCUMENTED IN MEDICAL RECORD: ICD-10-PCS | Mod: HCNC,CPTII,S$GLB, | Performed by: NURSE PRACTITIONER

## 2023-08-21 PROCEDURE — 1101F PR PT FALLS ASSESS DOC 0-1 FALLS W/OUT INJ PAST YR: ICD-10-PCS | Mod: HCNC,CPTII,S$GLB, | Performed by: NURSE PRACTITIONER

## 2023-08-21 PROCEDURE — 1101F PT FALLS ASSESS-DOCD LE1/YR: CPT | Mod: HCNC,CPTII,S$GLB, | Performed by: NURSE PRACTITIONER

## 2023-08-21 PROCEDURE — 1125F PR PAIN SEVERITY QUANTIFIED, PAIN PRESENT: ICD-10-PCS | Mod: HCNC,CPTII,S$GLB, | Performed by: NURSE PRACTITIONER

## 2023-08-21 NOTE — TELEPHONE ENCOUNTER
Specialty Pharmacy - Refill Coordination    Specialty Medication Orders Linked to Encounter      Flowsheet Row Most Recent Value   Medication #1 evolocumab (REPATHA SURECLICK) 140 mg/mL PnIj (Order#[id number], Rx#[id number])            Refill Questions - Documented Responses      Flowsheet Row Most Recent Value   Patient Availability and HIPAA Verification    Does patient want to proceed with activity? Yes   HIPAA/medical authority confirmed? Yes   Relationship to patient of person spoken to? Self   Refill Screening Questions    Would patient like to speak to a pharmacist? No   When does the patient need to receive the medication? 11/23/22   Refill Delivery Questions    How will the patient receive the medication? MEDRx   When does the patient need to receive the medication? 11/23/22   Shipping Address Home   Expected Copay ($) 0   Is the patient able to afford the medication copay? Yes   Payment Method zero copay   Days supply of Refill 28   Supplies needed? No supplies needed   Refill activity completed? Yes   Refill activity plan Refill scheduled   Shipment/Pickup Date: 11/17/22            Current Outpatient Medications   Medication Sig    aspirin 81 MG Chew Take 1 tablet (81 mg total) by mouth once daily.    buPROPion (WELLBUTRIN XL) 150 MG TB24 tablet Take 1 tablet (150 mg total) by mouth once daily.    clopidogreL (PLAVIX) 75 mg tablet Take 1 tablet (75 mg total) by mouth once daily.    cyanocobalamin (VITAMIN B-12) 1000 MCG tablet Take 100 mcg by mouth once daily.    evolocumab (REPATHA SURECLICK) 140 mg/mL PnIj Inject 1 mL (140 mg total) into the skin every 14 (fourteen) days.    fexofenadine HCl (ALLEGRA ORAL) Take 1 tablet by mouth once daily.    fluocinolone acetonide oiL (DERMOTIC OIL) 0.01 % Drop Place 3 drops in ear(s) 2 (two) times daily.    fluticasone propionate (FLONASE) 50 mcg/actuation nasal spray USE 2 SPRAYS IN EACH NOSTRIL ONE TIME DAILY    furosemide (LASIX) 20 MG tablet Take 1 tablet (20 mg  total) by mouth once daily.    gabapentin (NEURONTIN) 300 MG capsule Take 3 capsules (900 mg total) by mouth every evening.    isosorbide mononitrate (IMDUR) 60 MG 24 hr tablet Take 1 tablet (60 mg total) by mouth every evening.    loratadine (CLARITIN) 10 mg tablet Take 10 mg by mouth daily as needed for Allergies.    metoprolol tartrate (LOPRESSOR) 50 MG tablet Take 1 tablet (50 mg total) by mouth 2 (two) times daily.    multivitamin with minerals tablet Take 1 tablet by mouth once daily.    nitroGLYCERIN (NITROSTAT) 0.4 MG SL tablet Place 1 tablet (0.4 mg total) under the tongue every 5 (five) minutes as needed for Chest pain.    pantoprazole (PROTONIX) 20 MG tablet Take 1 tablet (20 mg total) by mouth once daily.    tiZANidine (ZANAFLEX) 4 MG tablet     valsartan (DIOVAN) 160 MG tablet TAKE ONE (1) TABLET (160 MG TOTAL) BY MOUTH ONCE DAILY    vitamin D (VITAMIN D3) 1000 units Tab Take 1,000 Units by mouth once daily.   Last reviewed on 11/7/2022 11:23 AM by Clayton Frankel MD    Review of patient's allergies indicates:   Allergen Reactions    Amlodipine Swelling    Statins-hmg-coa reductase inhibitors Other (See Comments)     Myalgias to lipitor and simvastatin    Last reviewed on  11/10/2022 9:01 AM by Gerry Mendoza      Tasks added this encounter   12/14/2022 - Refill Call (Auto Added)   Tasks due within next 3 months   No tasks due.     Lidya Loera, PharmD  [facility]  [address] [place] [address]  Phone: [phone]  Fax: [phone]         Wound of left foot

## 2023-08-21 NOTE — TELEPHONE ENCOUNTER
Please advise per staff message:    Lianna Kaiser, BROOKE [facility]za Staff  Good afternoon,     Mrs. Olvera asked for a refill of Albuterol nebulizer solution to use as needed for wheezing. This is not currently an active medication on her med list - is this something Dr. Miranda would be able to refill for her?     Thank you,     Lianna

## 2023-08-21 NOTE — PROGRESS NOTES
Called Pt to schedule apt scheduled for 9/27/2023 945 am per request offered sooner appointment Pt declined stating she has been dleaing with this long enough she can wait til sept with Dr. Villa

## 2023-08-21 NOTE — TELEPHONE ENCOUNTER
----- Message from Lianan Kaiser NP sent at 8/21/2023  3:51 PM CDT -----  Good afternoon,     Mrs. Olvera asked for a refill of Albuterol nebulizer solution to use as needed for wheezing. This is not currently an active medication on her med list - is this something Dr. Miranda would be able to refill for her?     Thank you,     Lianna

## 2023-08-21 NOTE — PROGRESS NOTES
"  Shirlene Olvera presented for a Medicare AWV today. The following components were reviewed and updated:    Medical history  Family History  Social history  Allergies and Current Medications  Health Risk Assessment  Health Maintenance  Care Team    **See Completed Assessments for Annual Wellness visit with in the encounter summary    The following assessments were completed:  Depression Screening  Cognitive function Screening  Timed Get Up Test  Whisper Test          Vitals:    08/21/23 1439   BP: 132/68   BP Location: Left arm   Patient Position: Sitting   Pulse: 62   Resp: 18   SpO2: 97%   Weight: 76.7 kg (169 lb 1.5 oz)   Height: 5' 4" (1.626 m)     Body mass index is 29.02 kg/m².   ]    Physical Exam  Constitutional:       Appearance: Normal appearance.   HENT:      Head: Normocephalic and atraumatic.   Cardiovascular:      Rate and Rhythm: Normal rate and regular rhythm.      Heart sounds: Normal heart sounds.   Pulmonary:      Effort: Pulmonary effort is normal.      Breath sounds: Normal breath sounds.   Musculoskeletal:         General: Normal range of motion.   Skin:     General: Skin is warm and dry.   Neurological:      General: No focal deficit present.      Mental Status: She is alert and oriented to person, place, and time.   Psychiatric:         Mood and Affect: Mood normal.         Behavior: Behavior normal.         Thought Content: Thought content normal.         Judgment: Judgment normal.       Diagnoses and health risks identified today and associated recommendations/orders:  1. Encounter for Medicare annual wellness exam  Reviewed and discussed preventive health screenings and vaccinations with the patient.     2. Stage 3b chronic kidney disease  Stable, recent labs with eGFR back to baseline. Continue current treatment plan as previously prescribed with your PCP     Lab Results   Component Value Date    CREATININE 1.6 (H) 07/13/2023    BUN 36 (H) 07/13/2023     07/13/2023    K 5.3 (H) " "07/13/2023     07/13/2023    CO2 26 07/13/2023     3. PAD (peripheral artery disease)  Followed by Vascular Surgery; on ASA, Plavix, and Repatha. Stable. Continue current treatment plan as previously prescribed with your Vascular specialist.     4. Aortic atherosclerosis  CXR 1/2023. On DAPT And Repatha. Stable. Continue current treatment plan as previously prescribed with your PCP and Cardiologist.     5. Coronary artery disease involving native coronary artery of native heart without angina pectoris  On DAPT, Repatha, Metoprolol, and Imdur. Stable. Continue current treatment plan as previously prescribed with your Cardiologist.     6. Essential hypertension  Stable/controlled in clinic today. Advised patient to start monitoring BP regularly and keeping log of readings. Continue current treatment plan as previously prescribed with your PCP and Cardiologist.     Pulse Readings from Last 3 Encounters:   08/21/23 62   07/13/23 (!) 58   06/27/23 (!) 55       BP Readings from Last 3 Encounters:   08/21/23 132/68   07/13/23 (!) 134/90   06/27/23 (!) 148/65       7. Mixed hyperlipidemia  On Repatha but has been out of medication. Will be restarting med once re-approved for assistance program. Continue treatment with Repatha and f/u with PCP and Cardiology as advised.     Lab Results   Component Value Date    CHOL 238 (H) 07/13/2023    CHOL 168 08/18/2022    CHOL 239 (H) 03/29/2022     Lab Results   Component Value Date    HDL 59 07/13/2023    HDL 48 08/18/2022    HDL 40 03/29/2022     Lab Results   Component Value Date    LDLCALC 131.8 07/13/2023    LDLCALC 64.2 08/18/2022    LDLCALC 159.2 (H) 03/29/2022     No results found for: "DLDL"  Lab Results   Component Value Date    TRIG 236 (H) 07/13/2023    TRIG 279 (H) 08/18/2022    TRIG 199 (H) 03/29/2022     Lab Results   Component Value Date    CHOLHDL 24.8 07/13/2023    CHOLHDL 28.6 08/18/2022    CHOLHDL 16.7 (L) 03/29/2022     8. Calcified granuloma of lung  Noted " "on CT Renal Stone Study 10/2019. Stable. Continue current treatment plan as previously prescribed with your PCP     9. ROMÁN (generalized anxiety disorder)  Chronic. Overall Stable. Continue current treatment plan as previously prescribed with your PCP.     10. Chronic pain syndrome  11. Degenerative disc disease, cervical  12. DDD (degenerative disc disease), lumbar  Chronic. Followed by pain management as well as neurologist. S/p injections per IPM. Currently using gabapentin and medical marijuana for pain. Stable. Continue current treatment plan as previously prescribed with your providers.     13. Osteopenia, unspecified location  DEXA scan 9/2022. Stable. Discussed recommendations for vitaminD and calcium as well as weight-bearing exercise. Continue current treatment plan as previously prescribed with your PCP     14. Gastroesophageal reflux disease, unspecified whether esophagitis present  15. Hiatal hernia  16. Schatzki's ring of distal esophagus  On Protonix. Stable. Continue current treatment plan as previously prescribed with your PCP and GI.     17. History of colon polyps  Colonoscopy 12/2021 with rec repeat in 5 yrs.     18. Blurry vision, bilateral   Referral placed to ophthalmology for eye exam    19. Trigger finger, unspecified finger, unspecified laterality   Patient reports bilateral fingers "locking" requiring manual retraction. Referral placed to Orthopedics/hand Ortho.     Opioid screen: Not currently using opioids per chart / patient     Substance abuse screen: No recreational substance use per patient / chart     Provided Shirlene with a 5-10 year written screening schedule and personal prevention plan. Recommendations were developed using the USPSTF age appropriate recommendations. Education, counseling, and referrals were provided as needed.  After Visit Summary printed and given to patient which includes a list of additional screenings\tests needed.    No follow-ups on file.      Lianna LEAL" BROOKE Kaiser    I offered to discuss advanced care planning, including how to pick a person who would make decisions for you if you were unable to make them for yourself, called a health care power of , and what kind of decisions you might make such as use of life sustaining treatments such as ventilators and tube feeding when faced with a life limiting illness recorded on a living will that they will need to know. (How you want to be cared for as you near the end of your natural life)     X Patient is interested in learning more about how to make advanced directives.  I provided them paperwork and offered to discuss this with them.

## 2023-08-21 NOTE — Clinical Note
Good afternoon,   Mrs. Olvera was previously seen by Dr. Villa 10/2022 for Chronic eczematous otitis externa of both ears. She is continuing to have issues with this despite medication. Can your team please assist her in scheduling f/u with Dr. Villa for evaluation?  Thank you,   Lianna Kaiser

## 2023-08-21 NOTE — Clinical Note
Good afternoon,   Mrs. Olvera asked for a refill of Albuterol nebulizer solution to use as needed for wheezing. This is not currently an active medication on her med list - is this something Dr. Miranda would be able to refill for her?   Thank you,   Lianna

## 2023-08-21 NOTE — PATIENT INSTRUCTIONS
Counseling and Referral of Other Preventative  (Italic type indicates deductible and co-insurance are waived)    Patient Name: Shirlene Olvera  Today's Date: 8/21/2023    Health Maintenance       Date Due Completion Date    COVID-19 Vaccine (1) Never done ---    Shingles Vaccine (2 of 3) 12/22/2016 10/27/2016    Mammogram 11/17/2023 11/17/2022    Influenza Vaccine (1) 09/01/2023 10/19/2020    Lipid Panel 07/13/2024 7/13/2023    Hemoglobin A1c (Diabetic Prevention Screening) 08/18/2025 8/18/2022    DEXA Scan 09/09/2025 9/9/2022    TETANUS VACCINE 10/27/2026 10/27/2016 (Declined)    Override on 10/27/2016: Declined    Override on 10/27/2016: Done    Colorectal Cancer Screening 12/09/2026 12/9/2021        Orders Placed This Encounter   Procedures    Ambulatory referral/consult to Orthopedics    Ambulatory referral/consult to Ophthalmology     The following information is provided to all patients.  This information is to help you find resources for any of the problems found today that may be affecting your health:                Living healthy guide: www.[facility].[place].gov      Understanding Diabetes: www.diabetes.org      Eating healthy: www.cdc.gov/healthyweight      CDC home safety checklist: www.cdc.gov/steadi/patient.html      Agency on Aging: www.goea.[place].[facility]      Alcoholics anonymous (AA): www.aa.org      Physical Activity: www.rosalva.nih.gov/iu4bxnx      Tobacco use: www.quitwithusla.org

## 2023-08-22 DIAGNOSIS — J20.9 BRONCHITIS, ACUTE, WITH BRONCHOSPASM: ICD-10-CM

## 2023-08-22 DIAGNOSIS — R06.2 WHEEZING: ICD-10-CM

## 2023-08-22 RX ORDER — ALBUTEROL SULFATE 0.83 MG/ML
2.5 SOLUTION RESPIRATORY (INHALATION) EVERY 6 HOURS PRN
Qty: 1 EACH | Refills: 0 | Status: SHIPPED | OUTPATIENT
Start: 2023-08-22

## 2023-08-23 ENCOUNTER — PATIENT MESSAGE (OUTPATIENT)
Dept: SPORTS MEDICINE | Facility: CLINIC | Age: 68
End: 2023-08-23
Payer: MEDICARE

## 2023-08-23 ENCOUNTER — TELEPHONE (OUTPATIENT)
Dept: PAIN MEDICINE | Facility: CLINIC | Age: 68
End: 2023-08-23
Payer: MEDICARE

## 2023-08-24 ENCOUNTER — TELEPHONE (OUTPATIENT)
Dept: SPORTS MEDICINE | Facility: CLINIC | Age: 68
End: 2023-08-24
Payer: MEDICARE

## 2023-08-24 ENCOUNTER — OFFICE VISIT (OUTPATIENT)
Dept: PAIN MEDICINE | Facility: CLINIC | Age: 68
End: 2023-08-24
Payer: MEDICARE

## 2023-08-24 VITALS
WEIGHT: 170 LBS | HEIGHT: 64 IN | BODY MASS INDEX: 29.02 KG/M2 | DIASTOLIC BLOOD PRESSURE: 66 MMHG | HEART RATE: 57 BPM | SYSTOLIC BLOOD PRESSURE: 141 MMHG

## 2023-08-24 DIAGNOSIS — M54.50 ACUTE EXACERBATION OF CHRONIC LOW BACK PAIN: ICD-10-CM

## 2023-08-24 DIAGNOSIS — M79.645 FINGER PAIN, LEFT: Primary | ICD-10-CM

## 2023-08-24 DIAGNOSIS — M47.818 ARTHROPATHY OF RIGHT SACROILIAC JOINT: ICD-10-CM

## 2023-08-24 DIAGNOSIS — G89.29 ACUTE EXACERBATION OF CHRONIC LOW BACK PAIN: ICD-10-CM

## 2023-08-24 DIAGNOSIS — M54.2 CERVICAL MUSCLE PAIN: ICD-10-CM

## 2023-08-24 DIAGNOSIS — M51.36 DDD (DEGENERATIVE DISC DISEASE), LUMBAR: ICD-10-CM

## 2023-08-24 DIAGNOSIS — M50.30 DEGENERATIVE DISC DISEASE, CERVICAL: ICD-10-CM

## 2023-08-24 DIAGNOSIS — M46.1 SACROILIITIS: ICD-10-CM

## 2023-08-24 DIAGNOSIS — M47.812 CERVICAL SPONDYLOSIS: Primary | ICD-10-CM

## 2023-08-24 PROCEDURE — 99214 OFFICE O/P EST MOD 30 MIN: CPT | Mod: 25,S$GLB,, | Performed by: PHYSICIAN ASSISTANT

## 2023-08-24 PROCEDURE — 1125F AMNT PAIN NOTED PAIN PRSNT: CPT | Mod: CPTII,S$GLB,, | Performed by: PHYSICIAN ASSISTANT

## 2023-08-24 PROCEDURE — 1159F PR MEDICATION LIST DOCUMENTED IN MEDICAL RECORD: ICD-10-PCS | Mod: CPTII,S$GLB,, | Performed by: PHYSICIAN ASSISTANT

## 2023-08-24 PROCEDURE — 4010F ACE/ARB THERAPY RXD/TAKEN: CPT | Mod: CPTII,S$GLB,, | Performed by: PHYSICIAN ASSISTANT

## 2023-08-24 PROCEDURE — 1101F PR PT FALLS ASSESS DOC 0-1 FALLS W/OUT INJ PAST YR: ICD-10-PCS | Mod: CPTII,S$GLB,, | Performed by: PHYSICIAN ASSISTANT

## 2023-08-24 PROCEDURE — 99999 PR PBB SHADOW E&M-EST. PATIENT-LVL IV: ICD-10-PCS | Mod: PBBFAC,HCNC,, | Performed by: PHYSICIAN ASSISTANT

## 2023-08-24 PROCEDURE — 3077F SYST BP >= 140 MM HG: CPT | Mod: CPTII,S$GLB,, | Performed by: PHYSICIAN ASSISTANT

## 2023-08-24 PROCEDURE — 1160F RVW MEDS BY RX/DR IN RCRD: CPT | Mod: CPTII,S$GLB,, | Performed by: PHYSICIAN ASSISTANT

## 2023-08-24 PROCEDURE — 3288F FALL RISK ASSESSMENT DOCD: CPT | Mod: CPTII,S$GLB,, | Performed by: PHYSICIAN ASSISTANT

## 2023-08-24 PROCEDURE — 1159F MED LIST DOCD IN RCRD: CPT | Mod: CPTII,S$GLB,, | Performed by: PHYSICIAN ASSISTANT

## 2023-08-24 PROCEDURE — 3008F PR BODY MASS INDEX (BMI) DOCUMENTED: ICD-10-PCS | Mod: CPTII,S$GLB,, | Performed by: PHYSICIAN ASSISTANT

## 2023-08-24 PROCEDURE — 1125F PR PAIN SEVERITY QUANTIFIED, PAIN PRESENT: ICD-10-PCS | Mod: CPTII,S$GLB,, | Performed by: PHYSICIAN ASSISTANT

## 2023-08-24 PROCEDURE — 99999 PR PBB SHADOW E&M-EST. PATIENT-LVL IV: CPT | Mod: PBBFAC,HCNC,, | Performed by: PHYSICIAN ASSISTANT

## 2023-08-24 PROCEDURE — 99214 PR OFFICE/OUTPT VISIT, EST, LEVL IV, 30-39 MIN: ICD-10-PCS | Mod: 25,S$GLB,, | Performed by: PHYSICIAN ASSISTANT

## 2023-08-24 PROCEDURE — 3008F BODY MASS INDEX DOCD: CPT | Mod: CPTII,S$GLB,, | Performed by: PHYSICIAN ASSISTANT

## 2023-08-24 PROCEDURE — 1160F PR REVIEW ALL MEDS BY PRESCRIBER/CLIN PHARMACIST DOCUMENTED: ICD-10-PCS | Mod: CPTII,S$GLB,, | Performed by: PHYSICIAN ASSISTANT

## 2023-08-24 PROCEDURE — 1101F PT FALLS ASSESS-DOCD LE1/YR: CPT | Mod: CPTII,S$GLB,, | Performed by: PHYSICIAN ASSISTANT

## 2023-08-24 PROCEDURE — 3078F PR MOST RECENT DIASTOLIC BLOOD PRESSURE < 80 MM HG: ICD-10-PCS | Mod: CPTII,S$GLB,, | Performed by: PHYSICIAN ASSISTANT

## 2023-08-24 PROCEDURE — 3077F PR MOST RECENT SYSTOLIC BLOOD PRESSURE >= 140 MM HG: ICD-10-PCS | Mod: CPTII,S$GLB,, | Performed by: PHYSICIAN ASSISTANT

## 2023-08-24 PROCEDURE — 3078F DIAST BP <80 MM HG: CPT | Mod: CPTII,S$GLB,, | Performed by: PHYSICIAN ASSISTANT

## 2023-08-24 PROCEDURE — 96372 THER/PROPH/DIAG INJ SC/IM: CPT | Mod: S$GLB,,, | Performed by: PHYSICIAN ASSISTANT

## 2023-08-24 PROCEDURE — 3288F PR FALLS RISK ASSESSMENT DOCUMENTED: ICD-10-PCS | Mod: CPTII,S$GLB,, | Performed by: PHYSICIAN ASSISTANT

## 2023-08-24 PROCEDURE — 4010F PR ACE/ARB THEARPY RXD/TAKEN: ICD-10-PCS | Mod: CPTII,S$GLB,, | Performed by: PHYSICIAN ASSISTANT

## 2023-08-24 PROCEDURE — 96372 PR INJECTION,THERAP/PROPH/DIAG2ST, IM OR SUBCUT: ICD-10-PCS | Mod: S$GLB,,, | Performed by: PHYSICIAN ASSISTANT

## 2023-08-24 RX ORDER — KETOROLAC TROMETHAMINE 30 MG/ML
30 INJECTION, SOLUTION INTRAMUSCULAR; INTRAVENOUS
Status: COMPLETED | OUTPATIENT
Start: 2023-08-24 | End: 2023-08-24

## 2023-08-24 RX ADMIN — KETOROLAC TROMETHAMINE 30 MG: 30 INJECTION, SOLUTION INTRAMUSCULAR; INTRAVENOUS at 11:08

## 2023-08-24 NOTE — PROGRESS NOTES
Established Patient Chronic Pain Note     Referring Physician: Clayton Frankel MD    PCP: Hermes Miranda MD      Chief Complaint:   Chief Complaint   Patient presents with    Hip Pain    Neck Pain    Shoulder Pain          SUBJECTIVE:  Interval History (8/24/2023): Shirlene Olvera presents today for follow-up visit.  she underwent right-sided sacroiliac joint injection on 6/27/23 (2 months ago).  The patient reports that she is/was better following the procedure.  she reports 65-80% pain relief initially, then pain relief wore off.  The changes lasted about 7 weeks, reporting only 40% pain relief today 8 weeks post.  The changes have continued through this visit.  Patient reports pain as 8/10 today.    She had right C4-6 RFA on 12/27/22 then left C4-6 RFA on 1/10/23 with 80% pain relief x >6 months. Pain has returned on left. During pain relief during this procedure, patient had improved ROM (which normally caused pain) and improvement of ADLs as compared to baseline (prior to RFA).    S/p L trapezius/rhomboid TPI in clinic 5/11/2023 with Dr. Izquierdo with some pain relief short term.    Interval history 05/11/2023  Patient presents for 2 month follow-up and for trigger point injection.  Today she reports maintained relief in neck pain following prior cervical radiofrequency ablation, on the right 12/27/2022 and on the left 01/10/2023.  Patient reports exacerbation of right-sided sacroiliitis.  Pain today is rated a 6/10.  Patient reports pain overlying right sacroiliac territory which radiates into the right groin.  90% of pain overlying the right sacroiliac territory.  Patient would like to repeat prior sacroiliac joint injection as she received 100% relief lasting almost 5 months in duration following her prior sacroiliac joint injection 12/27/2022.  Patient has continued gabapentin 900 mg nightly and is requesting a refill on this medication.  Patient reports she is still not receive topical compound  cream as of yet.      Of note patient is scheduled to receive left lower extremity angiogram with atherectomy with Dr. Miner followed by right leg angiogram 1 week following in a few weeks.     Interval Hx:  3/2/23  Patient presents for one-month follow-up.  She continues to report at least 50% sustained relief in bilateral axial neck pain following bilateral cervical radiofrequency ablation, right side 12/27/2022 and left-sided 01/10/2023.  Patient continues to report myofascial pain in the left trapezius and periscapular distribution.  Patient presents today for a trigger point injection.  Patient has continued gabapentin for neuropathic pain as well as tizanidine as needed for myofascial pain.  She reports she is scheduled to start physical therapy in [place] on March 7, 2023 for cervical traction exercises.  Today she denies more distal radiculopathy into the upper extremities or compromise in hand  strength or weakness in the upper extremities or hands.    Interval History (2/9/2023): Shirlene Olvera presents today for follow-up visit.  she underwent right C4-6 RFA on 12/27/22 then left C4-6 RFA on 1/10/23.  The patient reports that she is/was better following the procedure.  she reports about 50% pain relief.  The changes lasted 4 weeks so far.  The changes have continued through this visit.  Patient reports pain as 8/10 today. Pain is very localized in cervical muscular area on left.    Interval history 12/08/2022  At our last clinic visit, we discussed cervical radiofrequency ablation, not performed secondary to recent percutaneous angioplasty and necessity of remaining on anticoagulation.    Today patient again reports bilateral neck pain.  Pain again radiates to bilateral trapezius distribution in C5-6 dermatomal distribution.  She denies more distal radiculopathy into the upper extremities or hands.  She denies weakness in the upper extremities or compromise in hand  strength or dexterity.   Pain is constant and today is rated an 8/10.  Pain is exacerbated with cervical flexion, extension and lateral flexion.  Patient again reiterates at least 50% relief following bilateral neck cervical medial branch block and she would like to move forward with radiofrequency ablation pending safety of pausing anticoagulation.  Patient has continued gabapentin 900 mg in the evening and is requesting a refill as she has run out of this medication.  Patient denies any side effects from this medication.    Interval history 04/20/2022  Patient presents that his post bilateral C4, C5, C6 medial branch block 03/21/2022.  Patient reports 50% sustained relief in bilateral neck pain following medial branch block.  Of note patient reports she recently had a myocardial infarction with PTCA 3 weeks prior.  Patient reports she has been started on Brilinta and continued on aspirin.  Patient would like to pursue interventional treatment but understands necessity of pausing Brilinta prior to cervical radiofrequency ablation.  Today patient denies radiation into the upper extremities, compromise in hand  strength or dexterity or upper extremity weakness.  Patient has continued gabapentin 300 mg in the evening and reports improvement in her pain.  She denies any side effects from this medication.    Interval history 03/10/2022  Patient presents status post right-sided sacroiliac joint injection 12/17/2021 and for review of bilateral hip XR.  Patient reports 100% relief and right sacroiliac territory following her injection.  Today she reports insidious return of bilateral neck pain, which today is rated as a 9/10.  Patient reports pain in bilateral cervical paraspinous territory.  She denies radiation into the upper extremities, compromise in hand  strength or dexterity.  Patient is requesting repeat cervical C4, C5-C6 medial branch block as she obtain greater than 4 months of relief with this procedure.  Patient has continued  "gabapentin 100 mg 3 times daily and was not able to fill be updated prescription.  She does believe this medication helps with her symptoms and she denies any side effects.    Interval history 11/11/2021  Patient presents status post bilateral medial branch block at C4, C5, C6 10/15/2021.  Patient reports 100% pain relief following her bilateral medial branch block which is still sustained.  Patient reports significant improvement in range of motion and reduced pain with cervical flexion, extension and lateral flexion.  Today patient primarily ports right-sided sacroiliac joint pain which radiates into the right buttock and right hip territory.  Pain today is rated as a 7/10 and is intermittent.  Pain is described as sharp in nature.  Pain is exacerbated when moving from sitting to standing and patient has positive Jason finger sign.  Patient has continued gabapentin 300 mg at night.  Patient reports when she increase this dose to 600, she just did not feel right.  She will maintain her dose of 300 mg. She denies any new onset lower extremity weakness, bowel or bladder incontinence or saddle anesthesia.    HPI:  10/06/2021  Shirlene Olvera is a 66 y.o. female with past medical history significant for ROMÁN, HTN, HLD, CAD s/p MI, PAD, CKD III, nicotine dependence who presents to the clinic for the evaluation of longstanding neck pain.  Today patient reports neck pain which began several years prior without inciting accident, injury or trauma.  Patient describes pain as intermittent and is a 7/10 today.  At its worst pain is a 10/10.  Pain is described as stiff and a "pins and needles" sensation.  Pain begins at her occiput and radiates down bilateral cervical paraspinous muscles into bilateral trapezius distributions in C4-6 distribution.  Pain is exacerbated with cervical flexion, extension such as when she is gardening.  Pain is improved with Icy Hot.  Patient denies radicular symptoms into bilateral upper " extremities, paresthesias or weakness in bilateral hands, compromise in hand  strength or dexterity.  Patient reports receiving prior cervical medial branch block several years prior with significant improvement in her symptoms.  Patient is currently taking gabapentin 100 mg 3 times daily without noticeable improvement in her symptoms.  Patient denies night fever/night sweats, urinary incontinence, bowel incontinence, significant weight loss, significant motor weakness and loss of sensations.    Pain Disability Index Review:      8/24/2023    10:55 AM 5/11/2023    10:38 AM 3/2/2023     8:36 AM   Last 3 PDI Scores   Pain Disability Index (PDI) 25 55 40       Non-Pharmacologic Treatments:  Physical Therapy/Home Exercise: no  Ice/Heat:yes  TENS: no  Acupuncture: no  Massage: no  Chiropractic: no    Other: no      Pain Medications:  - Adjuvant Medications: Neurontin (Gabapentin), Topical Ointment (Voltaren Gel, Steroid cream, Anti-Inflammatory Cream, Compound cream) and Tylenol (Acetaminophen)  - Anti-Coagulants: Aspirin    Pain Procedures:    - right-sided sacroiliac joint injection on 6/27/23 with 80% pain relief initially for about 7 weeks, reporting 40% pain relief 8 weeks post  - L trapezius/rhomboid TPI in clinic 5/11/2023 with Dr. Izquierdo with good pain relief short-term  - right C4-6 RFA on 12/27/22 then left C4-6 RFA on 1/10/23 with 80% pain relief for >6 months   - 3/21/2022:  Bilateral C4, C5, C6 medial branch block  - 12/17/2021:  Right-sided sacroiliac joint injection with 100% pain relief x 5 months   - 10/15/2021:  Bilateral medial branch block at C4, C5, C6; Dr. Izquierdo    Cervical MBB: several years prior -- externally          Review of Systems:  GENERAL:  No weight loss, malaise or fevers.  HEENT:   No recent changes in vision or hearing  NECK:  Negative for lumps, no difficulty with swallowing.  RESPIRATORY:  Negative for cough, wheezing or shortness of breath, patient denies any recent  "URI.  CARDIOVASCULAR:  Negative for chest pain, leg swelling or palpitations.  GI:  Negative for abdominal discomfort, blood in stools or black stools or change in bowel habits.  MUSCULOSKELETAL:  See HPI.  SKIN:  Negative for lesions, rash, and itching.  PSYCH:  No mood disorder or recent psychosocial stressors.   HEMATOLOGY/LYMPHOLOGY:  Negative for prolonged bleeding, bruising easily or swollen nodes.    NEURO:   No history of headaches, syncope, paralysis, seizures or tremors.  All other reviewed and negative other than HPI.      OBJECTIVE:    Physical Exam:  Vitals:    08/24/23 1054   BP: (!) 141/66   Pulse: (!) 57   Weight: 77.1 kg (169 lb 15.6 oz)   Height: 5' 4" (1.626 m)   PainSc:   8   PainLoc: Neck    Body mass index is 29.18 kg/m².   (reviewed on 10/25/2023)    GENERAL: Well appearing, in no acute distress, alert and oriented x3.  PSYCH:  Mood and affect appropriate.  SKIN: Skin color, texture, turgor normal, no rashes or lesions.  HEAD/FACE:  Normocephalic, atraumatic.    PULM:  No obvious wheezing.    NECK:  pain to palpation over the cervical paraspinous muscles, L>R. TTP over left trapezius. Spurling Negative.  pain with neck flexion, extension, or lateral flexion.  Somewhat limited ROM.  Facet loading causes pain, much worse on the left.  Normal testing biceps, triceps and brachioradialis bilaterally.    Negative Antonio's bilaterally.    5/5 strength testing deltoid, biceps, triceps, wrist extensor, wrist flexor and ulnar intrinsics bilaterally.    Normal  strength bilaterally    SIJ testing: RIGHT  - TTP over SI joint: Present  - Alphonse's/ Bo's: Positive    - Sacroiliac Distraction Test (anterior pressure): Positive  - Sacroiliac Compression Test (lateral pressure): Positive     PULM: No evidence of respiratory difficulty, symmetric chest rise.  GI:  Soft and non-tender.    NEURO: BUE & BLE coordination and muscle stretch reflexes are physiologic and symmetric. No loss of sensation is " noted.  GAIT: normal.      Imaging (Reviewed on 10/25/2023):    Hip  XR 11/11/21  FINDINGS:  No acute fracture.  Hip joint spaces maintained with left greater than right acetabular degenerative findings.  Lower lumbar spine degenerative changes noted.  Soft tissues unremarkable.     08/05/12 MRI Lumbar Spine Without Contrast  Findings: Vertebral alignment is normal.  The conus ends at the level of  L1 and appears normal.  Intervertebral disks are well-hydrated and disk  spaces are maintained.  No compression fractures or acute osseous  abnormalities are seen.  There is no area of abnormal signal intensity  identified within the bone marrow.  Axial images are acquired through the  disk spaces from L1-2 through L5-S1.  There is no focal disk herniation,  canal or foraminal stenosis identified above the L4-5 level.  At L4 R. there is central disk protrusion minimally indenting the thecal  sac.  There is also some diffuse bulging of the disk and facet and  ligamentum flavum hypertrophy.  There is mild canal and bilateral  foraminal narrowing at this level.  At L5-S1 there is central bulging of the disk without canal or foraminal  stenosis.  Impression  Central disk protrusion at L4-5 with some canal and foraminal  narrowing due to combination of disk abnormality and degenerative change.  Please correlate above findings with the patient's clinical symptoms.    08/05/12 MRI Cervical Spine Without Contrast  Findings: The craniovertebral junction and vertebral alignment are  normal.  No abnormality of bone marrow signal intensity is seen.  Intervertebral disks are dessicated but disk spaces are maintained.  No  abnormality seen within the cervical spinal cord.  Axial images are  acquired through the disk spaces from C2-3 through C7-T1.  C2-3 disk space is normal.  The C3-4 disk space demonstrates spondylosis as well as facet and  uncinate hypertrophy.  There is mild canal and bilateral foraminal  narrowing at this  level.  The C4-5 disk space also demonstrates right paracentral spondylosis and  disk bulge with facet and uncinate hypertrophy on the right.  There is  mild canal and moderate right sided foraminal stenosis.  The C5-6 disk space does not demonstrate any significant disk herniation,  canal or foraminal stenosis.  The C6-7 disk space demonstrates minimal bulging of the disk but no canal  or foraminal narrowing.  The C7 T1 disk space is unremarkable.    Impression  Degenerative change and degenerative disk disease most  pronounced at the C3-4 and C4-5 level as discussed above.  Please  correlate with patient's clinical symptoms.       09/13/12 X-Ray Cervical Spine AP And Lateral  Findings: Vertebral alignment is normal.  There is moderate degenerative  change throughout the cervical spine with loss of disk height and  anterior osteophyte formation.  No acute osseous abnormalities are seen.  Postoperative findings from resection of the left first rib.      ASSESSMENT: 68 y.o. year old female with neck pain, R lower back pain, consistent with     1. Cervical spondylosis  Case Request-RAD/Other Procedure Area: Left C4-6 RFA with RN IV sedation      2. Degenerative disc disease, cervical        3. Cervical muscle pain        4. Sacroiliitis  Case Request-RAD/Other Procedure Area: Right SIJ Injection      5. Arthropathy of right sacroiliac joint  Case Request-RAD/Other Procedure Area: Right SIJ Injection      6. Acute exacerbation of chronic low back pain  ketorolac injection 30 mg      7. DDD (degenerative disc disease), lumbar  Back/Cervical Brace For Home Use          PLAN:   - Interventions:  - Schedule for repeat right-sided sacroiliac joint injection (as she is obtained greater than 100% relief in sacroiliitis exceeding 5 months in duration with prior injection 12/2022 & recently obtained pain relief from right SIJ injection on 6/27/23 for about 7 weeks).  We have discussed the procedure, benefits, potential risk in  detail.  Patient has agreed to pursue this procedure. Can continue ASA & Plavix for sacroiliac joint injection.    - Schedule left C4-6 RFA. Patient is taking ASA + Plavix as 2° prevention; she will have to stop Plavix for 5 days & ASA for 3 days prior to procedure.  Will get clearance from Cardiology.    - Procedure note: An IM injection of (ketolorac 30mg/1mL) was administered during clinic visit.  This was well tolerated.    - S/p right-sided sacroiliac joint injection on 6/27/23 with 80% pain relief initially for about 7 weeks, reporting 40% pain relief 8 weeks post  - S/p L trapezius/rhomboid TPI in clinic 5/11/2023 with Dr. Izquierdo with good pain relief short-term    - S/p right C4-6 RFA on 12/27/22 then left C4-6 RFA on 1/10/23 with sustained 50-70% pain relief so far.  We have discussed repeating this procedure no sooner than 6 months following the procedure date should symptoms exacerbate.    - Anticoagulation use: yes aspirin and Plavix - secondary - Dr. Byers/ Dr. Mendoza (Cardiology)   Patient had myocardial infarction with PTCA 3/2022.      - Medications:  -Refill gabapentin 300 mg in AM and 900 mg QHS X 90 day supply.  We have previously reviewed potential side effects of this medication including daytime somnolence, weight gain and peripheral edema    -D/c prescription compound cream to see if this helps with their pain.  Compound medication will include flurbiprofen 10%, baclofen 2%, cyclobenzaprine 2%, gabapentin 6%, orphenadrine 5%, tetracaine 2% for anti-inflammatory, neuropathic and anesthetic properties.  Patient can apply this medication 2-4 times daily to painful areas. Could not afford.        -Continue Zanaflex (tizanidine) 4mg QHS PRN. We have previously discussed potential deleterious side effects associated with this medication including  dizziness, drowsiness, dry mouth or tingling sensation in the upper or lower extremities.      report:  Reviewed and consistent with medication use as  prescribed.    - Therapy:   - Order back brace. Patient would benefit from brace to help provide stability, support weak spinal muscles, reduce pain, and increase ADLs. Order sent internally to [facility]. Patient was informed to limit use to avoid muscle atrophy.   - We discussed continuing exercises at home learned at physical therapy to help manage the patient/s painful condition. The patient was counseled that muscle strengthening will improve the long term prognosis in regards to pain and may also help increase range of motion and mobility. Previously attended  PT/Dry Needling @ [facility]    - Imaging: Reviewed available imaging with patient and answered any questions they had regarding study.      - Follow up visit: 4-6 weeks post injection      The above plan and management options were discussed at length with patient. Patient is in agreement with the above and verbalized understanding.    - I discussed the goals of interventional chronic pain management with the patient on today's visit. We discussed a multimodal and systematic approach to pain.  This includes diagnostic and therapeutic injections, adjuvant pharmacologic treatment, physical therapy, and at times psychiatry.  I emphasized the importance of regular exercise, core strengthening and stretching, diet and weight loss as a cornerstone of long-term pain management.    - This condition does not require this patient to take time off of work, and the primary goal of our Pain Management services is to improve the patient's functional capacity.  - Patient Questions: Answered all of the patient's questions regarding diagnoses, therapy, treatment and next steps    Kimberly Gutierrez PA-C  Interventional Pain Management - [facility]    Disclaimer:  This note was prepared using voice recognition system and is likely to have sound alike errors that may have been overlooked even after proof reading.  Please call me with any questions.

## 2023-08-28 ENCOUNTER — OFFICE VISIT (OUTPATIENT)
Dept: SPORTS MEDICINE | Facility: CLINIC | Age: 68
End: 2023-08-28
Payer: MEDICARE

## 2023-08-28 ENCOUNTER — HOSPITAL ENCOUNTER (OUTPATIENT)
Dept: RADIOLOGY | Facility: HOSPITAL | Age: 68
Discharge: HOME | End: 2023-08-28
Attending: STUDENT IN AN ORGANIZED HEALTH CARE EDUCATION/TRAINING PROGRAM
Payer: MEDICARE

## 2023-08-28 ENCOUNTER — PATIENT MESSAGE (OUTPATIENT)
Dept: SPORTS MEDICINE | Facility: CLINIC | Age: 68
End: 2023-08-28

## 2023-08-28 DIAGNOSIS — M79.642 LEFT HAND PAIN: ICD-10-CM

## 2023-08-28 DIAGNOSIS — M19.042 OSTEOARTHRITIS OF FINGER OF LEFT HAND: Primary | ICD-10-CM

## 2023-08-28 DIAGNOSIS — G56.22 ULNAR NEUROPATHY OF LEFT UPPER EXTREMITY: ICD-10-CM

## 2023-08-28 DIAGNOSIS — M79.645 FINGER PAIN, LEFT: ICD-10-CM

## 2023-08-28 PROCEDURE — 1160F RVW MEDS BY RX/DR IN RCRD: CPT | Mod: CPTII,S$GLB,, | Performed by: STUDENT IN AN ORGANIZED HEALTH CARE EDUCATION/TRAINING PROGRAM

## 2023-08-28 PROCEDURE — 73130 X-RAY EXAM OF HAND: CPT | Mod: 26,LT,, | Performed by: RADIOLOGY

## 2023-08-28 PROCEDURE — 1159F PR MEDICATION LIST DOCUMENTED IN MEDICAL RECORD: ICD-10-PCS | Mod: CPTII,S$GLB,, | Performed by: STUDENT IN AN ORGANIZED HEALTH CARE EDUCATION/TRAINING PROGRAM

## 2023-08-28 PROCEDURE — 1125F PR PAIN SEVERITY QUANTIFIED, PAIN PRESENT: ICD-10-PCS | Mod: CPTII,S$GLB,, | Performed by: STUDENT IN AN ORGANIZED HEALTH CARE EDUCATION/TRAINING PROGRAM

## 2023-08-28 PROCEDURE — 99213 PR OFFICE/OUTPT VISIT, EST, LEVL III, 20-29 MIN: ICD-10-PCS | Mod: S$GLB,,, | Performed by: STUDENT IN AN ORGANIZED HEALTH CARE EDUCATION/TRAINING PROGRAM

## 2023-08-28 PROCEDURE — 1159F MED LIST DOCD IN RCRD: CPT | Mod: CPTII,S$GLB,, | Performed by: STUDENT IN AN ORGANIZED HEALTH CARE EDUCATION/TRAINING PROGRAM

## 2023-08-28 PROCEDURE — 4010F ACE/ARB THERAPY RXD/TAKEN: CPT | Mod: CPTII,S$GLB,, | Performed by: STUDENT IN AN ORGANIZED HEALTH CARE EDUCATION/TRAINING PROGRAM

## 2023-08-28 PROCEDURE — 99213 OFFICE O/P EST LOW 20 MIN: CPT | Mod: S$GLB,,, | Performed by: STUDENT IN AN ORGANIZED HEALTH CARE EDUCATION/TRAINING PROGRAM

## 2023-08-28 PROCEDURE — 99999 PR PBB SHADOW E&M-EST. PATIENT-LVL III: ICD-10-PCS | Mod: PBBFAC,,, | Performed by: STUDENT IN AN ORGANIZED HEALTH CARE EDUCATION/TRAINING PROGRAM

## 2023-08-28 PROCEDURE — 99999 PR PBB SHADOW E&M-EST. PATIENT-LVL III: CPT | Mod: PBBFAC,,, | Performed by: STUDENT IN AN ORGANIZED HEALTH CARE EDUCATION/TRAINING PROGRAM

## 2023-08-28 PROCEDURE — 1160F PR REVIEW ALL MEDS BY PRESCRIBER/CLIN PHARMACIST DOCUMENTED: ICD-10-PCS | Mod: CPTII,S$GLB,, | Performed by: STUDENT IN AN ORGANIZED HEALTH CARE EDUCATION/TRAINING PROGRAM

## 2023-08-28 PROCEDURE — 1125F AMNT PAIN NOTED PAIN PRSNT: CPT | Mod: CPTII,S$GLB,, | Performed by: STUDENT IN AN ORGANIZED HEALTH CARE EDUCATION/TRAINING PROGRAM

## 2023-08-28 PROCEDURE — 73130 XR HAND COMPLETE 3 VIEW LEFT: ICD-10-PCS | Mod: 26,LT,, | Performed by: RADIOLOGY

## 2023-08-28 PROCEDURE — 73130 X-RAY EXAM OF HAND: CPT | Mod: TC,PN,LT

## 2023-08-28 PROCEDURE — 4010F PR ACE/ARB THEARPY RXD/TAKEN: ICD-10-PCS | Mod: CPTII,S$GLB,, | Performed by: STUDENT IN AN ORGANIZED HEALTH CARE EDUCATION/TRAINING PROGRAM

## 2023-08-28 NOTE — PROGRESS NOTES
Patient ID: Shirlene Olvera  YOB: 1955  MRN: [id number]    Chief Complaint: Pain and Numbness of the Left Hand    Referred By: Lianna Kaiser NP    Occupation: licensed       History of Present Illness: Shirlene Olvera is a right-hand dominant 67 y.o. female who presents today with Pain and Numbness of the Left Hand    She complains of chronic left hand pain and mechanical catching of the 2nd and 3rd digits.  It hurts on the dorsal side of her MCP and her fingers occasionally lock in extension.  This is worse when the weather is cold.  She also reports numbness/parasthesia in the 4th and 5th digits and ulnar side of her hand and wrist.  She has not had any treatment for this problem.    Past Medical History:   Past Medical History:   Diagnosis Date    Arthritis     Back pain     Chest pain 05/14/2015    Colon polyp     Coronary artery disease     pt states MI June 2015    Disorder of kidney and ureter     Hyperlipidemia     Hypertension     Myocardial infarction     PAD (peripheral artery disease)     Polyneuropathy     Rash 04/20/2022    Tobacco dependence     Tobacco use      Past Surgical History:   Procedure Laterality Date    CHOLECYSTECTOMY  09/03/2015    COLONOSCOPY N/A 10/11/2016    Procedure: COLONOSCOPY;  Surgeon: Haseeb Spears MD;  Location: [facility];  Service: Endoscopy;  Laterality: N/A;    COLONOSCOPY N/A 12/09/2021    Procedure: COLONOSCOPY;  Surgeon: Pat Rios MD;  Location: [facility];  Service: Endoscopy;  Laterality: N/A;    ESOPHAGOGASTRODUODENOSCOPY N/A 12/09/2021    Procedure: EGD (ESOPHAGOGASTRODUODENOSCOPY);  Surgeon: Pat Rios MD;  Location: [facility];  Service: Endoscopy;  Laterality: N/A;    high blood      HYSTERECTOMY      INJECTION OF ANESTHETIC AGENT AROUND MEDIAL BRANCH NERVES INNERVATING CERVICAL FACET JOINT Bilateral 10/15/2021    Procedure: Bilateral C5-7 MBB with RN IV sedation PATIENT WOULD LIKE AFTERNOON ARRIVAL, IF  POSSIBLE;  Surgeon: Nano Izquierdo MD;  Location: [facility];  Service: Pain Management;  Laterality: Bilateral;    INJECTION OF ANESTHETIC AGENT AROUND MEDIAL BRANCH NERVES INNERVATING CERVICAL FACET JOINT Bilateral 03/21/2022    Procedure: Bilateral C4-6 MBB with RN IV sedation;  Surgeon: Nano Izquierdo MD;  [facility];  Service: Pain Management;  Laterality: Bilateral;    INJECTION OF ANESTHETIC AGENT INTO SACROILIAC JOINT Right 12/17/2021    Procedure: Right SIJ Injection;  Surgeon: Nano Izquierdo MD;  Location: [facility];  Service: Pain Management;  Laterality: Right;    INJECTION OF ANESTHETIC AGENT INTO SACROILIAC JOINT Right 6/27/2023    Procedure: Right SIJ Injection with local;  Surgeon: Nano Izquierdo MD;  [facility];  Service: Pain Management;  Laterality: Right;    LEFT HEART CATHETERIZATION Left 03/29/2022    Procedure: CATHETERIZATION, HEART, LEFT;  Surgeon: Lion Awad MD;  Location: [facility];  Service: Cardiology;  Laterality: Left;    OOPHORECTOMY      RADIOFREQUENCY THERMOCOAGULATION Right 12/27/2022    Procedure: Right C4-6 RFA with RN IV sedation;  Surgeon: Nano Izquierdo MD;  Location: [facility];  Service: Pain Management;  Laterality: Right;    RADIOFREQUENCY THERMOCOAGULATION Left 01/10/2023    Procedure: Left C4-6 RFA with RN IV sedation;  Surgeon: Nano Izquierdo MD;  Location: [facility];  Service: Pain Management;  Laterality: Left;    RIB FRACTURE SURGERY       Family History   Problem Relation Age of Onset    Heart attack Father 56        MI    Stroke Sister     Asthma Neg Hx     Thyroid disease Neg Hx     Migraines Neg Hx     Cancer Neg Hx      Social History     Socioeconomic History    Marital status:     Number of children: 3   Occupational History    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     Employer: [employer]   Tobacco Use    Smoking status: Former     Current packs/day: 0.50     Average packs/day: 0.5  packs/day for 30.0 years (15.0 ttl pk-yrs)     Types: Cigarettes     Start date: 8/21/1993    Smokeless tobacco: Never   Substance and Sexual Activity    Alcohol use: Not Currently    Drug use: No    Sexual activity: Not Currently     Partners: Male     Birth control/protection: None     Social Determinants of Health     Financial Resource Strain: Low Risk  (8/21/2023)    Overall Financial Resource Strain (CARDIA)     Difficulty of Paying Living Expenses: Not hard at all   Food Insecurity: No Food Insecurity (8/21/2023)    Hunger Vital Sign     Worried About Running Out of Food in the Last Year: Never true     Ran Out of Food in the Last Year: Never true   Transportation Needs: No Transportation Needs (8/21/2023)    PRAPARE - Transportation     Lack of Transportation (Medical): No     Lack of Transportation (Non-Medical): No   Physical Activity: Sufficiently Active (8/21/2023)    Exercise Vital Sign     Days of Exercise per Week: 7 days     Minutes of Exercise per Session: 40 min   Stress: Stress Concern Present (8/21/2023)    [place] [employer] - Occupational Stress Questionnaire     Feeling of Stress : To some extent   Social Connections: Socially Isolated (8/21/2023)    Social Connection and Isolation Panel [NHANES]     Frequency of Communication with Friends and Family: Three times a week     Frequency of Social Gatherings with Friends and Family: More than three times a week     Attends Nondenominational Services: Never     Active Member of Clubs or Organizations: No     Attends Club or Organization Meetings: Never     Marital Status:    Housing Stability: Low Risk  (8/21/2023)    Housing Stability Vital Sign     Unable to Pay for Housing in the Last Year: No     Number of Places Lived in the Last Year: 1     Unstable Housing in the Last Year: No     Medication List with Changes/Refills   Current Medications    ALBUTEROL (PROVENTIL) 2.5 MG /3 ML (0.083 %) NEBULIZER SOLUTION    Take 3 mLs  (2.5 mg total) by nebulization every 6 (six) hours as needed for Wheezing.    ASPIRIN 81 MG CHEW    Take 1 tablet (81 mg total) by mouth once daily.    CLOPIDOGREL (PLAVIX) 75 MG TABLET    Take 1 tablet (75 mg total) by mouth once daily.    CYANOCOBALAMIN (VITAMIN B-12) 1000 MCG TABLET    Take 100 mcg by mouth once daily.    EVOLOCUMAB (REPATHA SURECLICK) 140 MG/ML PNIJ    Inject 1 mL (140 mg total) into the skin every 14 (fourteen) days.    FLUOCINOLONE ACETONIDE OIL (DERMOTIC OIL) 0.01 % DROP    Place 3 drops in ear(s) 2 (two) times daily.    FLUTICASONE PROPIONATE (FLONASE) 50 MCG/ACTUATION NASAL SPRAY    2 sprays (100 mcg total) by Each Nostril route once daily.    FUROSEMIDE (LASIX) 20 MG TABLET    Take 1 tablet (20 mg total) by mouth once daily.    GABAPENTIN (NEURONTIN) 300 MG CAPSULE    Take 1 capsule (300 mg total) by mouth every morning AND 3 capsules (900 mg total) every evening.    HYDRALAZINE (APRESOLINE) 50 MG TABLET    Take 1 tablet (50 mg total) by mouth every 8 (eight) hours as needed (SBP>180 or DBP>100).    ISOSORBIDE MONONITRATE (IMDUR) 60 MG 24 HR TABLET    Take 1 tablet (60 mg total) by mouth every evening.    METOPROLOL TARTRATE (LOPRESSOR) 50 MG TABLET    Take 1 tablet (50 mg total) by mouth 2 (two) times daily.    MULTIVITAMIN WITH MINERALS TABLET    Take 1 tablet by mouth once daily.    NITROGLYCERIN (NITROSTAT) 0.4 MG SL TABLET    Place 1 tablet (0.4 mg total) under the tongue every 5 (five) minutes as needed for Chest pain.    ONDANSETRON (ZOFRAN-ODT) 4 MG TBDL    Take 1 tablet (4 mg total) by mouth every 8 (eight) hours as needed.    PANTOPRAZOLE (PROTONIX) 40 MG TABLET    Take 1 tablet (40 mg total) by mouth once daily.    TIZANIDINE (ZANAFLEX) 4 MG TABLET        UNABLE TO FIND    medication name: Medical marijuana    VALSARTAN (DIOVAN) 320 MG TABLET    Take 1 tablet (320 mg total) by mouth once daily.    VITAMIN D (VITAMIN D3) 1000 UNITS TAB    Take 1,000 Units by mouth once daily.      Review of patient's allergies indicates:   Allergen Reactions    Amlodipine Swelling    Statins-hmg-coa reductase inhibitors Other (See Comments)     Myalgias to lipitor and simvastatin       Physical Exam:   There is no height or weight on file to calculate BMI.    Physical Exam  Detailed MSK exam:     Left Hand:  No swelling, erythema or ecchymosis. Palmar nodules present 3rd and 4th ray, nontender. Palpable crepitus in joints throughout hand and wrist, particularly 2nd MCP.  Diminished sensation to light touch in ulnar nerve distribution of left hand - 4th, 5th fingers and ulnar forearm/wrist.  Motor function normal throughout hand.    Imaging:  X-Ray Hand 3 view Left  Narrative: EXAMINATION:  XR HAND COMPLETE 3 VIEW LEFT    CLINICAL HISTORY:  XR HAND COMPLETE 3 VIEW LEFTPain in left finger(s)    COMPARISON:  None    FINDINGS:  Four views of the left hand were obtained.    No evidence of acute fracture or dislocation.  Mild osteopenia and scattered osteoarthritis greatest at the 1st carpometacarpal joint as well as within the interphalangeal joints.  Soft tissues are unremarkable.   No erosions.  Impression: No acute fracture or dislocation.    Electronically signed by: Celio Bustos MD  Date:    08/28/2023  Time:    15:53    Relevant imaging results were reviewed and interpreted by me and per my read:  Changes throughout the left hand.  Significant CMC arthritis thumb, as well as throughout the other MCP and IP joints.  No erosive changes.  Normal alignment overall.  No fractures or other acute abnormalities.    This was discussed with the patient and / or family today.     Patient Instructions   Assessment:  Shirlene Olvera is a 67 y.o. female with a chief complaint of Pain and Numbness of the Left Hand    Encounter Diagnoses   Name Primary?    Osteoarthritis of finger of left hand Yes    Ulnar neuropathy of left upper extremity     Left hand pain       Plan:  XR reviewed - degenerative changes  throughout the left hand.    Labs reviewed - Cr 1.28, GFR 46, LFTs WNL  History and clinical exam is consistent with osteoarthritis related locking/stiffness of the MCP joints of the index and middle fingers of the left hand.  Also with what appears to be some sort of ulnar neuropathy, although unclear if it is more cubital tunnel vs wrist vs related to her underlying cervical degenerative disease.    Diagnosis, treatment options, prognosis discussed in detail.    Defer corticosteroid injection to the finger MCP joints at this time, as not very symptomatic, but as we progress into the winter, if these become more bothersome, can certainly consider injections at that time.    For OR neuropathy, can always consider injections at either the elbow or the wrist see if this alleviates her hand/forearm numbness/paresthesias, but these also may be related to her underlying cervical degenerative disease, for which she is following with pain management.  Please contact us after you discuss with Dr. Izquierdo regarding the plan for interventions for your neck, and we can discuss injecting your elbow vs wrist as needed    Follow-up:  As needed or sooner if there are any problems between now and then.    Thank you for choosing [facility] and Dr. Navarro Stevenson for your orthopedic & sports medicine care. It is our goal to provide you with exceptional care that will help keep you healthy, active, and get you back in the game.    Please do not hesitate to reach out to us via email, phone, or MyChart with any questions, concerns, or feedback.    If you are experiencing pain/discomfort ,or have questions after 5pm and would like to be connected to the [facility] on-call team, please call this number and specify which Sports Medicine provider is treating you: [phone]       A copy of today's visit note has been sent to the referring provider.           Navarro Stevenson,  MD  Primary Care Sports Medicine    Disclaimer: This note was prepared using a voice recognition system and is likely to have sound alike errors within the text.

## 2023-08-28 NOTE — PATIENT INSTRUCTIONS
Assessment:  Shirlene Olvera is a 67 y.o. female with a chief complaint of Pain and Numbness of the Left Hand    Encounter Diagnoses   Name Primary?    Osteoarthritis of finger of left hand Yes    Ulnar neuropathy of left upper extremity     Left hand pain       Plan:  XR reviewed - degenerative changes throughout the left hand.    Labs reviewed - Cr 1.28, GFR 46, LFTs WNL  History and clinical exam is consistent with osteoarthritis related locking/stiffness of the MCP joints of the index and middle fingers of the left hand.  Also with what appears to be some sort of ulnar neuropathy, although unclear if it is more cubital tunnel vs wrist vs related to her underlying cervical degenerative disease.    Diagnosis, treatment options, prognosis discussed in detail.    Defer corticosteroid injection to the finger MCP joints at this time, as not very symptomatic, but as we progress into the winter, if these become more bothersome, can certainly consider injections at that time.    For OR neuropathy, can always consider injections at either the elbow or the wrist see if this alleviates her hand/forearm numbness/paresthesias, but these also may be related to her underlying cervical degenerative disease, for which she is following with pain management.  Please contact us after you discuss with Dr. Izquierdo regarding the plan for interventions for your neck, and we can discuss injecting your elbow vs wrist as needed    Follow-up:  As needed or sooner if there are any problems between now and then.    Thank you for choosing [facility] and Dr. Navarro Stevenson for your orthopedic & sports medicine care. It is our goal to provide you with exceptional care that will help keep you healthy, active, and get you back in the game.    Please do not hesitate to reach out to us via email, phone, or MyChart with any questions, concerns, or feedback.    If you are experiencing pain/discomfort ,or have questions after  5pm and would like to be connected to the [facility] [place] on-call team, please call this number and specify which Sports Medicine provider is treating you: [phone]

## 2023-09-01 ENCOUNTER — PATIENT MESSAGE (OUTPATIENT)
Dept: PRIMARY CARE CLINIC | Facility: CLINIC | Age: 68
End: 2023-09-01
Payer: MEDICARE

## 2023-09-07 ENCOUNTER — TELEPHONE (OUTPATIENT)
Dept: PAIN MEDICINE | Facility: CLINIC | Age: 68
End: 2023-09-07
Payer: MEDICARE

## 2023-09-07 NOTE — TELEPHONE ENCOUNTER
----- Message from Hermes Miranda MD sent at 8/29/2023  2:59 PM CDT -----  I am okay with that.  ----- Message -----  From: Leidy Wilhelm MA  Sent: 8/29/2023  10:53 AM CDT  To: Hermes Miranda MD; Lianna Kaiser NP    Hello Providers,    Shirlene Courtney Olvera was seen in office with complaints of severe neck pain. Dr. Izquierdo would like to perform Cervical RFA, and Shirlene Olvera would like to proceed. Dr. Izquierdo is requesting for clearance to hold ASA 3 days and clopidogrel (Plavix) 5 days prior to procedure. Patient Shirlene Olvera can resume medication following the procedure.     Thank You,  Jenna KIRKLAND  [facility] Scheduler

## 2023-09-11 ENCOUNTER — TELEPHONE (OUTPATIENT)
Dept: PAIN MEDICINE | Facility: CLINIC | Age: 68
End: 2023-09-11
Payer: MEDICARE

## 2023-09-27 ENCOUNTER — HOSPITAL ENCOUNTER (OUTPATIENT)
Dept: RADIOLOGY | Facility: HOSPITAL | Age: 68
Discharge: HOME | End: 2023-09-27
Attending: INTERNAL MEDICINE
Payer: MEDICARE

## 2023-09-27 ENCOUNTER — OFFICE VISIT (OUTPATIENT)
Dept: GASTROENTEROLOGY | Facility: CLINIC | Age: 68
End: 2023-09-27
Payer: MEDICARE

## 2023-09-27 ENCOUNTER — OFFICE VISIT (OUTPATIENT)
Dept: OTOLARYNGOLOGY | Facility: CLINIC | Age: 68
End: 2023-09-27
Payer: MEDICARE

## 2023-09-27 VITALS
BODY MASS INDEX: 29.4 KG/M2 | HEART RATE: 60 BPM | HEIGHT: 64 IN | DIASTOLIC BLOOD PRESSURE: 71 MMHG | WEIGHT: 172.19 LBS | SYSTOLIC BLOOD PRESSURE: 155 MMHG

## 2023-09-27 VITALS — BODY MASS INDEX: 29.71 KG/M2 | WEIGHT: 173.06 LBS

## 2023-09-27 DIAGNOSIS — K59.04 CHRONIC IDIOPATHIC CONSTIPATION: ICD-10-CM

## 2023-09-27 DIAGNOSIS — K21.9 GASTROESOPHAGEAL REFLUX DISEASE, UNSPECIFIED WHETHER ESOPHAGITIS PRESENT: ICD-10-CM

## 2023-09-27 DIAGNOSIS — H60.8X3 CHRONIC ECZEMATOUS OTITIS EXTERNA OF BOTH EARS: ICD-10-CM

## 2023-09-27 DIAGNOSIS — K59.04 CHRONIC IDIOPATHIC CONSTIPATION: Primary | ICD-10-CM

## 2023-09-27 DIAGNOSIS — K21.9 GASTROESOPHAGEAL REFLUX DISEASE, UNSPECIFIED WHETHER ESOPHAGITIS PRESENT: Primary | ICD-10-CM

## 2023-09-27 PROCEDURE — 3288F PR FALLS RISK ASSESSMENT DOCUMENTED: ICD-10-PCS | Mod: HCNC,CPTII,S$GLB, | Performed by: ORTHOPAEDIC SURGERY

## 2023-09-27 PROCEDURE — 99999 PR PBB SHADOW E&M-EST. PATIENT-LVL IV: CPT | Mod: PBBFAC,HCNC,, | Performed by: ORTHOPAEDIC SURGERY

## 2023-09-27 PROCEDURE — 3077F PR MOST RECENT SYSTOLIC BLOOD PRESSURE >= 140 MM HG: ICD-10-PCS | Mod: HCNC,CPTII,S$GLB, | Performed by: INTERNAL MEDICINE

## 2023-09-27 PROCEDURE — 1159F PR MEDICATION LIST DOCUMENTED IN MEDICAL RECORD: ICD-10-PCS | Mod: HCNC,CPTII,S$GLB, | Performed by: ORTHOPAEDIC SURGERY

## 2023-09-27 PROCEDURE — 4010F PR ACE/ARB THEARPY RXD/TAKEN: ICD-10-PCS | Mod: HCNC,CPTII,S$GLB, | Performed by: ORTHOPAEDIC SURGERY

## 2023-09-27 PROCEDURE — 1125F AMNT PAIN NOTED PAIN PRSNT: CPT | Mod: HCNC,CPTII,S$GLB, | Performed by: ORTHOPAEDIC SURGERY

## 2023-09-27 PROCEDURE — 4010F ACE/ARB THERAPY RXD/TAKEN: CPT | Mod: HCNC,CPTII,S$GLB, | Performed by: INTERNAL MEDICINE

## 2023-09-27 PROCEDURE — 3008F BODY MASS INDEX DOCD: CPT | Mod: HCNC,CPTII,S$GLB, | Performed by: ORTHOPAEDIC SURGERY

## 2023-09-27 PROCEDURE — 1101F PT FALLS ASSESS-DOCD LE1/YR: CPT | Mod: HCNC,CPTII,S$GLB, | Performed by: ORTHOPAEDIC SURGERY

## 2023-09-27 PROCEDURE — 74018 RADEX ABDOMEN 1 VIEW: CPT | Mod: 26,HCNC,, | Performed by: RADIOLOGY

## 2023-09-27 PROCEDURE — 99213 OFFICE O/P EST LOW 20 MIN: CPT | Mod: HCNC,S$GLB,, | Performed by: ORTHOPAEDIC SURGERY

## 2023-09-27 PROCEDURE — 1160F PR REVIEW ALL MEDS BY PRESCRIBER/CLIN PHARMACIST DOCUMENTED: ICD-10-PCS | Mod: HCNC,CPTII,S$GLB, | Performed by: INTERNAL MEDICINE

## 2023-09-27 PROCEDURE — 3077F SYST BP >= 140 MM HG: CPT | Mod: HCNC,CPTII,S$GLB, | Performed by: INTERNAL MEDICINE

## 2023-09-27 PROCEDURE — 99999 PR PBB SHADOW E&M-EST. PATIENT-LVL IV: ICD-10-PCS | Mod: PBBFAC,HCNC,, | Performed by: ORTHOPAEDIC SURGERY

## 2023-09-27 PROCEDURE — 1126F AMNT PAIN NOTED NONE PRSNT: CPT | Mod: HCNC,CPTII,S$GLB, | Performed by: INTERNAL MEDICINE

## 2023-09-27 PROCEDURE — 74018 RADEX ABDOMEN 1 VIEW: CPT | Mod: TC,HCNC

## 2023-09-27 PROCEDURE — 4010F PR ACE/ARB THEARPY RXD/TAKEN: ICD-10-PCS | Mod: HCNC,CPTII,S$GLB, | Performed by: INTERNAL MEDICINE

## 2023-09-27 PROCEDURE — 3288F FALL RISK ASSESSMENT DOCD: CPT | Mod: HCNC,CPTII,S$GLB, | Performed by: INTERNAL MEDICINE

## 2023-09-27 PROCEDURE — 99999 PR PBB SHADOW E&M-EST. PATIENT-LVL V: CPT | Mod: PBBFAC,HCNC,, | Performed by: INTERNAL MEDICINE

## 2023-09-27 PROCEDURE — 99213 PR OFFICE/OUTPT VISIT, EST, LEVL III, 20-29 MIN: ICD-10-PCS | Mod: HCNC,S$GLB,, | Performed by: ORTHOPAEDIC SURGERY

## 2023-09-27 PROCEDURE — 1159F MED LIST DOCD IN RCRD: CPT | Mod: HCNC,CPTII,S$GLB, | Performed by: ORTHOPAEDIC SURGERY

## 2023-09-27 PROCEDURE — 1101F PR PT FALLS ASSESS DOC 0-1 FALLS W/OUT INJ PAST YR: ICD-10-PCS | Mod: HCNC,CPTII,S$GLB, | Performed by: INTERNAL MEDICINE

## 2023-09-27 PROCEDURE — 1101F PR PT FALLS ASSESS DOC 0-1 FALLS W/OUT INJ PAST YR: ICD-10-PCS | Mod: HCNC,CPTII,S$GLB, | Performed by: ORTHOPAEDIC SURGERY

## 2023-09-27 PROCEDURE — 4010F ACE/ARB THERAPY RXD/TAKEN: CPT | Mod: HCNC,CPTII,S$GLB, | Performed by: ORTHOPAEDIC SURGERY

## 2023-09-27 PROCEDURE — 74018 XR ABDOMEN AP 1 VIEW: ICD-10-PCS | Mod: 26,HCNC,, | Performed by: RADIOLOGY

## 2023-09-27 PROCEDURE — 3008F PR BODY MASS INDEX (BMI) DOCUMENTED: ICD-10-PCS | Mod: HCNC,CPTII,S$GLB, | Performed by: INTERNAL MEDICINE

## 2023-09-27 PROCEDURE — 3078F DIAST BP <80 MM HG: CPT | Mod: HCNC,CPTII,S$GLB, | Performed by: INTERNAL MEDICINE

## 2023-09-27 PROCEDURE — 3008F BODY MASS INDEX DOCD: CPT | Mod: HCNC,CPTII,S$GLB, | Performed by: INTERNAL MEDICINE

## 2023-09-27 PROCEDURE — 3288F FALL RISK ASSESSMENT DOCD: CPT | Mod: HCNC,CPTII,S$GLB, | Performed by: ORTHOPAEDIC SURGERY

## 2023-09-27 PROCEDURE — 99204 OFFICE O/P NEW MOD 45 MIN: CPT | Mod: HCNC,S$GLB,, | Performed by: INTERNAL MEDICINE

## 2023-09-27 PROCEDURE — 3008F PR BODY MASS INDEX (BMI) DOCUMENTED: ICD-10-PCS | Mod: HCNC,CPTII,S$GLB, | Performed by: ORTHOPAEDIC SURGERY

## 2023-09-27 PROCEDURE — 1126F PR PAIN SEVERITY QUANTIFIED, NO PAIN PRESENT: ICD-10-PCS | Mod: HCNC,CPTII,S$GLB, | Performed by: INTERNAL MEDICINE

## 2023-09-27 PROCEDURE — 1160F RVW MEDS BY RX/DR IN RCRD: CPT | Mod: HCNC,CPTII,S$GLB, | Performed by: INTERNAL MEDICINE

## 2023-09-27 PROCEDURE — 1159F PR MEDICATION LIST DOCUMENTED IN MEDICAL RECORD: ICD-10-PCS | Mod: HCNC,CPTII,S$GLB, | Performed by: INTERNAL MEDICINE

## 2023-09-27 PROCEDURE — 1159F MED LIST DOCD IN RCRD: CPT | Mod: HCNC,CPTII,S$GLB, | Performed by: INTERNAL MEDICINE

## 2023-09-27 PROCEDURE — 99999 PR PBB SHADOW E&M-EST. PATIENT-LVL V: ICD-10-PCS | Mod: PBBFAC,HCNC,, | Performed by: INTERNAL MEDICINE

## 2023-09-27 PROCEDURE — 1101F PT FALLS ASSESS-DOCD LE1/YR: CPT | Mod: HCNC,CPTII,S$GLB, | Performed by: INTERNAL MEDICINE

## 2023-09-27 PROCEDURE — 1125F PR PAIN SEVERITY QUANTIFIED, PAIN PRESENT: ICD-10-PCS | Mod: HCNC,CPTII,S$GLB, | Performed by: ORTHOPAEDIC SURGERY

## 2023-09-27 PROCEDURE — 99204 PR OFFICE/OUTPT VISIT, NEW, LEVL IV, 45-59 MIN: ICD-10-PCS | Mod: HCNC,S$GLB,, | Performed by: INTERNAL MEDICINE

## 2023-09-27 PROCEDURE — 3288F PR FALLS RISK ASSESSMENT DOCUMENTED: ICD-10-PCS | Mod: HCNC,CPTII,S$GLB, | Performed by: INTERNAL MEDICINE

## 2023-09-27 PROCEDURE — 3078F PR MOST RECENT DIASTOLIC BLOOD PRESSURE < 80 MM HG: ICD-10-PCS | Mod: HCNC,CPTII,S$GLB, | Performed by: INTERNAL MEDICINE

## 2023-09-27 RX ORDER — FLUOCINOLONE ACETONIDE 0.11 MG/ML
3 OIL AURICULAR (OTIC) 2 TIMES DAILY
Qty: 20 ML | Refills: 2 | Status: SHIPPED | OUTPATIENT
Start: 2023-09-27

## 2023-09-27 RX ORDER — MUPIROCIN 20 MG/G
OINTMENT TOPICAL 2 TIMES DAILY
Qty: 15 G | Refills: 3 | Status: SHIPPED | OUTPATIENT
Start: 2023-09-27 | End: 2023-10-07

## 2023-09-27 NOTE — ASSESSMENT & PLAN NOTE
Plan:   -Will order EGD with Bravo off of PPI  -Continue PPI PO BID for now. Further recommendations after Bravo

## 2023-09-27 NOTE — PROGRESS NOTES
Subjective:      Patient ID: Shirlene Olvera is a 68 y.o. female.    Chief Complaint: Ear Drainage (Both ears are draining , itchy , says at night she can feel it running out her ear , has occasional soreness , tinnitus ) and Other (Also wants you to look at her throat , she feels like it is closing at times to where she cant breath )    Patient is a 68 year old female seen today for evaluation of her ears.  She was last here one year ago, and at that time started on Dermotic due to dryness and itchiness of her ears.  She notes drainage from her ears at night.  She just started back using her ear drops, it does help but she has some sore spots in her ears that are tender as well.  She says that she does not note spots on her pillow or active drainage, her ears feel moist.  She follows with an outside GI doctor.  She has had increased reflux, wakes with feeling of choking at times.  FFL at last visit.          Review of Systems   HENT:  Positive for nosebleeds, postnasal drip and trouble swallowing.    Eyes:  Positive for photophobia and itching.   Respiratory:  Positive for wheezing.    Gastrointestinal:  Positive for abdominal pain and constipation.   Endocrine: Positive for cold intolerance.   Genitourinary:  Positive for difficulty urinating.   Musculoskeletal:  Positive for back pain and neck pain.   Skin: Negative.    Neurological:  Positive for dizziness and light-headedness.   Hematological:  Bruises/bleeds easily.   Psychiatric/Behavioral:  Positive for decreased concentration. The patient is nervous/anxious.        Objective:       Physical Exam  Constitutional:       General: She is not in acute distress.     Appearance: She is well-developed.   HENT:      Head: Normocephalic and atraumatic.      Right Ear: Tympanic membrane, ear canal and external ear normal.      Left Ear: Tympanic membrane, ear canal and external ear normal.      Ears:      Comments: Ear canals dry with clear drainage on left,  small fissure at superior aspect of EAC bilaterally     Nose: Nose normal. No nasal deformity, septal deviation, mucosal edema or rhinorrhea.      Right Sinus: No maxillary sinus tenderness or frontal sinus tenderness.      Left Sinus: No maxillary sinus tenderness or frontal sinus tenderness.      Mouth/Throat:      Mouth: Mucous membranes are not pale and not dry.      Dentition: No dental caries.      Pharynx: Uvula midline. No oropharyngeal exudate or posterior oropharyngeal erythema.   Eyes:      General: Lids are normal. No scleral icterus.     Extraocular Movements:      Right eye: Normal extraocular motion and no nystagmus.      Left eye: Normal extraocular motion and no nystagmus.      Conjunctiva/sclera: Conjunctivae normal.      Right eye: Right conjunctiva is not injected. No chemosis.     Left eye: Left conjunctiva is not injected. No chemosis.     Pupils: Pupils are equal, round, and reactive to light.   Neck:      Thyroid: No thyroid mass or thyromegaly.      Trachea: Trachea and phonation normal. No tracheal tenderness or tracheal deviation.   Pulmonary:      Effort: Pulmonary effort is normal. No respiratory distress.      Breath sounds: No stridor.   Abdominal:      General: There is no distension.   Lymphadenopathy:      Head:      Right side of head: No submental, submandibular, preauricular, posterior auricular or occipital adenopathy.      Left side of head: No submental, submandibular, preauricular, posterior auricular or occipital adenopathy.      Cervical: No cervical adenopathy.   Skin:     General: Skin is warm and dry.      Findings: No erythema or rash.   Neurological:      Mental Status: She is alert and oriented to person, place, and time.      Cranial Nerves: No cranial nerve deficit.   Psychiatric:         Behavior: Behavior normal.         Assessment:       1. Gastroesophageal reflux disease, unspecified whether esophagitis present    2. Chronic eczematous otitis externa of both  ears        Plan:     Gastroesophageal reflux disease, unspecified whether esophagitis present:  Has GERD, history of EGD and dilation in the past.  Symptomatic despite maximal medical therapy.  FFL here at last visit, will refer to [facility].  -     Ambulatory referral/consult to Gastroenterology; Future; Expected date: 10/04/2023    Chronic eczematous otitis externa of both ears  -     fluocinolone acetonide oiL (DERMOTIC OIL) 0.01 % Drop; Place 3 drops in ear(s) 2 (two) times daily.  Dispense: 20 mL; Refill: 2  -     mupirocin (BACTROBAN) 2 % ointment; Apply topically 2 (two) times daily. Apply to ear twice daily for 10 days  Dispense: 15 g; Refill: 3

## 2023-09-27 NOTE — ASSESSMENT & PLAN NOTE
Plan:   -Abdominal xray ordered   -Drink at least 64 ounces of water   -High fiber diet  -Further recommendations after abdominal xray

## 2023-09-27 NOTE — PROGRESS NOTES
Clinic Consult:  [facility] Consultation Note    Reason for Consult:  The primary encounter diagnosis was Chronic idiopathic constipation. A diagnosis of Gastroesophageal reflux disease, unspecified whether esophagitis present was also pertinent to this visit.    PCP: Hermes Miranda   [id number] [facility] / [place] [id number]    HPI:  This is a 68 y.o. female here for evaluation of GERD.   She has been taking Protonix 40mg PO BID for 7 months.   She has been experiencing GERD for almost two months.   She is still having symptoms.   She describes burning with everything she eats.   She wakes up in the middle of the night with symptoms.     She has been experiencing constipation.   She can have one bowel movement every couple of weeks.   She does not endorse water intake.   Low fiber diet.       ROS:  None       Medical History:   Past Medical History:   Diagnosis Date    Arthritis     Back pain     Chest pain 05/14/2015    Colon polyp     Coronary artery disease     pt states MI June 2015    Disorder of kidney and ureter     Hyperlipidemia     Hypertension     Myocardial infarction     PAD (peripheral artery disease)     Polyneuropathy     Rash 04/20/2022    Tobacco dependence     Tobacco use        Surgical History:  Past Surgical History:   Procedure Laterality Date    CHOLECYSTECTOMY  09/03/2015    COLONOSCOPY N/A 10/11/2016    Procedure: COLONOSCOPY;  Surgeon: Haseeb Spears MD;  Location: [facility];  Service: Endoscopy;  Laterality: N/A;    COLONOSCOPY N/A 12/09/2021    Procedure: COLONOSCOPY;  Surgeon: Pat Rios MD;  Location: [facility];  Service: Endoscopy;  Laterality: N/A;    ESOPHAGOGASTRODUODENOSCOPY N/A 12/09/2021    Procedure: EGD (ESOPHAGOGASTRODUODENOSCOPY);  Surgeon: Pat Rios MD;  Location: [facility];  Service: Endoscopy;  Laterality: N/A;    high blood      HYSTERECTOMY      INJECTION OF ANESTHETIC AGENT AROUND MEDIAL BRANCH NERVES INNERVATING CERVICAL  FACET JOINT Bilateral 10/15/2021    Procedure: Bilateral C5-7 MBB with RN IV sedation PATIENT WOULD LIKE AFTERNOON ARRIVAL, IF POSSIBLE;  Surgeon: Nano Izquierdo MD;  Location: [facility] PAIN [facility]GT;  Service: Pain Management;  Laterality: Bilateral;    INJECTION OF ANESTHETIC AGENT AROUND MEDIAL BRANCH NERVES INNERVATING CERVICAL FACET JOINT Bilateral 03/21/2022    Procedure: Bilateral C4-6 MBB with RN IV sedation;  Surgeon: Nano Izquierdo MD;  Location: [facility];  Service: Pain Management;  Laterality: Bilateral;    INJECTION OF ANESTHETIC AGENT INTO SACROILIAC JOINT Right 12/17/2021    Procedure: Right SIJ Injection;  Surgeon: Nano Izquierdo MD;  Location: [facility];  Service: Pain Management;  Laterality: Right;    INJECTION OF ANESTHETIC AGENT INTO SACROILIAC JOINT Right 6/27/2023    Procedure: Right SIJ Injection with local;  Surgeon: Nano Izquierdo MD;  Location: [facility];  Service: Pain Management;  Laterality: Right;    LEFT HEART CATHETERIZATION Left 03/29/2022    Procedure: CATHETERIZATION, HEART, LEFT;  Surgeon: Lion Awad MD;  Location: [facility] CATH LAB;  Service: Cardiology;  Laterality: Left;    OOPHORECTOMY      RADIOFREQUENCY THERMOCOAGULATION Right 12/27/2022    Procedure: Right C4-6 RFA with RN IV sedation;  Surgeon: Nano Izquierdo MD;  Location: [facility];  Service: Pain Management;  Laterality: Right;    RADIOFREQUENCY THERMOCOAGULATION Left 01/10/2023    Procedure: Left C4-6 RFA with RN IV sedation;  Surgeon: Nano Izquierdo MD;  Location: [facility]IN [facility];  Service: Pain Management;  Laterality: Left;    RIB FRACTURE SURGERY         Family History:   Family History   Problem Relation Age of Onset    Heart attack Father 56        MI    Stroke Sister     Asthma Neg Hx     Thyroid disease Neg Hx     Migraines Neg Hx     Cancer Neg Hx        Social History:   Social History     Tobacco Use    Smoking status: Former     Current packs/day: 0.50     Average packs/day: 0.5 packs/day for 30.1  years (15.1 ttl pk-yrs)     Types: Cigarettes     Start date: 8/21/1993    Smokeless tobacco: Never   Substance Use Topics    Alcohol use: Not Currently    Drug use: No       Allergies: Reviewed    Home Medications:   Medication List with Changes/Refills   Current Medications    ALBUTEROL (PROVENTIL) 2.5 MG /3 ML (0.083 %) NEBULIZER SOLUTION    Take 3 mLs (2.5 mg total) by nebulization every 6 (six) hours as needed for Wheezing.    ASPIRIN 81 MG CHEW    Take 1 tablet (81 mg total) by mouth once daily.    CLOPIDOGREL (PLAVIX) 75 MG TABLET    Take 1 tablet (75 mg total) by mouth once daily.    CYANOCOBALAMIN (VITAMIN B-12) 1000 MCG TABLET    Take 100 mcg by mouth once daily.    EVOLOCUMAB (REPATHA SURECLICK) 140 MG/ML PNIJ    Inject 1 mL (140 mg total) into the skin every 14 (fourteen) days.    FLUOCINOLONE ACETONIDE OIL (DERMOTIC OIL) 0.01 % DROP    Place 3 drops in ear(s) 2 (two) times daily.    FLUTICASONE PROPIONATE (FLONASE) 50 MCG/ACTUATION NASAL SPRAY    2 sprays (100 mcg total) by Each Nostril route once daily.    FUROSEMIDE (LASIX) 20 MG TABLET    Take 1 tablet (20 mg total) by mouth once daily.    GABAPENTIN (NEURONTIN) 300 MG CAPSULE    Take 1 capsule (300 mg total) by mouth every morning AND 3 capsules (900 mg total) every evening.    HYDRALAZINE (APRESOLINE) 50 MG TABLET    Take 1 tablet (50 mg total) by mouth every 8 (eight) hours as needed (SBP>180 or DBP>100).    ISOSORBIDE MONONITRATE (IMDUR) 60 MG 24 HR TABLET    Take 1 tablet (60 mg total) by mouth every evening.    METOPROLOL TARTRATE (LOPRESSOR) 50 MG TABLET    Take 1 tablet (50 mg total) by mouth 2 (two) times daily.    MULTIVITAMIN WITH MINERALS TABLET    Take 1 tablet by mouth once daily.    MUPIROCIN (BACTROBAN) 2 % OINTMENT    Apply topically 2 (two) times daily. Apply to ear twice daily for 10 days    NITROGLYCERIN (NITROSTAT) 0.4 MG SL TABLET    Place 1 tablet (0.4 mg total) under the tongue every 5 (five) minutes as needed for Chest pain.  "   ONDANSETRON (ZOFRAN-ODT) 4 MG TBDL    Take 1 tablet (4 mg total) by mouth every 8 (eight) hours as needed.    PANTOPRAZOLE (PROTONIX) 40 MG TABLET    Take 1 tablet (40 mg total) by mouth once daily.    TIZANIDINE (ZANAFLEX) 4 MG TABLET        UNABLE TO FIND    medication name: Medical marijuana    VALSARTAN (DIOVAN) 320 MG TABLET    Take 1 tablet (320 mg total) by mouth once daily.    VITAMIN D (VITAMIN D3) 1000 UNITS TAB    Take 1,000 Units by mouth once daily.         Physical Exam:  Vital Signs:  BP (!) 155/71   Pulse 60   Ht 5' 4" (1.626 m)   Wt 78.1 kg (172 lb 2.9 oz)   LMP  (LMP Unknown)   BMI 29.55 kg/m²   Body mass index is 29.55 kg/m².    Physical Exam  Constitutional:       Appearance: Normal appearance.   HENT:      Head: Normocephalic.      Mouth/Throat:      Mouth: Mucous membranes are moist.   Pulmonary:      Effort: Pulmonary effort is normal.   Abdominal:      General: There is no distension.      Palpations: Abdomen is soft.      Tenderness: There is no abdominal tenderness. There is no guarding.          Labs: Pertinent labs reviewed.        Assessment:  Problem List Items Addressed This Visit          GI    Gastroesophageal reflux disease    Current Assessment & Plan     Plan:   -Will order EGD with Bravo off of PPI  -Continue PPI PO BID for now. Further recommendations after Bravo          Chronic idiopathic constipation - Primary    Current Assessment & Plan     Plan:   -Abdominal xray ordered   -Drink at least 64 ounces of water   -High fiber diet  -Further recommendations after abdominal xray             Chronic idiopathic constipation  -     X-Ray Abdomen AP 1 View; Future; Expected date: 09/27/2023    Gastroesophageal reflux disease, unspecified whether esophagitis present  -     Ambulatory referral/consult to Gastroenterology  -     Case Request Endoscopy: PH MONITORING, ESOPHAGUS, WIRELESS, (ON REFLUX MEDS), EGD (ESOPHAGOGASTRODUODENOSCOPY)         Follow-up determined after " diagnostic evaluation.     Order summary:  Orders Placed This Encounter   Procedures    X-Ray Abdomen AP 1 View       Thank you so much for allowing me to participate in the care of Shirlene Mirza MD  Gastroenterology and Hepatology  [facility]

## 2023-09-28 ENCOUNTER — PATIENT MESSAGE (OUTPATIENT)
Dept: ENDOSCOPY | Facility: HOSPITAL | Age: 68
End: 2023-09-28
Payer: MEDICARE

## 2023-10-04 ENCOUNTER — PATIENT MESSAGE (OUTPATIENT)
Dept: GASTROENTEROLOGY | Facility: CLINIC | Age: 68
End: 2023-10-04
Payer: MEDICARE

## 2023-10-04 DIAGNOSIS — K59.04 CHRONIC IDIOPATHIC CONSTIPATION: Primary | ICD-10-CM

## 2023-10-05 ENCOUNTER — PATIENT MESSAGE (OUTPATIENT)
Dept: GASTROENTEROLOGY | Facility: CLINIC | Age: 68
End: 2023-10-05
Payer: MEDICARE

## 2023-10-16 ENCOUNTER — PATIENT MESSAGE (OUTPATIENT)
Dept: GASTROENTEROLOGY | Facility: CLINIC | Age: 68
End: 2023-10-16
Payer: MEDICARE

## 2023-10-18 ENCOUNTER — PATIENT MESSAGE (OUTPATIENT)
Dept: PAIN MEDICINE | Facility: CLINIC | Age: 68
End: 2023-10-18
Payer: MEDICARE

## 2023-10-18 ENCOUNTER — TELEPHONE (OUTPATIENT)
Dept: PAIN MEDICINE | Facility: CLINIC | Age: 68
End: 2023-10-18
Payer: MEDICARE

## 2023-10-18 NOTE — TELEPHONE ENCOUNTER
Called pt regarding appointment on 10/19 for injection follow up. Pt did not have procedure. Procedure scheduled for 11/14. Pt informed to hold Hold Plavix 5 days and Hold ASA 3 days per Dr. Izquierdo. Procedure instructions reviewed and sent via [employer]. Pt verbalized understanding. All questions answered.

## 2023-11-15 NOTE — PROGRESS NOTES
Established Patient Chronic Pain Note     The patient location is: home  The chief complaint leading to consultation is: neck and R hip pain  Visit type: Virtual visit with synchronous audio and video  Total time spent with patient: 11-15m  Each patient to whom he or she provides medical services by telemedicine is: (1) informed of the relationship between the physician and patient and the respective role of any other health care provider with respect to management of the patient; and (2) notified that he or she may decline to receive medical services by telemedicine and may withdraw from such care at any time.    Referring Physician: Clayton Frankel MD    PCP: Hermes Miranda MD      Chief Complaint:   Neck and R hip pain     SUBJECTIVE:  Interval history 11/16/2023  Patient presents for follow-up for right-sided hip/sacroiliac joint pain as well as bilateral neck pain.  Today patient reports pain which is constant which is rated an 8/10.  She reports pain overlying the right sacroiliac joint which radiates into the right hip joint.  Pain is exacerbated with positional changes, moving from sitting to standing and with standing and ambulation.  She denies more distal radiculopathy into the lower extremities or feet, weakness in the lower extremities.  Of note patient received greater than 80% relief following her injection with 50% relief 3 months following her procedure.  She would like to repeat right-sided intra-articular hip and sacroiliac joint injection.  Of note patient has continued 6 weeks of physician directed physical therapy exercises at home with marginal improvement in her hip pain.     She also reports bilateral axial neck pain.  Pain is constant and today is rated a 6/10.  She denies more distal radiculopathy into the upper extremities or hands or weakness in the upper extremities or compromise in hand  strength or dexterity.  Pain is exacerbated with cervical flexion, extension and lateral  flexion.  Of note patient received greater than 80% relief exceeding 6 months in duration with prior left-sided C4-6 RFA 01/10/2023 and right-sided C4-6 RFA 12/27/2022.  Patient has continued physician directed physical therapy exercises at home over the last 6 weeks with marginal improvement in axial neck pain.  Patient would like to repeat cervical radiofrequency ablation.      Interval History (8/24/2023): Shirlene Olvera presents today for follow-up visit.  she underwent right-sided sacroiliac joint injection on 6/27/23 (2 months ago).  The patient reports that she is/was better following the procedure.  she reports 65-80% pain relief initially, then pain relief wore off.  The changes lasted about 7 weeks, reporting only 40% pain relief today 8 weeks post.  The changes have continued through this visit.  Patient reports pain as 8/10 today.    She had right C4-6 RFA on 12/27/22 then left C4-6 RFA on 1/10/23 with 80% pain relief x >6 months. Pain has returned on left. During pain relief during this procedure, patient had improved ROM (which normally caused pain) and improvement of ADLs as compared to baseline (prior to RFA).    S/p L trapezius/rhomboid TPI in clinic 5/11/2023 with Dr. Izquierdo with some pain relief short term.    Interval history 05/11/2023  Patient presents for 2 month follow-up and for trigger point injection.  Today she reports maintained relief in neck pain following prior cervical radiofrequency ablation, on the right 12/27/2022 and on the left 01/10/2023.  Patient reports exacerbation of right-sided sacroiliitis.  Pain today is rated a 6/10.  Patient reports pain overlying right sacroiliac territory which radiates into the right groin.  90% of pain overlying the right sacroiliac territory.  Patient would like to repeat prior sacroiliac joint injection as she received 100% relief lasting almost 5 months in duration following her prior sacroiliac joint injection 12/27/2022.  Patient has  continued gabapentin 900 mg nightly and is requesting a refill on this medication.  Patient reports she is still not receive topical compound cream as of yet.      Of note patient is scheduled to receive left lower extremity angiogram with atherectomy with Dr. Miner followed by right leg angiogram 1 week following in a few weeks.     Interval Hx:  3/2/23  Patient presents for one-month follow-up.  She continues to report at least 50% sustained relief in bilateral axial neck pain following bilateral cervical radiofrequency ablation, right side 12/27/2022 and left-sided 01/10/2023.  Patient continues to report myofascial pain in the left trapezius and periscapular distribution.  Patient presents today for a trigger point injection.  Patient has continued gabapentin for neuropathic pain as well as tizanidine as needed for myofascial pain.  She reports she is scheduled to start physical therapy in [place] on March 7, 2023 for cervical traction exercises.  Today she denies more distal radiculopathy into the upper extremities or compromise in hand  strength or weakness in the upper extremities or hands.    Interval History (2/9/2023): Shirlene Olvera presents today for follow-up visit.  she underwent right C4-6 RFA on 12/27/22 then left C4-6 RFA on 1/10/23.  The patient reports that she is/was better following the procedure.  she reports about 50% pain relief.  The changes lasted 4 weeks so far.  The changes have continued through this visit.  Patient reports pain as 8/10 today. Pain is very localized in cervical muscular area on left.    Interval history 12/08/2022  At our last clinic visit, we discussed cervical radiofrequency ablation, not performed secondary to recent percutaneous angioplasty and necessity of remaining on anticoagulation.    Today patient again reports bilateral neck pain.  Pain again radiates to bilateral trapezius distribution in C5-6 dermatomal distribution.  She denies more distal  radiculopathy into the upper extremities or hands.  She denies weakness in the upper extremities or compromise in hand  strength or dexterity.  Pain is constant and today is rated an 8/10.  Pain is exacerbated with cervical flexion, extension and lateral flexion.  Patient again reiterates at least 50% relief following bilateral neck cervical medial branch block and she would like to move forward with radiofrequency ablation pending safety of pausing anticoagulation.  Patient has continued gabapentin 900 mg in the evening and is requesting a refill as she has run out of this medication.  Patient denies any side effects from this medication.    Interval history 04/20/2022  Patient presents that his post bilateral C4, C5, C6 medial branch block 03/21/2022.  Patient reports 50% sustained relief in bilateral neck pain following medial branch block.  Of note patient reports she recently had a myocardial infarction with PTCA 3 weeks prior.  Patient reports she has been started on Brilinta and continued on aspirin.  Patient would like to pursue interventional treatment but understands necessity of pausing Brilinta prior to cervical radiofrequency ablation.  Today patient denies radiation into the upper extremities, compromise in hand  strength or dexterity or upper extremity weakness.  Patient has continued gabapentin 300 mg in the evening and reports improvement in her pain.  She denies any side effects from this medication.    Interval history 03/10/2022  Patient presents status post right-sided sacroiliac joint injection 12/17/2021 and for review of bilateral hip XR.  Patient reports 100% relief and right sacroiliac territory following her injection.  Today she reports insidious return of bilateral neck pain, which today is rated as a 9/10.  Patient reports pain in bilateral cervical paraspinous territory.  She denies radiation into the upper extremities, compromise in hand  strength or dexterity.  Patient is  "requesting repeat cervical C4, C5-C6 medial branch block as she obtain greater than 4 months of relief with this procedure.  Patient has continued gabapentin 100 mg 3 times daily and was not able to fill be updated prescription.  She does believe this medication helps with her symptoms and she denies any side effects.    Interval history 11/11/2021  Patient presents status post bilateral medial branch block at C4, C5, C6 10/15/2021.  Patient reports 100% pain relief following her bilateral medial branch block which is still sustained.  Patient reports significant improvement in range of motion and reduced pain with cervical flexion, extension and lateral flexion.  Today patient primarily ports right-sided sacroiliac joint pain which radiates into the right buttock and right hip territory.  Pain today is rated as a 7/10 and is intermittent.  Pain is described as sharp in nature.  Pain is exacerbated when moving from sitting to standing and patient has positive Jason finger sign.  Patient has continued gabapentin 300 mg at night.  Patient reports when she increase this dose to 600, she just did not feel right.  She will maintain her dose of 300 mg. She denies any new onset lower extremity weakness, bowel or bladder incontinence or saddle anesthesia.    HPI:  10/06/2021  Shirlene Olvera is a 66 y.o. female with past medical history significant for ROMÁN, HTN, HLD, CAD s/p MI, PAD, CKD III, nicotine dependence who presents to the clinic for the evaluation of longstanding neck pain.  Today patient reports neck pain which began several years prior without inciting accident, injury or trauma.  Patient describes pain as intermittent and is a 7/10 today.  At its worst pain is a 10/10.  Pain is described as stiff and a "pins and needles" sensation.  Pain begins at her occiput and radiates down bilateral cervical paraspinous muscles into bilateral trapezius distributions in C4-6 distribution.  Pain is exacerbated with " cervical flexion, extension such as when she is gardening.  Pain is improved with Icy Hot.  Patient denies radicular symptoms into bilateral upper extremities, paresthesias or weakness in bilateral hands, compromise in hand  strength or dexterity.  Patient reports receiving prior cervical medial branch block several years prior with significant improvement in her symptoms.  Patient is currently taking gabapentin 100 mg 3 times daily without noticeable improvement in her symptoms.  Patient denies night fever/night sweats, urinary incontinence, bowel incontinence, significant weight loss, significant motor weakness and loss of sensations.    Pain Disability Index Review:      8/24/2023    10:55 AM 5/11/2023    10:38 AM 3/2/2023     8:36 AM   Last 3 PDI Scores   Pain Disability Index (PDI) 25 55 40       Non-Pharmacologic Treatments:  Physical Therapy/Home Exercise: no  Ice/Heat:yes  TENS: no  Acupuncture: no  Massage: no  Chiropractic: no    Other: no      Pain Medications:  - Adjuvant Medications: Neurontin (Gabapentin), Topical Ointment (Voltaren Gel, Steroid cream, Anti-Inflammatory Cream, Compound cream) and Tylenol (Acetaminophen)  - Anti-Coagulants: Aspirin    Pain Procedures:    -06/27/2023: right-sided sacroiliac joint injection with 80% pain relief initially for about 7 weeks, reporting 40% pain relief 8 weeks post  - 05/11/2023: L trapezius/rhomboid TPI in clinic with Dr. Izquierdo with good pain relief short-term  -01/10/2023: left C4-6 RFA  - 12/27/2022: right C4-6 RFA  with 80% pain relief for >6 months   - 03/21/2022:  Bilateral C4, C5, C6 medial branch block  - 12/17/2021:  Right-sided sacroiliac joint injection with 100% pain relief x 5 months   - 10/15/2021:  Bilateral medial branch block at C4, C5, C6; Dr. Izquierdo    Cervical MBB: several years prior -- externally      Review of Systems:  GENERAL:  No weight loss, malaise or fevers.  HEENT:   No recent changes in vision or hearing  NECK:  Negative for  lumps, no difficulty with swallowing.  RESPIRATORY:  Negative for cough, wheezing or shortness of breath, patient denies any recent URI.  CARDIOVASCULAR:  Negative for chest pain, leg swelling or palpitations.  GI:  Negative for abdominal discomfort, blood in stools or black stools or change in bowel habits.  MUSCULOSKELETAL:  See HPI.  SKIN:  Negative for lesions, rash, and itching.  PSYCH:  No mood disorder or recent psychosocial stressors.   HEMATOLOGY/LYMPHOLOGY:  Negative for prolonged bleeding, bruising easily or swollen nodes.    NEURO:   No history of headaches, syncope, paralysis, seizures or tremors.  All other reviewed and negative other than HPI.      OBJECTIVE:    Physical Exam:  There were no vitals filed for this visit.   There is no height or weight on file to calculate BMI.   (reviewed on 11/16/2023)    GENERAL: Well appearing, in no acute distress, alert and oriented x3.  PSYCH:  Mood and affect appropriate.  SKIN: Skin color, texture, turgor normal, no rashes or lesions.  HEAD/FACE:  Normocephalic, atraumatic.    PULM:  No obvious wheezing.    NECK:  pain to palpation over the cervical paraspinous muscles, L>R. TTP over left trapezius. Spurling Negative.  pain with neck flexion, extension, or lateral flexion.  Somewhat limited ROM.  Facet loading causes pain, much worse on the left.  Normal testing biceps, triceps and brachioradialis bilaterally.    Negative Antonio's bilaterally.    5/5 strength testing deltoid, biceps, triceps, wrist extensor, wrist flexor and ulnar intrinsics bilaterally.    Normal  strength bilaterally    SIJ testing: RIGHT  - TTP over SI joint: Present  - Alphonse's/ Bo's: Positive    - Sacroiliac Distraction Test (anterior pressure): Positive  - Sacroiliac Compression Test (lateral pressure): Positive     PULM: No evidence of respiratory difficulty, symmetric chest rise.  GI:  Soft and non-tender.    NEURO: BUE & BLE coordination and muscle stretch reflexes are  physiologic and symmetric. No loss of sensation is noted.  GAIT: normal.      Imaging (Reviewed on 11/16/2023):    Hip  XR 11/11/21  FINDINGS:  No acute fracture.  Hip joint spaces maintained with left greater than right acetabular degenerative findings.  Lower lumbar spine degenerative changes noted.  Soft tissues unremarkable.     08/05/12 MRI Lumbar Spine Without Contrast  Findings: Vertebral alignment is normal.  The conus ends at the level of  L1 and appears normal.  Intervertebral disks are well-hydrated and disk  spaces are maintained.  No compression fractures or acute osseous  abnormalities are seen.  There is no area of abnormal signal intensity  identified within the bone marrow.  Axial images are acquired through the  disk spaces from L1-2 through L5-S1.  There is no focal disk herniation,  canal or foraminal stenosis identified above the L4-5 level.  At L4 R. there is central disk protrusion minimally indenting the thecal  sac.  There is also some diffuse bulging of the disk and facet and  ligamentum flavum hypertrophy.  There is mild canal and bilateral  foraminal narrowing at this level.  At L5-S1 there is central bulging of the disk without canal or foraminal  stenosis.  Impression  Central disk protrusion at L4-5 with some canal and foraminal  narrowing due to combination of disk abnormality and degenerative change.  Please correlate above findings with the patient's clinical symptoms.    08/05/12 MRI Cervical Spine Without Contrast  Findings: The craniovertebral junction and vertebral alignment are  normal.  No abnormality of bone marrow signal intensity is seen.  Intervertebral disks are dessicated but disk spaces are maintained.  No  abnormality seen within the cervical spinal cord.  Axial images are  acquired through the disk spaces from C2-3 through C7-T1.  C2-3 disk space is normal.  The C3-4 disk space demonstrates spondylosis as well as facet and  uncinate hypertrophy.  There is mild canal  and bilateral foraminal  narrowing at this level.  The C4-5 disk space also demonstrates right paracentral spondylosis and  disk bulge with facet and uncinate hypertrophy on the right.  There is  mild canal and moderate right sided foraminal stenosis.  The C5-6 disk space does not demonstrate any significant disk herniation,  canal or foraminal stenosis.  The C6-7 disk space demonstrates minimal bulging of the disk but no canal  or foraminal narrowing.  The C7 T1 disk space is unremarkable.    Impression  Degenerative change and degenerative disk disease most  pronounced at the C3-4 and C4-5 level as discussed above.  Please  correlate with patient's clinical symptoms.       09/13/12 X-Ray Cervical Spine AP And Lateral  Findings: Vertebral alignment is normal.  There is moderate degenerative  change throughout the cervical spine with loss of disk height and  anterior osteophyte formation.  No acute osseous abnormalities are seen.  Postoperative findings from resection of the left first rib.      ASSESSMENT: 68 y.o. year old female with neck pain, R lower back pain, consistent with     1. Cervical spondylosis  Case Request-RAD/Other Procedure Area: Left C4-6 RFA with RN IV sedation    Case Request-RAD/Other Procedure Area: Right C4-6 RFA with RN IV sedation      2. Facet arthropathy, cervical        3. Sacroiliitis  Case Request-RAD/Other Procedure Area: Right SIJ and intra-articular hip Injection, Right Hip injection      4. Primary osteoarthritis of right hip  Case Request-RAD/Other Procedure Area: Right SIJ and intra-articular hip Injection, Right Hip injection            PLAN:   - Interventions:  Schedule for right-sided sacroiliac joint/intra-articular hip injection for sacroiliitis and hip osteoarthritis.  Of note patient has received greater than 80% relief with her following procedure.  Two weeks following schedule for left-sided C4-6 cervical radiofrequency ablation followed by right-sided 1 week following.   Of note patient received 80% relief exceeding 6 months in duration with prior cervical RFA.  We have discussed the above-mentioned procedures, benefits, potential risk and alternative options in detail.  Patient has elected to pursue these procedures.      - Anticoagulation use: yes aspirin and Plavix - secondary - Dr. Byers/ Dr. Mendoza (Cardiology)   Patient had myocardial infarction with PTCA 3/2022.    --patient can continue aspirin and Plavix for intra-articular hip and sacroiliac joint injection.  --patient can continue aspirin but will need to pause Plavix 5 days prior to cervical radiofrequency ablation.  Will obtain clearance from Dr. Mendoza, cardiology    - Medications:   reviewed and consistent with use    -Continue gabapentin 300 mg in AM and 900 mg QHS.  We have previously reviewed potential side effects of this medication including daytime somnolence, weight gain and peripheral edema    -D/C prescription compound cream: (priox Rx flurbiprofen 10%, baclofen 2%, cyclobenzaprine 2%, gabapentin 6%, orphenadrine 5%, tetracaine 2%) 2/2 financial burden       -Continue Zanaflex (tizanidine) 4mg QHS PRN. We have previously discussed potential deleterious side effects associated with this medication including  dizziness, drowsiness, dry mouth or tingling sensation in the upper or lower extremities.     - Therapy:   - We discussed continuing exercises at home learned at physical therapy to help manage the patient/s painful condition. The patient was counseled that muscle strengthening will improve the long term prognosis in regards to pain and may also help increase range of motion and mobility. Previously attended  PT/Dry Viktoria @ [facility]    - Imaging: Reviewed available imaging with patient and answered any questions they had regarding study.      - Follow up visit: 4-6 weeks post injections x 3    The above plan and management options were discussed at length with patient. Patient is in agreement  with the above and verbalized understanding.    - I discussed the goals of interventional chronic pain management with the patient on today's visit. We discussed a multimodal and systematic approach to pain.  This includes diagnostic and therapeutic injections, adjuvant pharmacologic treatment, physical therapy, and at times psychiatry.  I emphasized the importance of regular exercise, core strengthening and stretching, diet and weight loss as a cornerstone of long-term pain management.    - This condition does not require this patient to take time off of work, and the primary goal of our Pain Management services is to improve the patient's functional capacity.  - Patient Questions: Answered all of the patient's questions regarding diagnoses, therapy, treatment and next steps    Nano Izquierdo MD  Interventional Pain Management - [facility]    Disclaimer:  This note was prepared using voice recognition system and is likely to have sound alike errors that may have been overlooked even after proof reading.  Please call me with any questions.

## 2023-11-16 ENCOUNTER — OFFICE VISIT (OUTPATIENT)
Dept: PAIN MEDICINE | Facility: CLINIC | Age: 68
End: 2023-11-16
Payer: MEDICARE

## 2023-11-16 DIAGNOSIS — M46.1 SACROILIITIS: ICD-10-CM

## 2023-11-16 DIAGNOSIS — M16.11 PRIMARY OSTEOARTHRITIS OF RIGHT HIP: ICD-10-CM

## 2023-11-16 DIAGNOSIS — M47.812 CERVICAL SPONDYLOSIS: Primary | ICD-10-CM

## 2023-11-16 DIAGNOSIS — M47.812 FACET ARTHROPATHY, CERVICAL: ICD-10-CM

## 2023-11-16 PROCEDURE — 4010F PR ACE/ARB THEARPY RXD/TAKEN: ICD-10-PCS | Mod: HCNC,CPTII,95, | Performed by: ANESTHESIOLOGY

## 2023-11-16 PROCEDURE — 1159F MED LIST DOCD IN RCRD: CPT | Mod: HCNC,CPTII,95, | Performed by: ANESTHESIOLOGY

## 2023-11-16 PROCEDURE — 1160F PR REVIEW ALL MEDS BY PRESCRIBER/CLIN PHARMACIST DOCUMENTED: ICD-10-PCS | Mod: HCNC,CPTII,95, | Performed by: ANESTHESIOLOGY

## 2023-11-16 PROCEDURE — 99214 OFFICE O/P EST MOD 30 MIN: CPT | Mod: HCNC,95,, | Performed by: ANESTHESIOLOGY

## 2023-11-16 PROCEDURE — 99214 PR OFFICE/OUTPT VISIT, EST, LEVL IV, 30-39 MIN: ICD-10-PCS | Mod: HCNC,95,, | Performed by: ANESTHESIOLOGY

## 2023-11-16 PROCEDURE — 1160F RVW MEDS BY RX/DR IN RCRD: CPT | Mod: HCNC,CPTII,95, | Performed by: ANESTHESIOLOGY

## 2023-11-16 PROCEDURE — 1159F PR MEDICATION LIST DOCUMENTED IN MEDICAL RECORD: ICD-10-PCS | Mod: HCNC,CPTII,95, | Performed by: ANESTHESIOLOGY

## 2023-11-16 PROCEDURE — 4010F ACE/ARB THERAPY RXD/TAKEN: CPT | Mod: HCNC,CPTII,95, | Performed by: ANESTHESIOLOGY

## 2023-11-21 ENCOUNTER — TELEPHONE (OUTPATIENT)
Dept: PAIN MEDICINE | Facility: CLINIC | Age: 68
End: 2023-11-21
Payer: MEDICARE

## 2023-11-21 NOTE — TELEPHONE ENCOUNTER
----- Message from Pepe Byers MD sent at 11/17/2023  7:38 PM CST -----  Regarding: RE: Cardiac Clearance  Pt can hold clopidogrel (Plavix) 5 days prior to procedure.  ----- Message -----  From: Sondra Samuel MA  Sent: 11/17/2023  11:53 AM CST  To: Pepe Byers MD  Subject: Cardiac Clearance                                Dr. Byers:    Shirlene Olvera was seen in office with complaints of severe neck pain. Dr. Izquierdo would like to perform cervical radiofrequency ablation , and Shirlene Olvera would like to proceed. Dr. Izquierdo is requesting for clearance to hold clopidogrel (Plavix) 5 days prior to procedure. Patient Shirlene Courtneyhaider Olvera can resume medication following the procedure. Please let us know if it is ok to proceed with procedure.    .Sondra Samuel [facility] Surgery Scheduler

## 2023-11-21 NOTE — TELEPHONE ENCOUNTER
----- Message from Nano Izquierdo MD sent at 11/16/2023  9:47 AM CST -----    Pain Provider: Timbo  Patient Name: Shirlene Olvera  MRN: [id number]  Case: SACROILIAC JOINT INJECTION and HIP JOINT INJECTION  Laterality: right  Anticoagulation: Clopidogrel (Plavix) and ASA  -Can continue aspirin and Plavix for sacroiliac joint and hip joint injection.    Two weeks following schedule:        Pain Provider: Timbo  Patient Name: Shirlene Olvera  MRN: [id number]  Case: CERVICAL RFA  Level: C4, C5, and C6  Laterality: bilateral (schedule left and right side 1 week apart)  Anticoagulation: Clopidogrel (Plavix) and ASA  -can continue aspirin.  Must stop Plavix for 5 days prior to her procedure.  Please obtain cardiac clearance from Dr. Byers or Dr. Mendoza      -please schedule 4-6 week virtual follow-up following completion of all 3 procedures.  Thank you

## 2023-12-04 ENCOUNTER — PATIENT OUTREACH (OUTPATIENT)
Dept: ADMINISTRATIVE | Facility: HOSPITAL | Age: 68
End: 2023-12-04
Payer: MEDICARE

## 2023-12-04 NOTE — PROGRESS NOTES
HTN Report: Called Pt to obtain home BP reading, No answer, automated msg customer unavailable, try your call later. Schedule BP check in the event Pt shows up for Mammogram tomorrow.

## 2023-12-05 ENCOUNTER — TELEPHONE (OUTPATIENT)
Dept: PRIMARY CARE CLINIC | Facility: CLINIC | Age: 68
End: 2023-12-05
Payer: MEDICARE

## 2023-12-05 ENCOUNTER — CLINICAL SUPPORT (OUTPATIENT)
Dept: PRIMARY CARE CLINIC | Facility: CLINIC | Age: 68
End: 2023-12-05
Payer: MEDICARE

## 2023-12-05 VITALS — SYSTOLIC BLOOD PRESSURE: 122 MMHG | DIASTOLIC BLOOD PRESSURE: 78 MMHG

## 2023-12-05 DIAGNOSIS — I10 ESSENTIAL HYPERTENSION: Primary | ICD-10-CM

## 2023-12-05 NOTE — PROGRESS NOTES
Pt presents to the clinic for 2 week blood pressure check. Two pt identifications used.. Pt blood pressure was taken manually.  Blood pressure reading was 122/78. Provider notified. No distressed noted.

## 2023-12-05 NOTE — TELEPHONE ENCOUNTER
----- Message from Ariadne Das sent at 12/5/2023  7:28 AM CST -----  Pt would like to reschedule to 2:00 today if possible.  Call back number is [phone]. Thx.EL

## 2024-01-02 ENCOUNTER — HOSPITAL ENCOUNTER (OUTPATIENT)
Dept: RADIOLOGY | Facility: HOSPITAL | Age: 69
Discharge: HOME | End: 2024-01-02
Attending: FAMILY MEDICINE
Payer: MEDICARE

## 2024-01-02 DIAGNOSIS — Z12.31 BREAST CANCER SCREENING BY MAMMOGRAM: ICD-10-CM

## 2024-01-02 PROCEDURE — 77063 BREAST TOMOSYNTHESIS BI: CPT | Mod: 26,HCNC,, | Performed by: RADIOLOGY

## 2024-01-02 PROCEDURE — 77067 SCR MAMMO BI INCL CAD: CPT | Mod: TC,HCNC,PO

## 2024-01-02 PROCEDURE — 77067 SCR MAMMO BI INCL CAD: CPT | Mod: 26,HCNC,, | Performed by: RADIOLOGY

## 2024-01-09 NOTE — PRE-PROCEDURE INSTRUCTIONS
Spoke with patient regarding procedure scheduled on 1.19     Arrival time 0615     Has patient been sick with fever or on antibiotics within the last 7 days? No     Does the patient have any open wounds, sores or rashes? No     Does the patient have any recent fractures? no     Has patient received a vaccination within the last 7 days? No     Received the COVID vaccination? yes     Has the patient stopped all medications as directed? na     Does patient have a pacemaker, defibrillator, or implantable stimulator? No     Does the patient have a ride to and from procedure and someone reliable to remain with patient?  GRANDDAUGHTER    Is the patient diabetic? no     Does the patient have sleep apnea? Or use O2 at home? No and no     Is the patient receiving sedation? yes     Is the patient instructed to remain NPO beginning at midnight the night before their procedure? yes     Procedure location confirmed with patient? Yes     Covid- Denies signs/symptoms. Instructed to notify PAT/MD if any changes.

## 2024-01-19 ENCOUNTER — HOSPITAL ENCOUNTER (OUTPATIENT)
Facility: HOSPITAL | Age: 69
Discharge: HOME | End: 2024-01-19
Attending: ANESTHESIOLOGY | Admitting: ANESTHESIOLOGY
Payer: MEDICARE

## 2024-01-19 VITALS
DIASTOLIC BLOOD PRESSURE: 60 MMHG | HEART RATE: 51 BPM | BODY MASS INDEX: 29.3 KG/M2 | HEIGHT: 64 IN | WEIGHT: 171.63 LBS | TEMPERATURE: 97 F | SYSTOLIC BLOOD PRESSURE: 125 MMHG | RESPIRATION RATE: 18 BRPM | OXYGEN SATURATION: 97 %

## 2024-01-19 DIAGNOSIS — M16.11 PRIMARY OSTEOARTHRITIS OF RIGHT HIP: ICD-10-CM

## 2024-01-19 DIAGNOSIS — M46.1 SACROILIITIS: ICD-10-CM

## 2024-01-19 PROCEDURE — 20610 DRAIN/INJ JOINT/BURSA W/O US: CPT | Mod: RT | Performed by: ANESTHESIOLOGY

## 2024-01-19 PROCEDURE — 25000003 PHARM REV CODE 250: Performed by: ANESTHESIOLOGY

## 2024-01-19 PROCEDURE — 27096 INJECT SACROILIAC JOINT: CPT | Mod: RT,,, | Performed by: ANESTHESIOLOGY

## 2024-01-19 PROCEDURE — 27096 INJECT SACROILIAC JOINT: CPT | Mod: RT | Performed by: ANESTHESIOLOGY

## 2024-01-19 PROCEDURE — 25500020 PHARM REV CODE 255: Performed by: ANESTHESIOLOGY

## 2024-01-19 PROCEDURE — 63600175 PHARM REV CODE 636 W HCPCS: Mod: JZ,JG | Performed by: ANESTHESIOLOGY

## 2024-01-19 PROCEDURE — 20610 DRAIN/INJ JOINT/BURSA W/O US: CPT | Mod: 59,RT,, | Performed by: ANESTHESIOLOGY

## 2024-01-19 RX ORDER — INDOMETHACIN 25 MG/1
CAPSULE ORAL
Status: DISCONTINUED | OUTPATIENT
Start: 2024-01-19 | End: 2024-01-19 | Stop reason: HOSPADM

## 2024-01-19 RX ORDER — BUPIVACAINE HYDROCHLORIDE 2.5 MG/ML
INJECTION, SOLUTION EPIDURAL; INFILTRATION; INTRACAUDAL
Status: DISCONTINUED | OUTPATIENT
Start: 2024-01-19 | End: 2024-01-19 | Stop reason: HOSPADM

## 2024-01-19 RX ORDER — MIDAZOLAM HYDROCHLORIDE 1 MG/ML
INJECTION, SOLUTION INTRAMUSCULAR; INTRAVENOUS
Status: DISCONTINUED | OUTPATIENT
Start: 2024-01-19 | End: 2024-01-19 | Stop reason: HOSPADM

## 2024-01-19 RX ORDER — FENTANYL CITRATE 50 UG/ML
INJECTION, SOLUTION INTRAMUSCULAR; INTRAVENOUS
Status: DISCONTINUED | OUTPATIENT
Start: 2024-01-19 | End: 2024-01-19 | Stop reason: HOSPADM

## 2024-01-19 RX ORDER — TRIAMCINOLONE ACETONIDE 40 MG/ML
INJECTION, SUSPENSION INTRA-ARTICULAR; INTRAMUSCULAR
Status: DISCONTINUED | OUTPATIENT
Start: 2024-01-19 | End: 2024-01-19 | Stop reason: HOSPADM

## 2024-01-19 NOTE — DISCHARGE SUMMARY
Discharge Note  Short Stay      SUMMARY     Admit Date: 1/19/2024    Attending Physician: Nano Izquierdo MD        Discharge Physician: Nano Izquierdo MD        Discharge Date: 1/19/2024 7:49 AM    Procedure(s) (LRB):  Right SIJ and intra-articular hip Injection (Right)  Right Hip injection (Right)    Final Diagnosis: Sacroiliitis [M46.1]  Primary osteoarthritis of right hip [M16.11]    Disposition: Home or self care    Patient Instructions:   Current Discharge Medication List        CONTINUE these medications which have NOT CHANGED    Details   albuterol (PROVENTIL) 2.5 mg /3 mL (0.083 %) nebulizer solution Take 3 mLs (2.5 mg total) by nebulization every 6 (six) hours as needed for Wheezing.  Qty: 1 each, Refills: 0    Associated Diagnoses: Bronchitis, acute, with bronchospasm; Wheezing      aspirin 81 MG Chew Take 1 tablet (81 mg total) by mouth once daily.  Qty: 360 tablet, Refills: 1    Associated Diagnoses: Coronary artery disease involving native coronary artery of native heart without angina pectoris; PAD (peripheral artery disease)      clopidogreL (PLAVIX) 75 mg tablet Take 1 tablet (75 mg total) by mouth once daily.  Qty: 90 tablet, Refills: 3    Associated Diagnoses: Coronary artery disease involving native coronary artery of native heart without angina pectoris; PAD (peripheral artery disease)      cyanocobalamin (VITAMIN B-12) 1000 MCG tablet Take 100 mcg by mouth once daily.      fluticasone propionate (FLONASE) 50 mcg/actuation nasal spray 2 sprays (100 mcg total) by Each Nostril route once daily.  Qty: 48 g, Refills: 3    Associated Diagnoses: Allergic rhinitis, unspecified seasonality, unspecified trigger      furosemide (LASIX) 20 MG tablet Take 1 tablet (20 mg total) by mouth once daily.  Qty: 90 tablet, Refills: 3    Associated Diagnoses: Essential hypertension; Coronary artery disease involving native coronary artery of native heart without angina pectoris      gabapentin (NEURONTIN) 300 MG  capsule Take 1 capsule (300 mg total) by mouth every morning AND 3 capsules (900 mg total) every evening.  Qty: 360 capsule, Refills: 3    Associated Diagnoses: Burning sensation of feet; Cervical radiculopathy; Lumbar radiculopathy      isosorbide mononitrate (IMDUR) 60 MG 24 hr tablet Take 1 tablet (60 mg total) by mouth every evening.  Qty: 90 tablet, Refills: 3    Associated Diagnoses: Essential hypertension      metoprolol tartrate (LOPRESSOR) 50 MG tablet Take 1 tablet (50 mg total) by mouth 2 (two) times daily.  Qty: 180 tablet, Refills: 3    Comments: .  Associated Diagnoses: Essential hypertension      multivitamin with minerals tablet Take 1 tablet by mouth once daily.      ondansetron (ZOFRAN-ODT) 4 MG TbDL Take 1 tablet (4 mg total) by mouth every 8 (eight) hours as needed.  Qty: 1 tablet, Refills: 0      pantoprazole (PROTONIX) 40 MG tablet Take 1 tablet (40 mg total) by mouth once daily.  Qty: 90 tablet, Refills: 3    Associated Diagnoses: Gastroesophageal reflux disease with esophagitis without hemorrhage      tiZANidine (ZANAFLEX) 4 MG tablet       valsartan (DIOVAN) 320 MG tablet Take 1 tablet (320 mg total) by mouth once daily.  Qty: 90 tablet, Refills: 3    Comments: .  Associated Diagnoses: Essential hypertension      vitamin D (VITAMIN D3) 1000 units Tab Take 1,000 Units by mouth once daily.      fluocinolone acetonide oiL (DERMOTIC OIL) 0.01 % Drop Place 3 drops in ear(s) 2 (two) times daily.  Qty: 20 mL, Refills: 2    Associated Diagnoses: Chronic eczematous otitis externa of both ears      hydrALAZINE (APRESOLINE) 50 MG tablet Take 1 tablet (50 mg total) by mouth every 8 (eight) hours as needed (SBP>180 or DBP>100).  Qty: 90 tablet, Refills: 3    Comments: .  Associated Diagnoses: Essential hypertension      nitroGLYCERIN (NITROSTAT) 0.4 MG SL tablet Place 1 tablet (0.4 mg total) under the tongue every 5 (five) minutes as needed for Chest pain.  Qty: 25 tablet, Refills: 0      UNABLE TO FIND  medication name: Medical marijuana                 Discharge Diagnosis: Sacroiliitis [M46.1]  Primary osteoarthritis of right hip [M16.11]  Condition on Discharge: Stable with no complications to procedure   Diet on Discharge: Same as before.  Activity: as per instruction sheet.  Discharge to: Home with a responsible adult.  Follow up: 2-4 weeks       Please call the office at [phone] if you experience any weakness or loss of sensation, fever > 101.5, pain uncontrolled with oral medications, persistent nausea/vomiting/or diarrhea, redness or drainage from the incisions, or any other worrisome concerns. If physician on call was not reached or could not communicate with our office for any reason please go to the nearest emergency department

## 2024-01-19 NOTE — DISCHARGE INSTRUCTIONS

## 2024-01-19 NOTE — H&P
HPI  Patient presenting for Procedure(s) (LRB):  Right SIJ and intra-articular hip Injection (Right)  Right Hip injection (Right)     Patient on Anti-coagulation No    No health changes since previous encounter    Past Medical History:   Diagnosis Date    Arthritis     Back pain     Chest pain 05/14/2015    Colon polyp     Coronary artery disease     pt states MI June 2015    Disorder of kidney and ureter     Hyperlipidemia     Hypertension     Myocardial infarction     PAD (peripheral artery disease)     Polyneuropathy     Rash 04/20/2022    Tobacco dependence     Tobacco use      Past Surgical History:   Procedure Laterality Date    CHOLECYSTECTOMY  09/03/2015    COLONOSCOPY N/A 10/11/2016    Procedure: COLONOSCOPY;  Surgeon: Haseeb Spears MD;  Location: [facility];  Service: Endoscopy;  Laterality: N/A;    COLONOSCOPY N/A 12/09/2021    Procedure: COLONOSCOPY;  Surgeon: Pat Rios MD;  Location: [facility];  Service: Endoscopy;  Laterality: N/A;    ESOPHAGOGASTRODUODENOSCOPY N/A 12/09/2021    Procedure: EGD (ESOPHAGOGASTRODUODENOSCOPY);  Surgeon: Pat Rios MD;  Location: [facility];  Service: Endoscopy;  Laterality: N/A;    high blood      HYSTERECTOMY      INJECTION OF ANESTHETIC AGENT AROUND MEDIAL BRANCH NERVES INNERVATING CERVICAL FACET JOINT Bilateral 10/15/2021    Procedure: Bilateral C5-7 MBB with RN IV sedation PATIENT WOULD LIKE AFTERNOON ARRIVAL, IF POSSIBLE;  Surgeon: Nano Izquierdo MD;  Location: [facility] PAIN T;  Service: Pain Management;  Laterality: Bilateral;    INJECTION OF ANESTHETIC AGENT AROUND MEDIAL BRANCH NERVES INNERVATING CERVICAL FACET JOINT Bilateral 03/21/2022    Procedure: Bilateral C4-6 MBB with RN IV sedation;  Surgeon: Nano Izquierdo MD;  Location: [facility] PAIN MGT;  Service: Pain Management;  Laterality: Bilateral;    INJECTION OF ANESTHETIC AGENT INTO SACROILIAC JOINT Right 12/17/2021    Procedure: Right SIJ Injection;  Surgeon: Nano Izquierdo MD;  Location: [facility]  PAIN MGT;  Service: Pain Management;  Laterality: Right;    INJECTION OF ANESTHETIC AGENT INTO SACROILIAC JOINT Right 06/27/2023    Procedure: Right SIJ Injection with local;  Surgeon: Nano Izquierdo MD;  Location: [facility] PAIN MGT;  Service: Pain Management;  Laterality: Right;    LEFT HEART CATHETERIZATION Left 03/29/2022    Procedure: CATHETERIZATION, HEART, LEFT;  Surgeon: Lion Awad MD;  Location: [facility] CATH LAB;  Service: Cardiology;  Laterality: Left;    OOPHORECTOMY      RADIOFREQUENCY THERMOCOAGULATION Right 12/27/2022    Procedure: Right C4-6 RFA with RN IV sedation;  Surgeon: Nano Izquierdo MD;  Location: [facility] PAIN MGT;  Service: Pain Management;  Laterality: Right;    RADIOFREQUENCY THERMOCOAGULATION Left 01/10/2023    Procedure: Left C4-6 RFA with RN IV sedation;  Surgeon: Nano Izquierdo MD;  Location: [facility] PAIN MGT;  Service: Pain Management;  Laterality: Left;    RIB FRACTURE SURGERY       Review of patient's allergies indicates:   Allergen Reactions    Amlodipine Swelling    Statins-hmg-coa reductase inhibitors Other (See Comments)     Myalgias to lipitor and simvastatin        No current facility-administered medications on file prior to encounter.     Current Outpatient Medications on File Prior to Encounter   Medication Sig Dispense Refill    albuterol (PROVENTIL) 2.5 mg /3 mL (0.083 %) nebulizer solution Take 3 mLs (2.5 mg total) by nebulization every 6 (six) hours as needed for Wheezing. 1 each 0    aspirin 81 MG Chew Take 1 tablet (81 mg total) by mouth once daily. 360 tablet 1    clopidogreL (PLAVIX) 75 mg tablet Take 1 tablet (75 mg total) by mouth once daily. 90 tablet 3    cyanocobalamin (VITAMIN B-12) 1000 MCG tablet Take 100 mcg by mouth once daily.      fluticasone propionate (FLONASE) 50 mcg/actuation nasal spray 2 sprays (100 mcg total) by Each Nostril route once daily. 48 g 3    furosemide (LASIX) 20 MG tablet Take 1 tablet (20 mg total) by mouth once daily. 90 tablet 3    gabapentin  "(NEURONTIN) 300 MG capsule Take 1 capsule (300 mg total) by mouth every morning AND 3 capsules (900 mg total) every evening. 360 capsule 3    isosorbide mononitrate (IMDUR) 60 MG 24 hr tablet Take 1 tablet (60 mg total) by mouth every evening. 90 tablet 3    metoprolol tartrate (LOPRESSOR) 50 MG tablet Take 1 tablet (50 mg total) by mouth 2 (two) times daily. 180 tablet 3    multivitamin with minerals tablet Take 1 tablet by mouth once daily.      ondansetron (ZOFRAN-ODT) 4 MG TbDL Take 1 tablet (4 mg total) by mouth every 8 (eight) hours as needed. 1 tablet 0    pantoprazole (PROTONIX) 40 MG tablet Take 1 tablet (40 mg total) by mouth once daily. 90 tablet 3    tiZANidine (ZANAFLEX) 4 MG tablet       valsartan (DIOVAN) 320 MG tablet Take 1 tablet (320 mg total) by mouth once daily. 90 tablet 3    vitamin D (VITAMIN D3) 1000 units Tab Take 1,000 Units by mouth once daily.      fluocinolone acetonide oiL (DERMOTIC OIL) 0.01 % Drop Place 3 drops in ear(s) 2 (two) times daily. 20 mL 2    hydrALAZINE (APRESOLINE) 50 MG tablet Take 1 tablet (50 mg total) by mouth every 8 (eight) hours as needed (SBP>180 or DBP>100). 90 tablet 3    nitroGLYCERIN (NITROSTAT) 0.4 MG SL tablet Place 1 tablet (0.4 mg total) under the tongue every 5 (five) minutes as needed for Chest pain. 25 tablet 0    UNABLE TO FIND medication name: Medical marijuana      [DISCONTINUED] valsartan-hydrochlorothiazide (DIOVAN-HCT) 160-25 mg per tablet Take 1 tablet by mouth once daily. 90 tablet 1        PMHx, PSHx, Allergies, Medications reviewed in epic    ROS negative except pain complaints in HPI    OBJECTIVE:    BP (!) 147/71 (BP Location: Right arm, Patient Position: Sitting)   Pulse (!) 59   Temp 97.1 °F (36.2 °C) (Temporal)   Resp 18   Ht 5' 4" (1.626 m)   Wt 77.9 kg (171 lb 10.1 oz)   LMP  (LMP Unknown)   SpO2 96%   Breastfeeding No   BMI 29.46 kg/m²     PHYSICAL EXAMINATION:    GENERAL: Well appearing, in no acute distress, alert and " oriented x3.  PSYCH:  Mood and affect appropriate.  SKIN: Skin color, texture, turgor normal, no rashes or lesions which will impact the procedure.  CV: RRR with palpation of the radial artery.  PULM: No evidence of respiratory difficulty, symmetric chest rise. Clear to auscultation.  NEURO: Cranial nerves grossly intact.    Plan:    Proceed with procedure as planned Procedure(s) (LRB):  Right SIJ and intra-articular hip Injection (Right)  Right Hip injection (Right)    Nano Izquierdo MD  01/19/2024

## 2024-01-19 NOTE — OP NOTE
Shirlene Olvera    [id number]      Vitals:    01/19/24 0746   BP: 130/60   Pulse: (!) 57   Resp: (!) 8   Temp:        Procedure Date: 01/19/2024          INFORMED CONSENT: The procedure, risks, benefits and options were discussed with patient. There are no contraindications to the procedure. The patient expressed understanding and agreed to proceed. The personnel performing the procedure was discussed. I verify that I personally obtained consent prior to the start of the procedure and the signed consent can be found on the patient's chart.       Anesthesia:   Conscious sedation provided by M.D    The patient was monitored with continuous pulse oximetry, EKG, and intermittent blood pressure monitors.  The patient was hemodynamically stable throughout the entire process was responsive to voice, and breathing spontaneously.  Supplemental O2 was provided at 2L/min via nasal cannula.  Patient was comfortable for the duration of the procedure. (See nurse documentation and case log for sedation time)    There was a total of 1mg IV Midazolam and 25mcg Fentanyl titrated for the procedure    Pre Procedure diagnosis: Sacroiliitis [M46.1]  Primary osteoarthritis of right hip [M16.11]  Post-Procedure diagnosis: SAME      PROCEDURE:  1) Right sacroiliac joint injection           2) Right intra-articular hip injection                          REASON FOR PROCEDURE:   Sacroiliitis [M46.1]  Hip Osteoarthritis [M16.0]      MEDICATIONS INJECTED: 1mL 40mg/ml Kenalog and 4mL Bupivacaine 0.25% into each site    LOCAL ANESTHETIC USED: Xylocaine 1% 6ml     ESTIMATED BLOOD LOSS: None.   COMPLICATIONS: None.     TECHNIQUE:   Procedure: Hip (acetabulofemoral) joint injection under fluoroscopic guidance    Side: right    The site and side of the procedure was identified and marked. The patient was taken to the procedural suite and positioned on the table in supine orientation. A time out was performed. All in attendance were in agreement with  the time out. The area over the above noted joint/s was widely prepped with ChloraPrep and drapped in usual sterile fashion.    The above noted joint/s was identified on fluoroscopy. A 27-gauge 1.5 inch needle was used to localize the site of entry with 3 mL of 1% PF Lidocaine. A 22 gauge 3.5 inch spinal needle was then introduced and advanced towards the neck of the femur. The target site was approximately 0.5 to 1.0 cm distal to the lateral border of the femoral head and 0.5 to 1.0 cm below the superior aspect of the femoral neck. The needle was advanced until osseus interface was met. Following negative aspiration, a solution containing 4 mL of 0.25% Bupivacaine and 1 mL of Triamcinolone (40 mg/mL) was then injected. There was minimal resistance encountered throughout injection. No paresthesias were noted on injection. The needle stylet was replaced and the needle was removed intact. The patient tolerated the procedure well. A bandage was applied to the site of injection.      Sacroiliac joint injection:  Remaining in the prone position, the patient was prepped and draped in the usual sterile fashion using ChloraPrep and fenestrated drape.  The area was determined under fluoroscopy.  Local Xylocaine was injected by raising a wheel and going down to the periosteum using a 27-gauge hypodermic needle.  The 3.5 inch 22-gauge spinal needle was introduce into the Right sacroiliac joint.  Negative pressure applied to confirm no intravascular placement.  Omnipaque was injected to confirm placement and to confirm that there was no vascular runoff.  The medication was then injected slowly.  The patient tolerated the procedure well.                       The patient was monitored for approximately 30 minutes after the procedure. Patient was given post procedure and discharge instructions to follow at home. We will see the patient back in two weeks or the patient may call to inform of status. The patient was discharged in a  stable condition

## 2024-01-27 DIAGNOSIS — J30.9 ALLERGIC RHINITIS, UNSPECIFIED SEASONALITY, UNSPECIFIED TRIGGER: ICD-10-CM

## 2024-01-29 RX ORDER — FLUTICASONE PROPIONATE 50 MCG
2 SPRAY, SUSPENSION (ML) NASAL
Qty: 48 G | Refills: 3 | Status: SHIPPED | OUTPATIENT
Start: 2024-01-29

## 2024-02-05 ENCOUNTER — OFFICE VISIT (OUTPATIENT)
Dept: PRIMARY CARE CLINIC | Facility: CLINIC | Age: 69
End: 2024-02-05
Payer: MEDICARE

## 2024-02-05 VITALS
HEART RATE: 60 BPM | OXYGEN SATURATION: 97 % | DIASTOLIC BLOOD PRESSURE: 80 MMHG | TEMPERATURE: 97 F | HEIGHT: 64 IN | BODY MASS INDEX: 28.76 KG/M2 | SYSTOLIC BLOOD PRESSURE: 124 MMHG | RESPIRATION RATE: 18 BRPM | WEIGHT: 168.44 LBS

## 2024-02-05 DIAGNOSIS — Z86.39 HISTORY OF ELEVATED GLUCOSE: ICD-10-CM

## 2024-02-05 DIAGNOSIS — N18.32 STAGE 3B CHRONIC KIDNEY DISEASE: ICD-10-CM

## 2024-02-05 DIAGNOSIS — I73.9 PAD (PERIPHERAL ARTERY DISEASE): ICD-10-CM

## 2024-02-05 DIAGNOSIS — I10 ESSENTIAL HYPERTENSION: Primary | Chronic | ICD-10-CM

## 2024-02-05 DIAGNOSIS — I25.10 CORONARY ARTERY DISEASE INVOLVING NATIVE CORONARY ARTERY OF NATIVE HEART WITHOUT ANGINA PECTORIS: Chronic | ICD-10-CM

## 2024-02-05 DIAGNOSIS — E78.2 MIXED HYPERLIPIDEMIA: Chronic | ICD-10-CM

## 2024-02-05 PROBLEM — J84.10 CALCIFIED GRANULOMA OF LUNG: Status: RESOLVED | Noted: 2023-08-14 | Resolved: 2024-02-05

## 2024-02-05 PROBLEM — J98.4 CALCIFIED GRANULOMA OF LUNG: Status: RESOLVED | Noted: 2023-08-14 | Resolved: 2024-02-05

## 2024-02-05 PROCEDURE — 3074F SYST BP LT 130 MM HG: CPT | Mod: CPTII,S$GLB,, | Performed by: FAMILY MEDICINE

## 2024-02-05 PROCEDURE — 3079F DIAST BP 80-89 MM HG: CPT | Mod: CPTII,S$GLB,, | Performed by: FAMILY MEDICINE

## 2024-02-05 PROCEDURE — 3008F BODY MASS INDEX DOCD: CPT | Mod: CPTII,S$GLB,, | Performed by: FAMILY MEDICINE

## 2024-02-05 PROCEDURE — 1101F PT FALLS ASSESS-DOCD LE1/YR: CPT | Mod: CPTII,S$GLB,, | Performed by: FAMILY MEDICINE

## 2024-02-05 PROCEDURE — 3288F FALL RISK ASSESSMENT DOCD: CPT | Mod: CPTII,S$GLB,, | Performed by: FAMILY MEDICINE

## 2024-02-05 PROCEDURE — 99999 PR PBB SHADOW E&M-EST. PATIENT-LVL V: CPT | Mod: PBBFAC,,, | Performed by: FAMILY MEDICINE

## 2024-02-05 PROCEDURE — 4010F ACE/ARB THERAPY RXD/TAKEN: CPT | Mod: CPTII,S$GLB,, | Performed by: FAMILY MEDICINE

## 2024-02-05 PROCEDURE — 1159F MED LIST DOCD IN RCRD: CPT | Mod: CPTII,S$GLB,, | Performed by: FAMILY MEDICINE

## 2024-02-05 PROCEDURE — 99215 OFFICE O/P EST HI 40 MIN: CPT | Mod: S$GLB,,, | Performed by: FAMILY MEDICINE

## 2024-02-05 PROCEDURE — 1126F AMNT PAIN NOTED NONE PRSNT: CPT | Mod: CPTII,S$GLB,, | Performed by: FAMILY MEDICINE

## 2024-02-05 RX ORDER — CLOPIDOGREL BISULFATE 75 MG/1
75 TABLET ORAL DAILY
Qty: 90 TABLET | Refills: 3 | Status: SHIPPED | OUTPATIENT
Start: 2024-02-05

## 2024-02-05 RX ORDER — FUROSEMIDE 20 MG/1
20 TABLET ORAL DAILY
Qty: 90 TABLET | Refills: 3 | Status: SHIPPED | OUTPATIENT
Start: 2024-02-05

## 2024-02-05 RX ORDER — EVOLOCUMAB 140 MG/ML
INJECTION, SOLUTION SUBCUTANEOUS
COMMUNITY

## 2024-02-05 RX ORDER — METOPROLOL SUCCINATE 50 MG/1
50 TABLET, EXTENDED RELEASE ORAL DAILY
Qty: 90 TABLET | Refills: 3 | Status: SHIPPED | OUTPATIENT
Start: 2024-02-05 | End: 2025-02-04

## 2024-02-05 RX ORDER — CLOPIDOGREL BISULFATE 75 MG/1
1 TABLET ORAL EVERY MORNING
COMMUNITY
Start: 2023-12-05 | End: 2024-02-05 | Stop reason: SDUPTHER

## 2024-02-05 RX ORDER — VALSARTAN 320 MG/1
320 TABLET ORAL DAILY
Qty: 90 TABLET | Refills: 3 | Status: SHIPPED | OUTPATIENT
Start: 2024-02-05

## 2024-02-05 RX ORDER — ISOSORBIDE MONONITRATE 60 MG/1
60 TABLET, EXTENDED RELEASE ORAL NIGHTLY
Qty: 90 TABLET | Refills: 3 | Status: SHIPPED | OUTPATIENT
Start: 2024-02-05 | End: 2025-02-04

## 2024-02-05 NOTE — PROGRESS NOTES
"    [facility]       [address] [place] [address]      Phone: [phone]      Fax: [phone]    Hermes Miranda MD                Office Visit  02/05/2024        Subjective      HPI:  Shirlene Olvera is a 68 y.o. female presents today in clinic for "Follow-up  ."     68-year-old female presents today to follow-up on multiple issues.      Patient states that she feels okay today.  Feels like she is doing well.  No chest pain, shortness a breath.  No fever, chills, body aches.  No coughing, sneezing, URI type symptoms.  Appetite okay.  Bowel movements fairly stable.  Has chronic issues with constipation.  Takes MiraLax.  No urinary symptoms.    Has hypertension, CAD, PAD, history of MI. Has coronary and femoral stents.  Currently takes aspirin and Plavix daily.  Also takes valsartan, metoprolol tartrate 50 mg twice a day, Imdur 60 mg nightly, Lasix 20 mg daily.  Also has prescription for hydralazine to be used as needed for SBP greater than 180, DBP greater than 100.  Lately, she has been having some episodes of lightheadedness.  Typically happens if she bends over, then sits up too quickly.  Tends to be positional.      Has ongoing issues with cervical/lumbar radiculopathy.  Pain in the legs has been improved since getting blood flow restored, but not completely resolved.  Follows with Dr. Izquierdo.  Has had a couple of injections in her back.  Last one did not seem to help very much.  Has been having a burning sensation in her left leg.  Wonders if it could be related to the stent?    Has CKD.  Avoids NSAIDs.  Stays hydrated.  No issues at this time    PMH:  HTN, HLD, CKD, CAD/stent, GERD/hiatal hernia, cervical/lumbar radiculopathy, environmental allergies.    PSH: Cardiac stent.  Gallbladder.  Hysterectomy.  Rib removal (thoracic outlet syndrome).  Left femoral stent.  Right femoral revascularization.  F MH: HTN.  DM.    Allergies:  Statins caused " arthralgias.  Amlodipine causes swelling.  Imdur causes indigestion.    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  Lives with daughter.    T:  No current use.  Quit in .  Previously, one ppd times 30+ years.    A:  Denies.  Also quit in .    D:  Denies    Exercise:  Frequently goes up and down the stairs.  Tries to stay active.      GI:  [facility]  Cardiology:  Dr. Byers / Dr. Stevens  ENT:  [facility]   Neck: Izquierdo  Vascular: [facility]    [id number]    Colon:  2021.  Repeat five years ()        The following were updated and reviewed by myself in the chart: medications, past medical history, past surgical history, family history, social history, and allergies.     Medications:  Current Outpatient Medications on File Prior to Visit   Medication Sig Dispense Refill    albuterol (PROVENTIL) 2.5 mg /3 mL (0.083 %) nebulizer solution Take 3 mLs (2.5 mg total) by nebulization every 6 (six) hours as needed for Wheezing. 1 each 0    aspirin 81 MG Chew Take 1 tablet (81 mg total) by mouth once daily. 360 tablet 1    cyanocobalamin (VITAMIN B-12) 1000 MCG tablet Take 100 mcg by mouth once daily.      evolocumab (REPATHA SYRINGE) 140 mg/mL Syrg Inject into the skin.      fluocinolone acetonide oiL (DERMOTIC OIL) 0.01 % Drop Place 3 drops in ear(s) 2 (two) times daily. 20 mL 2    fluticasone propionate (FLONASE) 50 mcg/actuation nasal spray USE 2 SPRAYS IN EACH NOSTRIL ONE TIME DAILY 48 g 3    gabapentin (NEURONTIN) 300 MG capsule Take 1 capsule (300 mg total) by mouth every morning AND 3 capsules (900 mg total) every evening. 360 capsule 3    multivitamin with minerals tablet Take 1 tablet by mouth once daily.      vitamin D (VITAMIN D3) 1000 units Tab Take 1,000 Units by mouth once daily.      [DISCONTINUED] clopidogreL (PLAVIX) 75 mg tablet Take 1 tablet (75 mg total) by mouth once daily. 90 tablet 3    [DISCONTINUED] clopidogreL (PLAVIX) 75 mg tablet Take 1 tablet by mouth every morning.      [DISCONTINUED]  furosemide (LASIX) 20 MG tablet Take 1 tablet (20 mg total) by mouth once daily. 90 tablet 3    [DISCONTINUED] isosorbide mononitrate (IMDUR) 60 MG 24 hr tablet Take 1 tablet (60 mg total) by mouth every evening. 90 tablet 3    [DISCONTINUED] metoprolol tartrate (LOPRESSOR) 50 MG tablet Take 1 tablet (50 mg total) by mouth 2 (two) times daily. 180 tablet 3    [DISCONTINUED] valsartan (DIOVAN) 320 MG tablet Take 1 tablet (320 mg total) by mouth once daily. 90 tablet 3    hydrALAZINE (APRESOLINE) 50 MG tablet Take 1 tablet (50 mg total) by mouth every 8 (eight) hours as needed (SBP>180 or DBP>100). 90 tablet 3    nitroGLYCERIN (NITROSTAT) 0.4 MG SL tablet Place 1 tablet (0.4 mg total) under the tongue every 5 (five) minutes as needed for Chest pain. 25 tablet 0    tiZANidine (ZANAFLEX) 4 MG tablet       [DISCONTINUED] ondansetron (ZOFRAN-ODT) 4 MG TbDL Take 1 tablet (4 mg total) by mouth every 8 (eight) hours as needed. 1 tablet 0    [DISCONTINUED] pantoprazole (PROTONIX) 40 MG tablet Take 1 tablet (40 mg total) by mouth once daily. 90 tablet 3    [DISCONTINUED] UNABLE TO FIND medication name: Medical marijuana      [DISCONTINUED] valsartan-hydrochlorothiazide (DIOVAN-HCT) 160-25 mg per tablet Take 1 tablet by mouth once daily. 90 tablet 1     No current facility-administered medications on file prior to visit.        PMHx:  Past Medical History:   Diagnosis Date    Arthritis     Back pain     Chest pain 05/14/2015    Colon polyp     Coronary artery disease     pt states MI June 2015    Disorder of kidney and ureter     Hyperlipidemia     Hypertension     Myocardial infarction     PAD (peripheral artery disease)     Polyneuropathy     Rash 04/20/2022    Tobacco dependence     Tobacco use       Patient Active Problem List    Diagnosis Date Noted    Primary osteoarthritis of right hip 01/19/2024    Chronic idiopathic constipation 09/27/2023    Gastroesophageal reflux disease 08/14/2023    Osteopenia 08/14/2023    Aortic  atherosclerosis 08/14/2023    Stage 3b chronic kidney disease 08/14/2023    Sacroiliitis 06/27/2023    Chronic pain syndrome 04/03/2023    Hiatal hernia 01/11/2023    Screening mammogram, encounter for 11/07/2022    Accelerated hypertension 11/01/2022    Postmenopausal 08/18/2022    Dyspepsia 08/18/2022    Ankle swelling 08/18/2022    Cervical strain 04/20/2022    Delayed immunizations 04/20/2022    Sinusitis 02/17/2022    Pharyngoesophageal dysphagia 12/09/2021    Schatzki's ring of distal esophagus 12/09/2021    Encounter for screening colonoscopy 12/09/2021    Cervical spondylosis 10/15/2021    Multiple nevi 08/16/2021    Statin intolerance 01/02/2019    PAD (peripheral artery disease) 06/09/2018    ROMÁN (generalized anxiety disorder) 06/09/2018    Insomnia 06/09/2018    History of colon polyps 10/11/2016    Biliary dyskinesia 09/03/2015    Coronary artery disease 08/31/2015    History of non-ST elevation myocardial infarction (NSTEMI) 05/14/2015    Tobacco abuse 07/18/2014    Allergic rhinitis, cause unspecified 07/18/2014    Essential hypertension 10/10/2013    Hyperlipidemia 10/10/2013    Degenerative disc disease, cervical 10/10/2013    DDD (degenerative disc disease), lumbar 10/10/2013    Thoracic or lumbosacral neuritis or radiculitis, unspecified 07/16/2013        PSHx:  Past Surgical History:   Procedure Laterality Date    CHOLECYSTECTOMY  09/03/2015    COLONOSCOPY N/A 10/11/2016    Procedure: COLONOSCOPY;  Surgeon: Haseeb Spears MD;  Location: [facility];  Service: Endoscopy;  Laterality: N/A;    COLONOSCOPY N/A 12/09/2021    Procedure: COLONOSCOPY;  Surgeon: Pat Rios MD;  Location: [facility];  Service: Endoscopy;  Laterality: N/A;    ESOPHAGOGASTRODUODENOSCOPY N/A 12/09/2021    Procedure: EGD (ESOPHAGOGASTRODUODENOSCOPY);  Surgeon: Pat Rios MD;  Location: [facility];  Service: Endoscopy;  Laterality: N/A;    high blood      HYSTERECTOMY      INJECTION OF ANESTHETIC AGENT AROUND  MEDIAL BRANCH NERVES INNERVATING CERVICAL FACET JOINT Bilateral 10/15/2021    Procedure: Bilateral C5-7 MBB with RN IV sedation PATIENT WOULD LIKE AFTERNOON ARRIVAL, IF POSSIBLE;  Surgeon: Nano Izquierdo MD;  Location: [facility];  Service: Pain Management;  Laterality: Bilateral;    INJECTION OF ANESTHETIC AGENT AROUND MEDIAL BRANCH NERVES INNERVATING CERVICAL FACET JOINT Bilateral 03/21/2022    Procedure: Bilateral C4-6 MBB with RN IV sedation;  Surgeon: Nano Izquierdo MD;  Location: [facility];  Service: Pain Management;  Laterality: Bilateral;    INJECTION OF ANESTHETIC AGENT INTO SACROILIAC JOINT Right 12/17/2021    Procedure: Right SIJ Injection;  Surgeon: Nano Izquierdo MD;  Location: [facility];  Service: Pain Management;  Laterality: Right;    INJECTION OF ANESTHETIC AGENT INTO SACROILIAC JOINT Right 06/27/2023    Procedure: Right SIJ Injection with local;  Surgeon: Nano Izquierdo MD;  Location: [facility];  Service: Pain Management;  Laterality: Right;    INJECTION OF ANESTHETIC AGENT INTO SACROILIAC JOINT Right 1/19/2024    Procedure: Right SIJ and intra-articular hip Injection;  Surgeon: Nano Izquierdo MD;  Location: [facility];  Service: Pain Management;  Laterality: Right;    INJECTION OF JOINT Right 1/19/2024    Procedure: Right Hip injection;  Surgeon: Nano Izquierdo MD;  Location: [facility];  Service: Pain Management;  Laterality: Right;    LEFT HEART CATHETERIZATION Left 03/29/2022    Procedure: CATHETERIZATION, HEART, LEFT;  Surgeon: Lion Awad MD;  Location: [facility];  Service: Cardiology;  Laterality: Left;    OOPHORECTOMY      RADIOFREQUENCY THERMOCOAGULATION Right 12/27/2022    Procedure: Right C4-6 RFA with RN IV sedation;  Surgeon: Nano Izquierdo MD;  [facility];  Service: Pain Management;  Laterality: Right;    RADIOFREQUENCY THERMOCOAGULATION Left 01/10/2023    Procedure: Left C4-6 RFA with RN IV sedation;  Surgeon: Nano Izquierdo MD;  Location:  [facility];  Service: Pain Management;  Laterality: Left;    RIB FRACTURE SURGERY          FHx:  Family History   Problem Relation Age of Onset    Heart attack Father 56        MI    Stroke Sister     Asthma Neg Hx     Thyroid disease Neg Hx     Migraines Neg Hx     Cancer Neg Hx         Social:  Social History     Socioeconomic History    Marital status:     Number of children: 3   Occupational History    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     Employer: [employer]   Tobacco Use    Smoking status: Former     Current packs/day: 0.50     Average packs/day: 0.5 packs/day for 30.5 years (15.2 ttl pk-yrs)     Types: Cigarettes     Start date: 8/21/1993    Smokeless tobacco: Never   Substance and Sexual Activity    Alcohol use: Not Currently    Drug use: No    Sexual activity: Not Currently     Partners: Male     Birth control/protection: None     Social Determinants of Health     Financial Resource Strain: Low Risk  (8/21/2023)    Overall Financial Resource Strain (CARDIA)     Difficulty of Paying Living Expenses: Not hard at all   Food Insecurity: No Food Insecurity (8/21/2023)    Hunger Vital Sign     Worried About Running Out of Food in the Last Year: Never true     Ran Out of Food in the Last Year: Never true   Transportation Needs: No Transportation Needs (8/21/2023)    PRAPARE - Transportation     Lack of Transportation (Medical): No     Lack of Transportation (Non-Medical): No   Physical Activity: Sufficiently Active (8/21/2023)    Exercise Vital Sign     Days of Exercise per Week: 7 days     Minutes of Exercise per Session: 40 min   Stress: Stress Concern Present (8/21/2023)    [employer] [place] [employer] - Occupational Stress Questionnaire     Feeling of Stress : To some extent   Social Connections: Socially Isolated (8/21/2023)    Social Connection and Isolation Panel [NHANES]     Frequency of Communication with Friends and Family: Three times a week      "Frequency of Social Gatherings with Friends and Family: More than three times a week     Attends [employer] Services: Never     Active Member of Clubs or Organizations: No     Attends Club or Organization Meetings: Never     Marital Status:    Housing Stability: Low Risk  (8/21/2023)    Housing Stability Vital Sign     Unable to Pay for Housing in the Last Year: No     Number of Places Lived in the Last Year: 1     Unstable Housing in the Last Year: No        Allergies:  Review of patient's allergies indicates:   Allergen Reactions    Amlodipine Swelling    Statins-hmg-coa reductase inhibitors Other (See Comments)     Myalgias to lipitor and simvastatin        ROS:  Review of Systems   Constitutional:  Negative for activity change, appetite change, chills and fever.   HENT:  Negative for congestion, postnasal drip, rhinorrhea, sore throat and trouble swallowing.    Respiratory:  Negative for cough, shortness of breath and wheezing.    Cardiovascular:  Negative for chest pain and palpitations.   Gastrointestinal:  Negative for abdominal pain, constipation, diarrhea, nausea and vomiting.   Genitourinary:  Negative for difficulty urinating.   Musculoskeletal:  Positive for arthralgias, back pain and myalgias.   Skin:  Negative for color change and rash.   Neurological:  Positive for light-headedness. Negative for speech difficulty and headaches.   All other systems reviewed and are negative.         Objective      /80   Pulse 60   Temp 97.4 °F (36.3 °C) (Oral)   Resp 18   Ht 5' 4" (1.626 m)   Wt 76.4 kg (168 lb 6.9 oz)   LMP  (LMP Unknown)   SpO2 97%   BMI 28.91 kg/m²   Ht Readings from Last 3 Encounters:   02/05/24 5' 4" (1.626 m)   01/19/24 5' 4" (1.626 m)   09/27/23 5' 4" (1.626 m)     Wt Readings from Last 3 Encounters:   02/05/24 76.4 kg (168 lb 6.9 oz)   01/19/24 77.9 kg (171 lb 10.1 oz)   09/27/23 78.1 kg (172 lb 2.9 oz)       PHYSICAL EXAM:  Physical Exam  Vitals and nursing note reviewed. "   Constitutional:       General: She is not in acute distress.     Appearance: Normal appearance.   HENT:      Head: Normocephalic and atraumatic.      Right Ear: Tympanic membrane, ear canal and external ear normal.      Left Ear: Tympanic membrane, ear canal and external ear normal.      Nose: Nose normal. No congestion or rhinorrhea.      Mouth/Throat:      Mouth: Mucous membranes are moist.      Pharynx: Oropharynx is clear. No oropharyngeal exudate or posterior oropharyngeal erythema.   Eyes:      Extraocular Movements: Extraocular movements intact.      Conjunctiva/sclera: Conjunctivae normal.      Pupils: Pupils are equal, round, and reactive to light.   Cardiovascular:      Rate and Rhythm: Normal rate and regular rhythm.   Pulmonary:      Effort: Pulmonary effort is normal. No respiratory distress.      Breath sounds: No wheezing, rhonchi or rales.   Musculoskeletal:         General: Normal range of motion.      Cervical back: Normal range of motion.   Lymphadenopathy:      Cervical: No cervical adenopathy.   Skin:     General: Skin is warm and dry.      Findings: No rash.   Neurological:      Mental Status: She is alert.              LABS / IMAGING:  No results found for this or any previous visit (from the past 4368 hour(s)).      Assessment    1. Essential hypertension    2. Mixed hyperlipidemia    3. Coronary artery disease involving native coronary artery of native heart without angina pectoris    4. PAD (peripheral artery disease)    5. History of elevated glucose    6. Stage 3b chronic kidney disease          Plan    Shirlene was seen today for follow-up.    Diagnoses and all orders for this visit:    Essential hypertension  -     metoprolol succinate (TOPROL-XL) 50 MG 24 hr tablet; Take 1 tablet (50 mg total) by mouth once daily.  -     furosemide (LASIX) 20 MG tablet; Take 1 tablet (20 mg total) by mouth once daily.  -     isosorbide mononitrate (IMDUR) 60 MG 24 hr tablet; Take 1 tablet (60 mg total)  by mouth every evening.  -     valsartan (DIOVAN) 320 MG tablet; Take 1 tablet (320 mg total) by mouth once daily.  -     Lipid Panel; Future  -     TSH; Future  -     Comprehensive Metabolic Panel; Future  -     CBC Auto Differential; Future    Mixed hyperlipidemia  -     Lipid Panel; Future    Coronary artery disease involving native coronary artery of native heart without angina pectoris  -     metoprolol succinate (TOPROL-XL) 50 MG 24 hr tablet; Take 1 tablet (50 mg total) by mouth once daily.  -     clopidogreL (PLAVIX) 75 mg tablet; Take 1 tablet (75 mg total) by mouth once daily.  -     furosemide (LASIX) 20 MG tablet; Take 1 tablet (20 mg total) by mouth once daily.    PAD (peripheral artery disease)  -     clopidogreL (PLAVIX) 75 mg tablet; Take 1 tablet (75 mg total) by mouth once daily.  -     US Lower Extremity Arteries Bilateral; Future    History of elevated glucose  -     Hemoglobin A1C; Future    Stage 3b chronic kidney disease      Physically, she looks good today.      Not sure when her next follow-up appointment with vascular is.  We will go ahead and get an ultrasound of the arterial vessels in the legs to see if the stents are still open.  If we see blockages, we can get her back in with them sooner.    Med refills, as above.      We will change the metoprolol tartrate 50 mg twice a day to metoprolol succinate 50 mg once a day.  Let us see if this can help with those dizzy/lightheadedness episodes.    Orders placed for screening blood work to be done before next visit.    FOLLOW-UP:  Follow up in about 6 months (around 8/5/2024) for check up, labs 1 week prior.    I spent a total of 45 minutes face to face and non-face to face on the date of this visit.This includes time preparing to see the patient (eg, review of tests, notes), obtaining and/or reviewing additional history from an independent historian and/or outside medical records, documenting clinical information in the electronic health  record, independently interpreting results and/or communicating results to the patient/family/caregiver, or care coordinator.    Signed by:  Hermes Miranda MD

## 2024-02-05 NOTE — PATIENT INSTRUCTIONS
Physically, everything looks pretty good today.      Let us go ahead and order an ultrasound of the arteries in the legs to make sure that the stents are wide open.  We can do this at the [place] location to make it more convenient.  We will contact you with the results as soon as they are available.  If we see the blockages start to form again, we can get you back in with the vascular surgeon sooner, rather than later.      Blood pressure is doing very well.  For the dizziness, let us try backing off on the metoprolol.  Instead of taking it twice a day, I am switching to a long-acting formulation that you can just take once each morning.  This will have a lower overall dose, as well, so it may help with those symptoms.      Refills of your other medications have been sent to the pharmacy, as well.      Continue to eat a healthy diet.  Be careful with portion sizes.  Includes lots of fresh fruits, vegetables, whole grains, lean proteins.  See info below.    Keep hydrated.  Be sure to drink at least 8-10, 8 oz, glasses of water every day.    Stay active.  Try to do some sort of physical activity every day.  Nothing outrageous, just try walking for 10-15 minutes each day.

## 2024-02-06 ENCOUNTER — HOSPITAL ENCOUNTER (OUTPATIENT)
Dept: RADIOLOGY | Facility: HOSPITAL | Age: 69
Discharge: HOME | End: 2024-02-06
Attending: FAMILY MEDICINE
Payer: MEDICARE

## 2024-02-06 DIAGNOSIS — I73.9 PAD (PERIPHERAL ARTERY DISEASE): ICD-10-CM

## 2024-02-06 PROCEDURE — 93925 LOWER EXTREMITY STUDY: CPT | Mod: TC,PN

## 2024-02-06 PROCEDURE — 93925 LOWER EXTREMITY STUDY: CPT | Mod: 26,,, | Performed by: STUDENT IN AN ORGANIZED HEALTH CARE EDUCATION/TRAINING PROGRAM

## 2024-02-07 NOTE — PROGRESS NOTES
The stent in the left leg seems to be wide open.  You have good flow all the way down that leg.      The right leg, however, still does have some significant blockage.  Might not be a bad idea to go back to Dr. Miner to see if he wants to stent the right leg, as well.  Or, if you would like to get a 2nd opinion from an [facility] vascular specialist, please let me know and I can put a referral in.    Since the left leg is flowing freely, it sounds more like the pains you feel are coming from the back.  Would definitely go back and talk with Dr. Izquierdo about it.

## 2024-02-19 ENCOUNTER — TELEPHONE (OUTPATIENT)
Dept: PRIMARY CARE CLINIC | Facility: CLINIC | Age: 69
End: 2024-02-19
Payer: MEDICARE

## 2024-02-19 NOTE — TELEPHONE ENCOUNTER
Called and spoke with the pt. Pt was provided the number for th [facility] to schedule a appt with Dr. Orville Miner. Pt understands and acknowledges

## 2024-02-19 NOTE — TELEPHONE ENCOUNTER
----- Message from Ariadne Das sent at 2/19/2024  3:17 PM CST -----  Patient would like a referral to schedule an appointment with a vascular doctor. Call back number is [phone]. x. EL

## 2024-03-01 ENCOUNTER — OFFICE VISIT (OUTPATIENT)
Dept: URGENT CARE | Facility: CLINIC | Age: 69
End: 2024-03-01
Payer: MEDICARE

## 2024-03-01 VITALS
HEART RATE: 73 BPM | TEMPERATURE: 98 F | DIASTOLIC BLOOD PRESSURE: 57 MMHG | BODY MASS INDEX: 28.85 KG/M2 | WEIGHT: 169 LBS | RESPIRATION RATE: 18 BRPM | HEIGHT: 64 IN | OXYGEN SATURATION: 96 % | SYSTOLIC BLOOD PRESSURE: 129 MMHG

## 2024-03-01 DIAGNOSIS — R09.82 POST-NASAL DRIP: ICD-10-CM

## 2024-03-01 DIAGNOSIS — J02.9 SORE THROAT: Primary | ICD-10-CM

## 2024-03-01 PROCEDURE — 99214 OFFICE O/P EST MOD 30 MIN: CPT | Mod: S$GLB,,, | Performed by: PHYSICIAN ASSISTANT

## 2024-03-01 RX ORDER — AMOXICILLIN 500 MG/1
500 CAPSULE ORAL 3 TIMES DAILY
COMMUNITY
Start: 2024-02-23 | End: 2024-03-19

## 2024-03-01 RX ORDER — METOPROLOL TARTRATE 50 MG/1
50 TABLET ORAL 2 TIMES DAILY
COMMUNITY
Start: 2024-02-23

## 2024-03-01 NOTE — PROGRESS NOTES
"Subjective:      Patient ID: Shirlene Olvera is a 68 y.o. female.    Vitals:  height is 5' 4" (1.626 m) and weight is 76.7 kg (169 lb). Her tympanic temperature is 97.7 °F (36.5 °C). Her blood pressure is 129/57 (abnormal) and her pulse is 73. Her respiration is 18 and oxygen saturation is 96%.     Chief Complaint: Sore Throat    68 year old female pt with symptoms of sore throat and post nasal drip that began earlier today. Pt states throat hurts to talk and to swallow. Pt states she is currently on Amoxicillin for dental work. She denies fever, chills, cough, SOB, and/or any other symptoms associated with this complaint.      Sore Throat   This is a new problem. The current episode started today. The problem has been unchanged. Neither side of throat is experiencing more pain than the other. There has been no fever. The pain is at a severity of 9/10. The pain is severe. Associated symptoms include a hoarse voice. Pertinent negatives include no abdominal pain, congestion, coughing, diarrhea, drooling, ear discharge, ear pain, headaches, plugged ear sensation, neck pain, shortness of breath, stridor, swollen glands, trouble swallowing or vomiting. She has had no exposure to strep or mono. The treatment provided no relief.       HENT:  Positive for sore throat. Negative for ear pain, ear discharge, drooling, congestion and trouble swallowing.    Neck: Negative for neck pain.   Respiratory:  Negative for cough, shortness of breath and stridor.    Gastrointestinal:  Negative for abdominal pain, vomiting and diarrhea.   Neurological:  Negative for headaches.      Objective:     Physical Exam   Constitutional: She is oriented to person, place, and time. She appears well-developed.   HENT:   Head: Normocephalic and atraumatic.   Ears:   Right Ear: External ear normal.   Left Ear: External ear normal.   Nose: Nose normal.   Mouth/Throat: Posterior oropharyngeal erythema present.   Eyes: Conjunctivae, EOM and lids are " normal.   Neck: Trachea normal and phonation normal. Neck supple.   Musculoskeletal: Normal range of motion.         General: Normal range of motion.   Neurological: She is alert and oriented to person, place, and time.   Skin: Skin is warm, dry and intact.   Psychiatric: Her speech is normal and behavior is normal. Judgment and thought content normal.   Nursing note and vitals reviewed.      Assessment:     1. Sore throat    2. Post-nasal drip        Plan:   VSS. Patient non-toxic appearing. Discussed medication being prescribed.  Advised patient to follow up with PCP as needed.  Patient verbalized understanding, agrees with the plan, and is comfortable with discharge.      Sore throat    Post-nasal drip  -     dexbrompheniramine-phenylep-DM 2-10-20 mg Tab; Take 1 tablet by mouth every 6 (six) hours as needed.  Dispense: 28 tablet; Refill: 0

## 2024-03-19 ENCOUNTER — OFFICE VISIT (OUTPATIENT)
Dept: OPHTHALMOLOGY | Facility: CLINIC | Age: 69
End: 2024-03-19
Payer: MEDICARE

## 2024-03-19 DIAGNOSIS — H52.03 HYPEROPIA WITH ASTIGMATISM AND PRESBYOPIA, BILATERAL: ICD-10-CM

## 2024-03-19 DIAGNOSIS — H52.203 HYPEROPIA WITH ASTIGMATISM AND PRESBYOPIA, BILATERAL: ICD-10-CM

## 2024-03-19 DIAGNOSIS — H25.813 COMBINED FORM OF AGE-RELATED CATARACT, BOTH EYES: ICD-10-CM

## 2024-03-19 DIAGNOSIS — H52.4 HYPEROPIA WITH ASTIGMATISM AND PRESBYOPIA, BILATERAL: ICD-10-CM

## 2024-03-19 DIAGNOSIS — D31.32 CHOROIDAL NEVUS OF LEFT EYE: Primary | ICD-10-CM

## 2024-03-19 PROCEDURE — 99999 PR PBB SHADOW E&M-EST. PATIENT-LVL I: CPT | Mod: PBBFAC,HCNC,, | Performed by: OPTOMETRIST

## 2024-03-19 PROCEDURE — 4010F ACE/ARB THERAPY RXD/TAKEN: CPT | Mod: HCNC,CPTII,S$GLB, | Performed by: OPTOMETRIST

## 2024-03-19 PROCEDURE — 92015 DETERMINE REFRACTIVE STATE: CPT | Mod: HCNC,S$GLB,, | Performed by: OPTOMETRIST

## 2024-03-19 PROCEDURE — 92004 COMPRE OPH EXAM NEW PT 1/>: CPT | Mod: HCNC,S$GLB,, | Performed by: OPTOMETRIST

## 2024-03-19 PROCEDURE — 1160F RVW MEDS BY RX/DR IN RCRD: CPT | Mod: HCNC,CPTII,S$GLB, | Performed by: OPTOMETRIST

## 2024-03-19 PROCEDURE — 1159F MED LIST DOCD IN RCRD: CPT | Mod: HCNC,CPTII,S$GLB, | Performed by: OPTOMETRIST

## 2024-03-19 NOTE — PROGRESS NOTES
HPI    Vision changes since last eye exam?: Pt has been experiencing blurriness   with far vision when trying to drive and or when driving at night. Pt   reports OD has a usually sporadically gets infected and clears up in the   past. Pt wears bifocal eyeglasses full time. Pt is not currently using any   eyedrops. Pt has a previous dx of dry eye and cataracts OU.     Any eye pain today: No.     Other ocular symptoms: Pt has been experiencing floaters OU    Interested in contact lens fitting today? No. Pt wants to refill bifocal   eyeglasses prescription.      Last edited by Betzy Etienne on 3/19/2024  9:50 AM.            Assessment /Plan     For exam results, see Encounter Report.    Choroidal nevus of left eye    Appears flat,   Recommend baseline optos for evaluation at [facility] due to lack of optos at [facility] then q6m afterwards for change.     Combined form of age-related cataract, both eyes  Cataracts are not visually significant and not affecting activities of daily living. Annual observation is recommended at this time. Patient to call or return to clinic with any significant change in vision prior to next visit.    Hyperopia with astigmatism and presbyopia, bilateral  Eyeglass Final Rx       Eyeglass Final Rx         Sphere Cylinder Axis Add    Right +0.25 +0.50 018 +2.50    Left +0.25 +0.75 038 +2.50      Type: PAL    Expiration Date: 3/19/2025   PD 64.5                   RTC next available for baseline optos imaging at [facility] clinic due to nevus, then q6m afterwards.

## 2024-03-19 NOTE — Clinical Note
Yarely, please call patient to switch to next available for optos imaging (baseline) then another one 6 months afterwards at [facility].   Thanks, DT

## 2024-03-20 ENCOUNTER — TELEPHONE (OUTPATIENT)
Dept: OPHTHALMOLOGY | Facility: CLINIC | Age: 69
End: 2024-03-20
Payer: MEDICARE

## 2024-03-20 NOTE — TELEPHONE ENCOUNTER
----- Message from Olivia Gerber sent at 3/20/2024  3:58 PM CDT -----  Contact: Shirlene  Patient is returning call to office after missing call. Please callback  [phone]

## 2024-03-25 ENCOUNTER — OFFICE VISIT (OUTPATIENT)
Dept: OPHTHALMOLOGY | Facility: CLINIC | Age: 69
End: 2024-03-25
Payer: MEDICARE

## 2024-03-25 ENCOUNTER — TELEPHONE (OUTPATIENT)
Dept: OPHTHALMOLOGY | Facility: CLINIC | Age: 69
End: 2024-03-25
Payer: MEDICARE

## 2024-03-25 DIAGNOSIS — H25.813 COMBINED FORM OF AGE-RELATED CATARACT, BOTH EYES: ICD-10-CM

## 2024-03-25 DIAGNOSIS — H52.4 HYPEROPIA WITH ASTIGMATISM AND PRESBYOPIA, BILATERAL: ICD-10-CM

## 2024-03-25 DIAGNOSIS — H52.03 HYPEROPIA WITH ASTIGMATISM AND PRESBYOPIA, BILATERAL: ICD-10-CM

## 2024-03-25 DIAGNOSIS — D31.32 CHOROIDAL NEVUS OF LEFT EYE: Primary | ICD-10-CM

## 2024-03-25 DIAGNOSIS — H52.203 HYPEROPIA WITH ASTIGMATISM AND PRESBYOPIA, BILATERAL: ICD-10-CM

## 2024-03-25 PROCEDURE — 99999 PR PBB SHADOW E&M-EST. PATIENT-LVL I: CPT | Mod: PBBFAC,HCNC,, | Performed by: OPTOMETRIST

## 2024-03-25 PROCEDURE — 99499 UNLISTED E&M SERVICE: CPT | Mod: HCNC,S$GLB,, | Performed by: OPTOMETRIST

## 2024-03-25 NOTE — PROGRESS NOTES
HPI     Follow-up            Comments: Patient here today for Optos          Comments    1. Nevus OS  2. NSC OU          Last edited by Cookie Jones, PCT on 3/25/2024 10:47 AM.            Assessment /Plan     For exam results, see Encounter Report.    Choroidal nevus of left eye  Nevus x 2 OS, one nasally and another superior at vessel bifurcation. RTC with Dr BIRD for nevus consult.     2. Combined form of age-related cataract, both eyes  Observe at this time.     3. Hyperopia with astigmatism and presbyopia, bilateral  Continue current Mrx wear PRN.     RTC with Dr BIRD for nevus consult.

## 2024-03-25 NOTE — TELEPHONE ENCOUNTER
----- Message from Piotr Thomas sent at 3/25/2024 10:12 AM CDT -----  Contact: Shirlene Vincent is calling in regards to running late due to traffic. Please call back at  [phone]                          Thanks  KT

## 2024-04-08 ENCOUNTER — OFFICE VISIT (OUTPATIENT)
Dept: URGENT CARE | Facility: CLINIC | Age: 69
End: 2024-04-08
Payer: MEDICARE

## 2024-04-08 VITALS
TEMPERATURE: 98 F | BODY MASS INDEX: 28.85 KG/M2 | RESPIRATION RATE: 18 BRPM | SYSTOLIC BLOOD PRESSURE: 121 MMHG | OXYGEN SATURATION: 97 % | DIASTOLIC BLOOD PRESSURE: 62 MMHG | HEART RATE: 70 BPM | WEIGHT: 169 LBS | HEIGHT: 64 IN

## 2024-04-08 DIAGNOSIS — M79.662 PAIN AND SWELLING OF LEFT LOWER LEG: Primary | ICD-10-CM

## 2024-04-08 DIAGNOSIS — T14.8XXA BRUISE: ICD-10-CM

## 2024-04-08 DIAGNOSIS — M79.89 PAIN AND SWELLING OF LEFT LOWER LEG: Primary | ICD-10-CM

## 2024-04-08 PROCEDURE — 99214 OFFICE O/P EST MOD 30 MIN: CPT | Mod: S$GLB,,, | Performed by: PHYSICIAN ASSISTANT

## 2024-04-08 RX ORDER — ACETAMINOPHEN 500 MG
1000 TABLET ORAL
Status: COMPLETED | OUTPATIENT
Start: 2024-04-08 | End: 2024-04-08

## 2024-04-08 RX ORDER — DICLOFENAC SODIUM 10 MG/G
2 GEL TOPICAL DAILY
Qty: 50 G | Refills: 0 | Status: SHIPPED | OUTPATIENT
Start: 2024-04-08 | End: 2024-04-18

## 2024-04-08 RX ORDER — IBUPROFEN 200 MG
400 TABLET ORAL
Status: DISCONTINUED | OUTPATIENT
Start: 2024-04-08 | End: 2024-04-08

## 2024-04-08 RX ADMIN — Medication 1000 MG: at 10:04

## 2024-04-08 NOTE — PATIENT INSTRUCTIONS
R.I.C.E:    Rest, apply ice intermittently, compress with ace wrap, and elevate above heart level as much as possible.  Do not apply ice directly to skin. Wrap ice in cloth before applying.    Please make sure that you wearing appropriate supportive shoes for lower extremity issues.    OTC Tylenol (acetaminophen) up to 4,000 mg a day is safe for short periods and can be used for pain, and fever. However in high doses and prolonged use it can cause liver irritation.    OTC Ibuprofen (NSAID) is a non-steroidal anti-inflammatory that can be used for pain. However it can also cause stomach irritation if over used.    Of note: Aleve, Motrin, Advil, Mobic, meloxicam, and indomethacin are also other names of NSAIDs.    OTC Voltaren topical cream may be applied for relief, as long as you do not have any allergy to the ingredients.    You will need to follow-up with orthopedics if your symptoms persist, as we discussed.  clinic  to help make appointment. ([phone])      If you have been discharged from the clinic prior to your point of care test results being completed, please make sure to check your MyChart account.  If there is a change in treatment, we will communicate with you through here.  If your test is positive, and medications are ordered, these will be sent to your preferred pharmacy.   If your test is negative, no further steps needed. If you do not hear from us or have questions, please call the clinic.      - You must understand that you have received an Urgent Care treatment only and that you may be released before all of your medical problems are known or treated.   - You, the patient, will arrange for follow up care as instructed with your primary care provider or recommended specialist.   - If your condition worsens or fails to improve we recommend that you receive another evaluation at the ER immediately or contact your PCP to discuss your concerns, or return here.   - Please do not drive or  make any important decisions for 24 hours if you have received any pain medications, sedatives or mood altering drugs during your visit.    Disclaimer: This document was drafted with the use of a voice recognition device and is likely to have sound alike errors.

## 2024-04-08 NOTE — LETTER
April 8, 2024      [facility]  [address], [address]  [place] [address]  Phone: [phone]       Patient: Shirlene Olvera   YOB: 1955  Date of Visit: 04/08/2024    To Whom It May Concern:    Maggy Olvera  was at [facility] on 04/08/2024. The patient may return to work/school on 4/11 with no restrictions. If you have any questions or concerns, or if I can be of further assistance, please do not hesitate to contact me.    Sincerely,    Laura Conley PA-C

## 2024-04-08 NOTE — PROGRESS NOTES
"Subjective:      Patient ID: Shirlene Olvera is a 68 y.o. female.    Vitals:  height is 5' 4" (1.626 m) and weight is 76.7 kg (169 lb). Her oral temperature is 98 °F (36.7 °C). Her blood pressure is 121/62 and her pulse is 70. Her respiration is 18 and oxygen saturation is 97%.     Chief Complaint: Leg Pain    C/o left leg pain/burning, x 1 week, rates pain 9/10, walking makes pain worse no known injuries, no meds for relief except elevation    Leg Pain   The incident occurred 5 to 7 days ago. There was no injury mechanism. The pain is present in the left leg. The quality of the pain is described as aching. The pain is at a severity of 9/10. The pain is moderate. The pain has been Constant since onset. Pertinent negatives include no inability to bear weight, loss of motion, loss of sensation, muscle weakness, numbness or tingling. She reports no foreign bodies present. The symptoms are aggravated by movement and weight bearing. She has tried elevation for the symptoms. The treatment provided mild relief.       Constitution: Negative for fever.   Cardiovascular:  Positive for leg swelling. Negative for chest pain and palpitations.   Respiratory:  Negative for shortness of breath.    Musculoskeletal:  Positive for pain, joint pain, joint swelling and muscle ache. Negative for trauma and muscle cramps.   Skin:  Positive for bruising. Negative for rash and erythema.   Neurological:  Negative for numbness.      Objective:     Vitals:    04/08/24 1023   BP: 121/62   BP Location: Left arm   Patient Position: Sitting   BP Method: Medium (Automatic)   Pulse: 70   Resp: 18   Temp: 98 °F (36.7 °C)   TempSrc: Oral   SpO2: 97%   Weight: 76.7 kg (169 lb)   Height: 5' 4" (1.626 m)       Physical Exam   Constitutional: She is oriented to person, place, and time. She appears well-developed.   HENT:   Head: Normocephalic and atraumatic. Head is without abrasion, without contusion and without laceration.   Ears:   Right Ear: " External ear normal.   Left Ear: External ear normal.   Nose: Nose normal.   Mouth/Throat: Oropharynx is clear and moist and mucous membranes are normal.   Eyes: Conjunctivae, EOM and lids are normal. Pupils are equal, round, and reactive to light.   Neck: Trachea normal and phonation normal. Neck supple.   Cardiovascular: Normal rate, regular rhythm and normal heart sounds.   Pulmonary/Chest: Effort normal and breath sounds normal. No stridor. No respiratory distress.   Musculoskeletal: Normal range of motion.         General: Normal range of motion.      Right ankle: She exhibits normal range of motion.      Left ankle: She exhibits swelling (mild intermittent per pt). She exhibits normal range of motion. No tenderness.      Left lower leg: She exhibits tenderness and swelling.        Legs:       Right foot: Normal range of motion. No laceration.      Left foot: Normal.   Neurological: She is alert and oriented to person, place, and time. Gait (limping (chronic)) abnormal.   Skin: Skin is warm, dry, intact and no rash. Capillary refill takes less than 2 seconds. not right footbruising (LLE) No abrasion, No burn, No erythema, No ecchymosis and No lesion   Psychiatric: Her speech is normal and behavior is normal. Judgment and thought content normal.   Nursing note and vitals reviewed.      Assessment:     1. Pain and swelling of left lower leg    2. Bruise        Plan:       Pain and swelling of left lower leg  -     Discontinue: ibuprofen tablet 400 mg  -     acetaminophen tablet 1,000 mg  -     diclofenac sodium (VOLTAREN) 1 % Gel; Apply 2 g topically once daily. for 10 days  Dispense: 50 g; Refill: 0  -     BANDAGE ELASTIC 3IN ACE    Bruise          Medical Decision Making:   Initial Assessment:   VSS  Take blood thinners  Denies known trauma but exam consistent with minor bruise  No SOB or calf pain   No fever or signs of infection   Not a rash, no itching   There is minor LLE swelling    Pt states that she is  on her feet for 8+ hrs a day at work.   Differential Diagnosis:   PAD  Claudication   Sciatica  Arthritis  DVT  Bruise  Hematoma   Contusion   Insect bite  Cellulitis  Bleeding disorder         Urgent Care Management:        - Educated patient regarding medications for symptomatic relief (outlined below).  - Strict ED precautions given for any emergent symptoms.      I have discussed the diagnosis, treatment plan and recommendations for follow-up with primary care, and patient/guardian verbalized understanding and is agreeable to the plan.   AVS printed and given to patient/guardian upon discharge with information regarding this visit. All questions were addressed prior to discharge.           Patient Instructions   R.I.C.E:    Rest, apply ice intermittently, compress with ace wrap, and elevate above heart level as much as possible.  Do not apply ice directly to skin. Wrap ice in cloth before applying.    Please make sure that you wearing appropriate supportive shoes for lower extremity issues.    OTC Tylenol (acetaminophen) up to 4,000 mg a day is safe for short periods and can be used for pain, and fever. However in high doses and prolonged use it can cause liver irritation.    OTC Ibuprofen (NSAID) is a non-steroidal anti-inflammatory that can be used for pain. However it can also cause stomach irritation if over used.    Of note: Aleve, Motrin, Advil, Mobic, meloxicam, and indomethacin are also other names of NSAIDs.    OTC Voltaren topical cream may be applied for relief, as long as you do not have any allergy to the ingredients.    You will need to follow-up with orthopedics if your symptoms persist, as we discussed.  clinic  to help make appointment. ([phone])      If you have been discharged from the clinic prior to your point of care test results being completed, please make sure to check your MyChart account.  If there is a change in treatment, we will communicate with you through here.  If  your test is positive, and medications are ordered, these will be sent to your preferred pharmacy.   If your test is negative, no further steps needed. If you do not hear from us or have questions, please call the clinic.      - You must understand that you have received an Urgent Care treatment only and that you may be released before all of your medical problems are known or treated.   - You, the patient, will arrange for follow up care as instructed with your primary care provider or recommended specialist.   - If your condition worsens or fails to improve we recommend that you receive another evaluation at the ER immediately or contact your PCP to discuss your concerns, or return here.   - Please do not drive or make any important decisions for 24 hours if you have received any pain medications, sedatives or mood altering drugs during your visit.    Disclaimer: This document was drafted with the use of a voice recognition device and is likely to have sound alike errors.

## 2024-05-01 ENCOUNTER — PATIENT MESSAGE (OUTPATIENT)
Dept: ADMINISTRATIVE | Facility: OTHER | Age: 69
End: 2024-05-01
Payer: MEDICARE

## 2024-05-07 ENCOUNTER — OFFICE VISIT (OUTPATIENT)
Dept: OPHTHALMOLOGY | Facility: CLINIC | Age: 69
End: 2024-05-07
Payer: MEDICARE

## 2024-05-07 DIAGNOSIS — H52.03 HYPEROPIA WITH ASTIGMATISM AND PRESBYOPIA, BILATERAL: ICD-10-CM

## 2024-05-07 DIAGNOSIS — H25.813 COMBINED FORM OF AGE-RELATED CATARACT, BOTH EYES: ICD-10-CM

## 2024-05-07 DIAGNOSIS — D31.32 CHOROIDAL NEVUS OF LEFT EYE: Primary | ICD-10-CM

## 2024-05-07 DIAGNOSIS — H52.203 HYPEROPIA WITH ASTIGMATISM AND PRESBYOPIA, BILATERAL: ICD-10-CM

## 2024-05-07 DIAGNOSIS — H52.4 HYPEROPIA WITH ASTIGMATISM AND PRESBYOPIA, BILATERAL: ICD-10-CM

## 2024-05-07 PROCEDURE — 1160F RVW MEDS BY RX/DR IN RCRD: CPT | Mod: CPTII,S$GLB,, | Performed by: OPHTHALMOLOGY

## 2024-05-07 PROCEDURE — 99203 OFFICE O/P NEW LOW 30 MIN: CPT | Mod: S$GLB,,, | Performed by: OPHTHALMOLOGY

## 2024-05-07 PROCEDURE — 4010F ACE/ARB THERAPY RXD/TAKEN: CPT | Mod: CPTII,S$GLB,, | Performed by: OPHTHALMOLOGY

## 2024-05-07 PROCEDURE — 99999 PR PBB SHADOW E&M-EST. PATIENT-LVL III: CPT | Mod: PBBFAC,,, | Performed by: OPHTHALMOLOGY

## 2024-05-07 PROCEDURE — 92250 FUNDUS PHOTOGRAPHY W/I&R: CPT | Mod: S$GLB,,, | Performed by: OPHTHALMOLOGY

## 2024-05-07 PROCEDURE — 1159F MED LIST DOCD IN RCRD: CPT | Mod: CPTII,S$GLB,, | Performed by: OPHTHALMOLOGY

## 2024-05-07 NOTE — PROGRESS NOTES
===============================  Date today is 5/7/2024  Shirlene Olvera is a 68 y.o. female  Last visit [facility]: :Visit date not found   Last visit eye dept. 3/25/2024    Corrected distance visual acuity was 20/30 -2 in the right eye and 20/30 -2 in the left eye.  Tonometry       Tonometry (Tonopen, 10:45 AM)         Right Left    Pressure 17 17                  Wearing Rx       Wearing Rx         Sphere Cylinder Axis Add    Right +0.25 +0.50 018 +2.50    Left +0.25 +0.75 038 +2.50      Type: PAL                  Not recorded       Not recorded       Chief Complaint   Patient presents with    Nevus Conslunt      Pt is here for Nevus OS 1M  per DKT, pt states she see's wavy lines in OS va and flashes of light. Has no pain     HPI     Nevus Conslunt      Additional comments: Pt is here for Nevus OS 1M  per DKT, pt states she   see's wavy lines in OS va and flashes of light. Has no pain          Last edited by Cee Elias on 5/7/2024 10:40 AM.      Problem List Items Addressed This Visit    None  Visit Diagnoses       Choroidal nevus of left eye    -  Primary    Relevant Orders    Posterior Segment OCT Retina-Both eyes (Completed)    Color Fundus Photography - OU - Both Eyes (Completed)    Combined form of age-related cataract, both eyes        Hyperopia with astigmatism and presbyopia, bilateral              Instructed to call 24/7 for any worsening of vision, visual distortion or pain.  Check OU independently daily.    Gave my office and personal cell phone number.  ________________  5/7/2024 today  Shirlene Olvera    :  OS nevus eval per Dr. Sanders  Optos and OCT done today    Focal choroidal hyperpigmentation OS- ok to watch yearly  No elevation or opacity to OS    RTC 1 year with Dr. Sanders  Instructed to call 24/7 for any worsening of vision or symptoms. Check OU daily.   Gave my office and cell phone number.    =============================

## 2024-07-06 NOTE — PROGRESS NOTES
Pt was up to the bathroom. Unsteady on her feet but alert and oriented. Pt requested food and pain medications for her knee and back. Provided pt with a sandwich, crackers and juice. Dr. Landis was notified of pt's current condition. Ibuprofen was ordered for pain.    Subjective:       Patient ID: Shirlene Olvera is a 66 y.o. female.    Chief Complaint: Leg Swelling    Rash  This is a new problem. The current episode started in the past 7 days. The problem has been gradually improving since onset. Pertinent negatives include no shortness of breath.     Review of Systems   Respiratory: Negative for shortness of breath.    Cardiovascular: Negative for chest pain.   Gastrointestinal: Negative for abdominal pain.   Skin: Positive for rash.       Objective:      Physical Exam  Vitals and nursing note reviewed.   Constitutional:       General: She is not in acute distress.     Appearance: Normal appearance. She is well-developed. She is not diaphoretic.   HENT:      Head: Normocephalic and atraumatic.   Pulmonary:      Effort: Pulmonary effort is normal. No respiratory distress.      Breath sounds: Normal breath sounds. No wheezing.   Musculoskeletal:      Comments: ttp sherine trapezius muscles   Skin:     General: Skin is warm and dry.      Findings: Rash present. No erythema.   Neurological:      Mental Status: She is alert.         Assessment:       1. Essential hypertension    2. Pure hypercholesterolemia    3. History of non-ST elevation myocardial infarction (NSTEMI)    4. Strain of neck muscle, initial encounter    5. Delayed immunizations    6. Rash        Plan:     Problem List Items Addressed This Visit        Derm    Rash    Current Assessment & Plan     Cont kenalog              Cardiac/Vascular    Essential hypertension - Primary (Chronic)    Overview     Chronic, Stable, cont toprol, diovan-hctz           Hyperlipidemia (Chronic)    Overview     Cont asa, awaiting repatha           History of non-ST elevation myocardial infarction (NSTEMI)       ID    Delayed immunizations    Relevant Orders    Pneumococcal Polysaccharide Vaccine (23 Valent) (SQ/IM)       Orthopedic    Cervical strain    Relevant Medications    tiZANidine (ZANAFLEX) 4 MG tablet

## 2024-07-12 ENCOUNTER — OFFICE VISIT (OUTPATIENT)
Dept: OPHTHALMOLOGY | Facility: CLINIC | Age: 69
End: 2024-07-12
Payer: MEDICARE

## 2024-07-12 DIAGNOSIS — H52.7 REFRACTIVE ERROR: Primary | ICD-10-CM

## 2024-07-12 PROCEDURE — 99999 PR PBB SHADOW E&M-EST. PATIENT-LVL III: CPT | Mod: PBBFAC,HCNC,, | Performed by: OPTOMETRIST

## 2024-07-12 NOTE — PROGRESS NOTES
HPI     Blurred Vision            Comments: Patient here today for blurred vision with new glasses  Patient states lights are doubled when driving  C/O glare          Comments    Vision changes since last eye exam?: Yes at distance  Wears PAL glasses full-time     Any eye pain today: No    Other ocular symptoms: No    Interested in contact lens fitting today? No    1. Nevus OS                Last edited by Cookie Jones, PCT on 7/12/2024  9:31 AM.            Assessment /Plan     For exam results, see Encounter Report.    Refractive error      Eyeglass Final Rx       Eyeglass Final Rx         Sphere Cylinder Axis Add    Right [place] +1.75 013 +2.50    Left +0.50 +1.25 180 +2.50      Type: PAL    Expiration Date: 7/12/2025                  Patient trial framed at distance near and computer with updated Mrx. Seeing well. Mrx provided.     RTC as scheduled for annual eye examination, sooner if any changes to vision or worsening symptoms.

## 2024-09-09 ENCOUNTER — OFFICE VISIT (OUTPATIENT)
Dept: OPHTHALMOLOGY | Facility: CLINIC | Age: 69
End: 2024-09-09
Payer: MEDICARE

## 2024-09-09 DIAGNOSIS — H25.813 COMBINED FORM OF AGE-RELATED CATARACT, BOTH EYES: ICD-10-CM

## 2024-09-09 DIAGNOSIS — D31.32 CHOROIDAL NEVUS OF LEFT EYE: Primary | ICD-10-CM

## 2024-09-09 DIAGNOSIS — H52.7 REFRACTIVE ERROR: ICD-10-CM

## 2024-09-09 PROCEDURE — 92014 COMPRE OPH EXAM EST PT 1/>: CPT | Mod: HCNC,S$GLB,, | Performed by: OPTOMETRIST

## 2024-09-09 PROCEDURE — 99999 PR PBB SHADOW E&M-EST. PATIENT-LVL III: CPT | Mod: PBBFAC,HCNC,, | Performed by: OPTOMETRIST

## 2024-09-09 PROCEDURE — 4010F ACE/ARB THERAPY RXD/TAKEN: CPT | Mod: HCNC,CPTII,S$GLB, | Performed by: OPTOMETRIST

## 2024-09-09 PROCEDURE — 3044F HG A1C LEVEL LT 7.0%: CPT | Mod: HCNC,CPTII,S$GLB, | Performed by: OPTOMETRIST

## 2024-09-09 PROCEDURE — 1159F MED LIST DOCD IN RCRD: CPT | Mod: HCNC,CPTII,S$GLB, | Performed by: OPTOMETRIST

## 2024-09-09 NOTE — PROGRESS NOTES
HPI    Vision changes since last eye exam?: Pt states her right eye has become   more blurry and weak since her last eye exam has a hard time reading and   driving due to the blurriness with her right eye. Pt is wearing PAL full   time and is using OTC lubricating PRN.      Any eye pain today: Pt denies any eye pain.    Other ocular symptoms: Pt states her right eye began to have an increase   in floaters over the last 3 days.     Interested in contact lens fitting today? No.     Last edited by Betzy Etienne on 9/9/2024  9:41 AM.            Assessment /Plan     For exam results, see Encounter Report.    1. Choroidal nevus of left eye  Has seen Dr Guadarrama,  Stable at this time, observe with annual DFE & optos.    2. Combined form of age-related cataract, both eyes  Not visually significant, observe.    3. Refractive error  Continue wearing current RX.     RTC 1 yr for dilated eye exam w/ Optos or sooner if any changes to vision.   Discussed above and answered questions.

## 2024-11-06 DIAGNOSIS — H60.8X3 CHRONIC ECZEMATOUS OTITIS EXTERNA OF BOTH EARS: ICD-10-CM

## 2024-11-06 RX ORDER — FLUOCINOLONE ACETONIDE 0.11 MG/ML
3 OIL AURICULAR (OTIC) 2 TIMES DAILY
Qty: 20 EACH | Refills: 2 | Status: SHIPPED | OUTPATIENT
Start: 2024-11-06

## 2024-12-04 DIAGNOSIS — N18.32 STAGE 3B CHRONIC KIDNEY DISEASE: ICD-10-CM

## 2025-01-14 DIAGNOSIS — Z00.00 ENCOUNTER FOR MEDICARE ANNUAL WELLNESS EXAM: ICD-10-CM

## 2025-03-06 ENCOUNTER — PATIENT MESSAGE (OUTPATIENT)
Dept: ADMINISTRATIVE | Facility: HOSPITAL | Age: 70
End: 2025-03-06
Payer: MEDICARE

## 2025-03-31 ENCOUNTER — PATIENT OUTREACH (OUTPATIENT)
Dept: ADMINISTRATIVE | Facility: HOSPITAL | Age: 70
End: 2025-03-31
Payer: MEDICARE

## 2025-04-14 ENCOUNTER — TELEPHONE (OUTPATIENT)
Dept: PRIMARY CARE CLINIC | Facility: CLINIC | Age: 70
End: 2025-04-14
Payer: MEDICARE

## 2025-04-14 VITALS — SYSTOLIC BLOOD PRESSURE: 110 MMHG | DIASTOLIC BLOOD PRESSURE: 58 MMHG

## 2025-06-09 DIAGNOSIS — I25.10 CORONARY ARTERY DISEASE INVOLVING NATIVE CORONARY ARTERY OF NATIVE HEART WITHOUT ANGINA PECTORIS: Chronic | ICD-10-CM

## 2025-06-09 DIAGNOSIS — I10 ESSENTIAL HYPERTENSION: Chronic | ICD-10-CM

## 2025-06-09 NOTE — TELEPHONE ENCOUNTER
Care Due:                  Date            Visit Type   Department     Provider  --------------------------------------------------------------------------------                                EP -                              PRIMARY      [facility] PRIMARY  Last Visit: 02-      CARE ([facility])   CARMELITA Miranda                              EP -                              PRIMARY      [facility] PRIMARY  Next Visit: 06-      CARE ([facility])   CARMELITA Miranda                                                            Last  Test          Frequency    Reason                     Performed    Due Date  --------------------------------------------------------------------------------    CBC.........  12 months..  clopidogreL..............  07- 07-    CMP.........  12 months..  furosemide, valsartan....  07- 07-    [employer] Embedded Care Due Messages. Reference number: [id number].   6/09/2025 9:45:09 AM CDT

## 2025-06-09 NOTE — TELEPHONE ENCOUNTER
Refill Routing Note   Medication(s) are not appropriate for processing by [facility] for the following reason(s):        Patient not seen by provider within 15 months    [facility] action(s):  Defer     Requires labs : Yes             Appointments  past 12m or future 3m with PCP    Date Provider   Last Visit   2/5/2024 Hermes Miranda MD   Next Visit   6/17/2025 Hermes Miranda MD   ED visits in past 90 days: 0        Note composed:4:30 PM 06/09/2025

## 2025-06-10 ENCOUNTER — PATIENT MESSAGE (OUTPATIENT)
Dept: ADMINISTRATIVE | Facility: CLINIC | Age: 70
End: 2025-06-10
Payer: MEDICARE

## 2025-06-13 RX ORDER — METOPROLOL SUCCINATE 50 MG/1
50 TABLET, EXTENDED RELEASE ORAL DAILY
Qty: 90 TABLET | Refills: 3 | Status: SHIPPED | OUTPATIENT
Start: 2025-06-13

## 2025-06-18 DIAGNOSIS — N18.32 STAGE 3B CHRONIC KIDNEY DISEASE: ICD-10-CM

## (undated) DEVICE — CATH IMPULSE PIGTAIL 6F 110CM

## (undated) DEVICE — KIT GLIDESHEATH SLEND 6FR 10CM

## (undated) DEVICE — SHEATH INTRODUCER 6FR 11CM

## (undated) DEVICE — GUIDE LAUNCHER 6FR JR 4.0

## (undated) DEVICE — GUIDEWIRE WHOLEY HI TORQ 175CM

## (undated) DEVICE — PACK CATH LAB CUSTOM BR

## (undated) DEVICE — CATH TURNPIKE LP 135CM

## (undated) DEVICE — GUIDEWIRE SION BLU .014X300CM

## (undated) DEVICE — CATH DIAG IMPULSE 6FR FL4

## (undated) DEVICE — KIT MANIFOLD LOW PRESS TUBING

## (undated) DEVICE — ANGIOTOUCH KIT

## (undated) DEVICE — CATH IMPULSE 6FR MULTI-PAK

## (undated) DEVICE — KIT WATCHDOG HEMSTAS VALVE 8FR

## (undated) DEVICE — SEE MEDLINE ITEM 157187

## (undated) DEVICE — INFLATOR ENCORE 26 BLLN INFL

## (undated) DEVICE — GUIDE RUNWAY 6FR VODA 3.5

## (undated) DEVICE — GUIDEWIRE FIELDER XT.014X300CM

## (undated) DEVICE — CATH NC QUANTUM APEX MR 3.5X12

## (undated) DEVICE — CONTRAST OMNIPAQUE 240 150ML

## (undated) DEVICE — CATH INFINITI 4F 3DRC 100CM

## (undated) DEVICE — CATH SAPPH II PRO SC 2.5X20MM

## (undated) DEVICE — Device

## (undated) DEVICE — CATH DIAG IMPULSE 6FR FR4

## (undated) DEVICE — GUIDEWIRE EMERALD .035IN 260CM